# Patient Record
Sex: MALE | Race: WHITE | Employment: FULL TIME | URBAN - METROPOLITAN AREA
[De-identification: names, ages, dates, MRNs, and addresses within clinical notes are randomized per-mention and may not be internally consistent; named-entity substitution may affect disease eponyms.]

---

## 2018-10-16 ENCOUNTER — HOSPITAL ENCOUNTER (EMERGENCY)
Age: 58
Discharge: HOME | End: 2018-10-17
Attending: EMERGENCY MEDICINE
Payer: COMMERCIAL

## 2018-10-16 ENCOUNTER — APPOINTMENT (OUTPATIENT)
Dept: GENERAL RADIOLOGY | Age: 58
End: 2018-10-16
Attending: EMERGENCY MEDICINE
Payer: COMMERCIAL

## 2018-10-16 ENCOUNTER — APPOINTMENT (OUTPATIENT)
Dept: ULTRASOUND IMAGING | Age: 58
End: 2018-10-16
Attending: EMERGENCY MEDICINE
Payer: COMMERCIAL

## 2018-10-16 DIAGNOSIS — R07.9 CHEST PAIN, UNSPECIFIED TYPE: Primary | ICD-10-CM

## 2018-10-16 LAB
ALBUMIN SERPL-MCNC: 3.5 G/DL (ref 3.5–5)
ALBUMIN/GLOB SERPL: 0.9 {RATIO} (ref 1.2–3.5)
ALP SERPL-CCNC: 84 U/L (ref 50–136)
ALT SERPL-CCNC: 43 U/L (ref 12–65)
ANION GAP SERPL CALC-SCNC: 8 MMOL/L (ref 7–16)
AST SERPL-CCNC: 23 U/L (ref 15–37)
BASOPHILS # BLD: 0.1 K/UL (ref 0–0.2)
BASOPHILS NFR BLD: 1 % (ref 0–2)
BILIRUB SERPL-MCNC: 0.4 MG/DL (ref 0.2–1.1)
BUN SERPL-MCNC: 29 MG/DL (ref 6–23)
CALCIUM SERPL-MCNC: 8.9 MG/DL (ref 8.3–10.4)
CHLORIDE SERPL-SCNC: 103 MMOL/L (ref 98–107)
CO2 SERPL-SCNC: 27 MMOL/L (ref 21–32)
CREAT SERPL-MCNC: 1.7 MG/DL (ref 0.8–1.5)
DIFFERENTIAL METHOD BLD: NORMAL
EOSINOPHIL # BLD: 0.2 K/UL (ref 0–0.8)
EOSINOPHIL NFR BLD: 2 % (ref 0.5–7.8)
ERYTHROCYTE [DISTWIDTH] IN BLOOD BY AUTOMATED COUNT: 12.7 %
GLOBULIN SER CALC-MCNC: 4 G/DL (ref 2.3–3.5)
GLUCOSE SERPL-MCNC: 183 MG/DL (ref 65–100)
HCT VFR BLD AUTO: 42.3 % (ref 41.1–50.3)
HGB BLD-MCNC: 14.2 G/DL (ref 13.6–17.2)
IMM GRANULOCYTES # BLD: 0.1 K/UL (ref 0–0.5)
IMM GRANULOCYTES NFR BLD AUTO: 1 % (ref 0–5)
LYMPHOCYTES # BLD: 2.7 K/UL (ref 0.5–4.6)
LYMPHOCYTES NFR BLD: 27 % (ref 13–44)
MCH RBC QN AUTO: 29.5 PG (ref 26.1–32.9)
MCHC RBC AUTO-ENTMCNC: 33.6 G/DL (ref 31.4–35)
MCV RBC AUTO: 87.9 FL (ref 79.6–97.8)
MONOCYTES # BLD: 0.9 K/UL (ref 0.1–1.3)
MONOCYTES NFR BLD: 9 % (ref 4–12)
NEUTS SEG # BLD: 5.9 K/UL (ref 1.7–8.2)
NEUTS SEG NFR BLD: 60 % (ref 43–78)
NRBC # BLD: 0 K/UL (ref 0–0.2)
PLATELET # BLD AUTO: 225 K/UL (ref 150–450)
PMV BLD AUTO: 9.7 FL (ref 9.4–12.3)
POTASSIUM SERPL-SCNC: 3.7 MMOL/L (ref 3.5–5.1)
PROT SERPL-MCNC: 7.5 G/DL (ref 6.3–8.2)
RBC # BLD AUTO: 4.81 M/UL (ref 4.23–5.6)
SODIUM SERPL-SCNC: 138 MMOL/L (ref 136–145)
TROPONIN I SERPL-MCNC: <0.02 NG/ML (ref 0.02–0.05)
WBC # BLD AUTO: 9.8 K/UL (ref 4.3–11.1)

## 2018-10-16 PROCEDURE — 84484 ASSAY OF TROPONIN QUANT: CPT

## 2018-10-16 PROCEDURE — 85025 COMPLETE CBC W/AUTO DIFF WBC: CPT

## 2018-10-16 PROCEDURE — 71046 X-RAY EXAM CHEST 2 VIEWS: CPT

## 2018-10-16 PROCEDURE — 99285 EMERGENCY DEPT VISIT HI MDM: CPT | Performed by: EMERGENCY MEDICINE

## 2018-10-16 PROCEDURE — 93005 ELECTROCARDIOGRAM TRACING: CPT | Performed by: EMERGENCY MEDICINE

## 2018-10-16 PROCEDURE — 93971 EXTREMITY STUDY: CPT

## 2018-10-16 PROCEDURE — 80053 COMPREHEN METABOLIC PANEL: CPT

## 2018-10-17 VITALS
BODY MASS INDEX: 35.55 KG/M2 | WEIGHT: 240 LBS | HEART RATE: 54 BPM | SYSTOLIC BLOOD PRESSURE: 156 MMHG | RESPIRATION RATE: 18 BRPM | DIASTOLIC BLOOD PRESSURE: 85 MMHG | OXYGEN SATURATION: 97 % | TEMPERATURE: 98 F | HEIGHT: 69 IN

## 2018-10-17 LAB
ATRIAL RATE: 63 BPM
CALCULATED P AXIS, ECG09: 44 DEGREES
CALCULATED R AXIS, ECG10: 22 DEGREES
CALCULATED T AXIS, ECG11: 64 DEGREES
DIAGNOSIS, 93000: NORMAL
P-R INTERVAL, ECG05: 162 MS
Q-T INTERVAL, ECG07: 440 MS
QRS DURATION, ECG06: 104 MS
QTC CALCULATION (BEZET), ECG08: 450 MS
TROPONIN I BLD-MCNC: 0 NG/ML (ref 0.02–0.05)
VENTRICULAR RATE, ECG03: 63 BPM

## 2018-10-17 NOTE — ED PROVIDER NOTES
HPI Comments: Patient is a 59-year-old male who comes to the emergency department today with several complaints. He states he is having swellingin the left leg for about 4 days. Today he's been having some substernal chest pressure. He states he had a heart stent placed a few years ago. He thought that this pain was indigestion. He does admit to some shortness of breath. He took nitroglycerin several times with minimal minimal change. He also R he took aspirin today. Patient is a 62 y.o. male presenting with chest pain. The history is provided by the patient. Chest Pain (Angina) This is a new problem. The current episode started 12 to 24 hours ago. The problem has not changed since onset. The problem occurs constantly. The pain is associated with normal activity. The pain is present in the substernal region. The quality of the pain is described as pressure-like. The pain does not radiate. Associated symptoms include shortness of breath. Pertinent negatives include no abdominal pain, no cough, no diaphoresis, no fever, no hemoptysis, no irregular heartbeat, no palpitations, no sputum production and no vomiting. He has tried aspirin and nitroglycerin for the symptoms. Risk factors include hypertension and male gender. His past medical history is significant for HTN. Procedural history includes cardiac catheterization and cardiac stents. Past Medical History:  
Diagnosis Date  Gastrointestinal disorder   
 reflux  Hypertension  Obesity 10/6/2015  Other ill-defined conditions(799.89)   
 dyspnea since surgery, wheezing, SOB  Unstable angina ([facility] [address] 10/27/2016 Past Surgical History:  
Procedure Laterality Date  HX CHOLECYSTECTOMY  HX ORTHOPAEDIC    
 rotator cuff; knee  HX UROLOGICAL    
 kidney stone surgeries x 3 No family history on file. Social History Social History  Marital status:    Spouse name: N/A  
 Number of children: N/A  
  Years of education: N/A Occupational History  Not on file. Social History Main Topics  Smoking status: Never Smoker  Smokeless tobacco: Not on file  Alcohol use No  
 Drug use: No  
 Sexual activity: Not on file Other Topics Concern  Not on file Social History Narrative ALLERGIES: Latex; Hydrocodone-acetaminophen; and Tessalon perles [benzonatate] Review of Systems Constitutional: Negative for diaphoresis and fever. HENT: Negative. Eyes: Negative. Respiratory: Positive for shortness of breath. Negative for cough, hemoptysis and sputum production. Cardiovascular: Positive for chest pain and leg swelling. Negative for palpitations. Gastrointestinal: Negative for abdominal pain and vomiting. Endocrine: Negative. Genitourinary: Negative. Musculoskeletal: Negative. Neurological: Negative. Vitals:  
 10/16/18 2111 BP: (!) 180/102 Pulse: 66 Resp: 18 Temp: 98 °F (36.7 °C) SpO2: 96% Weight: 108.9 kg (240 lb) Height: 5' 9\" (1.753 m) Physical Exam  
Constitutional: He is oriented to person, place, and time. He appears well-developed and well-nourished. HENT:  
Head: Normocephalic and atraumatic. Neck: Normal range of motion. Neck supple. Cardiovascular: Normal rate, regular rhythm and intact distal pulses. Pulmonary/Chest: Effort normal and breath sounds normal. No respiratory distress. He exhibits tenderness. Abdominal: There is no tenderness. There is no rebound and no guarding. Musculoskeletal: He exhibits edema. Swelling to the left lower extremity Neurological: He is oriented to person, place, and time. He has normal strength. No cranial nerve deficit or sensory deficit. GCS eye subscore is 4. GCS verbal subscore is 5. GCS motor subscore is 6. Skin: Skin is warm and dry. No rash noted. Nursing note and vitals reviewed. MDM Number of Diagnoses or Management Options Diagnosis management comments: EKG shows normal sinus rhythm at a rate of 63 with no acute ischemia 12:14 AM 
Chest x-ray is negative, troponin 0 and repeat 2 hour troponin remained negative. Venous duplex scan of the left lower extremity is also negative for DVT. I went back and spoke to patient about all these results he has not followed up with cardiology in several years. I offered admission and further workup and he declines I will make a phone call referral to the [facility] cardiology referral line to have the patient contacted for expedited outpatient follow-up Voice dictation software was used during the making of this note. This software is not perfect and grammatical and other typographical errors may be present. This note has been proofread, but may still contain errors. Hyacinth Martin MD; 10/17/2018 @12:15 AM  
=================================================================== Amount and/or Complexity of Data Reviewed Clinical lab tests: ordered and reviewed Tests in the radiology section of CPT®: ordered and reviewed Review and summarize past medical records: yes Independent visualization of images, tracings, or specimens: yes Risk of Complications, Morbidity, and/or Mortality Presenting problems: moderate Diagnostic procedures: moderate Management options: moderate Patient Progress Patient progress: stable ED Course Procedures

## 2018-10-17 NOTE — DISCHARGE INSTRUCTIONS
Chest Pain: Care Instructions  Your Care Instructions    There are many things that can cause chest pain. Some are not serious and will get better on their own in a few days. But some kinds of chest pain need more testing and treatment. Your doctor may have recommended a follow-up visit in the next 8 to 12 hours. If you are not getting better, you may need more tests or treatment. Even though your doctor has released you, you still need to watch for any problems. The doctor carefully checked you, but sometimes problems can develop later. If you have new symptoms or if your symptoms do not get better, get medical care right away. If you have worse or different chest pain or pressure that lasts more than 5 minutes or you passed out (lost consciousness), call 911 or seek other emergency help right away. A medical visit is only one step in your treatment. Even if you feel better, you still need to do what your doctor recommends, such as going to all suggested follow-up appointments and taking medicines exactly as directed. This will help you recover and help prevent future problems. How can you care for yourself at home? · Rest until you feel better. · Take your medicine exactly as prescribed. Call your doctor if you think you are having a problem with your medicine. · Do not drive after taking a prescription pain medicine. When should you call for help? Call 911 if:    · You passed out (lost consciousness).     · You have severe difficulty breathing.     · You have symptoms of a heart attack. These may include:  ¨ Chest pain or pressure, or a strange feeling in your chest.  ¨ Sweating. ¨ Shortness of breath. ¨ Nausea or vomiting. ¨ Pain, pressure, or a strange feeling in your back, neck, jaw, or upper belly or in one or both shoulders or arms. ¨ Lightheadedness or sudden weakness. ¨ A fast or irregular heartbeat.   After you call 911, the  may tell you to chew 1 adult-strength or 2 to 4 low-dose aspirin. Wait for an ambulance. Do not try to drive yourself.    Call your doctor today if:    · You have any trouble breathing.     · Your chest pain gets worse.     · You are dizzy or lightheaded, or you feel like you may faint.     · You are not getting better as expected.     · You are having new or different chest pain. Where can you learn more? Go to http://jim-sharad.info/. Enter A120 in the search box to learn more about \"Chest Pain: Care Instructions. \"  Current as of: November 20, 2017  Content Version: 11.8  © 8925-9077 [employer]. Care instructions adapted under license by [employer] (which disclaims liability or warranty for this information). If you have questions about a medical condition or this instruction, always ask your healthcare professional. [address] any warranty or liability for your use of this information.

## 2018-10-17 NOTE — ED NOTES
I have reviewed discharge instructions with the patient. The patient verbalized understanding. Patient left ED via Discharge Method: ambulatory to Home with self. Opportunity for questions and clarification provided. Patient given 0 scripts. To continue your aftercare when you leave the hospital, you may receive an automated call from our care team to check in on how you are doing. This is a free service and part of our promise to provide the best care and service to meet your aftercare needs.  If you have questions, or wish to unsubscribe from this service please call [phone]. Thank you for Choosing our [facility] Emergency Department.

## 2018-10-17 NOTE — ED TRIAGE NOTES
Patient states chest pain that has been on going all day. Also states leg swelling for the past 3 days. States that he has taken nitro 2 times today with some relief. States that he took 81mg aspirin about 45 min PTA. States that pain is similar to indigestion but is intermittently sharp. States shortness of breath and pain in his left arm.

## 2018-11-29 ENCOUNTER — HOSPITAL ENCOUNTER (OUTPATIENT)
Dept: LAB | Age: 58
Discharge: HOME | End: 2018-11-29
Payer: COMMERCIAL

## 2018-11-29 DIAGNOSIS — I25.119 CORONARY ARTERY DISEASE INVOLVING NATIVE CORONARY ARTERY OF NATIVE HEART WITH ANGINA PECTORIS (HCC): ICD-10-CM

## 2018-11-29 PROBLEM — E78.5 DYSLIPIDEMIA, GOAL LDL BELOW 70: Status: ACTIVE | Noted: 2018-11-29

## 2018-11-29 LAB
ANION GAP SERPL CALC-SCNC: 8 MMOL/L
BASOPHILS # BLD: 0 K/UL (ref 0–0.2)
BASOPHILS NFR BLD: 0 % (ref 0–2)
BUN SERPL-MCNC: 22 MG/DL (ref 6–23)
CALCIUM SERPL-MCNC: 8.8 MG/DL (ref 8.3–10.4)
CHLORIDE SERPL-SCNC: 102 MMOL/L (ref 98–107)
CO2 SERPL-SCNC: 28 MMOL/L (ref 21–32)
CREAT SERPL-MCNC: 1.5 MG/DL (ref 0.8–1.5)
DIFFERENTIAL METHOD BLD: NORMAL
EOSINOPHIL # BLD: 0.1 K/UL (ref 0–0.8)
EOSINOPHIL NFR BLD: 1 % (ref 0.5–7.8)
ERYTHROCYTE [DISTWIDTH] IN BLOOD BY AUTOMATED COUNT: 12.4 % (ref 11.9–14.6)
GLUCOSE SERPL-MCNC: 218 MG/DL (ref 65–100)
HCT VFR BLD AUTO: 44 % (ref 41.1–50.3)
HGB BLD-MCNC: 15.1 G/DL (ref 13.6–17.2)
IMM GRANULOCYTES # BLD: 0.1 K/UL (ref 0–0.5)
IMM GRANULOCYTES NFR BLD AUTO: 1 % (ref 0–5)
INR PPP: 1
LYMPHOCYTES # BLD: 2.5 K/UL (ref 0.5–4.6)
LYMPHOCYTES NFR BLD: 25 % (ref 13–44)
MCH RBC QN AUTO: 29.7 PG (ref 26.1–32.9)
MCHC RBC AUTO-ENTMCNC: 34.3 G/DL (ref 31.4–35)
MCV RBC AUTO: 86.6 FL (ref 79.6–97.8)
MONOCYTES # BLD: 0.7 K/UL (ref 0.1–1.3)
MONOCYTES NFR BLD: 7 % (ref 4–12)
NEUTS SEG # BLD: 6.5 K/UL (ref 1.7–8.2)
NEUTS SEG NFR BLD: 65 % (ref 43–78)
NRBC # BLD: 0 K/UL (ref 0–0.2)
PLATELET # BLD AUTO: 189 K/UL (ref 150–450)
PMV BLD AUTO: 9.5 FL (ref 9.4–12.3)
POTASSIUM SERPL-SCNC: 3.9 MMOL/L (ref 3.5–5.1)
PROTHROMBIN TIME: 13 SEC (ref 11.5–14.5)
RBC # BLD AUTO: 5.08 M/UL (ref 4.23–5.6)
SODIUM SERPL-SCNC: 138 MMOL/L (ref 136–145)
WBC # BLD AUTO: 10 K/UL (ref 4.3–11.1)

## 2018-11-29 PROCEDURE — 36415 COLL VENOUS BLD VENIPUNCTURE: CPT

## 2018-11-29 PROCEDURE — 80048 BASIC METABOLIC PNL TOTAL CA: CPT

## 2018-11-29 PROCEDURE — 85025 COMPLETE CBC W/AUTO DIFF WBC: CPT

## 2018-11-29 PROCEDURE — 85610 PROTHROMBIN TIME: CPT

## 2018-12-05 NOTE — PROGRESS NOTES
Patient pre-assessment complete for [facility] poss with Dr Willy Felipe scheduled for 18 at 9:30am , arrival time 7:30am. Patient verified using . Patient instructed to bring all home medications in labeled bottles on the day of procedure. NPO status reinforced. Patient informed to take a full dose aspirin 325mg  or 81 mg x 4 on the day of procedure. Instructed they can take all other medications excluding vitamins & supplements. Patient verbalizes understanding of all instructions & denies any questions at this time.

## 2018-12-06 ENCOUNTER — HOSPITAL ENCOUNTER (OUTPATIENT)
Dept: CARDIAC CATH/INVASIVE PROCEDURES | Age: 58
Discharge: HOME | End: 2018-12-06
Attending: INTERNAL MEDICINE | Admitting: INTERNAL MEDICINE
Payer: COMMERCIAL

## 2018-12-06 VITALS
WEIGHT: 220 LBS | OXYGEN SATURATION: 96 % | TEMPERATURE: 98.1 F | HEIGHT: 69 IN | HEART RATE: 53 BPM | BODY MASS INDEX: 32.58 KG/M2 | RESPIRATION RATE: 16 BRPM | SYSTOLIC BLOOD PRESSURE: 184 MMHG | DIASTOLIC BLOOD PRESSURE: 86 MMHG

## 2018-12-06 LAB
ANION GAP SERPL CALC-SCNC: 6 MMOL/L (ref 7–16)
ATRIAL RATE: 46 BPM
BUN SERPL-MCNC: 18 MG/DL (ref 6–23)
CALCIUM SERPL-MCNC: 8.6 MG/DL (ref 8.3–10.4)
CALCULATED P AXIS, ECG09: 38 DEGREES
CALCULATED R AXIS, ECG10: 12 DEGREES
CALCULATED T AXIS, ECG11: 48 DEGREES
CHLORIDE SERPL-SCNC: 105 MMOL/L (ref 98–107)
CO2 SERPL-SCNC: 27 MMOL/L (ref 21–32)
CREAT SERPL-MCNC: 1.4 MG/DL (ref 0.8–1.5)
DIAGNOSIS, 93000: NORMAL
GLUCOSE SERPL-MCNC: 207 MG/DL (ref 65–100)
P-R INTERVAL, ECG05: 164 MS
POTASSIUM SERPL-SCNC: 4.1 MMOL/L (ref 3.5–5.1)
Q-T INTERVAL, ECG07: 456 MS
QRS DURATION, ECG06: 102 MS
QTC CALCULATION (BEZET), ECG08: 399 MS
SODIUM SERPL-SCNC: 138 MMOL/L (ref 136–145)
VENTRICULAR RATE, ECG03: 46 BPM

## 2018-12-06 PROCEDURE — 99152 MOD SED SAME PHYS/QHP 5/>YRS: CPT

## 2018-12-06 PROCEDURE — 93458 L HRT ARTERY/VENTRICLE ANGIO: CPT

## 2018-12-06 PROCEDURE — 93005 ELECTROCARDIOGRAM TRACING: CPT | Performed by: INTERNAL MEDICINE

## 2018-12-06 PROCEDURE — 77030004534 HC CATH ANGI DX INFN CARD -A

## 2018-12-06 PROCEDURE — 80048 BASIC METABOLIC PNL TOTAL CA: CPT

## 2018-12-06 PROCEDURE — 74011250636 HC RX REV CODE- 250/636: Performed by: INTERNAL MEDICINE

## 2018-12-06 PROCEDURE — 74011636320 HC RX REV CODE- 636/320: Performed by: INTERNAL MEDICINE

## 2018-12-06 PROCEDURE — 77030015766

## 2018-12-06 PROCEDURE — 77030019569 HC BND COMPR RAD TERU -B

## 2018-12-06 PROCEDURE — 74011250636 HC RX REV CODE- 250/636

## 2018-12-06 PROCEDURE — 74011000250 HC RX REV CODE- 250: Performed by: INTERNAL MEDICINE

## 2018-12-06 PROCEDURE — C1769 GUIDE WIRE: HCPCS

## 2018-12-06 PROCEDURE — C1894 INTRO/SHEATH, NON-LASER: HCPCS

## 2018-12-06 RX ORDER — GUAIFENESIN 100 MG/5ML
81 LIQUID (ML) ORAL ONCE
Status: DISCONTINUED | OUTPATIENT
Start: 2018-12-06 | End: 2018-12-06 | Stop reason: HOSPADM

## 2018-12-06 RX ORDER — FENTANYL CITRATE 50 UG/ML
25-50 INJECTION, SOLUTION INTRAMUSCULAR; INTRAVENOUS
Status: DISCONTINUED | OUTPATIENT
Start: 2018-12-06 | End: 2018-12-06 | Stop reason: HOSPADM

## 2018-12-06 RX ORDER — MIDAZOLAM HYDROCHLORIDE 1 MG/ML
.5-2 INJECTION, SOLUTION INTRAMUSCULAR; INTRAVENOUS
Status: DISCONTINUED | OUTPATIENT
Start: 2018-12-06 | End: 2018-12-06 | Stop reason: HOSPADM

## 2018-12-06 RX ORDER — LIDOCAINE HYDROCHLORIDE 20 MG/ML
1-20 INJECTION, SOLUTION EPIDURAL; INFILTRATION; INTRACAUDAL; PERINEURAL
Status: DISCONTINUED | OUTPATIENT
Start: 2018-12-06 | End: 2018-12-06 | Stop reason: HOSPADM

## 2018-12-06 RX ORDER — SODIUM CHLORIDE 9 MG/ML
75 INJECTION, SOLUTION INTRAVENOUS CONTINUOUS
Status: DISCONTINUED | OUTPATIENT
Start: 2018-12-06 | End: 2018-12-06 | Stop reason: HOSPADM

## 2018-12-06 RX ORDER — HEPARIN SODIUM 200 [USP'U]/100ML
3 INJECTION, SOLUTION INTRAVENOUS CONTINUOUS
Status: DISCONTINUED | OUTPATIENT
Start: 2018-12-06 | End: 2018-12-06 | Stop reason: HOSPADM

## 2018-12-06 RX ORDER — DIAZEPAM 5 MG/1
5 TABLET ORAL AS NEEDED
Status: DISCONTINUED | OUTPATIENT
Start: 2018-12-06 | End: 2018-12-06 | Stop reason: HOSPADM

## 2018-12-06 RX ADMIN — HEPARIN SODIUM 3 ML/HR: 5000 INJECTION, SOLUTION INTRAVENOUS; SUBCUTANEOUS at 09:41

## 2018-12-06 RX ADMIN — HEPARIN SODIUM 2 ML: 10000 INJECTION, SOLUTION INTRAVENOUS; SUBCUTANEOUS at 09:58

## 2018-12-06 RX ADMIN — LIDOCAINE HYDROCHLORIDE 60 MG: 20 INJECTION, SOLUTION EPIDURAL; INFILTRATION; INTRACAUDAL; PERINEURAL at 09:58

## 2018-12-06 RX ADMIN — MIDAZOLAM HYDROCHLORIDE 2 MG: 1 INJECTION, SOLUTION INTRAMUSCULAR; INTRAVENOUS at 09:54

## 2018-12-06 RX ADMIN — IOPAMIDOL 90 ML: 755 INJECTION, SOLUTION INTRAVENOUS at 10:13

## 2018-12-06 RX ADMIN — FENTANYL CITRATE 25 MCG: 50 INJECTION, SOLUTION INTRAMUSCULAR; INTRAVENOUS at 09:54

## 2018-12-06 RX ADMIN — SODIUM CHLORIDE 75 ML/HR: 900 INJECTION, SOLUTION INTRAVENOUS at 08:45

## 2018-12-06 NOTE — PROGRESS NOTES
Patient received to [address] [place] [address] room # 11  Ambulatory from [facility]. Patient scheduled for [facility] today with Dr Bryce Felipe. Procedure reviewed & questions answered, voiced good understanding consent obtained & placed on chart. All medications and medical history reviewed. Will prep patient per orders. Patient & family updated on plan of care. The patient has a fraility score of 3-MANAGING WELL, based on reports recent CP and SOB frequently

## 2018-12-06 NOTE — PROCEDURES
[address]  MR#: [id number]  : 1960  ACCOUNT #: [de-identified]   DATE OF SERVICE: 2018    CLINICAL INFORMATION:  The patient with chest pain, known coronary artery disease, referred for catheterization. PROCEDURE DETAILS:  After informed consent was obtained, a 6-Ecuadorean sheath was placed in the right radial artery. A cocktail was given by protocol. A 5-Ecuadorean Tiger catheter, JL3.5 catheter was used to cannulate the LAD. As there is a very short left main, an angled pigtail were used. TR band was placed at the end of the procedure. Conscious sedation was given under my supervision by Edilia Rendon RN, beginning at 9:55 concluding 10:15, a total of 2 mg Versed, 25 mcg fentanyl were given. Vital signs and saturations were stable throughout. FINDINGS:  The circumflex has proximal to this marginal branch 20% ostial narrowing. There was a large midvessel obtuse marginal branch with 30% smooth proximal narrowing. This vessel bifurcates distally. The small distal obtuse marginal branch. This was unchanged from previous catheterization films. The right coronary is small dominant vessel with no atherosclerosis seen throughout. The LAD is widely patent. There is a stent in the mid portion of the vessel that is widely patent with 20% narrowing proximal to the stent, there is a diagonal branch with  30-40% narrowing. Within it is a small diagonal branch and unchanged from previous catheterization films. Left ventriculogram reveals normal systolic function with an ejection fraction of 50-55%. Left ventricular end diastolic pressure 17 mmHg with an opening aortic pressure 122/74. No gradient across the aortic valve. CONCLUSION:  1. Mild coronary atherosclerotic heart disease with no high-grade lesions seen. 2.  Preserved left ventricular systolic function.       Scar Fierro MD       [facility] / SN  D: 12/06/2018 10:29     T: 12/06/2018 10:43  JOB #: [id number]

## 2018-12-06 NOTE — DISCHARGE INSTRUCTIONS

## 2018-12-06 NOTE — PROGRESS NOTES
TRANSFER - OUT REPORT: 
 
Verbal report given to Geovanni Members on [facility].  being transferred to [address] [place] [address] for routine progression of care Report consisted of patients Situation, Background, Assessment and Recommendations(SBAR). Information from the following report(s) SBAR, Kardex, Procedure Summary and MAR was reviewed with the receiving nurse. Opportunity for questions and clarification was provided. [facility] with Dr Ken Jones No intervention 2 versed 25 fentanyl Right radial Rband 12ml at 1015

## 2018-12-06 NOTE — PROGRESS NOTES
Report received from [facility] Cath Lab RN. Procedural findings communicated. Intra procedural  medication administration reviewed. Progression of care discussed. Patient received into [address] post sheath removal.  
 
Right  Radial access site without bleeding or swelling TR band dry and intact Patient instructed to limit movement to right upper extremity Routine post procedural vital signs and site assessment initiated

## 2021-01-06 ENCOUNTER — APPOINTMENT (OUTPATIENT)
Dept: GENERAL RADIOLOGY | Age: 61
DRG: 004 | End: 2021-01-06
Attending: EMERGENCY MEDICINE
Payer: COMMERCIAL

## 2021-01-06 ENCOUNTER — HOSPITAL ENCOUNTER (INPATIENT)
Age: 61
LOS: 50 days | Discharge: NURSING, REHAB OR LONG-TERM CARE | DRG: 004 | End: 2021-02-26
Attending: EMERGENCY MEDICINE | Admitting: HOSPITALIST
Payer: COMMERCIAL

## 2021-01-06 DIAGNOSIS — J96.01 ACUTE HYPOXEMIC RESPIRATORY FAILURE (HCC): ICD-10-CM

## 2021-01-06 DIAGNOSIS — I95.9 HYPOTENSION, UNSPECIFIED HYPOTENSION TYPE: ICD-10-CM

## 2021-01-06 DIAGNOSIS — I48.0 PAROXYSMAL ATRIAL FIBRILLATION (HCC): ICD-10-CM

## 2021-01-06 DIAGNOSIS — K92.2 LOWER GI BLEED REQUIRING MORE THAN 4 UNITS OF BLOOD IN 24 HOURS, ICU, OR SURGERY: ICD-10-CM

## 2021-01-06 DIAGNOSIS — A41.9 SEPTIC SHOCK (HCC): ICD-10-CM

## 2021-01-06 DIAGNOSIS — R06.00 DYSPNEA, UNSPECIFIED TYPE: ICD-10-CM

## 2021-01-06 DIAGNOSIS — R09.02 HYPOXIA: ICD-10-CM

## 2021-01-06 DIAGNOSIS — Z51.5 ENCOUNTER FOR PALLIATIVE CARE: ICD-10-CM

## 2021-01-06 DIAGNOSIS — U07.1 PNEUMONIA DUE TO COVID-19 VIRUS: ICD-10-CM

## 2021-01-06 DIAGNOSIS — R73.9 HYPERGLYCEMIA: ICD-10-CM

## 2021-01-06 DIAGNOSIS — E86.0 DEHYDRATION: ICD-10-CM

## 2021-01-06 DIAGNOSIS — I82.452 ACUTE DEEP VEIN THROMBOSIS (DVT) OF LEFT PERONEAL VEIN (HCC): ICD-10-CM

## 2021-01-06 DIAGNOSIS — I25.118 CORONARY ARTERY DISEASE OF NATIVE ARTERY OF NATIVE HEART WITH STABLE ANGINA PECTORIS (HCC): ICD-10-CM

## 2021-01-06 DIAGNOSIS — E87.0 HYPERNATREMIA: ICD-10-CM

## 2021-01-06 DIAGNOSIS — J12.82 PNEUMONIA DUE TO COVID-19 VIRUS: ICD-10-CM

## 2021-01-06 DIAGNOSIS — E66.09 CLASS 1 OBESITY DUE TO EXCESS CALORIES WITHOUT SERIOUS COMORBIDITY WITH BODY MASS INDEX (BMI) OF 30.0 TO 30.9 IN ADULT: ICD-10-CM

## 2021-01-06 DIAGNOSIS — R00.1 BRADYCARDIA: ICD-10-CM

## 2021-01-06 DIAGNOSIS — R41.0 CONFUSION: ICD-10-CM

## 2021-01-06 DIAGNOSIS — R65.21 SEPTIC SHOCK (HCC): ICD-10-CM

## 2021-01-06 DIAGNOSIS — G62.81 CRITICAL ILLNESS POLYNEUROPATHY (HCC): ICD-10-CM

## 2021-01-06 DIAGNOSIS — U07.1 COVID-19: Primary | ICD-10-CM

## 2021-01-06 DIAGNOSIS — N17.9 AKI (ACUTE KIDNEY INJURY) (HCC): ICD-10-CM

## 2021-01-06 DIAGNOSIS — G93.40 ENCEPHALOPATHY: ICD-10-CM

## 2021-01-06 DIAGNOSIS — R54 FRAILTY: ICD-10-CM

## 2021-01-06 DIAGNOSIS — I48.91 ATRIAL FIBRILLATION, UNSPECIFIED TYPE (HCC): ICD-10-CM

## 2021-01-06 DIAGNOSIS — J80 ARDS (ADULT RESPIRATORY DISTRESS SYNDROME) (HCC): ICD-10-CM

## 2021-01-06 DIAGNOSIS — J95.851 VAP (VENTILATOR-ASSOCIATED PNEUMONIA) (HCC): ICD-10-CM

## 2021-01-06 DIAGNOSIS — R50.9 FEVER, UNSPECIFIED FEVER CAUSE: ICD-10-CM

## 2021-01-06 DIAGNOSIS — D64.9 ANEMIA, UNSPECIFIED TYPE: ICD-10-CM

## 2021-01-06 DIAGNOSIS — E11.00: ICD-10-CM

## 2021-01-06 LAB
ALBUMIN SERPL-MCNC: 3.1 G/DL (ref 3.2–4.6)
ALBUMIN/GLOB SERPL: 0.7 {RATIO} (ref 1.2–3.5)
ALP SERPL-CCNC: 88 U/L (ref 50–136)
ALT SERPL-CCNC: 45 U/L (ref 12–65)
ANION GAP SERPL CALC-SCNC: 9 MMOL/L (ref 7–16)
AST SERPL-CCNC: 33 U/L (ref 15–37)
BASOPHILS # BLD: 0 K/UL (ref 0–0.2)
BASOPHILS NFR BLD: 0 % (ref 0–2)
BILIRUB SERPL-MCNC: 0.6 MG/DL (ref 0.2–1.1)
BUN SERPL-MCNC: 40 MG/DL (ref 8–23)
CALCIUM SERPL-MCNC: 9.3 MG/DL (ref 8.3–10.4)
CHLORIDE SERPL-SCNC: 107 MMOL/L (ref 98–107)
CO2 SERPL-SCNC: 27 MMOL/L (ref 21–32)
CREAT SERPL-MCNC: 2.61 MG/DL (ref 0.8–1.5)
DIFFERENTIAL METHOD BLD: ABNORMAL
EOSINOPHIL # BLD: 0 K/UL (ref 0–0.8)
EOSINOPHIL NFR BLD: 0 % (ref 0.5–7.8)
ERYTHROCYTE [DISTWIDTH] IN BLOOD BY AUTOMATED COUNT: 12.9 % (ref 11.9–14.6)
GLOBULIN SER CALC-MCNC: 4.7 G/DL (ref 2.3–3.5)
GLUCOSE SERPL-MCNC: 760 MG/DL (ref 65–100)
HCT VFR BLD AUTO: 55.1 % (ref 41.1–50.3)
HGB BLD-MCNC: 18.2 G/DL (ref 13.6–17.2)
IMM GRANULOCYTES # BLD AUTO: 0.1 K/UL (ref 0–0.5)
IMM GRANULOCYTES NFR BLD AUTO: 1 % (ref 0–5)
LYMPHOCYTES # BLD: 0.5 K/UL (ref 0.5–4.6)
LYMPHOCYTES NFR BLD: 6 % (ref 13–44)
MCH RBC QN AUTO: 30.2 PG (ref 26.1–32.9)
MCHC RBC AUTO-ENTMCNC: 33 G/DL (ref 31.4–35)
MCV RBC AUTO: 91.4 FL (ref 79.6–97.8)
MONOCYTES # BLD: 0.8 K/UL (ref 0.1–1.3)
MONOCYTES NFR BLD: 9 % (ref 4–12)
NEUTS SEG # BLD: 7.2 K/UL (ref 1.7–8.2)
NEUTS SEG NFR BLD: 84 % (ref 43–78)
NRBC # BLD: 0 K/UL (ref 0–0.2)
PLATELET # BLD AUTO: 137 K/UL (ref 150–450)
PMV BLD AUTO: 11.3 FL (ref 9.4–12.3)
POTASSIUM SERPL-SCNC: 4.5 MMOL/L (ref 3.5–5.1)
PROT SERPL-MCNC: 7.8 G/DL (ref 6.3–8.2)
RBC # BLD AUTO: 6.03 M/UL (ref 4.23–5.6)
SARS-COV-2, NAA: DETECTED
SODIUM SERPL-SCNC: 143 MMOL/L (ref 136–145)
WBC # BLD AUTO: 8.6 K/UL (ref 4.3–11.1)

## 2021-01-06 PROCEDURE — 83690 ASSAY OF LIPASE: CPT

## 2021-01-06 PROCEDURE — 71045 X-RAY EXAM CHEST 1 VIEW: CPT

## 2021-01-06 PROCEDURE — 85025 COMPLETE CBC W/AUTO DIFF WBC: CPT

## 2021-01-06 PROCEDURE — 74011636637 HC RX REV CODE- 636/637: Performed by: EMERGENCY MEDICINE

## 2021-01-06 PROCEDURE — 74011250636 HC RX REV CODE- 250/636: Performed by: EMERGENCY MEDICINE

## 2021-01-06 PROCEDURE — 96375 TX/PRO/DX INJ NEW DRUG ADDON: CPT

## 2021-01-06 PROCEDURE — 96374 THER/PROPH/DIAG INJ IV PUSH: CPT

## 2021-01-06 PROCEDURE — 83605 ASSAY OF LACTIC ACID: CPT

## 2021-01-06 PROCEDURE — 99284 EMERGENCY DEPT VISIT MOD MDM: CPT

## 2021-01-06 PROCEDURE — 84145 PROCALCITONIN (PCT): CPT

## 2021-01-06 PROCEDURE — 85379 FIBRIN DEGRADATION QUANT: CPT

## 2021-01-06 PROCEDURE — 81003 URINALYSIS AUTO W/O SCOPE: CPT

## 2021-01-06 PROCEDURE — 80053 COMPREHEN METABOLIC PANEL: CPT

## 2021-01-06 RX ORDER — ONDANSETRON 2 MG/ML
4 INJECTION INTRAMUSCULAR; INTRAVENOUS
Status: COMPLETED | OUTPATIENT
Start: 2021-01-06 | End: 2021-01-06

## 2021-01-06 RX ADMIN — SODIUM CHLORIDE 1000 ML: 900 INJECTION, SOLUTION INTRAVENOUS at 23:44

## 2021-01-06 RX ADMIN — SODIUM CHLORIDE 1000 ML: 900 INJECTION, SOLUTION INTRAVENOUS at 23:43

## 2021-01-06 RX ADMIN — INSULIN HUMAN 10 UNITS: 100 INJECTION, SOLUTION PARENTERAL at 23:44

## 2021-01-06 RX ADMIN — ONDANSETRON 4 MG: 2 INJECTION INTRAMUSCULAR; INTRAVENOUS at 23:44

## 2021-01-07 PROBLEM — U07.1 LOWER RESPIRATORY TRACT INFECTION DUE TO COVID-19 VIRUS: Status: ACTIVE | Noted: 2021-01-07

## 2021-01-07 PROBLEM — J22 LOWER RESPIRATORY TRACT INFECTION DUE TO COVID-19 VIRUS: Status: ACTIVE | Noted: 2021-01-07

## 2021-01-07 PROBLEM — J96.01 ACUTE HYPOXEMIC RESPIRATORY FAILURE (HCC): Status: ACTIVE | Noted: 2021-01-07

## 2021-01-07 PROBLEM — E11.00 TYPE 2 DIABETES MELLITUS WITH HYPEROSMOLARITY WITHOUT NONKETOTIC HYPERGLYCEMIC-HYPEROSMOLAR COMA (HCC): Status: ACTIVE | Noted: 2021-01-07

## 2021-01-07 PROBLEM — N17.9 AKI (ACUTE KIDNEY INJURY) (HCC): Status: ACTIVE | Noted: 2021-01-07

## 2021-01-07 LAB
25(OH)D3 SERPL-MCNC: 19.5 NG/ML (ref 30–100)
ABO + RH BLD: NORMAL
ALBUMIN SERPL-MCNC: 2.5 G/DL (ref 3.2–4.6)
ALBUMIN/GLOB SERPL: 0.6 {RATIO} (ref 1.2–3.5)
ALP SERPL-CCNC: 75 U/L (ref 50–136)
ALT SERPL-CCNC: 37 U/L (ref 12–65)
ANION GAP SERPL CALC-SCNC: 8 MMOL/L (ref 7–16)
APTT PPP: 29.2 SEC (ref 24.1–35.1)
AST SERPL-CCNC: 28 U/L (ref 15–37)
BACTERIA URNS QL MICRO: 0 /HPF
BILIRUB SERPL-MCNC: 0.4 MG/DL (ref 0.2–1.1)
BLOOD GROUP ANTIBODIES SERPL: NORMAL
BUN SERPL-MCNC: 38 MG/DL (ref 8–23)
CALCIUM SERPL-MCNC: 8.5 MG/DL (ref 8.3–10.4)
CASTS URNS QL MICRO: NORMAL /LPF
CHLORIDE SERPL-SCNC: 117 MMOL/L (ref 98–107)
CO2 SERPL-SCNC: 23 MMOL/L (ref 21–32)
CREAT SERPL-MCNC: 1.91 MG/DL (ref 0.8–1.5)
CRP SERPL-MCNC: 4.5 MG/DL (ref 0–0.9)
CRYSTALS URNS QL MICRO: 0 /LPF
D DIMER PPP FEU-MCNC: 0.43 UG/ML(FEU)
EPI CELLS #/AREA URNS HPF: NORMAL /HPF
EST. AVERAGE GLUCOSE BLD GHB EST-MCNC: 298 MG/DL
FERRITIN SERPL-MCNC: 1186 NG/ML (ref 8–388)
FIBRINOGEN PPP-MCNC: 592 MG/DL (ref 190–501)
GLOBULIN SER CALC-MCNC: 4 G/DL (ref 2.3–3.5)
GLUCOSE BLD STRIP.AUTO-MCNC: 366 MG/DL (ref 65–100)
GLUCOSE BLD STRIP.AUTO-MCNC: 438 MG/DL (ref 65–100)
GLUCOSE BLD STRIP.AUTO-MCNC: 485 MG/DL (ref 65–100)
GLUCOSE BLD STRIP.AUTO-MCNC: 554 MG/DL (ref 65–100)
GLUCOSE BLD STRIP.AUTO-MCNC: 573 MG/DL (ref 65–100)
GLUCOSE SERPL-MCNC: 452 MG/DL (ref 65–100)
HBA1C MFR BLD: 12 % (ref 4.2–6.3)
INR PPP: 1.1
LACTATE SERPL-SCNC: 1.6 MMOL/L (ref 0.4–2)
LACTATE SERPL-SCNC: 4.1 MMOL/L (ref 0.4–2)
LDH SERPL L TO P-CCNC: 296 U/L (ref 100–190)
LIPASE SERPL-CCNC: 653 U/L (ref 73–393)
MUCOUS THREADS URNS QL MICRO: 0 /LPF
OTHER OBSERVATIONS,UCOM: NORMAL
POTASSIUM SERPL-SCNC: 4.8 MMOL/L (ref 3.5–5.1)
PROCALCITONIN SERPL-MCNC: 0.25 NG/ML
PROT SERPL-MCNC: 6.5 G/DL (ref 6.3–8.2)
PROTHROMBIN TIME: 14.3 SEC (ref 12.5–14.7)
RBC #/AREA URNS HPF: >100 /HPF
SODIUM SERPL-SCNC: 148 MMOL/L (ref 136–145)
SPECIMEN EXP DATE BLD: NORMAL
WBC URNS QL MICRO: NORMAL /HPF

## 2021-01-07 PROCEDURE — 80053 COMPREHEN METABOLIC PANEL: CPT

## 2021-01-07 PROCEDURE — 74011250636 HC RX REV CODE- 250/636: Performed by: HOSPITALIST

## 2021-01-07 PROCEDURE — 83605 ASSAY OF LACTIC ACID: CPT

## 2021-01-07 PROCEDURE — 86901 BLOOD TYPING SEROLOGIC RH(D): CPT

## 2021-01-07 PROCEDURE — 36415 COLL VENOUS BLD VENIPUNCTURE: CPT

## 2021-01-07 PROCEDURE — 83036 HEMOGLOBIN GLYCOSYLATED A1C: CPT

## 2021-01-07 PROCEDURE — 82962 GLUCOSE BLOOD TEST: CPT

## 2021-01-07 PROCEDURE — 97166 OT EVAL MOD COMPLEX 45 MIN: CPT

## 2021-01-07 PROCEDURE — 97110 THERAPEUTIC EXERCISES: CPT

## 2021-01-07 PROCEDURE — 74011000258 HC RX REV CODE- 258: Performed by: EMERGENCY MEDICINE

## 2021-01-07 PROCEDURE — 74011636637 HC RX REV CODE- 636/637: Performed by: HOSPITALIST

## 2021-01-07 PROCEDURE — 74011250636 HC RX REV CODE- 250/636: Performed by: EMERGENCY MEDICINE

## 2021-01-07 PROCEDURE — 74011250637 HC RX REV CODE- 250/637: Performed by: HOSPITALIST

## 2021-01-07 PROCEDURE — 86140 C-REACTIVE PROTEIN: CPT

## 2021-01-07 PROCEDURE — 96365 THER/PROPH/DIAG IV INF INIT: CPT

## 2021-01-07 PROCEDURE — 97530 THERAPEUTIC ACTIVITIES: CPT

## 2021-01-07 PROCEDURE — 36430 TRANSFUSION BLD/BLD COMPNT: CPT

## 2021-01-07 PROCEDURE — 74011636637 HC RX REV CODE- 636/637: Performed by: INTERNAL MEDICINE

## 2021-01-07 PROCEDURE — 97161 PT EVAL LOW COMPLEX 20 MIN: CPT

## 2021-01-07 PROCEDURE — 85384 FIBRINOGEN ACTIVITY: CPT

## 2021-01-07 PROCEDURE — 83615 LACTATE (LD) (LDH) ENZYME: CPT

## 2021-01-07 PROCEDURE — 85610 PROTHROMBIN TIME: CPT

## 2021-01-07 PROCEDURE — 65270000029 HC RM PRIVATE

## 2021-01-07 PROCEDURE — 96375 TX/PRO/DX INJ NEW DRUG ADDON: CPT

## 2021-01-07 PROCEDURE — 82306 VITAMIN D 25 HYDROXY: CPT

## 2021-01-07 PROCEDURE — 81015 MICROSCOPIC EXAM OF URINE: CPT

## 2021-01-07 PROCEDURE — 74011250636 HC RX REV CODE- 250/636: Performed by: INTERNAL MEDICINE

## 2021-01-07 PROCEDURE — XW13325 TRANSFUSION OF CONVALESCENT PLASMA (NONAUTOLOGOUS) INTO PERIPHERAL VEIN, PERCUTANEOUS APPROACH, NEW TECHNOLOGY GROUP 5: ICD-10-PCS | Performed by: INTERNAL MEDICINE

## 2021-01-07 PROCEDURE — 85730 THROMBOPLASTIN TIME PARTIAL: CPT

## 2021-01-07 PROCEDURE — 87040 BLOOD CULTURE FOR BACTERIA: CPT

## 2021-01-07 PROCEDURE — 82728 ASSAY OF FERRITIN: CPT

## 2021-01-07 PROCEDURE — 2709999900 HC NON-CHARGEABLE SUPPLY

## 2021-01-07 RX ORDER — GUAIFENESIN 100 MG/5ML
81 LIQUID (ML) ORAL DAILY
Status: DISCONTINUED | OUTPATIENT
Start: 2021-01-07 | End: 2021-01-17

## 2021-01-07 RX ORDER — GUAIFENESIN/DEXTROMETHORPHAN 100-10MG/5
10 SYRUP ORAL
Status: DISCONTINUED | OUTPATIENT
Start: 2021-01-07 | End: 2021-01-24

## 2021-01-07 RX ORDER — INSULIN LISPRO 100 [IU]/ML
3 INJECTION, SOLUTION INTRAVENOUS; SUBCUTANEOUS ONCE
Status: COMPLETED | OUTPATIENT
Start: 2021-01-07 | End: 2021-01-07

## 2021-01-07 RX ORDER — CLOPIDOGREL BISULFATE 75 MG/1
75 TABLET ORAL DAILY
Status: DISCONTINUED | OUTPATIENT
Start: 2021-01-07 | End: 2021-01-17

## 2021-01-07 RX ORDER — METOPROLOL TARTRATE 50 MG/1
50 TABLET ORAL 2 TIMES DAILY
Status: DISCONTINUED | OUTPATIENT
Start: 2021-01-07 | End: 2021-01-17

## 2021-01-07 RX ORDER — SODIUM CHLORIDE 0.9 % (FLUSH) 0.9 %
5-40 SYRINGE (ML) INJECTION EVERY 8 HOURS
Status: DISCONTINUED | OUTPATIENT
Start: 2021-01-07 | End: 2021-01-16 | Stop reason: SDUPTHER

## 2021-01-07 RX ORDER — ATORVASTATIN CALCIUM 80 MG/1
80 TABLET, FILM COATED ORAL DAILY
Status: DISCONTINUED | OUTPATIENT
Start: 2021-01-07 | End: 2021-01-17

## 2021-01-07 RX ORDER — INSULIN LISPRO 100 [IU]/ML
8 INJECTION, SOLUTION INTRAVENOUS; SUBCUTANEOUS ONCE
Status: COMPLETED | OUTPATIENT
Start: 2021-01-07 | End: 2021-01-07

## 2021-01-07 RX ORDER — HEPARIN SODIUM 5000 [USP'U]/ML
5000 INJECTION, SOLUTION INTRAVENOUS; SUBCUTANEOUS EVERY 8 HOURS
Status: DISCONTINUED | OUTPATIENT
Start: 2021-01-07 | End: 2021-01-10

## 2021-01-07 RX ORDER — SODIUM CHLORIDE 9 MG/ML
100 INJECTION, SOLUTION INTRAVENOUS CONTINUOUS
Status: DISCONTINUED | OUTPATIENT
Start: 2021-01-07 | End: 2021-01-08

## 2021-01-07 RX ORDER — INSULIN LISPRO 100 [IU]/ML
1 INJECTION, SOLUTION INTRAVENOUS; SUBCUTANEOUS ONCE
Status: COMPLETED | OUTPATIENT
Start: 2021-01-07 | End: 2021-01-07

## 2021-01-07 RX ORDER — DEXTROSE 50 % IN WATER (D50W) INTRAVENOUS SYRINGE
25-50 AS NEEDED
Status: DISCONTINUED | OUTPATIENT
Start: 2021-01-07 | End: 2021-02-26 | Stop reason: HOSPADM

## 2021-01-07 RX ORDER — ONDANSETRON 2 MG/ML
4 INJECTION INTRAMUSCULAR; INTRAVENOUS
Status: DISCONTINUED | OUTPATIENT
Start: 2021-01-07 | End: 2021-01-24

## 2021-01-07 RX ORDER — HYDRALAZINE HYDROCHLORIDE 20 MG/ML
20 INJECTION INTRAMUSCULAR; INTRAVENOUS
Status: DISCONTINUED | OUTPATIENT
Start: 2021-01-07 | End: 2021-02-26 | Stop reason: HOSPADM

## 2021-01-07 RX ORDER — SODIUM CHLORIDE 0.9 % (FLUSH) 0.9 %
5-40 SYRINGE (ML) INJECTION AS NEEDED
Status: DISCONTINUED | OUTPATIENT
Start: 2021-01-07 | End: 2021-01-15 | Stop reason: SDUPTHER

## 2021-01-07 RX ORDER — LANOLIN ALCOHOL/MO/W.PET/CERES
400 CREAM (GRAM) TOPICAL DAILY
Status: DISCONTINUED | OUTPATIENT
Start: 2021-01-07 | End: 2021-01-17

## 2021-01-07 RX ORDER — DEXTROSE 40 %
15 GEL (GRAM) ORAL AS NEEDED
Status: DISCONTINUED | OUTPATIENT
Start: 2021-01-07 | End: 2021-02-18

## 2021-01-07 RX ORDER — UREA 10 %
220 LOTION (ML) TOPICAL DAILY
Status: DISCONTINUED | OUTPATIENT
Start: 2021-01-07 | End: 2021-01-17

## 2021-01-07 RX ORDER — ASCORBIC ACID 500 MG
1000 TABLET ORAL 2 TIMES DAILY
Status: DISCONTINUED | OUTPATIENT
Start: 2021-01-07 | End: 2021-01-17

## 2021-01-07 RX ORDER — INSULIN LISPRO 100 [IU]/ML
5 INJECTION, SOLUTION INTRAVENOUS; SUBCUTANEOUS ONCE
Status: COMPLETED | OUTPATIENT
Start: 2021-01-07 | End: 2021-01-07

## 2021-01-07 RX ORDER — POLYETHYLENE GLYCOL 3350 17 G/17G
17 POWDER, FOR SOLUTION ORAL DAILY PRN
Status: DISCONTINUED | OUTPATIENT
Start: 2021-01-07 | End: 2021-01-24

## 2021-01-07 RX ORDER — INSULIN LISPRO 100 [IU]/ML
INJECTION, SOLUTION INTRAVENOUS; SUBCUTANEOUS
Status: DISCONTINUED | OUTPATIENT
Start: 2021-01-07 | End: 2021-01-14

## 2021-01-07 RX ORDER — DEXAMETHASONE 4 MG/1
6 TABLET ORAL DAILY
Status: COMPLETED | OUTPATIENT
Start: 2021-01-07 | End: 2021-01-16

## 2021-01-07 RX ORDER — INSULIN GLARGINE 100 [IU]/ML
10 INJECTION, SOLUTION SUBCUTANEOUS DAILY
Status: DISCONTINUED | OUTPATIENT
Start: 2021-01-07 | End: 2021-01-07

## 2021-01-07 RX ORDER — PROMETHAZINE HYDROCHLORIDE 25 MG/1
12.5 TABLET ORAL
Status: DISCONTINUED | OUTPATIENT
Start: 2021-01-07 | End: 2021-01-24

## 2021-01-07 RX ORDER — ACETAMINOPHEN 325 MG/1
650 TABLET ORAL
Status: DISCONTINUED | OUTPATIENT
Start: 2021-01-07 | End: 2021-01-24

## 2021-01-07 RX ORDER — INSULIN GLARGINE 100 [IU]/ML
18 INJECTION, SOLUTION SUBCUTANEOUS DAILY
Status: DISCONTINUED | OUTPATIENT
Start: 2021-01-07 | End: 2021-01-08

## 2021-01-07 RX ORDER — ALBUTEROL SULFATE 90 UG/1
2 AEROSOL, METERED RESPIRATORY (INHALATION)
Status: DISCONTINUED | OUTPATIENT
Start: 2021-01-07 | End: 2021-02-26 | Stop reason: HOSPADM

## 2021-01-07 RX ORDER — SODIUM CHLORIDE 9 MG/ML
250 INJECTION, SOLUTION INTRAVENOUS AS NEEDED
Status: DISCONTINUED | OUTPATIENT
Start: 2021-01-07 | End: 2021-02-04

## 2021-01-07 RX ADMIN — Medication 220 MG: at 08:37

## 2021-01-07 RX ADMIN — METOPROLOL TARTRATE 50 MG: 50 TABLET, FILM COATED ORAL at 08:37

## 2021-01-07 RX ADMIN — Medication 1000 MG: at 17:10

## 2021-01-07 RX ADMIN — CLOPIDOGREL BISULFATE 75 MG: 75 TABLET ORAL at 08:37

## 2021-01-07 RX ADMIN — INSULIN LISPRO 5 UNITS: 100 INJECTION, SOLUTION INTRAVENOUS; SUBCUTANEOUS at 23:00

## 2021-01-07 RX ADMIN — AZITHROMYCIN 500 MG: 500 INJECTION, POWDER, LYOPHILIZED, FOR SOLUTION INTRAVENOUS at 01:35

## 2021-01-07 RX ADMIN — HEPARIN SODIUM 5000 UNITS: 5000 INJECTION INTRAVENOUS; SUBCUTANEOUS at 13:40

## 2021-01-07 RX ADMIN — INSULIN LISPRO 8 UNITS: 100 INJECTION, SOLUTION INTRAVENOUS; SUBCUTANEOUS at 16:50

## 2021-01-07 RX ADMIN — INSULIN LISPRO 1 UNITS: 100 INJECTION, SOLUTION INTRAVENOUS; SUBCUTANEOUS at 08:48

## 2021-01-07 RX ADMIN — Medication 10 ML: at 13:40

## 2021-01-07 RX ADMIN — ASPIRIN 81 MG 81 MG: 81 TABLET ORAL at 08:37

## 2021-01-07 RX ADMIN — METOPROLOL TARTRATE 50 MG: 50 TABLET, FILM COATED ORAL at 17:10

## 2021-01-07 RX ADMIN — MAGNESIUM GLUCONATE 500 MG ORAL TABLET 400 MG: 500 TABLET ORAL at 08:37

## 2021-01-07 RX ADMIN — INSULIN LISPRO 10 UNITS: 100 INJECTION, SOLUTION INTRAVENOUS; SUBCUTANEOUS at 21:34

## 2021-01-07 RX ADMIN — Medication 10 ML: at 01:52

## 2021-01-07 RX ADMIN — DEXAMETHASONE 6 MG: 4 TABLET ORAL at 02:18

## 2021-01-07 RX ADMIN — INSULIN LISPRO 10 UNITS: 100 INJECTION, SOLUTION INTRAVENOUS; SUBCUTANEOUS at 16:50

## 2021-01-07 RX ADMIN — ACETAMINOPHEN 650 MG: 325 TABLET, FILM COATED ORAL at 22:42

## 2021-01-07 RX ADMIN — HUMAN INSULIN 15 UNITS: 100 INJECTION, SOLUTION SUBCUTANEOUS at 01:45

## 2021-01-07 RX ADMIN — Medication 1000 MG: at 08:37

## 2021-01-07 RX ADMIN — INSULIN GLARGINE 18 UNITS: 100 INJECTION, SOLUTION SUBCUTANEOUS at 10:31

## 2021-01-07 RX ADMIN — INSULIN LISPRO 3 UNITS: 100 INJECTION, SOLUTION INTRAVENOUS; SUBCUTANEOUS at 12:09

## 2021-01-07 RX ADMIN — HYDRALAZINE HYDROCHLORIDE 20 MG: 20 INJECTION, SOLUTION INTRAMUSCULAR; INTRAVENOUS at 11:35

## 2021-01-07 RX ADMIN — INSULIN LISPRO 10 UNITS: 100 INJECTION, SOLUTION INTRAVENOUS; SUBCUTANEOUS at 11:35

## 2021-01-07 RX ADMIN — Medication 10 ML: at 05:57

## 2021-01-07 RX ADMIN — HEPARIN SODIUM 5000 UNITS: 5000 INJECTION INTRAVENOUS; SUBCUTANEOUS at 21:40

## 2021-01-07 RX ADMIN — Medication 10 ML: at 21:40

## 2021-01-07 RX ADMIN — CEFTRIAXONE 2 G: 2 INJECTION, POWDER, FOR SOLUTION INTRAMUSCULAR; INTRAVENOUS at 01:19

## 2021-01-07 RX ADMIN — ATORVASTATIN CALCIUM 80 MG: 80 TABLET, FILM COATED ORAL at 08:37

## 2021-01-07 RX ADMIN — HEPARIN SODIUM 5000 UNITS: 5000 INJECTION INTRAVENOUS; SUBCUTANEOUS at 05:57

## 2021-01-07 RX ADMIN — SODIUM CHLORIDE 100 ML/HR: 900 INJECTION, SOLUTION INTRAVENOUS at 02:18

## 2021-01-07 RX ADMIN — INSULIN LISPRO 10 UNITS: 100 INJECTION, SOLUTION INTRAVENOUS; SUBCUTANEOUS at 08:36

## 2021-01-07 NOTE — ACP (ADVANCE CARE PLANNING)
Chart reviewed by CM, for ACP. Per chart review, no advance directive on file/HCPOA. CM attempted to complete ACP discussion with patient. Patient declined discussion regarding ventilation and resuscitation. However, patient confirmed his primary decision maker is Rika Valerio(Sister) [phone]. CM was receptive. CM remains available.

## 2021-01-07 NOTE — DISCHARGE INSTR - DIET
Discharge Nutrition Plan: Outpatient Diabetes Education Counseling recommended to patient- more information at [phone].

## 2021-01-07 NOTE — DIABETES MGMT
Patient admitted with lower respiratory tract infection due to COVID-19 virus. Admitting blood glucose 760. Patient received Regular 25 units and Decadron 6mg. Blood glucose this morning was 438. Reviewed patient current regimen: Lantus 10 units daily (held per [facility]), Humalog SSI, and Humalog 1 unit once. Updated provider via [employer] regarding patient glycemic control and to clarify dose of Humalog 1 unit once. Patient would likely benefit from weight based basal insulin as fasting blood glucose is not at goal. Spoke with provider the Humalog 1 unit ordered in addition to 10 units of SSI. New orders received from provider to increase Lantus to 18 units and start dose daily. Patient may benefit from prandial insulin pending glycemic control due to steroid therapy. Noted per chart review patient has confusion last night spoke with primary RN who states patient is now alert and oriented. Plan to set up telehealth for diabetes education today as patient condition allows. Diabetes management consult acknowledged for new diagnosis teaching. A1c 12 (eAG 298).

## 2021-01-07 NOTE — PROGRESS NOTES
Hourly rounds completed on patient. Patient is a lot more oriented, he was able to answer all questions appropriately. Patient denies any needs at this time. Will report to oncoming nurse.

## 2021-01-07 NOTE — ED PROVIDER NOTES
Pt seen at [facility] yesterday. Note follows. He states for last 5 days he has had some cough and congestion. He states it started with some rhinorrhea and sore throat. He had subjective fever. Those complaints are resolved after the first day or 2. He has had body aches, decreased smell and taste. Some nausea and vomiting. Last episode of vomiting was yesterday. Has kept oral intake down today. Has had loose stools. No chest pain or shortness of breath. Has been generally fatigued. Covid test yesterday came back positive. Patient has had some intermittent confusion at home with continued nausea and vomiting anytime he eats or drinks. Has some dizziness and mild cough today. The history is provided by the patient. No  was used. Fatigue  This is a new problem. The current episode started more than 2 days ago. The problem has been gradually worsening. There was no focality noted. Primary symptoms include mental status change (intermittent confusion). Pertinent negatives include no focal weakness, no slurred speech, no movement disorder and no disorientation. There has been no fever. Associated symptoms include vomiting, confusion and nausea. Pertinent negatives include no shortness of breath, no chest pain, no headaches, no bowel incontinence and no bladder incontinence. Past Medical History:   Diagnosis Date    Gastrointestinal disorder     reflux    GERD (gastroesophageal reflux disease)     Hypertension     Nausea & vomiting     Obesity 10/6/2015    Other ill-defined conditions(799.89)     dyspnea since surgery, wheezing, SOB    Unstable angina ([facility] [address] 10/27/2016       Past Surgical History:   Procedure Laterality Date    CARDIAC SURG PROCEDURE UNLIST      stent    HX CHOLECYSTECTOMY      HX ORTHOPAEDIC      rotator cuff; knee    HX UROLOGICAL      kidney stone surgeries x 3         No family history on file.     Social History     Socioeconomic History    Marital status:      Spouse name: Not on file    Number of children: Not on file    Years of education: Not on file    Highest education level: Not on file   Occupational History    Not on file   Social Needs    Financial resource strain: Not on file    Food insecurity     Worry: Not on file     Inability: Not on file    Transportation needs     Medical: Not on file     Non-medical: Not on file   Tobacco Use    Smoking status: Never Smoker    Smokeless tobacco: Never Used   Substance and Sexual Activity    Alcohol use: No    Drug use: No    Sexual activity: Not on file   Lifestyle    Physical activity     Days per week: Not on file     Minutes per session: Not on file    Stress: Not on file   Relationships    Social connections     Talks on phone: Not on file     Gets together: Not on file     Attends Nondenominational service: Not on file     Active member of club or organization: Not on file     Attends meetings of clubs or organizations: Not on file     Relationship status: Not on file    Intimate partner violence     Fear of current or ex partner: Not on file     Emotionally abused: Not on file     Physically abused: Not on file     Forced sexual activity: Not on file   Other Topics Concern    Not on file   Social History Narrative    Not on file         ALLERGIES: Latex, Hydrocodone-acetaminophen, and Tessalon perles [benzonatate]    Review of Systems   Constitutional: Positive for appetite change and fatigue. Negative for chills and fever. HENT: Positive for sore throat. Negative for rhinorrhea. Eyes: Negative for pain and redness. Respiratory: Positive for cough. Negative for chest tightness, shortness of breath and wheezing. Cardiovascular: Negative for chest pain and leg swelling. Gastrointestinal: Positive for diarrhea, nausea and vomiting. Negative for abdominal pain and bowel incontinence. Genitourinary: Negative for bladder incontinence, dysuria and hematuria.    Musculoskeletal: Negative for back pain, gait problem, myalgias, neck pain and neck stiffness. Skin: Negative for color change and rash. Neurological: Positive for light-headedness. Negative for focal weakness, weakness, numbness and headaches. Psychiatric/Behavioral: Positive for confusion. Vitals:    01/06/21 2212 01/06/21 2219   BP: 118/65    Pulse:  74   Resp: 18    Temp: 99.5 °F (37.5 °C)    SpO2: 98% 96%   Weight: 106.6 kg (235 lb)    Height: 5' 8\" (1.727 m)             Physical Exam  Constitutional:       General: He is not in acute distress. Appearance: Normal appearance. He is well-developed. HENT:      Head: Normocephalic and atraumatic. Eyes:      Extraocular Movements: Extraocular movements intact. Pupils: Pupils are equal, round, and reactive to light. Neck:      Musculoskeletal: Normal range of motion and neck supple. Cardiovascular:      Rate and Rhythm: Normal rate and regular rhythm. Pulmonary:      Effort: Pulmonary effort is normal. No respiratory distress. Breath sounds: Normal breath sounds. No wheezing. Abdominal:      General: Bowel sounds are normal.      Palpations: Abdomen is soft. Tenderness: There is no abdominal tenderness. Musculoskeletal: Normal range of motion. General: No swelling. Skin:     General: Skin is warm and dry. Neurological:      General: No focal deficit present. Mental Status: He is alert and oriented to person, place, and time. MDM  Number of Diagnoses or Management Options  Diagnosis management comments: Hypoxic in room 88%. Covid positive from yesterday at [facility]. Worse today with some confusion per family. Also hyperglycemia 760 with no DKA and dehydration Cr 2.61. Denies DM but previous blood sugars in the 200's. Will admit.         Amount and/or Complexity of Data Reviewed  Clinical lab tests: ordered and reviewed  Tests in the radiology section of CPT®: ordered and reviewed  Tests in the medicine section of CPT®: ordered and reviewed    Patient Progress  Patient progress: stable         Procedures        CXR bilateral patchy infiltrates. Results Include:    Recent Results (from the past 24 hour(s))   METABOLIC PANEL, COMPREHENSIVE    Collection Time: 01/06/21 10:17 PM   Result Value Ref Range    Sodium 143 136 - 145 mmol/L    Potassium 4.5 3.5 - 5.1 mmol/L    Chloride 107 98 - 107 mmol/L    CO2 27 21 - 32 mmol/L    Anion gap 9 7 - 16 mmol/L    Glucose 760 (HH) 65 - 100 mg/dL    BUN 40 (H) 8 - 23 MG/DL    Creatinine 2.61 (H) 0.8 - 1.5 MG/DL    GFR est AA 32 (L) >60 ml/min/1.73m2    GFR est non-AA 27 (L) >60 ml/min/1.73m2    Calcium 9.3 8.3 - 10.4 MG/DL    Bilirubin, total 0.6 0.2 - 1.1 MG/DL    ALT (SGPT) 45 12 - 65 U/L    AST (SGOT) 33 15 - 37 U/L    Alk. phosphatase 88 50 - 136 U/L    Protein, total 7.8 6.3 - 8.2 g/dL    Albumin 3.1 (L) 3.2 - 4.6 g/dL    Globulin 4.7 (H) 2.3 - 3.5 g/dL    A-G Ratio 0.7 (L) 1.2 - 3.5     CBC WITH AUTOMATED DIFF    Collection Time: 01/06/21 10:17 PM   Result Value Ref Range    WBC 8.6 4.3 - 11.1 K/uL    RBC 6.03 (H) 4.23 - 5.6 M/uL    HGB 18.2 (H) 13.6 - 17.2 g/dL    HCT 55.1 (H) 41.1 - 50.3 %    MCV 91.4 79.6 - 97.8 FL    MCH 30.2 26.1 - 32.9 PG    MCHC 33.0 31.4 - 35.0 g/dL    RDW 12.9 11.9 - 14.6 %    PLATELET 332 (L) 295 - 450 K/uL    MPV 11.3 9.4 - 12.3 FL    ABSOLUTE NRBC 0.00 0.0 - 0.2 K/uL    DF AUTOMATED      NEUTROPHILS 84 (H) 43 - 78 %    LYMPHOCYTES 6 (L) 13 - 44 %    MONOCYTES 9 4.0 - 12.0 %    EOSINOPHILS 0 (L) 0.5 - 7.8 %    BASOPHILS 0 0.0 - 2.0 %    IMMATURE GRANULOCYTES 1 0.0 - 5.0 %    ABS. NEUTROPHILS 7.2 1.7 - 8.2 K/UL    ABS. LYMPHOCYTES 0.5 0.5 - 4.6 K/UL    ABS. MONOCYTES 0.8 0.1 - 1.3 K/UL    ABS. EOSINOPHILS 0.0 0.0 - 0.8 K/UL    ABS. BASOPHILS 0.0 0.0 - 0.2 K/UL    ABS. IMM.  GRANS. 0.1 0.0 - 0.5 K/UL   LIPASE    Collection Time: 01/06/21 10:17 PM   Result Value Ref Range    Lipase 653 (H) 73 - 393 U/L   D DIMER    Collection Time: 01/06/21 10:17 PM Result Value Ref Range    D DIMER 0.43 <0.56 ug/ml(FEU)

## 2021-01-07 NOTE — DIABETES MGMT
Patient admitted 2021 with Covid-19. History of CAD, GERD, and HTN. New diagnosis DM. HbA1c 12 (eA). Pt states that he has never been told he was prediabetic/diabetic and has no family history of DM. Pt also states that he has lost 40 pounds unintentionally. Blood glucose on admission 760. Pt is currently receiving steroids, Dexamethasone 6 mg daily. Patient given educational material, \"Diabetes Self-Management: A Patient Teaching Guide\", which was reviewed with patient. Explained basic physiology of diabetes, as well as causes, signs and symptoms, and treatments for hypoglycemia and hyperglycemia. Described the effects of poor glycemic control and the development of long-term complications such as renal, eye, nerve, and cardiovascular disease. Described proper diabetic foot care and the importance of checking feet daily. Patient denies numbness and tingling in feet. Per patient they typically drink water and blended smoothies. Reviewed beverages to help improve glycemic control. Per patient they typically eat 2 meals/day. Educated re: effects of carbohydrates on blood glucose, the \"plate method\" of healthy meal planning, basics of healthy meal plan, Consistent Carbohydrate Diet, discussed the basics of carb counting and how to read a nutrition label. Also explained the relationship between hyperglycemia and infection and delayed healing. Discussed target goals for blood glucose and A1C. Educated patient regarding diabetic medications including mechanism of action, timing, and possible side effects. Patient verbalizes understanding of teaching. Explained the importance of blood glucose monitoring. Recommended frequency of qid blood glucose checks and to record in log book to take to PCP appointments. Demonstrated how to use a blood glucose meter, care of strips, and sharps disposal. Educated patient that all glucometers are similar but differ in small ways.  Educated patient that they have to get the glucometer covered by their insurance formulary to keep their costs down. Educated patient to seek out  affordable glucometer options that exist at some stores or drug stores if they are ever uninsured. Pt was able to return demonstrate correct use of meter. Reviewed target goals for Hba1c and blood glucose and the significance on an HbA1c of 12%. Pt will need prescription for glucometer and glucometer supplies at discharge so that the patient may obtain the meter covered by their insurance. Patient instructed regarding preparation and injection of insulin dose via vial and syringe method. Patient returned demonstrated proper insulin injection technique using injection model via vial and syringe method. Nursing will continue to have patient practice insulin self-injection when insulin dose is due and document progress or refusals in progress notes. Patient verbalizes understanding of site rotation, storage of insulin, and proper sharps disposal. Patient was also instructed in proper use of insulin pens. Patient was able to return demonstrate proper use of insulin pen using injection model. Patient states thay do not have a preference for insulin pen or vials and syringes at this time. Educated patient regarding current basal/bolus regimen of Lantus 18 units daily and Humalog sliding scale coverage 4x/day ac and hs, including type of insulin, timing with meals, onset, duration of effect, and peak of insulin dose. Educated patient on the differences between prandial insulin and sliding scale insulin. Instructed patient to always seek guidance from their primary care provider about adjusting their insulin doses and not to adjust them on their own as this could negatively impact their glycemic control or result in hypoglycemia. Patient verbalizes understanding, but will need reinforcement and review. Patient would likely benefit from continued diabetes outpatient education.  Referral to [facility] has been sent. Pt is agreeable to plan. Encouraged compliance with discharge regimen. Stressed the importance of follow up care for diabetes management with PCP. Pt states that he currently does not have a PCP and would like a referral. Message left for case management. Patient voices no further questions regarding diabetes management at this time.

## 2021-01-07 NOTE — ED TRIAGE NOTES
Presents to Ed with c/o dizziness, decreased appetite, cough,and altered mental status for approx. 5 days. Per sister/caregiver, pt was tested for flu and Covid yesterday. Results are reportedly pending. Pt currently alert and orientedx3 with even and unlabored respirations. No distress witnessed at this time . Mask present on arrival.

## 2021-01-07 NOTE — H&P
Hospitalist Note     Admit Date:  2021 11:02 PM   Name:  Eunice Eduardo. Age:  61 y.o.  :  1960   MRN:  [id number]   PCP:  Shilo Pineda MD  Treatment Team: Attending Provider: Loraine Lagos MD; Primary Nurse: Charlee Sandy RN    HPI/Subjective:     Chief complaint fever cough, congestion,    Patient is a 59-year-old male with past medical history significant for coronary artery disease, GERD, hypertension presented to the ER because of fever cough and congestion. Patient states that he has been having subjective fevers for the last week. He also has dry cough as well as nasal congestion. He was seen in the ER at [facility] yesterday. Covid test was done and is positive. (- Result was reviewed in care everywhere) Complains of having myalgias decreased smell and taste. He denies shortness of breath. No chest pain no palpitations. He reports losing about 30 pounds in the last week. Per the sister at bedside patient is also intermittently confused at home. He also has nausea vomiting. He reports feeling very tired. 10 systems reviewed and negative except as noted in HPI. ER course:  Patient is afebrile. Hemodynamically stable. Oxygen saturations dropped to 86% on room air. Placed on supplemental oxygen by nasal cannula. CBC remarkable for hemoglobin of 18.2 likely hemoconcentration. CMP with blood glucose of 760. BUN 40 and creatinine 2.6. Lipase 653. Lactic acid 4.1. Procalcitonin 0.25. Chest x-ray shows mild patchy groundglass densities in the mid lungs likely atelectasis/pneumonia. Patient admitted for further evaluation management of new diagnosis of diabetes with hyperglycemia, COVID-19 pneumonia.     Past Medical History:   Diagnosis Date    Gastrointestinal disorder     reflux    GERD (gastroesophageal reflux disease)     Hypertension     Lower respiratory tract infection due to COVID-19 virus 2021    Nausea & vomiting     Obesity 10/6/2015    Other ill-defined conditions(799.89)     dyspnea since surgery, wheezing, SOB    Type 2 diabetes mellitus with hyperosmolarity without nonketotic hyperglycemic-hyperosmolar coma ([facility] [address] 2021    Unstable angina ([facility] 10/27/2016      Past Surgical History:   Procedure Laterality Date    HX CHOLECYSTECTOMY      HX ORTHOPAEDIC      rotator cuff; knee    HX UROLOGICAL      kidney stone surgeries x 3    GA CARDIAC SURG PROCEDURE UNLIST      stent      Allergies   Allergen Reactions    Latex Swelling     Had a purcell catheter with swelling of urethra. Only known latex issue in past. Wife remembers this now    Hydrocodone-Acetaminophen Itching     Wife remembers this allergy    Tessalon Perles [Benzonatate] Unknown (comments)      Social History     Tobacco Use    Smoking status: Never Smoker    Smokeless tobacco: Never Used   Substance Use Topics    Alcohol use: No      No family history on file. Immunization History   Administered Date(s) Administered    TD Vaccine 09/15/2008     PTA Medications:  Prior to Admission Medications   Prescriptions Last Dose Informant Patient Reported? Taking? Aspirin, Buffered 81 mg tab   Yes No   Sig: Take 81 mg by mouth daily. atorvastatin (LIPITOR) 80 mg tablet   No No   Sig: Take 1 Tab by mouth daily. clopidogreL (Plavix) 75 mg tab   No No   Sig: Take 1 Tab by mouth daily. magnesium oxide (MAG-OX) 400 mg tablet   No No   Sig: Take 1 Tab by mouth daily. metoprolol tartrate (LOPRESSOR) 50 mg tablet   No No   Sig: Take 1 Tab by mouth two (2) times a day. nitroglycerin (NITROSTAT) 0.4 mg SL tablet   No No   Si Tab by SubLINGual route every five (5) minutes as needed for Chest Pain.       Facility-Administered Medications: None       Objective:     Patient Vitals for the past 24 hrs:   Temp Pulse Resp BP SpO2   21 0030 -- 73 -- 117/70 91 %   21 0005 -- -- -- -- (!) 86 %   21 2359 -- 74 -- (!) 122/57 92 %   21 2329 -- 75 -- (!) 125/55 (!) 89 %   01/06/21 2318 -- 75 -- 135/62 92 %   01/06/21 2219 -- 74 -- -- 96 %   01/06/21 2212 99.5 °F (37.5 °C) -- 18 118/65 98 %     Oxygen Therapy  O2 Sat (%): 91 % (01/07/21 0030)  Pulse via Oximetry: 73 beats per minute (01/07/21 0030)  O2 Device: Room air (01/07/21 0005)    No intake or output data in the 24 hours ending 01/07/21 0203    *Note that automatically entered I/Os may not be accurate; dependent on patient compliance with collection and accurate  by assistants. Physical Exam:  General:    Elderly, ill-appearing,  Eyes:   Normal sclerae. Extraocular movements intact. HENT:  Normocephalic, atraumatic.  dry mucous membranes  CV:   RRR. No m/r/g. Lungs:  CTAB. No wheezing, rhonchi, or rales. Abdomen: Soft, nontender, nondistended. Active bowel sounds, no organomegaly  Extremities: Warm and dry. No cyanosis or edema. Neurologic: CN II-XII grossly intact. Sensation intact. Skin:     No rashes or jaundice. Normal coloration  Psych:  Normal mood and affect. I reviewed the labs, imaging, EKGs, telemetry, and other studies done this admission. Data Review:   Recent Results (from the past 24 hour(s))   METABOLIC PANEL, COMPREHENSIVE    Collection Time: 01/06/21 10:17 PM   Result Value Ref Range    Sodium 143 136 - 145 mmol/L    Potassium 4.5 3.5 - 5.1 mmol/L    Chloride 107 98 - 107 mmol/L    CO2 27 21 - 32 mmol/L    Anion gap 9 7 - 16 mmol/L    Glucose 760 (HH) 65 - 100 mg/dL    BUN 40 (H) 8 - 23 MG/DL    Creatinine 2.61 (H) 0.8 - 1.5 MG/DL    GFR est AA 32 (L) >60 ml/min/1.73m2    GFR est non-AA 27 (L) >60 ml/min/1.73m2    Calcium 9.3 8.3 - 10.4 MG/DL    Bilirubin, total 0.6 0.2 - 1.1 MG/DL    ALT (SGPT) 45 12 - 65 U/L    AST (SGOT) 33 15 - 37 U/L    Alk.  phosphatase 88 50 - 136 U/L    Protein, total 7.8 6.3 - 8.2 g/dL    Albumin 3.1 (L) 3.2 - 4.6 g/dL    Globulin 4.7 (H) 2.3 - 3.5 g/dL    A-G Ratio 0.7 (L) 1.2 - 3.5     CBC WITH AUTOMATED DIFF    Collection Time: 01/06/21 10:17 PM   Result Value Ref Range    WBC 8.6 4.3 - 11.1 K/uL    RBC 6.03 (H) 4.23 - 5.6 M/uL    HGB 18.2 (H) 13.6 - 17.2 g/dL    HCT 55.1 (H) 41.1 - 50.3 %    MCV 91.4 79.6 - 97.8 FL    MCH 30.2 26.1 - 32.9 PG    MCHC 33.0 31.4 - 35.0 g/dL    RDW 12.9 11.9 - 14.6 %    PLATELET 537 (L) 767 - 450 K/uL    MPV 11.3 9.4 - 12.3 FL    ABSOLUTE NRBC 0.00 0.0 - 0.2 K/uL    DF AUTOMATED      NEUTROPHILS 84 (H) 43 - 78 %    LYMPHOCYTES 6 (L) 13 - 44 %    MONOCYTES 9 4.0 - 12.0 %    EOSINOPHILS 0 (L) 0.5 - 7.8 %    BASOPHILS 0 0.0 - 2.0 %    IMMATURE GRANULOCYTES 1 0.0 - 5.0 %    ABS. NEUTROPHILS 7.2 1.7 - 8.2 K/UL    ABS. LYMPHOCYTES 0.5 0.5 - 4.6 K/UL    ABS. MONOCYTES 0.8 0.1 - 1.3 K/UL    ABS. EOSINOPHILS 0.0 0.0 - 0.8 K/UL    ABS. BASOPHILS 0.0 0.0 - 0.2 K/UL    ABS. IMM.  GRANS. 0.1 0.0 - 0.5 K/UL   LIPASE    Collection Time: 01/06/21 10:17 PM   Result Value Ref Range    Lipase 653 (H) 73 - 393 U/L   PROCALCITONIN    Collection Time: 01/06/21 10:17 PM   Result Value Ref Range    Procalcitonin 0.25 ng/mL   D DIMER    Collection Time: 01/06/21 10:17 PM   Result Value Ref Range    D DIMER 0.43 <0.56 ug/ml(FEU)   LACTIC ACID    Collection Time: 01/06/21 11:57 PM   Result Value Ref Range    Lactic acid 4.1 (HH) 0.4 - 2.0 MMOL/L   URINE MICROSCOPIC    Collection Time: 01/07/21 12:03 AM   Result Value Ref Range    WBC 0-3 0 /hpf    RBC >100 0 /hpf    Epithelial cells 0-3 0 /hpf    Bacteria 0 0 /hpf    Casts 0-3 0 /lpf    Crystals, urine 0 0 /LPF    Mucus 0 0 /lpf    Other observations RESULTS VERIFIED MANUALLY     GLUCOSE, POC    Collection Time: 01/07/21  1:34 AM   Result Value Ref Range    Glucose (POC) 554 (HH) 65 - 100 mg/dL       All Micro Results     Procedure Component Value Units Date/Time    CULTURE, BLOOD [[id number]] Collected: 01/07/21 0115    Order Status: Completed Specimen: Blood Updated: 01/07/21 0127    CULTURE, BLOOD [[id number]] Collected: 01/07/21 0109    Order Status: Completed Specimen: Blood Updated: 01/07/21 0127          Current Facility-Administered Medications   Medication Dose Route Frequency    cefTRIAXone (ROCEPHIN) 2 g in 0.9% sodium chloride (MBP/ADV) 50 mL MBP  2 g IntraVENous Q24H    azithromycin (ZITHROMAX) 500 mg in 0.9% sodium chloride 250 mL (VIAL-MATE)  500 mg IntraVENous Q24H    insulin regular (NOVOLIN R, HUMULIN R) injection 15 Units  15 Units IntraVENous ONCE    sodium chloride (NS) flush 5-40 mL  5-40 mL IntraVENous Q8H    sodium chloride (NS) flush 5-40 mL  5-40 mL IntraVENous PRN    polyethylene glycol (MIRALAX) packet 17 g  17 g Oral DAILY PRN    promethazine (PHENERGAN) tablet 12.5 mg  12.5 mg Oral Q6H PRN    Or    ondansetron (ZOFRAN) injection 4 mg  4 mg IntraVENous Q6H PRN    heparin (porcine) injection 5,000 Units  5,000 Units SubCUTAneous Q8H    guaiFENesin-dextromethorphan (ROBITUSSIN DM) 100-10 mg/5 mL syrup 10 mL  10 mL Oral Q4H PRN    dexAMETHasone (DECADRON) tablet 6 mg  6 mg Oral DAILY    insulin lispro (HUMALOG) injection   SubCUTAneous AC&HS    0.9% sodium chloride infusion  100 mL/hr IntraVENous CONTINUOUS    dextrose 40% (GLUTOSE) oral gel 1 Tube  15 g Oral PRN    glucagon (GLUCAGEN) injection 1 mg  1 mg IntraMUSCular PRN    dextrose (D50W) injection syrg 12.5-25 g  25-50 mL IntraVENous PRN    insulin glargine (LANTUS) injection 10 Units  10 Units SubCUTAneous DAILY     Current Outpatient Medications   Medication Sig    metoprolol tartrate (LOPRESSOR) 50 mg tablet Take 1 Tab by mouth two (2) times a day.  clopidogreL (Plavix) 75 mg tab Take 1 Tab by mouth daily.  nitroglycerin (NITROSTAT) 0.4 mg SL tablet 1 Tab by SubLINGual route every five (5) minutes as needed for Chest Pain.  atorvastatin (LIPITOR) 80 mg tablet Take 1 Tab by mouth daily.  magnesium oxide (MAG-OX) 400 mg tablet Take 1 Tab by mouth daily.  Aspirin, Buffered 81 mg tab Take 81 mg by mouth daily.        Other Studies:  Xr Chest Port    Result Date: 1/7/2021  Portable chest xray  COMPARISON: October 2018 INDICATION: Cough FINDINGS: Mild patchy groundglass densities in the mid lungs. There is no pneumothorax or large pleural effusion. Cardiac mediastinal contour is within normal limits. Surrounding bones are unremarkable. IMPRESSION: Mild patchy groundglass densities in the mid lungs, likely atelectasis or pneumonia. Assessment and Plan:     Hospital Problems as of 1/7/2021 Date Reviewed: 6/3/2020          Codes Class Noted - Resolved POA    SWATI (acute kidney injury) ([facility] [address] ICD-10-CM: N17.9  ICD-9-CM: 584.9  1/7/2021 - Present Unknown        Acute hypoxemic respiratory failure ([facility] [address] ICD-10-CM: J96.01  ICD-9-CM: 518.81  1/7/2021 - Present Unknown        Type 2 diabetes mellitus with hyperosmolarity without nonketotic hyperglycemic-hyperosmolar coma ([facility] [address] ICD-10-CM: E11.00  ICD-9-CM: 250.20  1/7/2021 - Present Unknown        * (Principal) Lower respiratory tract infection due to COVID-19 virus ICD-10-CM: U07.1, J22  ICD-9-CM: 519.8, 079.89  1/7/2021 - Present Unknown        CAD (coronary artery disease) ICD-10-CM: I25.10  ICD-9-CM: 414.00  10/28/2016 - Present Yes    Overview Signed 10/28/2016  8:05 AM by SHELDON Randolph     10-27-06 [facility] 90% LAD s/p 2.25 x 20 Synergy drug-eluting stent  (CS)             Obesity ICD-10-CM: E66.9  ICD-9-CM: 278.00  10/6/2015 - Present Yes              Plan: This is a 80-year-old male with    Acute hypoxemic respiratory failure secondary to COVID-19 pneumonia  Dexamethasone, vitamin C, zinc, ceftriaxone, azithromycin  Patient consented for convalescent plasma. Ordered. Not a candidate for remdesivir due to acute kidney injury. COVID-19 infection   as above    New onset type 2 diabetes mellitus with hyperglycemic hyperosmolar state  Patient's blood glucose was 700 on arrival.  Received 2 L IV fluids  and recheck blood glucose was 554. No signs of DKA. IV regular insulin 15 units x 1. Will start Lantus, sliding scale insulin.   With dexamethasone, patient's blood glucose very poorly controlled. May need mealtime insulin if able to take p.o. Diabetes management consulted. Appreciate recommendations. Acute kidney injury on chronic kidney disease stage III  Likely prerenal from hyperglycemic hyperosmolar state. Creatinine of 2.6. Avoid nephrotoxic medications. Hydrate with IV fluids. Lactic acidosis  Lactic acid of 4.1. Trend lactic acid.     Coronary artery disease  Aspirin Plavix statin    Morbid obesity    Discharge planning: Medical, inpatient    DVT ppx: Heparin subcu    Code status:  Full    DPOA: [facility]Soledad [phone]     Estimated LOS:  Greater than 2 midnights    Risk:  high    Signed:  Oliver Odom MD

## 2021-01-07 NOTE — PROGRESS NOTES
ACUTE PHYSICAL THERAPY GOALS:  (Developed with and agreed upon by patient and/or caregiver. )  LTG:  (1.)Mr. Torres will move from supine to sit and sit to supine , scoot up and down and roll side to side in bed with INDEPENDENT within 7 treatment day(s). (2.)Mr. Torres will transfer from bed to chair and chair to bed with INDEPENDENT using the least restrictive device within 7 treatment day(s). (3.)Mr. Torres will ambulate with INDEPENDENT for 250+ feet with the least restrictive device within 7 treatment day(s) while maintaining normal vital signs. (4.)Mr. Torres will participate in 23+ minutes of therapeutic activity within 7 treatment days while maintaining normal vital signs throughout.      ________________________________________________________________________________________________       PHYSICAL THERAPY ASSESSMENT: Initial Assessment and AM PT Treatment Day # 1      Tracie Mcgee. is a 61 y.o. male   PRIMARY DIAGNOSIS: Lower respiratory tract infection due to COVID-19 virus  Type 2 diabetes mellitus with hyperosmolarity without nonketotic hyperglycemic-hyperosmolar coma (HCC) [E11.00]  Lower respiratory tract infection due to COVID-19 virus [U07.1, J22]  Acute hypoxemic respiratory failure (HCC) [J96.01]  [facility] [N17.9]       Reason for Referral:    ICD-10: Treatment Diagnosis: Difficulty in walking, Not elsewhere classified (R26.2)  INPATIENT: Payor: Ashwini Tam / Plan: Cecilio GARBER / Product Type: Commerical /     ASSESSMENT:     REHAB RECOMMENDATIONS:   Recommendation to date pending progress:  Setting:   No further skilled therapy   Equipment:    To Be Determined     PRIOR LEVEL OF FUNCTION:  (Prior to Hospitalization) INITIAL/CURRENT LEVEL OF FUNCTION:  (Most Recently Demonstrated)   Bed Mobility:   Independent  Sit to Stand:   Independent  Transfers:   Independent  Gait/Mobility:   Independent Bed Mobility:   Not tested  Sit to Stand:  Fernandez Standby Assistance  Transfers:   Contact Guard Assistance  Gait/Mobility:   Contact Guard Assistance     ASSESSMENT:  Mr. Oral Swann with decreased bed mobility, transfers, ambulation, balance, activity tolerance, and overall general functional mobility s/p hospital admission on 1/6/21 with cough, AMS, dizziness. Pt A & O x 4 this date, 3 L O2 via n.c, stats 90% at rest.  Pt reports lives with sister, works, independent at baseline. Pt CGA to SBA transfers, ambulated without assistive device x 50', O2 stats 88-90%, cues for pursed lip breathing. Pt educated on energy conservation techniques, additional time for activity. PT to cont to follow for acute care needs to address deficits noted above. Pt would benefit from HHPT or none at discharge pending progress with mobility. SUBJECTIVE:   Mr. Oral Swann states, \"I am ready. \"    SOCIAL HISTORY/LIVING ENVIRONMENT:   Home Environment: Private residence  One/Two Story Residence: One story  Living Alone: No  Support Systems: Family member(s)  OBJECTIVE:     PAIN: VITAL SIGNS: LINES/DRAINS:   Pre Treatment: Pain Screen  Pain Scale 1: Numeric (0 - 10)  Pain Intensity 1: 0  Post Treatment: 0 Vital Signs  O2 Sat (%): 90 %  O2 Device: Nasal cannula  O2 Flow Rate (L/min): 3 l/min IV  O2 Device: Nasal cannula     GROSS EVALUATION:  B LE Within Functional Limits Abnormal/ Functional Abnormal/ Non-Functional (see comments) Not Tested Comments:   AROM [x] [] [] []    PROM [x] [] [] []    Strength [x] [] [] []    Balance [] [x] [] []    Posture [x] [] [] []    Sensation [] [] [] [x]    Coordination [] [x] [] []    Tone [] [] [] [x]    Edema [] [] [] [x]    Activity Tolerance [] [x] [] []     [] [] [] []      COGNITION/  PERCEPTION: Intact Impaired   (see comments) Comments:   Orientation [x] []    Vision [x] []    Hearing [x] []    Command Following [x] []    Safety Awareness [x] []     [] []      MOBILITY: I Mod I S SBA CGA Min Mod Max Total  NT x2 Comments:   Bed Mobility Rolling [] [] [] [] [] [] [] [] [] [x] []    Supine to Sit [] [] [] [] [] [] [] [] [] [x] []    Scooting [x] [] [] [] [] [] [] [] [] [] []    Sit to Supine [] [] [] [] [] [] [] [] [] [x] []    Transfers    Sit to Stand [] [] [] [] [x] [] [] [] [] [] []    Bed to Chair [] [] [] [] [x] [] [] [] [] [] []    Stand to Sit [] [] [] [] [x] [] [] [] [] [] []    I=Independent, Mod I=Modified Independent, S=Supervision, SBA=Standby Assistance, CGA=Contact Guard Assistance,   Min=Minimal Assistance, Mod=Moderate Assistance, Max=Maximal Assistance, Total=Total Assistance, NT=Not Tested  GAIT: I Mod I S SBA CGA Min Mod Max Total  NT x2 Comments:   Level of Assistance [] [] [] [] [x] [] [] [] [] [] []    Distance 50    DME None    Gait Quality Slow aleah, O2 3 L    I=Independent, Mod I=Modified Independent, S=Supervision, SBA=Standby Assistance, CGA=Contact Guard Assistance,   Min=Minimal Assistance, Mod=Moderate Assistance, Max=Maximal Assistance, Total=Total Assistance, NT=Not Tested    [facility] Form       How much difficulty does the patient currently have. .. Unable A Lot A Little None   1. Turning over in bed (including adjusting bedclothes, sheets and blankets)? [] 1   [] 2   [] 3   [x] 4   2. Sitting down on and standing up from a chair with arms ( e.g., wheelchair, bedside commode, etc.)   [] 1   [] 2   [x] 3   [] 4   3. Moving from lying on back to sitting on the side of the bed? [] 1   [] 2   [] 3   [x] 4   How much help from another person does the patient currently need. .. Total A Lot A Little None   4. Moving to and from a bed to a chair (including a wheelchair)? [] 1   [] 2   [x] 3   [] 4   5. Need to walk in hospital room? [] 1   [] 2   [x] 3   [] 4   6. Climbing 3-5 steps with a railing? [] 1   [] 2   [x] 3   [] 4   © 2007, Trustees of [facility], under license to [employer].  All rights reserved     Score:  Initial: 20 Most Recent: X (Date: -- )    Interpretation of Tool:  Represents activities that are increasingly more difficult (i.e. Bed mobility, Transfers, Gait). PLAN:   FREQUENCY/DURATION: PT Plan of Care: 3 times/week for duration of hospital stay or until stated goals are met, whichever comes first.    PROBLEM LIST:   (Skilled intervention is medically necessary to address:)  1. Decreased ADL/Functional Activities  2. Decreased Activity Tolerance  3. Decreased Balance  4. Decreased Cognition  5. Decreased Gait Ability  6. Decreased Strength  7. Decreased Transfer Abilities   INTERVENTIONS PLANNED:   (Benefits and precautions of physical therapy have been discussed with the patient.)  1. Therapeutic Activity  2. Therapeutic Exercise/HEP  3. Neuromuscular Re-education  4. Gait Training  5. Manual Therapy  6. Education     TREATMENT:     EVALUATION: Low Complexity : (Untimed Charge)    TREATMENT:   ($$ Therapeutic Exercises: 8-22 mins    )  Therapeutic Exercise (10 Minutes): Activities to include transfers, static and dynamic standing balance, ambulation on level surfaces, energy conservation technqiues to improve functional activity tolerance, strength and mobility.      AFTER TREATMENT POSITION/PRECAUTIONS:  Chair, Needs within reach and RN notified    INTERDISCIPLINARY COLLABORATION:  RN/PCT    TOTAL TREATMENT DURATION:  PT Patient Time In/Time Out  Time In: 1105  Time Out: [address], PT

## 2021-01-07 NOTE — ED NOTES
TRANSFER - OUT REPORT:    Verbal report given to 6th floor nurse (name) on Herchel Schilder.  being transferred to 6th floor(unit) for routine progression of care       Report consisted of patients Situation, Background, Assessment and   Recommendations(SBAR). Information from the following report(s) SBAR, Kardex, ED Summary, [facility] and Recent Results was reviewed with the receiving nurse. Lines:   Peripheral IV 01/06/21 (Active)       Peripheral IV 01/07/21 Right Antecubital (Active)   Site Assessment Clean, dry, & intact 01/07/21 0225   Phlebitis Assessment 0 01/07/21 0225   Infiltration Assessment 0 01/07/21 0225   Dressing Status Clean, dry, & intact 01/07/21 0225   Hub Color/Line Status Pink 01/07/21 0225   Action Taken Blood drawn 01/07/21 0225   Alcohol Cap Used No 01/07/21 0225        Opportunity for questions and clarification was provided.       Patient transported with:   Tech oxygen @ 2liters NC

## 2021-01-07 NOTE — PROGRESS NOTES
Comprehensive Nutrition Assessment    Type and Reason for Visit: Initial, Positive nutrition screen  Best Practice Alert for Malnutrition Screening Tool: Recently Lost Weight Without Trying: Yes, If Yes, How Much Weight Loss: 14 - 23 lbs, Eating Poorly Due to Decreased Appetite: Yes    Nutrition Recommendations/Plan:   Meals and Snacks:  Continue current diet. Nutrition Supplement Therapy:   Medical food supplement therapy:  Initiate Glucerna Shake three times per day (this provides 220 kcal and 10 grams protein per bottle)   Brief DM education initiated. Will continue as appropriate during admission. Likely will best benefit from outpt education once recovered from acute illness.  Discharge Nutrition Plan: Outpatient Diabetes Education Counseling recommended to patient- more information at [phone]. Malnutrition Assessment:  Malnutrition Status: At risk for malnutrition (specify)  Context: Acute illness  Findings of clinical characteristics of malnutrition:   Energy Intake:  Mild decrease in energy intake (specify)(Decrease reported for four days)  Weight Loss:  (89% UBE per EMR, pt states 40# loss over 4 days (15% based on stated # and CBW))     Body Fat Loss:  Unable to assess,     Muscle Mass Loss:  Unable to assess,    Fluid Accumulation:  Unable to assess,     Strength:  Not performed     Nutrition Assessment:   Nutrition History: Pt reports inability to tolerate any food or liquids for 4 days PTA. Indicates vomiting with all po attempts during this time. Nutrition Background: PMH remarkable for CAD, GERD, HTN, MO. Admitted with Coivd 19, new onset DM with hyperglycemic hyperosmolar state, SWATI on CKD, lactic acidosis. Daily Update:  Discussed with patient via phone due to isolation precautions. He reports + tolerance of small amount of lunch he has eaten thus far. Prior to acute illness he did not follow any form of modified diet.   He is noted to be intermittently From: Ila Herrmann  To: Irena Nails MD  Sent: 11/4/2019 6:49 AM CST  Subject: Medication Question    Dr. Nails    I did increase the rosuvastatin - from 5 mg a day to 10mg (took 2 pills 5 mg each) for a week and all was good.    So you can send in prescription now to [facility] for the 10 mg pills.    thanks     Ila Herrmann   confused at home as reported by sister. Hx BMP with blood glucose > 200, no prior A1C value available side. Nutrition Related Findings:   NFPE deferred due to isolation; RN reports pt visually appears to have experienced rapid weight loss      Current Nutrition Therapies:  DIET DIABETIC CONSISTENT CARB Regular; 2 GM NA (House Low NA)    Current Intake:   Average Meal Intake: (pt states he has only consumed ~25% noon meal ) Average Supplement Intake: None ordered      Anthropometric Measures:  Height: 5' 8\" (172.7 cm)  Current Body Wt: 91 kg (200 lb 9.9 oz)(1/7), Weight source: Bed scale  BMI: 30.5, Obese class 1 (BMI 30.0-34. 9)  Admission Body Weight: 235 lb 0.2 oz(estimated)  Ideal Body Wt: 154 lbs (70 kg), 130.3 %  Usual Body Wt: 101.6 kg (224 lb)([facility] 6/3/2020 FP office; pt stated # unable to verify ), Percent weight change: -10.4          Estimated Daily Nutrient Needs:  Energy (kcal/day): 4181-8858 (Kcal/kg(25-30), Weight Used: Ideal(70 kg))  Protein (g/day): 60-75 (0.8-1 g/kg) Weight Used: (Ideal)  Fluid (ml/day):   (1 ml/kcal)    Nutrition Diagnosis:   · Inadequate oral intake related to altered GI function as evidenced by nausea, vomiting(reported wt loss of 15% in 4 days)    · Food & nutrition-related knowledge deficit related to endocrine dysfunction as evidenced by (new DM dx, A1C 12, minimal exposure to DM information.)    Nutrition Interventions:   Food and/or Nutrient Delivery: Continue current diet, Start oral nutrition supplement  Nutrition Education/Counseling: Education initiated  Coordination of Nutrition Care: Continue to monitor while inpatient  Plan of Care discussed with AQUILINO Dumont via Phone. Goals:     Active Goal: Meet >75% estimated needs by nutrition follow-up    Nutrition Monitoring and Evaluation:   Behavioral-Environmental Outcomes: Knowledge or skill  Food/Nutrient Intake Outcomes: Food and nutrient intake, Supplement intake  Physical Signs/Symptoms Outcomes: Biochemical data, GI status    Discharge Planning:    Recommend pursue outpatient diabetes education    [place], [place], [place], LDN on 1/7/2021 at 1:08 PM  Contact: [phone]       Disaster Mode active

## 2021-01-07 NOTE — PROGRESS NOTES
ACUTE OT GOALS:  (Developed with and agreed upon by patient and/or caregiver.)  1. Pt will toilet with SBA   2. Pt will complete functional mobility for ADLs with SBA  3. Pt will complete lower body dressing with SBA using AE as needed  4. Pt will complete grooming and hygiene at sink with SBA  5. Pt will demonstrate independence with HEP to promote increased BUE strength and functional use for ADLs  6. Pt will tolerate 23 minutes functional activity with min or fewer rest breaks to promote increased endurance for ADLs  7.  Pt will independently demonstrate/ verbalize 2+ energy conservation techniques to promote increased endurance for ADLs      Timeframe: 7 days      OCCUPATIONAL THERAPY ASSESSMENT: Initial Assessment and Daily Note OT Treatment Day # 1    Jovita Courtney is a 61 y.o. male   PRIMARY DIAGNOSIS: Lower respiratory tract infection due to COVID-19 virus  Type 2 diabetes mellitus with hyperosmolarity without nonketotic hyperglycemic-hyperosmolar coma (HCC) [E11.00]  Lower respiratory tract infection due to COVID-19 virus [U07.1, J22]  Acute hypoxemic respiratory failure (HCC) [J96.01]  SWATI (acute kidney injury) ([address] [N17.9]       Reason for Referral:  Fever, SOB, confusion, decreased balance and mobility  ICD-10: Treatment Diagnosis: Generalized Muscle Weakness (M62.81)  INPATIENT: Payor: [facility] / Plan: [facility] PPO / Product Type: Commerical /   ASSESSMENT:     REHAB RECOMMENDATIONS:   Recommendation to date pending progress:  [address] [place] [address] Therapy vs no needs  Equipment:    3 in 1 Bedside Commode     PRIOR LEVEL OF FUNCTION:  (Prior to Hospitalization)  INITIAL/CURRENT LEVEL OF FUNCTION:  (Most Recently Demonstrated)   Bathing:   Independent  Dressing:   Independent  Feeding/Grooming:   Independent  Toileting:   Independent  Functional Mobility:   Independent Bathing:   Contact Guard Assistance  Dressing:   Contact Guard Assistance  Feeding/Grooming:   Contact Guard Assistance  Toileting:   Contact Guard Assistance  Functional Mobility:   Contact Guard Assistance     ASSESSMENT:  Mr. Jaclyn Osuna presented generally weak with deficits in balance, endurance, and mobility impacting ADLs. Pt lives with his sister and is independent at baseline, works and drives, does not own or use any DME. Today, pt required CGA for bed mobility and transfer to chair, had a LOB upon reaching chair requiring assistance to correct. CGA for socks. Pt reports that he has been unsteady, like he is drunk. Pt is below his functional baseline and would benefit from skilled OT services to address deficits. SUBJECTIVE:   Mr. Jaclyn Osuna states, \"My sister noticed that my balance has been off, like I'm drunk. \"    SOCIAL HISTORY/LIVING ENVIRONMENT:   Home Environment: Private residence  One/Two Story Residence: One story  Living Alone: No  Support Systems: Family member(s)    OBJECTIVE:     PAIN: VITAL SIGNS: LINES/DRAINS:   Pre Treatment: Pain Screen  Pain Scale 1: Numeric (0 - 10)  Pain Intensity 1: 0  Post Treatment: 0     O2 Device: Nasal cannula     GROSS EVALUATION:  BUE Within Functional Limits Abnormal/ Functional Abnormal/ Non-Functional (see comments) Not Tested Comments:   AROM [x] [] [] []    PROM [] [] [] []    Strength [] [x] [] []    Balance [] [x] [] []    Posture [] [] [] []    Sensation [] [] [] []    Coordination [] [] [] []    Tone [] [] [] []    Edema [] [] [] []    Activity Tolerance [] [x] [] []     [] [] [] []      COGNITION/  PERCEPTION: Intact Impaired   (see comments) Comments:   Orientation [x] []    Vision [] [] glasses   Hearing [] [x] Slightly [place]   Judgment/ Insight [x] []    Attention [] [x] Slightly impaired   Memory [x] []    Command Following [x] []    Emotional Regulation [] []     [] []      ACTIVITIES OF DAILY LIVING: I Mod I S SBA CGA Min Mod Max Total NT Comments   BASIC ADLs:              Bathing/ Showering [] [] [] [] [] [] [] [] [] []    Toileting [] [] [] [] [] [] [] [] [] []    Dressing [] [] [] [] [x] [] [] [] [] []    Feeding [] [] [] [] [] [] [] [] [] []    Grooming [] [] [] [] [x] [] [] [] [] []    Personal Device Care [] [] [] [] [] [] [] [] [] []    Functional Mobility [] [] [] [] [x] [] [] [] [] []    I=Independent, Mod I=Modified Independent, S=Supervision, SBA=Standby Assistance, CGA=Contact Guard Assistance,   Min=Minimal Assistance, Mod=Moderate Assistance, Max=Maximal Assistance, Total=Total Assistance, NT=Not Tested    MOBILITY: I Mod I S SBA CGA Min Mod Max Total  NT x2 Comments:   Supine to sit [] [] [] [] [x] [] [] [] [] [] []    Sit to supine [] [] [] [] [] [] [] [] [] [] []    Sit to stand [] [] [] [] [x] [] [] [] [] [] []    Bed to chair [] [] [] [] [x] [] [] [] [] [] []    I=Independent, Mod I=Modified Independent, S=Supervision, SBA=Standby Assistance, CGA=Contact Guard Assistance,   Min=Minimal Assistance, Mod=Moderate Assistance, Max=Maximal Assistance, Total=Total Assistance, NT=Not Tested    [address] [address] AM-PAC 6 Clicks   Daily Activity Inpatient Short Form        How much help from another person does the patient currently need. .. Total A Lot A Little None   1. Putting on and taking off regular lower body clothing? [] 1   [] 2   [x] 3   [] 4   2. Bathing (including washing, rinsing, drying)? [] 1   [] 2   [x] 3   [] 4   3. Toileting, which includes using toilet, bedpan or urinal?   [] 1   [] 2   [x] 3   [] 4   4. Putting on and taking off regular upper body clothing? [] 1   [] 2   [x] 3   [] 4   5. Taking care of personal grooming such as brushing teeth? [] 1   [] 2   [x] 3   [] 4   6. Eating meals? [] 1   [] 2   [] 3   [x] 4   © 2007, Trustees of [address] [place] [address] [address] [id number], under license to [employer].  All rights reserved     Score:  Initial: 19 Most Recent: X (Date: -- )   Interpretation of Tool:  Represents activities that are increasingly more difficult (i.e. Bed mobility, Transfers, Gait).    PLAN:   FREQUENCY/DURATION: OT Plan of Care: 3 times/week for duration of hospital stay or until stated goals are met, whichever comes first.    PROBLEM LIST:   (Skilled intervention is medically necessary to address:)  1. Decreased ADL/Functional Activities  2. Decreased Activity Tolerance  3. Decreased Balance  4. Decreased Strength  5. Decreased Transfer Abilities   INTERVENTIONS PLANNED:   (Benefits and precautions of occupational therapy have been discussed with the patient.)  1. Self Care Training  2. Therapeutic Activity  3. Therapeutic Exercise/HEP  4. Neuromuscular Re-education  5. Education     TREATMENT:     EVALUATION: Moderate Complexity : (Untimed Charge)    TREATMENT:   ( $$ Therapeutic Activity: 8-22 mins   )  Therapeutic Activity (8 Minutes): Therapeutic activity included Supine to Sit, Sitting balance  and Standing balance to improve functional Mobility, Activity tolerance and balance for ADLs.     AFTER TREATMENT POSITION/PRECAUTIONS:  Chair, Needs within reach and RN notified    INTERDISCIPLINARY COLLABORATION:  RN/PCT    TOTAL TREATMENT DURATION:  OT Patient Time In/Time Out  Time In: 1002  Time Out: [facility]

## 2021-01-07 NOTE — PROGRESS NOTES
01/07/21 0343   Dual Skin Pressure Injury Assessment   Dual Skin Pressure Injury Assessment WDL   Second Care Provider (Based on [address] [place] [address]) Cheyenne ROLLE   Skin Integumentary   Skin Integumentary (WDL) WDL    Pressure  Injury Documentation No Pressure Injury Noted-Pressure Ulcer Prevention Initiated   dual skin assessment, skin is intact, no signs of skin breakdown.

## 2021-01-07 NOTE — PROGRESS NOTES
CM contacted patient in room to complete assessment. Demographic information verified by patient. The patient lives in a one story home with no steps at the entrance. Patient confirmed his sister resides with him. Patient also confirmed he is independent with completing all ADL's and confirmed no DME. Patient stated he is employed with [employer]. Patient receives his medications from [facility] [address] on "[employer] in [facility]. Patient voiced no difficulty with obtaining medications in the community. Discharge planning: PT/OT has been consulted. Patient denies any history of [facility] or [facility]. Patient voiced no needs at this time. CM will continue to monitor therapy's recommendations and plan of care. Please consult or notify CM if any needs shall arise. CM continues to monitor plan of care. Care Management Interventions  PCP Verified by CM: Yes  Mode of Transport at Discharge:  Other (see comment)(TBD: based upon need. )  Transition of Care Consult (CM Consult): Discharge Planning  Discharge Durable Medical Equipment: No  Physical Therapy Consult: Yes  Occupational Therapy Consult: Yes  Speech Therapy Consult: No  Current Support Network: Own Home  Confirm Follow Up Transport: [address] Provided?: No  Discharge Location  Discharge Placement: Home

## 2021-01-07 NOTE — PROGRESS NOTES
TRANSFER - IN REPORT:    Verbal report received from [facility] on Madan Victoria.  being received from ED for routine progression of care      Report consisted of patients Situation, Background, Assessment and   Recommendations(SBAR). Information from the following report(s) SBAR, Kardex and ED Summary was reviewed with the receiving nurse. Opportunity for questions and clarification was provided. Assessment completed upon patients arrival to unit and care assumed.

## 2021-01-08 ENCOUNTER — APPOINTMENT (OUTPATIENT)
Dept: GENERAL RADIOLOGY | Age: 61
DRG: 004 | End: 2021-01-08
Attending: INTERNAL MEDICINE
Payer: COMMERCIAL

## 2021-01-08 PROBLEM — E87.0 HYPERNATREMIA: Status: ACTIVE | Noted: 2021-01-08

## 2021-01-08 LAB
ALBUMIN SERPL-MCNC: 2.6 G/DL (ref 3.2–4.6)
ALBUMIN/GLOB SERPL: 0.6 {RATIO} (ref 1.2–3.5)
ALP SERPL-CCNC: 83 U/L (ref 50–136)
ALT SERPL-CCNC: 42 U/L (ref 12–65)
ANION GAP SERPL CALC-SCNC: 8 MMOL/L (ref 7–16)
APTT PPP: 33.4 SEC (ref 24.1–35.1)
AST SERPL-CCNC: 45 U/L (ref 15–37)
BILIRUB SERPL-MCNC: 0.5 MG/DL (ref 0.2–1.1)
BLD PROD TYP BPU: NORMAL
BLOOD BANK CMNT PATIENT-IMP: NORMAL
BPU ID: NORMAL
BUN SERPL-MCNC: 35 MG/DL (ref 8–23)
CALCIUM SERPL-MCNC: 8.9 MG/DL (ref 8.3–10.4)
CHLORIDE SERPL-SCNC: 116 MMOL/L (ref 98–107)
CO2 SERPL-SCNC: 25 MMOL/L (ref 21–32)
CREAT SERPL-MCNC: 1.69 MG/DL (ref 0.8–1.5)
FIBRINOGEN PPP-MCNC: 756 MG/DL (ref 190–501)
GLOBULIN SER CALC-MCNC: 4.6 G/DL (ref 2.3–3.5)
GLUCOSE BLD STRIP.AUTO-MCNC: 208 MG/DL (ref 65–100)
GLUCOSE BLD STRIP.AUTO-MCNC: 219 MG/DL (ref 65–100)
GLUCOSE BLD STRIP.AUTO-MCNC: 375 MG/DL (ref 65–100)
GLUCOSE BLD STRIP.AUTO-MCNC: 387 MG/DL (ref 65–100)
GLUCOSE SERPL-MCNC: 290 MG/DL (ref 65–100)
INR PPP: 1.1
POTASSIUM SERPL-SCNC: 3.7 MMOL/L (ref 3.5–5.1)
PROT SERPL-MCNC: 7.2 G/DL (ref 6.3–8.2)
PROTHROMBIN TIME: 14.5 SEC (ref 12.5–14.7)
SODIUM SERPL-SCNC: 149 MMOL/L (ref 136–145)
STATUS OF UNIT,%ST: NORMAL
UNIT DIVISION, %UDIV: NORMAL

## 2021-01-08 PROCEDURE — 85730 THROMBOPLASTIN TIME PARTIAL: CPT

## 2021-01-08 PROCEDURE — 80053 COMPREHEN METABOLIC PANEL: CPT

## 2021-01-08 PROCEDURE — 82962 GLUCOSE BLOOD TEST: CPT

## 2021-01-08 PROCEDURE — 74011250636 HC RX REV CODE- 250/636: Performed by: EMERGENCY MEDICINE

## 2021-01-08 PROCEDURE — 74011250636 HC RX REV CODE- 250/636: Performed by: HOSPITALIST

## 2021-01-08 PROCEDURE — 74011250636 HC RX REV CODE- 250/636: Performed by: INTERNAL MEDICINE

## 2021-01-08 PROCEDURE — 74011000250 HC RX REV CODE- 250: Performed by: INTERNAL MEDICINE

## 2021-01-08 PROCEDURE — 74011636637 HC RX REV CODE- 636/637: Performed by: HOSPITALIST

## 2021-01-08 PROCEDURE — 74011000258 HC RX REV CODE- 258: Performed by: INTERNAL MEDICINE

## 2021-01-08 PROCEDURE — 85610 PROTHROMBIN TIME: CPT

## 2021-01-08 PROCEDURE — 74011636637 HC RX REV CODE- 636/637: Performed by: INTERNAL MEDICINE

## 2021-01-08 PROCEDURE — 2709999900 HC NON-CHARGEABLE SUPPLY

## 2021-01-08 PROCEDURE — 74011250637 HC RX REV CODE- 250/637: Performed by: HOSPITALIST

## 2021-01-08 PROCEDURE — 74011000258 HC RX REV CODE- 258: Performed by: EMERGENCY MEDICINE

## 2021-01-08 PROCEDURE — 85384 FIBRINOGEN ACTIVITY: CPT

## 2021-01-08 PROCEDURE — 97530 THERAPEUTIC ACTIVITIES: CPT

## 2021-01-08 PROCEDURE — 71045 X-RAY EXAM CHEST 1 VIEW: CPT

## 2021-01-08 PROCEDURE — 36415 COLL VENOUS BLD VENIPUNCTURE: CPT

## 2021-01-08 PROCEDURE — 65270000029 HC RM PRIVATE

## 2021-01-08 RX ORDER — INSULIN LISPRO 100 [IU]/ML
5 INJECTION, SOLUTION INTRAVENOUS; SUBCUTANEOUS ONCE
Status: DISCONTINUED | OUTPATIENT
Start: 2021-01-08 | End: 2021-01-08

## 2021-01-08 RX ORDER — INSULIN GLARGINE 100 [IU]/ML
18 INJECTION, SOLUTION SUBCUTANEOUS
Status: COMPLETED | OUTPATIENT
Start: 2021-01-08 | End: 2021-01-08

## 2021-01-08 RX ORDER — INSULIN LISPRO 100 [IU]/ML
10 INJECTION, SOLUTION INTRAVENOUS; SUBCUTANEOUS
Status: DISCONTINUED | OUTPATIENT
Start: 2021-01-08 | End: 2021-01-12

## 2021-01-08 RX ORDER — SODIUM CHLORIDE, SODIUM LACTATE, POTASSIUM CHLORIDE, CALCIUM CHLORIDE 600; 310; 30; 20 MG/100ML; MG/100ML; MG/100ML; MG/100ML
100 INJECTION, SOLUTION INTRAVENOUS CONTINUOUS
Status: DISCONTINUED | OUTPATIENT
Start: 2021-01-08 | End: 2021-01-10

## 2021-01-08 RX ORDER — INSULIN GLARGINE 100 [IU]/ML
36 INJECTION, SOLUTION SUBCUTANEOUS DAILY
Status: DISCONTINUED | OUTPATIENT
Start: 2021-01-09 | End: 2021-01-12

## 2021-01-08 RX ADMIN — INSULIN LISPRO 10 UNITS: 100 INJECTION, SOLUTION INTRAVENOUS; SUBCUTANEOUS at 16:17

## 2021-01-08 RX ADMIN — CEFTRIAXONE 2 G: 2 INJECTION, POWDER, FOR SOLUTION INTRAMUSCULAR; INTRAVENOUS at 01:31

## 2021-01-08 RX ADMIN — DEXAMETHASONE 6 MG: 4 TABLET ORAL at 08:39

## 2021-01-08 RX ADMIN — INSULIN LISPRO 10 UNITS: 100 INJECTION, SOLUTION INTRAVENOUS; SUBCUTANEOUS at 09:43

## 2021-01-08 RX ADMIN — INSULIN GLARGINE 18 UNITS: 100 INJECTION, SOLUTION SUBCUTANEOUS at 08:50

## 2021-01-08 RX ADMIN — REMDESIVIR 200 MG: 100 INJECTION, POWDER, LYOPHILIZED, FOR SOLUTION INTRAVENOUS at 16:27

## 2021-01-08 RX ADMIN — HEPARIN SODIUM 5000 UNITS: 5000 INJECTION INTRAVENOUS; SUBCUTANEOUS at 05:38

## 2021-01-08 RX ADMIN — ATORVASTATIN CALCIUM 80 MG: 80 TABLET, FILM COATED ORAL at 08:39

## 2021-01-08 RX ADMIN — INSULIN LISPRO 10 UNITS: 100 INJECTION, SOLUTION INTRAVENOUS; SUBCUTANEOUS at 08:49

## 2021-01-08 RX ADMIN — CLOPIDOGREL BISULFATE 75 MG: 75 TABLET ORAL at 08:40

## 2021-01-08 RX ADMIN — Medication 10 ML: at 21:30

## 2021-01-08 RX ADMIN — METOPROLOL TARTRATE 50 MG: 50 TABLET, FILM COATED ORAL at 16:47

## 2021-01-08 RX ADMIN — Medication 10 ML: at 05:39

## 2021-01-08 RX ADMIN — SODIUM CHLORIDE, SODIUM LACTATE, POTASSIUM CHLORIDE, AND CALCIUM CHLORIDE 100 ML/HR: 600; 310; 30; 20 INJECTION, SOLUTION INTRAVENOUS at 16:45

## 2021-01-08 RX ADMIN — ASPIRIN 81 MG 81 MG: 81 TABLET ORAL at 08:39

## 2021-01-08 RX ADMIN — METOPROLOL TARTRATE 50 MG: 50 TABLET, FILM COATED ORAL at 08:40

## 2021-01-08 RX ADMIN — Medication 220 MG: at 08:39

## 2021-01-08 RX ADMIN — INSULIN LISPRO 4 UNITS: 100 INJECTION, SOLUTION INTRAVENOUS; SUBCUTANEOUS at 21:35

## 2021-01-08 RX ADMIN — HEPARIN SODIUM 5000 UNITS: 5000 INJECTION INTRAVENOUS; SUBCUTANEOUS at 14:24

## 2021-01-08 RX ADMIN — AZITHROMYCIN 500 MG: 500 INJECTION, POWDER, LYOPHILIZED, FOR SOLUTION INTRAVENOUS at 02:07

## 2021-01-08 RX ADMIN — Medication 10 ML: at 14:24

## 2021-01-08 RX ADMIN — Medication 1000 MG: at 16:46

## 2021-01-08 RX ADMIN — SODIUM CHLORIDE 100 ML/HR: 900 INJECTION, SOLUTION INTRAVENOUS at 02:08

## 2021-01-08 RX ADMIN — MAGNESIUM GLUCONATE 500 MG ORAL TABLET 400 MG: 500 TABLET ORAL at 08:39

## 2021-01-08 RX ADMIN — INSULIN LISPRO 10 UNITS: 100 INJECTION, SOLUTION INTRAVENOUS; SUBCUTANEOUS at 12:25

## 2021-01-08 RX ADMIN — INSULIN GLARGINE 18 UNITS: 100 INJECTION, SOLUTION SUBCUTANEOUS at 10:30

## 2021-01-08 RX ADMIN — HEPARIN SODIUM 5000 UNITS: 5000 INJECTION INTRAVENOUS; SUBCUTANEOUS at 21:29

## 2021-01-08 RX ADMIN — Medication 1000 MG: at 08:39

## 2021-01-08 NOTE — PROGRESS NOTES
Hourly rounding completed on this shift. No new complaints at this time. All needs met. Pt increased in O2 from 5L to 7L NC. O2 saturation at 90%. Pt is currently resting in bed. Will continue to monitor and give report to oncoming nurse.

## 2021-01-08 NOTE — PROGRESS NOTES
Pt successfully performed self injection of insulin. Will continue to have pt practice self injections.

## 2021-01-08 NOTE — PROGRESS NOTES
ACUTE PHYSICAL THERAPY GOALS:  (Developed with and agreed upon by patient and/or caregiver. )  LTG:  (1.)Mr. Torres will move from supine to sit and sit to supine , scoot up and down and roll side to side in bed with INDEPENDENT within 7 treatment day(s). (2.)Mr. Torres will transfer from bed to chair and chair to bed with INDEPENDENT using the least restrictive device within 7 treatment day(s). (3.)Mr. Torres will ambulate with INDEPENDENT for 250+ feet with the least restrictive device within 7 treatment day(s) while maintaining normal vital signs. (4.)Mr. Torres will participate in 23+ minutes of therapeutic activity within 7 treatment days while maintaining normal vital signs throughout. PHYSICAL THERAPY: Daily Note and PM Treatment Day # 2    Martin Rocha is a 61 y.o. male   PRIMARY DIAGNOSIS: Lower respiratory tract infection due to COVID-19 virus  Type 2 diabetes mellitus with hyperosmolarity without nonketotic hyperglycemic-hyperosmolar coma (HCC) [E11.00]  Lower respiratory tract infection due to COVID-19 virus [U07.1, J22]  Acute hypoxemic respiratory failure (HCC) [J96.01]  SWATI (acute kidney injury) ([facility] [address] [N17.9]         ASSESSMENT:     REHAB RECOMMENDATIONS: CURRENT LEVEL OF FUNCTION:  (Most Recently Demonstrated)   Recommendation to date pending progress:  Setting:   No further skilled therapy   Equipment:    None Bed Mobility:   Supervision  Sit to Stand:   Supervision  Transfers:   Supervision  Gait/Mobility:   Supervision     ASSESSMENT:  Mr. Rachna Beltran was agreeable to tx. On 7L 84%. Sat up and dropped to 79%. Increased O2 to 8L taking 4min to recover and sat EOB. Sat dropped again to 79% and needed 3-4min to recover to 90%. Ambulated 50ft in room and sat dropping to 60%. Pt returned to supine and waited 5-6 min to recover to 84%-88%. Pt had minimal symptoms mainly sat dropping. Slow progress due to poor sat levels. SUBJECTIVE:   Mr. Rachna Beltran states, [de-identified]. \"    SOCIAL HISTORY/ LIVING ENVIRONMENT:   Home Environment: Private residence  One/Two Story Residence: One story  Living Alone: No  Support Systems: Family member(s)  OBJECTIVE:     PAIN: VITAL SIGNS: LINES/DRAINS:   Pre Treatment: Pain Screen  Pain Scale 1: Numeric (0 - 10)  Pain Intensity 1: 0  Post Treatment: 0   IV and 7L  O2 Device: Nasal cannula     MOBILITY: I Mod I S SBA CGA Min Mod Max Total  NT x2 Comments:   Bed Mobility    Rolling [] [] [x] [] [] [] [] [] [] [] []    Supine to Sit [] [] [x] [] [] [] [] [] [] [] []    Scooting [] [] [x] [] [] [] [] [] [] [] []    Sit to Supine [] [] [x] [] [] [] [] [] [] [] []    Transfers    Sit to Stand [] [] [x] [] [] [] [] [] [] [] []    Bed to Chair [] [] [] [] [] [] [] [] [] [] []    Stand to Sit [] [] [x] [] [] [] [] [] [] [] []    I=Independent, Mod I=Modified Independent, S=Supervision, SBA=Standby Assistance, CGA=Contact Guard Assistance,   Min=Minimal Assistance, Mod=Moderate Assistance, Max=Maximal Assistance, Total=Total Assistance, NT=Not Tested    GAIT: I Mod I S SBA CGA Min Mod Max Total  NT x2 Comments:   Level of Assistance [] [] [x] [] [] [] [] [] [] [] []    Distance 50    DME None    Gait Quality No balance challenges,     I=Independent, Mod I=Modified Independent, S=Supervision, SBA=Standby Assistance, CGA=Contact Guard Assistance,   Min=Minimal Assistance, Mod=Moderate Assistance, Max=Maximal Assistance, Total=Total Assistance, NT=Not Tested    PLAN:   FREQUENCY/DURATION: PT Plan of Care: 3 times/week for duration of hospital stay or until stated goals are met, whichever comes first.  TREATMENT:     TREATMENT:   ($$ Therapeutic Activity: 23-37 mins    )  Therapeutic Activity (24 Minutes): Therapeutic activity included Rolling, Supine to Sit, Sit to Supine, Scooting, Transfer Training, Ambulation on level ground, Sitting balance  and Standing balance to improve functional Mobility, Strength, ROM and Activity tolerance.     AFTER TREATMENT POSITION/PRECAUTIONS:  Bed, Needs within reach and RN notified    INTERDISCIPLINARY COLLABORATION:  RN/PCT and PT/PTA    TOTAL TREATMENT DURATION:  PT Patient Time In/Time Out  Time In: 1305  Time Out: Neno Calix DPT

## 2021-01-08 NOTE — PROGRESS NOTES
Hospitalist Progress Note    2021  Admit Date: 2021 11:02 PM   NAME: Kaylee Torres Jr. :  1960   MRN:  [id number]   Attending: Terry Medina DO  PCP:  Collette Colt, MD    SUBJECTIVE:     Chief complaint fever cough, congestion,     Patient is a 80-year-old male with past medical history significant for coronary artery disease, GERD, hypertension presented to the ER because of fever cough and congestion. Patient states that he has been having subjective fevers for the last week. He also has dry cough as well as nasal congestion. He was seen in the ER at [facility] yesterday. Covid test was done and is positive. (- Result was reviewed in care everywhere) Complains of having myalgias decreased smell and taste. He denies shortness of breath. No chest pain no palpitations. He reports losing about 30 pounds in the last week. Per the sister at bedside patient is also intermittently confused at home. He also has nausea vomiting. He reports feeling very tired.     10 systems reviewed and negative except as noted in HPI.     ER course:  Patient is afebrile. Hemodynamically stable. Oxygen saturations dropped to 86% on room air. Placed on supplemental oxygen by nasal cannula.     CBC remarkable for hemoglobin of 18.2 likely hemoconcentration.     CMP with blood glucose of 760. BUN 40 and creatinine 2.6. Lipase 653. Lactic acid 4.1. Procalcitonin 0.25.     Chest x-ray shows mild patchy groundglass densities in the mid lungs likely atelectasis/pneumonia.     Patient admitted for further evaluation management of new diagnosis of diabetes with hyperglycemia, COVID-19 pneumonia. Interval History (): patient examined at bedside. Feels weak. Denies shortness of breath. No chest pain.      Review of Systems negative with exception of pertinent positives noted above  PHYSICAL EXAM     Visit Vitals  BP (!) 157/58   Pulse 86   Temp 98 °F (36.7 °C)   Resp 19   Ht 5' 8\" (1.727 m)   Wt 91 kg (200 lb 9.6 oz)   SpO2 90%   BMI 30.50 kg/m²      Temp (24hrs), Av.4 °F (37.4 °C), Min:98 °F (36.7 °C), Max:102.7 °F (39.3 °C)    Oxygen Therapy  O2 Sat (%): 90 % (21 1609)  Pulse via Oximetry: 72 beats per minute (21 0238)  O2 Device: Hi flow nasal cannula (21 1455)  O2 Flow Rate (L/min): 7 l/min (21 1455)    Intake/Output Summary (Last 24 hours) at 2021 1635  Last data filed at 2021 1421  Gross per 24 hour   Intake 690.3 ml   Output 1425 ml   Net -734.7 ml      General: No acute distress    Lungs:  CTA Bilaterally. On 7L   Heart:  Regular rate and rhythm,  No murmur, rub, or gallop  Abdomen: Soft, Non distended, Non tender, Positive bowel sounds  Extremities: No cyanosis, clubbing or edema  Neurologic:  No focal deficits    ASSESSMENT      Active Hospital Problems    Diagnosis Date Noted    Hypernatremia 2021    SWATI (acute kidney injury) ([facility] [address] 2021    Acute hypoxemic respiratory failure ([facility] [address] 2021    Type 2 diabetes mellitus with hyperosmolarity without nonketotic hyperglycemic-hyperosmolar coma ([facility] [address] 2021    Lower respiratory tract infection due to COVID-19 virus 2021    CAD (coronary artery disease) 10/28/2016     10-27-06 LHC 90% LAD s/p 2.25 x 20 Synergy drug-eluting stent  (CS)      Obesity 10/06/2015     Plan:    # Acute hypoxic respiratory failure due to viral pneumonia from COVID  - worsening hypoxemia  - trend d dimer and procalcitonin levels  - repeat CXR  - started on remdesevir today as SCr improves  - continue Decadron 6 mg  - received plasma on     # SWATI, improving overall  - likely due to urinary losses   - switch from NS to LR due to hypernatremia  - avoid nephrotoxic meds and IV contrast  - strict I's/O's  - daily BMPs    # DM type II, new diagnosis   - likely worsened by Decadron  - basal/bolus regimen  - IVF resuscitation  - Humalog SSI and serial CBGs    F/E/N: fluids as above.  Replete electrolytes as needed, diabetic diet    Ppx: Lovenox for VTE    Code Status: FULL CODE    Disposition: pending clinical improvement with plan as above. Discussed with patient at bedside. All questions answered.      Signed By: Thom Styles DO     January 8, 2021

## 2021-01-08 NOTE — DIABETES MGMT
. Blood glucose range yesterday 760-148 with pt receiving a total 90 units of insulin (Lantus 18 units and Humalog 72 units). Per discussion with provider, orders received to increase Lantus dose to Lantus 36 units daily and add Humalog 10 units 3x/day ac. Educated patient regarding current basal/bolus regimen of Lantus 36 units daily, Humalog 10 units 3x/day ac, and Humalog sliding scale coverage 4x/day ac and hs, including type of insulin, timing with meals, onset, duration of effect, and peak of insulin dose. Educated patient on the differences between prandial insulin and sliding scale insulin. Instructed patient to always seek guidance from their primary care provider about adjusting their insulin doses and not to adjust them on their own as this could negatively impact their glycemic control or result in hypoglycemia. Patient verbalizes understanding. Reviewed target goals for HbA1c and blood glucose and the significance of an HbA1c of 12. Discussed signs, symptoms and treatments for hyper/hypoglycemia and to notify primary care nurse of any s/s. Discussed the relationship between hyperglycemia and steroids and that as steroids are discontinued insulin needs may decrease too. Pt verbalizes understanding. Patient would likely benefit from continued diabetes outpatient education. Referral to [facility] has been sent. Pt is agreeable to plan. Encouraged compliance with discharge regimen. Stressed the importance of follow up care for diabetes management with PCP. Patient voices no further questions regarding diabetes management at this time.

## 2021-01-08 NOTE — PROGRESS NOTES
Patient seen and examined at bedside, admitted after MN. Feels \"cold\" and he is actively shivering at the bedside. Having a productive cough. Denies fevers, however. Renal function improving with IVF resuscitation, will continue. Will continue Rocephin/azithro for CAP coverage, trend procalcitonin. Once renal function improves can add remdesevir. Continue with Decadron and convalescent plasma. Continue heparin for VTE prophylaxis. No bill for this encounter.

## 2021-01-09 LAB
ALBUMIN SERPL-MCNC: 2.1 G/DL (ref 3.2–4.6)
ALBUMIN/GLOB SERPL: 0.5 {RATIO} (ref 1.2–3.5)
ALP SERPL-CCNC: 81 U/L (ref 50–136)
ALT SERPL-CCNC: 35 U/L (ref 12–65)
ANION GAP SERPL CALC-SCNC: 13 MMOL/L (ref 7–16)
APTT PPP: 33.3 SEC (ref 24.1–35.1)
AST SERPL-CCNC: 39 U/L (ref 15–37)
BASOPHILS # BLD: 0 K/UL (ref 0–0.2)
BASOPHILS NFR BLD: 0 % (ref 0–2)
BILIRUB SERPL-MCNC: 0.6 MG/DL (ref 0.2–1.1)
BUN SERPL-MCNC: 28 MG/DL (ref 8–23)
CALCIUM SERPL-MCNC: 8.9 MG/DL (ref 8.3–10.4)
CHLORIDE SERPL-SCNC: 113 MMOL/L (ref 98–107)
CO2 SERPL-SCNC: 23 MMOL/L (ref 21–32)
CREAT SERPL-MCNC: 1.05 MG/DL (ref 0.8–1.5)
D DIMER PPP FEU-MCNC: 0.67 UG/ML(FEU)
DIFFERENTIAL METHOD BLD: ABNORMAL
EOSINOPHIL # BLD: 0 K/UL (ref 0–0.8)
EOSINOPHIL NFR BLD: 0 % (ref 0.5–7.8)
ERYTHROCYTE [DISTWIDTH] IN BLOOD BY AUTOMATED COUNT: 13 % (ref 11.9–14.6)
FIBRINOGEN PPP-MCNC: 659 MG/DL (ref 190–501)
GLOBULIN SER CALC-MCNC: 4.4 G/DL (ref 2.3–3.5)
GLUCOSE BLD STRIP.AUTO-MCNC: 219 MG/DL (ref 65–100)
GLUCOSE BLD STRIP.AUTO-MCNC: 229 MG/DL (ref 65–100)
GLUCOSE BLD STRIP.AUTO-MCNC: 277 MG/DL (ref 65–100)
GLUCOSE BLD STRIP.AUTO-MCNC: 308 MG/DL (ref 65–100)
GLUCOSE SERPL-MCNC: 221 MG/DL (ref 65–100)
HCT VFR BLD AUTO: 49.2 % (ref 41.1–50.3)
HGB BLD-MCNC: 16.3 G/DL (ref 13.6–17.2)
IMM GRANULOCYTES # BLD AUTO: 0.1 K/UL (ref 0–0.5)
IMM GRANULOCYTES NFR BLD AUTO: 1 % (ref 0–5)
INR PPP: 1.2
LYMPHOCYTES # BLD: 0.5 K/UL (ref 0.5–4.6)
LYMPHOCYTES NFR BLD: 4 % (ref 13–44)
MCH RBC QN AUTO: 29.6 PG (ref 26.1–32.9)
MCHC RBC AUTO-ENTMCNC: 33.1 G/DL (ref 31.4–35)
MCV RBC AUTO: 89.5 FL (ref 79.6–97.8)
MONOCYTES # BLD: 0.5 K/UL (ref 0.1–1.3)
MONOCYTES NFR BLD: 4 % (ref 4–12)
NEUTS SEG # BLD: 11.3 K/UL (ref 1.7–8.2)
NEUTS SEG NFR BLD: 91 % (ref 43–78)
NRBC # BLD: 0 K/UL (ref 0–0.2)
PLATELET # BLD AUTO: 121 K/UL (ref 150–450)
PLATELET COMMENTS,PCOM: SLIGHT
PMV BLD AUTO: 11.8 FL (ref 9.4–12.3)
POTASSIUM SERPL-SCNC: 3.5 MMOL/L (ref 3.5–5.1)
PROCALCITONIN SERPL-MCNC: 0.39 NG/ML
PROT SERPL-MCNC: 6.5 G/DL (ref 6.3–8.2)
PROTHROMBIN TIME: 15 SEC (ref 12.5–14.7)
RBC # BLD AUTO: 5.5 M/UL (ref 4.23–5.6)
RBC MORPH BLD: ABNORMAL
SODIUM SERPL-SCNC: 149 MMOL/L (ref 138–145)
WBC # BLD AUTO: 12.4 K/UL (ref 4.3–11.1)
WBC MORPH BLD: ABNORMAL

## 2021-01-09 PROCEDURE — 74011250636 HC RX REV CODE- 250/636: Performed by: INTERNAL MEDICINE

## 2021-01-09 PROCEDURE — 74011250636 HC RX REV CODE- 250/636: Performed by: HOSPITALIST

## 2021-01-09 PROCEDURE — 74011000258 HC RX REV CODE- 258: Performed by: EMERGENCY MEDICINE

## 2021-01-09 PROCEDURE — 74011250636 HC RX REV CODE- 250/636: Performed by: EMERGENCY MEDICINE

## 2021-01-09 PROCEDURE — 36415 COLL VENOUS BLD VENIPUNCTURE: CPT

## 2021-01-09 PROCEDURE — 85730 THROMBOPLASTIN TIME PARTIAL: CPT

## 2021-01-09 PROCEDURE — 85384 FIBRINOGEN ACTIVITY: CPT

## 2021-01-09 PROCEDURE — 82962 GLUCOSE BLOOD TEST: CPT

## 2021-01-09 PROCEDURE — 84145 PROCALCITONIN (PCT): CPT

## 2021-01-09 PROCEDURE — 85379 FIBRIN DEGRADATION QUANT: CPT

## 2021-01-09 PROCEDURE — 74011636637 HC RX REV CODE- 636/637: Performed by: INTERNAL MEDICINE

## 2021-01-09 PROCEDURE — 85610 PROTHROMBIN TIME: CPT

## 2021-01-09 PROCEDURE — 2709999900 HC NON-CHARGEABLE SUPPLY

## 2021-01-09 PROCEDURE — 77010033678 HC OXYGEN DAILY

## 2021-01-09 PROCEDURE — 65270000029 HC RM PRIVATE

## 2021-01-09 PROCEDURE — 74011000250 HC RX REV CODE- 250: Performed by: INTERNAL MEDICINE

## 2021-01-09 PROCEDURE — 74011250637 HC RX REV CODE- 250/637: Performed by: HOSPITALIST

## 2021-01-09 PROCEDURE — 85025 COMPLETE CBC W/AUTO DIFF WBC: CPT

## 2021-01-09 PROCEDURE — 80053 COMPREHEN METABOLIC PANEL: CPT

## 2021-01-09 PROCEDURE — 74011636637 HC RX REV CODE- 636/637: Performed by: HOSPITALIST

## 2021-01-09 PROCEDURE — 94760 N-INVAS EAR/PLS OXIMETRY 1: CPT

## 2021-01-09 RX ADMIN — Medication 1000 MG: at 18:22

## 2021-01-09 RX ADMIN — INSULIN LISPRO 10 UNITS: 100 INJECTION, SOLUTION INTRAVENOUS; SUBCUTANEOUS at 09:26

## 2021-01-09 RX ADMIN — Medication 1000 MG: at 09:25

## 2021-01-09 RX ADMIN — INSULIN LISPRO 10 UNITS: 100 INJECTION, SOLUTION INTRAVENOUS; SUBCUTANEOUS at 18:00

## 2021-01-09 RX ADMIN — HEPARIN SODIUM 5000 UNITS: 5000 INJECTION INTRAVENOUS; SUBCUTANEOUS at 05:00

## 2021-01-09 RX ADMIN — MAGNESIUM GLUCONATE 500 MG ORAL TABLET 400 MG: 500 TABLET ORAL at 09:26

## 2021-01-09 RX ADMIN — AZITHROMYCIN 500 MG: 500 INJECTION, POWDER, LYOPHILIZED, FOR SOLUTION INTRAVENOUS at 01:57

## 2021-01-09 RX ADMIN — ATORVASTATIN CALCIUM 80 MG: 80 TABLET, FILM COATED ORAL at 09:25

## 2021-01-09 RX ADMIN — INSULIN LISPRO 6 UNITS: 100 INJECTION, SOLUTION INTRAVENOUS; SUBCUTANEOUS at 18:00

## 2021-01-09 RX ADMIN — Medication 10 ML: at 21:19

## 2021-01-09 RX ADMIN — METOPROLOL TARTRATE 50 MG: 50 TABLET, FILM COATED ORAL at 18:22

## 2021-01-09 RX ADMIN — HEPARIN SODIUM 5000 UNITS: 5000 INJECTION INTRAVENOUS; SUBCUTANEOUS at 13:55

## 2021-01-09 RX ADMIN — CLOPIDOGREL BISULFATE 75 MG: 75 TABLET ORAL at 09:26

## 2021-01-09 RX ADMIN — Medication 10 ML: at 05:00

## 2021-01-09 RX ADMIN — DEXAMETHASONE 6 MG: 4 TABLET ORAL at 09:26

## 2021-01-09 RX ADMIN — HEPARIN SODIUM 5000 UNITS: 5000 INJECTION INTRAVENOUS; SUBCUTANEOUS at 21:18

## 2021-01-09 RX ADMIN — INSULIN GLARGINE 36 UNITS: 100 INJECTION, SOLUTION SUBCUTANEOUS at 09:26

## 2021-01-09 RX ADMIN — CEFTRIAXONE 2 G: 2 INJECTION, POWDER, FOR SOLUTION INTRAMUSCULAR; INTRAVENOUS at 01:26

## 2021-01-09 RX ADMIN — Medication 220 MG: at 09:25

## 2021-01-09 RX ADMIN — Medication 10 ML: at 13:52

## 2021-01-09 RX ADMIN — REMDESIVIR 100 MG: 100 INJECTION, POWDER, LYOPHILIZED, FOR SOLUTION INTRAVENOUS at 15:55

## 2021-01-09 RX ADMIN — ASPIRIN 81 MG 81 MG: 81 TABLET ORAL at 09:25

## 2021-01-09 RX ADMIN — INSULIN LISPRO 8 UNITS: 100 INJECTION, SOLUTION INTRAVENOUS; SUBCUTANEOUS at 13:25

## 2021-01-09 RX ADMIN — INSULIN LISPRO 4 UNITS: 100 INJECTION, SOLUTION INTRAVENOUS; SUBCUTANEOUS at 21:19

## 2021-01-09 RX ADMIN — INSULIN LISPRO 4 UNITS: 100 INJECTION, SOLUTION INTRAVENOUS; SUBCUTANEOUS at 09:26

## 2021-01-09 RX ADMIN — METOPROLOL TARTRATE 50 MG: 50 TABLET, FILM COATED ORAL at 09:25

## 2021-01-09 RX ADMIN — INSULIN LISPRO 10 UNITS: 100 INJECTION, SOLUTION INTRAVENOUS; SUBCUTANEOUS at 13:25

## 2021-01-10 ENCOUNTER — APPOINTMENT (OUTPATIENT)
Dept: GENERAL RADIOLOGY | Age: 61
DRG: 004 | End: 2021-01-10
Attending: INTERNAL MEDICINE
Payer: COMMERCIAL

## 2021-01-10 PROBLEM — J12.82 PNEUMONIA DUE TO COVID-19 VIRUS: Status: ACTIVE | Noted: 2021-01-07

## 2021-01-10 LAB
ALBUMIN SERPL-MCNC: 1.8 G/DL (ref 3.2–4.6)
ALBUMIN/GLOB SERPL: 0.4 {RATIO} (ref 1.2–3.5)
ALP SERPL-CCNC: 87 U/L (ref 50–136)
ALT SERPL-CCNC: 35 U/L (ref 12–65)
ANION GAP SERPL CALC-SCNC: 5 MMOL/L (ref 7–16)
APTT PPP: 33.5 SEC (ref 24.1–35.1)
ARTERIAL PATENCY WRIST A: ABNORMAL
AST SERPL-CCNC: 50 U/L (ref 15–37)
BASE EXCESS BLD CALC-SCNC: 3 MMOL/L
BASOPHILS # BLD: 0 K/UL (ref 0–0.2)
BASOPHILS NFR BLD: 0 % (ref 0–2)
BDY SITE: ABNORMAL
BILIRUB SERPL-MCNC: 0.6 MG/DL (ref 0.2–1.1)
BUN SERPL-MCNC: 23 MG/DL (ref 8–23)
CALCIUM SERPL-MCNC: 8.4 MG/DL (ref 8.3–10.4)
CHLORIDE SERPL-SCNC: 109 MMOL/L (ref 98–107)
CO2 BLD-SCNC: 26 MMOL/L
CO2 SERPL-SCNC: 28 MMOL/L (ref 21–32)
COLLECT TIME,HTIME: 1942
CREAT SERPL-MCNC: 0.91 MG/DL (ref 0.8–1.5)
DIFFERENTIAL METHOD BLD: ABNORMAL
EOSINOPHIL # BLD: 0 K/UL (ref 0–0.8)
EOSINOPHIL NFR BLD: 0 % (ref 0.5–7.8)
ERYTHROCYTE [DISTWIDTH] IN BLOOD BY AUTOMATED COUNT: 12.9 % (ref 11.9–14.6)
FIBRINOGEN PPP-MCNC: 585 MG/DL (ref 190–501)
GAS FLOW.O2 O2 DELIVERY SYS: ABNORMAL L/MIN
GLOBULIN SER CALC-MCNC: 4.1 G/DL (ref 2.3–3.5)
GLUCOSE BLD STRIP.AUTO-MCNC: 159 MG/DL (ref 65–100)
GLUCOSE BLD STRIP.AUTO-MCNC: 174 MG/DL (ref 65–100)
GLUCOSE BLD STRIP.AUTO-MCNC: 245 MG/DL (ref 65–100)
GLUCOSE BLD STRIP.AUTO-MCNC: 93 MG/DL (ref 65–100)
GLUCOSE SERPL-MCNC: 141 MG/DL (ref 65–100)
HCO3 BLD-SCNC: 24.6 MMOL/L (ref 22–26)
HCT VFR BLD AUTO: 46.1 % (ref 41.1–50.3)
HGB BLD-MCNC: 16 G/DL (ref 13.6–17.2)
IMM GRANULOCYTES # BLD AUTO: 0.1 K/UL (ref 0–0.5)
IMM GRANULOCYTES NFR BLD AUTO: 1 % (ref 0–5)
INR PPP: 1.1
LYMPHOCYTES # BLD: 0.6 K/UL (ref 0.5–4.6)
LYMPHOCYTES NFR BLD: 5 % (ref 13–44)
MCH RBC QN AUTO: 29.6 PG (ref 26.1–32.9)
MCHC RBC AUTO-ENTMCNC: 34.7 G/DL (ref 31.4–35)
MCV RBC AUTO: 85.4 FL (ref 79.6–97.8)
MONOCYTES # BLD: 0.4 K/UL (ref 0.1–1.3)
MONOCYTES NFR BLD: 4 % (ref 4–12)
NEUTS SEG # BLD: 9.9 K/UL (ref 1.7–8.2)
NEUTS SEG NFR BLD: 90 % (ref 43–78)
NRBC # BLD: 0 K/UL (ref 0–0.2)
O2/TOTAL GAS SETTING VFR VENT: 100 %
PCO2 BLD: 30.2 MMHG (ref 35–45)
PEEP RESPIRATORY: 10 CMH2O
PH BLD: 7.52 [PH] (ref 7.35–7.45)
PLATELET # BLD AUTO: 164 K/UL (ref 150–450)
PLATELET COMMENTS,PCOM: ADEQUATE
PMV BLD AUTO: 11.7 FL (ref 9.4–12.3)
PO2 BLD: 70 MMHG (ref 75–100)
POTASSIUM SERPL-SCNC: 3.5 MMOL/L (ref 3.5–5.1)
PROT SERPL-MCNC: 5.9 G/DL (ref 6.3–8.2)
PROTHROMBIN TIME: 14.8 SEC (ref 12.5–14.7)
RBC # BLD AUTO: 5.4 M/UL (ref 4.23–5.6)
RBC MORPH BLD: ABNORMAL
SAO2 % BLD: 96 % (ref 95–98)
SERVICE CMNT-IMP: ABNORMAL
SERVICE CMNT-IMP: ABNORMAL
SODIUM SERPL-SCNC: 142 MMOL/L (ref 138–145)
SPECIMEN TYPE: ABNORMAL
TOTAL RESP. RATE, ITRR: 26
WBC # BLD AUTO: 11 K/UL (ref 4.3–11.1)
WBC MORPH BLD: ABNORMAL

## 2021-01-10 PROCEDURE — 82962 GLUCOSE BLOOD TEST: CPT

## 2021-01-10 PROCEDURE — 65270000029 HC RM PRIVATE

## 2021-01-10 PROCEDURE — 85384 FIBRINOGEN ACTIVITY: CPT

## 2021-01-10 PROCEDURE — 36600 WITHDRAWAL OF ARTERIAL BLOOD: CPT

## 2021-01-10 PROCEDURE — 2709999900 HC NON-CHARGEABLE SUPPLY

## 2021-01-10 PROCEDURE — 74011250636 HC RX REV CODE- 250/636: Performed by: HOSPITALIST

## 2021-01-10 PROCEDURE — 74011000250 HC RX REV CODE- 250: Performed by: INTERNAL MEDICINE

## 2021-01-10 PROCEDURE — 36415 COLL VENOUS BLD VENIPUNCTURE: CPT

## 2021-01-10 PROCEDURE — 82803 BLOOD GASES ANY COMBINATION: CPT

## 2021-01-10 PROCEDURE — 74011250637 HC RX REV CODE- 250/637: Performed by: HOSPITALIST

## 2021-01-10 PROCEDURE — 74011250636 HC RX REV CODE- 250/636: Performed by: INTERNAL MEDICINE

## 2021-01-10 PROCEDURE — 99223 1ST HOSP IP/OBS HIGH 75: CPT | Performed by: INTERNAL MEDICINE

## 2021-01-10 PROCEDURE — 74011250636 HC RX REV CODE- 250/636: Performed by: EMERGENCY MEDICINE

## 2021-01-10 PROCEDURE — 71045 X-RAY EXAM CHEST 1 VIEW: CPT

## 2021-01-10 PROCEDURE — 5A09557 ASSISTANCE WITH RESPIRATORY VENTILATION, GREATER THAN 96 CONSECUTIVE HOURS, CONTINUOUS POSITIVE AIRWAY PRESSURE: ICD-10-PCS | Performed by: INTERNAL MEDICINE

## 2021-01-10 PROCEDURE — 80053 COMPREHEN METABOLIC PANEL: CPT

## 2021-01-10 PROCEDURE — 74011000258 HC RX REV CODE- 258: Performed by: EMERGENCY MEDICINE

## 2021-01-10 PROCEDURE — 74011636637 HC RX REV CODE- 636/637: Performed by: INTERNAL MEDICINE

## 2021-01-10 PROCEDURE — 85730 THROMBOPLASTIN TIME PARTIAL: CPT

## 2021-01-10 PROCEDURE — 85025 COMPLETE CBC W/AUTO DIFF WBC: CPT

## 2021-01-10 PROCEDURE — 74011636637 HC RX REV CODE- 636/637: Performed by: HOSPITALIST

## 2021-01-10 PROCEDURE — 85610 PROTHROMBIN TIME: CPT

## 2021-01-10 PROCEDURE — 94660 CPAP INITIATION&MGMT: CPT

## 2021-01-10 RX ORDER — ENOXAPARIN SODIUM 100 MG/ML
30 INJECTION SUBCUTANEOUS EVERY 12 HOURS
Status: DISCONTINUED | OUTPATIENT
Start: 2021-01-10 | End: 2021-01-19

## 2021-01-10 RX ADMIN — Medication 1000 MG: at 16:36

## 2021-01-10 RX ADMIN — HEPARIN SODIUM 5000 UNITS: 5000 INJECTION INTRAVENOUS; SUBCUTANEOUS at 05:10

## 2021-01-10 RX ADMIN — Medication 1000 MG: at 08:33

## 2021-01-10 RX ADMIN — Medication 220 MG: at 08:32

## 2021-01-10 RX ADMIN — INSULIN LISPRO 2 UNITS: 100 INJECTION, SOLUTION INTRAVENOUS; SUBCUTANEOUS at 08:35

## 2021-01-10 RX ADMIN — Medication 10 ML: at 05:10

## 2021-01-10 RX ADMIN — INSULIN LISPRO 10 UNITS: 100 INJECTION, SOLUTION INTRAVENOUS; SUBCUTANEOUS at 16:37

## 2021-01-10 RX ADMIN — METOPROLOL TARTRATE 50 MG: 50 TABLET, FILM COATED ORAL at 16:36

## 2021-01-10 RX ADMIN — ATORVASTATIN CALCIUM 80 MG: 80 TABLET, FILM COATED ORAL at 08:32

## 2021-01-10 RX ADMIN — REMDESIVIR 100 MG: 100 INJECTION, POWDER, LYOPHILIZED, FOR SOLUTION INTRAVENOUS at 16:38

## 2021-01-10 RX ADMIN — INSULIN GLARGINE 36 UNITS: 100 INJECTION, SOLUTION SUBCUTANEOUS at 08:33

## 2021-01-10 RX ADMIN — AZITHROMYCIN 500 MG: 500 INJECTION, POWDER, LYOPHILIZED, FOR SOLUTION INTRAVENOUS at 23:52

## 2021-01-10 RX ADMIN — METOPROLOL TARTRATE 50 MG: 50 TABLET, FILM COATED ORAL at 08:33

## 2021-01-10 RX ADMIN — ASPIRIN 81 MG 81 MG: 81 TABLET ORAL at 08:31

## 2021-01-10 RX ADMIN — Medication 10 ML: at 22:13

## 2021-01-10 RX ADMIN — DEXAMETHASONE 6 MG: 4 TABLET ORAL at 08:32

## 2021-01-10 RX ADMIN — ENOXAPARIN SODIUM 30 MG: 30 INJECTION SUBCUTANEOUS at 13:55

## 2021-01-10 RX ADMIN — INSULIN LISPRO 2 UNITS: 100 INJECTION, SOLUTION INTRAVENOUS; SUBCUTANEOUS at 16:37

## 2021-01-10 RX ADMIN — SODIUM CHLORIDE, SODIUM LACTATE, POTASSIUM CHLORIDE, AND CALCIUM CHLORIDE 100 ML/HR: 600; 310; 30; 20 INJECTION, SOLUTION INTRAVENOUS at 04:53

## 2021-01-10 RX ADMIN — CLOPIDOGREL BISULFATE 75 MG: 75 TABLET ORAL at 08:33

## 2021-01-10 RX ADMIN — INSULIN LISPRO 4 UNITS: 100 INJECTION, SOLUTION INTRAVENOUS; SUBCUTANEOUS at 11:30

## 2021-01-10 RX ADMIN — Medication 10 ML: at 13:58

## 2021-01-10 RX ADMIN — CEFTRIAXONE 2 G: 2 INJECTION, POWDER, FOR SOLUTION INTRAMUSCULAR; INTRAVENOUS at 01:02

## 2021-01-10 RX ADMIN — MAGNESIUM GLUCONATE 500 MG ORAL TABLET 400 MG: 500 TABLET ORAL at 08:32

## 2021-01-10 RX ADMIN — INSULIN LISPRO 10 UNITS: 100 INJECTION, SOLUTION INTRAVENOUS; SUBCUTANEOUS at 08:36

## 2021-01-10 RX ADMIN — AZITHROMYCIN 500 MG: 500 INJECTION, POWDER, LYOPHILIZED, FOR SOLUTION INTRAVENOUS at 01:40

## 2021-01-10 RX ADMIN — CEFTRIAXONE 2 G: 2 INJECTION, POWDER, FOR SOLUTION INTRAMUSCULAR; INTRAVENOUS at 23:52

## 2021-01-10 RX ADMIN — INSULIN LISPRO 10 UNITS: 100 INJECTION, SOLUTION INTRAVENOUS; SUBCUTANEOUS at 11:30

## 2021-01-10 RX ADMIN — HYDRALAZINE HYDROCHLORIDE 20 MG: 20 INJECTION, SOLUTION INTRAMUSCULAR; INTRAVENOUS at 11:07

## 2021-01-10 NOTE — PROGRESS NOTES
Hourly rounding completed on this shift. No new complaints at this time. All needs met. Pt increased in O2 from 55L 80% to 55L 97%, O2 sat 90%. Pt is currently resting in bed. Will continue to monitor and give report to oncoming nurse.

## 2021-01-10 NOTE — PROGRESS NOTES
Hospitalist Progress Note    2021  Admit Date: 2021 11:02 PM   NAME: Michael Torres Jr. :  1960   MRN:  [id number]   Attending: Eduardo Lawrence DO  PCP:  Rolando Huang MD    SUBJECTIVE:     Chief complaint fever cough, congestion,     Patient is a 29-year-old male with past medical history significant for coronary artery disease, GERD, hypertension presented to the ER because of fever cough and congestion. Patient states that he has been having subjective fevers for the last week. He also has dry cough as well as nasal congestion. He was seen in the ER at [facility] yesterday. Covid test was done and is positive. (- Result was reviewed in care everywhere) Complains of having myalgias decreased smell and taste. He denies shortness of breath. No chest pain no palpitations. He reports losing about 30 pounds in the last week. Per the sister at bedside patient is also intermittently confused at home. He also has nausea vomiting. He reports feeling very tired.     10 systems reviewed and negative except as noted in HPI.     ER course:  Patient is afebrile. Hemodynamically stable. Oxygen saturations dropped to 86% on room air. Placed on supplemental oxygen by nasal cannula.     CBC remarkable for hemoglobin of 18.2 likely hemoconcentration.     CMP with blood glucose of 760. BUN 40 and creatinine 2.6. Lipase 653. Lactic acid 4.1. Procalcitonin 0.25.     Chest x-ray shows mild patchy groundglass densities in the mid lungs likely atelectasis/pneumonia.     Patient admitted for further evaluation management of new diagnosis of diabetes with hyperglycemia, COVID-19 pneumonia. Interval History (): patient examined at bedside. Worsening hypoxemia overnight and started on Airvo with FiO2 at 75%. Denies shortness of breath. No chest pain. Wants to go home.     Review of Systems negative with exception of pertinent positives noted above  PHYSICAL EXAM     Visit Vitals  BP (!) 162/94 Pulse 87   Temp 97.7 °F (36.5 °C)   Resp 22   Ht 5' 8\" (1.727 m)   Wt 91 kg (200 lb 9.6 oz)   SpO2 (!) 86%   BMI 30.50 kg/m²      Temp (24hrs), Av.9 °F (36.6 °C), Min:97.4 °F (36.3 °C), Max:98.5 °F (36.9 °C)    Oxygen Therapy  O2 Sat (%): (!) 86 % (21)  Pulse via Oximetry: 74 beats per minute (21)  O2 Device: Heated; Hi flow nasal cannula (21)  O2 Flow Rate (L/min): 55 l/min (21)  O2 Temperature: 87.8 °F (31 °C) (21)  FIO2 (%): 80 %(increased to 90%) (21)    Intake/Output Summary (Last 24 hours) at 2021  Last data filed at 2021 1704  Gross per 24 hour   Intake --   Output 1250 ml   Net -1250 ml      General: No acute distress    Lungs:  CTA Bilaterally.  On 7L   Heart:  Regular rate and rhythm,  No murmur, rub, or gallop  Abdomen: Soft, Non distended, Non tender, Positive bowel sounds  Extremities: No cyanosis, clubbing or edema  Neurologic:  No focal deficits    ASSESSMENT      Active Hospital Problems    Diagnosis Date Noted    Hypernatremia 2021    SWATI (acute kidney injury) ([facility] [address] 2021    Acute hypoxemic respiratory failure ([facility] [address] 2021    Type 2 diabetes mellitus with hyperosmolarity without nonketotic hyperglycemic-hyperosmolar coma ([facility] [address] 2021    Lower respiratory tract infection due to COVID-19 virus 2021    CAD (coronary artery disease) 10/28/2016     10-27-06 LHC 90% LAD s/p 2.25 x 20 Synergy drug-eluting stent  (CS)      Obesity 10/06/2015     Plan:    # Acute hypoxic respiratory failure due to viral pneumonia from COVID  - worsening hypoxemia and now on Airvo  - continue with Rocephin/azithromycin  - trend d dimer and procalcitonin levels  - started on remdesevir today as SCr improves  - continue Decadron 6 mg  - received plasma on     # SWATI, now resolved  - likely due to urinary losses   - switch from NS to LR due to hypernatremia  - avoid nephrotoxic meds and IV contrast  - strict I's/O's  - daily BMPs    # DM type II, new diagnosis   - likely worsened by Decadron  - basal/bolus regimen  - IVF resuscitation  - Humalog SSI and serial CBGs    F/E/N: fluids as above. Replete electrolytes as needed, diabetic diet    Ppx: Lovenox for VTE    Code Status: FULL CODE    Disposition: pending clinical improvement with plan as above. Discussed with patient at bedside. All questions answered.      Signed By: Scar Cloud DO     January 9, 2021

## 2021-01-10 NOTE — H&P (VIEW-ONLY)
CONSULT NOTE    Debramilo Valeriain.    1/10/2021    Date of Admission:  1/6/2021    The patient's chart is reviewed and the patient is discussed with the staff. Subjective:     Patient is a 61 y.o.  male seen and evaluated at the request of Dr. Adelfo Brooks. Patient is a 70-year-old male with past medical history significant for coronary artery disease, GERD, hypertension presented to the ER because of fever cough and congestion. Patient states that he has been having subjective fevers for the last week. He also has dry cough as well as nasal congestion. He was seen in the ER at [facility] yesterday. Covid test was done and is positive. (1/5- Result was reviewed in care everywhere) Complains of having myalgias decreased smell and taste. He denies shortness of breath. No chest pain no palpitations. He reports losing about 30 pounds in the last week. Per the sister at bedside patient is also intermittently confused at home. He also has nausea vomiting. He reports feeling very tired. He had worsening hypoxemia over the last 24 hours and he was on arrival at 90%. He denies any shortness of breath or chest pain or fever or chills. We were asked to see the patient for worsening oxygenation and possible need for BiPAP. Review of Systems  A comprehensive review of systems was negative except for that written in the HPI. Patient Active Problem List   Diagnosis Code    Obesity E66.9    Hypertension I10    Bradycardia R00.1    PVC (premature ventricular contraction) I49.3    CAD (coronary artery disease) I25.10    Dyslipidemia, goal LDL below 70 E78.5    SWATI (acute kidney injury) ([facility] [address] N17.9    Acute hypoxemic respiratory failure (HCC) J96.01    Type 2 diabetes mellitus with hyperosmolarity without nonketotic hyperglycemic-hyperosmolar coma ([facility] [address] E11.00    Pneumonia due to COVID-19 virus U07.1, J12.82    Hypernatremia E87.0       Home DME company none.     Prior to Admission Medications   Prescriptions Last Dose Informant Patient Reported? Taking? Aspirin, Buffered 81 mg tab 2021 at Unknown time  Yes Yes   Sig: Take 81 mg by mouth daily. atorvastatin (LIPITOR) 80 mg tablet Not Taking at Unknown time  No No   Sig: Take 1 Tab by mouth daily. clopidogreL (Plavix) 75 mg tab 2021 at Unknown time  No Yes   Sig: Take 1 Tab by mouth daily. magnesium oxide (MAG-OX) 400 mg tablet 2021 at Unknown time  No Yes   Sig: Take 1 Tab by mouth daily. metoprolol tartrate (LOPRESSOR) 50 mg tablet 2021 at Unknown time  No Yes   Sig: Take 1 Tab by mouth two (2) times a day. nitroglycerin (NITROSTAT) 0.4 mg SL tablet 2020 at Unknown time  No Yes   Si Tab by SubLINGual route every five (5) minutes as needed for Chest Pain.       Facility-Administered Medications: None       Past Medical History:   Diagnosis Date    Gastrointestinal disorder     reflux    GERD (gastroesophageal reflux disease)     Hypertension     Lower respiratory tract infection due to COVID-19 virus 2021    Nausea & vomiting     Obesity 10/6/2015    Other ill-defined conditions(799.89)     dyspnea since surgery, wheezing, SOB    Type 2 diabetes mellitus with hyperosmolarity without nonketotic hyperglycemic-hyperosmolar coma ([facility] [address] 2021    Unstable angina ([facility] [address] 10/27/2016     Past Surgical History:   Procedure Laterality Date    HX CHOLECYSTECTOMY      HX ORTHOPAEDIC      rotator cuff; knee    HX UROLOGICAL      kidney stone surgeries x 3    OK CARDIAC SURG PROCEDURE UNLIST      stent     Social History     Socioeconomic History    Marital status:      Spouse name: Not on file    Number of children: Not on file    Years of education: Not on file    Highest education level: Not on file   Occupational History    Not on file   Social Needs    Financial resource strain: Not on file    Food insecurity     Worry: Not on file     Inability: Not on file   co[employer]ban[employer] needs     Medical: Not on file     Non-medical: Not on file   Tobacco Use    Smoking status: Never Smoker    Smokeless tobacco: Never Used   Substance and Sexual Activity    Alcohol use: No    Drug use: No    Sexual activity: Not on file   Lifestyle    Physical activity     Days per week: Not on file     Minutes per session: Not on file    Stress: Not on file   Relationships    Social connections     Talks on phone: Not on file     Gets together: Not on file     Attends Alevism service: Not on file     Active member of club or organization: Not on file     Attends meetings of clubs or organizations: Not on file     Relationship status: Not on file    Intimate partner violence     Fear of current or ex partner: Not on file     Emotionally abused: Not on file     Physically abused: Not on file     Forced sexual activity: Not on file   Other Topics Concern    Not on file   Social History Narrative    Not on file     No family history on file. Allergies   Allergen Reactions    Latex Swelling     Had a purcell catheter with swelling of urethra.  Only known latex issue in past. Wife remembers this now    Hydrocodone-Acetaminophen Itching     Wife remembers this allergy    Tessalon Perles [Benzonatate] Unknown (comments)       Current Facility-Administered Medications   Medication Dose Route Frequency    enoxaparin (LOVENOX) injection 30 mg  30 mg SubCUTAneous Q12H    insulin glargine (LANTUS) injection 36 Units  36 Units SubCUTAneous DAILY    insulin lispro (HUMALOG) injection 10 Units  10 Units SubCUTAneous TIDAC    remdesivir 100 mg in 0.9% sodium chloride 250 mL IVPB  100 mg IntraVENous Q24H    cefTRIAXone (ROCEPHIN) 2 g in 0.9% sodium chloride (MBP/ADV) 50 mL MBP  2 g IntraVENous Q24H    azithromycin (ZITHROMAX) 500 mg in 0.9% sodium chloride 250 mL (VIAL-MATE)  500 mg IntraVENous Q24H    sodium chloride (NS) flush 5-40 mL  5-40 mL IntraVENous Q8H    sodium chloride (NS) flush 5-40 mL  5-40 mL IntraVENous PRN    polyethylene glycol (MIRALAX) packet 17 g  17 g Oral DAILY PRN    promethazine (PHENERGAN) tablet 12.5 mg  12.5 mg Oral Q6H PRN    Or    ondansetron (ZOFRAN) injection 4 mg  4 mg IntraVENous Q6H PRN    guaiFENesin-dextromethorphan (ROBITUSSIN DM) 100-10 mg/5 mL syrup 10 mL  10 mL Oral Q4H PRN    dexAMETHasone (DECADRON) tablet 6 mg  6 mg Oral DAILY    insulin lispro (HUMALOG) injection   SubCUTAneous AC&HS    aspirin chewable tablet 81 mg  81 mg Oral DAILY    atorvastatin (LIPITOR) tablet 80 mg  80 mg Oral DAILY    clopidogreL (PLAVIX) tablet 75 mg  75 mg Oral DAILY    magnesium oxide (MAG-OX) tablet 400 mg  400 mg Oral DAILY    metoprolol tartrate (LOPRESSOR) tablet 50 mg  50 mg Oral BID    dextrose 40% (GLUTOSE) oral gel 1 Tube  15 g Oral PRN    glucagon (GLUCAGEN) injection 1 mg  1 mg IntraMUSCular PRN    dextrose (D50W) injection syrg 12.5-25 g  25-50 mL IntraVENous PRN    0.9% sodium chloride infusion 250 mL  250 mL IntraVENous PRN    albuterol (PROVENTIL HFA, VENTOLIN HFA, PROAIR HFA) inhaler 2 Puff  2 Puff Inhalation Q4H PRN    ascorbic acid (vitamin C) (VITAMIN C) tablet 1,000 mg  1,000 mg Oral BID    zinc sulfate tablet 220 mg  220 mg Oral DAILY    influenza vaccine 2020-21 (6 mos+)(PF) (FLUARIX/FLULAVAL/FLUZONE QUAD) injection 0.5 mL  0.5 mL IntraMUSCular PRIOR TO DISCHARGE    hydrALAZINE (APRESOLINE) 20 mg/mL injection 20 mg  20 mg IntraVENous Q6H PRN    acetaminophen (TYLENOL) tablet 650 mg  650 mg Oral Q6H PRN         Objective:     Vitals:    01/10/21 0809 01/10/21 1051 01/10/21 1231 01/10/21 1621   BP: (!) 183/94 (!) 193/109 (!) 152/82 135/72   Pulse: 95 (!) 107 (!) 103 88   Resp: 20 22  19   Temp: 97.7 °F (36.5 °C) 97.6 °F (36.4 °C)  98.3 °F (36.8 °C)   SpO2: (!) 88% (!) 89% (!) 84% (!) 87%   Weight:       Height:           PHYSICAL EXAM     Constitutional:  the patient is well developed and in no acute distress  EENMT:  Sclera clear, pupils equal, oral mucosa moist  Respiratory: Diminished breath sound in the bases  Cardiovascular:  RRR without M,G,R  Gastrointestinal: soft and non-tender; with positive bowel sounds. Musculoskeletal: warm without cyanosis. There is no lower extremity edema. Skin:  no jaundice or rashes, no wounds   Neurologic: no gross neuro deficits     Psychiatric:  alert and oriented x 3    CXR:            Recent Labs     01/10/21  1810 01/09/21  0721 01/08/21  0614   WBC 11.0 12.4*  --    HGB 16.0 16.3  --    HCT 46.1 49.2  --     121*  --    INR  --  1.2 1.1     Recent Labs     01/10/21  1810 01/09/21  0721 01/08/21  0614    149* 149*   K 3.5 3.5 3.7   * 113* 116*   * 221* 290*   CO2 28 23 25   BUN 23 28* 35*   CREA 0.91 1.05 1.69*   CA 8.4 8.9 8.9   ALB 1.8* 2.1* 2.6*   TBILI 0.6 0.6 0.5   ALT 35 35 42     No results for input(s): PH, PCO2, PO2, HCO3, PHI, PCO2I, PO2I, HCO3I in the last 72 hours. No results for input(s): LCAD, LAC in the last 72 hours.     Assessment:  (Medical Decision Making)     Hospital Problems  Date Reviewed: 6/3/2020          Codes Class Noted POA    Hypernatremia ICD-10-CM: E87.0  ICD-9-CM: 276.0  1/8/2021 Unknown        SWATI (acute kidney injury) ([facility] [address] ICD-10-CM: N17.9  ICD-9-CM: 584.9  1/7/2021 Unknown        Acute hypoxemic respiratory failure ([facility] [address] ICD-10-CM: J96.01  ICD-9-CM: 518.81  1/7/2021 Unknown        Type 2 diabetes mellitus with hyperosmolarity without nonketotic hyperglycemic-hyperosmolar coma [facility] ICD-10-CM: E11.00  ICD-9-CM: 250.20  1/7/2021 Unknown        * (Principal) Pneumonia due to COVID-19 virus ICD-10-CM: U07.1, J12.82  ICD-9-CM: 480.8  1/7/2021 Unknown        CAD (coronary artery disease) ICD-10-CM: I25.10  ICD-9-CM: 414.00  10/28/2016 Yes    Overview Signed 10/28/2016  8:05 AM by SHELDON Cramer     10-27-06 [facility] 90% LAD s/p 2.25 x 20 Synergy drug-eluting stent  (CS)             Obesity ICD-10-CM: E66.9  ICD-9-CM: 278.00  10/6/2015 Yes              Plan:  (Medical Decision Making)     --We will place the patient on CPAP 10 cm and adjust FiO2 to maintain oxygen saturation above 90%. If you worsen we will consider BiPAP and transferred to the ICU  --Check ABGs  --Continue Decadron for total of 10 days  --Continue vitamin C and zinc  --Continue DVT and GI prophylaxis  --Discussed with nursing and other providers    More than 50% of the time documented was spent in face-to-face contact with the patient and in the care of the patient on the floor/unit where the patient is located. Thank you very much for this referral.  We appreciate the opportunity to participate in this patient's care. Will follow along with above stated plan.     Ame Hooper MD

## 2021-01-10 NOTE — CONSULTS
CONSULT NOTE    Cheri Paris.    1/10/2021    Date of Admission:  1/6/2021    The patient's chart is reviewed and the patient is discussed with the staff. Subjective:     Patient is a 61 y.o.  male seen and evaluated at the request of Dr. John Malagon. Patient is a 72-year-old male with past medical history significant for coronary artery disease, GERD, hypertension presented to the ER because of fever cough and congestion. Patient states that he has been having subjective fevers for the last week. He also has dry cough as well as nasal congestion. He was seen in the ER at [facility] yesterday. Covid test was done and is positive. (1/5- Result was reviewed in care everywhere) Complains of having myalgias decreased smell and taste. He denies shortness of breath. No chest pain no palpitations. He reports losing about 30 pounds in the last week. Per the sister at bedside patient is also intermittently confused at home. He also has nausea vomiting. He reports feeling very tired. He had worsening hypoxemia over the last 24 hours and he was on arrival at 90%. He denies any shortness of breath or chest pain or fever or chills. We were asked to see the patient for worsening oxygenation and possible need for BiPAP. Review of Systems  A comprehensive review of systems was negative except for that written in the HPI. Patient Active Problem List   Diagnosis Code    Obesity E66.9    Hypertension I10    Bradycardia R00.1    PVC (premature ventricular contraction) I49.3    CAD (coronary artery disease) I25.10    Dyslipidemia, goal LDL below 70 E78.5    SWATI (acute kidney injury) ([facility] [address] N17.9    Acute hypoxemic respiratory failure (HCC) J96.01    Type 2 diabetes mellitus with hyperosmolarity without nonketotic hyperglycemic-hyperosmolar coma ([facility] [address] E11.00    Pneumonia due to COVID-19 virus U07.1, J12.82    Hypernatremia E87.0       [employer] none.     Prior to Admission Medications   Prescriptions Last Dose Informant Patient Reported? Taking? Aspirin, Buffered 81 mg tab 2021 at Unknown time  Yes Yes   Sig: Take 81 mg by mouth daily. atorvastatin (LIPITOR) 80 mg tablet Not Taking at Unknown time  No No   Sig: Take 1 Tab by mouth daily. clopidogreL (Plavix) 75 mg tab 2021 at Unknown time  No Yes   Sig: Take 1 Tab by mouth daily. magnesium oxide (MAG-OX) 400 mg tablet 2021 at Unknown time  No Yes   Sig: Take 1 Tab by mouth daily. metoprolol tartrate (LOPRESSOR) 50 mg tablet 2021 at Unknown time  No Yes   Sig: Take 1 Tab by mouth two (2) times a day. nitroglycerin (NITROSTAT) 0.4 mg SL tablet 2020 at Unknown time  No Yes   Si Tab by SubLINGual route every five (5) minutes as needed for Chest Pain.       Facility-Administered Medications: None       Past Medical History:   Diagnosis Date    Gastrointestinal disorder     reflux    GERD (gastroesophageal reflux disease)     Hypertension     Lower respiratory tract infection due to COVID-19 virus 2021    Nausea & vomiting     Obesity 10/6/2015    Other ill-defined conditions(799.89)     dyspnea since surgery, wheezing, SOB    Type 2 diabetes mellitus with hyperosmolarity without nonketotic hyperglycemic-hyperosmolar coma ([facility] [address] 2021    Unstable angina ([facility] [address] 10/27/2016     Past Surgical History:   Procedure Laterality Date    HX CHOLECYSTECTOMY      HX ORTHOPAEDIC      rotator cuff; knee    HX UROLOGICAL      kidney stone surgeries x 3    CT CARDIAC SURG PROCEDURE UNLIST      stent     Social History     Socioeconomic History    Marital status:      Spouse name: Not on file    Number of children: Not on file    Years of education: Not on file    Highest education level: Not on file   Occupational History    Not on file   Social Needs    Financial resource strain: Not on file    Food insecurity     Worry: Not on file     Inability: Not on file   Rebls needs     Medical: Not on file     Non-medical: Not on file   Tobacco Use    Smoking status: Never Smoker    Smokeless tobacco: Never Used   Substance and Sexual Activity    Alcohol use: No    Drug use: No    Sexual activity: Not on file   Lifestyle    Physical activity     Days per week: Not on file     Minutes per session: Not on file    Stress: Not on file   Relationships    Social connections     Talks on phone: Not on file     Gets together: Not on file     Attends Latter-day service: Not on file     Active member of club or organization: Not on file     Attends meetings of clubs or organizations: Not on file     Relationship status: Not on file    Intimate partner violence     Fear of current or ex partner: Not on file     Emotionally abused: Not on file     Physically abused: Not on file     Forced sexual activity: Not on file   Other Topics Concern    Not on file   Social History Narrative    Not on file     No family history on file. Allergies   Allergen Reactions    Latex Swelling     Had a purcell catheter with swelling of urethra.  Only known latex issue in past. Wife remembers this now    Hydrocodone-Acetaminophen Itching     Wife remembers this allergy    Tessalon Perles [Benzonatate] Unknown (comments)       Current Facility-Administered Medications   Medication Dose Route Frequency    enoxaparin (LOVENOX) injection 30 mg  30 mg SubCUTAneous Q12H    insulin glargine (LANTUS) injection 36 Units  36 Units SubCUTAneous DAILY    insulin lispro (HUMALOG) injection 10 Units  10 Units SubCUTAneous TIDAC    remdesivir 100 mg in 0.9% sodium chloride 250 mL IVPB  100 mg IntraVENous Q24H    cefTRIAXone (ROCEPHIN) 2 g in 0.9% sodium chloride (MBP/ADV) 50 mL MBP  2 g IntraVENous Q24H    azithromycin (ZITHROMAX) 500 mg in 0.9% sodium chloride 250 mL (VIAL-MATE)  500 mg IntraVENous Q24H    sodium chloride (NS) flush 5-40 mL  5-40 mL IntraVENous Q8H    sodium chloride (NS) flush 5-40 mL  5-40 mL IntraVENous PRN    polyethylene glycol (MIRALAX) packet 17 g  17 g Oral DAILY PRN    promethazine (PHENERGAN) tablet 12.5 mg  12.5 mg Oral Q6H PRN    Or    ondansetron (ZOFRAN) injection 4 mg  4 mg IntraVENous Q6H PRN    guaiFENesin-dextromethorphan (ROBITUSSIN DM) 100-10 mg/5 mL syrup 10 mL  10 mL Oral Q4H PRN    dexAMETHasone (DECADRON) tablet 6 mg  6 mg Oral DAILY    insulin lispro (HUMALOG) injection   SubCUTAneous AC&HS    aspirin chewable tablet 81 mg  81 mg Oral DAILY    atorvastatin (LIPITOR) tablet 80 mg  80 mg Oral DAILY    clopidogreL (PLAVIX) tablet 75 mg  75 mg Oral DAILY    magnesium oxide (MAG-OX) tablet 400 mg  400 mg Oral DAILY    metoprolol tartrate (LOPRESSOR) tablet 50 mg  50 mg Oral BID    dextrose 40% (GLUTOSE) oral gel 1 Tube  15 g Oral PRN    glucagon (GLUCAGEN) injection 1 mg  1 mg IntraMUSCular PRN    dextrose (D50W) injection syrg 12.5-25 g  25-50 mL IntraVENous PRN    0.9% sodium chloride infusion 250 mL  250 mL IntraVENous PRN    albuterol (PROVENTIL HFA, VENTOLIN HFA, PROAIR HFA) inhaler 2 Puff  2 Puff Inhalation Q4H PRN    ascorbic acid (vitamin C) (VITAMIN C) tablet 1,000 mg  1,000 mg Oral BID    zinc sulfate tablet 220 mg  220 mg Oral DAILY    influenza vaccine 2020-21 (6 mos+)(PF) (FLUARIX/FLULAVAL/FLUZONE QUAD) injection 0.5 mL  0.5 mL IntraMUSCular PRIOR TO DISCHARGE    hydrALAZINE (APRESOLINE) 20 mg/mL injection 20 mg  20 mg IntraVENous Q6H PRN    acetaminophen (TYLENOL) tablet 650 mg  650 mg Oral Q6H PRN         Objective:     Vitals:    01/10/21 0809 01/10/21 1051 01/10/21 1231 01/10/21 1621   BP: (!) 183/94 (!) 193/109 (!) 152/82 135/72   Pulse: 95 (!) 107 (!) 103 88   Resp: 20 22  19   Temp: 97.7 °F (36.5 °C) 97.6 °F (36.4 °C)  98.3 °F (36.8 °C)   SpO2: (!) 88% (!) 89% (!) 84% (!) 87%   Weight:       Height:           PHYSICAL EXAM     Constitutional:  the patient is well developed and in no acute distress  EENMT:  Sclera clear, pupils equal, oral mucosa moist  Respiratory: Diminished breath sound in the bases  Cardiovascular:  RRR without M,G,R  Gastrointestinal: soft and non-tender; with positive bowel sounds. Musculoskeletal: warm without cyanosis. There is no lower extremity edema. Skin:  no jaundice or rashes, no wounds   Neurologic: no gross neuro deficits     Psychiatric:  alert and oriented x 3    CXR:            Recent Labs     01/10/21  1810 01/09/21  0721 01/08/21  0614   WBC 11.0 12.4*  --    HGB 16.0 16.3  --    HCT 46.1 49.2  --     121*  --    INR  --  1.2 1.1     Recent Labs     01/10/21  1810 01/09/21  0721 01/08/21  0614    149* 149*   K 3.5 3.5 3.7   * 113* 116*   * 221* 290*   CO2 28 23 25   BUN 23 28* 35*   CREA 0.91 1.05 1.69*   CA 8.4 8.9 8.9   ALB 1.8* 2.1* 2.6*   TBILI 0.6 0.6 0.5   ALT 35 35 42     No results for input(s): PH, PCO2, PO2, HCO3, PHI, PCO2I, PO2I, HCO3I in the last 72 hours. No results for input(s): LCAD, LAC in the last 72 hours.     Assessment:  (Medical Decision Making)     Hospital Problems  Date Reviewed: 6/3/2020          Codes Class Noted POA    Hypernatremia ICD-10-CM: E87.0  ICD-9-CM: 276.0  1/8/2021 Unknown        SWATI (acute kidney injury) ([facility] [address] ICD-10-CM: N17.9  ICD-9-CM: 584.9  1/7/2021 Unknown        Acute hypoxemic respiratory failure ([facility] [address] ICD-10-CM: J96.01  ICD-9-CM: 518.81  1/7/2021 Unknown        Type 2 diabetes mellitus with hyperosmolarity without nonketotic hyperglycemic-hyperosmolar coma ([facility] ICD-10-CM: E11.00  ICD-9-CM: 250.20  1/7/2021 Unknown        * (Principal) Pneumonia due to COVID-19 virus ICD-10-CM: U07.1, J12.82  ICD-9-CM: 480.8  1/7/2021 Unknown        CAD (coronary artery disease) ICD-10-CM: I25.10  ICD-9-CM: 414.00  10/28/2016 Yes    Overview Signed 10/28/2016  8:05 AM by SHELDON Simeon     10-27-06 [facility] 90% LAD s/p 2.25 x 20 Synergy drug-eluting stent  (CS)             Obesity ICD-10-CM: E66.9  ICD-9-CM: 278.00  10/6/2015 Yes              Plan:  (Medical Decision Making)     --We will place the patient on CPAP 10 cm and adjust FiO2 to maintain oxygen saturation above 90%. If you worsen we will consider BiPAP and transferred to the ICU  --Check ABGs  --Continue Decadron for total of 10 days  --Continue vitamin C and zinc  --Continue DVT and GI prophylaxis  --Discussed with nursing and other providers    More than 50% of the time documented was spent in face-to-face contact with the patient and in the care of the patient on the floor/unit where the patient is located. Thank you very much for this referral.  We appreciate the opportunity to participate in this patient's care. Will follow along with above stated plan.     Camilla Ramos MD

## 2021-01-11 LAB
ALBUMIN SERPL-MCNC: 2 G/DL (ref 3.2–4.6)
ALBUMIN/GLOB SERPL: 0.6 {RATIO} (ref 1.2–3.5)
ALP SERPL-CCNC: 90 U/L (ref 50–136)
ALT SERPL-CCNC: 37 U/L (ref 12–65)
ANION GAP SERPL CALC-SCNC: 9 MMOL/L (ref 7–16)
APTT PPP: 33.6 SEC (ref 24.1–35.1)
AST SERPL-CCNC: 66 U/L (ref 15–37)
BASOPHILS # BLD: 0.1 K/UL (ref 0–0.2)
BASOPHILS NFR BLD: 0 % (ref 0–2)
BILIRUB SERPL-MCNC: 0.6 MG/DL (ref 0.2–1.1)
BNP SERPL-MCNC: 714 PG/ML (ref 5–125)
BUN SERPL-MCNC: 24 MG/DL (ref 8–23)
CALCIUM SERPL-MCNC: 8 MG/DL (ref 8.3–10.4)
CHLORIDE SERPL-SCNC: 108 MMOL/L (ref 98–107)
CO2 SERPL-SCNC: 25 MMOL/L (ref 21–32)
CREAT SERPL-MCNC: 1.06 MG/DL (ref 0.8–1.5)
DIFFERENTIAL METHOD BLD: ABNORMAL
EOSINOPHIL # BLD: 0 K/UL (ref 0–0.8)
EOSINOPHIL NFR BLD: 0 % (ref 0.5–7.8)
ERYTHROCYTE [DISTWIDTH] IN BLOOD BY AUTOMATED COUNT: 12.8 % (ref 11.9–14.6)
FIBRINOGEN PPP-MCNC: 707 MG/DL (ref 190–501)
GLOBULIN SER CALC-MCNC: 3.6 G/DL (ref 2.3–3.5)
GLUCOSE BLD STRIP.AUTO-MCNC: 168 MG/DL (ref 65–100)
GLUCOSE BLD STRIP.AUTO-MCNC: 195 MG/DL (ref 65–100)
GLUCOSE BLD STRIP.AUTO-MCNC: 238 MG/DL (ref 65–100)
GLUCOSE BLD STRIP.AUTO-MCNC: 84 MG/DL (ref 65–100)
GLUCOSE SERPL-MCNC: 87 MG/DL (ref 65–100)
HCT VFR BLD AUTO: 49.6 % (ref 41.1–50.3)
HGB BLD-MCNC: 17.2 G/DL (ref 13.6–17.2)
IMM GRANULOCYTES # BLD AUTO: 0.3 K/UL (ref 0–0.5)
IMM GRANULOCYTES NFR BLD AUTO: 2 % (ref 0–5)
INR PPP: 1.1
LYMPHOCYTES # BLD: 0.7 K/UL (ref 0.5–4.6)
LYMPHOCYTES NFR BLD: 6 % (ref 13–44)
MCH RBC QN AUTO: 29.8 PG (ref 26.1–32.9)
MCHC RBC AUTO-ENTMCNC: 34.7 G/DL (ref 31.4–35)
MCV RBC AUTO: 85.8 FL (ref 79.6–97.8)
MONOCYTES # BLD: 0.5 K/UL (ref 0.1–1.3)
MONOCYTES NFR BLD: 4 % (ref 4–12)
NEUTS SEG # BLD: 10.9 K/UL (ref 1.7–8.2)
NEUTS SEG NFR BLD: 88 % (ref 43–78)
NRBC # BLD: 0.02 K/UL (ref 0–0.2)
PLATELET # BLD AUTO: 191 K/UL (ref 150–450)
PMV BLD AUTO: 11.3 FL (ref 9.4–12.3)
POTASSIUM SERPL-SCNC: 3.5 MMOL/L (ref 3.5–5.1)
PROT SERPL-MCNC: 5.6 G/DL (ref 6.3–8.2)
PROTHROMBIN TIME: 14.8 SEC (ref 12.5–14.7)
RBC # BLD AUTO: 5.78 M/UL (ref 4.23–5.6)
SODIUM SERPL-SCNC: 142 MMOL/L (ref 138–145)
WBC # BLD AUTO: 12.5 K/UL (ref 4.3–11.1)

## 2021-01-11 PROCEDURE — 85730 THROMBOPLASTIN TIME PARTIAL: CPT

## 2021-01-11 PROCEDURE — 2709999900 HC NON-CHARGEABLE SUPPLY

## 2021-01-11 PROCEDURE — 74011636637 HC RX REV CODE- 636/637: Performed by: INTERNAL MEDICINE

## 2021-01-11 PROCEDURE — 36415 COLL VENOUS BLD VENIPUNCTURE: CPT

## 2021-01-11 PROCEDURE — 99232 SBSQ HOSP IP/OBS MODERATE 35: CPT | Performed by: INTERNAL MEDICINE

## 2021-01-11 PROCEDURE — 85610 PROTHROMBIN TIME: CPT

## 2021-01-11 PROCEDURE — 85025 COMPLETE CBC W/AUTO DIFF WBC: CPT

## 2021-01-11 PROCEDURE — 74011250637 HC RX REV CODE- 250/637: Performed by: HOSPITALIST

## 2021-01-11 PROCEDURE — 80053 COMPREHEN METABOLIC PANEL: CPT

## 2021-01-11 PROCEDURE — 74011250637 HC RX REV CODE- 250/637: Performed by: INTERNAL MEDICINE

## 2021-01-11 PROCEDURE — 74011250636 HC RX REV CODE- 250/636: Performed by: INTERNAL MEDICINE

## 2021-01-11 PROCEDURE — 65610000006 HC RM INTENSIVE CARE

## 2021-01-11 PROCEDURE — 82962 GLUCOSE BLOOD TEST: CPT

## 2021-01-11 PROCEDURE — 74011250636 HC RX REV CODE- 250/636: Performed by: HOSPITALIST

## 2021-01-11 PROCEDURE — 74011000250 HC RX REV CODE- 250: Performed by: INTERNAL MEDICINE

## 2021-01-11 PROCEDURE — 74011636637 HC RX REV CODE- 636/637: Performed by: HOSPITALIST

## 2021-01-11 PROCEDURE — 83880 ASSAY OF NATRIURETIC PEPTIDE: CPT

## 2021-01-11 PROCEDURE — 85384 FIBRINOGEN ACTIVITY: CPT

## 2021-01-11 RX ORDER — LOPERAMIDE HYDROCHLORIDE 2 MG/1
2 CAPSULE ORAL
Status: DISCONTINUED | OUTPATIENT
Start: 2021-01-11 | End: 2021-02-26 | Stop reason: HOSPADM

## 2021-01-11 RX ADMIN — Medication 220 MG: at 08:21

## 2021-01-11 RX ADMIN — REMDESIVIR 100 MG: 100 INJECTION, POWDER, LYOPHILIZED, FOR SOLUTION INTRAVENOUS at 16:57

## 2021-01-11 RX ADMIN — INSULIN LISPRO 10 UNITS: 100 INJECTION, SOLUTION INTRAVENOUS; SUBCUTANEOUS at 16:50

## 2021-01-11 RX ADMIN — Medication 10 ML: at 14:27

## 2021-01-11 RX ADMIN — METOPROLOL TARTRATE 50 MG: 50 TABLET, FILM COATED ORAL at 17:22

## 2021-01-11 RX ADMIN — METOPROLOL TARTRATE 50 MG: 50 TABLET, FILM COATED ORAL at 08:21

## 2021-01-11 RX ADMIN — ENOXAPARIN SODIUM 30 MG: 30 INJECTION SUBCUTANEOUS at 00:12

## 2021-01-11 RX ADMIN — INSULIN LISPRO 4 UNITS: 100 INJECTION, SOLUTION INTRAVENOUS; SUBCUTANEOUS at 16:50

## 2021-01-11 RX ADMIN — INSULIN LISPRO 10 UNITS: 100 INJECTION, SOLUTION INTRAVENOUS; SUBCUTANEOUS at 12:52

## 2021-01-11 RX ADMIN — Medication 1000 MG: at 08:21

## 2021-01-11 RX ADMIN — CLOPIDOGREL BISULFATE 75 MG: 75 TABLET ORAL at 08:21

## 2021-01-11 RX ADMIN — LOPERAMIDE HYDROCHLORIDE 2 MG: 2 CAPSULE ORAL at 11:31

## 2021-01-11 RX ADMIN — MAGNESIUM GLUCONATE 500 MG ORAL TABLET 400 MG: 500 TABLET ORAL at 08:21

## 2021-01-11 RX ADMIN — ATORVASTATIN CALCIUM 80 MG: 80 TABLET, FILM COATED ORAL at 08:21

## 2021-01-11 RX ADMIN — INSULIN LISPRO 2 UNITS: 100 INJECTION, SOLUTION INTRAVENOUS; SUBCUTANEOUS at 12:52

## 2021-01-11 RX ADMIN — Medication 1000 MG: at 17:24

## 2021-01-11 RX ADMIN — ASPIRIN 81 MG 81 MG: 81 TABLET ORAL at 08:21

## 2021-01-11 RX ADMIN — Medication 10 ML: at 05:23

## 2021-01-11 RX ADMIN — Medication 10 ML: at 22:00

## 2021-01-11 RX ADMIN — DEXAMETHASONE 6 MG: 4 TABLET ORAL at 08:21

## 2021-01-11 RX ADMIN — ENOXAPARIN SODIUM 30 MG: 30 INJECTION SUBCUTANEOUS at 14:26

## 2021-01-11 RX ADMIN — INSULIN LISPRO 2 UNITS: 100 INJECTION, SOLUTION INTRAVENOUS; SUBCUTANEOUS at 22:00

## 2021-01-11 NOTE — PROGRESS NOTES
Physical Therapy Note:    Chart reviewed for physical therapy treatment and treatment attempted. Patient with high O2 requirements, requiring BIPAP at rest and SpO2 88%. Will HOLD physical therapy today and attempt as patient is able and schedule permits.     Thank you,  Ronnie Tapia, PT, DPT

## 2021-01-11 NOTE — PROGRESS NOTES
[facility] Critical Care COVID-19 Note:    Critical Care Note: 1/11/2021    Dar Wakefieldgael Elliott. Admission Date: 1/6/2021     Length of Stay: 4 days    Background: 61 y.o. y/o male with acute hypoxemic respiratory failure secondary to COVID-19. Date of COVID-19 symptom onset: + COVID 01/05/21    Notable PMH: obesity, HTN, CAD, dyslipidemia, SWATI, DM, GERD    Drug Allergies: Allergies   Allergen Reactions    Latex Swelling     Had a purcell catheter with swelling of urethra. Only known latex issue in past. Wife remembers this now    Hydrocodone-Acetaminophen Itching     Wife remembers this allergy    Tessalon Perles [Benzonatate] Unknown (comments)       24 Hour events: pulmonary consulted for worsening hypoxia yesterday. Suggested CPAP 10/100%. Last PM ABG 7.52/30.2/70/24. 6. Looks better this AM, weaned FiO2 to 90%. Can likely try Airvo again today. Notified RT to transition back to airvo. REVIEW OF SYSTEMS:  Constitutional:  no fever, No chills, night sweats, weight loss, weight gain  Eyes:  Denies problems with eye pain, erythema, blurred vision, or visual field loss. ENTM:  Denies sore throat, no loss of taste or smell  Lymph:  Denies swollen glands. Cardiac:  No chest pain, pressure, discomfort, palpitations, orthopnea, murmurs, or edema. GI:  No dysphagia, heartburn reflux, nausea/vomiting, abdominal pain, or bleeding. + diarrhea   :Denies history of dysuria, hematuria, polyuria, or decreased urine output. MS:  No history of myalgias, arthralgias, bone pain, or muscle cramps. Skin:  No history of rashes, jaundice, cyanosis, nodules, or ulcers. Endo:  Negative for heat or cold intolerance. No polyuria/polydipsia  Psych:  No anxiety, No depression, insomnia, hallucinations. Neuro: There is no history of AMS, persistent headache, decreased level of consciousness, seizures, or motor or sensory deficits.     Visit Vitals  BP (!) 154/82   Pulse (!) 102   Temp 98.9 °F (37.2 °C) Resp 20   Ht 5' 8\" (1.727 m)   Wt 200 lb 9.6 oz (91 kg)   SpO2 90%   BMI 30.50 kg/m²       I/O:      Intake/Output Summary (Last 24 hours) at 1/11/2021 1106  Last data filed at 1/11/2021 0730  Gross per 24 hour   Intake 240 ml   Output 300 ml   Net -60 ml        Pertinent Exam:            Constitutional: awake, alert, minimal SOB noted on CPAP   EENMT:  Sclera clear, pupils equal, oral mucosa moist  Respiratory: crackles bilateral, diminished   Cardiovascular:  RRR with no M,G,R;  Gastrointestinal:  soft with no tenderness; positive bowel sounds present  Musculoskeletal:  warm with no cyanosis, no  lower extremity edema  Skin:  no jaundice or ecchymosis  Neurologic: no gross neuro deficits     Psychiatric: calm     CXR:  01/10/21    Lines (insertion date):   PIV (01/08/21)    Drips: current dose (range)  None     COVID-19 Meds:  Dexamethasone 6mg daily (01/06-present)  Remdesivir (initially couldn't have cause of SWATI, but started 01/08/21; 5-10 days)  Convalescent plasma transfused 01/07    SARS-CoV-2 LAB Results +COVID 01/05/21    Microbiology Results  Date  Source Culture Result   01/07/21 blood Pending, no growth x3 days   1/7/21 blood NG     Anti-infectives (start date):   Azithromycin (01/07/21- )  Ceftriaxone (01/07/21- )      Pertinent Labs:   Recent Labs     01/11/21  0823 01/10/21  1810 01/09/21  0721   WBC 12.5* 11.0 12.4*   HGB 17.2 16.0 16.3   HCT 49.6 46.1 49.2    164 121*   INR 1.1 1.1 1.2     Recent Labs     01/11/21  0823 01/10/21  1810 01/09/21  0721    142 149*   K 3.5 3.5 3.5   * 109* 113*   CO2 25 28 23   GLU 87 141* 221*   BUN 24* 23 28*   CREA 1.06 0.91 1.05   CA 8.0* 8.4 8.9   ALB 2.0* 1.8* 2.1*   TBILI 0.6 0.6 0.6   ALT 37 35 35     Recent Labs     01/11/21  0729 01/10/21  2035 01/10/21  1557   GLUCPOC 84 93 159*       Oxygen Requirements:  Date O2 support mode FiO2 SaO2   01/10/21 CPAP 90 93   1/1/21 BiPAP 100% 90%     IMPRESSION:  Active Hospital Problems    Diagnosis Date Noted    Hypernatremia 01/08/2021    SWATI (acute kidney [facility]) ([facility] 01/07/2021    Acute hypoxemic respiratory failure ([facility] 01/07/2021    Type 2 diabetes mellitus with hyperosmolarity without nonketotic hyperglycemic-hyperosmolar coma ([facility] 01/07/2021    Pneumonia due to COVID-19 virus 01/07/2021    CAD (coronary artery disease) 10/28/2016     10-27-06 LHC 90% LAD s/p 2.25 x 20 Synergy drug-eluting stent  (CS)      Obesity 10/06/2015       61 y.o. y/o male with acute hypoxemic respiratory failure secondary to COVID-19. PLAN:  1. Acute respiratory failure with hypoxia: supportive measures with noninvasive oxygen strategy unless tiring or desaturation. Self-proning encouraged. Likely try off of CPAP today and back to Airvo. 2. COVID-19 pneumonia:  Decadron, Convalescent plasma, remdesivir as outlined above. 3. Nutrition: last po meal was 01/08/2020, taking hydration, but minimal solids   4. PPx:  DVT ppx ongoing. H2 blocker with decadron. 5. imodium for diarrhea    Full Code      The patient is critically ill with respiratory failure and requires high complexity decision making for assessment and support including frequent evaluation and titration of therapies, application of advanced monitoring technologies & timing & modality of advanced respiratory support. Cumulative time devoted to patient care services by me for day of service -35 min     Vira Luna NP     I have spoken with and examined the patient. I agree with the above assessment and plan as documented. Gen:  Awake, pleasant, O2 sat 90% on CPAP [id number], 85%  Lungs:  crackles  Heart:  RRR with no Murmur/Rubs/Gallops  Abd: NTND  Ext: no edema    --recommend proning  --move to ICU area when bed available. High risk for intubation.     Gaylord Cogan, MD

## 2021-01-11 NOTE — PROGRESS NOTES
01/11/21 1412   CPAP/BIPAP   Mask Type and Size Full face; Medium   Skin Condition INTACT   PIP Observed 17 cm H20   IPAP (cm H2O) 16 cm H2O   EPAP (cm H2O) 8 cm H2O   Inspiratory Time (sec) 1 seconds   Vt Spont (ml) 665 ml   Ve Observed (l/min) 16.7 l/min   Total RR (Spontaneous) 35 breaths per minute   Leak (Estimated) 19 L/min   Pt's Home Machine No   Biomedical Check Performed Yes   Settings Verified Yes   Alarm Settings   High Pressure 40   Low Pressure 5   Apnea 20   Low Ve 3   High Rate 50   Low Rate 8   Pulmonary Toilet   Pulmonary Toilet H. O.B elevated   Checked patient while he was on OPtiflow. Patient was 80%. Returned patient to CPAP. SpO2 increased to 85%. Changed patient to BIPAP at documented settings. Patient's SpO2 remains 88% on documented settings. Notified RN, will continue to monitor.

## 2021-01-11 NOTE — DIABETES MGMT
Patient admitted with pneumonia due to COVID-19. Blood glucose ranged  yesterday with patient receiving Lantus 36 units, Humalog 38 units, and Decadron 6mg. Blood glucose this morning was 84. Most recent FSBS 195. Lantus and prandial insulin held this AM see MAR for details. Updated provider via [employer] regarding patient glycemic control. Spoke with provider patient currently off bipap, new orders received to decrease basal insulin to Lantus 18 units nightly to reduce patient risk of hypoglycemia. Creatinine 1.06. GFR >60.

## 2021-01-11 NOTE — PROGRESS NOTES
Hospitalist Progress Note    1/10/2021  Admit Date: 2021 11:02 PM   NAME: Siobhan Torres Jr. :  1960   MRN:  [id number]   Attending: Azalia Diaz DO  PCP:  Ariela Gilmore MD    SUBJECTIVE:     Chief complaint fever cough, congestion,     Patient is a 71-year-old male with past medical history significant for coronary artery disease, GERD, hypertension presented to the ER because of fever cough and congestion. Patient states that he has been having subjective fevers for the last week. He also has dry cough as well as nasal congestion. He was seen in the ER at [facility] yesterday. Covid test was done and is positive. (- Result was reviewed in care everywhere) Complains of having myalgias decreased smell and taste. He denies shortness of breath. No chest pain no palpitations. He reports losing about 30 pounds in the last week. Per the sister at bedside patient is also intermittently confused at home. He also has nausea vomiting. He reports feeling very tired.     10 systems reviewed and negative except as noted in HPI.     ER course:  Patient is afebrile. Hemodynamically stable. Oxygen saturations dropped to 86% on room air. Placed on supplemental oxygen by nasal cannula.     CBC remarkable for hemoglobin of 18.2 likely hemoconcentration.     CMP with blood glucose of 760. BUN 40 and creatinine 2.6. Lipase 653. Lactic acid 4.1. Procalcitonin 0.25.     Chest x-ray shows mild patchy groundglass densities in the mid lungs likely atelectasis/pneumonia.     Patient admitted for further evaluation management of new diagnosis of diabetes with hyperglycemia, COVID-19 pneumonia. Interval History (1/10): patient examined at bedside. Worsening hypoxemia and currently maxed out on Airvo. Tired appearing and says he does feel short of breath. No chest pain. Wants to go home.     Review of Systems negative with exception of pertinent positives noted above  PHYSICAL EXAM     Visit Vitals  BP (!) 156/87   Pulse 80   Temp 98.7 °F (37.1 °C)   Resp 20   Ht 5' 8\" (1.727 m)   Wt 91 kg (200 lb 9.6 oz)   SpO2 97%   BMI 30.50 kg/m²      Temp (24hrs), Av.2 °F (36.8 °C), Min:97.6 °F (36.4 °C), Max:98.7 °F (37.1 °C)    Oxygen Therapy  O2 Sat (%): 97 % (01/10/21 2008)  Pulse via Oximetry: 74 beats per minute (21)  O2 Device: BIPAP (01/10/21 1830)  O2 Flow Rate (L/min): 55 l/min (21)  O2 Temperature: 87.8 °F (31 °C) (01/10/21 1830)  FIO2 (%): 100 % (01/10/21 1830)    Intake/Output Summary (Last 24 hours) at 1/10/2021 2105  Last data filed at 1/10/2021 1400  Gross per 24 hour   Intake --   Output 900 ml   Net -900 ml      General: Tired appearing    Lungs:  CTA Bilaterally. On Airvo  Heart:  Regular rate and rhythm,  No murmur, rub, or gallop  Abdomen: Soft, Non distended, Non tender, Positive bowel sounds  Extremities: No cyanosis, clubbing or edema  Neurologic:  No focal deficits    ASSESSMENT      Active Hospital Problems    Diagnosis Date Noted   • Hypernatremia 2021   • SW[facility]) [facility] 2021   • Acute hypoxemic respiratory failure ([facility]) 2021   • Type 2 diabetes mellitus with hyperosmolarity without nonketotic hyperglycemic-hyperosmolar coma ([facility]) 2021   • Pneumonia due to COVID-19 virus 2021   • CAD (coronary artery disease) 10/28/2016     10-27-06 LHC 90% LAD s/p 2.25 x 20 Synergy drug-eluting stent  (CS)     • Obesity 10/06/2015     Plan:    # Acute hypoxic respiratory failure due to viral pneumonia from COVID  - worsening hypoxemia and now maxed out on Airvo  - consult pulmonology as patient will likely need to go onto NIV, he is ok with this when explained to him in detail at bedside  - continue with Rocephin/azithromycin  - trend d dimer and procalcitonin levels  - started on remdesevir yesterday (had SWATI that prevented this upon admission)  - continue Decadron 6 mg  - received plasma on   - repeat CXR    # SWATI, now resolved  - likely  due to urinary losses   - continue with LR  - avoid nephrotoxic meds and IV contrast  - strict I's/O's  - daily BMPs    # DM type II, new diagnosis   - likely worsened by Decadron  - basal/bolus regimen  - IVF resuscitation  - Humalog SSI and serial CBGs    F/E/N: fluids as above. Replete electrolytes as needed, diabetic diet    Ppx: Lovenox for VTE    Code Status: FULL CODE    Disposition: pending clinical improvement with plan as above. High risk for continued clinical decompensation. Discussed with patient at bedside. All questions answered.      Signed By: Ra Perez DO     January 10, 2021

## 2021-01-11 NOTE — PROGRESS NOTES
01/11/21 1449   Oxygen Therapy   O2 Sat (%) 90 %   Pulse via Oximetry 90 beats per minute   O2 Device BIPAP   FIO2 (%) 100 %   Respiratory   Respiratory (WDL) X   Respiratory Pattern Dyspnea with exertion   Chest/Tracheal Assessment Chest expansion, symmetrical   Breath Sounds Bilateral Diminished;Coarse   CPAP/BIPAP   Mask Type and Size Full face; Medium   Skin Condition intact   PIP Observed 21 cm H20   IPAP (cm H2O) 20 cm H2O   EPAP (cm H2O) 10 cm H2O   Inspiratory Time (sec) 0.9 seconds   Vt Spont (ml) 617 ml   Ve Observed (l/min) 20.2 l/min   Backup Rate 22   Total RR (Spontaneous) 33 breaths per minute   Insp Rise Time (sec) 3   Leak (Estimated) 26 L/min   Pt's Home Machine No   Biomedical Check Performed Yes   Settings Verified Yes   Alarm Settings   High Pressure 40   Low Pressure 5   Apnea 20   Low Ve 3   High Rate 50   Low Rate 8   Pulmonary Toilet   Pulmonary Toilet H. O.B elevated       Increased patient's settings due to RN reporting patient SpO2=84%. Patient is stable at 90% on documented settings. Will continue to monitor.

## 2021-01-11 NOTE — PROGRESS NOTES
Hourly rounding completed on this shift. No new complaints at this time. All needs met. Pt is currently resting in bed. Pt self proned for 5 hours today. Laying on side currently. Report given to 5th floor & awaiting transport.

## 2021-01-11 NOTE — PROGRESS NOTES
TRANSFER - OUT REPORT:    Verbal report given to Kwaku ROLLE on [employer].  being transferred to  for urgent transfer       Report consisted of patients Situation, Background, Assessment and   Recommendations(SBAR). Information from the following report(s) SBAR was reviewed with the receiving nurse. Lines:   Peripheral IV 01/08/21 Left Antecubital (Active)   Site Assessment Clean, dry, & intact 01/11/21 0800   Phlebitis Assessment 0 01/11/21 0800   Infiltration Assessment 0 01/11/21 0800   Dressing Status Clean, dry, & intact 01/11/21 0800   Dressing Type Tape;Transparent 01/11/21 0800   Hub Color/Line Status Patent; Flushed 01/11/21 0800   Alcohol Cap Used No 01/10/21 0316        Opportunity for questions and clarification was provided.       Patient transported with:   Veeip

## 2021-01-11 NOTE — PROGRESS NOTES
Case Management continues to follow pt plan of care. Unfortunately he has declined and is being transferred to a unit bed. Will alert coworker to pt transfer and we will remain available to assist as needed.

## 2021-01-11 NOTE — PROGRESS NOTES
01/11/21 0937   Oxygen Therapy   O2 Sat (%) 90 %   Pulse via Oximetry 105 beats per minute   O2 Device CPAP mask   FIO2 (%) 100 %   Respiratory   Respiratory (WDL) X   Respiratory Pattern Dyspnea with exertion   Chest/Tracheal Assessment Chest expansion, symmetrical   Breath Sounds Bilateral Diminished   CPAP/BIPAP   CPAP/BIPAP Start/Stop On   Device Mode CPAP   Mask Type and Size Full face; Medium   Skin Condition intact   PIP Observed 13 cm H20   EPAP (cm H2O) 10 cm H2O   Vt Spont (ml) 670 ml   Ve Observed (l/min) 22.7 l/min   Total RR (Spontaneous) 34 breaths per minute   Leak (Estimated) 59 L/min   Pt's Home Machine No   Biomedical Check Performed Yes   Settings Verified Yes   Alarm Settings   High Pressure 40   Low Pressure 5   Apnea 20   Low Ve 3   High Rate 50   Low Rate 8   Pulmonary Toilet   Pulmonary Toilet H. O.B elevated     Patient had removed himself from CPAP. Patient's SpO2=75% upon my arrival into the room. Patient had increased WOB, and was cyanotic  Patient returned to CPAP at documented settings. Patient's SpO2 increased to 92%. RN notified.

## 2021-01-12 LAB
ALBUMIN SERPL-MCNC: 1.7 G/DL (ref 3.2–4.6)
ALBUMIN/GLOB SERPL: 0.4 {RATIO} (ref 1.2–3.5)
ALP SERPL-CCNC: 106 U/L (ref 50–136)
ALT SERPL-CCNC: 36 U/L (ref 12–65)
ANION GAP SERPL CALC-SCNC: 16 MMOL/L (ref 7–16)
AST SERPL-CCNC: 62 U/L (ref 15–37)
BACTERIA SPEC CULT: NORMAL
BACTERIA SPEC CULT: NORMAL
BASOPHILS # BLD: 0.1 K/UL (ref 0–0.2)
BASOPHILS NFR BLD: 0 % (ref 0–2)
BILIRUB SERPL-MCNC: 0.9 MG/DL (ref 0.2–1.1)
BUN SERPL-MCNC: 28 MG/DL (ref 8–23)
CALCIUM SERPL-MCNC: 8.5 MG/DL (ref 8.3–10.4)
CHLORIDE SERPL-SCNC: 106 MMOL/L (ref 98–107)
CO2 SERPL-SCNC: 19 MMOL/L (ref 21–32)
CREAT SERPL-MCNC: 1 MG/DL (ref 0.8–1.5)
DIFFERENTIAL METHOD BLD: ABNORMAL
EOSINOPHIL # BLD: 0 K/UL (ref 0–0.8)
EOSINOPHIL NFR BLD: 0 % (ref 0.5–7.8)
ERYTHROCYTE [DISTWIDTH] IN BLOOD BY AUTOMATED COUNT: 13 % (ref 11.9–14.6)
GLOBULIN SER CALC-MCNC: 4.7 G/DL (ref 2.3–3.5)
GLUCOSE BLD STRIP.AUTO-MCNC: 204 MG/DL (ref 65–100)
GLUCOSE BLD STRIP.AUTO-MCNC: 212 MG/DL (ref 65–100)
GLUCOSE BLD STRIP.AUTO-MCNC: 299 MG/DL (ref 65–100)
GLUCOSE BLD STRIP.AUTO-MCNC: 341 MG/DL (ref 65–100)
GLUCOSE SERPL-MCNC: 176 MG/DL (ref 65–100)
HCT VFR BLD AUTO: 52.1 % (ref 41.1–50.3)
HGB BLD-MCNC: 17 G/DL (ref 13.6–17.2)
IMM GRANULOCYTES # BLD AUTO: 0.2 K/UL (ref 0–0.5)
IMM GRANULOCYTES NFR BLD AUTO: 2 % (ref 0–5)
LYMPHOCYTES # BLD: 0.7 K/UL (ref 0.5–4.6)
LYMPHOCYTES NFR BLD: 5 % (ref 13–44)
MCH RBC QN AUTO: 29.8 PG (ref 26.1–32.9)
MCHC RBC AUTO-ENTMCNC: 32.6 G/DL (ref 31.4–35)
MCV RBC AUTO: 91.4 FL (ref 79.6–97.8)
MONOCYTES # BLD: 0.5 K/UL (ref 0.1–1.3)
MONOCYTES NFR BLD: 3 % (ref 4–12)
NEUTS SEG # BLD: 12.2 K/UL (ref 1.7–8.2)
NEUTS SEG NFR BLD: 90 % (ref 43–78)
NRBC # BLD: 0 K/UL (ref 0–0.2)
PLATELET # BLD AUTO: 168 K/UL (ref 150–450)
PMV BLD AUTO: 11.3 FL (ref 9.4–12.3)
POTASSIUM SERPL-SCNC: 4.5 MMOL/L (ref 3.5–5.1)
PROT SERPL-MCNC: 6.4 G/DL (ref 6.3–8.2)
RBC # BLD AUTO: 5.7 M/UL (ref 4.23–5.6)
SERVICE CMNT-IMP: NORMAL
SERVICE CMNT-IMP: NORMAL
SODIUM SERPL-SCNC: 141 MMOL/L (ref 138–145)
WBC # BLD AUTO: 13.6 K/UL (ref 4.3–11.1)

## 2021-01-12 PROCEDURE — 2709999900 HC NON-CHARGEABLE SUPPLY

## 2021-01-12 PROCEDURE — 74011250636 HC RX REV CODE- 250/636: Performed by: HOSPITALIST

## 2021-01-12 PROCEDURE — 74011250637 HC RX REV CODE- 250/637: Performed by: HOSPITALIST

## 2021-01-12 PROCEDURE — 80053 COMPREHEN METABOLIC PANEL: CPT

## 2021-01-12 PROCEDURE — 86580 TB INTRADERMAL TEST: CPT | Performed by: INTERNAL MEDICINE

## 2021-01-12 PROCEDURE — 94762 N-INVAS EAR/PLS OXIMTRY CONT: CPT

## 2021-01-12 PROCEDURE — 94660 CPAP INITIATION&MGMT: CPT

## 2021-01-12 PROCEDURE — 74011000250 HC RX REV CODE- 250: Performed by: INTERNAL MEDICINE

## 2021-01-12 PROCEDURE — 65610000006 HC RM INTENSIVE CARE

## 2021-01-12 PROCEDURE — 99291 CRITICAL CARE FIRST HOUR: CPT | Performed by: INTERNAL MEDICINE

## 2021-01-12 PROCEDURE — 74011636637 HC RX REV CODE- 636/637: Performed by: INTERNAL MEDICINE

## 2021-01-12 PROCEDURE — 82962 GLUCOSE BLOOD TEST: CPT

## 2021-01-12 PROCEDURE — 74011000302 HC RX REV CODE- 302: Performed by: INTERNAL MEDICINE

## 2021-01-12 PROCEDURE — 77010033711 HC HIGH FLOW OXYGEN

## 2021-01-12 PROCEDURE — 85025 COMPLETE CBC W/AUTO DIFF WBC: CPT

## 2021-01-12 PROCEDURE — 36415 COLL VENOUS BLD VENIPUNCTURE: CPT

## 2021-01-12 PROCEDURE — 74011636637 HC RX REV CODE- 636/637: Performed by: HOSPITALIST

## 2021-01-12 PROCEDURE — 74011250636 HC RX REV CODE- 250/636: Performed by: INTERNAL MEDICINE

## 2021-01-12 RX ORDER — INSULIN GLARGINE 100 [IU]/ML
18 INJECTION, SOLUTION SUBCUTANEOUS
Status: DISCONTINUED | OUTPATIENT
Start: 2021-01-12 | End: 2021-01-14

## 2021-01-12 RX ADMIN — METOPROLOL TARTRATE 50 MG: 50 TABLET, FILM COATED ORAL at 09:55

## 2021-01-12 RX ADMIN — CLOPIDOGREL BISULFATE 75 MG: 75 TABLET ORAL at 09:55

## 2021-01-12 RX ADMIN — Medication 1000 MG: at 17:33

## 2021-01-12 RX ADMIN — MAGNESIUM GLUCONATE 500 MG ORAL TABLET 400 MG: 500 TABLET ORAL at 09:55

## 2021-01-12 RX ADMIN — INSULIN LISPRO 6 UNITS: 100 INJECTION, SOLUTION INTRAVENOUS; SUBCUTANEOUS at 22:00

## 2021-01-12 RX ADMIN — Medication 10 ML: at 22:00

## 2021-01-12 RX ADMIN — INSULIN GLARGINE 18 UNITS: 100 INJECTION, SOLUTION SUBCUTANEOUS at 22:00

## 2021-01-12 RX ADMIN — INSULIN LISPRO 4 UNITS: 100 INJECTION, SOLUTION INTRAVENOUS; SUBCUTANEOUS at 08:12

## 2021-01-12 RX ADMIN — METOPROLOL TARTRATE 50 MG: 50 TABLET, FILM COATED ORAL at 17:33

## 2021-01-12 RX ADMIN — Medication 1000 MG: at 09:55

## 2021-01-12 RX ADMIN — INSULIN LISPRO 4 UNITS: 100 INJECTION, SOLUTION INTRAVENOUS; SUBCUTANEOUS at 12:27

## 2021-01-12 RX ADMIN — ENOXAPARIN SODIUM 30 MG: 30 INJECTION SUBCUTANEOUS at 12:27

## 2021-01-12 RX ADMIN — INSULIN LISPRO 8 UNITS: 100 INJECTION, SOLUTION INTRAVENOUS; SUBCUTANEOUS at 16:30

## 2021-01-12 RX ADMIN — Medication 10 ML: at 06:00

## 2021-01-12 RX ADMIN — ENOXAPARIN SODIUM 30 MG: 30 INJECTION SUBCUTANEOUS at 01:50

## 2021-01-12 RX ADMIN — ASPIRIN 81 MG 81 MG: 81 TABLET ORAL at 09:55

## 2021-01-12 RX ADMIN — DEXAMETHASONE 6 MG: 4 TABLET ORAL at 09:55

## 2021-01-12 RX ADMIN — Medication 220 MG: at 09:55

## 2021-01-12 RX ADMIN — REMDESIVIR 100 MG: 100 INJECTION, POWDER, LYOPHILIZED, FOR SOLUTION INTRAVENOUS at 16:57

## 2021-01-12 RX ADMIN — TUBERCULIN PURIFIED PROTEIN DERIVATIVE 5 UNITS: 5 INJECTION, SOLUTION INTRADERMAL at 12:27

## 2021-01-12 RX ADMIN — ATORVASTATIN CALCIUM 80 MG: 80 TABLET, FILM COATED ORAL at 09:55

## 2021-01-12 RX ADMIN — Medication 10 ML: at 14:00

## 2021-01-12 NOTE — DIABETES MGMT
Patient admitted with pneumonia due to COVID-19 virus, patient transferred to ICU yesterday for continued hypoxic respiratory failure and on bipap with high risk for intubation. Blood glucose ranged  yesterday with patient receiving Humalog 28 units and Decadron 6mg. Blood glucose this morning was 204. Reviewed patient current regimen: Lantus 18 units nightly and Humalog SSI. Given patient condition will follow along loosely.

## 2021-01-12 NOTE — PROGRESS NOTES
Physical Therapy Note:    Therapist is discontinuing acute PT services secondary to transfer to the ICU. Patient's goals were not met. Please re-consult acute PT services when medically appropriate.  Thank you,    Whitney Lyn, PT, DPT  1/12/2020

## 2021-01-12 NOTE — PROGRESS NOTES
Patient chart reviewed. Patient with continued hypoxic respiratory failure and on BIPAP with high risk for intubation. Hospitalist will sign off. Please page/call with questions. Patient not seen or examined. No bill for this encounter.

## 2021-01-12 NOTE — PROGRESS NOTES
Comprehensive Nutrition Assessment    Type and Reason for Visit: Reassess  This assessment was completed remotely from within the hospital.    Nutrition Recommendations/Plan:    Continue current diet   Continue Glucerna TID   Provide Glucerna with med passes to increase kcal and protein intake throughout the day    · Pursue nutrition support (NGT preferred) if unable to consume adequate nutrition within 48hrs. If nutrition support pursued, please consult RD for management. Malnutrition Assessment:  Malnutrition Status: At risk for malnutrition (specify)  Context: Acute illness  Findings of clinical characteristics of malnutrition:   Energy Intake:  Mild decrease in energy intake (specify)(Decrease reported for four days)  Weight Loss:  (89% UBE per EMR, pt states 40# loss over 4 days (15% based on stated # and CBW))     Body Fat Loss:  Unable to assess,     Muscle Mass Loss:  Unable to assess,    Fluid Accumulation:  Unable to assess,     Strength:  Not performed   Nutrition Assessment:   Nutrition History: Pt reports inability to tolerate any food or liquids for 4 days PTA. Indicates vomiting with all po attempts during this time. Nutrition Background: PMH remarkable for CAD, GERD, HTN, MO. Admitted with Coivd 19, new onset DM with hyperglycemic hyperosmolar state, SWTAI on CKD, lactic acidosis. Daily Update:  Pt transferred to BMT 1/11. Per MD note, last po meal 1/8. RN reports pt had minimal intake 1/11. Recorded intakes remain <25%. Discussed possible need for TF with MD via [employer]. Trialing providing ONS today due to pt being transitioned from BiPAP to Airvo. Abdominal Status (last documented): Soft abdomen with Active  bowel sounds. Last BM 01/10/21.   Pertinent Medications: Imodium (PRN, last done given 1/11), Zofran (PRN), Phenergan (PRN)    Nutrition Related Findings:   NFPE deferred due to isolation; RN reports pt visually appears to have experienced rapid weight loss Current Nutrition Therapies:  DIET DIABETIC CONSISTENT CARB Regular; 2 GM NA (House Low NA)  DIET NUTRITIONAL SUPPLEMENTS All Meals; Glucerna Shake ( )    Current Intake:   Average Meal Intake: 0% Average Supplement Intake: Unable to assess      Anthropometric Measures:  Height: 5' 8\" (172.7 cm)  Current Body Wt: 91 kg (200 lb 9.9 oz)(1/7), Weight source: Bed scale  BMI: 30.5, Obese class 1 (BMI 30.0-34. 9)  Admission Body Weight: 235 lb 0.2 oz(estimated)  Ideal Body Wt: 154 lbs (70 kg), 130.3 %  Usual Body Wt: 101.6 kg (224 lb)(EMR 6/3/2020 FP office; pt stated # unable to verify ), Percent weight change: -10.4          Estimated Daily Nutrient Needs:  Energy (kcal/day): 6812-7784 (Kcal/kg(25-30), Weight Used: Ideal(70 kg))  Protein (g/day): 60-75 (0.8-1 g/kg) Weight Used: (Ideal)  Fluid (ml/day):   (1 ml/kcal)    Nutrition Diagnosis:   · Inadequate oral intake related to impaired respiratory function as evidenced by (need for BiPAP; reported wt loss of 15% in 4 days)    · Food & nutrition-related knowledge deficit related to endocrine dysfunction as evidenced by (new DM dx, A1C 12, minimal exposure to DM information.)    Nutrition Interventions:   Food and/or Nutrient Delivery: Continue current diet, Continue oral nutrition supplement  Nutrition Education/Counseling: Education initiated  Coordination of Nutrition Care: Continue to monitor while inpatient  Plan of Care discussed with Fatimah Orozco RN and Dr. Bryant Brunner (via [employer])    Goals:   Previous Goal Met: No progress toward goal(s)  Active Goal: Meet >75% estimated nutrition needs within 7 days    Nutrition Monitoring and Evaluation:   Behavioral-Environmental Outcomes: Knowledge or skill  Food/Nutrient Intake Outcomes: Food and nutrient intake, Supplement intake  Physical Signs/Symptoms Outcomes: Biochemical data    Discharge Planning:     Too soon to determine    Electronically signed by Celso Gary PIEDRA RDN, LD 1/12/2021 at 1:39 PM  Contact: [phone]    Disaster Mode active

## 2021-01-12 NOTE — PROGRESS NOTES
[facility] Critical Care COVID-19 Note:    Critical Care Note: 1/12/2021    Fidencio Severance Donna Meter. Admission Date: 1/6/2021     Length of Stay: 5 days    Background: 61 y.o. y/o male with acute hypoxemic respiratory failure secondary to COVID-19. Date of COVID-19 symptom onset: + COVID 01/05/21    Notable PMH: obesity, HTN, CAD, dyslipidemia, SWATI, DM, GERD    Drug Allergies: Allergies   Allergen Reactions    Latex Swelling     Had a purcell catheter with swelling of urethra. Only known latex issue in past. Wife remembers this now    Hydrocodone-Acetaminophen Itching     Wife remembers this allergy    Tessalon Perles [Benzonatate] Unknown (comments)     24 Hour events: pulmonary consulted for worsening hypoxia 1/10. Continues on CPAP, down slightly to 85%, trial on AirVo now 100%, 89% sat. He states he feels the same. RR in 20s and tolerating well. REVIEW OF SYSTEMS:  Constitutional:  no fever, No chills, night sweats, weight loss, weight gain  Eyes:  Denies problems with eye pain, erythema, blurred vision, or visual field loss. ENTM:  Denies sore throat, no loss of taste or smell  Lymph:  Denies swollen glands. Cardiac:  No chest pain, pressure, discomfort, palpitations, orthopnea, murmurs, or edema. GI:  No dysphagia, heartburn reflux, nausea/vomiting, abdominal pain, or bleeding. + diarrhea   :Denies history of dysuria, hematuria, polyuria, or decreased urine output. MS:  No history of myalgias, arthralgias, bone pain, or muscle cramps. Skin:  No history of rashes, jaundice, cyanosis, nodules, or ulcers. Endo:  Negative for heat or cold intolerance. No polyuria/polydipsia  Psych:  No anxiety, No depression, insomnia, hallucinations. Neuro: There is no history of AMS, persistent headache, decreased level of consciousness, seizures, or motor or sensory deficits.     Visit Vitals  BP (!) 156/81   Pulse 93   Temp 98.8 °F (37.1 °C)   Resp 29   Ht 5' 8\" (1.727 m)   Wt 200 lb 9.6 oz (91 kg)   SpO2 (!) 78%   BMI 30.50 kg/m²     I/O:      Intake/Output Summary (Last 24 hours) at 1/12/2021 1009  Last data filed at 1/12/2021 1003  Gross per 24 hour   Intake 480 ml   Output 725 ml   Net -245 ml      Pertinent Exam:            Constitutional: awake, alert, minimal SOB noted on CPAP   EENMT:  Sclera clear, pupils equal, oral mucosa moist  Respiratory: crackles bilateral, diminished   Cardiovascular:  RRR with no M,G,R;  Gastrointestinal:  soft with no tenderness; positive bowel sounds present  Musculoskeletal:  warm with no cyanosis, no  lower extremity edema  Skin:  no jaundice or ecchymosis  Neurologic: no gross neuro deficits     Psychiatric: calm     CXR:  01/10/21: slightly worse infiltrates    Lines (insertion date):   PIV (01/08/21)    Drips: current dose (range)  None     COVID-19 Meds:  Dexamethasone 6mg daily (01/06-present)  Remdesivir (initially couldn't have cause of SWATI, but started 01/08/21; 5-10 days)  Convalescent plasma transfused 01/07    SARS-CoV-2 LAB Results +COVID 01/05/21    Microbiology Results  Date  Source Culture Result   01/07/21 blood Pending, no growth x3 days   1/7/21 blood NG     Anti-infectives (start date):   Azithromycin (01/07/21- )  Ceftriaxone (01/07/21- )      Pertinent Labs:   Recent Labs     01/11/21  0823 01/10/21  1810   WBC 12.5* 11.0   HGB 17.2 16.0   HCT 49.6 46.1    164   INR 1.1 1.1     Recent Labs     01/11/21  0823 01/10/21  1810    142   K 3.5 3.5   * 109*   CO2 25 28   GLU 87 141*   BUN 24* 23   CREA 1.06 0.91   CA 8.0* 8.4   ALB 2.0* 1.8*   TBILI 0.6 0.6   ALT 37 35     Recent Labs     01/12/21  0727 01/11/21  2204 01/11/21  1550   GLUCPOC 204* 168* 238*       Oxygen Requirements:  Date O2 support mode FiO2 SaO2   01/10/21 CPAP 90 93   1/1/21 BiPAP 100% 90%     IMPRESSION:  Active Hospital Problems    Diagnosis Date Noted    Hypernatremia 01/08/2021    SWATI (acute kidney injury) ([facility] [address] 01/07/2021    Acute hypoxemic respiratory failure (HCC) 01/07/2021   • Type 2 diabetes mellitus with hyperosmolarity without nonketotic hyperglycemic-hyperosmolar coma (HCC) 01/07/2021   • Pneumonia due to COVID-19 virus 01/07/2021   • CAD (coronary artery disease) 10/28/2016     10-27-06 C 90% LAD s/p 2.25 x 20 Synergy drug-eluting stent  (CS)     • Obesity 10/06/2015       60 y.o. y/o male with acute hypoxemic respiratory failure secondary to COVID-19.    PLAN:  1.  Acute respiratory failure with hypoxia: supportive measures with noninvasive oxygen strategy unless tiring or desaturation.  Self-proning encouraged. Likely try off of CPAP today and back to Airvo. Consider dose of lasix tomorrow if Cr stable.   2. COVID-19 pneumonia:  Decadron, Convalescent plasma, remdesivir as outlined above.  3. Nutrition: last po meal was 01/08/2020, taking hydration, but minimal solids   4. PPx:  DVT ppx ongoing.  H2 blocker with decadron.  5. imodium for diarrhea  6. DM2: lantus + SSI    Full Code    The patient is critically ill with respiratory failure and requires high complexity decision making for assessment and support including frequent evaluation and titration of therapies, application of advanced monitoring technologies & timing & modality of advanced respiratory support.  Cumulative time devoted to patient care services by me for day of service -35 min     Luba Davison MD

## 2021-01-12 NOTE — PROGRESS NOTES
Hospitalist Progress Note    2021  Admit Date: 2021 11:02 PM   NAME: Kaylee Torres Jr. :  1960   MRN:  [id number]   Attending: Terry Medina DO  PCP:  Collette Colt, MD    SUBJECTIVE:     Chief complaint fever cough, congestion,     Patient is a 55-year-old male with past medical history significant for coronary artery disease, GERD, hypertension presented to the ER because of fever cough and congestion. Patient states that he has been having subjective fevers for the last week. He also has dry cough as well as nasal congestion. He was seen in the ER at [facility] yesterday. Covid test was done and is positive. (- Result was reviewed in care everywhere) Complains of having myalgias decreased smell and taste. He denies shortness of breath. No chest pain no palpitations. He reports losing about 30 pounds in the last week. Per the sister at bedside patient is also intermittently confused at home. He also has nausea vomiting. He reports feeling very tired.     10 systems reviewed and negative except as noted in HPI.     ER course:  Patient is afebrile. Hemodynamically stable. Oxygen saturations dropped to 86% on room air. Placed on supplemental oxygen by nasal cannula.     CBC remarkable for hemoglobin of 18.2 likely hemoconcentration.     CMP with blood glucose of 760. BUN 40 and creatinine 2.6. Lipase 653. Lactic acid 4.1. Procalcitonin 0.25.     Chest x-ray shows mild patchy groundglass densities in the mid lungs likely atelectasis/pneumonia.     Patient admitted for further evaluation management of new diagnosis of diabetes with hyperglycemia, COVID-19 pneumonia. Interval History (): patient examined at bedside. Worsening hypoxemia and currently on CPAP, temporarily weaned to Airvo but did not tolerate due to hypoxemia. Tired appearing and says he does feel short of breath. No chest pain.      Review of Systems negative with exception of pertinent positives noted above  PHYSICAL EXAM     Visit Vitals  /74   Pulse 91   Temp 98.7 °F (37.1 °C)   Resp 20   Ht 5' 8\" (1.727 m)   Wt 91 kg (200 lb 9.6 oz)   SpO2 90%   BMI 30.50 kg/m²      Temp (24hrs), Av.6 °F (37 °C), Min:98.4 °F (36.9 °C), Max:98.9 °F (37.2 °C)    Oxygen Therapy  O2 Sat (%): 90 % (21)  Pulse via Oximetry: 90 beats per minute (21)  O2 Device: BIPAP (21)  O2 Flow Rate (L/min): 55 l/min (21)  O2 Temperature: 87.8 °F (31 °C) (21 1210)  FIO2 (%): 100 % (21)    Intake/Output Summary (Last 24 hours) at 2021  Last data filed at 2021 1444  Gross per 24 hour   Intake 840 ml   Output 425 ml   Net 415 ml      General: Tired appearing    Lungs:  CTA Bilaterally.  On Airvo  Heart:  Regular rate and rhythm,  No murmur, rub, or gallop  Abdomen: Soft, Non distended, Non tender, Positive bowel sounds  Extremities: No cyanosis, clubbing or edema  Neurologic:  No focal deficits    ASSESSMENT      Active Hospital Problems    Diagnosis Date Noted    Hypernatremia 2021    SWATI (acute kidney injury) ([facility] [address] 2021    Acute hypoxemic respiratory failure ([facility] [address] 2021    Type 2 diabetes mellitus with hyperosmolarity without nonketotic hyperglycemic-hyperosmolar coma ([facility] [address] 2021    Pneumonia due to COVID-19 virus 2021    CAD (coronary artery disease) 10/28/2016     10-27-06 LHC 90% LAD s/p 2.25 x 20 Synergy drug-eluting stent  (CS)      Obesity 10/06/2015     Plan:    # Acute hypoxic respiratory failure due to viral pneumonia from COVID  - worsening hypoxemia and on CPAP  - pulmonary following, appreciate all help  - patient very high risk for intubation  - stop antibiotics  - trend d dimer and procalcitonin levels  - started on remdesevir (day3, he had SWATI that prevented this upon admission)  - continue Decadron 6 mg  - received plasma on     # SWATI, now resolved  - likely due to urinary losses   - continue with LR  - avoid nephrotoxic meds and IV contrast  - strict I's/O's  - daily BMPs    # DM type II, new diagnosis   - likely worsened by Decadron  - basal/bolus regimen  - IVF resuscitation  - Humalog SSI and serial CBGs    F/E/N: fluids as above. Replete electrolytes as needed, diabetic diet    Ppx: Lovenox for VTE    Code Status: FULL CODE    Disposition: transfer from floor to ICU. High risk for continued clinical decompensation. Discussed with patient at bedside and with sister via phone. All questions answered.      Signed By: Jocelyne Ramirez DO     January 11, 2021

## 2021-01-12 NOTE — PROGRESS NOTES
LOS 5d  Patient transferred from 6th floor to BMT/ICU related to increase care.  PT and OT have requested re-eval consult placed when patient is medically stable to participate.  Discharge plan undetermined at this time.  Will continue to follow for discharge planning needs  Please consult  if any new issues arise

## 2021-01-12 NOTE — PROGRESS NOTES
Occupational Therapy Note:  Therapist is discharging patient from OT at this time due to decline in medical status and transfer to ICU. Please reconsult OT when MD deems patient appropriate for continued services. Thank you.   Ludivina Owusu, OTR/L

## 2021-01-13 LAB
ANION GAP SERPL CALC-SCNC: 9 MMOL/L (ref 7–16)
APTT PPP: 30.9 SEC (ref 24.1–35.1)
APTT PPP: 32.6 SEC (ref 24.1–35.1)
BASOPHILS # BLD: 0 K/UL (ref 0–0.2)
BASOPHILS NFR BLD: 0 % (ref 0–2)
BUN SERPL-MCNC: 32 MG/DL (ref 8–23)
CALCIUM SERPL-MCNC: 8.5 MG/DL (ref 8.3–10.4)
CHLORIDE SERPL-SCNC: 109 MMOL/L (ref 98–107)
CO2 SERPL-SCNC: 25 MMOL/L (ref 21–32)
CREAT SERPL-MCNC: 0.93 MG/DL (ref 0.8–1.5)
DIFFERENTIAL METHOD BLD: ABNORMAL
EOSINOPHIL # BLD: 0 K/UL (ref 0–0.8)
EOSINOPHIL NFR BLD: 0 % (ref 0.5–7.8)
ERYTHROCYTE [DISTWIDTH] IN BLOOD BY AUTOMATED COUNT: 12.7 % (ref 11.9–14.6)
FIBRINOGEN PPP-MCNC: 722 MG/DL (ref 190–501)
FIBRINOGEN PPP-MCNC: 749 MG/DL (ref 190–501)
GLUCOSE BLD STRIP.AUTO-MCNC: 256 MG/DL (ref 65–100)
GLUCOSE BLD STRIP.AUTO-MCNC: 264 MG/DL (ref 65–100)
GLUCOSE BLD STRIP.AUTO-MCNC: 300 MG/DL (ref 65–100)
GLUCOSE BLD STRIP.AUTO-MCNC: 322 MG/DL (ref 65–100)
GLUCOSE SERPL-MCNC: 228 MG/DL (ref 65–100)
HCT VFR BLD AUTO: 48.7 % (ref 41.1–50.3)
HGB BLD-MCNC: 16.4 G/DL (ref 13.6–17.2)
IMM GRANULOCYTES # BLD AUTO: 0.2 K/UL (ref 0–0.5)
IMM GRANULOCYTES NFR BLD AUTO: 2 % (ref 0–5)
INR PPP: 1.1
INR PPP: 1.2
LYMPHOCYTES # BLD: 0.6 K/UL (ref 0.5–4.6)
LYMPHOCYTES NFR BLD: 4 % (ref 13–44)
MCH RBC QN AUTO: 29.4 PG (ref 26.1–32.9)
MCHC RBC AUTO-ENTMCNC: 33.7 G/DL (ref 31.4–35)
MCV RBC AUTO: 87.3 FL (ref 79.6–97.8)
MM INDURATION POC: 0 MM (ref 0–5)
MONOCYTES # BLD: 0.4 K/UL (ref 0.1–1.3)
MONOCYTES NFR BLD: 3 % (ref 4–12)
NEUTS SEG # BLD: 12.3 K/UL (ref 1.7–8.2)
NEUTS SEG NFR BLD: 91 % (ref 43–78)
NRBC # BLD: 0 K/UL (ref 0–0.2)
PLATELET # BLD AUTO: 220 K/UL (ref 150–450)
PMV BLD AUTO: 11.2 FL (ref 9.4–12.3)
POTASSIUM SERPL-SCNC: 3.9 MMOL/L (ref 3.5–5.1)
PPD POC: NEGATIVE NEGATIVE
PROTHROMBIN TIME: 15 SEC (ref 12.5–14.7)
PROTHROMBIN TIME: 15.3 SEC (ref 12.5–14.7)
RBC # BLD AUTO: 5.58 M/UL (ref 4.23–5.6)
SODIUM SERPL-SCNC: 143 MMOL/L (ref 136–145)
WBC # BLD AUTO: 13.5 K/UL (ref 4.3–11.1)

## 2021-01-13 PROCEDURE — 74011250636 HC RX REV CODE- 250/636: Performed by: INTERNAL MEDICINE

## 2021-01-13 PROCEDURE — 85025 COMPLETE CBC W/AUTO DIFF WBC: CPT

## 2021-01-13 PROCEDURE — 36415 COLL VENOUS BLD VENIPUNCTURE: CPT

## 2021-01-13 PROCEDURE — 74011250636 HC RX REV CODE- 250/636: Performed by: HOSPITALIST

## 2021-01-13 PROCEDURE — 85384 FIBRINOGEN ACTIVITY: CPT

## 2021-01-13 PROCEDURE — P9047 ALBUMIN (HUMAN), 25%, 50ML: HCPCS | Performed by: INTERNAL MEDICINE

## 2021-01-13 PROCEDURE — 74011636637 HC RX REV CODE- 636/637: Performed by: INTERNAL MEDICINE

## 2021-01-13 PROCEDURE — 85730 THROMBOPLASTIN TIME PARTIAL: CPT

## 2021-01-13 PROCEDURE — 80048 BASIC METABOLIC PNL TOTAL CA: CPT

## 2021-01-13 PROCEDURE — 74011636637 HC RX REV CODE- 636/637: Performed by: HOSPITALIST

## 2021-01-13 PROCEDURE — 94660 CPAP INITIATION&MGMT: CPT

## 2021-01-13 PROCEDURE — 85610 PROTHROMBIN TIME: CPT

## 2021-01-13 PROCEDURE — 65610000006 HC RM INTENSIVE CARE

## 2021-01-13 PROCEDURE — 82962 GLUCOSE BLOOD TEST: CPT

## 2021-01-13 PROCEDURE — 99291 CRITICAL CARE FIRST HOUR: CPT | Performed by: INTERNAL MEDICINE

## 2021-01-13 PROCEDURE — 74011250637 HC RX REV CODE- 250/637: Performed by: HOSPITALIST

## 2021-01-13 PROCEDURE — C1751 CATH, INF, PER/CENT/MIDLINE: HCPCS

## 2021-01-13 PROCEDURE — 2709999900 HC NON-CHARGEABLE SUPPLY

## 2021-01-13 RX ORDER — ALBUMIN HUMAN 250 G/1000ML
25 SOLUTION INTRAVENOUS EVERY 6 HOURS
Status: COMPLETED | OUTPATIENT
Start: 2021-01-13 | End: 2021-01-13

## 2021-01-13 RX ORDER — FUROSEMIDE 10 MG/ML
40 INJECTION INTRAMUSCULAR; INTRAVENOUS ONCE
Status: COMPLETED | OUTPATIENT
Start: 2021-01-13 | End: 2021-01-13

## 2021-01-13 RX ORDER — SODIUM CHLORIDE 0.9 % (FLUSH) 0.9 %
20 SYRINGE (ML) INJECTION EVERY 8 HOURS
Status: DISCONTINUED | OUTPATIENT
Start: 2021-01-13 | End: 2021-02-26 | Stop reason: HOSPADM

## 2021-01-13 RX ORDER — SODIUM CHLORIDE 0.9 % (FLUSH) 0.9 %
20 SYRINGE (ML) INJECTION AS NEEDED
Status: DISCONTINUED | OUTPATIENT
Start: 2021-01-13 | End: 2021-02-26 | Stop reason: HOSPADM

## 2021-01-13 RX ADMIN — ALBUMIN (HUMAN) 25 G: 0.25 INJECTION, SOLUTION INTRAVENOUS at 18:34

## 2021-01-13 RX ADMIN — METOPROLOL TARTRATE 50 MG: 50 TABLET, FILM COATED ORAL at 18:26

## 2021-01-13 RX ADMIN — CLOPIDOGREL BISULFATE 75 MG: 75 TABLET ORAL at 09:02

## 2021-01-13 RX ADMIN — Medication 20 ML: at 18:35

## 2021-01-13 RX ADMIN — Medication 10 ML: at 21:06

## 2021-01-13 RX ADMIN — Medication 1000 MG: at 09:02

## 2021-01-13 RX ADMIN — INSULIN LISPRO 6 UNITS: 100 INJECTION, SOLUTION INTRAVENOUS; SUBCUTANEOUS at 08:35

## 2021-01-13 RX ADMIN — INSULIN GLARGINE 18 UNITS: 100 INJECTION, SOLUTION SUBCUTANEOUS at 21:06

## 2021-01-13 RX ADMIN — Medication 20 ML: at 21:06

## 2021-01-13 RX ADMIN — INSULIN LISPRO 8 UNITS: 100 INJECTION, SOLUTION INTRAVENOUS; SUBCUTANEOUS at 21:06

## 2021-01-13 RX ADMIN — ATORVASTATIN CALCIUM 80 MG: 80 TABLET, FILM COATED ORAL at 09:01

## 2021-01-13 RX ADMIN — FUROSEMIDE 40 MG: 10 INJECTION, SOLUTION INTRAMUSCULAR; INTRAVENOUS at 12:44

## 2021-01-13 RX ADMIN — ASPIRIN 81 MG 81 MG: 81 TABLET ORAL at 09:01

## 2021-01-13 RX ADMIN — ALBUMIN (HUMAN) 25 G: 0.25 INJECTION, SOLUTION INTRAVENOUS at 12:34

## 2021-01-13 RX ADMIN — MAGNESIUM GLUCONATE 500 MG ORAL TABLET 400 MG: 500 TABLET ORAL at 09:01

## 2021-01-13 RX ADMIN — Medication 10 ML: at 06:00

## 2021-01-13 RX ADMIN — DEXAMETHASONE 6 MG: 4 TABLET ORAL at 09:02

## 2021-01-13 RX ADMIN — INSULIN LISPRO 8 UNITS: 100 INJECTION, SOLUTION INTRAVENOUS; SUBCUTANEOUS at 18:46

## 2021-01-13 RX ADMIN — Medication 10 ML: at 12:49

## 2021-01-13 RX ADMIN — Medication 220 MG: at 09:02

## 2021-01-13 RX ADMIN — ENOXAPARIN SODIUM 30 MG: 30 INJECTION SUBCUTANEOUS at 12:36

## 2021-01-13 RX ADMIN — METOPROLOL TARTRATE 50 MG: 50 TABLET, FILM COATED ORAL at 09:01

## 2021-01-13 RX ADMIN — Medication 1000 MG: at 18:26

## 2021-01-13 RX ADMIN — INSULIN LISPRO 6 UNITS: 100 INJECTION, SOLUTION INTRAVENOUS; SUBCUTANEOUS at 12:29

## 2021-01-13 RX ADMIN — ENOXAPARIN SODIUM 30 MG: 30 INJECTION SUBCUTANEOUS at 01:42

## 2021-01-13 NOTE — DIABETES MGMT
Patient's blood glucose ranged 176-341 yesterday with patient receiving Lantus 18 units and Humalog 22 units as well as dexamethasone 6 mg. Blood glucose 256 this morning. Patient on BIPAP. Provider could consider increasing basal insulin to improve control.

## 2021-01-13 NOTE — PROGRESS NOTES
Stood up out of bed with minimal (< 4% help from staff) assist. Marched in place  (As potential ambulation distance was minimized in relationship to length of Airvo tubing) for 5 minutes and then walked to recliner and eased into a sitting position. States feels much better sitting up. Tolerated all well.

## 2021-01-13 NOTE — PROGRESS NOTES
PICC Placement Note    PRE-PROCEDURE VERIFICATION  Correct Procedure: yes. Time out completed with assistant andrés tipton rn and all persons present in agreement with time out. Correct Site:  yes  Temperature: Temp: 98.3 °F (36.8 °C), Temperature Source: Temp Source: Axillary  Recent Labs     01/13/21  0715 01/13/21  0414   BUN 32*  --    CREA 0.93  --    PLT  --  220   INR 1.2 1.1   WBC  --  13.5*     Allergies: Latex, Hydrocodone-acetaminophen, and Tessalon perles [benzonatate]  Education materials for [facility] given to patient or family. PROCEDURE DETAIL  A double lumen PICC line was started for TPN. The following documentation is in addition to the PICC properties in the lines/airways flowsheet :  Lot #: [id number]  xylocaine used: yes  Mid-Arm Circumference: 33 (cm)  Internal Catheter Length: 42 (cm)  Internal Catheter Total Length: 42 (cm)  Vein Selection for PICC:right basilic  Central Line Bundle followed yes  Complication Related to Insertion: none  Both the insertion guidewire and ECG guidewire were removed intact all ports have positive blood return and were flush well with normal saline. The location of the tip of the PICC is verified using ECG technology. The tip is in the SVC per ECG reading. See image below.              Line is okay to use: yes

## 2021-01-13 NOTE — PROGRESS NOTES
[address] Eleazar[address] Chávez [facility] Critical Care COVID-19 Note:    Critical Care Note: 1/13/2021    Elliott Marcum. Admission Date: 1/6/2021     Length of Stay: 6 days    Background: 61 y.o. y/o male with acute hypoxemic respiratory failure secondary to COVID-19. Date of COVID-19 symptom onset: + COVID 01/05/21    Notable PMH: obesity, HTN, CAD, dyslipidemia, SWATI, DM, GERD    Drug Allergies: Allergies   Allergen Reactions    Latex Swelling     Had a purcell catheter with swelling of urethra. Only known latex issue in past. Wife remembers this now    Hydrocodone-Acetaminophen Itching     Wife remembers this allergy    Tessalon Perles [Benzonatate] Unknown (comments)     24 Hour events: Remains on CPAP 10. FIO2 down to 80%.      REVIEW OF SYSTEMS:    Constitutional: negative for fever, chills, sweats  Cardiac: negative for chest pain, palpitations, syncope, edema  GI: negative for dysphagia, reflux, vomiting, diarrhea, abdominal pain, or melena  Neuro: negative for focal weakness, numbness, headache      Visit Vitals  BP (!) 166/95   Pulse 89   Temp 98.8 °F (37.1 °C)   Resp 24   Ht 5' 8\" (1.727 m)   Wt 200 lb 9.6 oz (91 kg)   SpO2 93%   BMI 30.50 kg/m²     I/O:      Intake/Output Summary (Last 24 hours) at 1/13/2021 1200  Last data filed at 1/13/2021 0600  Gross per 24 hour   Intake --   Output 1350 ml   Net -1350 ml      Pertinent Exam:            Constitutional: awake, alert, minimal SOB noted on CPAP   EENMT:  Sclera clear, pupils equal, oral mucosa moist  Respiratory: few crackles bilateral, diminished   Cardiovascular:  RRR with no M,G,R;  Gastrointestinal:  soft with no tenderness; positive bowel sounds present  Musculoskeletal:  warm with no cyanosis, no  lower extremity edema  Skin:  no jaundice or ecchymosis  Neurologic: no gross neuro deficits     Psychiatric: calm     CXR:        01/10/21: slightly worse infiltrates    Lines (insertion date):   PIV (01/08/21)    Drips: current dose (range)  None COVID-19 Meds:  Dexamethasone 6mg daily (01/06-present)  Remdesivir (initially couldn't have cause of SWATI, but started 01/08/21; 5-10 days)  Convalescent plasma transfused 01/07    SARS-CoV-2 LAB Results +COVID 01/05/21    Microbiology Results  Date  Source Culture Result   01/07/21 blood Pending, no growth x3 days   1/7/21 blood NG     Anti-infectives (start date):   Azithromycin (01/07/21- )  Ceftriaxone (01/07/21- )      Pertinent Labs:   Recent Labs     01/13/21  0715 01/13/21  0414 01/12/21  0957 01/11/21  0823 01/10/21  1810   WBC  --  13.5* 13.6* 12.5* 11.0   HGB  --  16.4 17.0 17.2 16.0   HCT  --  48.7 52.1* 49.6 46.1   PLT  --  220 168 191 164   INR 1.2 1.1  --  1.1 1.1     Recent Labs     01/13/21  0715 01/12/21  0957 01/11/21  0823 01/10/21  1810    141 142 142   K 3.9 4.5 3.5 3.5   * 106 108* 109*   CO2 25 19* 25 28   * 176* 87 141*   BUN 32* 28* 24* 23   CREA 0.93 1.00 1.06 0.91   CA 8.5 8.5 8.0* 8.4   ALB  --  1.7* 2.0* 1.8*   TBILI  --  0.9 0.6 0.6   ALT  --  36 37 35     Recent Labs     01/13/21  0830 01/12/21  2204 01/12/21  1643   GLUCPOC 256* 299* 341*       Oxygen Requirements:  Date O2 support mode FiO2 SaO2   01/10/21 CPAP 90 93   1/1/21 BiPAP 100% 90%     IMPRESSION:  Active Hospital Problems    Diagnosis Date Noted    Hypernatremia 01/08/2021    SWATI (acute kidney injury) ([facility] [address] 01/07/2021    Acute hypoxemic respiratory failure ([facility] [address] 01/07/2021    Type 2 diabetes mellitus with hyperosmolarity without nonketotic hyperglycemic-hyperosmolar coma ([facility] [address] 01/07/2021    Pneumonia due to COVID-19 virus 01/07/2021    CAD (coronary artery disease) 10/28/2016     10-27-06 LHC 90% LAD s/p 2.25 x 20 Synergy drug-eluting stent  (CS)      Obesity 10/06/2015       61 y.o. y/o male with acute hypoxemic respiratory failure secondary to COVID-19. PLAN:  1. Acute respiratory failure with hypoxia: supportive measures with noninvasive oxygen strategy unless tiring or desaturation. Self-proning encouraged. Pt prefers CPAP over Airvo. Lasix x 1.   2. COVID-19 pneumonia:  Decadron, Convalescent plasma, remdesivir as outlined above. 3. Nutrition: last po meal was 01/08/2020, taking hydration, but minimal solids. Place PICC for better nutrition. 4. PPx:  DVT ppx ongoing. H2 blocker with decadron. 5. Diarrhea- better  6. DM2: lantus + SSI    Full Code    Called sister Celia Moore ([phone]) with update. The patient is critically ill with respiratory failure and requires high complexity decision making for assessment and support including frequent evaluation and titration of therapies, application of advanced monitoring technologies & timing & modality of advanced respiratory support.   Cumulative time devoted to patient care services by me for day of service -31 min     Milo Payne MD

## 2021-01-14 ENCOUNTER — APPOINTMENT (OUTPATIENT)
Dept: GENERAL RADIOLOGY | Age: 61
DRG: 004 | End: 2021-01-14
Attending: INTERNAL MEDICINE
Payer: COMMERCIAL

## 2021-01-14 LAB
ANION GAP SERPL CALC-SCNC: 5 MMOL/L (ref 7–16)
APTT PPP: 32.1 SEC (ref 24.1–35.1)
BASOPHILS # BLD: 0 K/UL (ref 0–0.2)
BASOPHILS NFR BLD: 0 % (ref 0–2)
BUN SERPL-MCNC: 32 MG/DL (ref 8–23)
CALCIUM SERPL-MCNC: 8.8 MG/DL (ref 8.3–10.4)
CHLORIDE SERPL-SCNC: 108 MMOL/L (ref 98–107)
CO2 SERPL-SCNC: 29 MMOL/L (ref 21–32)
CREAT SERPL-MCNC: 0.99 MG/DL (ref 0.8–1.5)
DIFFERENTIAL METHOD BLD: ABNORMAL
EOSINOPHIL # BLD: 0 K/UL (ref 0–0.8)
EOSINOPHIL NFR BLD: 0 % (ref 0.5–7.8)
ERYTHROCYTE [DISTWIDTH] IN BLOOD BY AUTOMATED COUNT: 12.5 % (ref 11.9–14.6)
FIBRINOGEN PPP-MCNC: 552 MG/DL (ref 190–501)
GLUCOSE BLD STRIP.AUTO-MCNC: 185 MG/DL (ref 65–100)
GLUCOSE BLD STRIP.AUTO-MCNC: 279 MG/DL (ref 65–100)
GLUCOSE BLD STRIP.AUTO-MCNC: 414 MG/DL (ref 65–100)
GLUCOSE BLD STRIP.AUTO-MCNC: 477 MG/DL (ref 65–100)
GLUCOSE SERPL-MCNC: 194 MG/DL (ref 65–100)
HCT VFR BLD AUTO: 42.2 % (ref 41.1–50.3)
HGB BLD-MCNC: 14.4 G/DL (ref 13.6–17.2)
IMM GRANULOCYTES # BLD AUTO: 0.1 K/UL (ref 0–0.5)
IMM GRANULOCYTES NFR BLD AUTO: 1 % (ref 0–5)
INR PPP: 1.2
LYMPHOCYTES # BLD: 0.5 K/UL (ref 0.5–4.6)
LYMPHOCYTES NFR BLD: 4 % (ref 13–44)
MAGNESIUM SERPL-MCNC: 2.4 MG/DL (ref 1.8–2.4)
MCH RBC QN AUTO: 29.8 PG (ref 26.1–32.9)
MCHC RBC AUTO-ENTMCNC: 34.1 G/DL (ref 31.4–35)
MCV RBC AUTO: 87.4 FL (ref 79.6–97.8)
MONOCYTES # BLD: 0.3 K/UL (ref 0.1–1.3)
MONOCYTES NFR BLD: 2 % (ref 4–12)
NEUTS SEG # BLD: 11 K/UL (ref 1.7–8.2)
NEUTS SEG NFR BLD: 92 % (ref 43–78)
NRBC # BLD: 0 K/UL (ref 0–0.2)
PHOSPHATE SERPL-MCNC: 2.8 MG/DL (ref 2.3–3.7)
PLATELET # BLD AUTO: 200 K/UL (ref 150–450)
PMV BLD AUTO: 10.7 FL (ref 9.4–12.3)
POTASSIUM SERPL-SCNC: 3.6 MMOL/L (ref 3.5–5.1)
PROTHROMBIN TIME: 15.7 SEC (ref 12.5–14.7)
RBC # BLD AUTO: 4.83 M/UL (ref 4.23–5.6)
SODIUM SERPL-SCNC: 142 MMOL/L (ref 136–145)
TRIGL SERPL-MCNC: 49 MG/DL (ref 35–150)
WBC # BLD AUTO: 11.9 K/UL (ref 4.3–11.1)

## 2021-01-14 PROCEDURE — 74011250636 HC RX REV CODE- 250/636: Performed by: INTERNAL MEDICINE

## 2021-01-14 PROCEDURE — 74011000250 HC RX REV CODE- 250: Performed by: INTERNAL MEDICINE

## 2021-01-14 PROCEDURE — 3E0336Z INTRODUCTION OF NUTRITIONAL SUBSTANCE INTO PERIPHERAL VEIN, PERCUTANEOUS APPROACH: ICD-10-PCS | Performed by: INTERNAL MEDICINE

## 2021-01-14 PROCEDURE — 84478 ASSAY OF TRIGLYCERIDES: CPT

## 2021-01-14 PROCEDURE — 80048 BASIC METABOLIC PNL TOTAL CA: CPT

## 2021-01-14 PROCEDURE — 85025 COMPLETE CBC W/AUTO DIFF WBC: CPT

## 2021-01-14 PROCEDURE — 94660 CPAP INITIATION&MGMT: CPT

## 2021-01-14 PROCEDURE — 74011000258 HC RX REV CODE- 258: Performed by: INTERNAL MEDICINE

## 2021-01-14 PROCEDURE — 74011636637 HC RX REV CODE- 636/637: Performed by: INTERNAL MEDICINE

## 2021-01-14 PROCEDURE — 83735 ASSAY OF MAGNESIUM: CPT

## 2021-01-14 PROCEDURE — 99232 SBSQ HOSP IP/OBS MODERATE 35: CPT | Performed by: INTERNAL MEDICINE

## 2021-01-14 PROCEDURE — 85610 PROTHROMBIN TIME: CPT

## 2021-01-14 PROCEDURE — 84100 ASSAY OF PHOSPHORUS: CPT

## 2021-01-14 PROCEDURE — 65610000006 HC RM INTENSIVE CARE

## 2021-01-14 PROCEDURE — 85384 FIBRINOGEN ACTIVITY: CPT

## 2021-01-14 PROCEDURE — 82962 GLUCOSE BLOOD TEST: CPT

## 2021-01-14 PROCEDURE — 36573 INSJ PICC RS&I 5 YR+: CPT | Performed by: INTERNAL MEDICINE

## 2021-01-14 PROCEDURE — 74011250636 HC RX REV CODE- 250/636: Performed by: HOSPITALIST

## 2021-01-14 PROCEDURE — 74011250637 HC RX REV CODE- 250/637: Performed by: HOSPITALIST

## 2021-01-14 PROCEDURE — 71045 X-RAY EXAM CHEST 1 VIEW: CPT

## 2021-01-14 PROCEDURE — 74011636637 HC RX REV CODE- 636/637: Performed by: HOSPITALIST

## 2021-01-14 PROCEDURE — 85730 THROMBOPLASTIN TIME PARTIAL: CPT

## 2021-01-14 RX ORDER — INSULIN LISPRO 100 [IU]/ML
5 INJECTION, SOLUTION INTRAVENOUS; SUBCUTANEOUS ONCE
Status: COMPLETED | OUTPATIENT
Start: 2021-01-14 | End: 2021-01-14

## 2021-01-14 RX ORDER — INSULIN LISPRO 100 [IU]/ML
10 INJECTION, SOLUTION INTRAVENOUS; SUBCUTANEOUS ONCE
Status: COMPLETED | OUTPATIENT
Start: 2021-01-14 | End: 2021-01-14

## 2021-01-14 RX ORDER — INSULIN GLARGINE 100 [IU]/ML
25 INJECTION, SOLUTION SUBCUTANEOUS
Status: DISCONTINUED | OUTPATIENT
Start: 2021-01-14 | End: 2021-01-17

## 2021-01-14 RX ORDER — INSULIN LISPRO 100 [IU]/ML
INJECTION, SOLUTION INTRAVENOUS; SUBCUTANEOUS EVERY 6 HOURS
Status: DISCONTINUED | OUTPATIENT
Start: 2021-01-14 | End: 2021-01-15

## 2021-01-14 RX ADMIN — ENOXAPARIN SODIUM 30 MG: 30 INJECTION SUBCUTANEOUS at 01:32

## 2021-01-14 RX ADMIN — INSULIN GLARGINE 25 UNITS: 100 INJECTION, SOLUTION SUBCUTANEOUS at 22:00

## 2021-01-14 RX ADMIN — Medication 20 ML: at 06:00

## 2021-01-14 RX ADMIN — MAGNESIUM GLUCONATE 500 MG ORAL TABLET 400 MG: 500 TABLET ORAL at 08:27

## 2021-01-14 RX ADMIN — Medication 1000 MG: at 17:35

## 2021-01-14 RX ADMIN — INSULIN LISPRO 10 UNITS: 100 INJECTION, SOLUTION INTRAVENOUS; SUBCUTANEOUS at 17:33

## 2021-01-14 RX ADMIN — Medication 20 ML: at 22:00

## 2021-01-14 RX ADMIN — METOPROLOL TARTRATE 50 MG: 50 TABLET, FILM COATED ORAL at 20:44

## 2021-01-14 RX ADMIN — I.V. FAT EMULSION 250 ML: 20 EMULSION INTRAVENOUS at 17:51

## 2021-01-14 RX ADMIN — INSULIN LISPRO 2 UNITS: 100 INJECTION, SOLUTION INTRAVENOUS; SUBCUTANEOUS at 08:38

## 2021-01-14 RX ADMIN — ASPIRIN 81 MG 81 MG: 81 TABLET ORAL at 08:27

## 2021-01-14 RX ADMIN — Medication 20 ML: at 13:54

## 2021-01-14 RX ADMIN — INSULIN LISPRO 10 UNITS: 100 INJECTION, SOLUTION INTRAVENOUS; SUBCUTANEOUS at 20:45

## 2021-01-14 RX ADMIN — Medication 1000 MG: at 08:27

## 2021-01-14 RX ADMIN — CLOPIDOGREL BISULFATE 75 MG: 75 TABLET ORAL at 08:27

## 2021-01-14 RX ADMIN — ENOXAPARIN SODIUM 30 MG: 30 INJECTION SUBCUTANEOUS at 13:53

## 2021-01-14 RX ADMIN — ATORVASTATIN CALCIUM 80 MG: 80 TABLET, FILM COATED ORAL at 08:27

## 2021-01-14 RX ADMIN — DEXAMETHASONE 6 MG: 4 TABLET ORAL at 08:27

## 2021-01-14 RX ADMIN — ASCORBIC ACID, VITAMIN A PALMITATE, CHOLECALCIFEROL, THIAMINE HYDROCHLORIDE, RIBOFLAVIN-5 PHOSPHATE SODIUM, PYRIDOXINE HYDROCHLORIDE, NIACINAMIDE, DEXPANTHENOL, ALPHA-TOCOPHEROL ACETATE, VITAMIN K1, FOLIC ACID, BIOTIN, CYANOCOBALAMIN: 200; 3300; 200; 6; 3.6; 6; 40; 15; 10; 150; 600; 60; 5 INJECTION, SOLUTION INTRAVENOUS at 17:50

## 2021-01-14 RX ADMIN — INSULIN LISPRO 6 UNITS: 100 INJECTION, SOLUTION INTRAVENOUS; SUBCUTANEOUS at 12:37

## 2021-01-14 RX ADMIN — METOPROLOL TARTRATE 50 MG: 50 TABLET, FILM COATED ORAL at 08:27

## 2021-01-14 RX ADMIN — INSULIN LISPRO 5 UNITS: 100 INJECTION, SOLUTION INTRAVENOUS; SUBCUTANEOUS at 17:33

## 2021-01-14 RX ADMIN — Medication 5 ML: at 13:54

## 2021-01-14 RX ADMIN — Medication 220 MG: at 08:27

## 2021-01-14 NOTE — PROGRESS NOTES
's visit via telephone call. I conveyed care and concern for patient and offered spiritual interventions including prayer as requested. Mr. Gordon Lomeli misses being at home. Chaplains remain available for support.      Jeannette Kramer MDIV, [address] [facility]

## 2021-01-14 NOTE — CONSULTS
Comprehensive Nutrition Assessment    Type and Reason for Visit: Reassess, Consult  TPN Management (Pulmonary)    Nutrition Recommendations/Plan:   Parenteral Nutrition:  Start TPN  10%DEX/ 4.25%AA at 44 ml/hr with 250 ml 20% Lipids daily  To provide: 1010 kcal/d (58% of needs), 43 grams of protein/d (72% of needs), 100 grams of CHO/d and 1043 ml of total volume/d (60% of needs). Lytes/L:   Sodium 40 meq (40 meq NaCl), Potassium 60 meq (25 meq Acetate, 35 meq KPO4)-- will provide ~1:1 formula Acetate:Chloride  Other additives: MTE, MVI  Vitamin and Mineral Supplement Therapy:  Activate electrolyte management replacement protocol. Labs:    BMP daily, Phos and Mg MWF, Triglyceride tomorrow am.  POC Glucoses/SSI if Glucose > 180 mg/dl on AM BMP. · Oral Nutrition:  · Continue current diet  · D/C Glucerna     Malnutrition Assessment:  Malnutrition Status: At risk for malnutrition (specify)  Context: Acute illness  Findings of clinical characteristics of malnutrition:   Energy Intake:  Mild decrease in energy intake (specify)(Decrease reported for four days)  Weight Loss:  (89% UBE per EMR, pt states 40# loss over 4 days (15% based on stated # and CBW))     Body Fat Loss:  Unable to assess,     Muscle Mass Loss:  Unable to assess,    Fluid Accumulation:  Unable to assess,     Strength:  Not performed   Nutrition Assessment:   Nutrition History: Pt reports inability to tolerate any food or liquids for 4 days PTA. Indicates vomiting with all po attempts during this time. Nutrition Background: PMH remarkable for CAD, GERD, HTN, MO. Admitted with Coivd 19, new onset DM with hyperglycemic hyperosmolar state, SWATI on CKD, lactic acidosis. Daily Update:  Pt continues need for CPAP during the day per flowsheets. Pt with minimal po intake this AM. Noted 100% Ensure consumed 1/13. PICC placed yesterday for TPN per MD note. Pt is currently 8 days poor po intake.  DM management following glucose trends. Abdominal Status (last documented): Soft, Intact abdomen with Hyperactive  bowel sounds. Last BM 01/14/21. Pertinent Medications: Vitamin C, Decadron (End date: 1/17), Lantus (18 units), SSI (28 units 1/13, 8 units thus far today, magnesium oxide, Mrialax (daily PRN), Zinc  Pertinent Labs: Sodium 142, Potassium 3.6 (trending down since 1/12), CO2 29, Glucose 194, POC 1/13 (256-322), Phos 2.8, Mag 2.4, Triglyceride 49    Nutrition Related Findings:   double lumen PICC (1/13)      Current Nutrition Therapies:  DIET DIABETIC CONSISTENT CARB Regular; 2 GM NA (House Low NA)  DIET NUTRITIONAL SUPPLEMENTS All Meals; Glucerna Shake ( )    Current Intake:   Average Meal Intake: 0% Average Supplement Intake: Unable to assess(1 Ensure documented 1/14)      Anthropometric Measures:  Height: 5' 8\" (172.7 cm)  Current Body Wt: 91 kg (200 lb 9.9 oz)(1/7), Weight source: Bed scale  BMI: 30.5, Obese class 1 (BMI 30.0-34. 9)  Admission Body Weight: 235 lb 0.2 oz(estimated)  Ideal Body Wt: 154 lbs (70 kg), 130.3 %  Usual Body Wt: 101.6 kg (224 lb)(EMR 6/3/2020 FP office; pt stated # unable to verify ), Percent weight change: -10.4          Estimated Daily Nutrient Needs:  Energy (kcal/day): 5608-6605 (Kcal/kg(25-30), Weight Used: Ideal(70 kg))  Protein (g/day): 60-75 (0.8-1 g/kg) Weight Used: (Ideal)  Fluid (ml/day):   (1 ml/kcal)    Nutrition Diagnosis:   · Inadequate oral intake related to impaired respiratory function as evidenced by (need for CPAP during the day)    · Food & nutrition-related knowledge deficit related to endocrine dysfunction as evidenced by (new DM dx, A1C 12, minimal exposure to DM information.)    Nutrition Interventions:   Food and/or Nutrient Delivery: Continue current diet, Discontinue oral nutrition supplement, Start parenteral nutrition  Nutrition Education/Counseling: Education initiated  Coordination of Nutrition Care: Continue to monitor while inpatient  Plan of Care discussed with Rhett Fong, Dr. Vona Pallas (via [address] [place] [address]), DM management (via [employer])    Goals:   Previous Goal Met: No progress toward goal(s)  Active Goal: Meet >75% estimated nutrition needs within 3 days    Nutrition Monitoring and Evaluation:   Food/Nutrient Intake Outcomes: Food and nutrient intake, Parenteral nutrition intake/tolerance  Physical Signs/Symptoms Outcomes: Biochemical data, GI status    Discharge Planning:     Too soon to determine    Electronically signed by Aleyda Aldana MS, RDN, LD 1/14/2021 at 11:46 AM  Contact: [phone]    Disaster Mode active

## 2021-01-14 NOTE — PROGRESS NOTES
Critical Care Daily Progress Note: 1/14/2021  Admission Date: 1/6/2021     The patient's chart is reviewed and the patient is discussed with the staff. Admission Date: 1/6/2021     Length of Stay: 9 days     Background: 61 y.o. y/o male with acute hypoxemic respiratory failure secondary to COVID-19. Date of COVID-19 symptom onset: + COVID 01/05/21     Notable PMH: obesity, HTN, CAD, dyslipidemia, SWATI, DM, GERD     Drug Allergies: Allergies   Allergen Reactions    Latex Swelling       Had a purcell catheter with swelling of urethra. Only known latex issue in past. Wife remembers this now    Hydrocodone-Acetaminophen Itching       Wife remembers this allergy    Tessalon Perles [Benzonatate] Unknown (comments)      24 Hour events: Remains on CPAP 10.  FIO2 down to 80%    Subjective:   Not eating, currently on airvo 80% with O2 sat 92,  Angry and wants to go home-threatening to leave    Current Facility-Administered Medications   Medication Dose Route Frequency    sodium chloride (NS) flush 20 mL  20 mL InterCATHeter Q8H    sodium chloride (NS) flush 20 mL  20 mL InterCATHeter PRN    insulin glargine (LANTUS) injection 18 Units  18 Units SubCUTAneous QHS    loperamide (IMODIUM) capsule 2 mg  2 mg Oral Q4H PRN    enoxaparin (LOVENOX) injection 30 mg  30 mg SubCUTAneous Q12H    sodium chloride (NS) flush 5-40 mL  5-40 mL IntraVENous Q8H    sodium chloride (NS) flush 5-40 mL  5-40 mL IntraVENous PRN    polyethylene glycol (MIRALAX) packet 17 g  17 g Oral DAILY PRN    promethazine (PHENERGAN) tablet 12.5 mg  12.5 mg Oral Q6H PRN    Or    ondansetron (ZOFRAN) injection 4 mg  4 mg IntraVENous Q6H PRN    guaiFENesin-dextromethorphan (ROBITUSSIN DM) 100-10 mg/5 mL syrup 10 mL  10 mL Oral Q4H PRN    dexAMETHasone (DECADRON) tablet 6 mg  6 mg Oral DAILY    insulin lispro (HUMALOG) injection   SubCUTAneous AC&HS    aspirin chewable tablet 81 mg  81 mg Oral DAILY    atorvastatin (LIPITOR) tablet 80 mg  80 mg Oral DAILY    clopidogreL (PLAVIX) tablet 75 mg  75 mg Oral DAILY    magnesium oxide (MAG-OX) tablet 400 mg  400 mg Oral DAILY    metoprolol tartrate (LOPRESSOR) tablet 50 mg  50 mg Oral BID    dextrose 40% (GLUTOSE) oral gel 1 Tube  15 g Oral PRN    glucagon (GLUCAGEN) injection 1 mg  1 mg IntraMUSCular PRN    dextrose (D50W) injection syrg 12.5-25 g  25-50 mL IntraVENous PRN    0.9% sodium chloride infusion 250 mL  250 mL IntraVENous PRN    albuterol (PROVENTIL HFA, VENTOLIN HFA, PROAIR HFA) inhaler 2 Puff  2 Puff Inhalation Q4H PRN    ascorbic acid (vitamin C) (VITAMIN C) tablet 1,000 mg  1,000 mg Oral BID    zinc sulfate tablet 220 mg  220 mg Oral DAILY    influenza vaccine 2020-21 (6 mos+)(PF) (FLUARIX/FLULAVAL/FLUZONE QUAD) injection 0.5 mL  0.5 mL IntraMUSCular PRIOR TO DISCHARGE    hydrALAZINE (APRESOLINE) 20 mg/mL injection 20 mg  20 mg IntraVENous Q6H PRN    acetaminophen (TYLENOL) tablet 650 mg  650 mg Oral Q6H PRN       Review of Systems    Constitutional:  negative for fever, chills, sweats  Cardiovascular:  negative for chest pain, palpitations, syncope, edema  Gastrointestinal:  negative for dysphagia, reflux, vomiting, diarrhea, abdominal pain, or melena  Neurologic:  negative for focal weakness, numbness, headache        Objective:     Vitals:    01/14/21 0400 01/14/21 0500 01/14/21 0600 01/14/21 0835   BP: 136/62 (!) 171/91 (!) 169/95    Pulse: (!) 56 68 73    Resp: (!) 36 22 23    Temp: 98.1 °F (36.7 °C)      SpO2: 96% 98% 95% 96%   Weight:       Height:             Intake/Output Summary (Last 24 hours) at 1/14/2021 0919  Last data filed at 1/14/2021 0500  Gross per 24 hour   Intake 680 ml   Output 2340 ml   Net -1660 ml         Physical Exam:          Constitutional:  intubated and mechanically ventilated.   EENMT:  Sclera clear, pupils equal, oral mucosa moist  Respiratory: bilateral crackles  Cardiovascular:  RRR with no M,G,R;  Gastrointestinal:  soft with no tenderness; positive bowel sounds present  Musculoskeletal:  warm with no cyanosis, no lower extremity edema  Skin:  no jaundice or ecchymosis  Neurologic: no gross neuro deficits     Psychiatric:  alert and oriented x 3     LINES:    ETT, pic    DRIPS:    none    CXR:        Ventilator Settings  Mode FIO2 Rate Tidal Volume Pressure PEEP      80 %                 Peak airway pressure:     Minute ventilation: 20 l/min     ABG: No results for input(s): PH, PCO2, PO2, HCO3, PHI, PCO2I, PO2I, HCO3I in the last 72 hours. LAB  Recent Labs     01/14/21  0836 01/13/21  2038 01/13/21  1844 01/13/21  1227 01/13/21  0830   GLUCPOC 185* 300* 322* 264* 256*     Recent Labs     01/14/21  0248 01/13/21  0715 01/13/21  0414 01/12/21  0957   WBC 11.9*  --  13.5* 13.6*   HGB 14.4  --  16.4 17.0   HCT 42.2  --  48.7 52.1*     --  220 168   INR 1.2 1.2 1.1  --      Recent Labs     01/14/21  0248 01/13/21  0715 01/12/21  0957    143 141   K 3.6 3.9 4.5   * 109* 106   CO2 29 25 19*   * 228* 176*   BUN 32* 32* 28*   CREA 0.99 0.93 1.00   CA 8.8 8.5 8.5   ALB  --   --  1.7*     No results for input(s): LCAD, LAC in the last 72 hours.       Patient Active Problem List   Diagnosis Code    Obesity E66.9    Hypertension I10    Bradycardia R00.1    PVC (premature ventricular contraction) I49.3    CAD (coronary artery disease) I25.10    Dyslipidemia, goal LDL below 70 E78.5    SWATI (acute kidney injury) ([facility] N17.9    Acute hypoxemic respiratory failure (HCC) J96.01    Type 2 diabetes mellitus with hyperosmolarity without nonketotic hyperglycemic-hyperosmolar coma ([facility] E11.00    Pneumonia due to COVID-19 virus U07.1, J12.82    Hypernatremia E87.0         Assessment:  (Medical Decision Making)     Hospital Problems  Date Reviewed: 6/3/2020          Codes Class Noted POA    Hypernatremia ICD-10-CM: E87.0  ICD-9-CM: 276.0  1/8/2021 Unknown    141    [facility] ([facility]) [facility] ICD-10-CM: N17.9  ICD-9-CM: 584.9  1/7/2021 Unknown    Cr 0.98    Acute hypoxemic respiratory failure (HCC) ICD-10-CM: J96.01  ICD-9-CM: 518.81  1/7/2021 Unknown    sats marginal on 80% airvo    Type 2 diabetes mellitus with hyperosmolarity without nonketotic hyperglycemic-hyperosmolar coma ([facility] ICD-10-CM: E11.00  ICD-9-CM: 250.20  1/7/2021 Unknown        * (Principal) Pneumonia due to COVID-19 virus ICD-10-CM: U07.1, J12.82  ICD-9-CM: 480.8  1/7/2021 Unknown        CAD (coronary artery disease) ICD-10-CM: I25.10  ICD-9-CM: 414.00  10/28/2016 Yes    Overview Signed 10/28/2016  8:05 AM by SHELDON Quezada     10-27-06 [facility] 90% LAD s/p 2.25 x 20 Synergy drug-eluting stent  (CS)             Obesity ICD-10-CM: E66.9  ICD-9-CM: 278.00  10/6/2015 Yes              Plan:  (Medical Decision Making)   1    Wean O2 as tolerated   2    Will start tpn  --  I called Rika his sister  More than 50% of the time documented was spent in face-to-face contact with the patient and in the care of the patient on the floor/unit where the patient is located.     Abby Hernandez MD

## 2021-01-14 NOTE — DIABETES MGMT
Patient's blood glucose ranged 228-322 yesterday with patient receiving Lantus 18 units, Humalog 28 units, and dexamethasone 6 mg. Blood glucose 185 this morning. Provider could consider small prandial dose of Humalog if tighter control is desired. Update: Nutrition states patient to start on TPN today. Do not recommend titration of insulin today. Will follow along.

## 2021-01-14 NOTE — PROGRESS NOTES
LOS 8d  Pt remains in the BMT/ICU  02 demand: CPap 100%  PT and OT will need to eval when patient is medically stable to participate   Discharge plan is [facility] vs HH  Will continue to follow for discharge planning needs  Please consult  if any new issues arise

## 2021-01-15 LAB
ANION GAP SERPL CALC-SCNC: 7 MMOL/L (ref 7–16)
APTT PPP: 26.7 SEC (ref 24.1–35.1)
ARTERIAL PATENCY WRIST A: YES
ARTERIAL PATENCY WRIST A: YES
BASE EXCESS BLD CALC-SCNC: 0 MMOL/L
BASE EXCESS BLD CALC-SCNC: 3 MMOL/L
BASOPHILS # BLD: 0.1 K/UL (ref 0–0.2)
BASOPHILS NFR BLD: 0 % (ref 0–2)
BDY SITE: ABNORMAL
BDY SITE: ABNORMAL
BUN SERPL-MCNC: 27 MG/DL (ref 8–23)
CALCIUM SERPL-MCNC: 8.4 MG/DL (ref 8.3–10.4)
CHLORIDE SERPL-SCNC: 106 MMOL/L (ref 98–107)
CO2 BLD-SCNC: 23 MMOL/L
CO2 BLD-SCNC: 26 MMOL/L
CO2 SERPL-SCNC: 26 MMOL/L (ref 21–32)
COLLECT TIME,HTIME: 1525
COLLECT TIME,HTIME: 430
CREAT SERPL-MCNC: 1.15 MG/DL (ref 0.8–1.5)
DIFFERENTIAL METHOD BLD: ABNORMAL
EOSINOPHIL # BLD: 0.2 K/UL (ref 0–0.8)
EOSINOPHIL NFR BLD: 1 % (ref 0.5–7.8)
ERYTHROCYTE [DISTWIDTH] IN BLOOD BY AUTOMATED COUNT: 12.7 % (ref 11.9–14.6)
EXHALED MINUTE VOLUME, VE: 18.3 L/MIN
EXHALED MINUTE VOLUME, VE: 22.2 L/MIN
FIBRINOGEN PPP-MCNC: 586 MG/DL (ref 190–501)
GAS FLOW.O2 O2 DELIVERY SYS: ABNORMAL L/MIN
GAS FLOW.O2 O2 DELIVERY SYS: ABNORMAL L/MIN
GLUCOSE BLD STRIP.AUTO-MCNC: 144 MG/DL (ref 65–100)
GLUCOSE BLD STRIP.AUTO-MCNC: 147 MG/DL (ref 65–100)
GLUCOSE BLD STRIP.AUTO-MCNC: 297 MG/DL (ref 65–100)
GLUCOSE BLD STRIP.AUTO-MCNC: 305 MG/DL (ref 65–100)
GLUCOSE BLD STRIP.AUTO-MCNC: 319 MG/DL (ref 65–100)
GLUCOSE BLD STRIP.AUTO-MCNC: 414 MG/DL (ref 65–100)
GLUCOSE SERPL-MCNC: 153 MG/DL (ref 65–100)
HCO3 BLD-SCNC: 22.2 MMOL/L (ref 22–26)
HCO3 BLD-SCNC: 25.5 MMOL/L (ref 22–26)
HCT VFR BLD AUTO: 48.3 % (ref 41.1–50.3)
HGB BLD-MCNC: 16.4 G/DL (ref 13.6–17.2)
IMM GRANULOCYTES # BLD AUTO: 0.8 K/UL (ref 0–0.5)
IMM GRANULOCYTES NFR BLD AUTO: 4 % (ref 0–5)
INR PPP: 1.1
LYMPHOCYTES # BLD: 1.4 K/UL (ref 0.5–4.6)
LYMPHOCYTES NFR BLD: 7 % (ref 13–44)
MAGNESIUM SERPL-MCNC: 2.2 MG/DL (ref 1.8–2.4)
MCH RBC QN AUTO: 29.3 PG (ref 26.1–32.9)
MCHC RBC AUTO-ENTMCNC: 34 G/DL (ref 31.4–35)
MCV RBC AUTO: 86.4 FL (ref 79.6–97.8)
MONOCYTES # BLD: 0.4 K/UL (ref 0.1–1.3)
MONOCYTES NFR BLD: 2 % (ref 4–12)
NEUTS SEG # BLD: 18.3 K/UL (ref 1.7–8.2)
NEUTS SEG NFR BLD: 87 % (ref 43–78)
NRBC # BLD: 0 K/UL (ref 0–0.2)
O2/TOTAL GAS SETTING VFR VENT: 100 %
O2/TOTAL GAS SETTING VFR VENT: 95 %
PCO2 BLD: 30.4 MMHG (ref 35–45)
PCO2 BLD: 30.7 MMHG (ref 35–45)
PEEP RESPIRATORY: 10 CMH2O
PEEP RESPIRATORY: 10 CMH2O
PH BLD: 7.47 [PH] (ref 7.35–7.45)
PH BLD: 7.53 [PH] (ref 7.35–7.45)
PHOSPHATE SERPL-MCNC: 3 MG/DL (ref 2.3–3.7)
PLATELET # BLD AUTO: 253 K/UL (ref 150–450)
PMV BLD AUTO: 10.9 FL (ref 9.4–12.3)
PO2 BLD: 66 MMHG (ref 75–100)
PO2 BLD: 90 MMHG (ref 75–100)
POTASSIUM SERPL-SCNC: 4.2 MMOL/L (ref 3.5–5.1)
PROTHROMBIN TIME: 14.9 SEC (ref 12.5–14.7)
RBC # BLD AUTO: 5.59 M/UL (ref 4.23–5.6)
SAO2 % BLD: 94 % (ref 95–98)
SAO2 % BLD: 98 % (ref 95–98)
SERVICE CMNT-IMP: ABNORMAL
SODIUM SERPL-SCNC: 139 MMOL/L (ref 136–145)
SPECIMEN TYPE: ABNORMAL
SPECIMEN TYPE: ABNORMAL
TOTAL RESP. RATE, ITRR: 25
TOTAL RESP. RATE, ITRR: 31
TRIGL SERPL-MCNC: 47 MG/DL (ref 35–150)
WBC # BLD AUTO: 21.1 K/UL (ref 4.3–11.1)

## 2021-01-15 PROCEDURE — 74011000258 HC RX REV CODE- 258: Performed by: INTERNAL MEDICINE

## 2021-01-15 PROCEDURE — 74011000250 HC RX REV CODE- 250: Performed by: INTERNAL MEDICINE

## 2021-01-15 PROCEDURE — 85025 COMPLETE CBC W/AUTO DIFF WBC: CPT

## 2021-01-15 PROCEDURE — 85730 THROMBOPLASTIN TIME PARTIAL: CPT

## 2021-01-15 PROCEDURE — 74011250637 HC RX REV CODE- 250/637: Performed by: HOSPITALIST

## 2021-01-15 PROCEDURE — 84478 ASSAY OF TRIGLYCERIDES: CPT

## 2021-01-15 PROCEDURE — 99233 SBSQ HOSP IP/OBS HIGH 50: CPT | Performed by: INTERNAL MEDICINE

## 2021-01-15 PROCEDURE — 74011250636 HC RX REV CODE- 250/636: Performed by: INTERNAL MEDICINE

## 2021-01-15 PROCEDURE — 74011636637 HC RX REV CODE- 636/637: Performed by: INTERNAL MEDICINE

## 2021-01-15 PROCEDURE — 82803 BLOOD GASES ANY COMBINATION: CPT

## 2021-01-15 PROCEDURE — 85610 PROTHROMBIN TIME: CPT

## 2021-01-15 PROCEDURE — 36600 WITHDRAWAL OF ARTERIAL BLOOD: CPT

## 2021-01-15 PROCEDURE — 83735 ASSAY OF MAGNESIUM: CPT

## 2021-01-15 PROCEDURE — 74011250637 HC RX REV CODE- 250/637: Performed by: INTERNAL MEDICINE

## 2021-01-15 PROCEDURE — 80048 BASIC METABOLIC PNL TOTAL CA: CPT

## 2021-01-15 PROCEDURE — 65610000006 HC RM INTENSIVE CARE

## 2021-01-15 PROCEDURE — 74011250636 HC RX REV CODE- 250/636: Performed by: HOSPITALIST

## 2021-01-15 PROCEDURE — 84100 ASSAY OF PHOSPHORUS: CPT

## 2021-01-15 PROCEDURE — 94660 CPAP INITIATION&MGMT: CPT

## 2021-01-15 PROCEDURE — 82962 GLUCOSE BLOOD TEST: CPT

## 2021-01-15 PROCEDURE — 85384 FIBRINOGEN ACTIVITY: CPT

## 2021-01-15 RX ORDER — MORPHINE SULFATE 2 MG/ML
2 INJECTION, SOLUTION INTRAMUSCULAR; INTRAVENOUS
Status: DISCONTINUED | OUTPATIENT
Start: 2021-01-15 | End: 2021-02-07 | Stop reason: SDUPTHER

## 2021-01-15 RX ORDER — INSULIN LISPRO 100 [IU]/ML
INJECTION, SOLUTION INTRAVENOUS; SUBCUTANEOUS EVERY 4 HOURS
Status: DISCONTINUED | OUTPATIENT
Start: 2021-01-15 | End: 2021-01-15

## 2021-01-15 RX ORDER — LORAZEPAM 2 MG/ML
1 INJECTION INTRAMUSCULAR
Status: DISCONTINUED | OUTPATIENT
Start: 2021-01-15 | End: 2021-02-10 | Stop reason: SDUPTHER

## 2021-01-15 RX ORDER — INSULIN LISPRO 100 [IU]/ML
INJECTION, SOLUTION INTRAVENOUS; SUBCUTANEOUS EVERY 4 HOURS
Status: DISCONTINUED | OUTPATIENT
Start: 2021-01-15 | End: 2021-01-15 | Stop reason: CLARIF

## 2021-01-15 RX ORDER — INSULIN LISPRO 100 [IU]/ML
INJECTION, SOLUTION INTRAVENOUS; SUBCUTANEOUS EVERY 4 HOURS
Status: DISCONTINUED | OUTPATIENT
Start: 2021-01-15 | End: 2021-01-16

## 2021-01-15 RX ADMIN — SODIUM PHOSPHATE, MONOBASIC, MONOHYDRATE: 276; 142 INJECTION, SOLUTION INTRAVENOUS at 18:04

## 2021-01-15 RX ADMIN — ACETAMINOPHEN 650 MG: 325 TABLET, FILM COATED ORAL at 04:12

## 2021-01-15 RX ADMIN — LORAZEPAM 1 MG: 2 INJECTION INTRAMUSCULAR; INTRAVENOUS at 04:12

## 2021-01-15 RX ADMIN — METOPROLOL TARTRATE 50 MG: 50 TABLET, FILM COATED ORAL at 17:29

## 2021-01-15 RX ADMIN — Medication 20 ML: at 13:10

## 2021-01-15 RX ADMIN — DEXAMETHASONE 6 MG: 4 TABLET ORAL at 08:29

## 2021-01-15 RX ADMIN — ATORVASTATIN CALCIUM 80 MG: 80 TABLET, FILM COATED ORAL at 08:29

## 2021-01-15 RX ADMIN — Medication 20 ML: at 06:00

## 2021-01-15 RX ADMIN — Medication 1000 MG: at 08:29

## 2021-01-15 RX ADMIN — INSULIN LISPRO 8 UNITS: 100 INJECTION, SOLUTION INTRAVENOUS; SUBCUTANEOUS at 00:14

## 2021-01-15 RX ADMIN — Medication 20 ML: at 21:50

## 2021-01-15 RX ADMIN — Medication 22 ML: at 06:00

## 2021-01-15 RX ADMIN — Medication 220 MG: at 08:29

## 2021-01-15 RX ADMIN — Medication 1000 MG: at 17:29

## 2021-01-15 RX ADMIN — ENOXAPARIN SODIUM 30 MG: 30 INJECTION SUBCUTANEOUS at 12:13

## 2021-01-15 RX ADMIN — INSULIN LISPRO 8 UNITS: 100 INJECTION, SOLUTION INTRAVENOUS; SUBCUTANEOUS at 13:10

## 2021-01-15 RX ADMIN — ENOXAPARIN SODIUM 30 MG: 30 INJECTION SUBCUTANEOUS at 00:14

## 2021-01-15 RX ADMIN — INSULIN LISPRO 15 UNITS: 100 INJECTION, SOLUTION INTRAVENOUS; SUBCUTANEOUS at 18:08

## 2021-01-15 RX ADMIN — ASPIRIN 81 MG 81 MG: 81 TABLET ORAL at 08:29

## 2021-01-15 RX ADMIN — I.V. FAT EMULSION 250 ML: 20 EMULSION INTRAVENOUS at 18:03

## 2021-01-15 RX ADMIN — LOPERAMIDE HYDROCHLORIDE 2 MG: 2 CAPSULE ORAL at 12:13

## 2021-01-15 RX ADMIN — CLOPIDOGREL BISULFATE 75 MG: 75 TABLET ORAL at 08:29

## 2021-01-15 RX ADMIN — INSULIN GLARGINE 25 UNITS: 100 INJECTION, SOLUTION SUBCUTANEOUS at 21:50

## 2021-01-15 RX ADMIN — MAGNESIUM GLUCONATE 500 MG ORAL TABLET 400 MG: 500 TABLET ORAL at 08:29

## 2021-01-15 RX ADMIN — INSULIN LISPRO 9 UNITS: 100 INJECTION, SOLUTION INTRAVENOUS; SUBCUTANEOUS at 21:50

## 2021-01-15 RX ADMIN — METOPROLOL TARTRATE 50 MG: 50 TABLET, FILM COATED ORAL at 08:29

## 2021-01-15 RX ADMIN — Medication 10 ML: at 21:51

## 2021-01-15 NOTE — PROGRESS NOTES
BS> 400; MD notified. Order to modify sliding scale to insulin resistant. 15 units Lispro administered per sliding scale.

## 2021-01-15 NOTE — PROGRESS NOTES
Comprehensive Nutrition Assessment    Type and Reason for Visit: Reassess  This assessment was completed remotely from within the hospital.    Nutrition Recommendations/Plan:   Parenteral Nutrition:  Change TPN   5%DEX/ 4.25%AA at 86ml/hr with 250 ml 20% Lipids daily  To provide: 1180 kcal/d (67% of needs), 85 grams of protein/d (100% of needs), 100 grams of CHO/d and 2309 ml of total volume/d ( 100% of needs). Lytes/L:   Sodium 75 meq (65 meq NaCl, 10 meq NaPhos), Potassium 20 meq ( 20 meq KPO4)  Other additives: MTE, MVI  TPN to remain insulin free. Defer blood glucose management to DM management  Vitamin and Mineral Supplement Therapy:  Electrolyte management replacement protocol active. Labs:    BMP daily, Phos and Mg MWF. POC Glucoses/SSI if Glucose > 180 mg/dl on AM BMP.  Albumin tomorrow. Malnutrition Assessment:  Malnutrition Status: At risk for malnutrition (specify)  Context: Acute illness  Findings of clinical characteristics of malnutrition:   Energy Intake:  Mild decrease in energy intake (specify)(Decrease reported for four days)  Weight Loss:  (89% UBE per EMR, pt states 40# loss over 4 days (15% based on stated # and CBW))     Body Fat Loss:  Unable to assess,     Muscle Mass Loss:  Unable to assess,    Fluid Accumulation:  Unable to assess,     Strength:  Not performed   Nutrition Assessment:   Nutrition History: Pt reports inability to tolerate any food or liquids for 4 days PTA. Indicates vomiting with all po attempts during this time. Nutrition Background: PMH remarkable for CAD, GERD, HTN, MO. Admitted with Coivd 19, new onset DM with hyperglycemic hyperosmolar state, SWATI on CKD, lactic acidosis. Daily Update:  Pt unable to tolerate being off CPAP per MD note. RN reports pt had minimal intake and drank 1 Gatorade yesterday. Pt received lipids and TPN infusing currently per RN. TPN to continue today. Noted POC glucose of 477 @ 1719 and received 25 units of SSI. Lantus changed to 25 units at 2111. Glucose down to 147 at 0353. Per DM management note, Provider could consider transition from a normal SSI to very insulin resistant scale q4h for stricter glycemic control. Abdominal Status (last documented): Soft abdomen with Active  bowel sounds. Last BM 01/14/21. Pertinent Medications: vitamin C, Decadron (end date 1/17), Lantus 25 units, SSI (31 units 1/14, 8 units thus far today), magnesium oxide (400mg), zinc  Pertinent Labs: Sodium 139, Potassium 4.2, Glucose 153, POC 1/14 (185-477), Phos 30, Magnesium 2.2, Triglyceride 47    Nutrition Related Findings:   double lumen PICC (1/13)      Current Nutrition Therapies:  DIET DIABETIC CONSISTENT CARB Regular; 2 GM NA (House Low NA)  Current Parenteral Nutrition Orders:  · Type and Formula: 4.25AA/10DEX   · Lipids: 250ml  · Duration: Continuous  · Rate/Volume: 44ml/hr  · Current PN Order Provides: 1010 kcal/d (58% of needs), 43 grams of protein/d (72% of needs), 100 grams of CHO/d and 1043 ml of total volume/d (60% of needs)    Current Intake:   Average Meal Intake: 0% Average Supplement Intake: None ordered      Anthropometric Measures:  Height: 5' 8\" (172.7 cm)  Current Body Wt: 86.9 kg (191 lb 9.3 oz)(1/15), Weight source: Not specified  BMI: 29.1, Overweight (BMI 25.0-29. 9)  Admission Body Weight: 235 lb 0.2 oz(estimated)  Ideal Body Wt: 154 lbs (70 kg), 130.3 %  Usual Body Wt: 101.6 kg (224 lb)(EMR 6/3/2020 FP office; pt stated # unable to verify ), Percent weight change: -10.4          Estimated Daily Nutrient Needs:  Energy (kcal/day): 8185-2394 (Kcal/kg(25-30), Weight Used: Ideal(70 kg))  Protein (g/day): (1.2-1.5gm/kg) Weight Used: (Ideal(70kg))  Fluid (ml/day):   (1 ml/kcal)    Nutrition Diagnosis:   · Inadequate oral intake related to impaired respiratory function as evidenced by nutrition support-parenteral nutrition(need for CPAP)    · Food & nutrition-related knowledge deficit related to endocrine dysfunction as evidenced by (new DM dx, A1C 12, minimal exposure to DM information.)    Nutrition Interventions:   Food and/or Nutrient Delivery: Continue current diet, Modify parenteral nutrition  Nutrition Education/Counseling: Education initiated  Coordination of Nutrition Care: Continue to monitor while inpatient  Plan of Care discussed with Patricia RN, DM management notified via [employer]    Goals:   Previous Goal Met: Progressing toward goal(s)  Active Goal: Meet >75% estimated nutrition needs within 5 days    Nutrition Monitoring and Evaluation:   Behavioral-Environmental Outcomes: Knowledge or skill  Food/Nutrient Intake Outcomes: Food and nutrient intake, Parenteral nutrition intake/tolerance  Physical Signs/Symptoms Outcomes: Biochemical data, GI status    Discharge Planning:    Too soon to determine    Electronically signed by Michell Moreno MS, RDN, LD 1/15/2021 at 11:51 AM  Contact: [phone]    Disaster Mode active

## 2021-01-15 NOTE — PROGRESS NOTES
Critical Care Daily Progress Note: 1/15/2021  Admission Date: 1/6/2021     The patient's chart is reviewed and the patient is discussed with the staff. Admission Date: 1/6/2021     Length of Stay: 9 days     Background: 61 y.o. y/o male with acute hypoxemic respiratory failure secondary to COVID-19. Date of COVID-19 symptom onset: + COVID 01/05/21     Notable PMH: obesity, HTN, CAD, dyslipidemia, SWATI, DM, GERD     Drug Allergies: Allergies   Allergen Reactions    Latex Swelling       Had a purcell catheter with swelling of urethra. Only known latex issue in past. Wife remembers this now    Hydrocodone-Acetaminophen Itching       Wife remembers this allergy    Tessalon Perles [Benzonatate] Unknown (comments)      24 Hour events: Remains on CPAP 10. FIO2 at 100%. Subjective:     On TPN for nutrition. Not tolerating being off CPAP. BS high and started on Lantus. Sat 95-97% on 100% CPAP 10.      Current Facility-Administered Medications   Medication Dose Route Frequency    LORazepam (ATIVAN) injection 1 mg  1 mg IntraVENous Q4H PRN    morphine injection 2 mg  2 mg IntraVENous Q4H PRN    insulin lispro (HUMALOG) injection   SubCUTAneous Q6H    TPN ADULT - dextrose 10% amino acid 4.25%   IntraVENous QPM    fat emulsion 20% (LIPOSYN, INTRAlipid) infusion 250 mL  250 mL IntraVENous QPM    NUTRITIONAL SUPPORT ELECTROLYTE PRN ORDERS   Does Not Apply PRN    insulin glargine (LANTUS) injection 25 Units  25 Units SubCUTAneous QHS    sodium chloride (NS) flush 20 mL  20 mL InterCATHeter Q8H    sodium chloride (NS) flush 20 mL  20 mL InterCATHeter PRN    loperamide (IMODIUM) capsule 2 mg  2 mg Oral Q4H PRN    enoxaparin (LOVENOX) injection 30 mg  30 mg SubCUTAneous Q12H    sodium chloride (NS) flush 5-40 mL  5-40 mL IntraVENous Q8H    sodium chloride (NS) flush 5-40 mL  5-40 mL IntraVENous PRN    polyethylene glycol (MIRALAX) packet 17 g  17 g Oral DAILY PRN    promethazine (PHENERGAN) tablet 12.5 mg  12.5 mg Oral Q6H PRN    Or    ondansetron (ZOFRAN) injection 4 mg  4 mg IntraVENous Q6H PRN    guaiFENesin-dextromethorphan (ROBITUSSIN DM) 100-10 mg/5 mL syrup 10 mL  10 mL Oral Q4H PRN    dexAMETHasone (DECADRON) tablet 6 mg  6 mg Oral DAILY    aspirin chewable tablet 81 mg  81 mg Oral DAILY    atorvastatin (LIPITOR) tablet 80 mg  80 mg Oral DAILY    clopidogreL (PLAVIX) tablet 75 mg  75 mg Oral DAILY    magnesium oxide (MAG-OX) tablet 400 mg  400 mg Oral DAILY    metoprolol tartrate (LOPRESSOR) tablet 50 mg  50 mg Oral BID    dextrose 40% (GLUTOSE) oral gel 1 Tube  15 g Oral PRN    glucagon (GLUCAGEN) injection 1 mg  1 mg IntraMUSCular PRN    dextrose (D50W) injection syrg 12.5-25 g  25-50 mL IntraVENous PRN    0.9% sodium chloride infusion 250 mL  250 mL IntraVENous PRN    albuterol (PROVENTIL HFA, VENTOLIN HFA, PROAIR HFA) inhaler 2 Puff  2 Puff Inhalation Q4H PRN    ascorbic acid (vitamin C) (VITAMIN C) tablet 1,000 mg  1,000 mg Oral BID    zinc sulfate tablet 220 mg  220 mg Oral DAILY    influenza vaccine 2020-21 (6 mos+)(PF) (FLUARIX/FLULAVAL/FLUZONE QUAD) injection 0.5 mL  0.5 mL IntraMUSCular PRIOR TO DISCHARGE    hydrALAZINE (APRESOLINE) 20 mg/mL injection 20 mg  20 mg IntraVENous Q6H PRN    acetaminophen (TYLENOL) tablet 650 mg  650 mg Oral Q6H PRN       Review of Systems    Constitutional:  negative for fever, chills, sweats  Cardiovascular:  negative for chest pain, palpitations, syncope, edema  Gastrointestinal:  negative for dysphagia, reflux, vomiting, diarrhea, abdominal pain, or melena  Neurologic:  negative for focal weakness, numbness, headache        Objective:     Vitals:    01/15/21 0600 01/15/21 0700 01/15/21 0800 01/15/21 0900   BP: (!) 89/55 116/66 115/67 120/66   Pulse: 82 77 80 71   Resp: 27 24 24 26   Temp:   99.2 °F (37.3 °C)    SpO2: 99% 97% 96% 96%   Weight:       Height:             Intake/Output Summary (Last 24 hours) at 1/15/2021 1305 [facility] filed at 1/15/2021 0600  Gross per 24 hour   Intake 1213.41 ml   Output 1245 ml   Net -31.59 ml         Physical Exam:          Constitutional:  Comfortable on CPAP 10  EENMT:  Sclera clear, pupils equal, oral mucosa moist  Respiratory: bilateral crackles  Cardiovascular:  RRR with no M,G,R;  Gastrointestinal:  soft with no tenderness; positive bowel sounds present  Musculoskeletal:  warm with no cyanosis, no lower extremity edema  Skin:  no jaundice or ecchymosis  Neurologic: no gross neuro deficits     Psychiatric:  alert and oriented x 3     LINES:    PICC    DRIPS:    none    CXR:          Ventilator Settings  Mode FIO2 Rate Tidal Volume Pressure PEEP      100 %                 Peak airway pressure:     Minute ventilation: 23.8 l/min     ABG:   Recent Labs     01/15/21  0439   PHI 7.47*   PCO2I 30.4*   PO2I 66*   HCO3I 22.2       LAB  Recent Labs     01/15/21  0539 01/15/21  0353 01/15/21  0011 01/14/21  1949 01/14/21  1719   GLUCPOC 144* 147* 319* 414* 477*     Recent Labs     01/15/21  0356 01/14/21  0248 01/13/21  0715 01/13/21  0414   WBC 21.1* 11.9*  --  13.5*   HGB 16.4 14.4  --  16.4   HCT 48.3 42.2  --  48.7    200  --  220   INR 1.1 1.2 1.2 1.1     Recent Labs     01/15/21  0356 01/14/21  0248 01/13/21  0715    142 143   K 4.2 3.6 3.9    108* 109*   CO2 26 29 25   * 194* 228*   BUN 27* 32* 32*   CREA 1.15 0.99 0.93   MG 2.2 2.4  --    PHOS 3.0 2.8  --    CA 8.4 8.8 8.5     No results for input(s): LCAD, LAC in the last 72 hours.       Patient Active Problem List   Diagnosis Code    Obesity E66.9    Hypertension I10    Bradycardia R00.1    PVC (premature ventricular contraction) I49.3    CAD (coronary artery disease) I25.10    Dyslipidemia, goal LDL below 70 E78.5    SWATI (acute kidney injury) ([address] N17.9    Acute hypoxemic respiratory failure (HCC) J96.01    Type 2 diabetes mellitus with hyperosmolarity without nonketotic hyperglycemic-hyperosmolar coma ([facility] E11.00    Pneumonia due to COVID-19 virus U07.1, J12.82    Hypernatremia E87.0         Assessment:  (Medical Decision Making)     Hospital Problems  Date Reviewed: 6/3/2020          Codes Class Noted POA    Hypernatremia ICD-10-CM: E87.0  ICD-9-CM: 276.0  1/8/2021 Unknown    Resolved    SWATI (acute kidney injury) ([facility] ICD-10-CM: N17.9  ICD-9-CM: 584.9  1/7/2021 Unknown    Cr 1.15    Acute hypoxemic respiratory failure ([facility] ICD-10-CM: J96.01  ICD-9-CM: 518.81  1/7/2021 Unknown    Back on 100% CPAP 10. Type 2 diabetes mellitus with hyperosmolarity without nonketotic hyperglycemic-hyperosmolar coma ([facility] ICD-10-CM: E11.00  ICD-9-CM: 250.20  1/7/2021 Unknown        * (Principal) Pneumonia due to COVID-19 virus ICD-10-CM: U07.1, J12.82  ICD-9-CM: 480.8  1/7/2021 Unknown    Severe. Remains on decadron. CAD (coronary artery disease) ICD-10-CM: I25.10  ICD-9-CM: 414.00  10/28/2016 Yes    Overview Signed 10/28/2016  8:05 AM by SHELDON Verde     10-27-06 [facility] 90% LAD s/p 2.25 x 20 Synergy drug-eluting stent  (CS)             Obesity ICD-10-CM: E66.9  ICD-9-CM: 278.00  10/6/2015 Yes              Plan:  (Medical Decision Making)     Control anxiety and BS. Try to wean oxygen a little; if unable, increase CPAP to 12. BIPAP prn; intubate prn. Continue decadron. Follow CXR. Has [facility] paperwork that needs to be filled out. --  I called Rika his sister at [phone] with update. More than 50% of the time documented was spent in face-to-face contact with the patient and in the care of the patient on the floor/unit where the patient is located.     Veronica Dickinson MD

## 2021-01-15 NOTE — PROGRESS NOTES
Patient is tachypneic and anxious. O2 was increased to 100% on CPAP. Patient's sat is 93. Heart rate is sinus tach with PACs in the 120s. Blood pressure is in the 200s. MD notified. MD would like to order ativan 1 mg every 4 hours and morphine 2 mgs every 4 hours.

## 2021-01-15 NOTE — DIABETES MGMT
Patient admitted with pneumonia due to COVID-19. Blood glucose ranged 185-477 yesterday with patient receiving Lantus 25 units, Humalog 33 units and Decadron 6mg. Blood glucose this morning was 144. Reviewed patient current regimen: Lantus 25 units nightly, Humalog SSI q6h, and Decadron 6mg daily. Noted patient has TPN. Provider could consider transition from a normal SSI to very insulin resistant scale q4h for stricter glycemic control.

## 2021-01-15 NOTE — PROGRESS NOTES
01/15/21 1136   Oxygen Therapy   O2 Sat (%) 93 %   Pulse via Oximetry 71 beats per minute   O2 Device CPAP mask   FIO2 (%) 100 %   Respiratory   Respiratory (WDL) X   Respiratory Pattern Tachypneic;Retractions, intercostal  (no other distress or increased WOB)   Chest/Tracheal Assessment Chest expansion, symmetrical   Breath Sounds Bilateral Clear;Diminished   Cough Non-productive   CPAP/BIPAP   Device Mode CPAP   Mask Type and Size Medium   Skin Condition intact   PIP Observed 13 cm H20   EPAP (cm H2O) 10 cm H2O   Vt Spont (ml) 857 ml   Ve Observed (l/min) 26.1 l/min   Total RR (Spontaneous) 30 breaths per minute   Leak (Estimated) 7 L/min   Pt's Home Machine No   Biomedical Check Performed Yes   Settings Verified Yes   Alarm Settings   High Pressure 40   Low Pressure 5   Apnea 30   Low Ve 2   High Rate 50   Low Rate 8   Pulmonary Toilet   Pulmonary Toilet H. O.B elevated;Cough and deep breath

## 2021-01-15 NOTE — PROGRESS NOTES
BS>400. MD notified. 10 units of humalog ordered. Lantus order modified to 25 units. Will recheck at midnight.

## 2021-01-16 ENCOUNTER — APPOINTMENT (OUTPATIENT)
Dept: GENERAL RADIOLOGY | Age: 61
DRG: 004 | End: 2021-01-16
Attending: INTERNAL MEDICINE
Payer: COMMERCIAL

## 2021-01-16 LAB
ALBUMIN SERPL-MCNC: 1.9 G/DL (ref 3.2–4.6)
ANION GAP SERPL CALC-SCNC: 6 MMOL/L (ref 7–16)
APTT PPP: 28.2 SEC (ref 24.1–35.1)
BASOPHILS # BLD: 0 K/UL (ref 0–0.2)
BASOPHILS NFR BLD: 0 % (ref 0–2)
BUN SERPL-MCNC: 29 MG/DL (ref 8–23)
CALCIUM SERPL-MCNC: 8.4 MG/DL (ref 8.3–10.4)
CHLORIDE SERPL-SCNC: 106 MMOL/L (ref 98–107)
CO2 SERPL-SCNC: 25 MMOL/L (ref 21–32)
CREAT SERPL-MCNC: 0.84 MG/DL (ref 0.8–1.5)
DIFFERENTIAL METHOD BLD: ABNORMAL
EOSINOPHIL # BLD: 0.1 K/UL (ref 0–0.8)
EOSINOPHIL NFR BLD: 0 % (ref 0.5–7.8)
ERYTHROCYTE [DISTWIDTH] IN BLOOD BY AUTOMATED COUNT: 12.4 % (ref 11.9–14.6)
FIBRINOGEN PPP-MCNC: 643 MG/DL (ref 190–501)
GLUCOSE BLD STRIP.AUTO-MCNC: 173 MG/DL (ref 65–100)
GLUCOSE BLD STRIP.AUTO-MCNC: 191 MG/DL (ref 65–100)
GLUCOSE BLD STRIP.AUTO-MCNC: 196 MG/DL (ref 65–100)
GLUCOSE BLD STRIP.AUTO-MCNC: 277 MG/DL (ref 65–100)
GLUCOSE BLD STRIP.AUTO-MCNC: 301 MG/DL (ref 65–100)
GLUCOSE BLD STRIP.AUTO-MCNC: 344 MG/DL (ref 65–100)
GLUCOSE BLD STRIP.AUTO-MCNC: 371 MG/DL (ref 65–100)
GLUCOSE SERPL-MCNC: 220 MG/DL (ref 65–100)
HCT VFR BLD AUTO: 44.2 % (ref 41.1–50.3)
HGB BLD-MCNC: 15.2 G/DL (ref 13.6–17.2)
IMM GRANULOCYTES # BLD AUTO: 0.2 K/UL (ref 0–0.5)
IMM GRANULOCYTES NFR BLD AUTO: 2 % (ref 0–5)
INR PPP: 1.1
LYMPHOCYTES # BLD: 0.6 K/UL (ref 0.5–4.6)
LYMPHOCYTES NFR BLD: 4 % (ref 13–44)
MCH RBC QN AUTO: 29.9 PG (ref 26.1–32.9)
MCHC RBC AUTO-ENTMCNC: 34.4 G/DL (ref 31.4–35)
MCV RBC AUTO: 87 FL (ref 79.6–97.8)
MONOCYTES # BLD: 0.2 K/UL (ref 0.1–1.3)
MONOCYTES NFR BLD: 2 % (ref 4–12)
NEUTS SEG # BLD: 13.2 K/UL (ref 1.7–8.2)
NEUTS SEG NFR BLD: 92 % (ref 43–78)
NRBC # BLD: 0 K/UL (ref 0–0.2)
PLATELET # BLD AUTO: 151 K/UL (ref 150–450)
PMV BLD AUTO: 10.8 FL (ref 9.4–12.3)
POTASSIUM SERPL-SCNC: 4.1 MMOL/L (ref 3.5–5.1)
PROTHROMBIN TIME: 14.6 SEC (ref 12.5–14.7)
RBC # BLD AUTO: 5.08 M/UL (ref 4.23–5.6)
SODIUM SERPL-SCNC: 137 MMOL/L (ref 138–145)
WBC # BLD AUTO: 14.2 K/UL (ref 4.3–11.1)

## 2021-01-16 PROCEDURE — 85025 COMPLETE CBC W/AUTO DIFF WBC: CPT

## 2021-01-16 PROCEDURE — 74011250637 HC RX REV CODE- 250/637: Performed by: HOSPITALIST

## 2021-01-16 PROCEDURE — 85730 THROMBOPLASTIN TIME PARTIAL: CPT

## 2021-01-16 PROCEDURE — 85384 FIBRINOGEN ACTIVITY: CPT

## 2021-01-16 PROCEDURE — 74011250636 HC RX REV CODE- 250/636: Performed by: HOSPITALIST

## 2021-01-16 PROCEDURE — 80048 BASIC METABOLIC PNL TOTAL CA: CPT

## 2021-01-16 PROCEDURE — 71045 X-RAY EXAM CHEST 1 VIEW: CPT

## 2021-01-16 PROCEDURE — 82040 ASSAY OF SERUM ALBUMIN: CPT

## 2021-01-16 PROCEDURE — 74011250636 HC RX REV CODE- 250/636: Performed by: INTERNAL MEDICINE

## 2021-01-16 PROCEDURE — 94660 CPAP INITIATION&MGMT: CPT

## 2021-01-16 PROCEDURE — 74011000250 HC RX REV CODE- 250: Performed by: INTERNAL MEDICINE

## 2021-01-16 PROCEDURE — 74011000258 HC RX REV CODE- 258: Performed by: INTERNAL MEDICINE

## 2021-01-16 PROCEDURE — 74011636637 HC RX REV CODE- 636/637: Performed by: INTERNAL MEDICINE

## 2021-01-16 PROCEDURE — 65610000006 HC RM INTENSIVE CARE

## 2021-01-16 PROCEDURE — 85610 PROTHROMBIN TIME: CPT

## 2021-01-16 PROCEDURE — 82962 GLUCOSE BLOOD TEST: CPT

## 2021-01-16 PROCEDURE — 99233 SBSQ HOSP IP/OBS HIGH 50: CPT | Performed by: INTERNAL MEDICINE

## 2021-01-16 RX ORDER — INSULIN LISPRO 100 [IU]/ML
INJECTION, SOLUTION INTRAVENOUS; SUBCUTANEOUS EVERY 4 HOURS
Status: DISCONTINUED | OUTPATIENT
Start: 2021-01-16 | End: 2021-02-26 | Stop reason: HOSPADM

## 2021-01-16 RX ADMIN — ENOXAPARIN SODIUM 30 MG: 30 INJECTION SUBCUTANEOUS at 01:00

## 2021-01-16 RX ADMIN — Medication 20 ML: at 21:02

## 2021-01-16 RX ADMIN — METOPROLOL TARTRATE 50 MG: 50 TABLET, FILM COATED ORAL at 17:14

## 2021-01-16 RX ADMIN — Medication 10 ML: at 06:00

## 2021-01-16 RX ADMIN — ATORVASTATIN CALCIUM 80 MG: 80 TABLET, FILM COATED ORAL at 08:38

## 2021-01-16 RX ADMIN — INSULIN LISPRO 9 UNITS: 100 INJECTION, SOLUTION INTRAVENOUS; SUBCUTANEOUS at 20:31

## 2021-01-16 RX ADMIN — INSULIN LISPRO 3 UNITS: 100 INJECTION, SOLUTION INTRAVENOUS; SUBCUTANEOUS at 06:00

## 2021-01-16 RX ADMIN — INSULIN LISPRO 9 UNITS: 100 INJECTION, SOLUTION INTRAVENOUS; SUBCUTANEOUS at 16:16

## 2021-01-16 RX ADMIN — I.V. FAT EMULSION 250 ML: 20 EMULSION INTRAVENOUS at 17:16

## 2021-01-16 RX ADMIN — METOPROLOL TARTRATE 50 MG: 50 TABLET, FILM COATED ORAL at 08:39

## 2021-01-16 RX ADMIN — Medication 1000 MG: at 08:39

## 2021-01-16 RX ADMIN — ASPIRIN 81 MG 81 MG: 81 TABLET ORAL at 08:39

## 2021-01-16 RX ADMIN — INSULIN GLARGINE 25 UNITS: 100 INJECTION, SOLUTION SUBCUTANEOUS at 21:02

## 2021-01-16 RX ADMIN — ENOXAPARIN SODIUM 30 MG: 30 INJECTION SUBCUTANEOUS at 12:32

## 2021-01-16 RX ADMIN — Medication 1000 MG: at 17:14

## 2021-01-16 RX ADMIN — SODIUM PHOSPHATE, MONOBASIC, MONOHYDRATE: 276; 142 INJECTION, SOLUTION INTRAVENOUS at 17:15

## 2021-01-16 RX ADMIN — INSULIN LISPRO 3 UNITS: 100 INJECTION, SOLUTION INTRAVENOUS; SUBCUTANEOUS at 08:26

## 2021-01-16 RX ADMIN — DEXAMETHASONE 6 MG: 4 TABLET ORAL at 08:39

## 2021-01-16 RX ADMIN — INSULIN LISPRO 3 UNITS: 100 INJECTION, SOLUTION INTRAVENOUS; SUBCUTANEOUS at 12:08

## 2021-01-16 RX ADMIN — Medication 20 ML: at 14:14

## 2021-01-16 RX ADMIN — INSULIN LISPRO 12 UNITS: 100 INJECTION, SOLUTION INTRAVENOUS; SUBCUTANEOUS at 02:00

## 2021-01-16 RX ADMIN — Medication 220 MG: at 08:39

## 2021-01-16 RX ADMIN — Medication 20 ML: at 06:00

## 2021-01-16 RX ADMIN — CLOPIDOGREL BISULFATE 75 MG: 75 TABLET ORAL at 08:39

## 2021-01-16 RX ADMIN — MAGNESIUM GLUCONATE 500 MG ORAL TABLET 400 MG: 500 TABLET ORAL at 08:39

## 2021-01-16 NOTE — PROGRESS NOTES
Nutrition F/U: TPN Management    Daily Update:  Has active diet order for [facility] but po intake remains minimal d/t respiratory status, thus remains TPN dependent. Abdominal Status (last documented): Intact abdomen with Active  bowel sounds. Last BM 01/15/21. Pertinent Medications: Vitamin C, Lantus (25 units qhs), ssi ( received 40 units yesterday and 21 units thus far today)prn immodium ( 1 dose given yesterday),MgOxide, prn miralax, zinc  Pertinent Labs:   Lab Results   Component Value Date/Time    Sodium 137 (L) 01/16/2021 03:24 AM    Potassium 4.1 01/16/2021 03:24 AM    Chloride 106 01/16/2021 03:24 AM    CO2 25 01/16/2021 03:24 AM    Anion gap 6 (L) 01/16/2021 03:24 AM    Glucose 220 (H) 01/16/2021 03:24 AM    Glucose 103 09/16/2008 05:55 AM    BUN 29 (H) 01/16/2021 03:24 AM    Creatinine 0.84 01/16/2021 03:24 AM    Calcium 8.4 01/16/2021 03:24 AM    Albumin 1.9 (L) 01/16/2021 03:24 AM    Phosphorus 3.0 01/15/2021 03:56 AM   cCa 10.1-current TPN does not contain calcium  Current TPN contains 75 meq Na/L  POC glucoses: 173-414 -past 24 hrs. SSI from from normal sensitivity every 6 hrs to very resistant every 4 hr on 1/15. CHO intake from TPN maintained at 100 grams/d. Would not add insulin to TPN since pt is on Lantus and SSI and last dose of decadron given today so expect glucoses may start to improve.     Plan: Continue current TPN    Elham Sewell, [address] [place] [address], [facility] [address] [place] [address], [address] [place] [address]

## 2021-01-16 NOTE — PROGRESS NOTES
At 0519, blood sugar was 371 from a finger stick. At [address], blood sugar was 196 from blood from the PICC. At 0536, blood sugar was 184 from a ear stick. Patient is upset with status of health and appears agitated throughout the night. Emotional support given.

## 2021-01-16 NOTE — PROGRESS NOTES
Critical Care Daily Progress Note: 1/16/2021  Admission Date: 1/6/2021     The patient's chart is reviewed and the patient is discussed with the staff. Admission Date: 1/6/2021     Length of Stay: 9 days     Background: 61 y.o. y/o male with acute hypoxemic respiratory failure secondary to COVID-19. Date of COVID-19 symptom onset: + COVID 01/05/21     Notable PMH: obesity, HTN, CAD, dyslipidemia, SWATI, DM, GERD     Drug Allergies: Allergies   Allergen Reactions    Latex Swelling       Had a purcell catheter with swelling of urethra. Only known latex issue in past. Wife remembers this now    Hydrocodone-Acetaminophen Itching       Wife remembers this allergy    Tessalon Perles [Benzonatate] Unknown (comments)      24 Hour events: Remains on CPAP 10. FIO2 at 100%. Subjective:     On TPN, still on CPAP, couldn't come off to AirVo today. On 75% CPAP. No new issues.      Current Facility-Administered Medications   Medication Dose Route Frequency    insulin lispro (HUMALOG) injection   SubCUTAneous Q4H    TPN ADULT - dextrose 5% amino acid 4.25%   IntraVENous QPM    LORazepam (ATIVAN) injection 1 mg  1 mg IntraVENous Q4H PRN    morphine injection 2 mg  2 mg IntraVENous Q4H PRN    TPN ADULT - dextrose 5% amino acid 4.25%   IntraVENous QPM    fat emulsion 20% (LIPOSYN, INTRAlipid) infusion 250 mL  250 mL IntraVENous QPM    NUTRITIONAL SUPPORT ELECTROLYTE PRN ORDERS   Does Not Apply PRN    insulin glargine (LANTUS) injection 25 Units  25 Units SubCUTAneous QHS    sodium chloride (NS) flush 20 mL  20 mL InterCATHeter Q8H    sodium chloride (NS) flush 20 mL  20 mL InterCATHeter PRN    loperamide (IMODIUM) capsule 2 mg  2 mg Oral Q4H PRN    enoxaparin (LOVENOX) injection 30 mg  30 mg SubCUTAneous Q12H    sodium chloride (NS) flush 5-40 mL  5-40 mL IntraVENous Q8H    polyethylene glycol (MIRALAX) packet 17 g  17 g Oral DAILY PRN    promethazine (PHENERGAN) tablet 12.5 mg  12.5 mg Oral Q6H PRN    Or    ondansetron (ZOFRAN) injection 4 mg  4 mg IntraVENous Q6H PRN    guaiFENesin-dextromethorphan (ROBITUSSIN DM) 100-10 mg/5 mL syrup 10 mL  10 mL Oral Q4H PRN    aspirin chewable tablet 81 mg  81 mg Oral DAILY    atorvastatin (LIPITOR) tablet 80 mg  80 mg Oral DAILY    clopidogreL (PLAVIX) tablet 75 mg  75 mg Oral DAILY    magnesium oxide (MAG-OX) tablet 400 mg  400 mg Oral DAILY    metoprolol tartrate (LOPRESSOR) tablet 50 mg  50 mg Oral BID    dextrose 40% (GLUTOSE) oral gel 1 Tube  15 g Oral PRN    glucagon (GLUCAGEN) injection 1 mg  1 mg IntraMUSCular PRN    dextrose (D50W) injection syrg 12.5-25 g  25-50 mL IntraVENous PRN    0.9% sodium chloride infusion 250 mL  250 mL IntraVENous PRN    albuterol (PROVENTIL HFA, VENTOLIN HFA, PROAIR HFA) inhaler 2 Puff  2 Puff Inhalation Q4H PRN    ascorbic acid (vitamin C) (VITAMIN C) tablet 1,000 mg  1,000 mg Oral BID    zinc sulfate tablet 220 mg  220 mg Oral DAILY    influenza vaccine 2020-21 (6 mos+)(PF) (FLUARIX/FLULAVAL/FLUZONE QUAD) injection 0.5 mL  0.5 mL IntraMUSCular PRIOR TO DISCHARGE    hydrALAZINE (APRESOLINE) 20 mg/mL injection 20 mg  20 mg IntraVENous Q6H PRN    acetaminophen (TYLENOL) tablet 650 mg  650 mg Oral Q6H PRN     Review of Systems  Constitutional:  negative for fever, chills, sweats  Cardiovascular:  negative for chest pain, palpitations, syncope, edema  Gastrointestinal:  negative for dysphagia, reflux, vomiting, diarrhea, abdominal pain, or melena  Neurologic:  negative for focal weakness, numbness, headache    Objective:     Vitals:    01/16/21 0900 01/16/21 1000 01/16/21 1100 01/16/21 1200   BP: (!) 145/77 131/77 111/68 (!) 128/94   Pulse: 90 83 79 83   Resp: 30 (!) 33 (!) 32 29   Temp:       SpO2: 92% 90% 91% 98%   Weight:       Height:           Intake/Output Summary (Last 24 hours) at 1/16/2021 1350  Last data filed at 1/16/2021 1205  Gross per 24 hour   Intake 865.48 ml   Output 1225 ml   Net -359.52 ml Physical Exam:          Constitutional:  Comfortable on CPAP 10  EENMT:  Sclera clear, pupils equal, oral mucosa moist  Respiratory: bilateral crackles  Cardiovascular:  RRR with no M,G,R;  Gastrointestinal:  soft with no tenderness; positive bowel sounds present  Musculoskeletal:  warm with no cyanosis, no lower extremity edema  Skin:  no jaundice or ecchymosis  Neurologic: no gross neuro deficits     Psychiatric:  alert and oriented x 3     LINES:    PICC    DRIPS:    none    CXR:  Bilateral infiltrates, stable       Ventilator Settings  Mode FIO2 Rate Tidal Volume Pressure PEEP      75 %                 Peak airway pressure:     Minute ventilation: 24 l/min     ABG:   Recent Labs     01/15/21  1529 01/15/21  0439   PHI 7.53* 7.47*   PCO2I 30.7* 30.4*   PO2I 90 66*   HCO3I 25.5 22.2     LAB  Recent Labs     01/16/21  1207 01/16/21  0823 01/16/21  0532 01/16/21  0519 01/16/21  0124   GLUCPOC 191* 173* 196* 371* 301*     Recent Labs     01/16/21  0324 01/15/21  0356 01/14/21  0248   WBC 14.2* 21.1* 11.9*   HGB 15.2 16.4 14.4   HCT 44.2 48.3 42.2    253 200   INR 1.1 1.1 1.2     Recent Labs     01/16/21  0324 01/15/21  0356 01/14/21  0248   * 139 142   K 4.1 4.2 3.6    106 108*   CO2 25 26 29   * 153* 194*   BUN 29* 27* 32*   CREA 0.84 1.15 0.99   MG  --  2.2 2.4   PHOS  --  3.0 2.8   CA 8.4 8.4 8.8   ALB 1.9*  --   --      No results for input(s): LCAD, LAC in the last 72 hours.       Patient Active Problem List   Diagnosis Code    Obesity E66.9    Hypertension I10    Bradycardia R00.1    PVC (premature ventricular contraction) I49.3    CAD (coronary artery disease) I25.10    Dyslipidemia, goal LDL below 70 E78.5    SWATI (acute kidney injury) ([facility] N17.9    Acute hypoxemic respiratory failure (HCC) J96.01    Type 2 diabetes mellitus with hyperosmolarity without nonketotic hyperglycemic[facility]hyperos[facility] E11.00    Pneumonia due to COVID-19 virus U07.1, J12.82    Hypernatremia E87.0     Assessment:  (Medical Decision Making)     Hospital Problems  Date Reviewed: 6/3/2020          Codes Class Noted POA    Hypernatremia ICD-10-CM: E87.0  ICD-9-CM: 276.0  1/8/2021 Unknown    Resolved    SWATI (acute kidney injury) ([facility] ICD-10-CM: N17.9  ICD-9-CM: 584.9  1/7/2021 Unknown    Cr 1.15    Acute hypoxemic respiratory failure ([facility] ICD-10-CM: J96.01  ICD-9-CM: 518.81  1/7/2021 Unknown    Back on 100% CPAP 10. Type 2 diabetes mellitus with hyperosmolarity without nonketotic hyperglycemic-hyperosmolar coma ([facility] ICD-10-CM: E11.00  ICD-9-CM: 250.20  1/7/2021 Unknown        * (Principal) Pneumonia due to COVID-19 virus ICD-10-CM: U07.1, J12.82  ICD-9-CM: 480.8  1/7/2021 Unknown    Severe. Remains on decadron. CAD (coronary artery disease) ICD-10-CM: I25.10  ICD-9-CM: 414.00  10/28/2016 Yes    Overview Signed 10/28/2016  8:05 AM by SHELDON Talley     10-27-06 [facility] 90% LAD s/p 2.25 x 20 Synergy drug-eluting stent  (CS)             Obesity ICD-10-CM: E66.9  ICD-9-CM: 278.00  10/6/2015 Yes            Plan:  (Medical Decision Making)     Control anxiety and BS. Try to wean oxygen a little; if unable, increase CPAP to 12. CPAP 75%, Wean to AirVo  Continue decadron. CXR stable  Patient is frustrated with lack of progress, tried to encourage him. Has [place] paperwork that needs to be filled out. More than 50% of the time documented was spent in face-to-face contact with the patient and in the care of the patient on the floor/unit where the patient is located.     Joni Holstein, MD

## 2021-01-17 ENCOUNTER — APPOINTMENT (OUTPATIENT)
Dept: GENERAL RADIOLOGY | Age: 61
DRG: 004 | End: 2021-01-17
Attending: INTERNAL MEDICINE
Payer: COMMERCIAL

## 2021-01-17 LAB
ALBUMIN SERPL-MCNC: 1.7 G/DL (ref 3.2–4.6)
ALBUMIN/GLOB SERPL: 0.3 {RATIO} (ref 1.2–3.5)
ALP SERPL-CCNC: 139 U/L (ref 50–136)
ALT SERPL-CCNC: 27 U/L (ref 12–65)
ANION GAP SERPL CALC-SCNC: 7 MMOL/L (ref 7–16)
ANION GAP SERPL CALC-SCNC: 8 MMOL/L (ref 7–16)
APTT PPP: 28.3 SEC (ref 24.1–35.1)
ARTERIAL PATENCY WRIST A: YES
ARTERIAL PATENCY WRIST A: YES
AST SERPL-CCNC: 35 U/L (ref 15–37)
BASE DEFICIT BLD-SCNC: 5 MMOL/L
BASE DEFICIT BLD-SCNC: 5 MMOL/L
BASOPHILS # BLD: 0 K/UL (ref 0–0.2)
BASOPHILS NFR BLD: 0 % (ref 0–2)
BDY SITE: ABNORMAL
BDY SITE: ABNORMAL
BILIRUB SERPL-MCNC: 0.7 MG/DL (ref 0.2–1.1)
BUN SERPL-MCNC: 28 MG/DL (ref 8–23)
BUN SERPL-MCNC: 33 MG/DL (ref 8–23)
CALCIUM SERPL-MCNC: 8.2 MG/DL (ref 8.3–10.4)
CALCIUM SERPL-MCNC: 8.5 MG/DL (ref 8.3–10.4)
CHLORIDE SERPL-SCNC: 105 MMOL/L (ref 98–107)
CHLORIDE SERPL-SCNC: 106 MMOL/L (ref 98–107)
CO2 BLD-SCNC: 22 MMOL/L
CO2 BLD-SCNC: 24 MMOL/L
CO2 SERPL-SCNC: 22 MMOL/L (ref 21–32)
CO2 SERPL-SCNC: 24 MMOL/L (ref 21–32)
COLLECT TIME,HTIME: 1446
COLLECT TIME,HTIME: 1845
CREAT SERPL-MCNC: 0.82 MG/DL (ref 0.8–1.5)
CREAT SERPL-MCNC: 1.2 MG/DL (ref 0.8–1.5)
DIFFERENTIAL METHOD BLD: ABNORMAL
EOSINOPHIL # BLD: 0 K/UL (ref 0–0.8)
EOSINOPHIL NFR BLD: 0 % (ref 0.5–7.8)
ERYTHROCYTE [DISTWIDTH] IN BLOOD BY AUTOMATED COUNT: 12.2 % (ref 11.9–14.6)
EXHALED MINUTE VOLUME, VE: 13.6 L/MIN
EXHALED MINUTE VOLUME, VE: 15.8 L/MIN
FIBRINOGEN PPP-MCNC: 700 MG/DL (ref 190–501)
GAS FLOW.O2 O2 DELIVERY SYS: ABNORMAL L/MIN
GAS FLOW.O2 O2 DELIVERY SYS: ABNORMAL L/MIN
GAS FLOW.O2 SETTING OXYMISER: 26 BPM
GAS FLOW.O2 SETTING OXYMISER: 32 BPM
GLOBULIN SER CALC-MCNC: 4.9 G/DL (ref 2.3–3.5)
GLUCOSE BLD STRIP.AUTO-MCNC: 214 MG/DL (ref 65–100)
GLUCOSE BLD STRIP.AUTO-MCNC: 214 MG/DL (ref 65–100)
GLUCOSE BLD STRIP.AUTO-MCNC: 243 MG/DL (ref 65–100)
GLUCOSE BLD STRIP.AUTO-MCNC: 266 MG/DL (ref 65–100)
GLUCOSE BLD STRIP.AUTO-MCNC: 273 MG/DL (ref 65–100)
GLUCOSE BLD STRIP.AUTO-MCNC: 322 MG/DL (ref 65–100)
GLUCOSE SERPL-MCNC: 237 MG/DL (ref 65–100)
GLUCOSE SERPL-MCNC: 286 MG/DL (ref 65–100)
HCO3 BLD-SCNC: 20.9 MMOL/L (ref 22–26)
HCO3 BLD-SCNC: 22.3 MMOL/L (ref 22–26)
HCT VFR BLD AUTO: 43.9 % (ref 41.1–50.3)
HGB BLD-MCNC: 15.1 G/DL (ref 13.6–17.2)
IMM GRANULOCYTES # BLD AUTO: 0.3 K/UL (ref 0–0.5)
IMM GRANULOCYTES NFR BLD AUTO: 2 % (ref 0–5)
INR PPP: 1.1
INSPIRATION.DURATION SETTING TIME VENT: 0.9 SEC
INSPIRATION.DURATION SETTING TIME VENT: 0.9 SEC
LYMPHOCYTES # BLD: 0.4 K/UL (ref 0.5–4.6)
LYMPHOCYTES NFR BLD: 3 % (ref 13–44)
MAGNESIUM SERPL-MCNC: 2 MG/DL (ref 1.8–2.4)
MCH RBC QN AUTO: 29.4 PG (ref 26.1–32.9)
MCHC RBC AUTO-ENTMCNC: 34.4 G/DL (ref 31.4–35)
MCV RBC AUTO: 85.6 FL (ref 79.6–97.8)
MONOCYTES # BLD: 0.3 K/UL (ref 0.1–1.3)
MONOCYTES NFR BLD: 2 % (ref 4–12)
NEUTS SEG # BLD: 15.2 K/UL (ref 1.7–8.2)
NEUTS SEG NFR BLD: 94 % (ref 43–78)
NRBC # BLD: 0 K/UL (ref 0–0.2)
O2/TOTAL GAS SETTING VFR VENT: 100 %
O2/TOTAL GAS SETTING VFR VENT: 100 %
PCO2 BLD: 41.5 MMHG (ref 35–45)
PCO2 BLD: 48.7 MMHG (ref 35–45)
PEEP RESPIRATORY: 12 CMH2O
PEEP RESPIRATORY: 12 CMH2O
PH BLD: 7.27 [PH] (ref 7.35–7.45)
PH BLD: 7.31 [PH] (ref 7.35–7.45)
PLATELET # BLD AUTO: 155 K/UL (ref 150–450)
PMV BLD AUTO: 10.6 FL (ref 9.4–12.3)
PO2 BLD: 102 MMHG (ref 75–100)
PO2 BLD: 77 MMHG (ref 75–100)
POTASSIUM SERPL-SCNC: 4 MMOL/L (ref 3.5–5.1)
POTASSIUM SERPL-SCNC: 5.3 MMOL/L (ref 3.5–5.1)
PROT SERPL-MCNC: 6.6 G/DL (ref 6.3–8.2)
PROTHROMBIN TIME: 14.6 SEC (ref 12.5–14.7)
RBC # BLD AUTO: 5.13 M/UL (ref 4.23–5.6)
SAO2 % BLD: 93 % (ref 95–98)
SAO2 % BLD: 97 % (ref 95–98)
SERVICE CMNT-IMP: ABNORMAL
SERVICE CMNT-IMP: ABNORMAL
SODIUM SERPL-SCNC: 136 MMOL/L (ref 136–145)
SODIUM SERPL-SCNC: 136 MMOL/L (ref 138–145)
SPECIMEN TYPE: ABNORMAL
SPECIMEN TYPE: ABNORMAL
VENTILATION MODE VENT: ABNORMAL
VENTILATION MODE VENT: ABNORMAL
VT SETTING VENT: 500 ML
VT SETTING VENT: 500 ML
WBC # BLD AUTO: 16.2 K/UL (ref 4.3–11.1)

## 2021-01-17 PROCEDURE — 74011250636 HC RX REV CODE- 250/636: Performed by: ANESTHESIOLOGY

## 2021-01-17 PROCEDURE — 74011000250 HC RX REV CODE- 250: Performed by: ANESTHESIOLOGY

## 2021-01-17 PROCEDURE — 74011000250 HC RX REV CODE- 250

## 2021-01-17 PROCEDURE — 74011250637 HC RX REV CODE- 250/637: Performed by: HOSPITALIST

## 2021-01-17 PROCEDURE — 99291 CRITICAL CARE FIRST HOUR: CPT | Performed by: INTERNAL MEDICINE

## 2021-01-17 PROCEDURE — 74018 RADEX ABDOMEN 1 VIEW: CPT

## 2021-01-17 PROCEDURE — 74011000250 HC RX REV CODE- 250: Performed by: INTERNAL MEDICINE

## 2021-01-17 PROCEDURE — 5A1955Z RESPIRATORY VENTILATION, GREATER THAN 96 CONSECUTIVE HOURS: ICD-10-PCS | Performed by: INTERNAL MEDICINE

## 2021-01-17 PROCEDURE — 94660 CPAP INITIATION&MGMT: CPT

## 2021-01-17 PROCEDURE — 74011250636 HC RX REV CODE- 250/636

## 2021-01-17 PROCEDURE — 74011000258 HC RX REV CODE- 258: Performed by: INTERNAL MEDICINE

## 2021-01-17 PROCEDURE — 71045 X-RAY EXAM CHEST 1 VIEW: CPT

## 2021-01-17 PROCEDURE — 82803 BLOOD GASES ANY COMBINATION: CPT

## 2021-01-17 PROCEDURE — 85730 THROMBOPLASTIN TIME PARTIAL: CPT

## 2021-01-17 PROCEDURE — 65610000006 HC RM INTENSIVE CARE

## 2021-01-17 PROCEDURE — 94002 VENT MGMT INPAT INIT DAY: CPT

## 2021-01-17 PROCEDURE — 74011250637 HC RX REV CODE- 250/637: Performed by: INTERNAL MEDICINE

## 2021-01-17 PROCEDURE — 80053 COMPREHEN METABOLIC PANEL: CPT

## 2021-01-17 PROCEDURE — 74011250636 HC RX REV CODE- 250/636: Performed by: INTERNAL MEDICINE

## 2021-01-17 PROCEDURE — 36600 WITHDRAWAL OF ARTERIAL BLOOD: CPT

## 2021-01-17 PROCEDURE — 85610 PROTHROMBIN TIME: CPT

## 2021-01-17 PROCEDURE — 85384 FIBRINOGEN ACTIVITY: CPT

## 2021-01-17 PROCEDURE — 0BH17EZ INSERTION OF ENDOTRACHEAL AIRWAY INTO TRACHEA, VIA NATURAL OR ARTIFICIAL OPENING: ICD-10-PCS | Performed by: ANESTHESIOLOGY

## 2021-01-17 PROCEDURE — 85025 COMPLETE CBC W/AUTO DIFF WBC: CPT

## 2021-01-17 PROCEDURE — 74011636637 HC RX REV CODE- 636/637: Performed by: INTERNAL MEDICINE

## 2021-01-17 PROCEDURE — 83735 ASSAY OF MAGNESIUM: CPT

## 2021-01-17 RX ORDER — UREA 10 %
220 LOTION (ML) TOPICAL DAILY
Status: DISCONTINUED | OUTPATIENT
Start: 2021-01-18 | End: 2021-01-18

## 2021-01-17 RX ORDER — ASCORBIC ACID 500 MG
1000 TABLET ORAL 2 TIMES DAILY
Status: DISCONTINUED | OUTPATIENT
Start: 2021-01-17 | End: 2021-01-18

## 2021-01-17 RX ORDER — CLOPIDOGREL BISULFATE 75 MG/1
75 TABLET ORAL DAILY
Status: DISCONTINUED | OUTPATIENT
Start: 2021-01-18 | End: 2021-02-26 | Stop reason: HOSPADM

## 2021-01-17 RX ORDER — FENTANYL CITRATE-0.9 % NACL/PF 25 MCG/ML
0-200 PLASTIC BAG, INJECTION (ML) INJECTION
Status: DISCONTINUED | OUTPATIENT
Start: 2021-01-17 | End: 2021-02-04

## 2021-01-17 RX ORDER — ETOMIDATE 2 MG/ML
INJECTION INTRAVENOUS
Status: COMPLETED
Start: 2021-01-17 | End: 2021-01-17

## 2021-01-17 RX ORDER — INSULIN GLARGINE 100 [IU]/ML
20 INJECTION, SOLUTION SUBCUTANEOUS EVERY 12 HOURS
Status: DISCONTINUED | OUTPATIENT
Start: 2021-01-17 | End: 2021-01-18

## 2021-01-17 RX ORDER — MIDAZOLAM HYDROCHLORIDE 1 MG/ML
2 INJECTION, SOLUTION INTRAMUSCULAR; INTRAVENOUS ONCE
Status: COMPLETED | OUTPATIENT
Start: 2021-01-17 | End: 2021-01-17

## 2021-01-17 RX ORDER — PROPOFOL 10 MG/ML
INJECTION, EMULSION INTRAVENOUS
Status: COMPLETED
Start: 2021-01-17 | End: 2021-01-17

## 2021-01-17 RX ORDER — NOREPINEPHRINE BITARTRATE/D5W 4MG/250ML
.5-16 PLASTIC BAG, INJECTION (ML) INTRAVENOUS
Status: DISCONTINUED | OUTPATIENT
Start: 2021-01-17 | End: 2021-01-20

## 2021-01-17 RX ORDER — ATORVASTATIN CALCIUM 40 MG/1
80 TABLET, FILM COATED ORAL DAILY
Status: DISCONTINUED | OUTPATIENT
Start: 2021-01-18 | End: 2021-02-26 | Stop reason: HOSPADM

## 2021-01-17 RX ORDER — PROPOFOL 10 MG/ML
0-50 VIAL (ML) INTRAVENOUS
Status: DISCONTINUED | OUTPATIENT
Start: 2021-01-17 | End: 2021-01-22

## 2021-01-17 RX ORDER — ROCURONIUM BROMIDE 10 MG/ML
0.6 INJECTION, SOLUTION INTRAVENOUS
Status: COMPLETED | OUTPATIENT
Start: 2021-01-17 | End: 2021-01-17

## 2021-01-17 RX ORDER — SUCCINYLCHOLINE CHLORIDE 20 MG/ML
0.6 INJECTION INTRAMUSCULAR; INTRAVENOUS
Status: COMPLETED | OUTPATIENT
Start: 2021-01-17 | End: 2021-01-17

## 2021-01-17 RX ORDER — LANOLIN ALCOHOL/MO/W.PET/CERES
400 CREAM (GRAM) TOPICAL DAILY
Status: DISCONTINUED | OUTPATIENT
Start: 2021-01-18 | End: 2021-01-22

## 2021-01-17 RX ORDER — MIDAZOLAM HYDROCHLORIDE 1 MG/ML
INJECTION, SOLUTION INTRAMUSCULAR; INTRAVENOUS
Status: ACTIVE
Start: 2021-01-17 | End: 2021-01-18

## 2021-01-17 RX ORDER — MIDAZOLAM IN 0.9 % SOD.CHLORID 1 MG/ML
0-10 PLASTIC BAG, INJECTION (ML) INTRAVENOUS
Status: DISCONTINUED | OUTPATIENT
Start: 2021-01-17 | End: 2021-02-04

## 2021-01-17 RX ORDER — METOPROLOL TARTRATE 50 MG/1
50 TABLET ORAL 2 TIMES DAILY
Status: DISCONTINUED | OUTPATIENT
Start: 2021-01-17 | End: 2021-02-19

## 2021-01-17 RX ORDER — ETOMIDATE 2 MG/ML
0.15 INJECTION INTRAVENOUS ONCE
Status: COMPLETED | OUTPATIENT
Start: 2021-01-17 | End: 2021-01-17

## 2021-01-17 RX ORDER — GUAIFENESIN 100 MG/5ML
81 LIQUID (ML) ORAL DAILY
Status: DISCONTINUED | OUTPATIENT
Start: 2021-01-18 | End: 2021-01-22

## 2021-01-17 RX ORDER — ATRACURIUM BESYLATE 10 MG/ML
0.4 INJECTION, SOLUTION INTRAVENOUS
Status: COMPLETED | OUTPATIENT
Start: 2021-01-17 | End: 2021-01-17

## 2021-01-17 RX ORDER — SUCCINYLCHOLINE CHLORIDE 20 MG/ML INJECTION SOLUTION
SOLUTION
Status: COMPLETED
Start: 2021-01-17 | End: 2021-01-17

## 2021-01-17 RX ADMIN — INSULIN GLARGINE 20 UNITS: 100 INJECTION, SOLUTION SUBCUTANEOUS at 20:26

## 2021-01-17 RX ADMIN — LORAZEPAM 1 MG: 2 INJECTION INTRAMUSCULAR; INTRAVENOUS at 11:40

## 2021-01-17 RX ADMIN — ASPIRIN 81 MG 81 MG: 81 TABLET ORAL at 09:07

## 2021-01-17 RX ADMIN — Medication 52 MG: at 13:00

## 2021-01-17 RX ADMIN — FAMOTIDINE 20 MG: 10 INJECTION INTRAVENOUS at 13:53

## 2021-01-17 RX ADMIN — OXYCODONE HYDROCHLORIDE AND ACETAMINOPHEN 1000 MG: 500 TABLET ORAL at 17:02

## 2021-01-17 RX ADMIN — PROPOFOL 30 MCG/KG/MIN: 10 INJECTION, EMULSION INTRAVENOUS at 12:43

## 2021-01-17 RX ADMIN — INSULIN LISPRO 9 UNITS: 100 INJECTION, SOLUTION INTRAVENOUS; SUBCUTANEOUS at 09:07

## 2021-01-17 RX ADMIN — SODIUM CHLORIDE 5 MCG/KG/MIN: 900 INJECTION, SOLUTION INTRAVENOUS at 15:37

## 2021-01-17 RX ADMIN — CLOPIDOGREL BISULFATE 75 MG: 75 TABLET ORAL at 09:07

## 2021-01-17 RX ADMIN — ATORVASTATIN CALCIUM 80 MG: 80 TABLET, FILM COATED ORAL at 09:07

## 2021-01-17 RX ADMIN — MAGNESIUM GLUCONATE 500 MG ORAL TABLET 400 MG: 500 TABLET ORAL at 09:07

## 2021-01-17 RX ADMIN — Medication 20 ML: at 20:46

## 2021-01-17 RX ADMIN — METOPROLOL TARTRATE 50 MG: 50 TABLET, FILM COATED ORAL at 17:02

## 2021-01-17 RX ADMIN — ENOXAPARIN SODIUM 30 MG: 30 INJECTION SUBCUTANEOUS at 00:27

## 2021-01-17 RX ADMIN — ETOMIDATE 13.06 MG: 2 INJECTION INTRAVENOUS at 12:41

## 2021-01-17 RX ADMIN — ATRACURIUM BESYLATE 34.8 MG: 10 INJECTION, SOLUTION INTRAVENOUS at 15:35

## 2021-01-17 RX ADMIN — Medication 220 MG: at 09:07

## 2021-01-17 RX ADMIN — INSULIN LISPRO 12 UNITS: 100 INJECTION, SOLUTION INTRAVENOUS; SUBCUTANEOUS at 20:00

## 2021-01-17 RX ADMIN — Medication 1000 MG: at 09:07

## 2021-01-17 RX ADMIN — ENOXAPARIN SODIUM 30 MG: 30 INJECTION SUBCUTANEOUS at 13:53

## 2021-01-17 RX ADMIN — SUCCINYLCHOLINE CHLORIDE 52 MG: 20 INJECTION INTRAMUSCULAR; INTRAVENOUS at 13:00

## 2021-01-17 RX ADMIN — INSULIN LISPRO 6 UNITS: 100 INJECTION, SOLUTION INTRAVENOUS; SUBCUTANEOUS at 04:25

## 2021-01-17 RX ADMIN — INSULIN LISPRO 6 UNITS: 100 INJECTION, SOLUTION INTRAVENOUS; SUBCUTANEOUS at 17:00

## 2021-01-17 RX ADMIN — Medication 20 ML: at 13:31

## 2021-01-17 RX ADMIN — LORAZEPAM 1 MG: 2 INJECTION INTRAMUSCULAR; INTRAVENOUS at 05:13

## 2021-01-17 RX ADMIN — I.V. FAT EMULSION 250 ML: 20 EMULSION INTRAVENOUS at 17:02

## 2021-01-17 RX ADMIN — PROPOFOL 40 MCG/KG/MIN: 10 INJECTION, EMULSION INTRAVENOUS at 15:52

## 2021-01-17 RX ADMIN — INSULIN LISPRO 9 UNITS: 100 INJECTION, SOLUTION INTRAVENOUS; SUBCUTANEOUS at 11:53

## 2021-01-17 RX ADMIN — PROPOFOL 30 MCG/KG/MIN: 10 INJECTION, EMULSION INTRAVENOUS at 20:14

## 2021-01-17 RX ADMIN — POTASSIUM CHLORIDE: 2 INJECTION, SOLUTION, CONCENTRATE INTRAVENOUS at 17:02

## 2021-01-17 RX ADMIN — INSULIN LISPRO 6 UNITS: 100 INJECTION, SOLUTION INTRAVENOUS; SUBCUTANEOUS at 00:27

## 2021-01-17 RX ADMIN — ROCURONIUM BROMIDE 52.3 MG: 10 INJECTION, SOLUTION INTRAVENOUS at 12:42

## 2021-01-17 RX ADMIN — Medication 20 ML: at 05:13

## 2021-01-17 RX ADMIN — FAMOTIDINE 20 MG: 10 INJECTION INTRAVENOUS at 20:45

## 2021-01-17 RX ADMIN — MIDAZOLAM 2 MG: 1 INJECTION INTRAMUSCULAR; INTRAVENOUS at 13:06

## 2021-01-17 RX ADMIN — Medication 100 MCG/HR: at 13:05

## 2021-01-17 RX ADMIN — MIDAZOLAM HYDROCHLORIDE 2 MG/HR: 5 INJECTION, SOLUTION INTRAMUSCULAR; INTRAVENOUS at 13:19

## 2021-01-17 RX ADMIN — METOPROLOL TARTRATE 50 MG: 50 TABLET, FILM COATED ORAL at 09:07

## 2021-01-17 NOTE — PROGRESS NOTES
RT paged regarding the need to prone patient. Atracurium injection ready to hang  Bolus drawn up in syringe. 12 Fr. Catheter placed in bladder    Padding at bedside for proning.

## 2021-01-17 NOTE — PROGRESS NOTES
Sats continue to worsen. RT at bedside. Per Dr. Maira Alvarado, prone and paralyze patint. Atracurium injection and bolus ordered. BIS and TOF to be placed prior to proning    Unable to place 16 Fr. Cath. Awaiting Coude cath.

## 2021-01-17 NOTE — PROGRESS NOTES
Critical Care Daily Progress Note: 1/17/2021  Admission Date: 1/6/2021     The patient's chart is reviewed and the patient is discussed with the staff. Admission Date: 1/6/2021     Length of Stay: 9 days     Background: 61 y.o. y/o male with acute hypoxemic respiratory failure secondary to COVID-19. Date of COVID-19 symptom onset: + COVID 01/05/21     Notable PMH: obesity, HTN, CAD, dyslipidemia, SWATI, DM, GERD     Drug Allergies: Allergies   Allergen Reactions    Latex Swelling       Had a purcell catheter with swelling of urethra. Only known latex issue in past. Wife remembers this now    Hydrocodone-Acetaminophen Itching       Wife remembers this allergy    Tessalon Perles [Benzonatate] Unknown (comments)      24 Hour events: Remains on CPAP 10. FIO2 at 100%. Subjective: Worse with RR 40s, abdominal breathing, still on 75%, but looks worse overall. He is sleepy, but agreed to intubation.     Current Facility-Administered Medications   Medication Dose Route Frequency    etomidate (AMIDATE) 2 mg/mL injection        succinylcholine chloride 200 mg/10 mL (20 mg/mL) 200 mg/10 ml syringe        etomidate (AMIDATE) 2 mg/mL injection 13.06 mg  0.15 mg/kg IntraVENous ONCE    succinylcholine (ANECTINE) injection 52 mg  0.6 mg/kg IntraVENous NOW    rocuronium injection 52.3 mg  0.6 mg/kg IntraVENous NOW    NOREPINephrine (LEVOPHED) 4 mg in 5% dextrose 250 mL infusion  0.5-16 mcg/min IntraVENous TITRATE    propofol (DIPRIVAN) 10 mg/mL infusion  0-50 mcg/kg/min IntraVENous TITRATE    insulin lispro (HUMALOG) injection   SubCUTAneous Q4H    TPN ADULT - dextrose 5% amino acid 4.25%   IntraVENous QPM    LORazepam (ATIVAN) injection 1 mg  1 mg IntraVENous Q4H PRN    morphine injection 2 mg  2 mg IntraVENous Q4H PRN    fat emulsion 20% (LIPOSYN, INTRAlipid) infusion 250 mL  250 mL IntraVENous QPM    NUTRITIONAL SUPPORT ELECTROLYTE PRN ORDERS   Does Not Apply PRN    insulin glargine (LANTUS) injection 25 Units  25 Units SubCUTAneous QHS    sodium chloride (NS) flush 20 mL  20 mL InterCATHeter Q8H    sodium chloride (NS) flush 20 mL  20 mL InterCATHeter PRN    loperamide (IMODIUM) capsule 2 mg  2 mg Oral Q4H PRN    enoxaparin (LOVENOX) injection 30 mg  30 mg SubCUTAneous Q12H    polyethylene glycol (MIRALAX) packet 17 g  17 g Oral DAILY PRN    promethazine (PHENERGAN) tablet 12.5 mg  12.5 mg Oral Q6H PRN    Or    ondansetron (ZOFRAN) injection 4 mg  4 mg IntraVENous Q6H PRN    guaiFENesin-dextromethorphan (ROBITUSSIN DM) 100-10 mg/5 mL syrup 10 mL  10 mL Oral Q4H PRN    aspirin chewable tablet 81 mg  81 mg Oral DAILY    atorvastatin (LIPITOR) tablet 80 mg  80 mg Oral DAILY    clopidogreL (PLAVIX) tablet 75 mg  75 mg Oral DAILY    magnesium oxide (MAG-OX) tablet 400 mg  400 mg Oral DAILY    metoprolol tartrate (LOPRESSOR) tablet 50 mg  50 mg Oral BID    dextrose 40% (GLUTOSE) oral gel 1 Tube  15 g Oral PRN    glucagon (GLUCAGEN) injection 1 mg  1 mg IntraMUSCular PRN    dextrose (D50W) injection syrg 12.5-25 g  25-50 mL IntraVENous PRN    0.9% sodium chloride infusion 250 mL  250 mL IntraVENous PRN    albuterol (PROVENTIL HFA, VENTOLIN HFA, PROAIR HFA) inhaler 2 Puff  2 Puff Inhalation Q4H PRN    ascorbic acid (vitamin C) (VITAMIN C) tablet 1,000 mg  1,000 mg Oral BID    zinc sulfate tablet 220 mg  220 mg Oral DAILY    influenza vaccine 2020-21 (6 mos+)(PF) (FLUARIX/FLULAVAL/FLUZONE QUAD) injection 0.5 mL  0.5 mL IntraMUSCular PRIOR TO DISCHARGE    hydrALAZINE (APRESOLINE) 20 mg/mL injection 20 mg  20 mg IntraVENous Q6H PRN    acetaminophen (TYLENOL) tablet 650 mg  650 mg Oral Q6H PRN     Review of Systems  Constitutional:  negative for fever, chills, sweats  Cardiovascular:  negative for chest pain, palpitations, syncope, edema  Gastrointestinal:  negative for dysphagia, reflux, vomiting, diarrhea, abdominal pain, or melena  Neurologic:  negative for focal weakness, numbness, headache    Objective:     Vitals:    01/17/21 0930 01/17/21 1000 01/17/21 1140 01/17/21 1154   BP:  (!) 152/87 (!) 168/86    Pulse:  73 85    Resp:  (!) 31 (!) 40    Temp:       SpO2: 93% 95% 94% 92%   Weight:       Height:           Intake/Output Summary (Last 24 hours) at 1/17/2021 1221  Last data filed at 1/17/2021 0800  Gross per 24 hour   Intake 3942.41 ml   Output 1155 ml   Net 2787.41 ml     Physical Exam:          Constitutional:  Comfortable on CPAP 10  EENMT:  Sclera clear, pupils equal, oral mucosa moist  Respiratory: bilateral crackles  Cardiovascular:  RRR with no M,G,R;  Gastrointestinal:  soft with no tenderness; positive bowel sounds present  Musculoskeletal:  warm with no cyanosis, no lower extremity edema  Skin:  no jaundice or ecchymosis  Neurologic: no gross neuro deficits     Psychiatric:  alert and oriented x 3     LINES:    PICC    DRIPS:    none    CXR:  1/17: ?improved        Ventilator Settings  Mode FIO2 Rate Tidal Volume Pressure PEEP      75 %                 Peak airway pressure:     Minute ventilation: 41 l/min     ABG:   Recent Labs     01/15/21  1529 01/15/21  0439   PHI 7.53* 7.47*   PCO2I 30.7* 30.4*   PO2I 90 66*   HCO3I 25.5 22.2     LAB  Recent Labs     01/17/21  1132 01/17/21  0859 01/17/21  0333 01/16/21  2355 01/16/21  1949   GLUCPOC 273* 266* 214* 243* 344*     Recent Labs     01/17/21  0326 01/16/21  0324 01/15/21  0356   WBC 16.2* 14.2* 21.1*   HGB 15.1 15.2 16.4   HCT 43.9 44.2 48.3    151 253   INR 1.1 1.1 1.1     Recent Labs     01/17/21  0326 01/16/21  0324 01/15/21  0356   * 137* 139   K 4.0 4.1 4.2    106 106   CO2 22 25 26   * 220* 153*   BUN 28* 29* 27*   CREA 0.82 0.84 1.15   MG  --   --  2.2   PHOS  --   --  3.0   CA 8.5 8.4 8.4   ALB  --  1.9*  --      No results for input(s): LCAD, LAC in the last 72 hours.     Patient Active Problem List   Diagnosis Code    Obesity E66.9    Hypertension I10    Bradycardia R00.1    PVC (premature ventricular contraction) I49.3   • CAD (coronary artery disease) I25.10   • Dyslipidemia, goal LDL below 70 E78.5   • SWATI (acute kidney injury) (HCC) N17.9   • Acute hypoxemic respiratory failure (HCC) J96.01   • Type 2 diabetes mellitus with hyperosmolarity without nonketotic hyperglycemic-hyperosmolar coma (HCC) E11.00   • Pneumonia due to COVID-19 virus U07.1, J12.82   • Hypernatremia E87.0     Assessment:  (Medical Decision Making)     Hospital Problems  Date Reviewed: 6/3/2020          Codes Class Noted POA    Hypernatremia ICD-10-CM: E87.0  ICD-9-CM: 276.0  1/8/2021 Unknown    Resolved    SWATI (acute kidney injury) ([facility]) ICD-10-CM: N17.9  ICD-9-CM: 584.9  1/7/2021 Unknown    Cr 1.15    Acute hypoxemic respiratory failure (HCC) ICD-10-CM: J96.01  ICD-9-CM: 518.81  1/7/2021 Unknown    Back on 100% CPAP 10.     Type 2 diabetes mellitus with hyperosmolarity without nonketotic hyperglycemic-hyperosmolar coma (HCC) ICD-10-CM: E11.00  ICD-9-CM: 250.20  1/7/2021 Unknown        * (Principal) Pneumonia due to COVID-19 virus ICD-10-CM: U07.1, J12.82  ICD-9-CM: 480.8  1/7/2021 Unknown    Severe. Remains on decadron.     CAD (coronary artery disease) ICD-10-CM: I25.10  ICD-9-CM: 414.00  10/28/2016 Yes    Overview Signed 10/28/2016  8:05 AM by Patricia Carter PA     10-27-06 [facility] 90% LAD s/p 2.25 x 20 Synergy drug-eluting stent  (CS)             Obesity ICD-10-CM: E66.9  ICD-9-CM: 278.00  10/6/2015 Yes            Plan:  (Medical Decision Making)     Impression: 60 y.o. y/o male with acute hypoxemic respiratory failure secondary to COVID-19.  NEURO:   1. Sedation: Versed gtt  2. Analgesia: Fentanyl gtt  3. Paralytics: consider for severe hypoxemia or high ventilator pressures, poor synchrony with mechanical ventilation  CV:   1. Volume Status: avoid fluids when possible. Gentle fluid bolus vs pressors for hypotension. Lasix if normotensive nd hypoxemic. NO MAINTENANCE FLUIDS  PULM:   1. Acute  hypoxemic/hypercapneic respiratory failure:  Intubate today  2. Severe ARDS 2/2 COVID: Low Tidal Volume ventilation, goal 6cc per kg (Ideal body weight: 68.4 kg (150 lb 12.7 oz) x 6 = 420). Attempt to limit driving pressure to 70JO/J3Y or less. Allow permissive hypercapnia/acidosis to pH 7.25 if needed to achieve above. RENAL:  1. SWATI: frequent finding in COVID-19. Goal of slightly hypovolemic balance with fluids and Lasix. Early nephrology consult if need for dialysis is suspected  GI: NPO, PPI prophy  1. Nutrition: TF tomorrow if stable  HEME: no acute issues  1. Anemia: transfuse Hg <7  ID: WBC elevated, afebrile. 1. COVID-19: COVID meds as above, multi-D rounds with pharmacy to optimize meds with noted contraindications  ENDO: not on chronic steroids  1. DM: sliding scale insulin, increase lantus to 20 Q12  Skin: no decub, turns, preventive care  Prophy: Lovenox, H2B    Full Code    Updated sister by phone    The patient is critically ill with respiratory failure, circulatory failure and requires high complexity decision making for assessment and support including frequent ventilator adjustment , frequent evaluation and titration of therapies , application of advanced monitoring technologies and extensive interpretation of multiple databases.   Cumulative time devoted to patient care services by me for day of service -Zak Valdivia MD

## 2021-01-17 NOTE — PROGRESS NOTES
Comprehensive Nutrition Assessment  This assessment was completed remotely   Type and Reason for Visit: Reassess  TPN Management (Pulmonary)    Nutrition Recommendations/Plan: If FT placed and levophed dose is < 10 mcg/min, suggest start trophic TF of Vital AF 1.2 at 20 ml/hr with 40 ml water flush every 4 hrs  · Parenteral Nutrition:  · Change TPN   · 1L/d of 15%DEX/ 5%AA at 44ml/hr with 250 ml 20% Lipids daily  · To provide: 1210 kcal/d (69% of needs), 50 grams of protein/d (48% of needs), 150 grams of CHO/d and 1330 ml of total volume/d  · Lytes/L:   · Sodium 100 meq (100 meq NaCl), Potassium 40 meq (10 meq KCl, 30 meq Kphos)  · Other additives: MTE, MVI  · TPN to remain insulin free. · Vitamin and Mineral Supplement Therapy:  · Electrolyte management replacement protocol active. · Labs:   · BMP daily, Phos and Mg MWF. Malnutrition Assessment:  Malnutrition Status: At risk for malnutrition (specify)  Context: Acute illness  Findings of clinical characteristics of malnutrition:   Energy Intake:  Mild decrease in energy intake (specify)(Decrease reported for four days)  Weight Loss:  (89% UBW per EMR, pt states 40# loss over 4 days (15% based on stated # and CBW))       Nutrition Assessment:   Nutrition History: 1/7/2021: Pt reports inability to tolerate any food or liquids for 4 days PTA. Indicates vomiting with all po attempts during this time      Nutrition Background: PMH remarkable for CAD, GERD, HTN, MO. Admitted with Coivd 19, new onset DM with hyperglycemic hyperosmolar state, SWATI on CKD, lactic acidosis. Daily Update:  RN reports pt can quickly take all his po meds at one time but desats for the short time that the mask is off. Therefore he can tolerate the mask off for po intake including for an ONS or FT placement. Since phone call to RN this AMt, pt has been intubated. Therefore could consider start trophic feedings if hemodynamically stable.   Abdominal Status (last documented): Intact abdomen with Active  bowel sounds. Last BM 01/16/21. Pertinent Medications: Vitamin C, Lantus (25 units qhs), ssi ( received 39 units yesterday and 30 units thus far today), prn immodium ( 1 dose given 1-15), MgOxide, prn miralax, zinc, pepcid added 1/17  Drips: Levophed and Diprivan added 1/17( no doses yet specified)  Pertinent Labs:   Lab Results   Component Value Date/Time    Sodium 136 (L) 01/17/2021 03:26 AM    Potassium 4.0 01/17/2021 03:26 AM    Chloride 106 01/17/2021 03:26 AM    CO2 22 01/17/2021 03:26 AM    Anion gap 8 01/17/2021 03:26 AM    Glucose 237 (H) 01/17/2021 03:26 AM    Glucose 103 09/16/2008 05:55 AM    BUN 28 (H) 01/17/2021 03:26 AM    Creatinine 0.82 01/17/2021 03:26 AM    Calcium 8.5 01/17/2021 03:26 AM    Albumin 1.9 (L) 01/16/2021 03:24 AM    Phosphorus 3.0 01/15/2021 03:56 AM   Current TPN contains 75 meq Na/L  POC glucoses: 214-344 -past 24 hrs. SSI chnaged from normal sensitivity every 6 hrs to very resistant every 4 hr on 1/15. CHO intake from TPN maintained at 100 grams/d. Would not add insulin to TPN since pt is on Lantus and SSI and last dose of decadron given 1/16 so expect glucoses may start to improve. Nutrition Related Findings:   double lumen PICC (1/13)      Current Nutrition Therapies:  Current Parenteral Nutrition Orders:  · Type and Formula: 4.25AA/10DEX   · Lipids: 250ml  · Duration: Continuous  · Rate/Volume: 86 ml/hr or 2L/d  · Current PN Order Provides: 1180 kcal/d (67% of needs), 85 grams of protein/d (81% of needs), 100 grams of CHO/d and 2309 ml of total volume/d ( 100% of needs      Current Intake:   Average Meal Intake: 0% Average Supplement Intake: None ordered      Anthropometric Measures:  Height: 5' 8\" (172.7 cm)  Current Body Wt: 87.1 kg (192 lb 0.3 oz), Weight source: Not specified  BMI: 29.2, Overweight (BMI 25.0-29. 9)  Admission Body Weight: 235 lb(\"Estimated\")  Usual Body Wt: 101.6 kg (224 lb)([facility] 6/3/2020 FP office; pt stated # unable to verify ),        Estimated Daily Nutrient Needs:  Energy (kcal/day): 9502-9983 kcal/d ((20-25), Weight Used: Current(87.1 kg on 1/17/2021))  Protein (g/day): 105-131 grams/d (1.2 to 1.5 grams/kg) Weight Used: (Current(87.1 kg))  Fluid (ml/day):   (1 ml/kcal)    Nutrition Diagnosis:   · Inadequate oral intake related to impaired respiratory function as evidenced by (unable to take po d/t desats swhen CPAP mask removed, PN dependent)    Nutrition Interventions:   Food and/or Nutrient Delivery: Continue current diet, Modify parenteral nutrition  Coordination of Nutrition Care: Continue to monitor while inpatient  Plan of Care discussed with Quintin Denton RN    Goals:   Previous Goal Met: Progressing toward goal(s)  Active Goal: Meet >75% estimated nutrition needs within 5 days    Nutrition Monitoring and Evaluation:   Behavioral-Environmental Outcomes: Knowledge or skill  Food/Nutrient Intake Outcomes: Food and nutrient intake, Parenteral nutrition intake/tolerance  Physical Signs/Symptoms Outcomes: Biochemical data, GI status    Discharge Planning:     Too soon to determine    Blanca Abraham RD, LD, [facility] [phone]    Disaster Mode active

## 2021-01-17 NOTE — PROGRESS NOTES
01/17/21 1314   Vitals   Pulse (Heart Rate) (!) 136   Resp Rate 24   O2 Sat (%) 94 %   BP (!) 211/109   MAP (Monitor) 136     Patient remains hypertensive after propofol infusing @ 45 mcg. Orders for 2 mg IVP Versed and versed infusion to be hung per Dr. Seferino Lenz.

## 2021-01-17 NOTE — PROGRESS NOTES
Patient was intubated with a number 8.0 ET Tube. Tube placement verified by auscultation. ET Tube is secured at the 24 cm darin at the lip and on the right side. Patient was intubated by Dr. Soren Meza on the 1 attempt. Breath sounds are diminished. Patient is Negative for subcutaneous air and chest excursion is symmetric. Trachea is midline. Patient is also Negative for cyanosis and is Negative for pitting edema. Patient placed on ventilator on documented settings. All alarms are set and audible. Resuscitation bag is at the head of the bed. Ventilator Settings  Mode FIO2 Rate Tidal Volume Pressure PEEP I:E Ratio   PRVC  100 %   24 500 ml     12 cm H20  1:2.1      Peak airway pressure: 30 cm H2O   Minute ventilation: 11.4 l/min     ABG: No results for input(s): PH, PCO2, PO2, HCO3 in the last 72 hours.

## 2021-01-17 NOTE — PROGRESS NOTES
Patient with increased WOB and tachypnea. States he feels anxious. Lungs are diminished which are not different from previous assessment. RT at bedside. Patient states he would like some ativan.      Ativan 1 mg IV given

## 2021-01-18 ENCOUNTER — APPOINTMENT (OUTPATIENT)
Dept: GENERAL RADIOLOGY | Age: 61
DRG: 004 | End: 2021-01-18
Attending: ANESTHESIOLOGY
Payer: COMMERCIAL

## 2021-01-18 ENCOUNTER — APPOINTMENT (OUTPATIENT)
Dept: ULTRASOUND IMAGING | Age: 61
DRG: 004 | End: 2021-01-18
Attending: ANESTHESIOLOGY
Payer: COMMERCIAL

## 2021-01-18 ENCOUNTER — APPOINTMENT (OUTPATIENT)
Dept: GENERAL RADIOLOGY | Age: 61
DRG: 004 | End: 2021-01-18
Attending: INTERNAL MEDICINE
Payer: COMMERCIAL

## 2021-01-18 LAB
ANION GAP SERPL CALC-SCNC: 6 MMOL/L (ref 7–16)
APTT PPP: 31.7 SEC (ref 24.1–35.1)
ARTERIAL PATENCY WRIST A: YES
BASE DEFICIT BLD-SCNC: 4 MMOL/L
BASOPHILS # BLD: 0 K/UL (ref 0–0.2)
BASOPHILS NFR BLD: 0 % (ref 0–2)
BDY SITE: ABNORMAL
BUN SERPL-MCNC: 34 MG/DL (ref 8–23)
CALCIUM SERPL-MCNC: 8.4 MG/DL (ref 8.3–10.4)
CHLORIDE SERPL-SCNC: 105 MMOL/L (ref 98–107)
CO2 BLD-SCNC: 24 MMOL/L
CO2 SERPL-SCNC: 25 MMOL/L (ref 21–32)
COLLECT TIME,HTIME: 302
CREAT SERPL-MCNC: 1.23 MG/DL (ref 0.8–1.5)
D DIMER PPP FEU-MCNC: 2.44 UG/ML(FEU)
DIFFERENTIAL METHOD BLD: ABNORMAL
EOSINOPHIL # BLD: 0.1 K/UL (ref 0–0.8)
EOSINOPHIL NFR BLD: 1 % (ref 0.5–7.8)
ERYTHROCYTE [DISTWIDTH] IN BLOOD BY AUTOMATED COUNT: 12.7 % (ref 11.9–14.6)
EXHALED MINUTE VOLUME, VE: 15.8 L/MIN
FIBRINOGEN PPP-MCNC: 729 MG/DL (ref 190–501)
GAS FLOW.O2 O2 DELIVERY SYS: ABNORMAL L/MIN
GAS FLOW.O2 SETTING OXYMISER: 32 BPM
GLUCOSE BLD STRIP.AUTO-MCNC: 204 MG/DL (ref 65–100)
GLUCOSE BLD STRIP.AUTO-MCNC: 211 MG/DL (ref 65–100)
GLUCOSE BLD STRIP.AUTO-MCNC: 248 MG/DL (ref 65–100)
GLUCOSE BLD STRIP.AUTO-MCNC: 278 MG/DL (ref 65–100)
GLUCOSE BLD STRIP.AUTO-MCNC: 299 MG/DL (ref 65–100)
GLUCOSE BLD STRIP.AUTO-MCNC: 344 MG/DL (ref 65–100)
GLUCOSE SERPL-MCNC: 325 MG/DL (ref 65–100)
HCO3 BLD-SCNC: 22.7 MMOL/L (ref 22–26)
HCT VFR BLD AUTO: 42.7 % (ref 41.1–50.3)
HGB BLD-MCNC: 14.1 G/DL (ref 13.6–17.2)
IMM GRANULOCYTES # BLD AUTO: 0.2 K/UL (ref 0–0.5)
IMM GRANULOCYTES NFR BLD AUTO: 2 % (ref 0–5)
INR PPP: 1.2
LYMPHOCYTES # BLD: 0.6 K/UL (ref 0.5–4.6)
LYMPHOCYTES NFR BLD: 5 % (ref 13–44)
MAGNESIUM SERPL-MCNC: 2.2 MG/DL (ref 1.8–2.4)
MCH RBC QN AUTO: 29.3 PG (ref 26.1–32.9)
MCHC RBC AUTO-ENTMCNC: 33 G/DL (ref 31.4–35)
MCV RBC AUTO: 88.8 FL (ref 79.6–97.8)
MONOCYTES # BLD: 0.3 K/UL (ref 0.1–1.3)
MONOCYTES NFR BLD: 2 % (ref 4–12)
NEUTS SEG # BLD: 10.8 K/UL (ref 1.7–8.2)
NEUTS SEG NFR BLD: 90 % (ref 43–78)
NRBC # BLD: 0 K/UL (ref 0–0.2)
O2/TOTAL GAS SETTING VFR VENT: 85 %
PCO2 BLD: 48.8 MMHG (ref 35–45)
PEEP RESPIRATORY: 12 CMH2O
PH BLD: 7.28 [PH] (ref 7.35–7.45)
PHOSPHATE SERPL-MCNC: 4 MG/DL (ref 2.3–3.7)
PLATELET # BLD AUTO: 130 K/UL (ref 150–450)
PMV BLD AUTO: 10.3 FL (ref 9.4–12.3)
PO2 BLD: 85 MMHG (ref 75–100)
POTASSIUM SERPL-SCNC: 5 MMOL/L (ref 3.5–5.1)
PROCALCITONIN SERPL-MCNC: 1.38 NG/ML
PROTHROMBIN TIME: 15.1 SEC (ref 12.5–14.7)
RBC # BLD AUTO: 4.81 M/UL (ref 4.23–5.6)
SAO2 % BLD: 95 % (ref 95–98)
SERVICE CMNT-IMP: ABNORMAL
SERVICE CMNT-IMP: ABNORMAL
SODIUM SERPL-SCNC: 136 MMOL/L (ref 136–145)
SPECIMEN TYPE: ABNORMAL
TROPONIN-HIGH SENSITIVITY: 107.1 PG/ML (ref 0–14)
VENTILATION MODE VENT: ABNORMAL
VT SETTING VENT: 500 ML
WBC # BLD AUTO: 12 K/UL (ref 4.3–11.1)

## 2021-01-18 PROCEDURE — 94003 VENT MGMT INPAT SUBQ DAY: CPT

## 2021-01-18 PROCEDURE — 82962 GLUCOSE BLOOD TEST: CPT

## 2021-01-18 PROCEDURE — 74011250636 HC RX REV CODE- 250/636: Performed by: ANESTHESIOLOGY

## 2021-01-18 PROCEDURE — 74011636637 HC RX REV CODE- 636/637: Performed by: ANESTHESIOLOGY

## 2021-01-18 PROCEDURE — 85025 COMPLETE CBC W/AUTO DIFF WBC: CPT

## 2021-01-18 PROCEDURE — 93970 EXTREMITY STUDY: CPT

## 2021-01-18 PROCEDURE — 87040 BLOOD CULTURE FOR BACTERIA: CPT

## 2021-01-18 PROCEDURE — 87086 URINE CULTURE/COLONY COUNT: CPT

## 2021-01-18 PROCEDURE — 36415 COLL VENOUS BLD VENIPUNCTURE: CPT

## 2021-01-18 PROCEDURE — 74011000258 HC RX REV CODE- 258: Performed by: INTERNAL MEDICINE

## 2021-01-18 PROCEDURE — 85384 FIBRINOGEN ACTIVITY: CPT

## 2021-01-18 PROCEDURE — 83735 ASSAY OF MAGNESIUM: CPT

## 2021-01-18 PROCEDURE — 74011000258 HC RX REV CODE- 258: Performed by: ANESTHESIOLOGY

## 2021-01-18 PROCEDURE — 65610000006 HC RM INTENSIVE CARE

## 2021-01-18 PROCEDURE — 03HY32Z INSERTION OF MONITORING DEVICE INTO UPPER ARTERY, PERCUTANEOUS APPROACH: ICD-10-PCS | Performed by: ANESTHESIOLOGY

## 2021-01-18 PROCEDURE — 2709999900 HC NON-CHARGEABLE SUPPLY

## 2021-01-18 PROCEDURE — 71045 X-RAY EXAM CHEST 1 VIEW: CPT

## 2021-01-18 PROCEDURE — 74011000250 HC RX REV CODE- 250: Performed by: INTERNAL MEDICINE

## 2021-01-18 PROCEDURE — 84100 ASSAY OF PHOSPHORUS: CPT

## 2021-01-18 PROCEDURE — 74011250636 HC RX REV CODE- 250/636: Performed by: INTERNAL MEDICINE

## 2021-01-18 PROCEDURE — P9047 ALBUMIN (HUMAN), 25%, 50ML: HCPCS | Performed by: ANESTHESIOLOGY

## 2021-01-18 PROCEDURE — 85730 THROMBOPLASTIN TIME PARTIAL: CPT

## 2021-01-18 PROCEDURE — 74011636637 HC RX REV CODE- 636/637: Performed by: INTERNAL MEDICINE

## 2021-01-18 PROCEDURE — 74011000250 HC RX REV CODE- 250: Performed by: ANESTHESIOLOGY

## 2021-01-18 PROCEDURE — 36600 WITHDRAWAL OF ARTERIAL BLOOD: CPT

## 2021-01-18 PROCEDURE — 74011250637 HC RX REV CODE- 250/637: Performed by: INTERNAL MEDICINE

## 2021-01-18 PROCEDURE — 82803 BLOOD GASES ANY COMBINATION: CPT

## 2021-01-18 PROCEDURE — 85610 PROTHROMBIN TIME: CPT

## 2021-01-18 PROCEDURE — 5A1D70Z PERFORMANCE OF URINARY FILTRATION, INTERMITTENT, LESS THAN 6 HOURS PER DAY: ICD-10-PCS | Performed by: INTERNAL MEDICINE

## 2021-01-18 PROCEDURE — 74011250637 HC RX REV CODE- 250/637: Performed by: HOSPITALIST

## 2021-01-18 PROCEDURE — 80048 BASIC METABOLIC PNL TOTAL CA: CPT

## 2021-01-18 PROCEDURE — 84145 PROCALCITONIN (PCT): CPT

## 2021-01-18 PROCEDURE — 85379 FIBRIN DEGRADATION QUANT: CPT

## 2021-01-18 PROCEDURE — 84484 ASSAY OF TROPONIN QUANT: CPT

## 2021-01-18 RX ORDER — NOREPINEPHRINE BITARTRATE/D5W 4MG/250ML
PLASTIC BAG, INJECTION (ML) INTRAVENOUS
Status: ACTIVE
Start: 2021-01-18 | End: 2021-01-18

## 2021-01-18 RX ORDER — ALBUMIN HUMAN 250 G/1000ML
25 SOLUTION INTRAVENOUS ONCE
Status: COMPLETED | OUTPATIENT
Start: 2021-01-18 | End: 2021-01-18

## 2021-01-18 RX ORDER — SODIUM CHLORIDE 9 MG/ML
8 INJECTION, SOLUTION INTRAVENOUS
Status: DISCONTINUED | OUTPATIENT
Start: 2021-01-18 | End: 2021-01-23

## 2021-01-18 RX ORDER — INSULIN GLARGINE 100 [IU]/ML
30 INJECTION, SOLUTION SUBCUTANEOUS EVERY 12 HOURS
Status: DISCONTINUED | OUTPATIENT
Start: 2021-01-18 | End: 2021-01-22

## 2021-01-18 RX ORDER — CALCIUM GLUCONATE 20 MG/ML
2 INJECTION, SOLUTION INTRAVENOUS ONCE
Status: DISPENSED | OUTPATIENT
Start: 2021-01-18 | End: 2021-01-19

## 2021-01-18 RX ORDER — CALCIUM GLUCONATE 20 MG/ML
2 INJECTION, SOLUTION INTRAVENOUS ONCE
Status: COMPLETED | OUTPATIENT
Start: 2021-01-18 | End: 2021-01-18

## 2021-01-18 RX ORDER — POLYETHYLENE GLYCOL 3350 17 G/17G
17 POWDER, FOR SOLUTION ORAL DAILY
Status: DISCONTINUED | OUTPATIENT
Start: 2021-01-19 | End: 2021-01-24

## 2021-01-18 RX ADMIN — CLOPIDOGREL BISULFATE 75 MG: 75 TABLET ORAL at 08:54

## 2021-01-18 RX ADMIN — ALBUMIN (HUMAN) 25 G: 0.25 INJECTION, SOLUTION INTRAVENOUS at 17:41

## 2021-01-18 RX ADMIN — INSULIN LISPRO 9 UNITS: 100 INJECTION, SOLUTION INTRAVENOUS; SUBCUTANEOUS at 04:00

## 2021-01-18 RX ADMIN — Medication 400 MG: at 08:54

## 2021-01-18 RX ADMIN — ENOXAPARIN SODIUM 30 MG: 30 INJECTION SUBCUTANEOUS at 00:39

## 2021-01-18 RX ADMIN — Medication 20 ML: at 22:00

## 2021-01-18 RX ADMIN — Medication 20 ML: at 14:38

## 2021-01-18 RX ADMIN — FAMOTIDINE 20 MG: 10 INJECTION INTRAVENOUS at 09:00

## 2021-01-18 RX ADMIN — INSULIN LISPRO 9 UNITS: 100 INJECTION, SOLUTION INTRAVENOUS; SUBCUTANEOUS at 22:38

## 2021-01-18 RX ADMIN — INSULIN GLARGINE 30 UNITS: 100 INJECTION, SOLUTION SUBCUTANEOUS at 22:36

## 2021-01-18 RX ADMIN — Medication 75 MCG/HR: at 19:08

## 2021-01-18 RX ADMIN — ACETAMINOPHEN 650 MG: 325 TABLET, FILM COATED ORAL at 15:43

## 2021-01-18 RX ADMIN — ATORVASTATIN CALCIUM 80 MG: 40 TABLET, FILM COATED ORAL at 08:54

## 2021-01-18 RX ADMIN — ASCORBIC ACID, VITAMIN A PALMITATE, CHOLECALCIFEROL, THIAMINE HYDROCHLORIDE, RIBOFLAVIN-5 PHOSPHATE SODIUM, PYRIDOXINE HYDROCHLORIDE, NIACINAMIDE, DEXPANTHENOL, ALPHA-TOCOPHEROL ACETATE, VITAMIN K1, FOLIC ACID, BIOTIN, CYANOCOBALAMIN: 200; 3300; 200; 6; 3.6; 6; 40; 15; 10; 150; 600; 60; 5 INJECTION, SOLUTION INTRAVENOUS at 18:38

## 2021-01-18 RX ADMIN — Medication 220 MG: at 08:54

## 2021-01-18 RX ADMIN — INSULIN LISPRO 6 UNITS: 100 INJECTION, SOLUTION INTRAVENOUS; SUBCUTANEOUS at 12:00

## 2021-01-18 RX ADMIN — FAMOTIDINE 20 MG: 10 INJECTION INTRAVENOUS at 22:39

## 2021-01-18 RX ADMIN — SODIUM CHLORIDE 6.6 ML/HR: 900 INJECTION, SOLUTION INTRAVENOUS at 19:05

## 2021-01-18 RX ADMIN — OXYCODONE HYDROCHLORIDE AND ACETAMINOPHEN 1000 MG: 500 TABLET ORAL at 08:54

## 2021-01-18 RX ADMIN — ACETAMINOPHEN 650 MG: 325 TABLET, FILM COATED ORAL at 09:08

## 2021-01-18 RX ADMIN — INSULIN GLARGINE 20 UNITS: 100 INJECTION, SOLUTION SUBCUTANEOUS at 08:00

## 2021-01-18 RX ADMIN — INSULIN LISPRO 6 UNITS: 100 INJECTION, SOLUTION INTRAVENOUS; SUBCUTANEOUS at 16:42

## 2021-01-18 RX ADMIN — DEXTROSE MONOHYDRATE 4 MCG/MIN: 50 INJECTION, SOLUTION INTRAVENOUS at 09:55

## 2021-01-18 RX ADMIN — CALCIUM GLUCONATE 2 G: 20 INJECTION, SOLUTION INTRAVENOUS at 19:57

## 2021-01-18 RX ADMIN — Medication 20 ML: at 06:45

## 2021-01-18 RX ADMIN — SODIUM CHLORIDE 500 ML: 900 INJECTION, SOLUTION INTRAVENOUS at 12:00

## 2021-01-18 RX ADMIN — METOPROLOL TARTRATE 50 MG: 50 TABLET, FILM COATED ORAL at 08:54

## 2021-01-18 RX ADMIN — ASPIRIN 81 MG: 81 TABLET, CHEWABLE ORAL at 08:54

## 2021-01-18 RX ADMIN — ATRACURIUM BESYLATE 11 MCG/KG/MIN: 10 INJECTION, SOLUTION INTRAVENOUS at 23:30

## 2021-01-18 RX ADMIN — INSULIN LISPRO 6 UNITS: 100 INJECTION, SOLUTION INTRAVENOUS; SUBCUTANEOUS at 08:00

## 2021-01-18 RX ADMIN — INSULIN LISPRO 12 UNITS: 100 INJECTION, SOLUTION INTRAVENOUS; SUBCUTANEOUS at 00:00

## 2021-01-18 RX ADMIN — MIDAZOLAM HYDROCHLORIDE 2 MG/HR: 5 INJECTION, SOLUTION INTRAMUSCULAR; INTRAVENOUS at 19:08

## 2021-01-18 RX ADMIN — ENOXAPARIN SODIUM 30 MG: 30 INJECTION SUBCUTANEOUS at 14:33

## 2021-01-18 RX ADMIN — WATER 10 NG/KG/MIN: 1 SOLUTION INTRAVENOUS at 18:51

## 2021-01-18 NOTE — PROGRESS NOTES
LOS 11d  Pt remains in the BMT/ICU  02 demand currently Vent 100% Fi02  Gtts  Atracurium  Fentanyl gtt  midaz  norepi  Propofol    PPD ordered for discharge planning, Pt will need PT and OT eval when medically stable to participate. [facility] vs HH?   Will continue to follow for discharge planning needs  Please consult  if any new issues arise

## 2021-01-18 NOTE — PROGRESS NOTES
Ventilator check complete; patient has a #8. 0 ET tube secured at the 24 at the lip. Patient is sedated. Patient is not able to follow commands. Breath sounds are coarse and diminished. Trachea is midline, Negative for subcutaneous air, and chest excursion is symmetric. Patient is also Negative for cyanosis and is Negative for pitting edema. All alarms are set and audible. Resuscitation bag is at the head of the bed. Ventilator Settings  Mode FIO2 Rate Tidal Volume Pressure PEEP I:E Ratio   PRVC  100 %   32 500 ml     12 cm H20  1:1      Peak airway pressure: 27 cm H2O   Minute ventilation: 16.2 l/min     ABG: No results for input(s): PH, PCO2, PO2, HCO3 in the last 72 hours.       Destiny Round, RT

## 2021-01-18 NOTE — PROGRESS NOTES
Pt having a lot of ectopy. Dr. Katia Galo at bedside. Orders to obtain a Lucien English. MD notified of BGL= [phone].

## 2021-01-18 NOTE — PROCEDURES
Arterial Line Placement    Start time: 1/18/2021 1:26 PM  End time: 1/18/2021 1:36 PM  Performed by: Tanja Mahajan MD  Authorized by: Tanja Mahajan MD     Pre-Procedure  Indications:  Arterial pressure monitoring  Preanesthetic Checklist: patient identified, risks and benefits discussed, anesthesia consent, site marked, patient being monitored, timeout performed and patient being monitored    Timeout Time: 13:26        Procedure:   Prep:  Chlorhexidine  Seldinger Technique?: Yes    Orientation:  Left  Location:  Radial artery  Catheter size:  20 G  Number of attempts:  1    Assessment:   Post-procedure:  Line secured and sterile dressing applied  Patient Tolerance:  Patient tolerated the procedure well with no immediate complications  Comment:   Potential access site(s) were examined with ultrasound and acceptable patent access site selected as noted above. Needle path and artery access visualized in real time using ultrasound, an image of wire in vessel recorded for permanent record.

## 2021-01-18 NOTE — PROGRESS NOTES
Patient skin is clean, dry and intact. NO pressure injury noted. Damien Torres, AQUILINO dual skin assessment.

## 2021-01-18 NOTE — CONSULTS
Comprehensive Nutrition Assessment    Type and Reason for Visit: Reassess, Consult  Tube Feeding Management (Pulmonary)    Nutrition Recommendations/Plan:   · Parenteral Nutrition:  · Change TPN   · 1L/d of 15%DEX/ 5%AA at 43ml/hr, no lipids today  · To provide: 710 kcal/d (41% of needs), 50 grams of protein/d (48% of needs), 150 grams of CHO/d and 1037 ml of total volume/d  · Lytes/L:   · Sodium 100 meq (100 meq NaCl)  · Other additives: MTE, MVI  · TPN to remain insulin free  Enteral Nutrition:  Trophic Enteral Feeds do not advance:  Initiate Vital AF via NGT at 20ml/hour. Water flush 30ml Q4H  To provide 576 kcal (33% estimated calorie needs), 36 grams protein (34% estimated protein needs) and 569ml free fluid (<1ml/kcal). Parenteral and Enteral Nutrition:  · To provide 1286kcal (74% estimated calorie needs), 86gm protein (82% estimated protein needs), and 1606ml free fluid (<1ml/kcal)  Vitamin and Mineral Supplement Therapy:  Electrolyte management replacement protocol implemented. · Labs:   · BMP daily, Phos and Mg MWF.       Malnutrition Assessment:  Malnutrition Status: At risk for malnutrition (specify)  Context: Acute illness  Nutrition Assessment:   Nutrition History: 1/7/2021: Pt reports inability to tolerate any food or liquids for 4 days PTA. Indicates vomiting with all po attempts during this time      Nutrition Background: PMH remarkable for CAD, GERD, HTN, MO. Admitted with Coivd 19, new onset DM with hyperglycemic hyperosmolar state, SWATI on CKD, lactic acidosis. Daily Update:  Pt proned yesterday and became significantly hypotensive when unproned per MD note. Levophed started. Abdominal Status (last documented): Intact abdomen with Hypoactive  bowel sounds. Last BM 01/16/21.   Pertinent Medications: Pepcid, SSI (48units 1/17, 27units thus far today), Lantus (20 units; D/C 0856), Lantus (starting 2000; 30units Q12H), magnesium oxide (400mg)  Drips/IVF: Tracrium, Fentanyl, Versed, Levophed (6mcg/min at time of assessment), Propofol (off at time of assessment)  Pertinent Labs: Sodium 136, Potassium 5.0, Glucose 325, POC Glucose 1/18 (214-322), Phos 4.0, Magnesium 2.2    Nutrition Related Findings:   double lumen PICC (1/13), Intubated 1/17, NGT 1/17      Current Nutrition Therapies:  No diet orders on file  Current Parenteral Nutrition Orders:  · Type and Formula: 5%AA/15%DEX   · Lipids: 250ml  · Duration: Continuous  · Rate/Volume: 44ml/hr or 1L/day  · Current PN Order Provides: 1210 kcal/d (69% of needs), 50 grams of protein/d (48% of needs), 150 grams of CHO/d and 1330 ml of total volume/d   ·   Current Intake:   Average Meal Intake: NPO Average Supplement Intake: NPO      Anthropometric Measures:  Height: 5' 8\" (172.7 cm)  Current Body Wt: 87.1 kg (192 lb 0.3 oz)(1/17), Weight source: Not specified  BMI: 29.2, Overweight (BMI 25.0-29.9)  Admission Body Weight: 235 lb(\"Estimated\")  Ideal Body Wt: 154 lbs (70 kg), 130.3 %  Usual Body Wt: 101.6 kg (224 lb)([facility] 6/3/2020 FP office; pt stated # unable to verify ), Percent weight change: -10.4          Estimated Daily Nutrient Needs:  Energy (kcal/day): 7644-0483 kcal/d ((20-25), Weight Used: Current(87.1 kg on 1/17/2021))  Protein (g/day): 105-131 grams/d (1.2 to 1.5 grams/kg) Weight Used: (Current(87.1 kg))  Fluid (ml/day):   (1 ml/kcal)    Nutrition Diagnosis:   · Inadequate oral intake related to impaired respiratory function as evidenced by (unable to take po d/t desat swhen CPAP mask removed, PN dependent)    · Food & nutrition-related knowledge deficit related to endocrine dysfunction as evidenced by (new DM dx, A1C 12, minimal exposure to DM information.)    Nutrition Interventions:   Food and/or Nutrient Delivery: Continue current diet, Modify parenteral nutrition  Coordination of Nutrition Care: Continue to monitor while inpatient  Plan of Care discussed with Shaw    Goals:   Previous Goal Met: Progressing toward  goal(s)  Active Goal: Meet >75% estimated nutrition needs within 5 days    Nutrition Monitoring and Evaluation:   Behavioral-Environmental Outcomes: Knowledge or skill  Food/Nutrient Intake Outcomes: Enteral nutrition intake/tolerance, Parenteral nutrition intake/tolerance  Physical Signs/Symptoms Outcomes: Biochemical data, GI status    Discharge Planning:     Too soon to determine    Electronically signed by Dana Beauchamp MS, RDN, LD 1/18/2021 at 11:48 AM  Contact: [phone]    Disaster Mode active

## 2021-01-18 NOTE — PROGRESS NOTES
Critical Care Daily Progress Note: 1/18/2021  Admission Date: 1/6/2021     The patient's chart is reviewed and the patient is discussed with the staff. Admission Date: 1/6/2021     Length of Stay: 9 days     Background: 61 y.o. y/o male with acute hypoxemic respiratory failure secondary to COVID-19. Date of COVID-19 symptom onset: + COVID 01/05/21     Notable PMH: obesity, HTN, CAD, dyslipidemia, SWATI, DM, GERD     Drug Allergies: Allergies   Allergen Reactions    Latex Swelling       Had a purcell catheter with swelling of urethra. Only known latex issue in past. Wife remembers this now    Hydrocodone-Acetaminophen Itching       Wife remembers this allergy    Tessalon Perles [Benzonatate] Unknown (comments)      24 Hour events: Remains on CPAP 10. FIO2 at 100%. Subjective:      Intubated 1/17  proned 1/17 when unproned he became significantly hypotensive, starting Levo 6, sending eval given IVF bolus    Current Facility-Administered Medications   Medication Dose Route Frequency    white petrolatum-mineral oiL (LACRILUBE S.O.P.) ointment   Both Eyes Q4H PRN    NOREPINephrine (LEVOPHED) 4 mg/250 mL (16 mcg/mL) in dextrose 5% 250 mL infusion        insulin glargine (LANTUS) injection 30 Units  30 Units SubCUTAneous Q12H    NOREPINephrine (LEVOPHED) 4 mg in 5% dextrose 250 mL infusion  0.5-16 mcg/min IntraVENous TITRATE    propofol (DIPRIVAN) 10 mg/mL infusion  0-50 mcg/kg/min IntraVENous TITRATE    famotidine (PF) (PEPCID) 20 mg in 0.9% sodium chloride 10 mL injection  20 mg IntraVENous Q12H    TPN ADULT-CENTRAL - dextrose 15% amino acid 5%   IntraVENous QPM    fentaNYL in normal saline (pf) 25 mcg/mL infusion  0-200 mcg/hr IntraVENous TITRATE    midazolam in normal saline (VERSED) 1 mg/mL infusion  0-10 mg/hr IntraVENous TITRATE    atracurium (TRACRIUM) 1,000 mg in 0.9% sodium chloride 250 mL infusion  0-15 mcg/kg/min IntraVENous TITRATE    ascorbic acid (vitamin C) (VITAMIN C) tablet 1,000 mg  1,000 mg Per NG tube BID    aspirin chewable tablet 81 mg  81 mg Per NG tube DAILY    atorvastatin (LIPITOR) tablet 80 mg  80 mg Per NG tube DAILY    clopidogreL (PLAVIX) tablet 75 mg  75 mg Per NG tube DAILY    magnesium oxide (MAG-OX) tablet 400 mg  400 mg Per NG tube DAILY    metoprolol tartrate (LOPRESSOR) tablet 50 mg  50 mg Per NG tube BID    zinc sulfate tablet 220 mg  220 mg Per G Tube DAILY    insulin lispro (HUMALOG) injection   SubCUTAneous Q4H    LORazepam (ATIVAN) injection 1 mg  1 mg IntraVENous Q4H PRN    morphine injection 2 mg  2 mg IntraVENous Q4H PRN    fat emulsion 20% (LIPOSYN, INTRAlipid) infusion 250 mL  250 mL IntraVENous QPM    NUTRITIONAL SUPPORT ELECTROLYTE PRN ORDERS   Does Not Apply PRN    sodium chloride (NS) flush 20 mL  20 mL InterCATHeter Q8H    sodium chloride (NS) flush 20 mL  20 mL InterCATHeter PRN    loperamide (IMODIUM) capsule 2 mg  2 mg Oral Q4H PRN    enoxaparin (LOVENOX) injection 30 mg  30 mg SubCUTAneous Q12H    polyethylene glycol (MIRALAX) packet 17 g  17 g Oral DAILY PRN    promethazine (PHENERGAN) tablet 12.5 mg  12.5 mg Oral Q6H PRN    Or    ondansetron (ZOFRAN) injection 4 mg  4 mg IntraVENous Q6H PRN    guaiFENesin-dextromethorphan (ROBITUSSIN DM) 100-10 mg/5 mL syrup 10 mL  10 mL Oral Q4H PRN    dextrose 40% (GLUTOSE) oral gel 1 Tube  15 g Oral PRN    glucagon (GLUCAGEN) injection 1 mg  1 mg IntraMUSCular PRN    dextrose (D50W) injection syrg 12.5-25 g  25-50 mL IntraVENous PRN    0.9% sodium chloride infusion 250 mL  250 mL IntraVENous PRN    albuterol (PROVENTIL HFA, VENTOLIN HFA, PROAIR HFA) inhaler 2 Puff  2 Puff Inhalation Q4H PRN    influenza vaccine 2020-21 (6 mos+)(PF) (FLUARIX/FLULAVAL/FLUZONE QUAD) injection 0.5 mL  0.5 mL IntraMUSCular PRIOR TO DISCHARGE    hydrALAZINE (APRESOLINE) 20 mg/mL injection 20 mg  20 mg IntraVENous Q6H PRN    acetaminophen (TYLENOL) tablet 650 mg  650 mg Oral Q6H PRN     Review of Systems  Constitutional:  negative for fever, chills, sweats  Cardiovascular:  negative for chest pain, palpitations, syncope, edema  Gastrointestinal:  negative for dysphagia, reflux, vomiting, diarrhea, abdominal pain, or melena  Neurologic:  negative for focal weakness, numbness, headache    Objective:     Vitals:    01/18/21 0701 01/18/21 0716 01/18/21 0730 01/18/21 0922   BP: (!) 88/58 116/68 138/79    Pulse: 86 87 91 (!) 112   Resp: (!) 31 25 (!) 32 (!) 32   Temp:       SpO2: 97% 97% 97% 95%   Weight:       Height:           Intake/Output Summary (Last 24 hours) at 1/18/2021 1042  Last data filed at 1/18/2021 5619  Gross per 24 hour   Intake 2593.22 ml   Output 1165 ml   Net 1428.22 ml     Physical Exam:          Constitutional:  Intubated and sedated  EENMT:  Sclera clear, pupils equal, oral mucosa moist  Respiratory: bilateral crackles, no crepitus  Cardiovascular:  RRR with no M,G,R;  Gastrointestinal:  soft with no tenderness; positive bowel sounds present  Musculoskeletal:  warm with no cyanosis, no lower extremity edema  Skin:  no jaundice or ecchymosis  Neurologic: pupils equal  Psychiatric:  sedation    LINES:    PICC    DRIPS:    Atracurium  Fentanyl gtt  midaz  norepi  propofol    CXR:  1/17: ?improved        Ventilator Settings  Mode FIO2 Rate Tidal Volume Pressure PEEP   PRVC  100 %    500 ml     12 cm H20      Peak airway pressure: 28 cm H2O   Minute ventilation: 15.7 l/min     ABG:   Recent Labs     01/18/21  0305 01/17/21  1847 01/17/21  1450   PHI 7.28* 7.31* 7.27*   PCO2I 48.8* 41.5 48.7*   PO2I 85 102* 77   HCO3I 22.7 20.9* 22.3     LAB  Recent Labs     01/18/21  0855 01/18/21  0320 01/18/21  0021 01/17/21 2012 01/17/21  1653   GLUCPOC 248* 299* 344* 322* 214*     Recent Labs     01/18/21  0312 01/17/21  0326 01/16/21  0324   WBC 12.0* 16.2* 14.2*   HGB 14.1 15.1 15.2   HCT 42.7 43.9 44.2   * 155 151   INR 1.2 1.1 1.1     Recent Labs     01/18/21 0312 01/17/21 2035 01/17/21 0326 01/16/21  0324    136 136* 137*   K 5.0 5.3* 4.0 4.1    105 106 106   CO2 25 24 22 25   * 286* 237* 220*   BUN 34* 33* 28* 29*   CREA 1.23 1.20 0.82 0.84   MG 2.2 2.0  --   --    PHOS 4.0*  --   --   --    CA 8.4 8.2* 8.5 8.4   ALB  --  1.7*  --  1.9*     No results for input(s): LCAD, LAC in the last 72 hours. Patient Active Problem List   Diagnosis Code    Obesity E66.9    Hypertension I10    Bradycardia R00.1    PVC (premature ventricular contraction) I49.3    CAD (coronary artery disease) I25.10    Dyslipidemia, goal LDL below 70 E78.5    SWATI (acute kidney injury) ([facility] N17.9    Acute hypoxemic respiratory failure (HCC) J96.01    Type 2 diabetes mellitus with hyperosmolarity without nonketotic hyperglycemic-hyperosmolar coma ([facility] E11.00    Pneumonia due to COVID-19 virus U07.1, J12.82    Hypernatremia E87.0     Assessment:  (Medical Decision Making)     Hospital Problems  Date Reviewed: 6/3/2020          Codes Class Noted POA    Hypernatremia ICD-10-CM: E87.0  ICD-9-CM: 276.0  1/8/2021 Unknown    Resolved    SWATI (acute kidney injury) ([facility] ICD-10-CM: N17.9  ICD-9-CM: 584.9  1/7/2021 Unknown    Cr 1.15    Acute hypoxemic respiratory failure ([facility] ICD-10-CM: J96.01  ICD-9-CM: 518.81  1/7/2021 Unknown    Back on 100% CPAP 10. Type 2 diabetes mellitus with hyperosmolarity without nonketotic hyperglycemic-hyperosmolar coma ([facility] ICD-10-CM: E11.00  ICD-9-CM: 250.20  1/7/2021 Unknown        * (Principal) Pneumonia due to COVID-19 virus ICD-10-CM: U07.1, J12.82  ICD-9-CM: 480.8  1/7/2021 Unknown    Severe. Remains on decadron.      CAD (coronary artery disease) ICD-10-CM: I25.10  ICD-9-CM: 414.00  10/28/2016 Yes    Overview Signed 10/28/2016  8:05 AM by SHELDON Simeon     10-27-06 [facility] 90% LAD s/p 2.25 x 20 Synergy drug-eluting stent  (CS)             Obesity ICD-10-CM: E66.9  ICD-9-CM: 278.00  10/6/2015 Yes            Plan:  (Medical Decision Making)     Impression: 61 y.o. y/o male with acute hypoxemic respiratory failure secondary to COVID-19. NEURO:   1. Sedation: Versed gtt propofol  2. Analgesia: Fentanyl gtt  3. Paralytics:atracurium    CV:   1. Volume Status: avoid fluids when possible. Gentle fluid bolus vs pressors for hypotension. Lasix if normotensive and hypoxemic. NO MAINTENANCE FLUIDS on low dose nor-epi with eval.  PULM:   1. Acute hypoxemic/hypercapneic respiratory failure:    1/18:proned overnight, 100% FiO2 with sats in low 90s currently 12 PEEP 1:1 ratio  2. Severe ARDS 2/2 COVID: Low Tidal Volume ventilation, goal 6cc per kg (Ideal body weight: 68.4 kg (150 lb 12.7 oz) x 6 = 420). Attempt to limit driving pressure to 22ZL/U4I or less. Allow permissive hypercapnia/acidosis to pH 7.25 if needed to achieve above. RENAL:  1. SWATI: elevated Cr      GI: NPO, PPI prophy  1. Nutrition: TF request to dietary, continue TPN      HEME: no acute issues, platelets slowly sliding 130 on 1/18  1. Anemia: transfuse Hg <7      ID: WBC falling from 16 to 12 (elevated), febrile to 100.7 overnight,  Will pan culture today but will not order empiric antibiotics unless he spikes again  Blood Urine sputum Cx 1/18 - pending    1. COVID-19: COVID meds as above, multi-D rounds with pharmacy to optimize meds with noted contraindications    ENDO: not on chronic steroids  1. DM: sliding scale insulin, increase lantus to 30 Q12    Skin: no decub, turns, preventive care    Prophy: Lovenox, H2B    Full Code        Plan to Update sister Mónica Necessary  [phone] by phone    The patient is critically ill with respiratory failure, circulatory failure and requires high complexity decision making for assessment and support including frequent ventilator adjustment , frequent evaluation and titration of therapies , application of advanced monitoring technologies and extensive interpretation of multiple databases.   Cumulative time devoted to coordination, counseling and  patient care services by me for day of service -Kvng Krause MD

## 2021-01-18 NOTE — PROGRESS NOTES
Bedside report received from [place], [address] [facility]. Fentanyl and versed gtt dual verified.

## 2021-01-18 NOTE — DIABETES MGMT
Patient admitted with pneumonia due to COVID-19 virus. Blood glucose ranged 214-322 yesterday with patient receiving Lantus 20 units and Humalog 58 units. Blood glucose this morning was 248. Per chart review patient intubated on ventilator, patient has TPN ordered. Provider has increased patient basal insulin. Reviewed patient current regimen: Lantus 30 units q12h and Humalog SSI very insulin resistant q4h. Will continue to follow along loosely.

## 2021-01-19 LAB
ANION GAP SERPL CALC-SCNC: 5 MMOL/L (ref 7–16)
APTT PPP: 44.7 SEC (ref 24.1–35.1)
APTT PPP: 47.5 SEC (ref 24.1–35.1)
ARTERIAL PATENCY WRIST A: ABNORMAL
BASE DEFICIT BLD-SCNC: 7 MMOL/L
BASOPHILS # BLD: 0 K/UL (ref 0–0.2)
BASOPHILS # BLD: 0 K/UL (ref 0–0.2)
BASOPHILS NFR BLD: 0 % (ref 0–2)
BASOPHILS NFR BLD: 0 % (ref 0–2)
BDY SITE: ABNORMAL
BUN SERPL-MCNC: 36 MG/DL (ref 8–23)
CALCIUM SERPL-MCNC: 8.7 MG/DL (ref 8.3–10.4)
CHLORIDE SERPL-SCNC: 110 MMOL/L (ref 98–107)
CO2 BLD-SCNC: 19 MMOL/L
CO2 SERPL-SCNC: 22 MMOL/L (ref 21–32)
COLLECT TIME,HTIME: 120
CREAT SERPL-MCNC: 1.52 MG/DL (ref 0.8–1.5)
DIFFERENTIAL METHOD BLD: ABNORMAL
DIFFERENTIAL METHOD BLD: ABNORMAL
EOSINOPHIL # BLD: 0.2 K/UL (ref 0–0.8)
EOSINOPHIL # BLD: 0.2 K/UL (ref 0–0.8)
EOSINOPHIL NFR BLD: 2 % (ref 0.5–7.8)
EOSINOPHIL NFR BLD: 2 % (ref 0.5–7.8)
ERYTHROCYTE [DISTWIDTH] IN BLOOD BY AUTOMATED COUNT: 13 % (ref 11.9–14.6)
ERYTHROCYTE [DISTWIDTH] IN BLOOD BY AUTOMATED COUNT: 13.1 % (ref 11.9–14.6)
EXHALED MINUTE VOLUME, VE: 16 L/MIN
FIBRINOGEN PPP-MCNC: 814 MG/DL (ref 190–501)
GAS FLOW.O2 O2 DELIVERY SYS: ABNORMAL L/MIN
GAS FLOW.O2 SETTING OXYMISER: 32 BPM
GLUCOSE BLD STRIP.AUTO-MCNC: 157 MG/DL (ref 65–100)
GLUCOSE BLD STRIP.AUTO-MCNC: 190 MG/DL (ref 65–100)
GLUCOSE BLD STRIP.AUTO-MCNC: 212 MG/DL (ref 65–100)
GLUCOSE BLD STRIP.AUTO-MCNC: 236 MG/DL (ref 65–100)
GLUCOSE BLD STRIP.AUTO-MCNC: 253 MG/DL (ref 65–100)
GLUCOSE BLD STRIP.AUTO-MCNC: 254 MG/DL (ref 65–100)
GLUCOSE SERPL-MCNC: 242 MG/DL (ref 65–100)
HCO3 BLD-SCNC: 18.4 MMOL/L (ref 22–26)
HCT VFR BLD AUTO: 37.2 % (ref 41.1–50.3)
HCT VFR BLD AUTO: 38.5 % (ref 41.1–50.3)
HGB BLD-MCNC: 12 G/DL (ref 13.6–17.2)
HGB BLD-MCNC: 12.3 G/DL (ref 13.6–17.2)
IMM GRANULOCYTES # BLD AUTO: 0.1 K/UL (ref 0–0.5)
IMM GRANULOCYTES # BLD AUTO: 0.2 K/UL (ref 0–0.5)
IMM GRANULOCYTES NFR BLD AUTO: 1 % (ref 0–5)
IMM GRANULOCYTES NFR BLD AUTO: 1 % (ref 0–5)
INR PPP: 1.4
INSPIRATION.DURATION SETTING TIME VENT: 0.95 SEC
LYMPHOCYTES # BLD: 0.6 K/UL (ref 0.5–4.6)
LYMPHOCYTES # BLD: 0.6 K/UL (ref 0.5–4.6)
LYMPHOCYTES NFR BLD: 5 % (ref 13–44)
LYMPHOCYTES NFR BLD: 6 % (ref 13–44)
MCH RBC QN AUTO: 29.2 PG (ref 26.1–32.9)
MCH RBC QN AUTO: 29.6 PG (ref 26.1–32.9)
MCHC RBC AUTO-ENTMCNC: 31.9 G/DL (ref 31.4–35)
MCHC RBC AUTO-ENTMCNC: 32.3 G/DL (ref 31.4–35)
MCV RBC AUTO: 91.4 FL (ref 79.6–97.8)
MCV RBC AUTO: 91.6 FL (ref 79.6–97.8)
MONOCYTES # BLD: 0.1 K/UL (ref 0.1–1.3)
MONOCYTES # BLD: 0.3 K/UL (ref 0.1–1.3)
MONOCYTES NFR BLD: 1 % (ref 4–12)
MONOCYTES NFR BLD: 2 % (ref 4–12)
NEUTS SEG # BLD: 10.5 K/UL (ref 1.7–8.2)
NEUTS SEG # BLD: 8.3 K/UL (ref 1.7–8.2)
NEUTS SEG NFR BLD: 90 % (ref 43–78)
NEUTS SEG NFR BLD: 90 % (ref 43–78)
NRBC # BLD: 0 K/UL (ref 0–0.2)
NRBC # BLD: 0 K/UL (ref 0–0.2)
O2/TOTAL GAS SETTING VFR VENT: 100 %
PCO2 BLD: 36.3 MMHG (ref 35–45)
PEEP RESPIRATORY: 12 CMH2O
PH BLD: 7.31 [PH] (ref 7.35–7.45)
PLATELET # BLD AUTO: 107 K/UL (ref 150–450)
PLATELET # BLD AUTO: 116 K/UL (ref 150–450)
PLATELET COMMENTS,PCOM: SLIGHT
PMV BLD AUTO: 10.6 FL (ref 9.4–12.3)
PMV BLD AUTO: 10.7 FL (ref 9.4–12.3)
PO2 BLD: 253 MMHG (ref 75–100)
POTASSIUM SERPL-SCNC: 4.3 MMOL/L (ref 3.5–5.1)
PROTHROMBIN TIME: 17.1 SEC (ref 12.5–14.7)
RBC # BLD AUTO: 4.06 M/UL (ref 4.23–5.6)
RBC # BLD AUTO: 4.21 M/UL (ref 4.23–5.6)
RBC MORPH BLD: ABNORMAL
SAO2 % BLD: 100 % (ref 95–98)
SERVICE CMNT-IMP: ABNORMAL
SODIUM SERPL-SCNC: 137 MMOL/L (ref 136–145)
SPECIMEN TYPE: ABNORMAL
UFH PPP CHRO-ACNC: 0.51 IU/ML (ref 0.3–0.7)
VENTILATION MODE VENT: ABNORMAL
VT SETTING VENT: 500 ML
WBC # BLD AUTO: 11.8 K/UL (ref 4.3–11.1)
WBC # BLD AUTO: 9.3 K/UL (ref 4.3–11.1)
WBC MORPH BLD: ABNORMAL

## 2021-01-19 PROCEDURE — 36600 WITHDRAWAL OF ARTERIAL BLOOD: CPT

## 2021-01-19 PROCEDURE — 74011250636 HC RX REV CODE- 250/636: Performed by: ANESTHESIOLOGY

## 2021-01-19 PROCEDURE — 85730 THROMBOPLASTIN TIME PARTIAL: CPT

## 2021-01-19 PROCEDURE — 74011000250 HC RX REV CODE- 250: Performed by: INTERNAL MEDICINE

## 2021-01-19 PROCEDURE — 74011000258 HC RX REV CODE- 258: Performed by: INTERNAL MEDICINE

## 2021-01-19 PROCEDURE — 74011250637 HC RX REV CODE- 250/637: Performed by: ANESTHESIOLOGY

## 2021-01-19 PROCEDURE — 80048 BASIC METABOLIC PNL TOTAL CA: CPT

## 2021-01-19 PROCEDURE — 74011250637 HC RX REV CODE- 250/637: Performed by: INTERNAL MEDICINE

## 2021-01-19 PROCEDURE — 82803 BLOOD GASES ANY COMBINATION: CPT

## 2021-01-19 PROCEDURE — 99291 CRITICAL CARE FIRST HOUR: CPT | Performed by: INTERNAL MEDICINE

## 2021-01-19 PROCEDURE — 85384 FIBRINOGEN ACTIVITY: CPT

## 2021-01-19 PROCEDURE — 74011250636 HC RX REV CODE- 250/636: Performed by: INTERNAL MEDICINE

## 2021-01-19 PROCEDURE — 94003 VENT MGMT INPAT SUBQ DAY: CPT

## 2021-01-19 PROCEDURE — 82962 GLUCOSE BLOOD TEST: CPT

## 2021-01-19 PROCEDURE — 65610000006 HC RM INTENSIVE CARE

## 2021-01-19 PROCEDURE — 74011636637 HC RX REV CODE- 636/637: Performed by: INTERNAL MEDICINE

## 2021-01-19 PROCEDURE — 74011000250 HC RX REV CODE- 250: Performed by: ANESTHESIOLOGY

## 2021-01-19 PROCEDURE — 2709999900 HC NON-CHARGEABLE SUPPLY

## 2021-01-19 PROCEDURE — 74011000258 HC RX REV CODE- 258: Performed by: ANESTHESIOLOGY

## 2021-01-19 PROCEDURE — 85520 HEPARIN ASSAY: CPT

## 2021-01-19 PROCEDURE — 85025 COMPLETE CBC W/AUTO DIFF WBC: CPT

## 2021-01-19 PROCEDURE — 85610 PROTHROMBIN TIME: CPT

## 2021-01-19 PROCEDURE — 74011250637 HC RX REV CODE- 250/637: Performed by: HOSPITALIST

## 2021-01-19 PROCEDURE — 74011636637 HC RX REV CODE- 636/637: Performed by: ANESTHESIOLOGY

## 2021-01-19 RX ORDER — SODIUM BICARBONATE 1 MEQ/ML
50 SYRINGE (ML) INTRAVENOUS ONCE
Status: COMPLETED | OUTPATIENT
Start: 2021-01-19 | End: 2021-01-19

## 2021-01-19 RX ORDER — VANCOMYCIN 2 GRAM/500 ML IN 0.9 % SODIUM CHLORIDE INTRAVENOUS
2000 ONCE
Status: COMPLETED | OUTPATIENT
Start: 2021-01-19 | End: 2021-01-19

## 2021-01-19 RX ORDER — VANCOMYCIN HYDROCHLORIDE
1250
Status: DISCONTINUED | OUTPATIENT
Start: 2021-01-20 | End: 2021-01-21

## 2021-01-19 RX ORDER — HEPARIN SODIUM 5000 [USP'U]/100ML
18-36 INJECTION, SOLUTION INTRAVENOUS
Status: DISCONTINUED | OUTPATIENT
Start: 2021-01-19 | End: 2021-01-22

## 2021-01-19 RX ADMIN — CLOPIDOGREL BISULFATE 75 MG: 75 TABLET ORAL at 08:37

## 2021-01-19 RX ADMIN — Medication 20 ML: at 21:06

## 2021-01-19 RX ADMIN — INSULIN LISPRO 3 UNITS: 100 INJECTION, SOLUTION INTRAVENOUS; SUBCUTANEOUS at 21:04

## 2021-01-19 RX ADMIN — WATER 10.23 NG/KG/MIN: 1 SOLUTION INTRAVENOUS at 17:00

## 2021-01-19 RX ADMIN — Medication 20 ML: at 14:00

## 2021-01-19 RX ADMIN — POLYETHYLENE GLYCOL 3350 17 G: 17 POWDER, FOR SOLUTION ORAL at 08:38

## 2021-01-19 RX ADMIN — FAMOTIDINE 20 MG: 10 INJECTION INTRAVENOUS at 08:38

## 2021-01-19 RX ADMIN — INSULIN LISPRO 9 UNITS: 100 INJECTION, SOLUTION INTRAVENOUS; SUBCUTANEOUS at 12:31

## 2021-01-19 RX ADMIN — ACETAMINOPHEN 650 MG: 325 TABLET, FILM COATED ORAL at 21:06

## 2021-01-19 RX ADMIN — HEPARIN SODIUM 18 UNITS/KG/HR: 5000 INJECTION, SOLUTION INTRAVENOUS at 13:07

## 2021-01-19 RX ADMIN — FAMOTIDINE 20 MG: 10 INJECTION INTRAVENOUS at 21:06

## 2021-01-19 RX ADMIN — Medication 75 MCG/HR: at 01:05

## 2021-01-19 RX ADMIN — CEFEPIME 2 G: 2 INJECTION, POWDER, FOR SOLUTION INTRAVENOUS at 12:32

## 2021-01-19 RX ADMIN — Medication 20 ML: at 04:55

## 2021-01-19 RX ADMIN — DEXTROSE MONOHYDRATE 14 MCG/MIN: 50 INJECTION, SOLUTION INTRAVENOUS at 09:07

## 2021-01-19 RX ADMIN — INSULIN GLARGINE 30 UNITS: 100 INJECTION, SOLUTION SUBCUTANEOUS at 07:54

## 2021-01-19 RX ADMIN — ATRACURIUM BESYLATE 13 MCG/KG/MIN: 10 INJECTION, SOLUTION INTRAVENOUS at 16:54

## 2021-01-19 RX ADMIN — INSULIN GLARGINE 30 UNITS: 100 INJECTION, SOLUTION SUBCUTANEOUS at 21:04

## 2021-01-19 RX ADMIN — INSULIN LISPRO 9 UNITS: 100 INJECTION, SOLUTION INTRAVENOUS; SUBCUTANEOUS at 01:06

## 2021-01-19 RX ADMIN — INSULIN LISPRO 3 UNITS: 100 INJECTION, SOLUTION INTRAVENOUS; SUBCUTANEOUS at 17:05

## 2021-01-19 RX ADMIN — Medication 400 MG: at 08:38

## 2021-01-19 RX ADMIN — INSULIN LISPRO 6 UNITS: 100 INJECTION, SOLUTION INTRAVENOUS; SUBCUTANEOUS at 07:54

## 2021-01-19 RX ADMIN — ATORVASTATIN CALCIUM 80 MG: 40 TABLET, FILM COATED ORAL at 08:38

## 2021-01-19 RX ADMIN — SODIUM BICARBONATE 50 MEQ: 84 INJECTION, SOLUTION INTRAVENOUS at 03:59

## 2021-01-19 RX ADMIN — MIDAZOLAM HYDROCHLORIDE 2 MG/HR: 5 INJECTION, SOLUTION INTRAMUSCULAR; INTRAVENOUS at 15:20

## 2021-01-19 RX ADMIN — INSULIN LISPRO 6 UNITS: 100 INJECTION, SOLUTION INTRAVENOUS; SUBCUTANEOUS at 03:42

## 2021-01-19 RX ADMIN — DEXTROSE MONOHYDRATE 8 MCG/MIN: 50 INJECTION, SOLUTION INTRAVENOUS at 15:38

## 2021-01-19 RX ADMIN — VANCOMYCIN HYDROCHLORIDE 2000 MG: 10 INJECTION, POWDER, LYOPHILIZED, FOR SOLUTION INTRAVENOUS at 13:38

## 2021-01-19 RX ADMIN — ACETAMINOPHEN 650 MG: 325 TABLET, FILM COATED ORAL at 03:44

## 2021-01-19 RX ADMIN — WATER 10.23 NG/KG/MIN: 1 SOLUTION INTRAVENOUS at 09:00

## 2021-01-19 RX ADMIN — ASPIRIN 81 MG: 81 TABLET, CHEWABLE ORAL at 08:37

## 2021-01-19 RX ADMIN — ENOXAPARIN SODIUM 30 MG: 30 INJECTION SUBCUTANEOUS at 01:04

## 2021-01-19 NOTE — PROGRESS NOTES
Ventilator check complete; patient has a #8. 0 ET tube secured at the 24 at the lip. Patient is sedated. Patient is not able to follow commands. Breath sounds are diminished. Trachea is midline, Negative for subcutaneous air, and chest excursion is symmetric. Patient is also Negative for cyanosis and is Negative for pitting edema. All alarms are set and audible. Resuscitation bag is at the head of the bed.       Ventilator Settings  Mode FIO2 Rate Tidal Volume Pressure PEEP I:E Ratio   PRVC  70 %   32 500 ml     12 cm H20  1:1      Peak airway pressure: 28 cm H2O   Minute ventilation: 17 l/min         Americo Ha, RT

## 2021-01-19 NOTE — PROGRESS NOTES
Critical Care Daily Progress Note: 1/19/2021  Admission Date: 1/6/2021     The patient's chart is reviewed and the patient is discussed with the staff. Admission Date: 1/6/2021     Length of Stay: 9 days     Background: 61 y.o. y/o male with acute hypoxemic respiratory failure secondary to COVID-19. Date of COVID-19 symptom onset: + COVID 01/05/21     Notable PMH: obesity, HTN, CAD, dyslipidemia, SWATI, DM, GERD       24 Hour events:  Hypotensive yesterday. D-dimer not highly elevated. Procal slightly up. Still having fevers, Tm 101.1 yesterday. Hyperglycemic  P:F ratio of 253 this morning. Fluid balance 613ml positive yesterday.     Current Facility-Administered Medications   Medication Dose Route Frequency    white petrolatum-mineral oiL (LACRILUBE S.O.P.) ointment   Both Eyes Q4H PRN    insulin glargine (LANTUS) injection 30 Units  30 Units SubCUTAneous Q12H    TPN ADULT-CENTRAL - dextrose 15% amino acid 5%   IntraVENous QPM    polyethylene glycol (MIRALAX) packet 17 g  17 g Oral DAILY    EPINEPHrine (ADRENALIN) 4 mg in 0.9% sodium chloride 250 mL infusion  1-10 mcg/min IntraVENous TITRATE    vasopressin (VASOSTRICT) 20 Units in 0.9% sodium chloride 100 mL infusion  0-0.03 Units/min IntraVENous TITRATE    epoprostenol (FLOLAN) 30,000 ng/mL in diluent for epoprostenol (gly) 50 mL solution  10-50 ng/kg/min (Ideal) Nebulization TITRATE    0.9% sodium chloride infusion  8 mL/hr Nebulization TITRATE    atracurium (TRACRIUM) 1,000 mg in 0.9% sodium chloride 250 mL infusion  0-15 mcg/kg/min IntraVENous TITRATE    NOREPINephrine (LEVOPHED) 4 mg in 5% dextrose 250 mL infusion  0.5-16 mcg/min IntraVENous TITRATE    propofol (DIPRIVAN) 10 mg/mL infusion  0-50 mcg/kg/min IntraVENous TITRATE    famotidine (PF) (PEPCID) 20 mg in 0.9% sodium chloride 10 mL injection  20 mg IntraVENous Q12H    fentaNYL in normal saline (pf) 25 mcg/mL infusion  0-200 mcg/hr IntraVENous TITRATE    midazolam in normal saline (VERSED) 1 mg/mL infusion  0-10 mg/hr IntraVENous TITRATE    aspirin chewable tablet 81 mg  81 mg Per NG tube DAILY    atorvastatin (LIPITOR) tablet 80 mg  80 mg Per NG tube DAILY    clopidogreL (PLAVIX) tablet 75 mg  75 mg Per NG tube DAILY    magnesium oxide (MAG-OX) tablet 400 mg  400 mg Per NG tube DAILY    [Held by provider] metoprolol tartrate (LOPRESSOR) tablet 50 mg  50 mg Per NG tube BID    insulin lispro (HUMALOG) injection   SubCUTAneous Q4H    LORazepam (ATIVAN) injection 1 mg  1 mg IntraVENous Q4H PRN    morphine injection 2 mg  2 mg IntraVENous Q4H PRN    NUTRITIONAL SUPPORT ELECTROLYTE PRN ORDERS   Does Not Apply PRN    sodium chloride (NS) flush 20 mL  20 mL InterCATHeter Q8H    sodium chloride (NS) flush 20 mL  20 mL InterCATHeter PRN    loperamide (IMODIUM) capsule 2 mg  2 mg Oral Q4H PRN    enoxaparin (LOVENOX) injection 30 mg  30 mg SubCUTAneous Q12H    polyethylene glycol (MIRALAX) packet 17 g  17 g Oral DAILY PRN    promethazine (PHENERGAN) tablet 12.5 mg  12.5 mg Oral Q6H PRN    Or    ondansetron (ZOFRAN) injection 4 mg  4 mg IntraVENous Q6H PRN    guaiFENesin-dextromethorphan (ROBITUSSIN DM) 100-10 mg/5 mL syrup 10 mL  10 mL Oral Q4H PRN    dextrose 40% (GLUTOSE) oral gel 1 Tube  15 g Oral PRN    glucagon (GLUCAGEN) injection 1 mg  1 mg IntraMUSCular PRN    dextrose (D50W) injection syrg 12.5-25 g  25-50 mL IntraVENous PRN    0.9% sodium chloride infusion 250 mL  250 mL IntraVENous PRN    albuterol (PROVENTIL HFA, VENTOLIN HFA, PROAIR HFA) inhaler 2 Puff  2 Puff Inhalation Q4H PRN    influenza vaccine 2020-21 (6 mos+)(PF) (FLUARIX/FLULAVAL/FLUZONE QUAD) injection 0.5 mL  0.5 mL IntraMUSCular PRIOR TO DISCHARGE    hydrALAZINE (APRESOLINE) 20 mg/mL injection 20 mg  20 mg IntraVENous Q6H PRN    acetaminophen (TYLENOL) tablet 650 mg  650 mg Oral Q6H PRN       Objective:     Vitals:    01/19/21 0930 01/19/21 0945 01/19/21 1000 01/19/21 1015   BP:   102/65 Pulse: 78 79 75 81   Resp: (!) 31 (!) 32 (!) 31 (!) 31   Temp:       SpO2: 94% 93% 94% 94%   Weight:       Height:           Intake/Output Summary (Last 24 hours) at 1/19/2021 1101  Last data filed at 1/19/2021 0448  Gross per 24 hour   Intake 1633.93 ml   Output 1020 ml   Net 613.93 ml     Physical Exam:          Constitutional:  Intubated and sedated  EENMT:  Sclera clear, pupils equal, oral mucosa moist  Respiratory: bilateral crackles, no crepitus  Cardiovascular:  RRR with no M,G,R;  Gastrointestinal:  soft with no tenderness; positive bowel sounds present  Musculoskeletal:  warm with no cyanosis, no lower extremity edema  Skin:  no jaundice or ecchymosis  Neurologic: pupils equal  Psychiatric:  sedation    LINES:    ET tube 1/17/2021  PICC 1/13  NGT 1/17  Art line 1/18/2021    DRIPS:    Atracurium  Fentanyl gtt  midaz  norepi 14    COVID-19 medications  Decadron 1/7-16  remdesivir 1/8-1/12  Convalescent plasma 1/7    Anti-infectives  azithro (completed)  Ceftriaxone (completed)    CXR:-Improved slightly        Ventilator Settings  Mode FIO2 Rate Tidal Volume Pressure PEEP   PRVC  70 %    500 ml     12 cm H20      Peak airway pressure: 28 cm H2O   Minute ventilation: 17 l/min     ABG:   Recent Labs     01/19/21  0132 01/18/21  0305 01/17/21  1847   PHI 7.31* 7.28* 7.31*   PCO2I 36.3 48.8* 41.5   PO2I 253* 85 102*   HCO3I 18.4* 22.7 20.9*     LAB    MICRO  Date Source Result   1/18  sputum  pending   1/18  blood  pending   1/18  blood  pending   1/18  urine  no growth; prelim     Recent Labs     01/19/21  0753 01/19/21  0341 01/19/21  0102 01/18/21  2236 01/18/21  1642   GLUCPOC 212* 236* 254* 278* 204*     Recent Labs     01/19/21  0258 01/18/21  0312 01/17/21  0326   WBC 9.3 12.0* 16.2*   HGB 12.3* 14.1 15.1   HCT 38.5* 42.7 43.9   * 130* 155   INR 1.4 1.2 1.1     Recent Labs     01/19/21  0258 01/18/21  0312 01/17/21 2035    136 136   K 4.3 5.0 5.3*   * 105 105   CO2 22 25 24   * 325* 286*   BUN 36* 34* 33*   CREA 1.52* 1.23 1.20   MG  --  2.2 2.0   PHOS  --  4.0*  --    CA 8.7 8.4 8.2*   ALB  --   --  1.7*     No results for input(s): LCAD, LAC in the last 72 hours. Assessment:  (Medical Decision Making)     Hospital Problems  Date Reviewed: 6/3/2020          Codes Class Noted POA    Hypernatremia ICD-10-CM: E87.0  ICD-9-CM: 276.0  1/8/2021 Unknown    Resolved    SWATI (acute kidney injury) ([facility].) ICD-10-CM: N17.9  ICD-9-CM: 584.9  1/7/2021 Unknown    Cr 1.15    Acute hypoxemic respiratory failure ([facility] ICD-10-CM: J96.01  ICD-9-CM: 518.81  1/7/2021 Unknown    Back on 100% CPAP 10. Type 2 diabetes mellitus with hyperosmolarity without nonketotic hyperglycemic-hyperosmolar coma ([facility] ICD-10-CM: E11.00  ICD-9-CM: 250.20  1/7/2021 Unknown        * (Principal) Pneumonia due to COVID-19 virus ICD-10-CM: U07.1, J12.82  ICD-9-CM: 480.8  1/7/2021 Unknown    Severe. Remains on decadron. CAD (coronary artery disease) ICD-10-CM: I25.10  ICD-9-CM: 414.00  10/28/2016 Yes    Overview Signed 10/28/2016  8:05 AM by SHELDON Shaffer     10-27-06 [facility] 90% LAD s/p 2.25 x 20 Synergy drug-eluting stent  (CS)             Obesity ICD-10-CM: E66.9  ICD-9-CM: 278.00  10/6/2015 Yes            Plan:  (Medical Decision Making)     Impression: 61 y.o. y/o male with acute hypoxemic respiratory failure secondary to COVID-19. NEURO:   1. Sedation: Versed gtt propofol  2. Analgesia: Fentanyl gtt  3. Paralytics: d/c paralytics  CV:   1. Volume Status: f/u with [facility] numbers to assess. PULM:   1. Acute hypoxemic/hypercapneic respiratory failure:    1/18:proned overnight, 100% FiO2 with sats in low 90s currently 12 PEEP 1:1 ratio  2. Severe ARDS 2/2 COVID: Low Tidal Volume ventilation, goal 6cc per kg (Ideal body weight: 68.4 kg (150 lb 12.7 oz) x 6 = 420). Attempt to limit driving pressure to 82NC/K0C or less. Allow permissive hypercapnia/acidosis to pH 7.25 if needed to achieve above.  No need to prone today.  RENAL:  1. SWATI: elevated Cr  GI: NPO, PPI prophy  1. Nutrition: TF request to dietary, d/c TPN  HEME: no acute issues, platelets slowly sliding 130 on 1/18  1. DVT Peroneal vein:  Will anticoagulate with heparin drip and monitor platelets  ID:   1. [id number]: COVID meds as above, multi-D rounds with pharmacy to optimize meds with noted contraindications  2. Fevers:  With elevated procal.  Empiric vanco/cefepime awaiting culture results. Started 1/19  ENDO: not on chronic steroids  1. DM: sliding scale insulin,  lantus to 30 Q12 and monitor. Skin: no decub, turns, preventive care  Prophy: Lovenox, H2B    Full Code    Plan to Update sister Taye 47-7  [phone] by phone  The patient is critically ill with respiratory failure, circulatory failure and requires high complexity decision making for assessment and support including frequent ventilator adjustment , frequent evaluation and titration of therapies , application of advanced monitoring technologies and extensive interpretation of multiple databases.   Cumulative time devoted to coordination, counseling and  patient care services by me for day of service -Rick Kern MD

## 2021-01-19 NOTE — PROGRESS NOTES
Tube feed were initiated earlier per nutrition orders but patient is now on 12 of levophed. Dr. Hemant Sherman at bedside and orders to turn off tube feeds received.

## 2021-01-19 NOTE — PROGRESS NOTES
Comprehensive Nutrition Assessment    Type and Reason for Visit: Reassess    Nutrition Recommendations/Plan:   Enteral Nutrition:   Vital AF via OGT start at 20ml/hr and progress by 10ml/hour every 8 hours to goal rate of 65ml/hour. Water flush 80ml Q4H. At goal will provide 1872 kcal (100% estimated calorie needs), 117 grams protein (100% estimated protein needs) and 1745ml free fluid (1ml/kcal). Labs:   BMP daily, Mg and Phos MWF. Malnutrition Assessment:  Malnutrition Status: At risk for malnutrition (specify)  Context: Acute illness  Nutrition Assessment:   Nutrition History: 1/7/2021: Pt reports inability to tolerate any food or liquids for 4 days PTA. Indicates vomiting with all po attempts during this time      Nutrition Background: PMH remarkable for CAD, GERD, HTN, MO. Admitted with Coivd 19, new onset DM with hyperglycemic hyperosmolar state, SWATI on CKD, lactic acidosis. Daily Update:  TPN d/c today with plans to advance TF to goal rate. TF not started yesterday due to increase in levophed to 12mcg/min. Abdominal Status (last documented): Intact, Semi-soft abdomen with Hypoactive  bowel sounds. Last BM 01/16/21. Pertinent Medications: Pepcid, Lantus (30units Q12H), SSI (48units 1/18, 30units thus far today), Magnesium oxide, Miralax (daily)  Drips: Tracrium, Fentanyl, Levophed (8mcg/min at time of assessment), Propofol (off at 4937)  Pertinent Labs: Sodium 137, Potassium 4.3, GFR 50    Nutrition Related Findings:   double lumen PICC (1/13), Intubated 1/17, NGT 1/17      Current Nutrition Therapies:  DIET TUBE FEEDING  Current Tube Feeding (TF) Orders:   · Feeding Route: Nasogastric  · Formula: Vital AF  · Schedule:Continuous    · Regimen: 20ml/hr  · Additives/Modulars: Other (comment)(None)  · Water Flushes: 30ml Q4H  · Current TF & Flush Orders Provides: held due to Levophed  · Goal TF & Flush Orders Provides:  To provide 576 kcal (33% estimated calorie needs), 36 grams protein (34% estimated protein needs) and 569ml free fluid (<1ml/kcal). Current Parenteral Nutrition Orders:  · Type and Formula: 5%AA/15%DEX   · Lipids: None  · Duration: Continuous  · Rate/Volume: 43ml/hr  · Current PN Order Provides: 710 kcal/d (41% of needs), 50 grams of protein/d (48% of needs), 150 grams of CHO/d and 1037 ml of total volume/d     Current Intake:   Average Meal Intake: NPO Average Supplement Intake: NPO      Anthropometric Measures:  Height: 5' 8\" (172.7 cm)  Current Body Wt: 89.2 kg (196 lb 10.4 oz)(1/19), Weight source: Bed scale  BMI: 29.9, Overweight (BMI 25.0-29. 9)  Admission Body Weight: 235 lb(\"Estimated\")  Ideal Body Wt: 154 lbs (70 kg), 130.3 %  Usual Body Wt: 101.6 kg (224 lb)([facility] 6/3/2020 FP office; pt stated # unable to verify ), Percent weight change: -10.4          Estimated Daily Nutrient Needs:  Energy (kcal/day): 5956-4965 kcal/d ((20-25), Weight Used: Current(87.1 kg on 1/17/2021))  Protein (g/day): 105-131 grams/d (1.2 to 1.5 grams/kg) Weight Used: (Current(87.1 kg))  Fluid (ml/day):   (1 ml/kcal)    Nutrition Diagnosis:   · Inadequate oral intake related to impaired respiratory function as evidenced by (unable to take po d/t desat swhen CPAP mask removed, PN dependent)    · Food & nutrition-related knowledge deficit related to endocrine dysfunction as evidenced by (new DM dx, A1C 12, minimal exposure to DM information.)    Nutrition Interventions:   Food and/or Nutrient Delivery: Modify tube feeding, Continue NPO  Coordination of Nutrition Care: Continue to monitor while inpatient  Plan of Care discussed with AQUILINO Ames    Goals:   Previous Goal Met: Progressing toward goal(s)  Active Goal: Meet >75% estimated nutrition needs within 3 days    Nutrition Monitoring and Evaluation:   Food/Nutrient Intake Outcomes: Enteral nutrition intake/tolerance  Physical Signs/Symptoms Outcomes: Biochemical data, GI status    Discharge Planning:     Too soon to determine    Electronically signed by Jose A William MS, RDN, LD 1/19/2021 at 4:36 PM  Contact: [phone]    Disaster Mode active

## 2021-01-19 NOTE — DIABETES MGMT
Patient admitted with pneumonia due to COVID-19. Blood glucose ranged 204-344 yesterday with patient receiving Lantus 50 units and Humalog 48 units. Blood glucose this morning was 212. Reviewed patient current regimen: 30 units BID (an increase from yesterday's basal total) and Humalog SSI q4h very insulin resistant scale. Will continue to follow along loosely.

## 2021-01-19 NOTE — PROGRESS NOTES
Ventilator check complete; patient has a #8. 0 ET tube secured at the 24 at the lip. Patient is sedated. Patient is not able to follow commands. Breath sounds are coarse and diminished. Trachea is midline, Negative for subcutaneous air, and chest excursion is symmetric. Patient is also Negative for cyanosis. All alarms are set and audible. Resuscitation bag is at the head of the bed. Ventilator Settings  Mode FIO2 Rate Tidal Volume Pressure PEEP I:E Ratio   PRVC  100 %   32 500 ml     12 cm H20  1:1      Peak airway pressure: 29 cm H2O   Minute ventilation: 16.1 l/min     ABG: No results for input(s): PH, PCO2, PO2, HCO3 in the last 72 hours.       Radha Patel

## 2021-01-19 NOTE — MANAGEMENT PLAN
Pharmacokinetic Consult to Pharmacist    Kaitlin Huang. is a 61 y.o. male being treated for VAP with vancomycin. Height: 5' 8\" (172.7 cm)  Weight: 89.2 kg (196 lb 10.4 oz)  Lab Results   Component Value Date/Time    BUN 36 (H) 01/19/2021 02:58 AM    Creatinine 1.52 (H) 01/19/2021 02:58 AM    WBC 9.3 01/19/2021 02:58 AM    Procalcitonin 1.38 01/18/2021 11:17 AM    Lactic acid 1.6 01/07/2021 06:24 AM      Estimated Creatinine Clearance: 56.1 mL/min (A) (based on SCr of 1.52 mg/dL (H)). CULTURES:  pending    No results found for: Adelia Barillas    Day 1 of vancomycin. Goal trough is 15-20. Will load with 2000 mg and then initiate 1250 mg every 18 hours for now. Pharmacy to order levels and adjust the dose as clinically indicated. Will continue to follow patient.       Thank you,  Brenda Kern, PharmD, [address]  Clinical Pharmacy Specialist  [phone] Principal Discharge DX:	Hip pain, acute, left  Secondary Diagnosis:	Human metapneumovirus (hMPV) as cause of disease classified elsewhere

## 2021-01-20 ENCOUNTER — APPOINTMENT (OUTPATIENT)
Dept: GENERAL RADIOLOGY | Age: 61
DRG: 004 | End: 2021-01-20
Attending: INTERNAL MEDICINE
Payer: COMMERCIAL

## 2021-01-20 LAB
ANION GAP SERPL CALC-SCNC: 6 MMOL/L (ref 7–16)
APTT PPP: >200 SEC (ref 24.1–35.1)
ARTERIAL PATENCY WRIST A: ABNORMAL
ARTERIAL PATENCY WRIST A: YES
BASE DEFICIT BLD-SCNC: 5 MMOL/L
BASE DEFICIT BLD-SCNC: 5 MMOL/L
BASOPHILS # BLD: 0 K/UL (ref 0–0.2)
BASOPHILS NFR BLD: 0 % (ref 0–2)
BDY SITE: ABNORMAL
BDY SITE: ABNORMAL
BUN SERPL-MCNC: 34 MG/DL (ref 8–23)
CALCIUM SERPL-MCNC: 8.8 MG/DL (ref 8.3–10.4)
CHLORIDE SERPL-SCNC: 109 MMOL/L (ref 98–107)
CO2 BLD-SCNC: 21 MMOL/L
CO2 BLD-SCNC: 23 MMOL/L
CO2 SERPL-SCNC: 23 MMOL/L (ref 21–32)
COLLECT TIME,HTIME: 1549
COLLECT TIME,HTIME: 320
CREAT SERPL-MCNC: 1.55 MG/DL (ref 0.8–1.5)
DIFFERENTIAL METHOD BLD: ABNORMAL
EOSINOPHIL # BLD: 0.2 K/UL (ref 0–0.8)
EOSINOPHIL NFR BLD: 2 % (ref 0.5–7.8)
ERYTHROCYTE [DISTWIDTH] IN BLOOD BY AUTOMATED COUNT: 13.2 % (ref 11.9–14.6)
EXHALED MINUTE VOLUME, VE: 15.1 L/MIN
EXHALED MINUTE VOLUME, VE: 16 L/MIN
FIBRINOGEN PPP-MCNC: 919 MG/DL (ref 190–501)
GAS FLOW.O2 O2 DELIVERY SYS: ABNORMAL L/MIN
GAS FLOW.O2 O2 DELIVERY SYS: ABNORMAL L/MIN
GAS FLOW.O2 SETTING OXYMISER: 30 BPM
GAS FLOW.O2 SETTING OXYMISER: 32 BPM
GLUCOSE BLD STRIP.AUTO-MCNC: 133 MG/DL (ref 65–100)
GLUCOSE BLD STRIP.AUTO-MCNC: 143 MG/DL (ref 65–100)
GLUCOSE BLD STRIP.AUTO-MCNC: 143 MG/DL (ref 65–100)
GLUCOSE BLD STRIP.AUTO-MCNC: 166 MG/DL (ref 65–100)
GLUCOSE BLD STRIP.AUTO-MCNC: 170 MG/DL (ref 65–100)
GLUCOSE BLD STRIP.AUTO-MCNC: 180 MG/DL (ref 65–100)
GLUCOSE SERPL-MCNC: 181 MG/DL (ref 65–100)
HCO3 BLD-SCNC: 20.3 MMOL/L (ref 22–26)
HCO3 BLD-SCNC: 21.7 MMOL/L (ref 22–26)
HCT VFR BLD AUTO: 36.6 % (ref 41.1–50.3)
HGB BLD-MCNC: 11.9 G/DL (ref 13.6–17.2)
IMM GRANULOCYTES # BLD AUTO: 0.2 K/UL (ref 0–0.5)
IMM GRANULOCYTES NFR BLD AUTO: 1 % (ref 0–5)
INR PPP: 1.4
INSPIRATION.DURATION SETTING TIME VENT: 0.95 SEC
LYMPHOCYTES # BLD: 0.8 K/UL (ref 0.5–4.6)
LYMPHOCYTES NFR BLD: 7 % (ref 13–44)
MAGNESIUM SERPL-MCNC: 2.1 MG/DL (ref 1.8–2.4)
MCH RBC QN AUTO: 29.7 PG (ref 26.1–32.9)
MCHC RBC AUTO-ENTMCNC: 32.5 G/DL (ref 31.4–35)
MCV RBC AUTO: 91.3 FL (ref 79.6–97.8)
MONOCYTES # BLD: 0.4 K/UL (ref 0.1–1.3)
MONOCYTES NFR BLD: 3 % (ref 4–12)
NEUTS SEG # BLD: 10.9 K/UL (ref 1.7–8.2)
NEUTS SEG NFR BLD: 87 % (ref 43–78)
NRBC # BLD: 0 K/UL (ref 0–0.2)
O2/TOTAL GAS SETTING VFR VENT: 70 %
O2/TOTAL GAS SETTING VFR VENT: 90 %
PCO2 BLD: 36.6 MMHG (ref 35–45)
PCO2 BLD: 46.7 MMHG (ref 35–45)
PEEP RESPIRATORY: 12 CMH2O
PEEP RESPIRATORY: 12 CMH2O
PH BLD: 7.27 [PH] (ref 7.35–7.45)
PH BLD: 7.35 [PH] (ref 7.35–7.45)
PHOSPHATE SERPL-MCNC: 2.1 MG/DL (ref 2.3–3.7)
PHOSPHATE SERPL-MCNC: 3.4 MG/DL (ref 2.3–3.7)
PLATELET # BLD AUTO: 123 K/UL (ref 150–450)
PMV BLD AUTO: 10.5 FL (ref 9.4–12.3)
PO2 BLD: 113 MMHG (ref 75–100)
PO2 BLD: 69 MMHG (ref 75–100)
POTASSIUM SERPL-SCNC: 4.3 MMOL/L (ref 3.5–5.1)
PROTHROMBIN TIME: 17 SEC (ref 12.5–14.7)
RBC # BLD AUTO: 4.01 M/UL (ref 4.23–5.6)
SAO2 % BLD: 93 % (ref 95–98)
SAO2 % BLD: 98 % (ref 95–98)
SERVICE CMNT-IMP: ABNORMAL
SODIUM SERPL-SCNC: 138 MMOL/L (ref 136–145)
SPECIMEN TYPE: ABNORMAL
SPECIMEN TYPE: ABNORMAL
UFH PPP CHRO-ACNC: 0.75 IU/ML (ref 0.3–0.7)
UFH PPP CHRO-ACNC: 0.86 IU/ML (ref 0.3–0.7)
VENTILATION MODE VENT: ABNORMAL
VENTILATION MODE VENT: ABNORMAL
VT SETTING VENT: 500 ML
VT SETTING VENT: 500 ML
WBC # BLD AUTO: 12.5 K/UL (ref 4.3–11.1)

## 2021-01-20 PROCEDURE — 74011000258 HC RX REV CODE- 258: Performed by: INTERNAL MEDICINE

## 2021-01-20 PROCEDURE — 74011000250 HC RX REV CODE- 250: Performed by: ANESTHESIOLOGY

## 2021-01-20 PROCEDURE — 71045 X-RAY EXAM CHEST 1 VIEW: CPT

## 2021-01-20 PROCEDURE — 74011000258 HC RX REV CODE- 258: Performed by: ANESTHESIOLOGY

## 2021-01-20 PROCEDURE — 82962 GLUCOSE BLOOD TEST: CPT

## 2021-01-20 PROCEDURE — 65610000006 HC RM INTENSIVE CARE

## 2021-01-20 PROCEDURE — 80048 BASIC METABOLIC PNL TOTAL CA: CPT

## 2021-01-20 PROCEDURE — 85610 PROTHROMBIN TIME: CPT

## 2021-01-20 PROCEDURE — 74011000250 HC RX REV CODE- 250: Performed by: INTERNAL MEDICINE

## 2021-01-20 PROCEDURE — 85025 COMPLETE CBC W/AUTO DIFF WBC: CPT

## 2021-01-20 PROCEDURE — 94003 VENT MGMT INPAT SUBQ DAY: CPT

## 2021-01-20 PROCEDURE — 82803 BLOOD GASES ANY COMBINATION: CPT

## 2021-01-20 PROCEDURE — 85520 HEPARIN ASSAY: CPT

## 2021-01-20 PROCEDURE — 84100 ASSAY OF PHOSPHORUS: CPT

## 2021-01-20 PROCEDURE — 74011250636 HC RX REV CODE- 250/636: Performed by: INTERNAL MEDICINE

## 2021-01-20 PROCEDURE — 85730 THROMBOPLASTIN TIME PARTIAL: CPT

## 2021-01-20 PROCEDURE — 83735 ASSAY OF MAGNESIUM: CPT

## 2021-01-20 PROCEDURE — 74011250637 HC RX REV CODE- 250/637: Performed by: INTERNAL MEDICINE

## 2021-01-20 PROCEDURE — 74011250637 HC RX REV CODE- 250/637: Performed by: ANESTHESIOLOGY

## 2021-01-20 PROCEDURE — 74011636637 HC RX REV CODE- 636/637: Performed by: INTERNAL MEDICINE

## 2021-01-20 PROCEDURE — 2709999900 HC NON-CHARGEABLE SUPPLY

## 2021-01-20 PROCEDURE — 74011636637 HC RX REV CODE- 636/637: Performed by: ANESTHESIOLOGY

## 2021-01-20 PROCEDURE — 74011250636 HC RX REV CODE- 250/636: Performed by: ANESTHESIOLOGY

## 2021-01-20 PROCEDURE — 85384 FIBRINOGEN ACTIVITY: CPT

## 2021-01-20 RX ORDER — NOREPINEPHRINE BITARTRATE/D5W 4MG/250ML
.5-3 PLASTIC BAG, INJECTION (ML) INTRAVENOUS
Status: DISCONTINUED | OUTPATIENT
Start: 2021-01-20 | End: 2021-01-22

## 2021-01-20 RX ORDER — ATRACURIUM BESYLATE 10 MG/ML
0.5 INJECTION, SOLUTION INTRAVENOUS ONCE
Status: COMPLETED | OUTPATIENT
Start: 2021-01-20 | End: 2021-01-20

## 2021-01-20 RX ADMIN — INSULIN GLARGINE 30 UNITS: 100 INJECTION, SOLUTION SUBCUTANEOUS at 21:52

## 2021-01-20 RX ADMIN — DEXTROSE MONOHYDRATE 14 MCG/MIN: 50 INJECTION, SOLUTION INTRAVENOUS at 00:27

## 2021-01-20 RX ADMIN — INSULIN LISPRO 3 UNITS: 100 INJECTION, SOLUTION INTRAVENOUS; SUBCUTANEOUS at 08:15

## 2021-01-20 RX ADMIN — Medication 150 MCG/HR: at 14:07

## 2021-01-20 RX ADMIN — FAMOTIDINE 20 MG: 10 INJECTION INTRAVENOUS at 08:14

## 2021-01-20 RX ADMIN — INSULIN GLARGINE 30 UNITS: 100 INJECTION, SOLUTION SUBCUTANEOUS at 08:15

## 2021-01-20 RX ADMIN — HEPARIN SODIUM 16 UNITS/KG/HR: 5000 INJECTION, SOLUTION INTRAVENOUS at 05:00

## 2021-01-20 RX ADMIN — INSULIN LISPRO 3 UNITS: 100 INJECTION, SOLUTION INTRAVENOUS; SUBCUTANEOUS at 03:49

## 2021-01-20 RX ADMIN — ATRACURIUM BESYLATE 45.5 MG: 10 INJECTION, SOLUTION INTRAVENOUS at 13:21

## 2021-01-20 RX ADMIN — CEFEPIME 2 G: 2 INJECTION, POWDER, FOR SOLUTION INTRAVENOUS at 00:15

## 2021-01-20 RX ADMIN — FAMOTIDINE 20 MG: 10 INJECTION INTRAVENOUS at 21:37

## 2021-01-20 RX ADMIN — WATER 10.23 NG/KG/MIN: 1 SOLUTION INTRAVENOUS at 17:18

## 2021-01-20 RX ADMIN — DEXTROSE MONOHYDRATE 6 MCG/MIN: 50 INJECTION, SOLUTION INTRAVENOUS at 05:23

## 2021-01-20 RX ADMIN — DEXTROSE MONOHYDRATE 8 MCG/MIN: 50 INJECTION, SOLUTION INTRAVENOUS at 21:36

## 2021-01-20 RX ADMIN — MORPHINE SULFATE 2 MG: 2 INJECTION, SOLUTION INTRAMUSCULAR; INTRAVENOUS at 12:36

## 2021-01-20 RX ADMIN — ATORVASTATIN CALCIUM 80 MG: 40 TABLET, FILM COATED ORAL at 08:14

## 2021-01-20 RX ADMIN — CEFEPIME 2 G: 2 INJECTION, POWDER, FOR SOLUTION INTRAVENOUS at 11:46

## 2021-01-20 RX ADMIN — CLOPIDOGREL BISULFATE 75 MG: 75 TABLET ORAL at 08:13

## 2021-01-20 RX ADMIN — SODIUM PHOSPHATE, MONOBASIC, MONOHYDRATE AND SODIUM PHOSPHATE, DIBASIC, ANHYDROUS: 276; 142 INJECTION, SOLUTION INTRAVENOUS at 08:13

## 2021-01-20 RX ADMIN — Medication 20 ML: at 14:00

## 2021-01-20 RX ADMIN — INSULIN LISPRO 3 UNITS: 100 INJECTION, SOLUTION INTRAVENOUS; SUBCUTANEOUS at 00:20

## 2021-01-20 RX ADMIN — ATRACURIUM BESYLATE 13 MCG/KG/MIN: 10 INJECTION, SOLUTION INTRAVENOUS at 08:41

## 2021-01-20 RX ADMIN — Medication 20 ML: at 05:24

## 2021-01-20 RX ADMIN — ASPIRIN 81 MG: 81 TABLET, CHEWABLE ORAL at 08:14

## 2021-01-20 RX ADMIN — WATER 10 NG/KG/MIN: 1 SOLUTION INTRAVENOUS at 00:38

## 2021-01-20 RX ADMIN — VANCOMYCIN HYDROCHLORIDE 1250 MG: 10 INJECTION, POWDER, LYOPHILIZED, FOR SOLUTION INTRAVENOUS at 10:46

## 2021-01-20 RX ADMIN — WATER 10.23 NG/KG/MIN: 1 SOLUTION INTRAVENOUS at 08:30

## 2021-01-20 RX ADMIN — Medication 400 MG: at 08:14

## 2021-01-20 RX ADMIN — Medication 20 ML: at 21:37

## 2021-01-20 RX ADMIN — POLYETHYLENE GLYCOL 3350 17 G: 17 POWDER, FOR SOLUTION ORAL at 08:15

## 2021-01-20 NOTE — PROGRESS NOTES
Ventilator check complete; patient has a #8. 0 ET tube secured at the 24 at the lip. Patient is sedated. Patient is not able to follow commands. Breath sounds are diminished. Trachea is midline, Negative for subcutaneous air, and chest excursion is symmetric. Patient is also Negative for cyanosis and is Negative for pitting edema. All alarms are set and audible. Resuscitation bag is at the head of the bed.       Ventilator Settings  Mode FIO2 Rate Tidal Volume PEEP I:E Ratio   PRVC  70 %   32 bpm 500 ml  12 cm H20  1:1      Peak airway pressure: 32 cm H2O   Minute ventilation: 15.9 l/min       Terrance Velásquez, RT

## 2021-01-20 NOTE — PROGRESS NOTES
Bedside, Verbal and Written shift change report given to Teresa ROLLE (oncoming nurse) by Demetrio Javier RN (offgoing nurse). Report included the following information SBAR, Kardex, Intake/Output, MAR, Recent Results, Cardiac Rhythm NSR and Alarm Parameters .

## 2021-01-20 NOTE — PROCEDURES
Emergent Intubation  Performed by: Lorena Gonzalez MD  Authorized by: Lorena Gonzalez MD     Emergent Intubation:   Location:  ICU  Date/Time:  1/17/2021 12:30 PM  Indications:  Impending respiratory failure  Spontaneous Ventilation: present    Level of Consciousness: sedated  Preoxygenated:  Yes      Airway Documentation:   Airway:  ETT - Cuffed  Technique:  Direct laryngoscopy  Advanced Technique:  Glide scope  Insertion Site:  Oral  Blade Type:  Emily  Blade Size:  4  ETT size (mm):  8.0  ETT Line Yandel:  Gumline  ETT Insertion depth (cm):  23  Placement verified by: auscultation, EtCO2, BBS and fiber optic    Attempts:  1  Difficult airway: No

## 2021-01-20 NOTE — PROGRESS NOTES
Critical Care Daily Progress Note: 1/20/2021  Admission Date: 1/6/2021     The patient's chart is reviewed and the patient is discussed with the staff. Admission Date: 1/6/2021     Length of Stay: 9 days     Background: 61 y.o. y/o male with acute hypoxemic respiratory failure secondary to COVID-19. Date of COVID-19 symptom onset: + COVID 01/05/21     Notable PMH: obesity, HTN, CAD, dyslipidemia, SWATI, DM, GERD       24 Hour events:   D-dimer not highly elevated. Procal slightly up. Still having fevers, Tm 101. 1/19, afebrile since. P:F ratio of 100 this morning. Fluid balance 2.5 L ml positive yesterday.   dyssnchronous with the vent upon un-paralyzing today, needed returned to 100%    Current Facility-Administered Medications   Medication Dose Route Frequency    NOREPINephrine (LEVOPHED) 4 mg in 5% dextrose 250 mL infusion  0.5-30 mcg/min IntraVENous TITRATE    [START ON 1/21/2021] Vancomycin Trough Level Reminder   Other ONCE    heparin 25,000 units in dextrose 500 mL infusion  18-36 Units/kg/hr IntraVENous TITRATE    cefepime (MAXIPIME) 2 g in 0.9% sodium chloride (MBP/ADV) 100 mL MBP  2 g IntraVENous Q12H    vancomycin (VANCOCIN) 1250 mg in  ml infusion  1,250 mg IntraVENous Q18H    white petrolatum-mineral oiL (LACRILUBE S.O.P.) ointment   Both Eyes Q4H PRN    insulin glargine (LANTUS) injection 30 Units  30 Units SubCUTAneous Q12H    polyethylene glycol (MIRALAX) packet 17 g  17 g Oral DAILY    EPINEPHrine (ADRENALIN) 4 mg in 0.9% sodium chloride 250 mL infusion  1-10 mcg/min IntraVENous TITRATE    vasopressin (VASOSTRICT) 20 Units in 0.9% sodium chloride 100 mL infusion  0-0.03 Units/min IntraVENous TITRATE    epoprostenol (FLOLAN) 30,000 ng/mL in diluent for epoprostenol (gly) 50 mL solution  10-50 ng/kg/min (Ideal) Nebulization TITRATE    0.9% sodium chloride infusion  8 mL/hr Nebulization TITRATE    atracurium (TRACRIUM) 1,000 mg in 0.9% sodium chloride 250 mL infusion  0-15 mcg/kg/min IntraVENous TITRATE    propofol (DIPRIVAN) 10 mg/mL infusion  0-50 mcg/kg/min IntraVENous TITRATE    famotidine (PF) (PEPCID) 20 mg in 0.9% sodium chloride 10 mL injection  20 mg IntraVENous Q12H    fentaNYL in normal saline (pf) 25 mcg/mL infusion  0-200 mcg/hr IntraVENous TITRATE    midazolam in normal saline (VERSED) 1 mg/mL infusion  0-10 mg/hr IntraVENous TITRATE    aspirin chewable tablet 81 mg  81 mg Per NG tube DAILY    atorvastatin (LIPITOR) tablet 80 mg  80 mg Per NG tube DAILY    clopidogreL (PLAVIX) tablet 75 mg  75 mg Per NG tube DAILY    magnesium oxide (MAG-OX) tablet 400 mg  400 mg Per NG tube DAILY    [Held by provider] metoprolol tartrate (LOPRESSOR) tablet 50 mg  50 mg Per NG tube BID    insulin lispro (HUMALOG) injection   SubCUTAneous Q4H    LORazepam (ATIVAN) injection 1 mg  1 mg IntraVENous Q4H PRN    morphine injection 2 mg  2 mg IntraVENous Q4H PRN    NUTRITIONAL SUPPORT ELECTROLYTE PRN ORDERS   Does Not Apply PRN    sodium chloride (NS) flush 20 mL  20 mL InterCATHeter Q8H    sodium chloride (NS) flush 20 mL  20 mL InterCATHeter PRN    loperamide (IMODIUM) capsule 2 mg  2 mg Oral Q4H PRN    polyethylene glycol (MIRALAX) packet 17 g  17 g Oral DAILY PRN    promethazine (PHENERGAN) tablet 12.5 mg  12.5 mg Oral Q6H PRN    Or    ondansetron (ZOFRAN) injection 4 mg  4 mg IntraVENous Q6H PRN    guaiFENesin-dextromethorphan (ROBITUSSIN DM) 100-10 mg/5 mL syrup 10 mL  10 mL Oral Q4H PRN    dextrose 40% (GLUTOSE) oral gel 1 Tube  15 g Oral PRN    glucagon (GLUCAGEN) injection 1 mg  1 mg IntraMUSCular PRN    dextrose (D50W) injection syrg 12.5-25 g  25-50 mL IntraVENous PRN    0.9% sodium chloride infusion 250 mL  250 mL IntraVENous PRN    albuterol (PROVENTIL HFA, VENTOLIN HFA, PROAIR HFA) inhaler 2 Puff  2 Puff Inhalation Q4H PRN    influenza vaccine 2020-21 (6 mos+)(PF) (FLUARIX/FLULAVAL/FLUZONE QUAD) injection 0.5 mL  0.5 mL IntraMUSCular PRIOR TO DISCHARGE    hydrALAZINE (APRESOLINE) 20 mg/mL injection 20 mg  20 mg IntraVENous Q6H PRN    acetaminophen (TYLENOL) tablet 650 mg  650 mg Oral Q6H PRN       Objective:     Vitals:    01/20/21 1130 01/20/21 1145 01/20/21 1200 01/20/21 1215   BP:   (!) 144/71    Pulse: 96 100 96 98   Resp: (!) 31 (!) 31 (!) 31 (!) 31   Temp:       SpO2: 94% 95% 94% 93%   Weight:       Height:           Intake/Output Summary (Last 24 hours) at 1/20/2021 1233  Last data filed at 1/20/2021 0528  Gross per 24 hour   Intake 3419.35 ml   Output 1075 ml   Net 2344.35 ml     Physical Exam:            Constitutional:  Intubated and sedated  EENMT:  Sclera clear, pupils equal, oral mucosa moist  Respiratory: bilateral crackles, no crepitus  Cardiovascular:  RRR with no M,G,R;  Gastrointestinal:  soft with no tenderness; positive bowel sounds present  Musculoskeletal:  warm with no cyanosis, no lower extremity edema  Skin:  no jaundice or ecchymosis  Neurologic: pupils equal  Psychiatric:  sedation      LINES:    ET tube 1/17/2021  PICC 1/13  NGT 1/17  Art line 1/18/2021    DRIPS:    Atracurium  Fentanyl gtt  midaz  norepi 14    COVID-19 medications  Decadron 1/7-16  remdesivir 1/8-1/12  Convalescent plasma 1/7    Anti-infectives  azithro (completed)  Ceftriaxone (completed)  Vanc and cefepime 1/19 and 1/20      CXR:-Improved slightly        Ventilator Settings  Mode FIO2 Rate Tidal Volume Pressure PEEP   PRVC  70 %    500 ml     12 cm H20      Peak airway pressure: 31 cm H2O   Minute ventilation: 15.9 l/min     ABG:   Recent Labs     01/20/21  0326 01/19/21  0132 01/18/21  0305   PHI 7.35 7.31* 7.28*   PCO2I 36.6 36.3 48.8*   PO2I 69* 253* 85   HCO3I 20.3* 18.4* 22.7     LAB    MICRO  Date Source Result   1/18  sputum  pending   1/18  blood  pending   1/18  blood  pending   1/18  urine  no growth; prelim     Recent Labs     01/20/21  1111 01/20/21  0747 01/20/21  0346 01/20/21  0014 01/19/21 2102   GLUCPOC 143* 180* 166* 170* 157* Recent Labs     01/20/21  0211 01/19/21  1249 01/19/21  0258 01/18/21 0312   WBC 12.5* 11.8* 9.3 12.0*   HGB 11.9* 12.0* 12.3* 14.1   HCT 36.6* 37.2* 38.5* 42.7   * 116* 107* 130*   INR 1.4  --  1.4 1.2     Recent Labs     01/20/21  0211 01/19/21  0258 01/18/21 0312 01/17/21 2035    137 136 136   K 4.3 4.3 5.0 5.3*   * 110* 105 105   CO2 23 22 25 24   * 242* 325* 286*   BUN 34* 36* 34* 33*   CREA 1.55* 1.52* 1.23 1.20   MG 2.1  --  2.2 2.0   PHOS 2.1*  --  4.0*  --    CA 8.8 8.7 8.4 8.2*   ALB  --   --   --  1.7*     No results for input(s): LCAD, LAC in the last 72 hours. Assessment:  (Medical Decision Making)     Hospital Problems  Date Reviewed: 6/3/2020          Codes Class Noted POA    Hypernatremia ICD-10-CM: E87.0  ICD-9-CM: 276.0  1/8/2021 Unknown    Resolved    SWATI (acute kidney injury) ([facility] 75.) ICD-10-CM: N17.9  ICD-9-CM: 584.9  1/7/2021 Unknown    Cr 1.15    Acute hypoxemic respiratory failure ([facility] 75.) ICD-10-CM: J96.01  ICD-9-CM: 518.81  1/7/2021 Unknown    Back on 100% CPAP 10. Type 2 diabetes mellitus with hyperosmolarity without nonketotic hyperglycemic-hyperosmolar coma ([facility] 75.) ICD-10-CM: E11.00  ICD-9-CM: 250.20  1/7/2021 Unknown        * (Principal) Pneumonia due to COVID-19 virus ICD-10-CM: U07.1, J12.82  ICD-9-CM: 480.8  1/7/2021 Unknown    Severe. Remains on decadron. CAD (coronary artery disease) ICD-10-CM: I25.10  ICD-9-CM: 414.00  10/28/2016 Yes    Overview Signed 10/28/2016  8:05 AM by SHELDON Talley     10-27-06 [facility] 90% LAD s/p 2.25 x 20 Synergy drug-eluting stent  (CS)             Obesity ICD-10-CM: E66.9  ICD-9-CM: 278.00  10/6/2015 Yes            Plan:  (Medical Decision Making)     Impression: 61 y.o. y/o male with acute hypoxemic respiratory failure secondary to COVID-19. NEURO:   1. Sedation: Versed gtt propofol  2. Analgesia: Fentanyl gtt  3. Paralytics: d/c paralytics    CV:   1. Volume Status: norepi @ 6, this morning now off.     PULM: 1. Acute hypoxemic/hypercapneic respiratory failure:    1/18:proned overnight, 100% FiO2 with sats in low 90s currently 12 PEEP 1:1 ratio  1/20 prone again today. 2. Severe ARDS 2/2 COVID: Low Tidal Volume ventilation, goal 6cc per kg (Ideal body weight: 68.4 kg (150 lb 12.7 oz) x 6 = 420). Attempt to limit driving pressure to 73BI/J2T or less. Allow permissive hypercapnia/acidosis to pH 7.25 if needed to achieve above. RENAL:  1. SWATI: elevated Cr    GI: NPO, PPI prophy  1. Nutrition: TF request to dietary, d/c TPN    HEME: no acute issues, platelets stable since 1/18  1. DVT Peroneal vein:   heparin drip and monitor platelets, low threshold to stop given it is treatment for a below the knee DVT. ID:   1. COVID-19: COVID meds as above, multi-D rounds with pharmacy to optimize meds with noted contraindications    2. Fevers:  With elevated procal.  Empiric vanco/cefepime awaiting culture results. Started 1/19    ENDO: not on chronic steroids  1. DM: sliding scale insulin,  lantus to 30 Q12 and monitor. Skin: no decub, turns, preventive care    Prophy: Lovenox, H2B    Full Code    Plan to Update sister Mynor Chowdhury  [phone] by phone  The patient is critically ill with respiratory failure, circulatory failure and requires high complexity decision making for assessment and support including frequent ventilator adjustment , frequent evaluation and titration of therapies , application of advanced monitoring technologies and extensive interpretation of multiple databases.   Cumulative time devoted to coordination, counseling and  patient care services by me for day of service -42 min     Veronica Hutchison MD

## 2021-01-20 NOTE — PROGRESS NOTES
Ventilator check complete; patient has a #8. 0 ET tube secured at the 24 at the lip. Patient is sedated. Patient is not able to follow commands. Breath sounds are coarse. Trachea is midline, Negative for subcutaneous air, and chest excursion is symmetric. Patient is also Negative for cyanosis and is Positive for pitting edema. All alarms are set and audible. Resuscitation bag is at the head of the bed. Ventilator Settings  Mode FIO2 Rate Tidal Volume Pressure PEEP I:E Ratio   PRVC  70 %   32 500 ml     12 cm H20  1:1.3      Peak airway pressure: 31 cm H2O   Minute ventilation: 15.9 l/min     ABG: No results for input(s): PH, PCO2, PO2, HCO3 in the last 72 hours.       Sera Siddiqi, RT

## 2021-01-20 NOTE — PROGRESS NOTES
LOS 13d  BMT/ ICU  Vent 70% Fi02/ PRVC (vent mode)  Gtts:  Tracrium  Fentanyl  Norepi  STRH vs LTACH  CM will send to referral to [facility], [facility] to have liaison review for possible LTACH as next LOC  Will continue to follow for discharge planning needs  Please consult  if any new issues arise

## 2021-01-21 ENCOUNTER — APPOINTMENT (OUTPATIENT)
Dept: GENERAL RADIOLOGY | Age: 61
DRG: 004 | End: 2021-01-21
Attending: INTERNAL MEDICINE
Payer: COMMERCIAL

## 2021-01-21 LAB
ALBUMIN SERPL-MCNC: 2.1 G/DL (ref 3.2–4.6)
ALBUMIN/GLOB SERPL: 0.5 {RATIO} (ref 1.2–3.5)
ALP SERPL-CCNC: 118 U/L (ref 50–136)
ALT SERPL-CCNC: 35 U/L (ref 12–65)
ANION GAP SERPL CALC-SCNC: 5 MMOL/L (ref 7–16)
ANION GAP SERPL CALC-SCNC: 5 MMOL/L (ref 7–16)
ANION GAP SERPL CALC-SCNC: 7 MMOL/L (ref 7–16)
APTT PPP: 44.4 SEC (ref 24.1–35.1)
ARTERIAL PATENCY WRIST A: ABNORMAL
ARTERIAL PATENCY WRIST A: ABNORMAL
AST SERPL-CCNC: 60 U/L (ref 15–37)
BACTERIA SPEC CULT: NORMAL
BASE DEFICIT BLD-SCNC: 4 MMOL/L
BASE DEFICIT BLD-SCNC: 7 MMOL/L
BASOPHILS # BLD: 0 K/UL (ref 0–0.2)
BASOPHILS NFR BLD: 0 % (ref 0–2)
BDY SITE: ABNORMAL
BDY SITE: ABNORMAL
BILIRUB SERPL-MCNC: 1.3 MG/DL (ref 0.2–1.1)
BUN SERPL-MCNC: 37 MG/DL (ref 8–23)
BUN SERPL-MCNC: 39 MG/DL (ref 8–23)
BUN SERPL-MCNC: 43 MG/DL (ref 8–23)
CALCIUM SERPL-MCNC: 8.7 MG/DL (ref 8.3–10.4)
CALCIUM SERPL-MCNC: 8.9 MG/DL (ref 8.3–10.4)
CALCIUM SERPL-MCNC: 9 MG/DL (ref 8.3–10.4)
CHLORIDE SERPL-SCNC: 108 MMOL/L (ref 98–107)
CHLORIDE SERPL-SCNC: 108 MMOL/L (ref 98–107)
CHLORIDE SERPL-SCNC: 109 MMOL/L (ref 98–107)
CO2 BLD-SCNC: 22 MMOL/L
CO2 BLD-SCNC: 23 MMOL/L
CO2 SERPL-SCNC: 21 MMOL/L (ref 21–32)
CO2 SERPL-SCNC: 23 MMOL/L (ref 21–32)
CO2 SERPL-SCNC: 24 MMOL/L (ref 21–32)
COLLECT TIME,HTIME: 1548
COLLECT TIME,HTIME: 420
CREAT SERPL-MCNC: 1.95 MG/DL (ref 0.8–1.5)
CREAT SERPL-MCNC: 2.32 MG/DL (ref 0.8–1.5)
CREAT SERPL-MCNC: 2.53 MG/DL (ref 0.8–1.5)
DIFFERENTIAL METHOD BLD: ABNORMAL
EOSINOPHIL # BLD: 0.2 K/UL (ref 0–0.8)
EOSINOPHIL NFR BLD: 2 % (ref 0.5–7.8)
ERYTHROCYTE [DISTWIDTH] IN BLOOD BY AUTOMATED COUNT: 13.4 % (ref 11.9–14.6)
EXHALED MINUTE VOLUME, VE: 15.5 L/MIN
EXHALED MINUTE VOLUME, VE: 16.2 L/MIN
FIBRINOGEN PPP-MCNC: 892 MG/DL (ref 190–501)
GAS FLOW.O2 O2 DELIVERY SYS: ABNORMAL L/MIN
GAS FLOW.O2 O2 DELIVERY SYS: ABNORMAL L/MIN
GAS FLOW.O2 SETTING OXYMISER: 32 BPM
GAS FLOW.O2 SETTING OXYMISER: 32 BPM
GLOBULIN SER CALC-MCNC: 4.1 G/DL (ref 2.3–3.5)
GLUCOSE BLD STRIP.AUTO-MCNC: 116 MG/DL (ref 65–100)
GLUCOSE BLD STRIP.AUTO-MCNC: 124 MG/DL (ref 65–100)
GLUCOSE BLD STRIP.AUTO-MCNC: 126 MG/DL (ref 65–100)
GLUCOSE BLD STRIP.AUTO-MCNC: 154 MG/DL (ref 65–100)
GLUCOSE BLD STRIP.AUTO-MCNC: 158 MG/DL (ref 65–100)
GLUCOSE BLD STRIP.AUTO-MCNC: 212 MG/DL (ref 65–100)
GLUCOSE BLD STRIP.AUTO-MCNC: 218 MG/DL (ref 65–100)
GLUCOSE SERPL-MCNC: 125 MG/DL (ref 65–100)
GLUCOSE SERPL-MCNC: 144 MG/DL (ref 65–100)
GLUCOSE SERPL-MCNC: 220 MG/DL (ref 65–100)
HCO3 BLD-SCNC: 20.6 MMOL/L (ref 22–26)
HCO3 BLD-SCNC: 22 MMOL/L (ref 22–26)
HCT VFR BLD AUTO: 31.3 % (ref 41.1–50.3)
HCT VFR BLD AUTO: 33.2 % (ref 41.1–50.3)
HGB BLD-MCNC: 10.2 G/DL (ref 13.6–17.2)
HGB BLD-MCNC: 10.8 G/DL (ref 13.6–17.2)
IMM GRANULOCYTES # BLD AUTO: 0.1 K/UL (ref 0–0.5)
IMM GRANULOCYTES NFR BLD AUTO: 2 % (ref 0–5)
INR PPP: 1.2
LYMPHOCYTES # BLD: 0.6 K/UL (ref 0.5–4.6)
LYMPHOCYTES NFR BLD: 7 % (ref 13–44)
MAGNESIUM SERPL-MCNC: 2.3 MG/DL (ref 1.8–2.4)
MCH RBC QN AUTO: 29.7 PG (ref 26.1–32.9)
MCHC RBC AUTO-ENTMCNC: 32.5 G/DL (ref 31.4–35)
MCV RBC AUTO: 91.2 FL (ref 79.6–97.8)
MONOCYTES # BLD: 0.4 K/UL (ref 0.1–1.3)
MONOCYTES NFR BLD: 4 % (ref 4–12)
NEUTS SEG # BLD: 7.5 K/UL (ref 1.7–8.2)
NEUTS SEG NFR BLD: 85 % (ref 43–78)
NRBC # BLD: 0 K/UL (ref 0–0.2)
O2/TOTAL GAS SETTING VFR VENT: 75 %
O2/TOTAL GAS SETTING VFR VENT: 80 %
PCO2 BLD: 44 MMHG (ref 35–45)
PCO2 BLD: 47.9 MMHG (ref 35–45)
PEEP RESPIRATORY: 12 CMH2O
PEEP RESPIRATORY: 16 CMH2O
PH BLD: 7.24 [PH] (ref 7.35–7.45)
PH BLD: 7.31 [PH] (ref 7.35–7.45)
PLATELET # BLD AUTO: 108 K/UL (ref 150–450)
PMV BLD AUTO: 10.1 FL (ref 9.4–12.3)
PO2 BLD: 73 MMHG (ref 75–100)
PO2 BLD: 77 MMHG (ref 75–100)
POTASSIUM SERPL-SCNC: 4.6 MMOL/L (ref 3.5–5.1)
POTASSIUM SERPL-SCNC: 4.7 MMOL/L (ref 3.5–5.1)
POTASSIUM SERPL-SCNC: 5.3 MMOL/L (ref 3.5–5.1)
PROT SERPL-MCNC: 6.2 G/DL (ref 6.3–8.2)
PROTHROMBIN TIME: 15.8 SEC (ref 12.5–14.7)
RBC # BLD AUTO: 3.64 M/UL (ref 4.23–5.6)
SAO2 % BLD: 92 % (ref 95–98)
SAO2 % BLD: 93 % (ref 95–98)
SERVICE CMNT-IMP: ABNORMAL
SERVICE CMNT-IMP: NORMAL
SODIUM SERPL-SCNC: 136 MMOL/L (ref 136–145)
SODIUM SERPL-SCNC: 137 MMOL/L (ref 136–145)
SODIUM SERPL-SCNC: 137 MMOL/L (ref 136–145)
SPECIMEN TYPE: ABNORMAL
SPECIMEN TYPE: ABNORMAL
VANCOMYCIN TROUGH SERPL-MCNC: 16.8 UG/ML (ref 5–20)
VENTILATION MODE VENT: ABNORMAL
VENTILATION MODE VENT: ABNORMAL
VT SETTING VENT: 480 ML
VT SETTING VENT: 500 ML
WBC # BLD AUTO: 8.8 K/UL (ref 4.3–11.1)

## 2021-01-21 PROCEDURE — 65610000006 HC RM INTENSIVE CARE

## 2021-01-21 PROCEDURE — 71045 X-RAY EXAM CHEST 1 VIEW: CPT

## 2021-01-21 PROCEDURE — 74011000250 HC RX REV CODE- 250: Performed by: INTERNAL MEDICINE

## 2021-01-21 PROCEDURE — 36592 COLLECT BLOOD FROM PICC: CPT

## 2021-01-21 PROCEDURE — 74011250636 HC RX REV CODE- 250/636: Performed by: INTERNAL MEDICINE

## 2021-01-21 PROCEDURE — 74011000258 HC RX REV CODE- 258: Performed by: INTERNAL MEDICINE

## 2021-01-21 PROCEDURE — 74011000258 HC RX REV CODE- 258: Performed by: EMERGENCY MEDICINE

## 2021-01-21 PROCEDURE — 99291 CRITICAL CARE FIRST HOUR: CPT | Performed by: INTERNAL MEDICINE

## 2021-01-21 PROCEDURE — 74011250637 HC RX REV CODE- 250/637: Performed by: ANESTHESIOLOGY

## 2021-01-21 PROCEDURE — 74011250637 HC RX REV CODE- 250/637: Performed by: HOSPITALIST

## 2021-01-21 PROCEDURE — 74011250637 HC RX REV CODE- 250/637: Performed by: INTERNAL MEDICINE

## 2021-01-21 PROCEDURE — 82962 GLUCOSE BLOOD TEST: CPT

## 2021-01-21 PROCEDURE — 85730 THROMBOPLASTIN TIME PARTIAL: CPT

## 2021-01-21 PROCEDURE — 85384 FIBRINOGEN ACTIVITY: CPT

## 2021-01-21 PROCEDURE — 85610 PROTHROMBIN TIME: CPT

## 2021-01-21 PROCEDURE — 74011000250 HC RX REV CODE- 250: Performed by: EMERGENCY MEDICINE

## 2021-01-21 PROCEDURE — 94003 VENT MGMT INPAT SUBQ DAY: CPT

## 2021-01-21 PROCEDURE — 80053 COMPREHEN METABOLIC PANEL: CPT

## 2021-01-21 PROCEDURE — 82803 BLOOD GASES ANY COMBINATION: CPT

## 2021-01-21 PROCEDURE — 74011000258 HC RX REV CODE- 258: Performed by: ANESTHESIOLOGY

## 2021-01-21 PROCEDURE — 74011000250 HC RX REV CODE- 250: Performed by: ANESTHESIOLOGY

## 2021-01-21 PROCEDURE — 80048 BASIC METABOLIC PNL TOTAL CA: CPT

## 2021-01-21 PROCEDURE — 36415 COLL VENOUS BLD VENIPUNCTURE: CPT

## 2021-01-21 PROCEDURE — 74011250636 HC RX REV CODE- 250/636: Performed by: ANESTHESIOLOGY

## 2021-01-21 PROCEDURE — 93005 ELECTROCARDIOGRAM TRACING: CPT | Performed by: EMERGENCY MEDICINE

## 2021-01-21 PROCEDURE — 85025 COMPLETE CBC W/AUTO DIFF WBC: CPT

## 2021-01-21 PROCEDURE — P9045 ALBUMIN (HUMAN), 5%, 250 ML: HCPCS | Performed by: INTERNAL MEDICINE

## 2021-01-21 PROCEDURE — 74011636637 HC RX REV CODE- 636/637: Performed by: INTERNAL MEDICINE

## 2021-01-21 PROCEDURE — 74011250636 HC RX REV CODE- 250/636: Performed by: EMERGENCY MEDICINE

## 2021-01-21 PROCEDURE — 85014 HEMATOCRIT: CPT

## 2021-01-21 PROCEDURE — 83735 ASSAY OF MAGNESIUM: CPT

## 2021-01-21 PROCEDURE — P9047 ALBUMIN (HUMAN), 25%, 50ML: HCPCS | Performed by: INTERNAL MEDICINE

## 2021-01-21 PROCEDURE — 80202 ASSAY OF VANCOMYCIN: CPT

## 2021-01-21 PROCEDURE — 74011636637 HC RX REV CODE- 636/637: Performed by: ANESTHESIOLOGY

## 2021-01-21 PROCEDURE — 2709999900 HC NON-CHARGEABLE SUPPLY

## 2021-01-21 RX ORDER — DIGOXIN 0.25 MG/ML
500 INJECTION INTRAMUSCULAR; INTRAVENOUS
Status: COMPLETED | OUTPATIENT
Start: 2021-01-21 | End: 2021-01-21

## 2021-01-21 RX ORDER — HYDROCORTISONE SODIUM SUCCINATE 100 MG/2ML
50 INJECTION, POWDER, FOR SOLUTION INTRAMUSCULAR; INTRAVENOUS EVERY 6 HOURS
Status: DISCONTINUED | OUTPATIENT
Start: 2021-01-21 | End: 2021-01-28

## 2021-01-21 RX ORDER — DILTIAZEM HYDROCHLORIDE 5 MG/ML
15 INJECTION INTRAVENOUS ONCE
Status: COMPLETED | OUTPATIENT
Start: 2021-01-21 | End: 2021-01-21

## 2021-01-21 RX ORDER — ALBUMIN HUMAN 250 G/1000ML
25 SOLUTION INTRAVENOUS EVERY 6 HOURS
Status: COMPLETED | OUTPATIENT
Start: 2021-01-21 | End: 2021-01-23

## 2021-01-21 RX ORDER — ALBUMIN HUMAN 50 G/1000ML
50 SOLUTION INTRAVENOUS
Status: COMPLETED | OUTPATIENT
Start: 2021-01-21 | End: 2021-01-21

## 2021-01-21 RX ADMIN — VANCOMYCIN HYDROCHLORIDE 1250 MG: 10 INJECTION, POWDER, LYOPHILIZED, FOR SOLUTION INTRAVENOUS at 04:06

## 2021-01-21 RX ADMIN — HYDROCORTISONE SODIUM SUCCINATE 50 MG: 100 INJECTION, POWDER, FOR SOLUTION INTRAMUSCULAR; INTRAVENOUS at 13:04

## 2021-01-21 RX ADMIN — HYDROCORTISONE SODIUM SUCCINATE 50 MG: 100 INJECTION, POWDER, FOR SOLUTION INTRAMUSCULAR; INTRAVENOUS at 23:43

## 2021-01-21 RX ADMIN — DEXTROSE MONOHYDRATE 10 MCG/MIN: 50 INJECTION, SOLUTION INTRAVENOUS at 10:16

## 2021-01-21 RX ADMIN — Medication 150 MCG/HR: at 23:53

## 2021-01-21 RX ADMIN — POLYETHYLENE GLYCOL 3350 17 G: 17 POWDER, FOR SOLUTION ORAL at 09:24

## 2021-01-21 RX ADMIN — CEFEPIME 2 G: 2 INJECTION, POWDER, FOR SOLUTION INTRAVENOUS at 00:49

## 2021-01-21 RX ADMIN — CEFEPIME 2 G: 2 INJECTION, POWDER, FOR SOLUTION INTRAVENOUS at 23:43

## 2021-01-21 RX ADMIN — DEXTROSE MONOHYDRATE 14 MCG/MIN: 50 INJECTION, SOLUTION INTRAVENOUS at 20:00

## 2021-01-21 RX ADMIN — INSULIN LISPRO 6 UNITS: 100 INJECTION, SOLUTION INTRAVENOUS; SUBCUTANEOUS at 23:38

## 2021-01-21 RX ADMIN — ACETAMINOPHEN 650 MG: 325 TABLET, FILM COATED ORAL at 11:46

## 2021-01-21 RX ADMIN — Medication 175 MCG/HR: at 10:08

## 2021-01-21 RX ADMIN — DEXTROSE MONOHYDRATE 10 MCG/MIN: 50 INJECTION, SOLUTION INTRAVENOUS at 01:38

## 2021-01-21 RX ADMIN — ASPIRIN 81 MG: 81 TABLET, CHEWABLE ORAL at 08:21

## 2021-01-21 RX ADMIN — WATER 10 NG/KG/MIN: 1 SOLUTION INTRAVENOUS at 00:50

## 2021-01-21 RX ADMIN — ALBUMIN (HUMAN) 50 G: 12.5 INJECTION, SOLUTION INTRAVENOUS at 12:35

## 2021-01-21 RX ADMIN — ATORVASTATIN CALCIUM 80 MG: 40 TABLET, FILM COATED ORAL at 08:21

## 2021-01-21 RX ADMIN — Medication 20 ML: at 06:00

## 2021-01-21 RX ADMIN — Medication 400 MG: at 08:21

## 2021-01-21 RX ADMIN — INSULIN GLARGINE 30 UNITS: 100 INJECTION, SOLUTION SUBCUTANEOUS at 20:18

## 2021-01-21 RX ADMIN — FAMOTIDINE 20 MG: 10 INJECTION INTRAVENOUS at 22:05

## 2021-01-21 RX ADMIN — MIDAZOLAM HYDROCHLORIDE 5 MG/HR: 5 INJECTION, SOLUTION INTRAMUSCULAR; INTRAVENOUS at 02:33

## 2021-01-21 RX ADMIN — ALBUMIN (HUMAN) 25 G: 0.25 INJECTION, SOLUTION INTRAVENOUS at 20:19

## 2021-01-21 RX ADMIN — DEXTROSE MONOHYDRATE 16 MCG/MIN: 50 INJECTION, SOLUTION INTRAVENOUS at 19:42

## 2021-01-21 RX ADMIN — CEFEPIME 2 G: 2 INJECTION, POWDER, FOR SOLUTION INTRAVENOUS at 11:46

## 2021-01-21 RX ADMIN — ATRACURIUM BESYLATE 8 MCG/KG/MIN: 10 INJECTION, SOLUTION INTRAVENOUS at 17:32

## 2021-01-21 RX ADMIN — FAMOTIDINE 20 MG: 10 INJECTION INTRAVENOUS at 08:22

## 2021-01-21 RX ADMIN — DIGOXIN 500 MCG: 0.25 INJECTION INTRAMUSCULAR; INTRAVENOUS at 21:07

## 2021-01-21 RX ADMIN — INSULIN GLARGINE 30 UNITS: 100 INJECTION, SOLUTION SUBCUTANEOUS at 08:21

## 2021-01-21 RX ADMIN — DILTIAZEM HYDROCHLORIDE 15 MG: 5 INJECTION INTRAVENOUS at 21:30

## 2021-01-21 RX ADMIN — PHENYLEPHRINE HYDROCHLORIDE 30 MCG/MIN: 10 INJECTION INTRAVENOUS at 21:00

## 2021-01-21 RX ADMIN — INSULIN LISPRO 3 UNITS: 100 INJECTION, SOLUTION INTRAVENOUS; SUBCUTANEOUS at 00:00

## 2021-01-21 RX ADMIN — WATER 10.23 NG/KG/MIN: 1 SOLUTION INTRAVENOUS at 17:00

## 2021-01-21 RX ADMIN — Medication 20 ML: at 14:30

## 2021-01-21 RX ADMIN — CLOPIDOGREL BISULFATE 75 MG: 75 TABLET ORAL at 08:21

## 2021-01-21 RX ADMIN — MIDAZOLAM HYDROCHLORIDE 5 MG/HR: 5 INJECTION, SOLUTION INTRAMUSCULAR; INTRAVENOUS at 23:53

## 2021-01-21 RX ADMIN — DEXTROSE MONOHYDRATE 28 MCG/MIN: 50 INJECTION, SOLUTION INTRAVENOUS at 16:55

## 2021-01-21 RX ADMIN — WATER 10.23 NG/KG/MIN: 1 SOLUTION INTRAVENOUS at 08:50

## 2021-01-21 RX ADMIN — Medication 20 ML: at 22:06

## 2021-01-21 RX ADMIN — INSULIN LISPRO 3 UNITS: 100 INJECTION, SOLUTION INTRAVENOUS; SUBCUTANEOUS at 16:06

## 2021-01-21 RX ADMIN — HYDROCORTISONE SODIUM SUCCINATE 50 MG: 100 INJECTION, POWDER, FOR SOLUTION INTRAMUSCULAR; INTRAVENOUS at 17:27

## 2021-01-21 RX ADMIN — MINERAL OIL, PETROLATUM: 425; 568 OINTMENT OPHTHALMIC at 10:00

## 2021-01-21 RX ADMIN — INSULIN LISPRO 6 UNITS: 100 INJECTION, SOLUTION INTRAVENOUS; SUBCUTANEOUS at 20:18

## 2021-01-21 RX ADMIN — SODIUM CHLORIDE 5 MG/HR: 900 INJECTION, SOLUTION INTRAVENOUS at 21:50

## 2021-01-21 NOTE — MANAGEMENT PLAN
Pharmacokinetic Consult to Pharmacist    Eunice Eduardo. is a 61 y.o. male being treated for VAP with vancomycin. Height: 5' 8\" (172.7 cm)  Weight: 92.2 kg (203 lb 4.2 oz)  Lab Results   Component Value Date/Time    BUN 37 (H) 01/21/2021 03:47 AM    Creatinine 1.95 (H) 01/21/2021 03:47 AM    WBC 8.8 01/21/2021 03:47 AM    Procalcitonin 1.38 01/18/2021 11:17 AM    Lactic acid 1.6 01/07/2021 06:24 AM      Estimated Creatinine Clearance: 44.4 mL/min (A) (based on SCr of 1.95 mg/dL (H)). CULTURES:  pending    Lab Results   Component Value Date/Time    Vancomycin,trough 16.8 01/21/2021 03:47 AM       Day 3 of vancomycin. Goal trough is 15-20. Pre-steady state trough therapeutic at 16.8, however SCR worsening further. Will change to intermittent dosing for now. Pharmacy to order levels and adjust the dose as clinically indicated. Will continue to follow patient.       Thank you,  Hayder Snyder, PharmD, [address]  Clinical Pharmacy Specialist  [phone]

## 2021-01-21 NOTE — PROGRESS NOTES
Ventilator check complete; patient has a #8. 0 ET tube secured at the 25 at the lip. Breath sounds are diminished. Trachea is midline, Negative for subcutaneous air, and chest excursion is symmetric. Patient is also Negative for cyanosis and is Negative for pitting edema. All alarms are set and audible. Resuscitation bag is at the head of the bed. Ventilator Settings  Mode FIO2 Rate Tidal Volume Pressure PEEP I:E Ratio   PRVC  75 %    500 ml     12 cm H20  1:1.7      Peak airway pressure: 29 cm H2O   Minute ventilation: 16.1 l/min     ABG: No results for input(s): PH, PCO2, PO2, HCO3 in the last 72 hours.       Ariel Petty, RT

## 2021-01-21 NOTE — PROGRESS NOTES
Critical Care Daily Progress Note: 1/21/2021  Admission Date: 1/6/2021     The patient's chart is reviewed and the patient is discussed with the staff. Admission Date: 1/6/2021     Length of Stay: 9 days     Background: 61 y.o. y/o male with acute hypoxemic respiratory failure secondary to COVID-19. Date of COVID-19 symptom onset: + COVID 01/05/21     Notable PMH: obesity, HTN, CAD, dyslipidemia, SWATI, DM, GERD    24 Hour events:  Paralyzed yesterday after episode of dyssynchrony and agitation. MAPs 55-70. Heparin drip stopped after epistaxis. Leukocytosis improving. Still has severe hypoxia with P:F ratio 97  Now also hypotensive on increasing doses of levophed to maintain MAP.   Febrile today on abx - 100.7    Current Facility-Administered Medications   Medication Dose Route Frequency    VANCOMYCIN INTERMITTENT DOSING PER PHARMACY   Other Rx Dosing/Monitoring    NOREPINephrine (LEVOPHED) 4 mg in 5% dextrose 250 mL infusion  0.5-30 mcg/min IntraVENous TITRATE    white petrolatum-mineral oiL (LACRILUBE S.O.P.) ointment   Both Eyes Q12H    heparin 25,000 units in dextrose 500 mL infusion  18-36 Units/kg/hr IntraVENous TITRATE    cefepime (MAXIPIME) 2 g in 0.9% sodium chloride (MBP/ADV) 100 mL MBP  2 g IntraVENous Q12H    white petrolatum-mineral oiL (LACRILUBE S.O.P.) ointment   Both Eyes Q4H PRN    insulin glargine (LANTUS) injection 30 Units  30 Units SubCUTAneous Q12H    polyethylene glycol (MIRALAX) packet 17 g  17 g Oral DAILY    EPINEPHrine (ADRENALIN) 4 mg in 0.9% sodium chloride 250 mL infusion  1-10 mcg/min IntraVENous TITRATE    vasopressin (VASOSTRICT) 20 Units in 0.9% sodium chloride 100 mL infusion  0-0.03 Units/min IntraVENous TITRATE    epoprostenol (FLOLAN) 30,000 ng/mL in diluent for epoprostenol (gly) 50 mL solution  10-50 ng/kg/min (Ideal) Nebulization TITRATE    0.9% sodium chloride infusion  8 mL/hr Nebulization TITRATE    atracurium (TRACRIUM) 1,000 mg in 0.9% sodium chloride 250 mL infusion  0-15 mcg/kg/min IntraVENous TITRATE    propofol (DIPRIVAN) 10 mg/mL infusion  0-50 mcg/kg/min IntraVENous TITRATE    famotidine (PF) (PEPCID) 20 mg in 0.9% sodium chloride 10 mL injection  20 mg IntraVENous Q12H    fentaNYL in normal saline (pf) 25 mcg/mL infusion  0-200 mcg/hr IntraVENous TITRATE    midazolam in normal saline (VERSED) 1 mg/mL infusion  0-10 mg/hr IntraVENous TITRATE    aspirin chewable tablet 81 mg  81 mg Per NG tube DAILY    atorvastatin (LIPITOR) tablet 80 mg  80 mg Per NG tube DAILY    clopidogreL (PLAVIX) tablet 75 mg  75 mg Per NG tube DAILY    magnesium oxide (MAG-OX) tablet 400 mg  400 mg Per NG tube DAILY    [Held by provider] metoprolol tartrate (LOPRESSOR) tablet 50 mg  50 mg Per NG tube BID    insulin lispro (HUMALOG) injection   SubCUTAneous Q4H    LORazepam (ATIVAN) injection 1 mg  1 mg IntraVENous Q4H PRN    morphine injection 2 mg  2 mg IntraVENous Q4H PRN    NUTRITIONAL SUPPORT ELECTROLYTE PRN ORDERS   Does Not Apply PRN    sodium chloride (NS) flush 20 mL  20 mL InterCATHeter Q8H    sodium chloride (NS) flush 20 mL  20 mL InterCATHeter PRN    loperamide (IMODIUM) capsule 2 mg  2 mg Oral Q4H PRN    polyethylene glycol (MIRALAX) packet 17 g  17 g Oral DAILY PRN    promethazine (PHENERGAN) tablet 12.5 mg  12.5 mg Oral Q6H PRN    Or    ondansetron (ZOFRAN) injection 4 mg  4 mg IntraVENous Q6H PRN    guaiFENesin-dextromethorphan (ROBITUSSIN DM) 100-10 mg/5 mL syrup 10 mL  10 mL Oral Q4H PRN    dextrose 40% (GLUTOSE) oral gel 1 Tube  15 g Oral PRN    glucagon (GLUCAGEN) injection 1 mg  1 mg IntraMUSCular PRN    dextrose (D50W) injection syrg 12.5-25 g  25-50 mL IntraVENous PRN    0.9% sodium chloride infusion 250 mL  250 mL IntraVENous PRN    albuterol (PROVENTIL HFA, VENTOLIN HFA, PROAIR HFA) inhaler 2 Puff  2 Puff Inhalation Q4H PRN    influenza vaccine 2020-21 (6 mos+)(PF) (FLUARIX/FLULAVAL/FLUZONE QUAD) injection 0.5 mL  0.5 mL IntraMUSCular PRIOR TO DISCHARGE    hydrALAZINE (APRESOLINE) 20 mg/mL injection 20 mg  20 mg IntraVENous Q6H PRN    acetaminophen (TYLENOL) tablet 650 mg  650 mg Oral Q6H PRN       Objective:     Vitals:    01/21/21 1045 01/21/21 1100 01/21/21 1115 01/21/21 1130   BP:  (!) 116/45     Pulse: 79 78 79 81   Resp: (!) 31 (!) 31 (!) 31 (!) 31   Temp:  (!) 100.7 °F (38.2 °C)     SpO2: 93% 93% 93% 92%   Weight:       Height:           Intake/Output Summary (Last 24 hours) at 1/21/2021 1152  Last data filed at 1/21/2021 0538  Gross per 24 hour   Intake 2915.06 ml   Output 1375 ml   Net 1540.06 ml     Physical Exam:            Constitutional:  Intubated and sedated  EENMT:  Sclera clear, pupils equal, oral mucosa moist  Respiratory: bilateral crackles, no crepitus  Cardiovascular:  RRR with no M,G,R;  Gastrointestinal:  soft with no tenderness; positive bowel sounds present  Musculoskeletal:  warm with no cyanosis, no lower extremity edema  Skin:  no jaundice or ecchymosis  Neurologic: pupils equal  Psychiatric:  sedation    Flotrac values  CO 7.2  CI 3.1    SVV 12  CVP 13  MAP 60    LINES:    ET tube 1/17/2021  PICC 1/13  NGT 1/17  Art line 1/18/2021    DRIPS:    Levophed 20mcg/min  Tracrium 8mcg/kg/min  Flolan 8ng/kg/min  Fentanyl 150mcg/hr  Versed 5mg/hr      COVID-19 medications  Decadron 1/7-16  remdesivir 1/8-1/12  Convalescent plasma 1/7    Anti-infectives  azithro (completed)  Ceftriaxone (completed)  Vanc and cefepime 1/19 and 1/20    CXR:-Improved slightly        Ventilator Settings  Mode FIO2 Rate Tidal Volume Pressure PEEP   PRVC  75 %    500 ml     12 cm H20      Peak airway pressure: 29 cm H2O   Minute ventilation: 16.1 l/min     ABG:   Recent Labs     01/21/21  0427 01/20/21  1551 01/20/21  0326   PHI 7.31* 7.27* 7.35   PCO2I 44.0 46.7* 36.6   PO2I 73* 113* 69*   HCO3I 22.0 21.7* 20.3*     LAB    MICRO  Date Source Result   1/18  sputum NG   1/18  blood  NG   1/18  blood NG   1/18  urine  no growth; prelim     Recent Labs     01/21/21  0739 01/21/21  0404 01/21/21  0017 01/20/21  2111 01/20/21  1548   GLUCPOC 116* 124* 154* 133* 143*     Recent Labs     01/21/21  0347 01/20/21  0211 01/19/21  1249 01/19/21  0258   WBC 8.8 12.5* 11.8* 9.3   HGB 10.8* 11.9* 12.0* 12.3*   HCT 33.2* 36.6* 37.2* 38.5*   * 123* 116* 107*   INR 1.2 1.4  --  1.4     Recent Labs     01/21/21  0347 01/20/21  1833 01/20/21  0211 01/19/21  0258     --  138 137   K 4.6  --  4.3 4.3   *  --  109* 110*   CO2 24  --  23 22   *  --  181* 242*   BUN 37*  --  34* 36*   CREA 1.95*  --  1.55* 1.52*   MG  --   --  2.1  --    PHOS  --  3.4 2.1*  --    CA 8.7  --  8.8 8.7     No results for input(s): LCAD, LAC in the last 72 hours. Assessment:  (Medical Decision Making)     Hospital Problems  Date Reviewed: 6/3/2020          Codes Class Noted POA    Hypernatremia ICD-10-CM: E87.0  ICD-9-CM: 276.0  1/8/2021 Unknown    Resolved    SWATI (acute kidney injury) ([facility].) ICD-10-CM: N17.9  ICD-9-CM: 584.9  1/7/2021 Unknown    Creatinine up at 1.95. Acute hypoxemic respiratory failure [facility]) ICD-10-CM: J96.01  ICD-9-CM: 518.81  1/7/2021 Unknown    Severe ARDS    Type 2 diabetes mellitus with hyperosmolarity without nonketotic hyperglycemic-hyperosmolar coma ([facility] ICD-10-CM: E11.00  ICD-9-CM: 250.20  1/7/2021 Unknown        * (Principal) Pneumonia due to COVID-19 virus ICD-10-CM: U07.1, J12.82  ICD-9-CM: 480.8  1/7/2021 Unknown    Severe. Remains on decadron. CAD (coronary artery disease) ICD-10-CM: I25.10  ICD-9-CM: 414.00  10/28/2016 Yes    Overview Signed 10/28/2016  8:05 AM by SHELDON Connell     10-27-06 [facility] 90% LAD s/p 2.25 x 20 Synergy drug-eluting stent  (CS)             Obesity ICD-10-CM: E66.9  ICD-9-CM: 278.00  10/6/2015 Yes            Plan:  (Medical Decision Making)     Impression: 61 y.o. y/o male with acute hypoxemic respiratory failure secondary to COVID-19.   NEURO:   1. Sedation: Versed gtt propofol  2. Analgesia: Fentanyl gtt  3. Paralytics: d/c paralytics  CV:   1. Hypotension:  Increased SVV and low SVR concerning for hypovolemia. Will give albumin now. Also start hydrocortisone in case of relative adrenal insufficiency. PULM:   1. Acute hypoxemic/hypercapneic respiratory failure:    P:F ratio <100. Flolan not helping and will stop. Prone and paralysis to continue. 2. Severe ARDS 2/2 COVID: Low Tidal Volume ventilation, goal 6cc per kg (Ideal body weight: 68.4 kg (150 lb 12.7 oz) x 6 = 420). Attempt to limit driving pressure to 55MM/B8I or less. Allow permissive hypercapnia/acidosis to pH 7.25 if needed to achieve above. RENAL:  1. SWATI: elevated Cr. Monitor with volume repletion    GI: NPO, PPI prophy  1. Nutrition: TF request to dietary, d/c TPN    HEME: no acute issues, platelets stable since 1/18  1. DVT Peroneal vein:   heparin drip and monitor platelets, low threshold to stop given it is treatment for a below the knee DVT. ID:   1. COVID-19: COVID meds as above, multi-D rounds with pharmacy to optimize meds with noted contraindications    2. Fevers:  With elevated procal.  Empiric vanco/cefepime. ENDO:   1. DM: sliding scale insulin,  lantus to 30 Q12 and monitor. Skin: no decub, turns, preventive care    Prophy: Lovenox, H2B    Full Code    Plan to Update sister Mackenzie Gordon  [phone] by phone  The patient is critically ill with respiratory failure, circulatory failure and requires high complexity decision making for assessment and support including frequent ventilator adjustment , frequent evaluation and titration of therapies , application of advanced monitoring technologies and extensive interpretation of multiple databases.   Cumulative time devoted to coordination, counseling and  patient care services by me for day of service -41 min     Augusto Garcia MD

## 2021-01-21 NOTE — PROGRESS NOTES
MD notified of ABG results. No vent changes at this time. We are using ARDS protocol with smaller VT for airway protection.

## 2021-01-21 NOTE — DIABETES MGMT
Patient's blood glucose ranged 133-180 yesterday with patient receiving Lantus 60 units and Humalog 9 units. Blood glucose 116 this morning. Hgb 10. 8. Cr 1.95. GFR 37. Patient remains on vent with TF. Note blood glucose slowly trending down. Current regimen: Lantus 30 units Q12 and Humalog SSI Q4 (VIR). Provider may want to consider small decrease in basal dose to reduce risk for hypoglycemia.

## 2021-01-21 NOTE — PROGRESS NOTES
Ventilator check complete; patient has a #8. 0 ET tube secured at the 25 at the lip. Patient is sedated. Patient is not able to follow commands. Breath sounds are diminished. Trachea is midline, Negative for subcutaneous air, and chest excursion is symmetric. Patient is also Negative for cyanosis and is Negative for pitting edema. All alarms are set and audible. Resuscitation bag is at the head of the bed.       Ventilator Settings  Mode FIO2 Rate Tidal Volume Pressure PEEP I:E Ratio   PRVC  75 %   32 bpm 500 ml     12 cm H20  1:1.7      Peak airway pressure: 32 cm H2O   Minute ventilation: 16.2 l/min     ABG:     Harjeet Mathew RT

## 2021-01-21 NOTE — PROGRESS NOTES
Comprehensive Nutrition Assessment    Type and Reason for Visit: Reassess    Nutrition Recommendations/Plan:    Restart TF as ordered when pt is medically appropriate     Malnutrition Assessment:  Malnutrition Status: At risk for malnutrition (specify)  Nutrition Assessment:   Nutrition History: 1/7/2021: Pt reports inability to tolerate any food or liquids for 4 days PTA. Indicates vomiting with all po attempts during this time      Nutrition Background: PMH remarkable for CAD, GERD, HTN, MO. Admitted with Coivd 19, new onset DM with hyperglycemic hyperosmolar state, SWATI on CKD, lactic acidosis. Daily Update:  Pt currently receiving max levophed. RN reports TF currently on hold. Per flowsheets, pt's rate previously at 30ml/hr and held due to residual of 400ml. Abdominal Status (last documented): Intact, Semi-soft abdomen with Hypoactive  bowel sounds. Last BM 01/16/21. Pertinent Medications: Pepcid, hydrocortisone, Lantus (30units BID), SSI (9 units 1/20, 3 units 1/21), magnesium oxide, Miralax (daily), albumin, vancomycin  Drips: tracrium, fentanyl, versed, levophed (30mcg/min at time of assessment)  Pertinent Labs: Sodium 137, Potassium 4.6, Cr 1.95    Nutrition Related Findings:   double lumen PICC (1/13), Intubated 1/17, NGT 1/17      Current Nutrition Therapies:  DIET TUBE FEEDING Please open for rate and water flush. Current Tube Feeding (TF) Orders:   · Feeding Route: Nasogastric  · Formula: Vital AF  · Schedule:Continuous    · Regimen: 65ml/hr (20ml/hr advancing 10ml/hr Q8H to goal)  · Additives/Modulars:  Other (comment)(None)  · Water Flushes: 80ml Q4H  · Current TF & Flush Orders Provides: held due to Levophed  · Goal TF & Flush Orders Provides: At goal will provide 1872 kcal (100% estimated calorie needs), 117 grams protein (100% estimated protein needs) and 1745ml free fluid (1ml/kcal)    Current Intake:   Average Meal Intake: NPO Average Supplement Intake: NPO      Anthropometric Measures:  Height: 5' 8\" (172.7 cm)  Current Body Wt: 92.2 kg (203 lb 4.2 oz)(1/21), Weight source: Bed scale  BMI: 30.9, Obese class 1 (BMI 30.0-34. 9)  Admission Body Weight: 235 lb(\"Estimated\")  Ideal Body Wt: 154 lbs (70 kg), 130.3 %  Usual Body Wt: 101.6 kg (224 lb)(EMR 6/3/2020 FP office; pt stated # unable to verify ), Percent weight change: -10.4          Estimated Daily Nutrient Needs:  Energy (kcal/day): 2641-0717 kcal/d ((20-25), Weight Used: Current(87.1 kg on 1/17/2021))  Protein (g/day): 105-131 grams/d (1.2 to 1.5 grams/kg) Weight Used: (Current(87.1 kg))  Fluid (ml/day):   (1 ml/kcal)    Nutrition Diagnosis:   · Inadequate oral intake related to impaired respiratory function as evidenced by (unable to take po d/t desat swhen CPAP mask removed, PN dependent)    · Food & nutrition-related knowledge deficit related to endocrine dysfunction as evidenced by (new DM dx, A1C 12, minimal exposure to DM information.)    Nutrition Interventions:   Food and/or Nutrient Delivery: Continue NPO, Continue tube feeding(cont TF when medically appropriate)  Nutrition Education/Counseling: Education initiated  Coordination of Nutrition Care: Continue to monitor while inpatient  Plan of Care discussed with AQUILINO Ames    Goals:   Previous Goal Met: No progress toward goal(s)  Active Goal: Meet >75% estimated nutrition needs within 5 days    Nutrition Monitoring and Evaluation:   Behavioral-Environmental Outcomes: Knowledge or skill  Food/Nutrient Intake Outcomes: Enteral nutrition intake/tolerance  Physical Signs/Symptoms Outcomes: Biochemical data, GI status    Discharge Planning:     Too soon to determine    Electronically signed by Nito Grove MS, RDN, LD 1/21/2021 at 2:06 PM  Contact: [phone]    Disaster Mode active

## 2021-01-22 ENCOUNTER — APPOINTMENT (OUTPATIENT)
Dept: GENERAL RADIOLOGY | Age: 61
DRG: 004 | End: 2021-01-22
Attending: INTERNAL MEDICINE
Payer: COMMERCIAL

## 2021-01-22 PROBLEM — I48.91 ATRIAL FIBRILLATION (HCC): Status: ACTIVE | Noted: 2021-01-22

## 2021-01-22 LAB
ANION GAP SERPL CALC-SCNC: 6 MMOL/L (ref 7–16)
ANION GAP SERPL CALC-SCNC: 7 MMOL/L (ref 7–16)
ANION GAP SERPL CALC-SCNC: 8 MMOL/L (ref 7–16)
APTT PPP: 44.2 SEC (ref 24.1–35.1)
ARTERIAL PATENCY WRIST A: ABNORMAL
ARTERIAL PATENCY WRIST A: YES
ATRIAL RATE: 122 BPM
BASE DEFICIT BLD-SCNC: 10 MMOL/L
BASE DEFICIT BLD-SCNC: 11 MMOL/L
BASE DEFICIT BLD-SCNC: 6 MMOL/L
BASE DEFICIT BLD-SCNC: 6 MMOL/L
BASE DEFICIT BLD-SCNC: 8 MMOL/L
BASOPHILS # BLD: 0 K/UL (ref 0–0.2)
BASOPHILS NFR BLD: 0 % (ref 0–2)
BDY SITE: ABNORMAL
BUN SERPL-MCNC: 45 MG/DL (ref 8–23)
BUN SERPL-MCNC: 47 MG/DL (ref 8–23)
BUN SERPL-MCNC: 63 MG/DL (ref 8–23)
CALCIUM SERPL-MCNC: 6.2 MG/DL (ref 8.3–10.4)
CALCIUM SERPL-MCNC: 8.7 MG/DL (ref 8.3–10.4)
CALCIUM SERPL-MCNC: 8.8 MG/DL (ref 8.3–10.4)
CALCULATED R AXIS, ECG10: 39 DEGREES
CALCULATED T AXIS, ECG11: -179 DEGREES
CHLORIDE SERPL-SCNC: 106 MMOL/L (ref 98–107)
CHLORIDE SERPL-SCNC: 109 MMOL/L (ref 98–107)
CHLORIDE SERPL-SCNC: 119 MMOL/L (ref 98–107)
CO2 BLD-SCNC: 21 MMOL/L
CO2 BLD-SCNC: 21 MMOL/L
CO2 BLD-SCNC: 22 MMOL/L
CO2 BLD-SCNC: 22 MMOL/L
CO2 BLD-SCNC: 23 MMOL/L
CO2 SERPL-SCNC: 16 MMOL/L (ref 21–32)
CO2 SERPL-SCNC: 21 MMOL/L (ref 21–32)
CO2 SERPL-SCNC: 23 MMOL/L (ref 21–32)
COLLECT TIME,HTIME: 1546
COLLECT TIME,HTIME: 1732
COLLECT TIME,HTIME: 2106
COLLECT TIME,HTIME: 350
COLLECT TIME,HTIME: 630
CREAT SERPL-MCNC: 1.91 MG/DL (ref 0.8–1.5)
CREAT SERPL-MCNC: 2.57 MG/DL (ref 0.8–1.5)
CREAT SERPL-MCNC: 2.97 MG/DL (ref 0.8–1.5)
DIAGNOSIS, 93000: NORMAL
DIFFERENTIAL METHOD BLD: ABNORMAL
EOSINOPHIL # BLD: 0 K/UL (ref 0–0.8)
EOSINOPHIL NFR BLD: 0 % (ref 0.5–7.8)
ERYTHROCYTE [DISTWIDTH] IN BLOOD BY AUTOMATED COUNT: 13.8 % (ref 11.9–14.6)
EXHALED MINUTE VOLUME, VE: 13.3 L/MIN
EXHALED MINUTE VOLUME, VE: 15 L/MIN
EXHALED MINUTE VOLUME, VE: 15.4 L/MIN
EXHALED MINUTE VOLUME, VE: 15.5 L/MIN
EXHALED MINUTE VOLUME, VE: 8.1 L/MIN
FIBRINOGEN PPP-MCNC: 847 MG/DL (ref 190–501)
GAS FLOW.O2 O2 DELIVERY SYS: ABNORMAL L/MIN
GAS FLOW.O2 SETTING OXYMISER: 32 BPM
GLUCOSE BLD STRIP.AUTO-MCNC: 195 MG/DL (ref 65–100)
GLUCOSE BLD STRIP.AUTO-MCNC: 207 MG/DL (ref 65–100)
GLUCOSE BLD STRIP.AUTO-MCNC: 239 MG/DL (ref 65–100)
GLUCOSE BLD STRIP.AUTO-MCNC: 278 MG/DL (ref 65–100)
GLUCOSE BLD STRIP.AUTO-MCNC: 287 MG/DL (ref 65–100)
GLUCOSE SERPL-MCNC: 192 MG/DL (ref 65–100)
GLUCOSE SERPL-MCNC: 212 MG/DL (ref 65–100)
GLUCOSE SERPL-MCNC: 310 MG/DL (ref 65–100)
HCO3 BLD-SCNC: 19.4 MMOL/L (ref 22–26)
HCO3 BLD-SCNC: 19.9 MMOL/L (ref 22–26)
HCO3 BLD-SCNC: 19.9 MMOL/L (ref 22–26)
HCO3 BLD-SCNC: 20.2 MMOL/L (ref 22–26)
HCO3 BLD-SCNC: 21.5 MMOL/L (ref 22–26)
HCT VFR BLD AUTO: 30.4 % (ref 41.1–50.3)
HGB BLD-MCNC: 9.7 G/DL (ref 13.6–17.2)
IMM GRANULOCYTES # BLD AUTO: 0.1 K/UL (ref 0–0.5)
IMM GRANULOCYTES NFR BLD AUTO: 2 % (ref 0–5)
INR PPP: 1.4
INSPIRATION.DURATION SETTING TIME VENT: 0.6 SEC
INSPIRATION.DURATION SETTING TIME VENT: 0.95 SEC
LYMPHOCYTES # BLD: 0.4 K/UL (ref 0.5–4.6)
LYMPHOCYTES NFR BLD: 5 % (ref 13–44)
MAGNESIUM SERPL-MCNC: 2.5 MG/DL (ref 1.8–2.4)
MAGNESIUM SERPL-MCNC: 2.6 MG/DL (ref 1.8–2.4)
MCH RBC QN AUTO: 29.8 PG (ref 26.1–32.9)
MCHC RBC AUTO-ENTMCNC: 31.9 G/DL (ref 31.4–35)
MCV RBC AUTO: 93.5 FL (ref 79.6–97.8)
MONOCYTES # BLD: 0.2 K/UL (ref 0.1–1.3)
MONOCYTES NFR BLD: 3 % (ref 4–12)
NEUTS SEG # BLD: 6.7 K/UL (ref 1.7–8.2)
NEUTS SEG NFR BLD: 90 % (ref 43–78)
NRBC # BLD: 0 K/UL (ref 0–0.2)
O2/TOTAL GAS SETTING VFR VENT: 100 %
O2/TOTAL GAS SETTING VFR VENT: 100 %
O2/TOTAL GAS SETTING VFR VENT: 70 %
O2/TOTAL GAS SETTING VFR VENT: 70 %
O2/TOTAL GAS SETTING VFR VENT: 80 %
PCO2 BLD: 42.5 MMHG (ref 35–45)
PCO2 BLD: 47.3 MMHG (ref 35–45)
PCO2 BLD: 54.7 MMHG (ref 35–45)
PCO2 BLD: 63.8 MMHG (ref 35–45)
PCO2 BLD: 74.4 MMHG (ref 35–45)
PEEP RESPIRATORY: 15 CMH2O
PEEP RESPIRATORY: 16 CMH2O
PH BLD: 7.04 [PH] (ref 7.35–7.45)
PH BLD: 7.11 [PH] (ref 7.35–7.45)
PH BLD: 7.2 [PH] (ref 7.35–7.45)
PH BLD: 7.22 [PH] (ref 7.35–7.45)
PH BLD: 7.28 [PH] (ref 7.35–7.45)
PHOSPHATE SERPL-MCNC: 5.4 MG/DL (ref 2.3–3.7)
PLATELET # BLD AUTO: 91 K/UL (ref 150–450)
PMV BLD AUTO: 10.4 FL (ref 9.4–12.3)
PO2 BLD: 100 MMHG (ref 75–100)
PO2 BLD: 102 MMHG (ref 75–100)
PO2 BLD: 122 MMHG (ref 75–100)
PO2 BLD: 150 MMHG (ref 75–100)
PO2 BLD: 80 MMHG (ref 75–100)
POTASSIUM SERPL-SCNC: 3.3 MMOL/L (ref 3.5–5.1)
POTASSIUM SERPL-SCNC: 4.8 MMOL/L (ref 3.5–5.1)
POTASSIUM SERPL-SCNC: 5.5 MMOL/L (ref 3.5–5.1)
PRESSURE CONTROL, IPC: 21
PRESSURE CONTROL, IPC: 23
PROTHROMBIN TIME: 17 SEC (ref 12.5–14.7)
Q-T INTERVAL, ECG07: 262 MS
QRS DURATION, ECG06: 98 MS
QTC CALCULATION (BEZET), ECG08: 406 MS
RBC # BLD AUTO: 3.25 M/UL (ref 4.23–5.6)
SAO2 % BLD: 90 % (ref 95–98)
SAO2 % BLD: 96 % (ref 95–98)
SAO2 % BLD: 96 % (ref 95–98)
SAO2 % BLD: 97 % (ref 95–98)
SAO2 % BLD: 99 % (ref 95–98)
SERVICE CMNT-IMP: ABNORMAL
SODIUM SERPL-SCNC: 135 MMOL/L (ref 136–145)
SODIUM SERPL-SCNC: 138 MMOL/L (ref 136–145)
SODIUM SERPL-SCNC: 142 MMOL/L (ref 136–145)
SPECIMEN TYPE: ABNORMAL
UFH PPP CHRO-ACNC: <0.1 IU/ML (ref 0.3–0.7)
VANCOMYCIN SERPL-MCNC: 19 UG/ML
VENTILATION MODE VENT: ABNORMAL
VENTRICULAR RATE, ECG03: 145 BPM
VT SETTING VENT: 430 ML
VT SETTING VENT: 480 ML
VT SETTING VENT: 480 ML
WBC # BLD AUTO: 7.5 K/UL (ref 4.3–11.1)

## 2021-01-22 PROCEDURE — 83735 ASSAY OF MAGNESIUM: CPT

## 2021-01-22 PROCEDURE — 80048 BASIC METABOLIC PNL TOTAL CA: CPT

## 2021-01-22 PROCEDURE — 85384 FIBRINOGEN ACTIVITY: CPT

## 2021-01-22 PROCEDURE — 71045 X-RAY EXAM CHEST 1 VIEW: CPT

## 2021-01-22 PROCEDURE — 74011000250 HC RX REV CODE- 250: Performed by: INTERNAL MEDICINE

## 2021-01-22 PROCEDURE — 85610 PROTHROMBIN TIME: CPT

## 2021-01-22 PROCEDURE — 85730 THROMBOPLASTIN TIME PARTIAL: CPT

## 2021-01-22 PROCEDURE — 74011000258 HC RX REV CODE- 258: Performed by: EMERGENCY MEDICINE

## 2021-01-22 PROCEDURE — 65610000006 HC RM INTENSIVE CARE

## 2021-01-22 PROCEDURE — 74011250637 HC RX REV CODE- 250/637: Performed by: ANESTHESIOLOGY

## 2021-01-22 PROCEDURE — 74011636637 HC RX REV CODE- 636/637: Performed by: INTERNAL MEDICINE

## 2021-01-22 PROCEDURE — 74011000258 HC RX REV CODE- 258: Performed by: INTERNAL MEDICINE

## 2021-01-22 PROCEDURE — 85025 COMPLETE CBC W/AUTO DIFF WBC: CPT

## 2021-01-22 PROCEDURE — 74011000250 HC RX REV CODE- 250: Performed by: ANESTHESIOLOGY

## 2021-01-22 PROCEDURE — 74011250636 HC RX REV CODE- 250/636: Performed by: INTERNAL MEDICINE

## 2021-01-22 PROCEDURE — 85520 HEPARIN ASSAY: CPT

## 2021-01-22 PROCEDURE — 82803 BLOOD GASES ANY COMBINATION: CPT

## 2021-01-22 PROCEDURE — 74011250637 HC RX REV CODE- 250/637: Performed by: INTERNAL MEDICINE

## 2021-01-22 PROCEDURE — C9113 INJ PANTOPRAZOLE SODIUM, VIA: HCPCS | Performed by: INTERNAL MEDICINE

## 2021-01-22 PROCEDURE — 74011250636 HC RX REV CODE- 250/636: Performed by: EMERGENCY MEDICINE

## 2021-01-22 PROCEDURE — P9047 ALBUMIN (HUMAN), 25%, 50ML: HCPCS | Performed by: INTERNAL MEDICINE

## 2021-01-22 PROCEDURE — 74011636637 HC RX REV CODE- 636/637: Performed by: ANESTHESIOLOGY

## 2021-01-22 PROCEDURE — 84100 ASSAY OF PHOSPHORUS: CPT

## 2021-01-22 PROCEDURE — 74011250636 HC RX REV CODE- 250/636: Performed by: ANESTHESIOLOGY

## 2021-01-22 PROCEDURE — 80202 ASSAY OF VANCOMYCIN: CPT

## 2021-01-22 PROCEDURE — 99291 CRITICAL CARE FIRST HOUR: CPT | Performed by: INTERNAL MEDICINE

## 2021-01-22 PROCEDURE — 94003 VENT MGMT INPAT SUBQ DAY: CPT

## 2021-01-22 PROCEDURE — 82962 GLUCOSE BLOOD TEST: CPT

## 2021-01-22 RX ORDER — NOREPINEPHRINE BITARTRATE/D5W 4MG/250ML
.5-3 PLASTIC BAG, INJECTION (ML) INTRAVENOUS
Status: DISCONTINUED | OUTPATIENT
Start: 2021-01-22 | End: 2021-01-25

## 2021-01-22 RX ORDER — INSULIN GLARGINE 100 [IU]/ML
32 INJECTION, SOLUTION SUBCUTANEOUS EVERY 12 HOURS
Status: DISCONTINUED | OUTPATIENT
Start: 2021-01-22 | End: 2021-01-28

## 2021-01-22 RX ORDER — HEPARIN SODIUM 5000 [USP'U]/ML
40 INJECTION, SOLUTION INTRAVENOUS; SUBCUTANEOUS ONCE
Status: COMPLETED | OUTPATIENT
Start: 2021-01-22 | End: 2021-01-22

## 2021-01-22 RX ORDER — HEPARIN SODIUM 5000 [USP'U]/100ML
10 INJECTION, SOLUTION INTRAVENOUS
Status: DISCONTINUED | OUTPATIENT
Start: 2021-01-22 | End: 2021-01-23

## 2021-01-22 RX ADMIN — INSULIN LISPRO 9 UNITS: 100 INJECTION, SOLUTION INTRAVENOUS; SUBCUTANEOUS at 17:39

## 2021-01-22 RX ADMIN — INSULIN GLARGINE 30 UNITS: 100 INJECTION, SOLUTION SUBCUTANEOUS at 08:25

## 2021-01-22 RX ADMIN — INSULIN LISPRO 6 UNITS: 100 INJECTION, SOLUTION INTRAVENOUS; SUBCUTANEOUS at 12:12

## 2021-01-22 RX ADMIN — Medication 125 MCG/HR: at 17:40

## 2021-01-22 RX ADMIN — ALBUMIN (HUMAN) 25 G: 0.25 INJECTION, SOLUTION INTRAVENOUS at 06:06

## 2021-01-22 RX ADMIN — ALBUMIN (HUMAN) 25 G: 0.25 INJECTION, SOLUTION INTRAVENOUS at 12:16

## 2021-01-22 RX ADMIN — AMIODARONE HYDROCHLORIDE 1 MG/MIN: 50 INJECTION, SOLUTION INTRAVENOUS at 10:28

## 2021-01-22 RX ADMIN — SODIUM CHLORIDE 20 MG/HR: 900 INJECTION, SOLUTION INTRAVENOUS at 01:58

## 2021-01-22 RX ADMIN — WATER 10 NG/KG/MIN: 1 SOLUTION INTRAVENOUS at 01:24

## 2021-01-22 RX ADMIN — NOREPINEPHRINE-DEXTROSE IV SOLUTION 4 MG/250ML-5% 10 MCG/MIN: 4-5/250 SOLUTION at 21:38

## 2021-01-22 RX ADMIN — FAMOTIDINE 20 MG: 10 INJECTION INTRAVENOUS at 08:22

## 2021-01-22 RX ADMIN — Medication 20 ML: at 13:20

## 2021-01-22 RX ADMIN — AMIODARONE HYDROCHLORIDE 0.5 MG/MIN: 50 INJECTION, SOLUTION INTRAVENOUS at 20:28

## 2021-01-22 RX ADMIN — SODIUM ZIRCONIUM CYCLOSILICATE 10 G: 10 POWDER, FOR SUSPENSION ORAL at 11:44

## 2021-01-22 RX ADMIN — INSULIN LISPRO 6 UNITS: 100 INJECTION, SOLUTION INTRAVENOUS; SUBCUTANEOUS at 08:26

## 2021-01-22 RX ADMIN — ATRACURIUM BESYLATE 8 MCG/KG/MIN: 10 INJECTION, SOLUTION INTRAVENOUS at 13:07

## 2021-01-22 RX ADMIN — NOREPINEPHRINE-DEXTROSE IV SOLUTION 4 MG/250ML-5% 4 MCG/MIN: 4-5/250 SOLUTION at 12:12

## 2021-01-22 RX ADMIN — ATORVASTATIN CALCIUM 80 MG: 40 TABLET, FILM COATED ORAL at 08:21

## 2021-01-22 RX ADMIN — HYDROCORTISONE SODIUM SUCCINATE 50 MG: 100 INJECTION, POWDER, FOR SOLUTION INTRAMUSCULAR; INTRAVENOUS at 06:06

## 2021-01-22 RX ADMIN — HEPARIN SODIUM 10 UNITS/KG/HR: 5000 INJECTION, SOLUTION INTRAVENOUS at 10:50

## 2021-01-22 RX ADMIN — HEPARIN SODIUM 3800 UNITS: 5000 INJECTION INTRAVENOUS; SUBCUTANEOUS at 20:16

## 2021-01-22 RX ADMIN — MINERAL OIL, PETROLATUM: 425; 568 OINTMENT OPHTHALMIC at 21:00

## 2021-01-22 RX ADMIN — HYDROCORTISONE SODIUM SUCCINATE 50 MG: 100 INJECTION, POWDER, FOR SOLUTION INTRAMUSCULAR; INTRAVENOUS at 17:40

## 2021-01-22 RX ADMIN — ALBUMIN (HUMAN) 25 G: 0.25 INJECTION, SOLUTION INTRAVENOUS at 01:24

## 2021-01-22 RX ADMIN — ASPIRIN 81 MG: 81 TABLET, CHEWABLE ORAL at 08:21

## 2021-01-22 RX ADMIN — PHENYLEPHRINE HYDROCHLORIDE 75 MCG/MIN: 10 INJECTION INTRAVENOUS at 03:45

## 2021-01-22 RX ADMIN — Medication 20 ML: at 06:08

## 2021-01-22 RX ADMIN — PANTOPRAZOLE SODIUM 40 MG: 40 INJECTION, POWDER, FOR SOLUTION INTRAVENOUS at 11:44

## 2021-01-22 RX ADMIN — INSULIN LISPRO 3 UNITS: 100 INJECTION, SOLUTION INTRAVENOUS; SUBCUTANEOUS at 03:45

## 2021-01-22 RX ADMIN — MINERAL OIL, PETROLATUM: 425; 568 OINTMENT OPHTHALMIC at 08:29

## 2021-01-22 RX ADMIN — SODIUM CHLORIDE 15 MG/HR: 900 INJECTION, SOLUTION INTRAVENOUS at 06:49

## 2021-01-22 RX ADMIN — SODIUM CHLORIDE 12.5 MG/HR: 900 INJECTION, SOLUTION INTRAVENOUS at 07:57

## 2021-01-22 RX ADMIN — POLYETHYLENE GLYCOL 3350 17 G: 17 POWDER, FOR SOLUTION ORAL at 08:23

## 2021-01-22 RX ADMIN — INSULIN LISPRO 9 UNITS: 100 INJECTION, SOLUTION INTRAVENOUS; SUBCUTANEOUS at 20:15

## 2021-01-22 RX ADMIN — MIDAZOLAM HYDROCHLORIDE 4 MG/HR: 5 INJECTION, SOLUTION INTRAMUSCULAR; INTRAVENOUS at 17:55

## 2021-01-22 RX ADMIN — Medication 20 ML: at 22:00

## 2021-01-22 RX ADMIN — INSULIN GLARGINE 32 UNITS: 100 INJECTION, SOLUTION SUBCUTANEOUS at 20:15

## 2021-01-22 RX ADMIN — CLOPIDOGREL BISULFATE 75 MG: 75 TABLET ORAL at 08:21

## 2021-01-22 RX ADMIN — CEFEPIME 2 G: 2 INJECTION, POWDER, FOR SOLUTION INTRAVENOUS at 12:29

## 2021-01-22 RX ADMIN — AMIODARONE HYDROCHLORIDE 150 MG: 50 INJECTION, SOLUTION INTRAVENOUS at 10:18

## 2021-01-22 RX ADMIN — HYDROCORTISONE SODIUM SUCCINATE 50 MG: 100 INJECTION, POWDER, FOR SOLUTION INTRAMUSCULAR; INTRAVENOUS at 12:17

## 2021-01-22 NOTE — PROGRESS NOTES
LOS 15d  Pt remains in BMT/ICU   Vent 70% Fi02 PRVC  Gtts  Paul-Synephrine   Tracrium   Cardizem   Fentanyl  Versed  PWill continue to follow for discharge planning needs  Please consult  if any new issues arise  Patientt will need PT and OT re-evals when medically stable to participate

## 2021-01-22 NOTE — PROGRESS NOTES
Bedside, Verbal and Written shift change report given to Rosy Montana RN (oncoming nurse) by Juwan Castillo RN (offgoing nurse). Report included the following information SBAR, Kardex, Procedure Summary, Intake/Output, MAR, Recent Results, Cardiac Rhythm afib (rate controlled on cardizem gtt) and Alarm Parameters . All gtts dual verified in [facility]

## 2021-01-22 NOTE — PROGRESS NOTES
Critical Care Daily Progress Note: 1/22/2021  Admission Date: 1/6/2021     The patient's chart is reviewed and the patient is discussed with the staff. Admission Date: 1/6/2021     Length of Stay: 9 days     Background: 61 y.o. y/o male with acute hypoxemic respiratory failure secondary to COVID-19. Date of COVID-19 symptom onset: + COVID 01/05/21     Notable PMH: obesity, HTN, CAD, dyslipidemia, SWATI, DM, GERD    24 Hour events:  Rapid a fib overnight with rate as high as 200s. Proned yesterday. Required volume yesterday to support hypotension. P:F ratio 145. Leukocytosis improving. Creatinine rising. UOP 1860ml yesterday. Tm 100.7 yesterday. Hemoglobin trending down slightly. Mildly hyperglycemic.     Current Facility-Administered Medications   Medication Dose Route Frequency    VANCOMYCIN INTERMITTENT DOSING PER PHARMACY   Other Rx Dosing/Monitoring    albumin human 25% (BUMINATE) solution 25 g  25 g IntraVENous Q6H    hydrocortisone Sod Succ (PF) (SOLU-CORTEF) injection 50 mg  50 mg IntraVENous Q6H    PHENYLephrine (ZI-SYNEPHRINE) 30 mg in 0.9% sodium chloride 250 mL infusion   mcg/min IntraVENous TITRATE    dilTIAZem (CARDIZEM) 100 mg in 0.9% sodium chloride (MBP/ADV) 100 mL infusion  0-20 mg/hr IntraVENous TITRATE    NOREPINephrine (LEVOPHED) 4 mg in 5% dextrose 250 mL infusion  0.5-30 mcg/min IntraVENous TITRATE    white petrolatum-mineral oiL (LACRILUBE S.O.P.) ointment   Both Eyes Q12H    heparin 25,000 units in dextrose 500 mL infusion  18-36 Units/kg/hr IntraVENous TITRATE    cefepime (MAXIPIME) 2 g in 0.9% sodium chloride (MBP/ADV) 100 mL MBP  2 g IntraVENous Q12H    white petrolatum-mineral oiL (LACRILUBE S.O.P.) ointment   Both Eyes Q4H PRN    insulin glargine (LANTUS) injection 30 Units  30 Units SubCUTAneous Q12H    polyethylene glycol (MIRALAX) packet 17 g  17 g Oral DAILY    EPINEPHrine (ADRENALIN) 4 mg in 0.9% sodium chloride 250 mL infusion  1-10 mcg/min IntraVENous TITRATE    vasopressin (VASOSTRICT) 20 Units in 0.9% sodium chloride 100 mL infusion  0-0.03 Units/min IntraVENous TITRATE    epoprostenol (FLOLAN) 30,000 ng/mL in diluent for epoprostenol (gly) 50 mL solution  10-50 ng/kg/min (Ideal) Nebulization TITRATE    0.9% sodium chloride infusion  8 mL/hr Nebulization TITRATE    atracurium (TRACRIUM) 1,000 mg in 0.9% sodium chloride 250 mL infusion  0-15 mcg/kg/min IntraVENous TITRATE    propofol (DIPRIVAN) 10 mg/mL infusion  0-50 mcg/kg/min IntraVENous TITRATE    famotidine (PF) (PEPCID) 20 mg in 0.9% sodium chloride 10 mL injection  20 mg IntraVENous Q12H    fentaNYL in normal saline (pf) 25 mcg/mL infusion  0-200 mcg/hr IntraVENous TITRATE    midazolam in normal saline (VERSED) 1 mg/mL infusion  0-10 mg/hr IntraVENous TITRATE    aspirin chewable tablet 81 mg  81 mg Per NG tube DAILY    atorvastatin (LIPITOR) tablet 80 mg  80 mg Per NG tube DAILY    clopidogreL (PLAVIX) tablet 75 mg  75 mg Per NG tube DAILY    magnesium oxide (MAG-OX) tablet 400 mg  400 mg Per NG tube DAILY    [Held by provider] metoprolol tartrate (LOPRESSOR) tablet 50 mg  50 mg Per NG tube BID    insulin lispro (HUMALOG) injection   SubCUTAneous Q4H    LORazepam (ATIVAN) injection 1 mg  1 mg IntraVENous Q4H PRN    morphine injection 2 mg  2 mg IntraVENous Q4H PRN    NUTRITIONAL SUPPORT ELECTROLYTE PRN ORDERS   Does Not Apply PRN    sodium chloride (NS) flush 20 mL  20 mL InterCATHeter Q8H    sodium chloride (NS) flush 20 mL  20 mL InterCATHeter PRN    loperamide (IMODIUM) capsule 2 mg  2 mg Oral Q4H PRN    polyethylene glycol (MIRALAX) packet 17 g  17 g Oral DAILY PRN    promethazine (PHENERGAN) tablet 12.5 mg  12.5 mg Oral Q6H PRN    Or    ondansetron (ZOFRAN) injection 4 mg  4 mg IntraVENous Q6H PRN    guaiFENesin-dextromethorphan (ROBITUSSIN DM) 100-10 mg/5 mL syrup 10 mL  10 mL Oral Q4H PRN    dextrose 40% (GLUTOSE) oral gel 1 Tube  15 g Oral PRN    glucagon (GLUCAGEN) injection 1 mg  1 mg IntraMUSCular PRN    dextrose (D50W) injection syrg 12.5-25 g  25-50 mL IntraVENous PRN    0.9% sodium chloride infusion 250 mL  250 mL IntraVENous PRN    albuterol (PROVENTIL HFA, VENTOLIN HFA, PROAIR HFA) inhaler 2 Puff  2 Puff Inhalation Q4H PRN    influenza vaccine 2020-21 (6 mos+)(PF) (FLUARIX/FLULAVAL/FLUZONE QUAD) injection 0.5 mL  0.5 mL IntraMUSCular PRIOR TO DISCHARGE    hydrALAZINE (APRESOLINE) 20 mg/mL injection 20 mg  20 mg IntraVENous Q6H PRN    acetaminophen (TYLENOL) tablet 650 mg  650 mg Oral Q6H PRN       Objective:     Vitals:    01/22/21 0730 01/22/21 0745 01/22/21 0800 01/22/21 0809   BP:  (!) 148/78     Pulse: 88 (!) 107 83 75   Resp: (!) 32 (!) 31 (!) 31 (!) 32   Temp:  97.3 °F (36.3 °C)     SpO2: 97% 97% 97% 97%   Weight:       Height:           Intake/Output Summary (Last 24 hours) at 1/22/2021 0836  Last data filed at 1/22/2021 1203  Gross per 24 hour   Intake 2452.12 ml   Output 2035 ml   Net 417.12 ml     Physical Exam:            Constitutional:  Intubated and sedated  EENMT:  Sclera clear, pupils equal, oral mucosa moist  Respiratory: bilateral crackles, no crepitus  Cardiovascular:  RRR with no M,G,R;  Gastrointestinal:  soft with no tenderness; positive bowel sounds present  Musculoskeletal:  warm with no cyanosis, no lower extremity edema  Skin:  no jaundice or ecchymosis  Neurologic: pupils equal  Psychiatric:  sedation    Flotrac values  CO 6.4  CI 3.2  SVV 8    LINES:    ET tube 1/17/2021  PICC 1/13  NGT 1/17  Art line 1/18/2021    DRIPS:    Paul-Synephrine 50 mcg  Tracrium 8mcg/kg/min  Cardizem 12.5 mg/ hour  Fentanyl 150mcg/hr  Versed 5mg/hr    COVID-19 medications  Decadron 1/7-16  remdesivir 1/8-1/12  Convalescent plasma 1/7    Anti-infectives  azithro (completed)  Ceftriaxone (completed)  Vanc and cefepime 1/19 and 1/20    CXR:  None today.    Ventilator Settings  Mode FIO2 Rate Tidal Volume Pressure PEEP   PRVC  70 %    480 ml 16 cm H20      Peak airway pressure: 27 cm H2O   Minute ventilation: 14.4 l/min     ABG:   Recent Labs     01/22/21  0633 01/22/21  0352 01/21/21  1549   PHI 7.28* 7.20* 7.24*   PCO2I 42.5 54.7* 47.9*   PO2I 102* 150* 77   HCO3I 19.9* 21.5* 20.6*     LAB    MICRO  Date Source Result   1/18  blood  NG 3 days   1/18  blood NG 3 days   1/18  urine  no growth 2 days     Recent Labs     01/22/21  0343 01/21/21  2335 01/21/21  1956 01/21/21  1603 01/21/21  1202   GLUCPOC 195* 212* 218* 158* 126*     Recent Labs     01/22/21 0317 01/21/21  1508 01/21/21  0347 01/20/21  0211   WBC 7.5  --  8.8 12.5*   HGB 9.7* 10.2* 10.8* 11.9*   HCT 30.4* 31.3* 33.2* 36.6*   PLT 91*  --  108* 123*   INR 1.4  --  1.2 1.4     Recent Labs     01/22/21  0317 01/21/21  2204 01/21/21  1508 01/20/21  1833 01/20/21  1833 01/20/21  0211    136 137   < >  --  138   K 5.5* 5.3* 4.7   < >  --  4.3   * 108* 109*   < >  --  109*   CO2 23 21 23   < >  --  23   * 220* 144*   < >  --  181*   BUN 47* 43* 39*   < >  --  34*   CREA 2.57* 2.53* 2.32*   < >  --  1.55*   MG 2.5* 2.3  --   --   --  2.1   PHOS 5.4*  --   --   --  3.4 2.1*   CA 8.7 9.0 8.9   < >  --  8.8   ALB  --   --  2.1*  --   --   --     < > = values in this interval not displayed. No results for input(s): LCAD, LAC in the last 72 hours. Assessment:  (Medical Decision Making)     Hospital Problems  Date Reviewed: 6/3/2020          Codes Class Noted POA    Hypernatremia ICD-10-CM: E87.0  ICD-9-CM: 276.0  1/8/2021 Unknown    Resolved    SWATI (acute kidney injury) ([facility] ICD-10-CM: N17.9  ICD-9-CM: 584.9  1/7/2021 Unknown    Creatinine up at 1.95.     Acute hypoxemic respiratory failure [facility]) ICD-10-CM: J96.01  ICD-9-CM: 518.81  1/7/2021 Unknown    Severe ARDS    Type 2 diabetes mellitus with hyperosmolarity without nonketotic hyperglycemic-hyperosmolar coma ([facility] ICD-10-CM: E11.00  ICD-9-CM: 250.20  1/7/2021 Unknown        * (Principal) Pneumonia due to COVID-19 virus ICD-10-CM: U07.1, J12.82  ICD-9-CM: 480.8  1/7/2021 Unknown    Severe. Remains on decadron. CAD (coronary artery disease) ICD-10-CM: I25.10  ICD-9-CM: 414.00  10/28/2016 Yes    Overview Signed 10/28/2016  8:05 AM by Aldo Hatchet, PA     10-27-06 [facility] 90% LAD s/p 2.25 x 20 Synergy drug-eluting stent  (CS)             Obesity ICD-10-CM: E66.9  ICD-9-CM: 278.00  10/6/2015 Yes            Plan:  (Medical Decision Making)     Impression: 61 y.o. y/o male with acute hypoxemic respiratory failure secondary to COVID-19. NEURO:   1. Sedation: Versed gtt propofol  2. Analgesia: Fentanyl gtt  3. Paralytics: d/c paralytics  CV:   1. Hypotension:  Getting albumin, hydrocortisone, vasopressor support. [facility]. 2.  A fib:  amio bolus and drip starting now. Low dose AC with monitoring for bleeding. 3.  Holding ASA    PULM:   1. Acute hypoxemic/hypercapneic respiratory failure:    P:F ratio improves significantly with PRONING. Has not benefited from trials of Flolan. Will continue proning today. 2. Severe ARDS 2/2 COVID: Low Tidal Volume ventilation, goal 6cc per kg (Ideal body weight: 68.4 kg (150 lb 12.7 oz) x 6 = 420). Attempt to limit driving pressure to 40BG/S3C or less. Allow permissive hypercapnia/acidosis to pH 7.25 if needed to achieve above. RENAL:  1. SWATI: worsening renal function with good UOP. Monitor closely today- may need nephrology consult. GI: NPO, PPI prophy  1. Nutrition: TF, use PPI rather than H2b due to low platelets    HEME: no acute issues, platelets stable since 1/18  1. DVT Peroneal vein:  Had epistaxis and anticoag stopped  2. A fib:  Starting low dose anticoagulation per anticoag protocol. ID:   1. COVID-19: COVID meds as above, multi-D rounds with pharmacy to optimize meds with noted contraindications. 2. Fevers:  With elevated procal.  Empiric vanco/cefepime. Will trend procal.  Day 4 vanco, day 3 cefepime.     ENDO:   1. DM: sliding scale insulin,  lantus to 32 Q12 and monitor. Skin: no decub, turns, preventive care    Prophy: Lovenox, H2B    Full Code    Plan to Update sister Emigdio Verde  [phone] by phone  The patient is critically ill with respiratory failure, circulatory failure and requires high complexity decision making for assessment and support including frequent ventilator adjustment , frequent evaluation and titration of therapies , application of advanced monitoring technologies and extensive interpretation of multiple databases.   Cumulative time devoted to coordination, counseling and  patient care services by me for day of service -39 min     Frida Shea MD

## 2021-01-22 NOTE — PROGRESS NOTES
Ventilator check complete; patient has a #8.0 ETT. Patient is sedated. Patient is not able to follow commands. Breath sounds are diminished. Trachea is midline, Negative for subcutaneous air, and chest excursion is symmetric. Patient is also Negative for cyanosis and is Positive for pitting edema. All alarms are set and audible. Resuscitation bag is at the head of the bed. Ventilator Settings  Mode FIO2 Rate Tidal Volume Pressure PEEP I:E Ratio   PRVC  70 %   32 480 ml     16 cm H20  1:2.1      Peak airway pressure: 36 cm H2O   Minute ventilation: 15.3 l/min     ABG: No results for input(s): PH, PCO2, PO2, HCO3 in the last 72 hours.       Seferino Boone, RT

## 2021-01-22 NOTE — PROGRESS NOTES
2010:  Pt HR 140s-170s, irregular, with Spoke with Dr Kelsie Carrillo. New orders received. Levophed drip titrated off and Paul started. Awaiting Digoxin from Pharmacy. 2110:  Pt more tachycardic. HR 190s-200s. Dr Melani Garrett to bedside. New orders received.

## 2021-01-22 NOTE — PROGRESS NOTES
I was called to the patients room by nursing staff his HR has greatly increased they have had trouble with ectopy throughout his stay but now his HR has gone up to 180 to 200. I went and evaluated patient he has narrow complex tachycardia that appears irregular. He was switched from levophed to neosynephrine earlier in the evening to try and help with his HR but it did not help the HR. His blood pressure has been okay. I did order 15 mg of diltiazem which did help slow it down.   ekg and cardizem gtt ordered as well as repeat electrolytes. 35 minutes was spent at patients beside for critical care. Involving unstable HR with rechecks.   Ardeth Paget, MD 9:59 PM 1/21/2021

## 2021-01-22 NOTE — PROGRESS NOTES
Ventilator check complete; patient has a #8. 0 ET tube secured at the 25 at the lip. Patient is sedated. Patient is not able to follow commands. Breath sounds are diminished. Trachea is midline, Negative for subcutaneous air, and chest excursion is symmetric. Patient is also Negative for cyanosis. All alarms are set and audible. Resuscitation bag is at the head of the bed. Ventilator Settings  Mode FIO2 Rate Tidal Volume Pressure PEEP I:E Ratio   PRVC  80 %   32 480 ml     16 cm H20  1:2.1      Peak airway pressure: 27 cm H2O   Minute ventilation: 14.4 l/min     ABG: No results for input(s): PH, PCO2, PO2, HCO3 in the last 72 hours.       Marlo Slain

## 2021-01-22 NOTE — PROGRESS NOTES
..Bedside, Verbal and Written shift change report given to Aleida Richardson, AQUILINO and Autumn Avila RN (oncoming nurse) by [facility], RN (offgoing nurse). Report included the following information SBAR, Kardex, Procedure Summary, Intake/Output, MAR, Recent Results, Med Rec Status, Cardiac Rhythm Sinus Tachy, Alarm Parameters , Procedure Verification and Quality Measures. Verified Fentynl and Versed.

## 2021-01-22 NOTE — PROGRESS NOTES
Dr. Harrison Evans notified of elevated peak pressures and MVe. Orders received for STAT chest xray.

## 2021-01-22 NOTE — DIABETES MGMT
Patient's blood glucose ranged 116-220 yesterday with patient receiving Lantus 60 units, Humalog 21 units, and SoluCortef 150 mg. Blood glucose 207 this morning. Hgb 9. 7. Plt 91. K+ 5. 5. Cr 2.57. Marily 5. 4. GFR 27.Mg 2. 5. Patient remains on vent, had issues with afib and heart rate overnight. Provider increased Lantus slightly today. Current regimen: Lantus 32 units Q12. Humalog SSI Q4, and SoluCortef 50 mg Q6. Will continue to follow loosely.

## 2021-01-22 NOTE — PROGRESS NOTES
Sp[facility] to Dr. Sonal Dalal [facility]. No new vent orders at this. Time.

## 2021-01-22 NOTE — PROGRESS NOTES
Dr. Juan Miguel Post notified of morning platelet count of 91. No new orders received. Will continue to initiate heparin gtt as planned per Dr. Juan Miguel Post.

## 2021-01-22 NOTE — PROGRESS NOTES
Pt proned. Able to pass suction catheter with no obstruction, however peak pressure was 45-48. Switched pt to [facility] and VE dropped from 14.5-15 to 11.2. Called MD, was not able to leave message. Will attempt to call again. ABG in 1Hour.

## 2021-01-23 LAB
ANION GAP SERPL CALC-SCNC: 5 MMOL/L (ref 7–16)
ANION GAP SERPL CALC-SCNC: 8 MMOL/L (ref 7–16)
APPEARANCE UR: ABNORMAL
APTT PPP: 66.6 SEC (ref 24.1–35.1)
APTT PPP: >200 SEC (ref 24.1–35.1)
ARTERIAL PATENCY WRIST A: ABNORMAL
ARTERIAL PATENCY WRIST A: ABNORMAL
BACTERIA SPEC CULT: NORMAL
BACTERIA SPEC CULT: NORMAL
BACTERIA URNS QL MICRO: ABNORMAL /HPF
BASE DEFICIT BLD-SCNC: 7 MMOL/L
BASE DEFICIT BLD-SCNC: 9 MMOL/L
BASOPHILS # BLD: 0 K/UL (ref 0–0.2)
BASOPHILS # BLD: 0 K/UL (ref 0–0.2)
BASOPHILS NFR BLD: 0 % (ref 0–2)
BASOPHILS NFR BLD: 0 % (ref 0–2)
BDY SITE: ABNORMAL
BDY SITE: ABNORMAL
BILIRUB UR QL: ABNORMAL
BUN SERPL-MCNC: 70 MG/DL (ref 8–23)
BUN SERPL-MCNC: 79 MG/DL (ref 8–23)
CALCIUM SERPL-MCNC: 8.4 MG/DL (ref 8.3–10.4)
CALCIUM SERPL-MCNC: 8.5 MG/DL (ref 8.3–10.4)
CHLORIDE SERPL-SCNC: 106 MMOL/L (ref 98–107)
CHLORIDE SERPL-SCNC: 107 MMOL/L (ref 98–107)
CO2 BLD-SCNC: 20 MMOL/L
CO2 BLD-SCNC: 22 MMOL/L
CO2 SERPL-SCNC: 20 MMOL/L (ref 21–32)
CO2 SERPL-SCNC: 21 MMOL/L (ref 21–32)
COLLECT TIME,HTIME: 1515
COLLECT TIME,HTIME: 400
COLOR UR: ABNORMAL
CREAT SERPL-MCNC: 3.22 MG/DL (ref 0.8–1.5)
CREAT SERPL-MCNC: 3.53 MG/DL (ref 0.8–1.5)
CREAT UR-MCNC: 86.7 MG/DL
DIFFERENTIAL METHOD BLD: ABNORMAL
DIFFERENTIAL METHOD BLD: ABNORMAL
EOSINOPHIL # BLD: 0 K/UL (ref 0–0.8)
EOSINOPHIL # BLD: 0 K/UL (ref 0–0.8)
EOSINOPHIL NFR BLD: 0 % (ref 0.5–7.8)
EOSINOPHIL NFR BLD: 0 % (ref 0.5–7.8)
EPI CELLS #/AREA URNS HPF: ABNORMAL /HPF
ERYTHROCYTE [DISTWIDTH] IN BLOOD BY AUTOMATED COUNT: 14 % (ref 11.9–14.6)
ERYTHROCYTE [DISTWIDTH] IN BLOOD BY AUTOMATED COUNT: 14.1 % (ref 11.9–14.6)
EXHALED MINUTE VOLUME, VE: 15.1 L/MIN
FIBRINOGEN PPP-MCNC: 695 MG/DL (ref 190–501)
GAS FLOW.O2 O2 DELIVERY SYS: ABNORMAL L/MIN
GAS FLOW.O2 O2 DELIVERY SYS: ABNORMAL L/MIN
GAS FLOW.O2 SETTING OXYMISER: 32 BPM
GLUCOSE BLD STRIP.AUTO-MCNC: 187 MG/DL (ref 65–100)
GLUCOSE BLD STRIP.AUTO-MCNC: 205 MG/DL (ref 65–100)
GLUCOSE BLD STRIP.AUTO-MCNC: 230 MG/DL (ref 65–100)
GLUCOSE BLD STRIP.AUTO-MCNC: 239 MG/DL (ref 65–100)
GLUCOSE BLD STRIP.AUTO-MCNC: 296 MG/DL (ref 65–100)
GLUCOSE BLD STRIP.AUTO-MCNC: 297 MG/DL (ref 65–100)
GLUCOSE SERPL-MCNC: 233 MG/DL (ref 65–100)
GLUCOSE SERPL-MCNC: 292 MG/DL (ref 65–100)
GLUCOSE UR STRIP.AUTO-MCNC: 250 MG/DL
HCO3 BLD-SCNC: 18.9 MMOL/L (ref 22–26)
HCO3 BLD-SCNC: 20.1 MMOL/L (ref 22–26)
HCT VFR BLD AUTO: 27.5 % (ref 41.1–50.3)
HCT VFR BLD AUTO: 29.9 % (ref 41.1–50.3)
HGB BLD-MCNC: 9 G/DL (ref 13.6–17.2)
HGB BLD-MCNC: 9.5 G/DL (ref 13.6–17.2)
HGB UR QL STRIP: ABNORMAL
IMM GRANULOCYTES # BLD AUTO: 0.1 K/UL (ref 0–0.5)
IMM GRANULOCYTES # BLD AUTO: 0.1 K/UL (ref 0–0.5)
IMM GRANULOCYTES NFR BLD AUTO: 1 % (ref 0–5)
IMM GRANULOCYTES NFR BLD AUTO: 1 % (ref 0–5)
INR PPP: 1.3
INR PPP: 1.5
INSPIRATION.DURATION SETTING TIME VENT: 0.95 SEC
KETONES UR QL STRIP.AUTO: 40 MG/DL
LEUKOCYTE ESTERASE UR QL STRIP.AUTO: ABNORMAL
LYMPHOCYTES # BLD: 0.4 K/UL (ref 0.5–4.6)
LYMPHOCYTES # BLD: 0.5 K/UL (ref 0.5–4.6)
LYMPHOCYTES NFR BLD: 6 % (ref 13–44)
LYMPHOCYTES NFR BLD: 7 % (ref 13–44)
MCH RBC QN AUTO: 29.5 PG (ref 26.1–32.9)
MCH RBC QN AUTO: 30.2 PG (ref 26.1–32.9)
MCHC RBC AUTO-ENTMCNC: 31.8 G/DL (ref 31.4–35)
MCHC RBC AUTO-ENTMCNC: 32.7 G/DL (ref 31.4–35)
MCV RBC AUTO: 92.3 FL (ref 79.6–97.8)
MCV RBC AUTO: 92.9 FL (ref 79.6–97.8)
MONOCYTES # BLD: 0.3 K/UL (ref 0.1–1.3)
MONOCYTES # BLD: 0.3 K/UL (ref 0.1–1.3)
MONOCYTES NFR BLD: 4 % (ref 4–12)
MONOCYTES NFR BLD: 5 % (ref 4–12)
NEUTS SEG # BLD: 5.8 K/UL (ref 1.7–8.2)
NEUTS SEG # BLD: 7.3 K/UL (ref 1.7–8.2)
NEUTS SEG NFR BLD: 88 % (ref 43–78)
NEUTS SEG NFR BLD: 88 % (ref 43–78)
NITRITE UR QL STRIP.AUTO: POSITIVE
NRBC # BLD: 0 K/UL (ref 0–0.2)
NRBC # BLD: 0 K/UL (ref 0–0.2)
O2/TOTAL GAS SETTING VFR VENT: 100 %
O2/TOTAL GAS SETTING VFR VENT: 70 %
PCO2 BLD: 40.5 MMHG (ref 35–45)
PCO2 BLD: 58.4 MMHG (ref 35–45)
PEEP RESPIRATORY: 15 CMH2O
PH BLD: 7.14 [PH] (ref 7.35–7.45)
PH BLD: 7.28 [PH] (ref 7.35–7.45)
PH UR STRIP: 7 [PH] (ref 5–9)
PLATELET # BLD AUTO: 84 K/UL (ref 150–450)
PLATELET # BLD AUTO: 89 K/UL (ref 150–450)
PMV BLD AUTO: 10.6 FL (ref 9.4–12.3)
PMV BLD AUTO: 10.6 FL (ref 9.4–12.3)
PO2 BLD: 115 MMHG (ref 75–100)
PO2 BLD: 215 MMHG (ref 75–100)
POTASSIUM SERPL-SCNC: 4.3 MMOL/L (ref 3.5–5.1)
POTASSIUM SERPL-SCNC: 4.4 MMOL/L (ref 3.5–5.1)
PRESSURE CONTROL, IPC: 21
PROT UR STRIP-MCNC: >300 MG/DL
PROTHROMBIN TIME: 16.5 SEC (ref 12.5–14.7)
PROTHROMBIN TIME: 18.2 SEC (ref 12.5–14.7)
RBC # BLD AUTO: 2.98 M/UL (ref 4.23–5.6)
RBC # BLD AUTO: 3.22 M/UL (ref 4.23–5.6)
RBC #/AREA URNS HPF: >100 /HPF
SAO2 % BLD: 100 % (ref 95–98)
SAO2 % BLD: 97 % (ref 95–98)
SERVICE CMNT-IMP: ABNORMAL
SERVICE CMNT-IMP: NORMAL
SERVICE CMNT-IMP: NORMAL
SODIUM SERPL-SCNC: 132 MMOL/L (ref 136–145)
SODIUM SERPL-SCNC: 135 MMOL/L (ref 138–145)
SODIUM UR-SCNC: 13 MMOL/L
SP GR UR REFRACTOMETRY: 1.01 (ref 1–1.02)
SPECIMEN TYPE: ABNORMAL
SPECIMEN TYPE: ABNORMAL
UFH PPP CHRO-ACNC: 0.82 IU/ML (ref 0.3–0.7)
UFH PPP CHRO-ACNC: <0.1 IU/ML (ref 0.3–0.7)
UROBILINOGEN UR QL STRIP.AUTO: >8 EU/DL (ref 0.2–1)
VENTILATION MODE VENT: ABNORMAL
VENTILATION MODE VENT: ABNORMAL
WBC # BLD AUTO: 6.6 K/UL (ref 4.3–11.1)
WBC # BLD AUTO: 8.3 K/UL (ref 4.3–11.1)
WBC URNS QL MICRO: ABNORMAL /HPF

## 2021-01-23 PROCEDURE — 82803 BLOOD GASES ANY COMBINATION: CPT

## 2021-01-23 PROCEDURE — 85520 HEPARIN ASSAY: CPT

## 2021-01-23 PROCEDURE — 85610 PROTHROMBIN TIME: CPT

## 2021-01-23 PROCEDURE — 74011000250 HC RX REV CODE- 250: Performed by: INTERNAL MEDICINE

## 2021-01-23 PROCEDURE — 74011250636 HC RX REV CODE- 250/636: Performed by: ANESTHESIOLOGY

## 2021-01-23 PROCEDURE — 82570 ASSAY OF URINE CREATININE: CPT

## 2021-01-23 PROCEDURE — 65610000006 HC RM INTENSIVE CARE

## 2021-01-23 PROCEDURE — 80048 BASIC METABOLIC PNL TOTAL CA: CPT

## 2021-01-23 PROCEDURE — 99233 SBSQ HOSP IP/OBS HIGH 50: CPT | Performed by: INTERNAL MEDICINE

## 2021-01-23 PROCEDURE — 74011250637 HC RX REV CODE- 250/637: Performed by: ANESTHESIOLOGY

## 2021-01-23 PROCEDURE — 85730 THROMBOPLASTIN TIME PARTIAL: CPT

## 2021-01-23 PROCEDURE — 81003 URINALYSIS AUTO W/O SCOPE: CPT

## 2021-01-23 PROCEDURE — C9113 INJ PANTOPRAZOLE SODIUM, VIA: HCPCS | Performed by: INTERNAL MEDICINE

## 2021-01-23 PROCEDURE — 74011636637 HC RX REV CODE- 636/637: Performed by: INTERNAL MEDICINE

## 2021-01-23 PROCEDURE — 85384 FIBRINOGEN ACTIVITY: CPT

## 2021-01-23 PROCEDURE — 74011250637 HC RX REV CODE- 250/637: Performed by: INTERNAL MEDICINE

## 2021-01-23 PROCEDURE — 85025 COMPLETE CBC W/AUTO DIFF WBC: CPT

## 2021-01-23 PROCEDURE — 74011250636 HC RX REV CODE- 250/636: Performed by: INTERNAL MEDICINE

## 2021-01-23 PROCEDURE — 94003 VENT MGMT INPAT SUBQ DAY: CPT

## 2021-01-23 PROCEDURE — 74011000258 HC RX REV CODE- 258: Performed by: INTERNAL MEDICINE

## 2021-01-23 PROCEDURE — 84300 ASSAY OF URINE SODIUM: CPT

## 2021-01-23 RX ORDER — SODIUM BICARBONATE 1 MEQ/ML
100 SYRINGE (ML) INTRAVENOUS ONCE
Status: COMPLETED | OUTPATIENT
Start: 2021-01-23 | End: 2021-01-23

## 2021-01-23 RX ORDER — VANCOMYCIN HYDROCHLORIDE
1250 ONCE
Status: COMPLETED | OUTPATIENT
Start: 2021-01-23 | End: 2021-01-24

## 2021-01-23 RX ADMIN — POLYETHYLENE GLYCOL 3350 17 G: 17 POWDER, FOR SOLUTION ORAL at 08:56

## 2021-01-23 RX ADMIN — ATORVASTATIN CALCIUM 80 MG: 40 TABLET, FILM COATED ORAL at 08:56

## 2021-01-23 RX ADMIN — HYDROCORTISONE SODIUM SUCCINATE 50 MG: 100 INJECTION, POWDER, FOR SOLUTION INTRAMUSCULAR; INTRAVENOUS at 12:36

## 2021-01-23 RX ADMIN — VANCOMYCIN HYDROCHLORIDE 1250 MG: 10 INJECTION, POWDER, LYOPHILIZED, FOR SOLUTION INTRAVENOUS at 12:32

## 2021-01-23 RX ADMIN — HYDROCORTISONE SODIUM SUCCINATE 50 MG: 100 INJECTION, POWDER, FOR SOLUTION INTRAMUSCULAR; INTRAVENOUS at 00:28

## 2021-01-23 RX ADMIN — INSULIN LISPRO 6 UNITS: 100 INJECTION, SOLUTION INTRAVENOUS; SUBCUTANEOUS at 16:22

## 2021-01-23 RX ADMIN — PANTOPRAZOLE SODIUM 40 MG: 40 INJECTION, POWDER, FOR SOLUTION INTRAVENOUS at 08:57

## 2021-01-23 RX ADMIN — HYDROCORTISONE SODIUM SUCCINATE 50 MG: 100 INJECTION, POWDER, FOR SOLUTION INTRAMUSCULAR; INTRAVENOUS at 17:26

## 2021-01-23 RX ADMIN — Medication 20 ML: at 22:00

## 2021-01-23 RX ADMIN — INSULIN LISPRO 6 UNITS: 100 INJECTION, SOLUTION INTRAVENOUS; SUBCUTANEOUS at 08:59

## 2021-01-23 RX ADMIN — ATRACURIUM BESYLATE 8 MCG/KG/MIN: 10 INJECTION, SOLUTION INTRAVENOUS at 13:12

## 2021-01-23 RX ADMIN — Medication 150 MCG/HR: at 14:35

## 2021-01-23 RX ADMIN — CEFEPIME 2 G: 2 INJECTION, POWDER, FOR SOLUTION INTRAVENOUS at 00:28

## 2021-01-23 RX ADMIN — MINERAL OIL, PETROLATUM: 425; 568 OINTMENT OPHTHALMIC at 08:57

## 2021-01-23 RX ADMIN — INSULIN GLARGINE 32 UNITS: 100 INJECTION, SOLUTION SUBCUTANEOUS at 08:59

## 2021-01-23 RX ADMIN — INSULIN GLARGINE 32 UNITS: 100 INJECTION, SOLUTION SUBCUTANEOUS at 20:43

## 2021-01-23 RX ADMIN — Medication 20 ML: at 13:02

## 2021-01-23 RX ADMIN — MIDAZOLAM HYDROCHLORIDE 5 MG/HR: 5 INJECTION, SOLUTION INTRAMUSCULAR; INTRAVENOUS at 19:29

## 2021-01-23 RX ADMIN — MINERAL OIL, PETROLATUM: 425; 568 OINTMENT OPHTHALMIC at 21:00

## 2021-01-23 RX ADMIN — INSULIN LISPRO 9 UNITS: 100 INJECTION, SOLUTION INTRAVENOUS; SUBCUTANEOUS at 05:26

## 2021-01-23 RX ADMIN — HYDROCORTISONE SODIUM SUCCINATE 50 MG: 100 INJECTION, POWDER, FOR SOLUTION INTRAMUSCULAR; INTRAVENOUS at 05:25

## 2021-01-23 RX ADMIN — Medication 20 ML: at 05:26

## 2021-01-23 RX ADMIN — NOREPINEPHRINE-DEXTROSE IV SOLUTION 4 MG/250ML-5% 10 MCG/MIN: 4-5/250 SOLUTION at 14:39

## 2021-01-23 RX ADMIN — CEFEPIME 2 G: 2 INJECTION, POWDER, FOR SOLUTION INTRAVENOUS at 12:31

## 2021-01-23 RX ADMIN — INSULIN LISPRO 6 UNITS: 100 INJECTION, SOLUTION INTRAVENOUS; SUBCUTANEOUS at 12:31

## 2021-01-23 RX ADMIN — SODIUM BICARBONATE 100 MEQ: 84 INJECTION, SOLUTION INTRAVENOUS at 16:54

## 2021-01-23 RX ADMIN — INSULIN LISPRO 9 UNITS: 100 INJECTION, SOLUTION INTRAVENOUS; SUBCUTANEOUS at 00:28

## 2021-01-23 RX ADMIN — INSULIN LISPRO 3 UNITS: 100 INJECTION, SOLUTION INTRAVENOUS; SUBCUTANEOUS at 20:41

## 2021-01-23 NOTE — PROGRESS NOTES
Critical Care Daily Progress Note: 1/23/2021  Admission Date: 1/6/2021     The patient's chart is reviewed and the patient is discussed with the staff. Admission Date: 1/6/2021     Length of Stay: 17 days     Background: 61 y.o. y/o male with acute hypoxemic respiratory failure secondary to COVID-19. Date of COVID-19 symptom onset: + COVID 01/05/21     Notable PMH: obesity, HTN, CAD, dyslipidemia, SWATI, DM, GERD    24 Hour events:  Pt was on heparin and amiodarone, but was stopped last pm due to INR 1.5, PTT <200 and pt with hematuria and factor X: mildly elevated 0.82. Cr continues to rise: 3.22 today, was elevated on admit but recovered to 0.9 on 1-10-21 but has cont to rise. Will consult nephrology today. Attempted prone yesterday and pt did not tolerate.      Current Facility-Administered Medications   Medication Dose Route Frequency    vancomycin (VANCOCIN) 1250 mg in  ml infusion  1,250 mg IntraVENous ONCE    insulin glargine (LANTUS) injection 32 Units  32 Units SubCUTAneous Q12H    pantoprazole (PROTONIX) 40 mg in 0.9% sodium chloride 10 mL injection  40 mg IntraVENous DAILY    sodium zirconium cyclosilicate (LOKELMA) powder packet 10 g  10 g Oral DAILY    NOREPINephrine (LEVOPHED) 4 mg in 5% dextrose 250 mL infusion  0.5-30 mcg/min IntraVENous TITRATE    VANCOMYCIN INTERMITTENT DOSING PER PHARMACY   Other Rx Dosing/Monitoring    hydrocortisone Sod Succ (PF) (SOLU-CORTEF) injection 50 mg  50 mg IntraVENous Q6H    white petrolatum-mineral oiL (LACRILUBE S.O.P.) ointment   Both Eyes Q12H    cefepime (MAXIPIME) 2 g in 0.9% sodium chloride (MBP/ADV) 100 mL MBP  2 g IntraVENous Q12H    white petrolatum-mineral oiL (LACRILUBE S.O.P.) ointment   Both Eyes Q4H PRN    polyethylene glycol (MIRALAX) packet 17 g  17 g Oral DAILY    0.9% sodium chloride infusion  8 mL/hr Nebulization TITRATE    atracurium (TRACRIUM) 1,000 mg in 0.9% sodium chloride 250 mL infusion  0-15 mcg/kg/min IntraVENous TITRATE    fentaNYL in normal saline (pf) 25 mcg/mL infusion  0-200 mcg/hr IntraVENous TITRATE    midazolam in normal saline (VERSED) 1 mg/mL infusion  0-10 mg/hr IntraVENous TITRATE    atorvastatin (LIPITOR) tablet 80 mg  80 mg Per NG tube DAILY    [Held by provider] clopidogreL (PLAVIX) tablet 75 mg  75 mg Per NG tube DAILY    [Held by provider] metoprolol tartrate (LOPRESSOR) tablet 50 mg  50 mg Per NG tube BID    insulin lispro (HUMALOG) injection   SubCUTAneous Q4H    LORazepam (ATIVAN) injection 1 mg  1 mg IntraVENous Q4H PRN    morphine injection 2 mg  2 mg IntraVENous Q4H PRN    NUTRITIONAL SUPPORT ELECTROLYTE PRN ORDERS   Does Not Apply PRN    sodium chloride (NS) flush 20 mL  20 mL InterCATHeter Q8H    sodium chloride (NS) flush 20 mL  20 mL InterCATHeter PRN    loperamide (IMODIUM) capsule 2 mg  2 mg Oral Q4H PRN    polyethylene glycol (MIRALAX) packet 17 g  17 g Oral DAILY PRN    promethazine (PHENERGAN) tablet 12.5 mg  12.5 mg Oral Q6H PRN    Or    ondansetron (ZOFRAN) injection 4 mg  4 mg IntraVENous Q6H PRN    guaiFENesin-dextromethorphan (ROBITUSSIN DM) 100-10 mg/5 mL syrup 10 mL  10 mL Oral Q4H PRN    dextrose 40% (GLUTOSE) oral gel 1 Tube  15 g Oral PRN    glucagon (GLUCAGEN) injection 1 mg  1 mg IntraMUSCular PRN    dextrose (D50W) injection syrg 12.5-25 g  25-50 mL IntraVENous PRN    0.9% sodium chloride infusion 250 mL  250 mL IntraVENous PRN    albuterol (PROVENTIL HFA, VENTOLIN HFA, PROAIR HFA) inhaler 2 Puff  2 Puff Inhalation Q4H PRN    influenza vaccine 2020-21 (6 mos+)(PF) (FLUARIX/FLULAVAL/FLUZONE QUAD) injection 0.5 mL  0.5 mL IntraMUSCular PRIOR TO DISCHARGE    hydrALAZINE (APRESOLINE) 20 mg/mL injection 20 mg  20 mg IntraVENous Q6H PRN    acetaminophen (TYLENOL) tablet 650 mg  650 mg Oral Q6H PRN       Objective:     Vitals:    01/23/21 1230 01/23/21 1243 01/23/21 1246 01/23/21 1257   BP:       Pulse: 81 75 78 78   Resp: 30 30 (!) 32 (!) 31   Temp: SpO2: 92% 91% 91% 92%   Weight:       Height:           Intake/Output Summary (Last 24 hours) at 1/23/2021 1325  Last data filed at 1/23/2021 1128  Gross per 24 hour   Intake 2446.76 ml   Output 430 ml   Net 2016.76 ml     Physical Exam:            Constitutional:  Intubated/sedated/paralysed   EENMT:  Sclera clear, pupils equal, oral mucosa moist  Respiratory: bilateral crackles, no crepitus  Cardiovascular:  RRR with no M,G,R;  Gastrointestinal:  soft with no tenderness; positive bowel sounds present  Musculoskeletal:  warm with no cyanosis, +1 lower extremity edema  Skin:  no jaundice or ecchymosis  Neurologic: pupils equal  Psychiatric:  Sedation and paralytic.      Flotrac values  CO 7.0  CI 3.5  SVV 7    LINES:    ET tube 1/17/2021  PICC 1/13  NGT 1/17  Art line 1/18/2021    DRIPS:    Tracrium 8mcg/kg/min  Fentanyl 150mcg/hr  Versed 5mg/hr    COVID-19 medications  Decadron 1/7-16  remdesivir 1/8-1/12  Convalescent plasma 1/7    Anti-infectives  azithro (completed)  Ceftriaxone (completed)  Vanc and cefepime: D 5/7    CXR: 1-22-21      Ventilator Settings  Mode FIO2 Rate Tidal Volume Pressure PEEP   PRVC  60%    481 ml     15 cm H20      Peak airway pressure: 36 cm H2O   Minute ventilation: 15.3 l/min     ABG:   Recent Labs     01/23/21  0410 01/22/21  2116 01/22/21  1733   PHI 7.28* 7.22* 7.11*   PCO2I 40.5 47.3* 63.8*   PO2I 215* 100 80   HCO3I 18.9* 19.4* 20.2*     LAB    MICRO  Date Source Result   1/18  blood  NG 3 days   1/18  blood NG 3 days   1/18  urine  no growth 2 days     Recent Labs     01/23/21  1218 01/23/21  0900 01/23/21  0517 01/22/21  2351 01/22/21 2006   GLUCPOC 239* 230* 296* 297* 278*     Recent Labs     01/23/21  1041 01/23/21  0231 01/22/21  0317   WBC 6.6 8.3 7.5   HGB 9.0* 9.5* 9.7*   HCT 27.5* 29.9* 30.4*   PLT 84* 89* 91*   INR 1.3 1.5 1.4     Recent Labs     01/23/21  1041 01/23/21  0231 01/22/21  1910 01/22/21  0317 01/22/21  0317 01/21/21  2204 01/21/21  1508 01/20/21  1833 01/20/21  1833   * 132* 135*   < > 138 136 137   < >  --    K 4.4 4.3 4.8   < > 5.5* 5.3* 4.7   < >  --     107 106   < > 109* 108* 109*   < >  --    CO2 21 20* 21   < > 23 21 23   < >  --    * 292* 310*   < > 192* 220* 144*   < >  --    BUN 79* 70* 63*   < > 47* 43* 39*   < >  --    CREA 3.53* 3.22* 2.97*   < > 2.57* 2.53* 2.32*   < >  --    MG  --   --  2.6*  --  2.5* 2.3  --   --   --    PHOS  --   --   --   --  5.4*  --   --   --  3.4   CA 8.4 8.5 8.8   < > 8.7 9.0 8.9   < >  --    ALB  --   --   --   --   --   --  2.1*  --   --     < > = values in this interval not displayed. No results for input(s): LCAD, LAC in the last 72 hours. Assessment:  (Medical Decision Making)     Hospital Problems  Date Reviewed: 6/3/2020          Codes Class Noted POA    Hypernatremia ICD-10-CM: E87.0  ICD-9-CM: 276.0  1/8/2021 Unknown    Resolved    SWATI (acute kidney injury) ([facility] ICD-10-CM: N17.9  ICD-9-CM: 584.9  1/7/2021 Unknown    Creatinine up at 1.95. Acute hypoxemic respiratory failure [facility]) ICD-10-CM: J96.01  ICD-9-CM: 518.81  1/7/2021 Unknown    Severe ARDS    Type 2 diabetes mellitus with hyperosmolarity without nonketotic hyperglycemic-hyperosmolar coma ([facility] ICD-10-CM: E11.00  ICD-9-CM: 250.20  1/7/2021 Unknown        * (Principal) Pneumonia due to COVID-19 virus ICD-10-CM: U07.1, J12.82  ICD-9-CM: 480.8  1/7/2021 Unknown    Severe. Remains on decadron. CAD (coronary artery disease) ICD-10-CM: I25.10  ICD-9-CM: 414.00  10/28/2016 Yes    Overview Signed 10/28/2016  8:05 AM by Aldo Hatchet, PA     10-27-06 [facility] 90% LAD s/p 2.25 x 20 Synergy drug-eluting stent  (CS)             Obesity ICD-10-CM: E66.9  ICD-9-CM: 278.00  10/6/2015 Yes            Plan:  (Medical Decision Making)     Impression: 61 y.o. y/o male with acute hypoxemic respiratory failure secondary to COVID-19. NEURO:   1. Sedation: Versed gtt  2. Analgesia: Fentanyl gtt  3.  Paralytics: Tracrium gtt- will first try to stop paralytics,    CV:   1. Hypotension:  Off pressors for several hours, back on levo to keep MAP >65  2. A fib: pt was on heparin gtt and amiodarone gtt, dc'd last pm due to hematuria/PTT. Will need AC when tolerated. 3.  Holding ASA    PULM:   1. Acute hypoxemic/hypercapneic respiratory failure:    PF ratio: 215. Pt on 100% AC, decreased to 60% this am. , will decrease PEEP before FI02, repeat ABG on 70 % Fi02 ,PEEP 14- if Ok would stop paralytics first     2. Severe ARDS 2/2 COVID: Low Tidal Volume ventilation, goal 6cc per kg (Ideal body weight: 68.4 kg (150 lb 12.7 oz) x 6 = 420). Attempt to limit driving pressure to 45KP/P1B or less. Allow permissive hypercapnia/acidosis to pH 7.25 if needed to achieve above. RENAL:  1. SWATI: worsening renal function with decreased UOP over last 24 hours. Will consult Nephrology today. GI: NPO, PPI prophy  1. Nutrition: TF, vital AF @20cc/hr. use PPI rather than H2b due to low platelets    HEME: no acute issues, platelets stable since 1/18  1. DVT Peroneal vein:  Had epistaxis and anticoag stopped  2. A fib: heparin stopped due to hematuria/PTT. Will need to restart asap. ID:   1. COVID-19: COVID meds as above, multi-D rounds with pharmacy to optimize meds with noted contraindications. 2. Fevers:  With elevated procal.  Empiric vanco/cefepime. Will trend procal.  Day 5 vanco, day 4 cefepime. ENDO:   1. DM: sliding scale insulin,  lantus to 32 Q12 and monitor.     Skin: no decub, turns, preventive care    Prophy: protonix    Full Code    Sister:  Ibrahima Mckinnon MD

## 2021-01-23 NOTE — PROGRESS NOTES
Ventilator check complete; patient has a #8. 0 ET tube secured at the 25 at the lip. Patient is sedated. Patient is not able to follow commands. Breath sounds are diminished. Trachea is midline, Negative for subcutaneous air, and chest excursion is symmetric. Patient is also Negative for cyanosis and is Negative for pitting edema. All alarms are set and audible. Resuscitation bag is at the head of the bed.       Ventilator Settings  Mode FIO2 Rate Tidal Volume PEEP I:E Ratio   PRVC  60 %(weaned)   32 bpm 450 ml(post abg)  15 cm H20  1:1      Peak airway pressure: 35 cm H2O   Minute ventilation: 15.6 l/min         Olga Ko, RT

## 2021-01-23 NOTE — CONSULTS
[facility] CONSULT NOTE    Admission Date:  1/6/2021    Admission Diagnosis:  Type 2 diabetes mellitus with hyperosmolarity without nonketotic hyperglycemic-hyperosmolar coma ([facility] [E11.00]  Lower respiratory tract infection due to COVID-19 virus [U07.1, J22]  Acute hypoxemic respiratory failure ([facility] [J96.01]  SWATI (acute kidney injury) ([facility] [N17.9]    Consulting physician: DR Aristeo ORTEGA  Reason for consult: Swati over CKD     Subjective:   History of Present Illness:    62 y/o male with PMH of CAD /GERD /HTNpresneted to Er on 01/07/2021 with fever ,cough diagnosed with COVID     Hypotensive - needing IVF   AFIB with RVR     Intubated and sedated     On cardizem   On levophed , flako ,   On heparin - stopped for hematuria        Having fevers       Past Medical History:   Diagnosis Date    Atrial fibrillation ([facility] 01/22/2021    Gastrointestinal disorder     reflux    GERD (gastroesophageal reflux disease)     Hypertension     Lower respiratory tract infection due to COVID-19 virus 1/7/2021    Nausea & vomiting     Obesity 10/6/2015    Other ill-defined conditions(799.89)     dyspnea since surgery, wheezing, SOB    Type 2 diabetes mellitus with hyperosmolarity without nonketotic hyperglycemic-hyperosmolar coma ([facility] 1/7/2021    Unstable angina ([facility] 10/27/2016      Past Surgical History:   Procedure Laterality Date    HX CHOLECYSTECTOMY      HX ORTHOPAEDIC      rotator cuff; knee    HX UROLOGICAL      kidney stone surgeries x 3    TN CARDIAC SURG PROCEDURE UNLIST      stent      Current Facility-Administered Medications   Medication Dose Route Frequency    vancomycin (VANCOCIN) 1250 mg in  ml infusion  1,250 mg IntraVENous ONCE    insulin glargine (LANTUS) injection 32 Units  32 Units SubCUTAneous Q12H    pantoprazole (PROTONIX) 40 mg in 0.9% sodium chloride 10 mL injection  40 mg IntraVENous DAILY    sodium zirconium cyclosilicate (LOKELMA) powder packet 10 g  10 g Oral DAILY    NOREPINephrine (LEVOPHED) 4 mg in 5% dextrose 250 mL infusion  0.5-30 mcg/min IntraVENous TITRATE    VANCOMYCIN INTERMITTENT DOSING PER PHARMACY   Other Rx Dosing/Monitoring    hydrocortisone Sod Succ (PF) (SOLU-CORTEF) injection 50 mg  50 mg IntraVENous Q6H    white petrolatum-mineral oiL (LACRILUBE S.O.P.) ointment   Both Eyes Q12H    cefepime (MAXIPIME) 2 g in 0.9% sodium chloride (MBP/ADV) 100 mL MBP  2 g IntraVENous Q12H    white petrolatum-mineral oiL (LACRILUBE S.O.P.) ointment   Both Eyes Q4H PRN    polyethylene glycol (MIRALAX) packet 17 g  17 g Oral DAILY    0.9% sodium chloride infusion  8 mL/hr Nebulization TITRATE    atracurium (TRACRIUM) 1,000 mg in 0.9% sodium chloride 250 mL infusion  0-15 mcg/kg/min IntraVENous TITRATE    fentaNYL in normal saline (pf) 25 mcg/mL infusion  0-200 mcg/hr IntraVENous TITRATE    midazolam in normal saline (VERSED) 1 mg/mL infusion  0-10 mg/hr IntraVENous TITRATE    atorvastatin (LIPITOR) tablet 80 mg  80 mg Per NG tube DAILY    [Held by provider] clopidogreL (PLAVIX) tablet 75 mg  75 mg Per NG tube DAILY    [Held by provider] metoprolol tartrate (LOPRESSOR) tablet 50 mg  50 mg Per NG tube BID    insulin lispro (HUMALOG) injection   SubCUTAneous Q4H    LORazepam (ATIVAN) injection 1 mg  1 mg IntraVENous Q4H PRN    morphine injection 2 mg  2 mg IntraVENous Q4H PRN    NUTRITIONAL SUPPORT ELECTROLYTE PRN ORDERS   Does Not Apply PRN    sodium chloride (NS) flush 20 mL  20 mL InterCATHeter Q8H    sodium chloride (NS) flush 20 mL  20 mL InterCATHeter PRN    loperamide (IMODIUM) capsule 2 mg  2 mg Oral Q4H PRN    polyethylene glycol (MIRALAX) packet 17 g  17 g Oral DAILY PRN    promethazine (PHENERGAN) tablet 12.5 mg  12.5 mg Oral Q6H PRN    Or    ondansetron (ZOFRAN) injection 4 mg  4 mg IntraVENous Q6H PRN    guaiFENesin-dextromethorphan (ROBITUSSIN DM) 100-10 mg/5 mL syrup 10 mL  10 mL Oral Q4H PRN    dextrose 40% (GLUTOSE) oral gel 1 Tube  15 g Oral PRN    glucagon (GLUCAGEN) injection 1 mg  1 mg IntraMUSCular PRN    dextrose (D50W) injection syrg 12.5-25 g  25-50 mL IntraVENous PRN    0.9% sodium chloride infusion 250 mL  250 mL IntraVENous PRN    albuterol (PROVENTIL HFA, VENTOLIN HFA, PROAIR HFA) inhaler 2 Puff  2 Puff Inhalation Q4H PRN    influenza vaccine 2020-21 (6 mos+)(PF) (FLUARIX/FLULAVAL/FLUZONE QUAD) injection 0.5 mL  0.5 mL IntraMUSCular PRIOR TO DISCHARGE    hydrALAZINE (APRESOLINE) 20 mg/mL injection 20 mg  20 mg IntraVENous Q6H PRN    acetaminophen (TYLENOL) tablet 650 mg  650 mg Oral Q6H PRN     Allergies   Allergen Reactions    Latex Swelling     Had a purcell catheter with swelling of urethra. Only known latex issue in past. Wife remembers this now    Hydrocodone-Acetaminophen Itching     Wife remembers this allergy    Tessalon Perles [Benzonatate] Unknown (comments)      Social History     Tobacco Use    Smoking status: Never Smoker    Smokeless tobacco: Never Used   Substance Use Topics    Alcohol use: No      History reviewed. No pertinent family history. Review of Systems  UTO     Objective:   Vitals:    01/23/21 1052 01/23/21 1102 01/23/21 1106 01/23/21 1113   BP:       Pulse: 70 68 71 74   Resp: (!) 31 30 (!) 32 (!) 32   Temp:  98.3 °F (36.8 °C)     SpO2: 95% 95% 94% 94%   Weight:       Height:           Intake/Output Summary (Last 24 hours) at 1/23/2021 1123  Last data filed at 1/23/2021 0801  Gross per 24 hour   Intake 2360.09 ml   Output 515 ml   Net 1845.09 ml       Physical Exam  GEN :intubated and sedated   HEENT: anicteric sclerae, eomi. Oropharynx without lesions. Mucous membranes are moist.  Neck - supple without JVD, no thyromegaly. No lymphadenopathy.   CV - regular rate and rhythm, no murmur, no rub  Lung - clear bilaterally, lungs expand symmetrically  Chest wall - normal appearance  Abd - soft, nontender, bowel sounds present, no hepatosplenomegaly  Ext - no clubbing, no cyanosis, no edema        Data Review:   Recent Labs     01/23/21  0231 01/22/21  0317 01/21/21  1508 01/21/21  0347   WBC 8.3 7.5  --  8.8   HGB 9.5* 9.7* 10.2* 10.8*   HCT 29.9* 30.4* 31.3* 33.2*   PLT 89* 91*  --  108*     Recent Labs     01/23/21  0231 01/22/21  1910 01/22/21  1750 01/22/21 0317 01/21/21  2204 01/20/21  1833 01/20/21 1833   * 135* 142 138 136   < >  --    K 4.3 4.8 3.3* 5.5* 5.3*   < >  --     106 119* 109* 108*   < >  --    CO2 20* 21 16* 23 21   < >  --    BUN 70* 63* 45* 47* 43*   < >  --    CREA 3.22* 2.97* 1.91* 2.57* 2.53*   < >  --    * 310* 212* 192* 220*   < >  --    CA 8.5 8.8 6.2* 8.7 9.0   < >  --    MG  --  2.6*  --  2.5* 2.3  --   --    PHOS  --   --   --  5.4*  --   --  3.4    < > = values in this interval not displayed. No results for input(s): PH, PCO2, PO2, PCO2 in the last 72 hours. Problem List:     Patient Active Problem List    Diagnosis Date Noted    At[facility] fibrillation ([facility] 01/22/2021    Hypernatremia 01/08/2021    SWATI (acute kidney injury) ([facility] 01/07/2021    Acute hypoxemic respiratory failure ([facility] 01/07/2021    Type 2 diabetes mellitus with hyperosmolarity without nonketotic hyperglycemic-hyperosmolar coma ([facility] 01/07/2021    Pneumonia due to COVID-19 virus 01/07/2021    Dyslipidemia, goal LDL below 70 11/29/2018    CAD (coronary artery disease) 10/28/2016    Hypertension 10/27/2016    Bradycardia 10/27/2016    PVC (premature ventricular contraction) 10/27/2016    Obesity 10/06/2015       Assessment and Plan:    1. SWATI - COVID ATN - Had multiple episodes of Hypotension     Baseline cr 1.5 in 2018 ,admitted with cr of 2.6 improved to normal renal function until 01/17 ,started declining and today its 3.2   Uo 800 ml with positive fluid balance   Will do UA, UPCR , urine chemistries  Maintain MAP > 65     2. COVID /severe ARDS  Intubated   On vancomycin - trough wnl     3. Hypotension   On pressors   Maintain MAP > 65     4. Hyperkalemia   On lokelma     5.  Mild hyponatremia and acidosis seen         Dutch Mann MD

## 2021-01-23 NOTE — PROGRESS NOTES
Bedside, Verbal and Written shift change report given to AQUILINO Smiley (oncoming nurse) by AQUILINO Damico (offgoing nurse). Report included the following information SBAR, Kardex, Intake/Output, MAR, Recent Results, Cardiac Rhythm SB and Alarm Parameters .    Versed, fentanyl and tracrium dual verified in MAR.     Current infusion rates:  Fentanyl @ 125mcg/hr  Versed @ 4mg/hr  tracrium @ 8mcg/kg/min

## 2021-01-24 ENCOUNTER — APPOINTMENT (OUTPATIENT)
Dept: GENERAL RADIOLOGY | Age: 61
DRG: 004 | End: 2021-01-24
Attending: NURSE PRACTITIONER
Payer: COMMERCIAL

## 2021-01-24 ENCOUNTER — APPOINTMENT (OUTPATIENT)
Dept: GENERAL RADIOLOGY | Age: 61
DRG: 004 | End: 2021-01-24
Attending: INTERNAL MEDICINE
Payer: COMMERCIAL

## 2021-01-24 LAB
ANION GAP SERPL CALC-SCNC: 10 MMOL/L (ref 7–16)
ANION GAP SERPL CALC-SCNC: 11 MMOL/L (ref 7–16)
APTT PPP: 36.7 SEC (ref 24.1–35.1)
ARTERIAL PATENCY WRIST A: ABNORMAL
BASE DEFICIT BLD-SCNC: 6 MMOL/L
BASOPHILS # BLD: 0 K/UL (ref 0–0.2)
BASOPHILS NFR BLD: 0 % (ref 0–2)
BDY SITE: ABNORMAL
BUN SERPL-MCNC: 102 MG/DL (ref 8–23)
BUN SERPL-MCNC: 129 MG/DL (ref 8–23)
CALCIUM SERPL-MCNC: 8.6 MG/DL (ref 8.3–10.4)
CALCIUM SERPL-MCNC: 8.6 MG/DL (ref 8.3–10.4)
CHLORIDE SERPL-SCNC: 106 MMOL/L (ref 98–107)
CHLORIDE SERPL-SCNC: 108 MMOL/L (ref 98–107)
CO2 BLD-SCNC: 21 MMOL/L
CO2 SERPL-SCNC: 19 MMOL/L (ref 21–32)
CO2 SERPL-SCNC: 20 MMOL/L (ref 21–32)
COLLECT TIME,HTIME: 245
CREAT SERPL-MCNC: 4.74 MG/DL (ref 0.8–1.5)
CREAT SERPL-MCNC: 5.63 MG/DL (ref 0.8–1.5)
DIFFERENTIAL METHOD BLD: ABNORMAL
EOSINOPHIL # BLD: 0 K/UL (ref 0–0.8)
EOSINOPHIL NFR BLD: 0 % (ref 0.5–7.8)
ERYTHROCYTE [DISTWIDTH] IN BLOOD BY AUTOMATED COUNT: 14.3 % (ref 11.9–14.6)
ERYTHROCYTE [DISTWIDTH] IN BLOOD BY AUTOMATED COUNT: 14.9 % (ref 11.9–14.6)
EXHALED MINUTE VOLUME, VE: 16.2 L/MIN
FIBRINOGEN PPP-MCNC: 576 MG/DL (ref 190–501)
GAS FLOW.O2 O2 DELIVERY SYS: ABNORMAL L/MIN
GAS FLOW.O2 SETTING OXYMISER: 32 BPM
GLUCOSE BLD STRIP.AUTO-MCNC: 145 MG/DL (ref 65–100)
GLUCOSE BLD STRIP.AUTO-MCNC: 147 MG/DL (ref 65–100)
GLUCOSE BLD STRIP.AUTO-MCNC: 151 MG/DL (ref 65–100)
GLUCOSE BLD STRIP.AUTO-MCNC: 166 MG/DL (ref 65–100)
GLUCOSE BLD STRIP.AUTO-MCNC: 182 MG/DL (ref 65–100)
GLUCOSE BLD STRIP.AUTO-MCNC: 199 MG/DL (ref 65–100)
GLUCOSE BLD STRIP.AUTO-MCNC: 241 MG/DL (ref 65–100)
GLUCOSE SERPL-MCNC: 147 MG/DL (ref 65–100)
GLUCOSE SERPL-MCNC: 225 MG/DL (ref 65–100)
HCO3 BLD-SCNC: 19.4 MMOL/L (ref 22–26)
HCT VFR BLD AUTO: 28.3 % (ref 41.1–50.3)
HCT VFR BLD AUTO: 32.2 % (ref 41.1–50.3)
HGB BLD-MCNC: 10.1 G/DL (ref 13.6–17.2)
HGB BLD-MCNC: 9.1 G/DL (ref 13.6–17.2)
IMM GRANULOCYTES # BLD AUTO: 0.1 K/UL (ref 0–0.5)
IMM GRANULOCYTES NFR BLD AUTO: 1 % (ref 0–5)
INR PPP: 1.3
INSPIRATION.DURATION SETTING TIME VENT: 0.6 SEC
LYMPHOCYTES # BLD: 0.4 K/UL (ref 0.5–4.6)
LYMPHOCYTES NFR BLD: 4 % (ref 13–44)
MAGNESIUM SERPL-MCNC: 3 MG/DL (ref 1.8–2.4)
MCH RBC QN AUTO: 29.1 PG (ref 26.1–32.9)
MCH RBC QN AUTO: 29.4 PG (ref 26.1–32.9)
MCHC RBC AUTO-ENTMCNC: 31.4 G/DL (ref 31.4–35)
MCHC RBC AUTO-ENTMCNC: 32.2 G/DL (ref 31.4–35)
MCV RBC AUTO: 91.3 FL (ref 79.6–97.8)
MCV RBC AUTO: 92.8 FL (ref 79.6–97.8)
MONOCYTES # BLD: 0.4 K/UL (ref 0.1–1.3)
MONOCYTES NFR BLD: 4 % (ref 4–12)
NEUTS SEG # BLD: 8.8 K/UL (ref 1.7–8.2)
NEUTS SEG NFR BLD: 91 % (ref 43–78)
NRBC # BLD: 0 K/UL (ref 0–0.2)
NRBC # BLD: 0 K/UL (ref 0–0.2)
O2/TOTAL GAS SETTING VFR VENT: 70 %
PCO2 BLD: 38.5 MMHG (ref 35–45)
PEEP RESPIRATORY: 14 CMH2O
PH BLD: 7.31 [PH] (ref 7.35–7.45)
PLATELET # BLD AUTO: 103 K/UL (ref 150–450)
PLATELET # BLD AUTO: 105 K/UL (ref 150–450)
PMV BLD AUTO: 10.1 FL (ref 9.4–12.3)
PMV BLD AUTO: 10.8 FL (ref 9.4–12.3)
PO2 BLD: 131 MMHG (ref 75–100)
POTASSIUM SERPL-SCNC: 4.6 MMOL/L (ref 3.5–5.1)
POTASSIUM SERPL-SCNC: 4.8 MMOL/L (ref 3.5–5.1)
PRESSURE CONTROL, IPC: 24
PROTHROMBIN TIME: 16.2 SEC (ref 12.5–14.7)
RBC # BLD AUTO: 3.1 M/UL (ref 4.23–5.6)
RBC # BLD AUTO: 3.47 M/UL (ref 4.23–5.6)
SAO2 % BLD: 99 % (ref 95–98)
SERVICE CMNT-IMP: ABNORMAL
SODIUM SERPL-SCNC: 136 MMOL/L (ref 136–145)
SODIUM SERPL-SCNC: 138 MMOL/L (ref 136–145)
SPECIMEN TYPE: ABNORMAL
VANCOMYCIN SERPL-MCNC: 23.8 UG/ML
VENTILATION MODE VENT: ABNORMAL
WBC # BLD AUTO: 9 K/UL (ref 4.3–11.1)
WBC # BLD AUTO: 9.6 K/UL (ref 4.3–11.1)

## 2021-01-24 PROCEDURE — 74011636637 HC RX REV CODE- 636/637: Performed by: INTERNAL MEDICINE

## 2021-01-24 PROCEDURE — 85610 PROTHROMBIN TIME: CPT

## 2021-01-24 PROCEDURE — 82803 BLOOD GASES ANY COMBINATION: CPT

## 2021-01-24 PROCEDURE — 74011000258 HC RX REV CODE- 258: Performed by: INTERNAL MEDICINE

## 2021-01-24 PROCEDURE — 74011250636 HC RX REV CODE- 250/636: Performed by: INTERNAL MEDICINE

## 2021-01-24 PROCEDURE — 80048 BASIC METABOLIC PNL TOTAL CA: CPT

## 2021-01-24 PROCEDURE — 77030014008 HC SPNG HEMSTAT J&J -C

## 2021-01-24 PROCEDURE — 74011250636 HC RX REV CODE- 250/636: Performed by: EMERGENCY MEDICINE

## 2021-01-24 PROCEDURE — 82962 GLUCOSE BLOOD TEST: CPT

## 2021-01-24 PROCEDURE — 74011000250 HC RX REV CODE- 250: Performed by: INTERNAL MEDICINE

## 2021-01-24 PROCEDURE — 85384 FIBRINOGEN ACTIVITY: CPT

## 2021-01-24 PROCEDURE — 85730 THROMBOPLASTIN TIME PARTIAL: CPT

## 2021-01-24 PROCEDURE — C9113 INJ PANTOPRAZOLE SODIUM, VIA: HCPCS | Performed by: INTERNAL MEDICINE

## 2021-01-24 PROCEDURE — 36556 INSERT NON-TUNNEL CV CATH: CPT | Performed by: NURSE PRACTITIONER

## 2021-01-24 PROCEDURE — 85027 COMPLETE CBC AUTOMATED: CPT

## 2021-01-24 PROCEDURE — 74011250637 HC RX REV CODE- 250/637: Performed by: ANESTHESIOLOGY

## 2021-01-24 PROCEDURE — 02HV33Z INSERTION OF INFUSION DEVICE INTO SUPERIOR VENA CAVA, PERCUTANEOUS APPROACH: ICD-10-PCS | Performed by: NURSE PRACTITIONER

## 2021-01-24 PROCEDURE — 2709999900 HC NON-CHARGEABLE SUPPLY

## 2021-01-24 PROCEDURE — 80202 ASSAY OF VANCOMYCIN: CPT

## 2021-01-24 PROCEDURE — 94003 VENT MGMT INPAT SUBQ DAY: CPT

## 2021-01-24 PROCEDURE — 74011000258 HC RX REV CODE- 258: Performed by: EMERGENCY MEDICINE

## 2021-01-24 PROCEDURE — 36556 INSERT NON-TUNNEL CV CATH: CPT

## 2021-01-24 PROCEDURE — 71045 X-RAY EXAM CHEST 1 VIEW: CPT

## 2021-01-24 PROCEDURE — 99291 CRITICAL CARE FIRST HOUR: CPT | Performed by: INTERNAL MEDICINE

## 2021-01-24 PROCEDURE — 83735 ASSAY OF MAGNESIUM: CPT

## 2021-01-24 PROCEDURE — 85025 COMPLETE CBC W/AUTO DIFF WBC: CPT

## 2021-01-24 PROCEDURE — 65620000000 HC RM CCU GENERAL

## 2021-01-24 PROCEDURE — 74011250637 HC RX REV CODE- 250/637: Performed by: INTERNAL MEDICINE

## 2021-01-24 RX ORDER — FENTANYL CITRATE 50 UG/ML
INJECTION, SOLUTION INTRAMUSCULAR; INTRAVENOUS
Status: DISCONTINUED
Start: 2021-01-24 | End: 2021-01-24 | Stop reason: CLARIF

## 2021-01-24 RX ORDER — POLYETHYLENE GLYCOL 3350 17 G/17G
17 POWDER, FOR SOLUTION ORAL DAILY PRN
Status: DISCONTINUED | OUTPATIENT
Start: 2021-01-24 | End: 2021-02-26 | Stop reason: HOSPADM

## 2021-01-24 RX ORDER — ONDANSETRON 2 MG/ML
4 INJECTION INTRAMUSCULAR; INTRAVENOUS
Status: DISCONTINUED | OUTPATIENT
Start: 2021-01-24 | End: 2021-02-26 | Stop reason: HOSPADM

## 2021-01-24 RX ORDER — SUCCINYLCHOLINE CHLORIDE 20 MG/ML INJECTION SOLUTION
SOLUTION
Status: DISCONTINUED
Start: 2021-01-24 | End: 2021-01-24 | Stop reason: CLARIF

## 2021-01-24 RX ORDER — GUAIFENESIN/DEXTROMETHORPHAN 100-10MG/5
10 SYRUP ORAL
Status: DISCONTINUED | OUTPATIENT
Start: 2021-01-24 | End: 2021-02-26 | Stop reason: HOSPADM

## 2021-01-24 RX ORDER — ETOMIDATE 2 MG/ML
INJECTION INTRAVENOUS
Status: DISCONTINUED
Start: 2021-01-24 | End: 2021-01-24 | Stop reason: CLARIF

## 2021-01-24 RX ORDER — PROMETHAZINE HYDROCHLORIDE 25 MG/1
12.5 TABLET ORAL
Status: DISCONTINUED | OUTPATIENT
Start: 2021-01-24 | End: 2021-01-29

## 2021-01-24 RX ORDER — ACETAMINOPHEN 325 MG/1
650 TABLET ORAL
Status: DISCONTINUED | OUTPATIENT
Start: 2021-01-24 | End: 2021-02-26 | Stop reason: HOSPADM

## 2021-01-24 RX ORDER — POLYETHYLENE GLYCOL 3350 17 G/17G
17 POWDER, FOR SOLUTION ORAL DAILY
Status: DISCONTINUED | OUTPATIENT
Start: 2021-01-25 | End: 2021-02-12

## 2021-01-24 RX ADMIN — INSULIN LISPRO 3 UNITS: 100 INJECTION, SOLUTION INTRAVENOUS; SUBCUTANEOUS at 00:57

## 2021-01-24 RX ADMIN — ATORVASTATIN CALCIUM 80 MG: 40 TABLET, FILM COATED ORAL at 08:23

## 2021-01-24 RX ADMIN — CEFEPIME 2 G: 2 INJECTION, POWDER, FOR SOLUTION INTRAVENOUS at 23:38

## 2021-01-24 RX ADMIN — CEFEPIME 2 G: 2 INJECTION, POWDER, FOR SOLUTION INTRAVENOUS at 11:14

## 2021-01-24 RX ADMIN — Medication 20 ML: at 22:00

## 2021-01-24 RX ADMIN — HYDROCORTISONE SODIUM SUCCINATE 50 MG: 100 INJECTION, POWDER, FOR SOLUTION INTRAMUSCULAR; INTRAVENOUS at 11:10

## 2021-01-24 RX ADMIN — AMIODARONE HYDROCHLORIDE 150 MG: 50 INJECTION, SOLUTION INTRAVENOUS at 23:26

## 2021-01-24 RX ADMIN — HYDROCORTISONE SODIUM SUCCINATE 50 MG: 100 INJECTION, POWDER, FOR SOLUTION INTRAMUSCULAR; INTRAVENOUS at 06:23

## 2021-01-24 RX ADMIN — Medication 20 ML: at 13:36

## 2021-01-24 RX ADMIN — INSULIN GLARGINE 32 UNITS: 100 INJECTION, SOLUTION SUBCUTANEOUS at 08:24

## 2021-01-24 RX ADMIN — ATRACURIUM BESYLATE 8 MCG/KG/MIN: 10 INJECTION, SOLUTION INTRAVENOUS at 13:38

## 2021-01-24 RX ADMIN — MINERAL OIL, PETROLATUM: 425; 568 OINTMENT OPHTHALMIC at 20:21

## 2021-01-24 RX ADMIN — INSULIN LISPRO 3 UNITS: 100 INJECTION, SOLUTION INTRAVENOUS; SUBCUTANEOUS at 20:18

## 2021-01-24 RX ADMIN — NOREPINEPHRINE-DEXTROSE IV SOLUTION 4 MG/250ML-5% 6 MCG/MIN: 4-5/250 SOLUTION at 14:13

## 2021-01-24 RX ADMIN — HYDROCORTISONE SODIUM SUCCINATE 50 MG: 100 INJECTION, POWDER, FOR SOLUTION INTRAMUSCULAR; INTRAVENOUS at 23:38

## 2021-01-24 RX ADMIN — NOREPINEPHRINE-DEXTROSE IV SOLUTION 4 MG/250ML-5% 3 MCG/MIN: 4-5/250 SOLUTION at 01:09

## 2021-01-24 RX ADMIN — CEFEPIME 2 G: 2 INJECTION, POWDER, FOR SOLUTION INTRAVENOUS at 00:56

## 2021-01-24 RX ADMIN — INSULIN LISPRO 3 UNITS: 100 INJECTION, SOLUTION INTRAVENOUS; SUBCUTANEOUS at 05:32

## 2021-01-24 RX ADMIN — POLYETHYLENE GLYCOL 3350 17 G: 17 POWDER, FOR SOLUTION ORAL at 08:23

## 2021-01-24 RX ADMIN — Medication 150 MCG/HR: at 08:11

## 2021-01-24 RX ADMIN — Medication 20 ML: at 06:23

## 2021-01-24 RX ADMIN — INSULIN LISPRO 3 UNITS: 100 INJECTION, SOLUTION INTRAVENOUS; SUBCUTANEOUS at 08:24

## 2021-01-24 RX ADMIN — HYDRALAZINE HYDROCHLORIDE 20 MG: 20 INJECTION, SOLUTION INTRAMUSCULAR; INTRAVENOUS at 21:37

## 2021-01-24 RX ADMIN — INSULIN LISPRO 6 UNITS: 100 INJECTION, SOLUTION INTRAVENOUS; SUBCUTANEOUS at 23:38

## 2021-01-24 RX ADMIN — MINERAL OIL, PETROLATUM: 425; 568 OINTMENT OPHTHALMIC at 08:26

## 2021-01-24 RX ADMIN — INSULIN GLARGINE 32 UNITS: 100 INJECTION, SOLUTION SUBCUTANEOUS at 20:17

## 2021-01-24 RX ADMIN — HYDROCORTISONE SODIUM SUCCINATE 50 MG: 100 INJECTION, POWDER, FOR SOLUTION INTRAMUSCULAR; INTRAVENOUS at 17:42

## 2021-01-24 RX ADMIN — AMIODARONE HYDROCHLORIDE 1 MG/MIN: 50 INJECTION, SOLUTION INTRAVENOUS at 23:37

## 2021-01-24 RX ADMIN — HYDROCORTISONE SODIUM SUCCINATE 50 MG: 100 INJECTION, POWDER, FOR SOLUTION INTRAMUSCULAR; INTRAVENOUS at 00:56

## 2021-01-24 RX ADMIN — PANTOPRAZOLE SODIUM 40 MG: 40 INJECTION, POWDER, FOR SOLUTION INTRAVENOUS at 08:54

## 2021-01-24 NOTE — PROGRESS NOTES
reviewed notes for spiritual concerns   patient looks peaceful   staff providing very compassionate care   said prayer for patient outside the door

## 2021-01-24 NOTE — PROGRESS NOTES
Bedside, Verbal and Written shift change report given to Karly Engle RN (oncoming nurse) by Katie Borrero RN (offgoing nurse). Report included the following information SBAR, Kardex, Intake/Output, MAR, Recent Results, Cardiac Rhythm SB and Alarm Parameters .

## 2021-01-24 NOTE — PROGRESS NOTES
Ventilator check complete; patient has a #8. 0 ET tube secured at the 25 at the lip. Patient is sedated. Patient is not able to follow commands. Breath sounds are coarse and diminished. Trachea is midline, Negative for subcutaneous air, and chest excursion is symmetric. Patient is also Negative for cyanosis and is Negative for pitting edema. All alarms are set and audible. Resuscitation bag is at the head of the bed.       Ventilator Settings  Mode FIO2 Rate Pressure PEEP I:E Ratio   Pressure control  60 %(weaned)   32 bpm   24 cm H20 14 cm H20  1:2.1      Peak airway pressure: 37 cm H2O   Minute ventilation: 16 l/min       David Shoulders, RT

## 2021-01-24 NOTE — PROGRESS NOTES
TRANSFER - OUT REPORT:    Verbal report given to Davi Delgado RN (name) on [facility].  being transferred to CCU 3302 (unit) for routine progression of care       Report consisted of patients Situation, Background, Assessment and   Recommendations(SBAR). Information from the following report(s) SBAR, Kardex, Procedure Summary, Intake/Output, MAR, Recent Results, Cardiac Rhythm SB/NSR and Alarm Parameters  was reviewed with the receiving nurse. Lines:   PICC Double Lumen 68/35/65 Right;Basilic (Active)   Central Line Being Utilized Yes 01/24/21 0815   Criteria for Appropriate Use Hemodynamically unstable, requiring monitoring lines, vasopressors, or volume resuscitation 01/24/21 0815   Site Assessment Clean, dry, & intact 01/24/21 0815   Phlebitis Assessment 0 01/24/21 0815   Infiltration Assessment 0 01/24/21 0815   Arm Circumference (cm) 33 cm 01/13/21 1605   Date of Last Dressing Change 01/20/21 01/24/21 0815   Dressing Status Clean, dry, & intact 01/24/21 0815   External Catheter Length (cm) 0 centimeters 01/16/21 1600   Dressing Type Transparent;Disk with Chlorhexadine gluconate (CHG) 01/24/21 0815   Hub Color/Line Status Red;Patent; Flushed 01/24/21 0815   Positive Blood Return (Site #1) Yes 01/24/21 0815   Hub Color/Line Status Purple;Patent; Flushed 01/24/21 0815   Positive Blood Return (Site #2) Yes 01/24/21 0815   Alcohol Cap Used No 01/24/21 0815       Peripheral IV 01/18/21 Left Arm (Active)   Site Assessment Clean, dry, & intact 01/24/21 0815   Phlebitis Assessment 0 01/24/21 0815   Infiltration Assessment 0 01/24/21 0815   Dressing Status Clean, dry, & intact 01/24/21 0815   Dressing Type Transparent;Tape 01/24/21 0815   Hub Color/Line Status Pink;Patent; Flushed 01/24/21 0815   Alcohol Cap Used No 01/24/21 0815       Peripheral IV 01/19/21 Right Hand (Active)   Site Assessment Clean, dry, & intact 01/24/21 0815   Phlebitis Assessment 0 01/24/21 0815   Infiltration Assessment 0 01/24/21 0815 Dressing Status Clean, dry, & intact 01/24/21 0815   Dressing Type Transparent;Tape 01/24/21 0815   Hub Color/Line Status Pink;Patent; Flushed 01/24/21 0815   Alcohol Cap Used No 01/24/21 0815       Arterial Line 01/18/21 Left Radial artery (Active)   Site Assessment Clean, dry, & intact 01/24/21 0815   Dressing Status Clean, dry, & intact 01/24/21 0815   Dressing Type Transparent;Disk with Chlorhexadine gluconate (CHG) 01/24/21 0815   Line Status Intact and in place 01/24/21 0815   Treatment Zeroed or re-zeroed 01/24/21 0815   Affected Extremity/Extremities Color distal to insertion site pink (or appropriate for race); Pulses palpable;Range of motion performed 01/24/21 0815        Opportunity for questions and clarification was provided.       Patient transported with:   Monitor  O2 @ vent liters  Patient-specific medications from Pharmacy  Registered Nurse

## 2021-01-24 NOTE — PROGRESS NOTES
Ventilator check complete; patient has a #8. 0 ET tube secured at the 25 at the lip. Patient is sedated. Patient is not able to follow commands. Breath sounds are coarse and diminished. Trachea is midline, Negative for subcutaneous air, and chest excursion is symmetric. Patient is also Negative for cyanosis and is Positive for pitting edema. All alarms are set and audible. Resuscitation bag is at the head of the bed.       Ventilator Settings  Mode FIO2 Rate Tidal Volume Pressure PEEP I:E Ratio   Pressure control  70 %    454 ml     14 cm H20  1:1.5      Peak airway pressure: 32 cm H2O   Minute ventilation: 15.5 l/min       Viraj Corcoran, RT

## 2021-01-24 NOTE — PROGRESS NOTES
[facility] Critical Care COVID-19 Note:    Critical Care Note: 1/24/2021    Anjali Chavez Mark Choi. Admission Date: 1/6/2021     Length of Stay: 17 days    Background: 61 y.o. y/o male with acute hypoxemic respiratory failure secondary to COVID-19. Onset of COVID-19 symptoms:  + COVID 01/05/21    Notable PMH: obesity, HTN, CAD, dyslipidemia, SWATI, DM, GERD    24 Hour events:   Pt was on heparin and amiodarone, but was stopped 1-22-21due to INR 1.5, PTT <200 and pt with hematuria and factor X: mildly elevated 0.82. Cr continues to rise: 4.74 today. Nephrology following. .   Attempted prone 1-22-21 and pt did not tolerate. ROS: intubated, sedated      Flotrac values  CO 65  CI 3.5  SVV 5     LINES:    ET tube 1/17/2021  PICC 1/13  NGT 1/17  Art line 1/18/2021     DRIPS:    Tracrium 8mcg/kg/min  Fentanyl 150mcg/hr  Versed 5mg/hr     COVID-19 medications  Decadron 1/7-16  remdesivir 1/8-1/12  Convalescent plasma 1/7     Anti-infectives  azithro (completed)  Ceftriaxone (completed)  Vanc and cefepime: D 6/7      Visit Vitals  BP (!) 132/48 (BP 1 Location: Left arm, BP Patient Position: At rest)   Pulse 61   Temp 98.3 °F (36.8 °C)   Resp 30   Ht 5' 8\" (1.727 m)   Wt 219 lb 9.3 oz (99.6 kg)   SpO2 96%   BMI 33.39 kg/m²     Pertinent Exam:            Constitutional:  intubated and mechanically ventilated.   EENMT:  Sclera clear, pupils equal, oral mucosa moist  Respiratory: coarse  Cardiovascular:  RRR  Gastrointestinal:  soft with no tenderness; positive bowel sounds present  Musculoskeletal:  warm with no cyanosis, plus 1 lower extremity edema  Skin:  no jaundice or ecchymosis  Neurologic:unable to assess, sedated   Psychiatric: sedated and paralyzed    CXR    CXR: 1-22-21    Pertinent Labs:   Recent Labs     01/24/21  0500 01/23/21  1041 01/23/21  0231   WBC 9.6 6.6 8.3   HGB 9.1* 9.0* 9.5*   HCT 28.3* 27.5* 29.9*   * 84* 89*   INR 1.3 1.3 1.5     Recent Labs     01/24/21  0500 01/23/21  1041 01/23/21  0231 01/22/21  1910 01/22/21  0317 01/22/21  0317 01/21/21  2204 01/21/21  1508    135* 132* 135*   < > 138 136 137   K 4.6 4.4 4.3 4.8   < > 5.5* 5.3* 4.7   * 106 107 106   < > 109* 108* 109*   CO2 20* 21 20* 21   < > 23 21 23   * 233* 292* 310*   < > 192* 220* 144*   * 79* 70* 63*   < > 47* 43* 39*   CREA 4.74* 3.53* 3.22* 2.97*   < > 2.57* 2.53* 2.32*   MG  --   --   --  2.6*  --  2.5* 2.3  --    CA 8.6 8.4 8.5 8.8   < > 8.7 9.0 8.9   PHOS  --   --   --   --   --  5.4*  --   --    ALB  --   --   --   --   --   --   --  2.1*   TBILI  --   --   --   --   --   --   --  1.3*   ALT  --   --   --   --   --   --   --  35    < > = values in this interval not displayed. Recent Labs     01/24/21  0821 01/24/21  0529 01/24/21  0047   GLUCPOC 151* 166* 182*           Ventilator Settings:  5' 8\" (1.727 m)  Mode FIO2 Rate Tidal Volume Pressure PEEP   Pressure control  80 %    454 ml     14 cm H20    Peak airway pressure: 37 cm H2O   Minute ventilation: 14.8 l/min    ABG:   Recent Labs     01/24/21  0255 01/23/21  1515 01/23/21  0410   PHI 7.31* 7.14* 7.28*   PCO2I 38.5 58.4* 40.5   PO2I 131* 115* 215*   HCO3I 19.4* 20.1* 18.9*     Hospital Problems  Date Reviewed: 6/3/2020           Codes Class Noted POA     Hypernatremia ICD-10-CM: E87.0  ICD-9-CM: 276.0   1/8/2021 Unknown     Resolved     SWATI (acute kidney injury) ([facility] ICD-10-CM: N17.9  ICD-9-CM: 686. 9   1/7/2021 Unknown     Creatinine up at 1.95.     Acute hypoxemic respiratory failure [facility]) ICD-10-CM: J96.01  ICD-9-CM: 518.81   1/7/2021 Unknown     Severe ARDS     Type 2 diabetes mellitus with hyperosmolarity without nonketotic hyperglycemic-hyperosmolar coma ([facility] ICD-10-CM: E11.00  ICD-9-CM: 250.20   1/7/2021 Unknown           * (Principal) Pneumonia due to COVID-19 virus ICD-10-CM: U07.1, J12.82  ICD-9-CM: 480. 8   1/7/2021 Unknown     Severe.  Remains on decadron.      CAD (coronary artery disease) ICD-10-CM: I25.10  ICD-9-CM: 414.00   10/28/2016 Yes         Plan:  (Medical Decision Making)      Impression: 61 y.o. y/o male with acute hypoxemic respiratory failure secondary to COVID-19. NEURO:   1. Sedation: Versed gtt  2. Analgesia: Fentanyl gtt  3. Paralytics: Tracrium gtt- will first try to stop paralytics,    CV:   1. Hypotension:  levo to keep MAP >65  2. A fib: pt was on heparin gtt and amiodarone gtt, dc'd 1-22-21 due to hematuria/PTT. Will need AC when tolerated. 3.  Holding ASA    PULM:   1. Acute hypoxemic/hypercapneic respiratory failure:    PF ratio: 187. Pt on 70%. PEEP 14-     2. Severe ARDS 2/2 COVID: Low Tidal Volume ventilation, goal 6cc per kg (Ideal body weight: 68.4 kg (150 lb 12.7 oz) x 6 = 420). Attempt to limit driving pressure to 39UE/H4I or less. Allow permissive hypercapnia/acidosis to pH 7.25 if needed to achieve above. RENAL:  1. SWATI: worsening renal function and Nephrology consulted and following. 2. UOP: 250cc/24hr. 3. UA: large leukocytes, Nitrites +, Hematuria. ( on cefepime/vanc)     GI:  1. Nutrition: TF, vital AF @20cc/hr. use PPI rather than H2b due to low platelets  2. PPI: protonix      HEME:  1. DVT Peroneal vein:  Had epistaxis and anticoag stopped  2. A fib: heparin stopped due to hematuria/PTT. Will need to restart asap. 3. HH: 9.1/28.3  4. PLT: 105 today. INR: 1.3    ID:   1. COVID-19: COVID meds as above, multi-D rounds with pharmacy to optimize meds with noted contraindications.     2. TEMP:  Tm: 98.4/24hr.   VANC/CEFE: D6    ENDO:   1. DM: sliding scale insulin,  lantus to 32 Q12 and monitor.     Skin: no decub, turns, preventive care       Full Code    Full Code     Sister:  Jesus Velazquez  [phone]     The patient is critically ill with respiratory failure, circulatory failure and requires high complexity decision making for assessment and support including frequent ventilator adjustment , frequent evaluation and titration of therapies , application of advanced monitoring technologies and extensive interpretation of multiple databases  Time devoted to patient care services -35 min        Velia Galvan MD

## 2021-01-24 NOTE — PROCEDURES
TRIALYSIS DIALYSIS CATHETER:    Date: 1-24-21  Time: 1200  Indication: Hemodynamic monitoring/Intravenous access      A time-out was completed verifying correct patient, procedure, site, positioning, and special equipment if applicable. The patient was placed in a dependent position appropriate for central line placement based on the vein to be cannulated. The patients right/left neck was prepped and draped in sterile fashion. 1% Lidocaine was used to anesthetize the surrounding skin area. A triple lumen Trialysis catheter  13-Japanese Cordis catheter was introduced into the the internal jugular vein using the Seldinger technique and under ultrasound guidance. The catheter was threaded smoothly over the guide wire and appropriate blood return was obtained. Each lumen of the catheter was evacuated of air and flushed with sterile saline. The catheter was then sutured in place to the skin and a sterile dressing applied. Estimated Blood Loss: 5cc  The patient tolerated the procedure well and there were no complications. A PCXR was ordered and viewed: good placement noted.

## 2021-01-24 NOTE — PROGRESS NOTES
[facility] PROGRESS NOTE    Follow up for:    Subjective:   Patient seen and examined. Chart, notes, labs, imaging, results all reviewed.      Intubated and sedated   On cardizem   On levophed , flako ,   On heparin - stopped for hematuria       ROS:  UTO     Objective:   Exam:  Vitals:    01/24/21 0945 01/24/21 0946 01/24/21 0947 01/24/21 1000   BP:       Pulse: 61 62 62 61   Resp: (!) 33 (!) 32 (!) 32 (!) 31   Temp:       SpO2: 96% 96%  96%   Weight:       Height:             Intake/Output Summary (Last 24 hours) at 1/24/2021 1019  Last data filed at 1/24/2021 3119  Gross per 24 hour   Intake 2360.97 ml   Output 275 ml   Net 2085.97 ml       Current Facility-Administered Medications   Medication Dose Route Frequency    insulin glargine (LANTUS) injection 32 Units  32 Units SubCUTAneous Q12H    pantoprazole (PROTONIX) 40 mg in 0.9% sodium chloride 10 mL injection  40 mg IntraVENous DAILY    sodium zirconium cyclosilicate (LOKELMA) powder packet 10 g  10 g Oral DAILY    NOREPINephrine (LEVOPHED) 4 mg in 5% dextrose 250 mL infusion  0.5-30 mcg/min IntraVENous TITRATE    VANCOMYCIN INTERMITTENT DOSING PER PHARMACY   Other Rx Dosing/Monitoring    hydrocortisone Sod Succ (PF) (SOLU-CORTEF) injection 50 mg  50 mg IntraVENous Q6H    white petrolatum-mineral oiL (LACRILUBE S.O.P.) ointment   Both Eyes Q12H    cefepime (MAXIPIME) 2 g in 0.9% sodium chloride (MBP/ADV) 100 mL MBP  2 g IntraVENous Q12H    white petrolatum-mineral oiL (LACRILUBE S.O.P.) ointment   Both Eyes Q4H PRN    polyethylene glycol (MIRALAX) packet 17 g  17 g Oral DAILY    atracurium (TRACRIUM) 1,000 mg in 0.9% sodium chloride 250 mL infusion  0-15 mcg/kg/min IntraVENous TITRATE    fentaNYL in normal saline (pf) 25 mcg/mL infusion  0-200 mcg/hr IntraVENous TITRATE    midazolam in normal saline (VERSED) 1 mg/mL infusion  0-10 mg/hr IntraVENous TITRATE    atorvastatin (LIPITOR) tablet 80 mg  80 mg Per NG tube DAILY    [Held by provider] clopidogreL (PLAVIX) tablet 75 mg  75 mg Per NG tube DAILY    [Held by provider] metoprolol tartrate (LOPRESSOR) tablet 50 mg  50 mg Per NG tube BID    insulin lispro (HUMALOG) injection   SubCUTAneous Q4H    LORazepam (ATIVAN) injection 1 mg  1 mg IntraVENous Q4H PRN    morphine injection 2 mg  2 mg IntraVENous Q4H PRN    NUTRITIONAL SUPPORT ELECTROLYTE PRN ORDERS   Does Not Apply PRN    sodium chloride (NS) flush 20 mL  20 mL InterCATHeter Q8H    sodium chloride (NS) flush 20 mL  20 mL InterCATHeter PRN    loperamide (IMODIUM) capsule 2 mg  2 mg Oral Q4H PRN    polyethylene glycol (MIRALAX) packet 17 g  17 g Oral DAILY PRN    promethazine (PHENERGAN) tablet 12.5 mg  12.5 mg Oral Q6H PRN    Or    ondansetron (ZOFRAN) injection 4 mg  4 mg IntraVENous Q6H PRN    guaiFENesin-dextromethorphan (ROBITUSSIN DM) 100-10 mg/5 mL syrup 10 mL  10 mL Oral Q4H PRN    dextrose 40% (GLUTOSE) oral gel 1 Tube  15 g Oral PRN    glucagon (GLUCAGEN) injection 1 mg  1 mg IntraMUSCular PRN    dextrose (D50W) injection syrg 12.5-25 g  25-50 mL IntraVENous PRN    0.9% sodium chloride infusion 250 mL  250 mL IntraVENous PRN    albuterol (PROVENTIL HFA, VENTOLIN HFA, PROAIR HFA) inhaler 2 Puff  2 Puff Inhalation Q4H PRN    influenza vaccine 2020-21 (6 mos+)(PF) (FLUARIX/FLULAVAL/FLUZONE QUAD) injection 0.5 mL  0.5 mL IntraMUSCular PRIOR TO DISCHARGE    hydrALAZINE (APRESOLINE) 20 mg/mL injection 20 mg  20 mg IntraVENous Q6H PRN    acetaminophen (TYLENOL) tablet 650 mg  650 mg Oral Q6H PRN       EXAM  GEN - Alert, oriented, in no distress  CV - S1, S2, RRR, no rub, murmur, or gallop  Lung - clear to auscultation bilaterally  Abd - soft, nontender, BS present  Ext - no edema    Recent Labs     01/24/21  0500 01/23/21  1041 01/23/21  0231   WBC 9.6 6.6 8.3   HGB 9.1* 9.0* 9.5*   HCT 28.3* 27.5* 29.9*   * 84* 89*        Recent Labs     01/24/21  0500 01/23/21  1041 01/23/21  0231 01/22/21  1910 01/22/21  7666 01/22/21  0317 01/21/21  2204    135* 132* 135*   < > 138 136   K 4.6 4.4 4.3 4.8   < > 5.5* 5.3*   * 106 107 106   < > 109* 108*   CO2 20* 21 20* 21   < > 23 21   * 79* 70* 63*   < > 47* 43*   CREA 4.74* 3.53* 3.22* 2.97*   < > 2.57* 2.53*   CA 8.6 8.4 8.5 8.8   < > 8.7 9.0   * 233* 292* 310*   < > 192* 220*   MG  --   --   --  2.6*  --  2.5* 2.3   PHOS  --   --   --   --   --  5.4*  --     < > = values in this interval not displayed. Assessment and Plan:     1. SWATI - COVID ATN - Had multiple episodes of Hypotension      Baseline cr 1.5 in 2018 ,admitted with cr of 2.6 improved to normal renal function until 01/17 ,started declining and today its 3.2    ml with positive fluid balance   Maintain MAP > 65   UA very active urine  sediments with wbc and rbc ,UTI     Will need to initiate pt on dialysis as renal function declining with declining UO   Will request temp HD line placement      2. COVID /severe ARDS  Intubated   On vancomycin - trough wnl      3. Hypotension   On pressors   Maintain MAP > 65      4. Hyperkalemia   On lokelma      5. Mild hyponatremia and acidosis seen     Discussed with Sister Queen Bam  - explained about the pros and cons of doing dialysis.  Agreed to the dialysis   Paddy Vang MD

## 2021-01-24 NOTE — PROGRESS NOTES
Critical Care Daily Progress Note: 1/24/2021  Admission Date: 1/6/2021     The patient's chart is reviewed and the patient is discussed with the staff. Admission Date: 1/6/2021     Length of Stay: 18 days     Background: 61 y.o. y/o male with acute hypoxemic respiratory failure secondary to COVID-19. Date of COVID-19 symptom onset: + COVID 01/05/21     Notable PMH: obesity, HTN, CAD, dyslipidemia, SWATI, DM, GERD    24 Hour events:  Pt was on heparin and amiodarone, but was stopped 1-22-21due to INR 1.5, PTT <200 and pt with hematuria and factor X: mildly elevated 0.82. Cr continues to rise: 4.74 today. Nephrology following. .   Attempted prone 1-22-21 and pt did not tolerate.      Current Facility-Administered Medications   Medication Dose Route Frequency    insulin glargine (LANTUS) injection 32 Units  32 Units SubCUTAneous Q12H    pantoprazole (PROTONIX) 40 mg in 0.9% sodium chloride 10 mL injection  40 mg IntraVENous DAILY    sodium zirconium cyclosilicate (LOKELMA) powder packet 10 g  10 g Oral DAILY    NOREPINephrine (LEVOPHED) 4 mg in 5% dextrose 250 mL infusion  0.5-30 mcg/min IntraVENous TITRATE    VANCOMYCIN INTERMITTENT DOSING PER PHARMACY   Other Rx Dosing/Monitoring    hydrocortisone Sod Succ (PF) (SOLU-CORTEF) injection 50 mg  50 mg IntraVENous Q6H    white petrolatum-mineral oiL (LACRILUBE S.O.P.) ointment   Both Eyes Q12H    cefepime (MAXIPIME) 2 g in 0.9% sodium chloride (MBP/ADV) 100 mL MBP  2 g IntraVENous Q12H    white petrolatum-mineral oiL (LACRILUBE S.O.P.) ointment   Both Eyes Q4H PRN    polyethylene glycol (MIRALAX) packet 17 g  17 g Oral DAILY    atracurium (TRACRIUM) 1,000 mg in 0.9% sodium chloride 250 mL infusion  0-15 mcg/kg/min IntraVENous TITRATE    fentaNYL in normal saline (pf) 25 mcg/mL infusion  0-200 mcg/hr IntraVENous TITRATE    midazolam in normal saline (VERSED) 1 mg/mL infusion  0-10 mg/hr IntraVENous TITRATE    atorvastatin (LIPITOR) tablet 80 mg  80 mg Per NG tube DAILY    [Held by provider] clopidogreL (PLAVIX) tablet 75 mg  75 mg Per NG tube DAILY    [Held by provider] metoprolol tartrate (LOPRESSOR) tablet 50 mg  50 mg Per NG tube BID    insulin lispro (HUMALOG) injection   SubCUTAneous Q4H    LORazepam (ATIVAN) injection 1 mg  1 mg IntraVENous Q4H PRN    morphine injection 2 mg  2 mg IntraVENous Q4H PRN    NUTRITIONAL SUPPORT ELECTROLYTE PRN ORDERS   Does Not Apply PRN    sodium chloride (NS) flush 20 mL  20 mL InterCATHeter Q8H    sodium chloride (NS) flush 20 mL  20 mL InterCATHeter PRN    loperamide (IMODIUM) capsule 2 mg  2 mg Oral Q4H PRN    polyethylene glycol (MIRALAX) packet 17 g  17 g Oral DAILY PRN    promethazine (PHENERGAN) tablet 12.5 mg  12.5 mg Oral Q6H PRN    Or    ondansetron (ZOFRAN) injection 4 mg  4 mg IntraVENous Q6H PRN    guaiFENesin-dextromethorphan (ROBITUSSIN DM) 100-10 mg/5 mL syrup 10 mL  10 mL Oral Q4H PRN    dextrose 40% (GLUTOSE) oral gel 1 Tube  15 g Oral PRN    glucagon (GLUCAGEN) injection 1 mg  1 mg IntraMUSCular PRN    dextrose (D50W) injection syrg 12.5-25 g  25-50 mL IntraVENous PRN    0.9% sodium chloride infusion 250 mL  250 mL IntraVENous PRN    albuterol (PROVENTIL HFA, VENTOLIN HFA, PROAIR HFA) inhaler 2 Puff  2 Puff Inhalation Q4H PRN    influenza vaccine 2020-21 (6 mos+)(PF) (FLUARIX/FLULAVAL/FLUZONE QUAD) injection 0.5 mL  0.5 mL IntraMUSCular PRIOR TO DISCHARGE    hydrALAZINE (APRESOLINE) 20 mg/mL injection 20 mg  20 mg IntraVENous Q6H PRN    acetaminophen (TYLENOL) tablet 650 mg  650 mg Oral Q6H PRN       Objective:     Vitals:    01/24/21 0800 01/24/21 0815 01/24/21 0826 01/24/21 0830   BP:  (!) 132/48     Pulse: 64 61 63 62   Resp: (!) 32 (!) 35 25 (!) 31   Temp:  98.3 °F (36.8 °C)     SpO2: 96% 95% 95% 95%   Weight:       Height:           Intake/Output Summary (Last 24 hours) at 1/24/2021 0907  Last data filed at 1/24/2021 0826  Gross per 24 hour   Intake 2360.97 ml   Output 275 ml   Net 2085.97 ml     Physical Exam:            Constitutional:  Intubated/sedated/paralysed   EENMT:  Sclera clear, pupils equal, oral mucosa moist  Respiratory: coarse. +secretions. Cardiovascular:  RRR with no M,G,R;  Gastrointestinal:  soft with no tenderness; positive bowel sounds present  Musculoskeletal:  warm with no cyanosis, +1 lower extremity edema  Skin:  no jaundice or ecchymosis  Neurologic: pupils equal  Psychiatric:  Sedation and paralytic.      Flotrac values  CO 65  CI 3.5  SVV 5    LINES:    ET tube 1/17/2021  PICC 1/13  NGT 1/17  Art line 1/18/2021    DRIPS:    Tracrium 8mcg/kg/min  Fentanyl 150mcg/hr  Versed 5mg/hr    COVID-19 medications  Decadron 1/7-16  remdesivir 1/8-1/12  Convalescent plasma 1/7    Anti-infectives  azithro (completed)  Ceftriaxone (completed)  Vanc and cefepime: D 6/7    CXR: 1-22-21      Ventilator Settings  Mode FIO2 Rate Tidal Volume Pressure PEEP   Pressure control  60%    481 ml     14 cm H20      Peak airway pressure: 37 cm H2O   Minute ventilation: 16 l/min     ABG:   Recent Labs     01/24/21  0255 01/23/21  1515 01/23/21  0410   PHI 7.31* 7.14* 7.28*   PCO2I 38.5 58.4* 40.5   PO2I 131* 115* 215*   HCO3I 19.4* 20.1* 18.9*     LAB    MICRO  Date Source Result   1/18  blood  NG 3 days   1/18  blood NG 3 days   1/18  urine  no growth 2 days     Recent Labs     01/24/21  0821 01/24/21  0529 01/24/21  0047 01/23/21  2035 01/23/21  1621   GLUCPOC 151* 166* 182* 187* 205*     Recent Labs     01/24/21  0500 01/23/21  1041 01/23/21  0231   WBC 9.6 6.6 8.3   HGB 9.1* 9.0* 9.5*   HCT 28.3* 27.5* 29.9*   * 84* 89*   INR 1.3 1.3 1.5     Recent Labs     01/24/21  0500 01/23/21  1041 01/23/21  0231 01/22/21  1910 01/22/21  0317 01/22/21  0317 01/21/21  2204 01/21/21  1508    135* 132* 135*   < > 138 136 137   K 4.6 4.4 4.3 4.8   < > 5.5* 5.3* 4.7   * 106 107 106   < > 109* 108* 109*   CO2 20* 21 20* 21   < > 23 21 23   * 233* 292* 310*   < > 192* 220* 144*   * 79* 70* 63*   < > 47* 43* 39*   CREA 4.74* 3.53* 3.22* 2.97*   < > 2.57* 2.53* 2.32*   MG  --   --   --  2.6*  --  2.5* 2.3  --    PHOS  --   --   --   --   --  5.4*  --   --    CA 8.6 8.4 8.5 8.8   < > 8.7 9.0 8.9   ALB  --   --   --   --   --   --   --  2.1*    < > = values in this interval not displayed. No results for input(s): LCAD, LAC in the last 72 hours. Assessment:  (Medical Decision Making)     Hospital Problems  Date Reviewed: 6/3/2020          Codes Class Noted POA    Hypernatremia ICD-10-CM: E87.0  ICD-9-CM: 276.0  1/8/2021 Unknown    Resolved    SWATI (acute kidney injury) ([facility] ICD-10-CM: N17.9  ICD-9-CM: 584.9  1/7/2021 Unknown    Creatinine up at 1.95. Acute hypoxemic respiratory failure [facility]) ICD-10-CM: J96.01  ICD-9-CM: 518.81  1/7/2021 Unknown    Severe ARDS    Type 2 diabetes mellitus with hyperosmolarity without nonketotic hyperglycemic-hyperosmolar coma ([facility] ICD-10-CM: E11.00  ICD-9-CM: 250.20  1/7/2021 Unknown        * (Principal) Pneumonia due to COVID-19 virus ICD-10-CM: U07.1, J12.82  ICD-9-CM: 480.8  1/7/2021 Unknown    Severe. Remains on decadron. CAD (coronary artery disease) ICD-10-CM: I25.10  ICD-9-CM: 414.00  10/28/2016 Yes    Overview Signed 10/28/2016  8:05 AM by SHELDON Jules     10-27-06 [facility] 90% LAD s/p 2.25 x 20 Synergy drug-eluting stent  (CS)             Obesity ICD-10-CM: E66.9  ICD-9-CM: 278.00  10/6/2015 Yes            Plan:  (Medical Decision Making)     Impression: 61 y.o. y/o male with acute hypoxemic respiratory failure secondary to COVID-19. NEURO:   1. Sedation: Versed gtt  2. Analgesia: Fentanyl gtt  3. Paralytics: Tracrium gtt- will first try to stop paralytics,    CV:   1. Hypotension:  levo to keep MAP >65  2. A fib: pt was on heparin gtt and amiodarone gtt, dc'd 1-22-21 due to hematuria/PTT. Will need AC when tolerated. 3.  Holding ASA    PULM:   1. Acute hypoxemic/hypercapneic respiratory failure:    PF ratio: 187.  Pt on 70%. PEEP 14-    2. Severe ARDS 2/2 COVID: Low Tidal Volume ventilation, goal 6cc per kg (Ideal body weight: 68.4 kg (150 lb 12.7 oz) x 6 = 420). Attempt to limit driving pressure to 57MB/V4U or less. Allow permissive hypercapnia/acidosis to pH 7.25 if needed to achieve above. RENAL:  1. SWATI: worsening renal function and Nephrology consulted and following. 2. UOP: 250cc/24hr. 3. UA: large leukocytes, Nitrites +, Hematuria. ( on cefepime/vanc)     GI:  1. Nutrition: TF, vital AF @20cc/hr. use PPI rather than H2b due to low platelets  2. PPI: protonix     HEME:  1. DVT Peroneal vein:  Had epistaxis and anticoag stopped  2. A fib: heparin stopped due to hematuria/PTT. Will need to restart asap. 3. HH: 9.1/28.3  4. PLT: 105 today. INR: 1.3    ID:   1. COVID-19: COVID meds as above, multi-D rounds with pharmacy to optimize meds with noted contraindications. 2. TEMP:  Tm: 98.4/24hr. VANC/CEFE: D6    ENDO:   1. DM: sliding scale insulin,  lantus to 32 Q12 and monitor.     Skin: no decub, turns, preventive care      Full Code    Sister:  Sangita Cabezas     Kendall , NP

## 2021-01-25 LAB
ANION GAP SERPL CALC-SCNC: 13 MMOL/L (ref 7–16)
APTT PPP: 37.9 SEC (ref 24.1–35.1)
ARTERIAL PATENCY WRIST A: YES
BASE DEFICIT BLD-SCNC: 11 MMOL/L
BASOPHILS # BLD: 0 K/UL (ref 0–0.2)
BASOPHILS NFR BLD: 0 % (ref 0–2)
BDY SITE: ABNORMAL
BUN SERPL-MCNC: 131 MG/DL (ref 8–23)
CALCIUM SERPL-MCNC: 8.4 MG/DL (ref 8.3–10.4)
CHLORIDE SERPL-SCNC: 105 MMOL/L (ref 98–107)
CO2 BLD-SCNC: 19 MMOL/L
CO2 SERPL-SCNC: 18 MMOL/L (ref 21–32)
COLLECT TIME,HTIME: 240
CREAT SERPL-MCNC: 6.02 MG/DL (ref 0.8–1.5)
DIFFERENTIAL METHOD BLD: ABNORMAL
EOSINOPHIL # BLD: 0 K/UL (ref 0–0.8)
EOSINOPHIL NFR BLD: 0 % (ref 0.5–7.8)
ERYTHROCYTE [DISTWIDTH] IN BLOOD BY AUTOMATED COUNT: 15 % (ref 11.9–14.6)
ERYTHROCYTE [DISTWIDTH] IN BLOOD BY AUTOMATED COUNT: 15 % (ref 11.9–14.6)
EXHALED MINUTE VOLUME, VE: 12.4 L/MIN
FIBRINOGEN PPP-MCNC: 519 MG/DL (ref 190–501)
GAS FLOW.O2 O2 DELIVERY SYS: ABNORMAL L/MIN
GAS FLOW.O2 SETTING OXYMISER: 32 BPM
GASTROCULT GAST QL: POSITIVE
GLUCOSE BLD STRIP.AUTO-MCNC: 110 MG/DL (ref 65–100)
GLUCOSE BLD STRIP.AUTO-MCNC: 114 MG/DL (ref 65–100)
GLUCOSE BLD STRIP.AUTO-MCNC: 119 MG/DL (ref 65–100)
GLUCOSE BLD STRIP.AUTO-MCNC: 155 MG/DL (ref 65–100)
GLUCOSE BLD STRIP.AUTO-MCNC: 200 MG/DL (ref 65–100)
GLUCOSE BLD STRIP.AUTO-MCNC: 356 MG/DL (ref 65–100)
GLUCOSE SERPL-MCNC: 304 MG/DL (ref 65–100)
HCO3 BLD-SCNC: 17.2 MMOL/L (ref 22–26)
HCT VFR BLD AUTO: 27.6 % (ref 41.1–50.3)
HCT VFR BLD AUTO: 31.5 % (ref 41.1–50.3)
HGB BLD-MCNC: 8.9 G/DL (ref 13.6–17.2)
HGB BLD-MCNC: 8.9 G/DL (ref 13.6–17.2)
HGB BLD-MCNC: 9.9 G/DL (ref 13.6–17.2)
IMM GRANULOCYTES # BLD AUTO: 0.1 K/UL (ref 0–0.5)
IMM GRANULOCYTES NFR BLD AUTO: 1 % (ref 0–5)
INR PPP: 1.3
LYMPHOCYTES # BLD: 0.4 K/UL (ref 0.5–4.6)
LYMPHOCYTES NFR BLD: 3 % (ref 13–44)
MAGNESIUM SERPL-MCNC: 3 MG/DL (ref 1.8–2.4)
MCH RBC QN AUTO: 29.3 PG (ref 26.1–32.9)
MCH RBC QN AUTO: 29.5 PG (ref 26.1–32.9)
MCHC RBC AUTO-ENTMCNC: 31.4 G/DL (ref 31.4–35)
MCHC RBC AUTO-ENTMCNC: 32.2 G/DL (ref 31.4–35)
MCV RBC AUTO: 91.4 FL (ref 79.6–97.8)
MCV RBC AUTO: 93.2 FL (ref 79.6–97.8)
MONOCYTES # BLD: 0.7 K/UL (ref 0.1–1.3)
MONOCYTES NFR BLD: 6 % (ref 4–12)
NEUTS SEG # BLD: 10.5 K/UL (ref 1.7–8.2)
NEUTS SEG NFR BLD: 89 % (ref 43–78)
NRBC # BLD: 0 K/UL (ref 0–0.2)
NRBC # BLD: 0.02 K/UL (ref 0–0.2)
O2/TOTAL GAS SETTING VFR VENT: 65 %
PCO2 BLD: 47.7 MMHG (ref 35–45)
PEEP RESPIRATORY: 14 CMH2O
PH BLD: 7.16 [PH] (ref 7.35–7.45)
PH GAST: 4 [PH] (ref 1.5–3.5)
PHOSPHATE SERPL-MCNC: 8.6 MG/DL (ref 2.3–3.7)
PLATELET # BLD AUTO: 114 K/UL (ref 150–450)
PLATELET # BLD AUTO: 143 K/UL (ref 150–450)
PMV BLD AUTO: 10.9 FL (ref 9.4–12.3)
PMV BLD AUTO: 11.2 FL (ref 9.4–12.3)
PO2 BLD: 106 MMHG (ref 75–100)
POTASSIUM SERPL-SCNC: 5 MMOL/L (ref 3.5–5.1)
PRESSURE CONTROL, IPC: 20
PROTHROMBIN TIME: 16.6 SEC (ref 12.5–14.7)
RBC # BLD AUTO: 3.02 M/UL (ref 4.23–5.6)
RBC # BLD AUTO: 3.38 M/UL (ref 4.23–5.6)
SAO2 % BLD: 96 % (ref 95–98)
SERVICE CMNT-IMP: ABNORMAL
SERVICE CMNT-IMP: ABNORMAL
SODIUM SERPL-SCNC: 136 MMOL/L (ref 136–145)
SPECIMEN TYPE: ABNORMAL
VENTILATION MODE VENT: ABNORMAL
WBC # BLD AUTO: 11.7 K/UL (ref 4.3–11.1)
WBC # BLD AUTO: 15.3 K/UL (ref 4.3–11.1)

## 2021-01-25 PROCEDURE — 74011250637 HC RX REV CODE- 250/637: Performed by: INTERNAL MEDICINE

## 2021-01-25 PROCEDURE — 82803 BLOOD GASES ANY COMBINATION: CPT

## 2021-01-25 PROCEDURE — 74011000250 HC RX REV CODE- 250: Performed by: INTERNAL MEDICINE

## 2021-01-25 PROCEDURE — 85730 THROMBOPLASTIN TIME PARTIAL: CPT

## 2021-01-25 PROCEDURE — 82271 OCCULT BLOOD OTHER SOURCES: CPT

## 2021-01-25 PROCEDURE — 74011250636 HC RX REV CODE- 250/636: Performed by: INTERNAL MEDICINE

## 2021-01-25 PROCEDURE — 74011000258 HC RX REV CODE- 258: Performed by: ANESTHESIOLOGY

## 2021-01-25 PROCEDURE — 90945 DIALYSIS ONE EVALUATION: CPT

## 2021-01-25 PROCEDURE — 74011000258 HC RX REV CODE- 258: Performed by: INTERNAL MEDICINE

## 2021-01-25 PROCEDURE — 82962 GLUCOSE BLOOD TEST: CPT

## 2021-01-25 PROCEDURE — 77030029488 HC ELECTRD PAD DEFIB ZOLL -B

## 2021-01-25 PROCEDURE — 80048 BASIC METABOLIC PNL TOTAL CA: CPT

## 2021-01-25 PROCEDURE — 92960 CARDIOVERSION ELECTRIC EXT: CPT | Performed by: INTERNAL MEDICINE

## 2021-01-25 PROCEDURE — 84100 ASSAY OF PHOSPHORUS: CPT

## 2021-01-25 PROCEDURE — 83735 ASSAY OF MAGNESIUM: CPT

## 2021-01-25 PROCEDURE — 74011250636 HC RX REV CODE- 250/636: Performed by: ANESTHESIOLOGY

## 2021-01-25 PROCEDURE — 5A1D90Z PERFORMANCE OF URINARY FILTRATION, CONTINUOUS, GREATER THAN 18 HOURS PER DAY: ICD-10-PCS | Performed by: INTERNAL MEDICINE

## 2021-01-25 PROCEDURE — 5A2204Z RESTORATION OF CARDIAC RHYTHM, SINGLE: ICD-10-PCS | Performed by: INTERNAL MEDICINE

## 2021-01-25 PROCEDURE — 77030014008 HC SPNG HEMSTAT J&J -C

## 2021-01-25 PROCEDURE — 85018 HEMOGLOBIN: CPT

## 2021-01-25 PROCEDURE — 74011636637 HC RX REV CODE- 636/637: Performed by: INTERNAL MEDICINE

## 2021-01-25 PROCEDURE — 99223 1ST HOSP IP/OBS HIGH 75: CPT | Performed by: INTERNAL MEDICINE

## 2021-01-25 PROCEDURE — 74011000250 HC RX REV CODE- 250

## 2021-01-25 PROCEDURE — 99291 CRITICAL CARE FIRST HOUR: CPT | Performed by: INTERNAL MEDICINE

## 2021-01-25 PROCEDURE — C9113 INJ PANTOPRAZOLE SODIUM, VIA: HCPCS | Performed by: INTERNAL MEDICINE

## 2021-01-25 PROCEDURE — 74011250636 HC RX REV CODE- 250/636

## 2021-01-25 PROCEDURE — 85025 COMPLETE CBC W/AUTO DIFF WBC: CPT

## 2021-01-25 PROCEDURE — 2709999900 HC NON-CHARGEABLE SUPPLY

## 2021-01-25 PROCEDURE — 85027 COMPLETE CBC AUTOMATED: CPT

## 2021-01-25 PROCEDURE — 74011250636 HC RX REV CODE- 250/636: Performed by: EMERGENCY MEDICINE

## 2021-01-25 PROCEDURE — 65620000000 HC RM CCU GENERAL

## 2021-01-25 PROCEDURE — 74011000258 HC RX REV CODE- 258: Performed by: EMERGENCY MEDICINE

## 2021-01-25 PROCEDURE — 85610 PROTHROMBIN TIME: CPT

## 2021-01-25 PROCEDURE — 85384 FIBRINOGEN ACTIVITY: CPT

## 2021-01-25 RX ORDER — FUROSEMIDE 10 MG/ML
80 INJECTION INTRAMUSCULAR; INTRAVENOUS ONCE
Status: COMPLETED | OUTPATIENT
Start: 2021-01-25 | End: 2021-01-25

## 2021-01-25 RX ORDER — HEPARIN SODIUM 1000 [USP'U]/ML
5000 INJECTION, SOLUTION INTRAVENOUS; SUBCUTANEOUS ONCE
Status: COMPLETED | OUTPATIENT
Start: 2021-01-25 | End: 2021-01-25

## 2021-01-25 RX ORDER — SODIUM BICARBONATE 84 MG/ML
INJECTION, SOLUTION INTRAVENOUS
Status: COMPLETED
Start: 2021-01-25 | End: 2021-01-25

## 2021-01-25 RX ORDER — GUAIFENESIN 100 MG/5ML
81 LIQUID (ML) ORAL DAILY
Status: DISCONTINUED | OUTPATIENT
Start: 2021-01-26 | End: 2021-01-25

## 2021-01-25 RX ORDER — SODIUM BICARBONATE 84 MG/ML
100 INJECTION, SOLUTION INTRAVENOUS ONCE
Status: COMPLETED | OUTPATIENT
Start: 2021-01-25 | End: 2021-01-25

## 2021-01-25 RX ORDER — FACIAL-BODY WIPES
10 EACH TOPICAL DAILY
Status: ACTIVE | OUTPATIENT
Start: 2021-01-25 | End: 2021-01-26

## 2021-01-25 RX ORDER — AMOXICILLIN 250 MG
1 CAPSULE ORAL DAILY
Status: DISCONTINUED | OUTPATIENT
Start: 2021-01-25 | End: 2021-02-12

## 2021-01-25 RX ORDER — SODIUM BICARBONATE 1 MEQ/ML
100 SYRINGE (ML) INTRAVENOUS ONCE
Status: DISCONTINUED | OUTPATIENT
Start: 2021-01-25 | End: 2021-01-25 | Stop reason: SDUPTHER

## 2021-01-25 RX ORDER — FUROSEMIDE 10 MG/ML
INJECTION INTRAMUSCULAR; INTRAVENOUS
Status: COMPLETED
Start: 2021-01-25 | End: 2021-01-25

## 2021-01-25 RX ORDER — HEPARIN SODIUM 5000 [USP'U]/ML
5000 INJECTION, SOLUTION INTRAVENOUS; SUBCUTANEOUS
Status: DISCONTINUED | OUTPATIENT
Start: 2021-01-25 | End: 2021-01-25 | Stop reason: SDUPTHER

## 2021-01-25 RX ORDER — HEPARIN SODIUM 1000 [USP'U]/ML
5000 INJECTION, SOLUTION INTRAVENOUS; SUBCUTANEOUS
Status: DISCONTINUED | OUTPATIENT
Start: 2021-01-25 | End: 2021-02-04

## 2021-01-25 RX ADMIN — FUROSEMIDE 80 MG: 10 INJECTION, SOLUTION INTRAMUSCULAR; INTRAVENOUS at 00:18

## 2021-01-25 RX ADMIN — Medication 150 MCG/HR: at 23:10

## 2021-01-25 RX ADMIN — HEPARIN SODIUM 5000 UNITS: 1000 INJECTION, SOLUTION INTRAVENOUS; SUBCUTANEOUS at 06:56

## 2021-01-25 RX ADMIN — INSULIN LISPRO 15 UNITS: 100 INJECTION, SOLUTION INTRAVENOUS; SUBCUTANEOUS at 04:06

## 2021-01-25 RX ADMIN — AMIODARONE HYDROCHLORIDE 1 MG/MIN: 50 INJECTION, SOLUTION INTRAVENOUS at 18:33

## 2021-01-25 RX ADMIN — Medication 20 ML: at 05:35

## 2021-01-25 RX ADMIN — AMIODARONE HYDROCHLORIDE 150 MG: 150 INJECTION, SOLUTION INTRAVENOUS at 13:00

## 2021-01-25 RX ADMIN — ATORVASTATIN CALCIUM 80 MG: 40 TABLET, FILM COATED ORAL at 08:55

## 2021-01-25 RX ADMIN — DOCUSATE SODIUM 50 MG AND SENNOSIDES 8.6 MG 1 TABLET: 8.6; 5 TABLET, FILM COATED ORAL at 18:16

## 2021-01-25 RX ADMIN — INSULIN LISPRO 6 UNITS: 100 INJECTION, SOLUTION INTRAVENOUS; SUBCUTANEOUS at 09:07

## 2021-01-25 RX ADMIN — MINERAL OIL, PETROLATUM: 425; 568 OINTMENT OPHTHALMIC at 08:55

## 2021-01-25 RX ADMIN — Medication 20 ML: at 15:22

## 2021-01-25 RX ADMIN — DEXTROSE MONOHYDRATE 12 MCG/MIN: 50 INJECTION, SOLUTION INTRAVENOUS at 08:13

## 2021-01-25 RX ADMIN — SODIUM BICARBONATE 100 MEQ: 84 INJECTION, SOLUTION INTRAVENOUS at 03:53

## 2021-01-25 RX ADMIN — HYDROCORTISONE SODIUM SUCCINATE 50 MG: 100 INJECTION, POWDER, FOR SOLUTION INTRAMUSCULAR; INTRAVENOUS at 18:16

## 2021-01-25 RX ADMIN — INSULIN GLARGINE 32 UNITS: 100 INJECTION, SOLUTION SUBCUTANEOUS at 08:35

## 2021-01-25 RX ADMIN — HYDROCORTISONE SODIUM SUCCINATE 50 MG: 100 INJECTION, POWDER, FOR SOLUTION INTRAMUSCULAR; INTRAVENOUS at 13:05

## 2021-01-25 RX ADMIN — Medication 125 MCG/HR: at 05:48

## 2021-01-25 RX ADMIN — HYDROCORTISONE SODIUM SUCCINATE 50 MG: 100 INJECTION, POWDER, FOR SOLUTION INTRAMUSCULAR; INTRAVENOUS at 05:35

## 2021-01-25 RX ADMIN — Medication 20 ML: at 22:18

## 2021-01-25 RX ADMIN — MIDAZOLAM HYDROCHLORIDE 5 MG/HR: 5 INJECTION, SOLUTION INTRAMUSCULAR; INTRAVENOUS at 14:47

## 2021-01-25 RX ADMIN — ATRACURIUM BESYLATE 8 MCG/KG/MIN: 10 INJECTION, SOLUTION INTRAVENOUS at 14:47

## 2021-01-25 RX ADMIN — FUROSEMIDE 80 MG: 10 INJECTION INTRAMUSCULAR; INTRAVENOUS at 00:18

## 2021-01-25 RX ADMIN — CEFEPIME 2 G: 2 INJECTION, POWDER, FOR SOLUTION INTRAVENOUS at 22:40

## 2021-01-25 RX ADMIN — CEFEPIME 2 G: 2 INJECTION, POWDER, FOR SOLUTION INTRAVENOUS at 13:05

## 2021-01-25 RX ADMIN — POLYETHYLENE GLYCOL 3350 17 G: 17 POWDER, FOR SOLUTION ORAL at 08:55

## 2021-01-25 RX ADMIN — PANTOPRAZOLE SODIUM 40 MG: 40 INJECTION, POWDER, FOR SOLUTION INTRAVENOUS at 09:34

## 2021-01-25 RX ADMIN — MINERAL OIL, PETROLATUM: 425; 568 OINTMENT OPHTHALMIC at 20:00

## 2021-01-25 NOTE — PROGRESS NOTES
Ventilator check complete; patient has a #8. 0 ET tube secured at the 24 at the lip. Patient is sedated. Patient is not able to follow commands. Breath sounds are coarse. Trachea is midline, Negative for subcutaneous air, and chest excursion is symmetric. Patient is also Negative for cyanosis. All alarms are set and audible. Resuscitation bag is at the head of the bed.       Ventilator Settings  Mode FIO2 Rate Tidal Volume Pressure PEEP I:E Ratio   Pressure control  65 %  32     20  14 cm H20  1:1.49      Peak airway pressure: 32.9 cm H2O   Minute ventilation: 12 l/min     Terri Villalobos, RT

## 2021-01-25 NOTE — PROCEDURES
Brief Cardiac Procedure Note    Patient: Jovita Courtney MRN: [id number]  SSN: [id number]    YOB: 1960  Age: 61 y.o. Sex: male      Date of Procedure: 1/25/2021     Pre-procedure Diagnosis: Atrial Fibrillation/Atrial Flutter    Post-procedure Diagnosis: atrial fibrillation      Procedure: Cardioversion    Brief Description of Procedure: As above    Performed By: Cheri Perez MD     Assistants: None    Anesthesia: Moderate Sedation    Estimated Blood Loss: Less than 10 mL      Specimens: None    Implants: None    Findings: 200 J cardioversion to sinus    Complications: None    Recommendations: Continue medical therapy.     Signed By: Cheri Perez MD     January 25, 2021

## 2021-01-25 NOTE — PROGRESS NOTES
Bedside shift change report given to Alberto Henriquez (oncoming nurse) by Lashay Bojorquez RN (offgoing nurse). Report included the following information SBAR, Kardex, ED Summary, Procedure Summary, Intake/Output, MAR, Recent Results and Cardiac Rhythm SR with frequent PVCs.

## 2021-01-25 NOTE — PROGRESS NOTES
Chart reviewed s/p tx from covid floor to CCU covid positive isolation. Intubated/vent (7 days). 65% Fio2. SLED per Nephrology. Paralyzed, fentanyl, versed, amio and levo gtts. CM following for any assist. LOS 18 days.

## 2021-01-25 NOTE — DIABETES MGMT
Patient's blood glucose ranged 147-241 yesterday with patient receiving Lantus 64 units, Humalog 18 units, and SoluCortef 100 mg. Blood glucose 177 this morning. Hgb 9. 9. Plt 114. Cr 6.02. GFR 10. Phos 8. 6. Mg 3. Patient remains on vent, Levo, CRRT, TF currently on hold due to residual. Will continue to follow loosely.

## 2021-01-25 NOTE — CONSULTS
[facility] Cardiology Consult                Date of  Admission: 1/6/2021 11:02 PM         HPI:  Florencio Irizarry is a 61 y.o. male admitted for Type 2 diabetes mellitus with hyperosmolarity without nonketotic hyperglycemic-hyperosmolar coma ([facility] [address] [E11.00]; Lower respiratory tract infection due to COVID-19 virus [U07.1, J22]; Acute hypoxemic respiratory failure (HCC) [J96.01];SWATI (acute kidney injury) ([facility] [address] [N17.9]. Patient is a 59-year-old male with history of coronary artery disease with previous PCI in 2016, hypertension, diabetes and hyperlipidemia. He has been admitted with Covid pneumonia and respiratory failure. He is currently intubated. He has had intermittent atrial fibrillation and was placed on amiodarone yesterday. This morning with attempts at dialysis he became unstable with atrial fibrillation with a tachycardic rate and hypotension. He has required escalation of his Levophed therapy. He is currently on 14 mics per minute. He is unable to give history due to his intubated status.       Patient Active Problem List   Diagnosis Code    Obesity E66.9    Hypertension I10    Bradycardia R00.1    PVC (premature ventricular contraction) I49.3    CAD (coronary artery disease) I25.10    Dyslipidemia, goal LDL below 70 E78.5    SWATI (acute kidney injury) ([facility] [address] N17.9    Acute hypoxemic respiratory failure (HCC) J96.01    Type 2 diabetes mellitus with hyperosmolarity without nonketotic hyperglycemic-hyperosmolar coma ([facility] [address] E11.00    Pneumonia due to COVID-19 virus U07.1, J12.82    Hypernatremia E87.0    Atrial fibrillation (HCC) I48.91       Past Medical History:   Diagnosis Date    Atrial fibrillation ([facility] [address] 01/22/2021    Gastrointestinal disorder     reflux    GERD (gastroesophageal reflux disease)     Hypertension     Lower respiratory tract infection due to COVID-19 virus 1/7/2021    Nausea & vomiting     Obesity 10/6/2015    Other ill-defined conditions(799.89)     dyspnea since surgery, wheezing, SOB    Type 2 diabetes mellitus with hyperosmolarity without nonketotic hyperglycemic-hyperosmolar coma ([facility] [address] 1/7/2021    Unstable angina ([facility] [address] 10/27/2016      Past Surgical History:   Procedure Laterality Date    HX CHOLECYSTECTOMY      HX ORTHOPAEDIC      rotator cuff; knee    HX UROLOGICAL      kidney stone surgeries x 3    GA CARDIAC SURG PROCEDURE UNLIST      stent     Allergies   Allergen Reactions    Latex Swelling     Had a purcell catheter with swelling of urethra. Only known latex issue in past. Wife remembers this now    Hydrocodone-Acetaminophen Itching     Wife remembers this allergy    Tessalon Perles [Benzonatate] Unknown (comments)      History reviewed. No pertinent family history.      Current Facility-Administered Medications   Medication Dose Route Frequency    NOREPINephrine (LEVOPHED) 16,000 mcg in dextrose 5% 250 mL infusion  0.5-30 mcg/min IntraVENous TITRATE    heparin (porcine) 1,000 unit/mL injection 5,000 Units  5,000 Units Hemodialysis DIALYSIS PRN    promethazine (PHENERGAN) tablet 12.5 mg  12.5 mg Per NG tube Q6H PRN    Or    ondansetron (ZOFRAN) injection 4 mg  4 mg IntraVENous Q6H PRN    polyethylene glycol (MIRALAX) packet 17 g  17 g Per NG tube DAILY PRN    guaiFENesin-dextromethorphan (ROBITUSSIN DM) 100-10 mg/5 mL syrup 10 mL  10 mL Per NG tube Q4H PRN    acetaminophen (TYLENOL) tablet 650 mg  650 mg Per NG tube Q6H PRN    polyethylene glycol (MIRALAX) packet 17 g  17 g Per NG tube DAILY    amiodarone (CORDARONE) 450 mg in dextrose 5% 250 mL infusion  0.5-1 mg/min IntraVENous TITRATE    insulin glargine (LANTUS) injection 32 Units  32 Units SubCUTAneous Q12H    pantoprazole (PROTONIX) 40 mg in 0.9% sodium chloride 10 mL injection  40 mg IntraVENous DAILY    sodium zirconium cyclosilicate (LOKELMA) powder packet 10 g  10 g Oral DAILY    hydrocortisone Sod Succ (PF) (SOLU-CORTEF) injection 50 mg  50 mg IntraVENous Q6H    white petrolatum-mineral oiL (LACRILUBE S.O.P.) ointment   Both Eyes Q12H    cefepime (MAXIPIME) 2 g in 0.9% sodium chloride (MBP/ADV) 100 mL MBP  2 g IntraVENous Q12H    white petrolatum-mineral oiL (LACRILUBE S.O.P.) ointment   Both Eyes Q4H PRN    atracurium (TRACRIUM) 1,000 mg in 0.9% sodium chloride 250 mL infusion  0-15 mcg/kg/min IntraVENous TITRATE    fentaNYL in normal saline (pf) 25 mcg/mL infusion  0-200 mcg/hr IntraVENous TITRATE    midazolam in normal saline (VERSED) 1 mg/mL infusion  0-10 mg/hr IntraVENous TITRATE    atorvastatin (LIPITOR) tablet 80 mg  80 mg Per NG tube DAILY    [Held by provider] clopidogreL (PLAVIX) tablet 75 mg  75 mg Per NG tube DAILY    [Held by provider] metoprolol tartrate (LOPRESSOR) tablet 50 mg  50 mg Per NG tube BID    insulin lispro (HUMALOG) injection   SubCUTAneous Q4H    LORazepam (ATIVAN) injection 1 mg  1 mg IntraVENous Q4H PRN    morphine injection 2 mg  2 mg IntraVENous Q4H PRN    NUTRITIONAL SUPPORT ELECTROLYTE PRN ORDERS   Does Not Apply PRN    sodium chloride (NS) flush 20 mL  20 mL InterCATHeter Q8H    sodium chloride (NS) flush 20 mL  20 mL InterCATHeter PRN    loperamide (IMODIUM) capsule 2 mg  2 mg Oral Q4H PRN    dextrose 40% (GLUTOSE) oral gel 1 Tube  15 g Oral PRN    glucagon (GLUCAGEN) injection 1 mg  1 mg IntraMUSCular PRN    dextrose (D50W) injection syrg 12.5-25 g  25-50 mL IntraVENous PRN    0.9% sodium chloride infusion 250 mL  250 mL IntraVENous PRN    albuterol (PROVENTIL HFA, VENTOLIN HFA, PROAIR HFA) inhaler 2 Puff  2 Puff Inhalation Q4H PRN    influenza vaccine 2020-21 (6 mos+)(PF) (FLUARIX/FLULAVAL/FLUZONE QUAD) injection 0.5 mL  0.5 mL IntraMUSCular PRIOR TO DISCHARGE    hydrALAZINE (APRESOLINE) 20 mg/mL injection 20 mg  20 mg IntraVENous Q6H PRN       Review of Systems   Unable to perform ROS: Intubated        Physical Exam  Vitals:    01/25/21 1215 01/25/21 1230 01/25/21 1245 01/25/21 1300   BP:       Pulse: 87 81 82 80 Resp: (!) 32 (!) 32 (!) 33 (!) 32   Temp: (!) 94.4 °F (34.7 °C) (!) 94.6 °F (34.8 °C) (!) 94.8 °F (34.9 °C) (!) 95 °F (35 °C)   SpO2: 92% 92% 91% 92%   Weight:       Height:           Physical Exam:  Physical Exam  HENT:      Head: Atraumatic. Eyes:      Conjunctiva/sclera: Conjunctivae normal.      Pupils: Pupils are equal, round, and reactive to light. Neck:      Thyroid: No thyromegaly. Vascular: No JVD. Cardiovascular:      Rate and Rhythm: Tachycardia present. Rhythm irregular. Heart sounds: No murmur. Pulmonary:      Effort: Pulmonary effort is normal.      Breath sounds: Normal breath sounds. Abdominal:      General: Bowel sounds are normal. There is no distension. Palpations: Abdomen is soft. Tenderness: There is no abdominal tenderness. Musculoskeletal:         General: Swelling present. Skin:     General: Skin is warm. Findings: No rash. Neurological:      Mental Status: He is alert and oriented to person, place, and time. Psychiatric:         Mood and Affect: Mood normal.             Cardiac testing      Labs:   Recent Labs     01/25/21  0311 01/24/21  2245 01/24/21  0500    136 138   K 5.0 4.8 4.6   MG 3.0* 3.0*  --    * 129* 102*   CREA 6.02* 5.63* 4.74*   * 225* 147*   WBC 11.7* 9.0 9.6   HGB 9.9* 10.1* 9.1*   HCT 31.5* 32.2* 28.3*   * 103* 105*   INR 1.3  --  1.3      No results found for: TROIQ, CHARLEY, TROPT, TNIPOC     Assessment/Plan:     Assessment:      Principal Problem:    Pneumonia due to COVID-19 virus (1/7/2021)  Critically ill. Continue steroids and vent support per pulmonary    Active Problems:    Obesity (25/7/5412)  Complicates management. CAD (coronary artery disease) (10/28/2016)    Needs to be on ASA 81 mg a day. SWATI [facility]acute [facility]) ([facility] (1/7/2021)  Dialysis as hemodynamics allow. Acute hypoxemic respiratory failure ([facility] [address] (1/7/2021)  Intubated.   Critically ill      Type 2 diabetes mellitus with hyperosmolarity without nonketotic hyperglycemic-hyperosmolar coma ([facility] [address] (1/7/2021)  Defer management to primary team.        Hypernatremia (1/8/2021)  Defer management to primary team.        Atrial fibrillation ([facility] [address] (1/22/2021)  Patient hemodynamically unstable with atrial fibrillation with a rapid ventricular response. Requiring escalation of his inotropic support. Will attempt cardioversion given that he has been on intravenous amiodarone. High risk for recurrent atrial fibrillation. Appears prognosis is poor. Thank you very much for this referral. We appreciate the opportunity to participate in this patient's care. We will follow along with above stated plan.     Linda Mckeon MD

## 2021-01-25 NOTE — PROGRESS NOTES
Nephrologist notified of patient's bun and creatinine, bounding pulses, and low urine output. New order for lasix 80mg IV x 1 dose.

## 2021-01-25 NOTE — MANAGEMENT PLAN
[facility] Cardiology Care Management Note                Date of  Admission: 1/6/2021 11:02 PM     Primary Care Physician: Dr. Lieutenant Butt  Primary Cardiologist:  Dr. Tala Cunningham  Referring Physician:  Dr. Del Angel  Physician:  Dr. Watt Lesches     CC/Reason for consult:  AF with RVR, hypotension       Isaac Perry is a 61 y.o. male with PMH of CAD with PCI to LAD in 2016, GERD, HTN, and DM, who presented to the ED with dizziness, decreased appetite, cough and AMS. He was tested for COVID the day prior to admission and was found to be +. Patient also noted fever and decreased taste and smell. Labs in the ED showed bgl 760, BUN 40 creatinine 2.6, lipase,LA 4.1. CXR showed bilateral ground glass densities. Patient was admitted for treatment. Unfortunately he continued to decline with development of ARBS requiring intubation. He remains intubated, sedated and paralyzed as he was difficult to synch with the vent. Patient also had a progressive worsening of his renal function with minimal UOP and nephrology was consulted. Trialysis catheter was placed yesterday. Overnight the patient was noted to have runs of NSVT for which IV amiodarone bolus was given and drip started. This morning dialysis was started with plan to run an 8 hour SLED. However, patient MAP dropped into 50s at which time levophed was started. His HR was then elevated at 130-150s and UF decreased to 200/hr from 250 /hr. EKG confirmed a fib with RVR. Nephrology was contacted regarding changes after SLED was started and order to turn UF off x 1 hour was received. BP improved with UF off and levophed has been weaned to 12 mcg/min, but his HR remains elevated. Labs today show /creatinine 6.02, WBC 11.7, H&H 9.9/31.5, and plt 114. ABG on vent PC with FIO2 65%, PEEP 14, rate 32 -- pH 7.16, CO2 47, PO2 106 and HCO3 17.2.         Information for HPI obtained via chart review  Past Medical History:   Diagnosis Date    [facility]22[facility]    Gastrointestinal disorder     reflux    GERD (gastroesophageal reflux disease)     Hypertension     Lower respiratory tract infection due to COVID-19 virus 1/7/2021    Nausea & vomiting     Obesity 10/6/2015    Other ill-defined conditions(799.89)     dyspnea since surgery, wheezing, SOB    Type 2 diabetes mellitus with hyperosmolarity without nonketotic hyperglycemic-hyperosmolar coma ([facility] [address] 1/7/2021    Unstable ang[facility] ([facility] 10/27[facility]2016      Past Surgical History:   Procedure Laterality Date    HX CHOLECYSTECTOMY      HX ORTHOPAEDIC      rotator cuff; knee    HX UROLOGICAL      kidney stone surgeries x 3    CT CARDIAC SURG PROCEDURE UNLIST      stent     Allergies   Allergen Reactions    Latex Swelling     Had a purcell catheter with swelling of urethra. Only known latex issue in past. Wife remembers this now    Hydrocodone-Acetaminophen Itching     Wife remembers this allergy    Tessalon Perles [Benzonatate] Unknown (comments)      History reviewed. No pertinent family history.      Current Facility-Administered Medications   Medication Dose Route Frequency    NOREPINephrine (LEVOPHED) 16,000 mcg in dextrose 5% 250 mL infusion  0.5-30 mcg/min IntraVENous TITRATE    heparin (porcine) 1,000 unit/mL injection 5,000 Units  5,000 Units Hemodialysis DIALYSIS PRN    promethazine (PHENERGAN) tablet 12.5 mg  12.5 mg Per NG tube Q6H PRN    Or    ondansetron (ZOFRAN) injection 4 mg  4 mg IntraVENous Q6H PRN    polyethylene glycol (MIRALAX) packet 17 g  17 g Per NG tube DAILY PRN    guaiFENesin-dextromethorphan (ROBITUSSIN DM) 100-10 mg/5 mL syrup 10 mL  10 mL Per NG tube Q4H PRN    acetaminophen (TYLENOL) tablet 650 mg  650 mg Per NG tube Q6H PRN    polyethylene glycol (MIRALAX) packet 17 g  17 g Per NG tube DAILY    amiodarone (CORDARONE) 450 mg in dextrose 5% 250 mL infusion  0.5-1 mg/min IntraVENous TITRATE    insulin glargine (LANTUS) injection 32 Units  32 Units SubCUTAneous Q12H    pantoprazole (PROTONIX) 40 mg in 0.9% sodium chloride 10 mL injection  40 mg IntraVENous DAILY    sodium zirconium cyclosilicate (LOKELMA) powder packet 10 g  10 g Oral DAILY    VANCOMYCIN INTERMITTENT DOSING PER PHARMACY   Other Rx Dosing/Monitoring    hydrocortisone Sod Succ (PF) (SOLU-CORTEF) injection 50 mg  50 mg IntraVENous Q6H    white petrolatum-mineral oiL (LACRILUBE S.O.P.) ointment   Both Eyes Q12H    cefepime (MAXIPIME) 2 g in 0.9% sodium chloride (MBP/ADV) 100 mL MBP  2 g IntraVENous Q12H    white petrolatum-mineral oiL (LACRILUBE S.O.P.) ointment   Both Eyes Q4H PRN    atracurium (TRACRIUM) 1,000 mg in 0.9% sodium chloride 250 mL infusion  0-15 mcg/kg/min IntraVENous TITRATE    fentaNYL in normal saline (pf) 25 mcg/mL infusion  0-200 mcg/hr IntraVENous TITRATE    midazolam in normal saline (VERSED) 1 mg/mL infusion  0-10 mg/hr IntraVENous TITRATE    atorvastatin (LIPITOR) tablet 80 mg  80 mg Per NG tube DAILY    [Held by provider] clopidogreL (PLAVIX) tablet 75 mg  75 mg Per NG tube DAILY    [Held by provider] metoprolol tartrate (LOPRESSOR) tablet 50 mg  50 mg Per NG tube BID    insulin lispro (HUMALOG) injection   SubCUTAneous Q4H    LORazepam (ATIVAN) injection 1 mg  1 mg IntraVENous Q4H PRN    morphine injection 2 mg  2 mg IntraVENous Q4H PRN    NUTRITIONAL SUPPORT ELECTROLYTE PRN ORDERS   Does Not Apply PRN    sodium chloride (NS) flush 20 mL  20 mL InterCATHeter Q8H    sodium chloride (NS) flush 20 mL  20 mL InterCATHeter PRN    loperamide (IMODIUM) capsule 2 mg  2 mg Oral Q4H PRN    dextrose 40% (GLUTOSE) oral gel 1 Tube  15 g Oral PRN    glucagon (GLUCAGEN) injection 1 mg  1 mg IntraMUSCular PRN    dextrose (D50W) injection syrg 12.5-25 g  25-50 mL IntraVENous PRN    0.9% sodium chloride infusion 250 mL  250 mL IntraVENous PRN    albuterol (PROVENTIL HFA, VENTOLIN HFA, PROAIR HFA) inhaler 2 Puff  2 Puff Inhalation Q4H PRN    influenza vaccine 2020-21 (6 mos+)(PF) (FLUARIX/FLULAVAL/FLUZONE QUAD) injection 0.5 mL  0.5 mL IntraMUSCular PRIOR TO DISCHARGE    hydrALAZINE (APRESOLINE) 20 mg/mL injection 20 mg  20 mg IntraVENous Q6H PRN       Review of Systems   Unable to perform ROS: Intubated     ROS information obtained from chart review   Physical Exam  Vitals:    01/25/21 0745 01/25/21 0800 01/25/21 0815 01/25/21 0820   BP:    (!) 97/53   Pulse: 78 (!) 130 (!) 134 (!) 136   Resp: (!) 33      Temp:       SpO2: 93% 91% 91%    Weight:       Height:           Physical Exam:  Not performed due to COVID/Droplet Plus precautions in order to limit exposures and preserve PPE    Cardiographics    Telemetry: AFIB  ECG: atrial fibrillation, rate 145    Labs:   Recent Labs     01/25/21  0311 01/24/21  2245 01/24/21  0500    136 138   K 5.0 4.8 4.6   MG 3.0* 3.0*  --    * 129* 102*   CREA 6.02* 5.63* 4.74*   * 225* 147*   WBC 11.7* 9.0 9.6   HGB 9.9* 10.1* 9.1*   HCT 31.5* 32.2* 28.3*   * 103* 105*   INR 1.3  --  1.3        Assessment/Plan:     Assessment:      Principal Problem:    Pneumonia due to COVID-19 virus --  On abx and steroids    Active Problems:    Obesity --       CAD (coronary artery disease)           SWATI (acute kidney injury) -- nephrology following, SLED running with UF off, for only clearance       Acute hypoxemic respiratory failure -- remains on vent      Type 2 diabetes mellitus -- on lantus and SSi       Hypernatremia       Atrial fibrillation with RVR --  In setting of COVID and SLED, continue amiodarone drip. Options are limited in the setting of hypotension and SWATI. Thank you very much for this referral. We appreciate the opportunity to participate in this patient's care. We will follow along with above stated plan.     Sathish Teresa NP  Consulting MD: Claudetta Nay

## 2021-01-25 NOTE — PROGRESS NOTES
08:15 - Dr. Dillan Villegas aware of rapid afib with addition of vasopressor during first hour on SLED. Orders to turn UF off for one hour. 09:45 - UF turned off indefinitely pending Cardiology consult.  In the meantime, amiodarone gtt increased to 1 mg/min per Intensivist.

## 2021-01-25 NOTE — PROGRESS NOTES
Pt cardioverted at 200j per MD Khan. To give bolus dose of amiodarone 150 mg and continue infusion at 1 mg/min.

## 2021-01-25 NOTE — PROGRESS NOTES
Ventilator check complete; patient has a #8. 0ET tube secured at the 25 at the lip. Patient is sedated. Patient is not able to follow commands. Breath sounds are coarse. Trachea is midline, Negative for subcutaneous air, and chest excursion is symmetric. Patient is also Negative for cyanosis and is Negative for pitting edema. All alarms are set and audible. Resuscitation bag is at the head of the bed.       Ventilator Settings  Mode FIO2 Rate Tidal Volume Pressure PEEP I:E Ratio   Pressure control  65 %   32     20 cmh20  14 cm H20  1:1.49      Peak airway pressure: 32.8 cm H2O   Minute ventilation: 14.5 l/min           Jean Pierre Ferguson, RT

## 2021-01-25 NOTE — PROGRESS NOTES
Bedside, Verbal and Written shift change report given to Shelly Walker RN and AQUILINO Lei (oncoming nurse) by Rishabh Arellano RN (offgoing nurse). Report included the following information SBAR, Kardex, ED Summary, Intake/Output, MAR, Recent Results, Med Rec Status, Cardiac Rhythm Sinus brett with PVCs  and Alarm Parameters . Fentanyl and Versed drips dual verified.

## 2021-01-25 NOTE — PROGRESS NOTES
Levophed started for MAP in 50s after SLED initiated. Frequent PVCs noted after Levophed started; pt not perfusing ectopic beats, making titration difficult. Up to 24 mcg/min. Dialysis RN aware. Will assess for tolerance and update MD in morning rounds.

## 2021-01-25 NOTE — PROGRESS NOTES
. MD notified. Gastric residual greater than 250ml x 2 consecutive checks. Tube feeding currently on hold. MD aware.

## 2021-01-25 NOTE — DIALYSIS
8 HR SLED initiated using  R IJ. Aspirated and flushed both catheter ports without problem. Machine settings per MD order. 150  DFR  250 UFR  Heparin 5000 unit bolus and 500 ml/hr. Reported to primary nurse. Dialysis nurse available as needed.

## 2021-01-25 NOTE — PROGRESS NOTES
Critical Care Daily Progress Note: 1/25/2021  Admission Date: 1/6/2021     The patient's chart is reviewed and the patient is discussed with the staff.       Admission Date: 1/6/2021     Length of Stay: 18 days   day 9 intubation  Background: 61 y.o. y/o male with acute hypoxemic respiratory failure secondary to COVID-19.     Onset of COVID-19 symptoms:  + COVID 01/05/21     Notable PMH: obesity, HTN, CAD, dyslipidemia, SWATI, DM, GERD  Subjective:   24 hour events  Back on amiodarone as he is in a fib with rvr as soon as dialysis attempted this am  Flotrac values  CO 65  CI 3.5  SVV 5   svr 480  LINES:    ET tube 1/17/2021  PICC 1/13  NGT 1/17  Art line 1/18/2021     DRIPS:    Tracrium 8mcg/kg/min  Fentanyl 150mcg/hr  Versed 5mg/hr     COVID-19 medications  Decadron 1/7-16  remdesivir 1/8-1/12  Convalescent plasma 1/7     Anti-infectives  azithro (completed)  Ceftriaxone (completed)  Vanc and cefepime: D 7/7  Current Facility-Administered Medications   Medication Dose Route Frequency    NOREPINephrine (LEVOPHED) 16,000 mcg in dextrose 5% 250 mL infusion  0.5-30 mcg/min IntraVENous TITRATE    heparin (porcine) 1,000 unit/mL injection 5,000 Units  5,000 Units Hemodialysis DIALYSIS PRN    promethazine (PHENERGAN) tablet 12.5 mg  12.5 mg Per NG tube Q6H PRN    Or    ondansetron (ZOFRAN) injection 4 mg  4 mg IntraVENous Q6H PRN    polyethylene glycol (MIRALAX) packet 17 g  17 g Per NG tube DAILY PRN    guaiFENesin-dextromethorphan (ROBITUSSIN DM) 100-10 mg/5 mL syrup 10 mL  10 mL Per NG tube Q4H PRN    acetaminophen (TYLENOL) tablet 650 mg  650 mg Per NG tube Q6H PRN    polyethylene glycol (MIRALAX) packet 17 g  17 g Per NG tube DAILY    amiodarone (CORDARONE) 450 mg in dextrose 5% 250 mL infusion  0.5-1 mg/min IntraVENous TITRATE    insulin glargine (LANTUS) injection 32 Units  32 Units SubCUTAneous Q12H    pantoprazole (PROTONIX) 40 mg in 0.9% sodium chloride 10 mL injection  40 mg IntraVENous DAILY    sodium zirconium cyclosilicate (LOKELMA) powder packet 10 g  10 g Oral DAILY    VANCOMYCIN INTERMITTENT DOSING PER PHARMACY   Other Rx Dosing/Monitoring    hydrocortisone Sod Succ (PF) (SOLU-CORTEF) injection 50 mg  50 mg IntraVENous Q6H    white petrolatum-mineral oiL (LACRILUBE S.O.P.) ointment   Both Eyes Q12H    cefepime (MAXIPIME) 2 g in 0.9% sodium chloride (MBP/ADV) 100 mL MBP  2 g IntraVENous Q12H    white petrolatum-mineral oiL (LACRILUBE S.O.P.) ointment   Both Eyes Q4H PRN    atracurium (TRACRIUM) 1,000 mg in 0.9% sodium chloride 250 mL infusion  0-15 mcg/kg/min IntraVENous TITRATE    fentaNYL in normal saline (pf) 25 mcg/mL infusion  0-200 mcg/hr IntraVENous TITRATE    midazolam in normal saline (VERSED) 1 mg/mL infusion  0-10 mg/hr IntraVENous TITRATE    atorvastatin (LIPITOR) tablet 80 mg  80 mg Per NG tube DAILY    [Held by provider] clopidogreL (PLAVIX) tablet 75 mg  75 mg Per NG tube DAILY    [Held by provider] metoprolol tartrate (LOPRESSOR) tablet 50 mg  50 mg Per NG tube BID    insulin lispro (HUMALOG) injection   SubCUTAneous Q4H    LORazepam (ATIVAN) injection 1 mg  1 mg IntraVENous Q4H PRN    morphine injection 2 mg  2 mg IntraVENous Q4H PRN    NUTRITIONAL SUPPORT ELECTROLYTE PRN ORDERS   Does Not Apply PRN    sodium chloride (NS) flush 20 mL  20 mL InterCATHeter Q8H    sodium chloride (NS) flush 20 mL  20 mL InterCATHeter PRN    loperamide (IMODIUM) capsule 2 mg  2 mg Oral Q4H PRN    dextrose 40% (GLUTOSE) oral gel 1 Tube  15 g Oral PRN    glucagon (GLUCAGEN) injection 1 mg  1 mg IntraMUSCular PRN    dextrose (D50W) injection syrg 12.5-25 g  25-50 mL IntraVENous PRN    0.9% sodium chloride infusion 250 mL  250 mL IntraVENous PRN    albuterol (PROVENTIL HFA, VENTOLIN HFA, PROAIR HFA) inhaler 2 Puff  2 Puff Inhalation Q4H PRN    influenza vaccine 2020-21 (6 mos+)(PF) (FLUARIX/FLULAVAL/FLUZONE QUAD) injection 0.5 mL  0.5 mL IntraMUSCular PRIOR TO DISCHARGE    hydrALAZINE (APRESOLINE) 20 mg/mL injection 20 mg  20 mg IntraVENous Q6H PRN       Review of Systems   Unobtainable due to patient status. Objective:     Vitals:    01/25/21 0745 01/25/21 0800 01/25/21 0815 01/25/21 0820   BP:    (!) 97/53   Pulse: 78 (!) 130 (!) 134 (!) 136   Resp: (!) 33      Temp:       SpO2: 93% 91% 91%    Weight:       Height:             Intake/Output Summary (Last 24 hours) at 1/25/2021 0900  Last data filed at 1/25/2021 0820  Gross per 24 hour   Intake 2097.65 ml   Output 674 ml   Net 1423.65 ml         Physical Exam:          Constitutional:  intubated and mechanically ventilated.   EENMT:  Sclera clear, pupils equal, oral mucosa moist  Respiratory: rhonchi  Cardiovascular:  RRR with no M,G,R;  Gastrointestinal:  soft with no tenderness; positive bowel sounds present  Musculoskeletal:  warm with no cyanosis, no lower extremity edema  Skin:  no jaundice or ecchymosis  Neurologic: no gross neuro deficits     Psychiatric:  sedated      CXR:    None today    Ventilator Settings  Mode FIO2 Rate Tidal Volume Pressure PEEP   Pressure control  65 %    454 ml     14 cm H20      Peak airway pressure: 32.9 cm H2O   Minute ventilation: 12 l/min     ABG:   Recent Labs     01/25/21  0245 01/24/21  0255 01/23/21  1515   PHI 7.16* 7.31* 7.14*   PCO2I 47.7* 38.5 58.4*   PO2I 106* 131* 115*   HCO3I 17.2* 19.4* 20.1*       LAB  Recent Labs     01/25/21  0351 01/24/21  2336 01/24/21 2006 01/24/21  1638 01/24/21  1112   GLUCPOC 356* 241* 199* 147* 145*     Recent Labs     01/25/21  0311 01/24/21  2245 01/24/21  0500 01/23/21  1041   WBC 11.7* 9.0 9.6 6.6   HGB 9.9* 10.1* 9.1* 9.0*   HCT 31.5* 32.2* 28.3* 27.5*   * 103* 105* 84*   INR 1.3  --  1.3 1.3     Recent Labs     01/25/21  0311 01/24/21  2245 01/24/21  0500 01/22/21 1910 01/22/21 1910    136 138   < > 135*   K 5.0 4.8 4.6   < > 4.8    106 108*   < > 106   CO2 18* 19* 20*   < > 21   * 225* 147*   < > 310*   * 129* 102*   < > 63*   CREA 6.02* 5.63* 4.74*   < > 2.97*   MG 3.0* 3.0*  --   --  2.6*   PHOS 8.6*  --   --   --   --    CA 8.4 8.6 8.6   < > 8.8    < > = values in this interval not displayed. No results for input(s): LCAD, LAC in the last 72 hours.       Patient Active Problem List   Diagnosis Code    Obesity E66.9    Hypertension I10    Bradycardia R00.1    PVC (premature ventricular contraction) I49.3    CAD (coronary artery disease) I25.10    Dyslipidemia, goal LDL below 70 E78.5    SWATI (acute kidney injury) ([facility] N17.9    Acute hypoxemic respiratory failure (HCC) J96.01    Type 2 diabetes mellitus with hyperosmolarity without nonketotic hyperglycemic-hyperosmolar coma ([facility] E11.00    Pneumonia due to COVID-19 virus U07.1, J12.82    Hypernatremia E87.0    Atrial fibrillation ([facility]) I48.91         Assessment:  (Medical Decision Making)     Hospital Problems  Date Reviewed: 6/3/2020          Codes Class Noted POA    Atrial fibrillation ([facility] ICD-10-CM: I48.91  ICD-9-CM: 427.31  1/22/2021 Yes    With rvr    Hypernatremia ICD-10-CM: E87.0  ICD-9-CM: 276.0  1/8/2021 Unknown        SWATI (acute kidney injury) ([facility] ICD-10-CM: N17.9  ICD-9-CM: 584.9  1/7/2021 Unknown        Acute hypoxemic respiratory failure ([facility] ICD-10-CM: J96.01  ICD-9-CM: 518.81  1/7/2021 Unknown    On vent support-critically ill    Type 2 diabetes mellitus with hyperosmolarity without nonketotic hyperglycemic-hyperosmolar coma ([facility] ICD-10-CM: E11.00  ICD-9-CM: 250.20  1/7/2021 Unknown        * (Principal) Pneumonia due to COVID-19 virus ICD-10-CM: U07.1, J12.82  ICD-9-CM: 480.8  1/7/2021 Unknown        CAD (coronary artery disease) ICD-10-CM: I25.10  ICD-9-CM: 414.00  10/28/2016 Yes    Overview Signed 10/28/2016  8:05 AM by Aldo Hatchet, PA     10-27-06 [facility] 90% LAD s/p 2.25 x 20 Synergy drug-eluting stent  (CS)             Obesity ICD-10-CM: E66.9  ICD-9-CM: 278.00  10/6/2015 Yes              Plan:  (Medical Decision Making)   1 Vent support ongoing- 65% fio2  2    Amiodarone for a fib  3   Did not tolerate proning  4   Cardiology consult  5    Dialysis-crrt as tolerated  6   Stop antibx today  7   Start back on heparin soon  --  Critically care time 39 minutes  More than 50% of the time documented was spent in face-to-face contact with the patient and in the care of the patient on the floor/unit where the patient is located.     Sarabjit Can MD

## 2021-01-25 NOTE — PROGRESS NOTES
Comprehensive Nutrition Assessment    Type and Reason for Visit: Reassess      Nutrition Recommendations/Plan:   Pt has received limited nutrition since TPN was d/c'd on 1/19 and TF was started. TF held 1/20-1/22 d/t levophed requirements. Trophic TF from 1/22-1/24 provides minimal nutrition. TF off today. Consider TPN. Enteral Nutrition:  D/C current TF orders with Vital AF 1.2 and start Nepro   As medically appropriate: initiate Nepro via NGT at 25ml/hour, progress by 10ml/hour every 4 hours to goal rate of 45ml/hour. Water flush 100 every 4 hours. At goal will provide 1944 kcal (100% estimated calorie needs), 87 grams protein (62% estimated protein needs) and 1383 ml free fluid (100% fluid needs met with TF regimen + drips/medications). Prosource tube feeding can be added as medically appropriate to meet estimated protein needs. Vitamin and Mineral Supplement Therapy:  Electrolyte management replacement protocol implemented. Labs:   EN labs: BMP daily, Mg and Phos MWF. Bowel regimen: check for impaction, dulcolax x 1, daily pericolace     Malnutrition Assessment:  Malnutrition Status: At risk for malnutrition (specify)(Poor nutrition since 1/19.)  Context: Acute illness  Findings of clinical characteristics of malnutrition:   Energy Intake:  Mild decrease in energy intake (specify)(Decrease reported for four days)  Weight Loss:  (89% UBE per EMR, pt states 40# loss over 4 days (15% based on stated # and CBW))     Body Fat Loss:  Unable to assess,     Muscle Mass Loss:  Unable to assess,    Fluid Accumulation:  Unable to assess,     Strength:  Not performed     Nutrition Assessment:   Nutrition History: 1/7/2021: Pt reports inability to tolerate any food or liquids for 4 days PTA. Indicates vomiting with all po attempts during this time      Nutrition Background: PMH remarkable for CAD, GERD, HTN, MO.   Admitted with Coivd 19, new onset DM with hyperglycemic hyperosmolar state, SWATI on CKD, lactic acidosis. Daily Update:  Pt remains intubated, sedated, and paralyzed. NGT in place with TF on hold this afternoon since 0700 d/t elevated GRVs x 2 (240,260,550 cc x last three checks). TF infusing @ 20 ml/hr from 1/22-1/24. Pt remains constipated despite daily miralax. S/P cardioversion today. SLED initiated today. Abdominal Status (last documented): Intact, Semi-soft abdomen with Hypoactive  bowel sounds. Last BM 01/16/21. Pertinent Medications: cefepime, solu-cortef, 32 units lantus q 12, SSI, protonix, daily miralax (since 1/19), lokelma (held since 1/23)  Drips: amiodarone gtt, tracrium, fentanyl, versed, levophed @ 10 mcg/min at time of assessment  Pertinent Labs: Glucose 304 mg/dl, K+ 5, Phos 8.6, Mg 3, , Cr 6.02    Nutrition Related Findings:   double lumen PICC (1/13), Intubated 1/17, NGT 1/17. Pt was TPN dependent from 1/14-1/19. TF initiated on 1/18. TF held 1/20-1/22 d/t levophed requirements. TF resumed on 1/22 @ 20 ml/hr and not advanced. Held on 1/25 d/t elevated GRVs. SLED initiated on 1/25. Current Nutrition Therapies:  Current Tube Feeding (TF) Orders:   · Feeding Route: Nasogastric  · Formula: Vital AF  · Schedule:Continuous    · Regimen: 65ml/hr (20ml/hr advancing 10ml/hr Q8H to goal)  · Additives/Modulars: Other (comment)(None)  · Water Flushes: 80ml Q4H  · Current TF & Flush Orders Provides: 0 kcal, 0 gm PRO  · Goal TF & Flush Orders Provides: At goal will provide 1872 kcal (100% estimated calorie needs), 117 grams protein (100% estimated protein needs) and 1745ml free fluid (1ml/kcal)    Current Intake:   Average Meal Intake: NPO Average Supplement Intake: NPO      Anthropometric Measures:  Height: 5' 8\" (172.7 cm)  Current Body Wt: 97.1 kg (214 lb 1.1 oz)(1/25), Weight source: Not specified  BMI: 32.6, Obese class 1 (BMI 30.0-34. 9)  Admission Body Weight: 235 lb(\"Estimated\")  Ideal Body Wt: 154 lbs (70 kg), 130.3 %  Usual Body Wt: 101.6 kg (224 lb)(EMR 6/3/2020 FP office; pt stated # unable to verify ), Percent weight change: -10.4          Estimated Daily Nutrient Needs:  Energy (kcal/day): 5169-4814 (Kcal/kg(25-30 ), Weight Used: Ideal(70 kg - vent, SLED))  Protein (g/day): 140-175(2-2.5 - vent, SLED) Weight Used: (Ideal(70 kg ))  Fluid (ml/day):   (Standard renal)    Nutrition Diagnosis:   · Inadequate protein-energy intake related to altered GI function, impaired respiratory function as evidenced by intubation(NPO, TF on hold d/t elevated GRVs x 2, constipation.)    Nutrition Interventions:   Food and/or Nutrient Delivery: Continue NPO(Resume TF as medically appropriate.)  Nutrition Education/Counseling: Education initiated  Coordination of Nutrition Care: Continue to monitor while inpatient  Plan of Care discussed with - awaiting RN to return phone call. Perfect serve sent to Dr. Kenyatta Baptiste. Goals:   Previous Goal Met: No progress toward goal(s)  Active Goal: Meet >75% estimated nutrition needs within 5 days    Nutrition Monitoring and Evaluation:   Behavioral-Environmental Outcomes: Knowledge or skill  Food/Nutrient Intake Outcomes: Enteral nutrition intake/tolerance  Physical Signs/Symptoms Outcomes: Biochemical data, Constipation, Hemodynamic status    Discharge Planning:     Too soon to determine    Hamp Young, MS, RD, LD, [address]    Disaster Mode active

## 2021-01-25 NOTE — PROGRESS NOTES
[facility] PROGRESS NOTE    Follow up for:    Subjective:   Patient seen and examined on SLED. In Afib w/ RVR. UF turned off. Remains intubated/sedated.       ROS:  UTO     Objective:   Exam:  Vitals:    01/25/21 0745 01/25/21 0800 01/25/21 0815 01/25/21 0820   BP:    (!) 97/53   Pulse: 78 (!) 130 (!) 134 (!) 136   Resp: (!) 33      Temp:       SpO2: 93% 91% 91%    Weight:       Height:             Intake/Output Summary (Last 24 hours) at 1/25/2021 0857  Last data filed at 1/25/2021 0820  Gross per 24 hour   Intake 2097.65 ml   Output 674 ml   Net 1423.65 ml       Current Facility-Administered Medications   Medication Dose Route Frequency    NOREPINephrine (LEVOPHED) 16,000 mcg in dextrose 5% 250 mL infusion  0.5-30 mcg/min IntraVENous TITRATE    heparin (porcine) 1,000 unit/mL injection 5,000 Units  5,000 Units Hemodialysis DIALYSIS PRN    promethazine (PHENERGAN) tablet 12.5 mg  12.5 mg Per NG tube Q6H PRN    Or    ondansetron (ZOFRAN) injection 4 mg  4 mg IntraVENous Q6H PRN    polyethylene glycol (MIRALAX) packet 17 g  17 g Per NG tube DAILY PRN    guaiFENesin-dextromethorphan (ROBITUSSIN DM) 100-10 mg/5 mL syrup 10 mL  10 mL Per NG tube Q4H PRN    acetaminophen (TYLENOL) tablet 650 mg  650 mg Per NG tube Q6H PRN    polyethylene glycol (MIRALAX) packet 17 g  17 g Per NG tube DAILY    amiodarone (CORDARONE) 450 mg in dextrose 5% 250 mL infusion  0.5-1 mg/min IntraVENous TITRATE    insulin glargine (LANTUS) injection 32 Units  32 Units SubCUTAneous Q12H    pantoprazole (PROTONIX) 40 mg in 0.9% sodium chloride 10 mL injection  40 mg IntraVENous DAILY    sodium zirconium cyclosilicate (LOKELMA) powder packet 10 g  10 g Oral DAILY    VANCOMYCIN INTERMITTENT DOSING PER PHARMACY   Other Rx Dosing/Monitoring    hydrocortisone Sod Succ (PF) (SOLU-CORTEF) injection 50 mg  50 mg IntraVENous Q6H    white petrolatum-mineral oiL (LACRILUBE S.O.P.) ointment   Both Eyes Q12H    cefepime (MAXIPIME) 2 g in 0.9% sodium chloride (MBP/ADV) 100 mL MBP  2 g IntraVENous Q12H    white petrolatum-mineral oiL (LACRILUBE S.O.P.) ointment   Both Eyes Q4H PRN    atracurium (TRACRIUM) 1,000 mg in 0.9% sodium chloride 250 mL infusion  0-15 mcg/kg/min IntraVENous TITRATE    fentaNYL in normal saline (pf) 25 mcg/mL infusion  0-200 mcg/hr IntraVENous TITRATE    midazolam in normal saline (VERSED) 1 mg/mL infusion  0-10 mg/hr IntraVENous TITRATE    atorvastatin (LIPITOR) tablet 80 mg  80 mg Per NG tube DAILY    [Held by provider] clopidogreL (PLAVIX) tablet 75 mg  75 mg Per NG tube DAILY    [Held by provider] metoprolol tartrate (LOPRESSOR) tablet 50 mg  50 mg Per NG tube BID    insulin lispro (HUMALOG) injection   SubCUTAneous Q4H    LORazepam (ATIVAN) injection 1 mg  1 mg IntraVENous Q4H PRN    morphine injection 2 mg  2 mg IntraVENous Q4H PRN    NUTRITIONAL SUPPORT ELECTROLYTE PRN ORDERS   Does Not Apply PRN    sodium chloride (NS) flush 20 mL  20 mL InterCATHeter Q8H    sodium chloride (NS) flush 20 mL  20 mL InterCATHeter PRN    loperamide (IMODIUM) capsule 2 mg  2 mg Oral Q4H PRN    dextrose 40% (GLUTOSE) oral gel 1 Tube  15 g Oral PRN    glucagon (GLUCAGEN) injection 1 mg  1 mg IntraMUSCular PRN    dextrose (D50W) injection syrg 12.5-25 g  25-50 mL IntraVENous PRN    0.9% sodium chloride infusion 250 mL  250 mL IntraVENous PRN    albuterol (PROVENTIL HFA, VENTOLIN HFA, PROAIR HFA) inhaler 2 Puff  2 Puff Inhalation Q4H PRN    influenza vaccine 2020-21 (6 mos+)(PF) (FLUARIX/FLULAVAL/FLUZONE QUAD) injection 0.5 mL  0.5 mL IntraMUSCular PRIOR TO DISCHARGE    hydrALAZINE (APRESOLINE) 20 mg/mL injection 20 mg  20 mg IntraVENous Q6H PRN       EXAM  GEN - intubated/sedated   CV - S1, S2, RRR, no rub, murmur, or gallop  Lung - diffusely coarse  Abd - soft, nontender, BS present  Ext - no edema  Access- R IJ temp line    Recent Labs     01/25/21  0311 01/24/21  2245 01/24/21  0500   WBC 11.7* 9.0 9.6   HGB 9.9* 10.1* 9.1*   HCT 31.5* 32.2* 28.3*   * 103* 105*        Recent Labs     01/25/21  0311 01/24/21  2245 01/24/21  0500 01/22/21 1910 01/22/21 1910    136 138   < > 135*   K 5.0 4.8 4.6   < > 4.8    106 108*   < > 106   CO2 18* 19* 20*   < > 21   * 129* 102*   < > 63*   CREA 6.02* 5.63* 4.74*   < > 2.97*   CA 8.4 8.6 8.6   < > 8.8   * 225* 147*   < > 310*   MG 3.0* 3.0*  --   --  2.6*   PHOS 8.6*  --   --   --   --     < > = values in this interval not displayed. Assessment and Plan:     1. SWATI - 2/2 ATN from COVID. Initiated on SLED today. Unfortunately went back in to Afib with RVR. UF is off, but continue for clearance for now.           2. COVID /severe ARDS  Intubated   On vancomycin - trough wnl      3. Hypotension   On pressors   Maintain MAP > 65      4. Hyperkalemia   Will correct with HD.     5. Afib w/ RVR-   amio gtt.   Pending cardiology consult        Neena Can MD

## 2021-01-25 NOTE — PROGRESS NOTES
Patient having multiple PVC's and short runs of V-Tach. Dr. Mary Miller notified. Labs ordered. New order to restart amiodarone gtt with bolus of 150mg, then drip at 1mg/min.

## 2021-01-25 NOTE — PROCEDURES
[facility]  PROCEDURE NOTE    Name:  Janae Diaz  MR#:  [id number]  :  1960  ACCOUNT #:  [de-identified]  DATE OF SERVICE:  2021    PREOPERATIVE DIAGNOSIS:  Atrial fibrillation with hemodynamic instability requiring cardioversion. POSTOPERATIVE DIAGNOSIS:  Atrial fibrillation with hemodynamic instability requiring cardioversion. PROCEDURE PERFORMED:  Cardioversion of atrial fibrillation    SURGEON:  Wayne Hazel. MD Erin    ASSISTANT:  None. ANESTHESIA:  The patient had been sedated and intubated prior to my arrival.  No additional sedation was given. ESTIMATED BLOOD LOSS:  None. SPECIMENS REMOVED:  None. COMPLICATIONS:  None. IMPLANTS:  None. TECHNICAL FACTORS:  The patient was in the ICU critically ill. Developed atrial fibrillation with hypotension requiring escalating doses of Levophed. Based on this, it was felt urgent cardioversion was needed. The defibrillation pads were placed in the apical and right upper chest positions. 200 joules of synchronized energy was applied with restoration of sinus rhythm. Frequent ectopy was noted. Additional 150 mg of amiodarone was ordered and will continue the amiodarone drip at 1 mg per minute. CONCLUSION:  Successful cardioversion of symptomatic atrial fibrillation to sinus rhythm. PLAN:  Amiodarone as above. High risk for recurrent arrhythmias given this critically ill state.       Leeanne Lopez MD      [id number]  D:  2021 13:23  T:  2021 13:59  JOB #:  [id number]  CC:  Nancy Triplett MD

## 2021-01-25 NOTE — PROGRESS NOTES
Dr. Lan Founds placed order for daily aspirin to be given per NGT. Notified MD of bloody residuals from NGT, order received to cancel order for aspirin.

## 2021-01-26 PROBLEM — I95.9 HYPOTENSION: Status: ACTIVE | Noted: 2021-01-26

## 2021-01-26 LAB
ANION GAP SERPL CALC-SCNC: 5 MMOL/L (ref 7–16)
APTT PPP: 34.5 SEC (ref 24.1–35.1)
ARTERIAL PATENCY WRIST A: ABNORMAL
BASE EXCESS BLD CALC-SCNC: 2 MMOL/L
BASOPHILS # BLD: 0 K/UL (ref 0–0.2)
BASOPHILS NFR BLD: 0 % (ref 0–2)
BDY SITE: ABNORMAL
BUN SERPL-MCNC: 60 MG/DL (ref 8–23)
CALCIUM SERPL-MCNC: 8 MG/DL (ref 8.3–10.4)
CHLORIDE SERPL-SCNC: 103 MMOL/L (ref 98–107)
CO2 BLD-SCNC: 28 MMOL/L
CO2 SERPL-SCNC: 29 MMOL/L (ref 21–32)
COLLECT TIME,HTIME: 250
CREAT SERPL-MCNC: 3.48 MG/DL (ref 0.8–1.5)
DIFFERENTIAL METHOD BLD: ABNORMAL
EOSINOPHIL # BLD: 0 K/UL (ref 0–0.8)
EOSINOPHIL NFR BLD: 0 % (ref 0.5–7.8)
ERYTHROCYTE [DISTWIDTH] IN BLOOD BY AUTOMATED COUNT: 15 % (ref 11.9–14.6)
ERYTHROCYTE [DISTWIDTH] IN BLOOD BY AUTOMATED COUNT: 15.1 % (ref 11.9–14.6)
ERYTHROCYTE [DISTWIDTH] IN BLOOD BY AUTOMATED COUNT: 15.3 % (ref 11.9–14.6)
ERYTHROCYTE [DISTWIDTH] IN BLOOD BY AUTOMATED COUNT: 15.6 % (ref 11.9–14.6)
EXHALED MINUTE VOLUME, VE: 14.7 L/MIN
FIBRINOGEN PPP-MCNC: 501 MG/DL (ref 190–501)
GAS FLOW.O2 O2 DELIVERY SYS: ABNORMAL L/MIN
GAS FLOW.O2 SETTING OXYMISER: 32 BPM
GLUCOSE BLD STRIP.AUTO-MCNC: 119 MG/DL (ref 65–100)
GLUCOSE BLD STRIP.AUTO-MCNC: 124 MG/DL (ref 65–100)
GLUCOSE BLD STRIP.AUTO-MCNC: 145 MG/DL (ref 65–100)
GLUCOSE BLD STRIP.AUTO-MCNC: 162 MG/DL (ref 65–100)
GLUCOSE BLD STRIP.AUTO-MCNC: 215 MG/DL (ref 65–100)
GLUCOSE SERPL-MCNC: 115 MG/DL (ref 65–100)
HCO3 BLD-SCNC: 26.9 MMOL/L (ref 22–26)
HCT VFR BLD AUTO: 24.7 % (ref 41.1–50.3)
HCT VFR BLD AUTO: 25.6 % (ref 41.1–50.3)
HCT VFR BLD AUTO: 26.1 % (ref 41.1–50.3)
HCT VFR BLD AUTO: 26.7 % (ref 41.1–50.3)
HGB BLD-MCNC: 7.9 G/DL (ref 13.6–17.2)
HGB BLD-MCNC: 8.3 G/DL (ref 13.6–17.2)
HGB BLD-MCNC: 8.5 G/DL (ref 13.6–17.2)
HGB BLD-MCNC: 8.6 G/DL (ref 13.6–17.2)
IMM GRANULOCYTES # BLD AUTO: 0.3 K/UL (ref 0–0.5)
IMM GRANULOCYTES NFR BLD AUTO: 2 % (ref 0–5)
INR PPP: 1.2
LYMPHOCYTES # BLD: 0.5 K/UL (ref 0.5–4.6)
LYMPHOCYTES NFR BLD: 4 % (ref 13–44)
MAGNESIUM SERPL-MCNC: 2.5 MG/DL (ref 1.8–2.4)
MCH RBC QN AUTO: 29.3 PG (ref 26.1–32.9)
MCH RBC QN AUTO: 30.2 PG (ref 26.1–32.9)
MCHC RBC AUTO-ENTMCNC: 31.8 G/DL (ref 31.4–35)
MCHC RBC AUTO-ENTMCNC: 32 G/DL (ref 31.4–35)
MCHC RBC AUTO-ENTMCNC: 32.2 G/DL (ref 31.4–35)
MCHC RBC AUTO-ENTMCNC: 33.2 G/DL (ref 31.4–35)
MCV RBC AUTO: 90.8 FL (ref 79.6–97.8)
MCV RBC AUTO: 91.1 FL (ref 79.6–97.8)
MCV RBC AUTO: 91.5 FL (ref 79.6–97.8)
MCV RBC AUTO: 92.2 FL (ref 79.6–97.8)
MONOCYTES # BLD: 1.1 K/UL (ref 0.1–1.3)
MONOCYTES NFR BLD: 7 % (ref 4–12)
NEUTS SEG # BLD: 13 K/UL (ref 1.7–8.2)
NEUTS SEG NFR BLD: 87 % (ref 43–78)
NRBC # BLD: 0 K/UL (ref 0–0.2)
O2/TOTAL GAS SETTING VFR VENT: 65 %
PCO2 BLD: 43.6 MMHG (ref 35–45)
PEEP RESPIRATORY: 14 CMH2O
PH BLD: 7.4 [PH] (ref 7.35–7.45)
PHOSPHATE SERPL-MCNC: 4.4 MG/DL (ref 2.3–3.7)
PLATELET # BLD AUTO: 108 K/UL (ref 150–450)
PLATELET # BLD AUTO: 115 K/UL (ref 150–450)
PLATELET # BLD AUTO: 120 K/UL (ref 150–450)
PLATELET # BLD AUTO: 137 K/UL (ref 150–450)
PMV BLD AUTO: 10.1 FL (ref 9.4–12.3)
PMV BLD AUTO: 10.1 FL (ref 9.4–12.3)
PMV BLD AUTO: 10.6 FL (ref 9.4–12.3)
PMV BLD AUTO: 10.7 FL (ref 9.4–12.3)
PO2 BLD: 75 MMHG (ref 75–100)
POTASSIUM SERPL-SCNC: 4.4 MMOL/L (ref 3.5–5.1)
PRESSURE CONTROL, IPC: 20
PROTHROMBIN TIME: 15.9 SEC (ref 12.5–14.7)
RBC # BLD AUTO: 2.7 M/UL (ref 4.23–5.6)
RBC # BLD AUTO: 2.81 M/UL (ref 4.23–5.6)
RBC # BLD AUTO: 2.83 M/UL (ref 4.23–5.6)
RBC # BLD AUTO: 2.94 M/UL (ref 4.23–5.6)
SAO2 % BLD: 95 % (ref 95–98)
SERVICE CMNT-IMP: ABNORMAL
SERVICE CMNT-IMP: ABNORMAL
SODIUM SERPL-SCNC: 137 MMOL/L (ref 138–145)
SPECIMEN TYPE: ABNORMAL
TRIGL SERPL-MCNC: 74 MG/DL (ref 35–150)
VENTILATION MODE VENT: ABNORMAL
WBC # BLD AUTO: 12.8 K/UL (ref 4.3–11.1)
WBC # BLD AUTO: 13 K/UL (ref 4.3–11.1)
WBC # BLD AUTO: 14.2 K/UL (ref 4.3–11.1)
WBC # BLD AUTO: 15 K/UL (ref 4.3–11.1)

## 2021-01-26 PROCEDURE — 74011250637 HC RX REV CODE- 250/637: Performed by: INTERNAL MEDICINE

## 2021-01-26 PROCEDURE — 74011000250 HC RX REV CODE- 250: Performed by: INTERNAL MEDICINE

## 2021-01-26 PROCEDURE — 74011000258 HC RX REV CODE- 258: Performed by: INTERNAL MEDICINE

## 2021-01-26 PROCEDURE — 74011250636 HC RX REV CODE- 250/636: Performed by: INTERNAL MEDICINE

## 2021-01-26 PROCEDURE — 74011250636 HC RX REV CODE- 250/636: Performed by: ANESTHESIOLOGY

## 2021-01-26 PROCEDURE — 36600 WITHDRAWAL OF ARTERIAL BLOOD: CPT

## 2021-01-26 PROCEDURE — 2709999900 HC NON-CHARGEABLE SUPPLY

## 2021-01-26 PROCEDURE — 85730 THROMBOPLASTIN TIME PARTIAL: CPT

## 2021-01-26 PROCEDURE — 84478 ASSAY OF TRIGLYCERIDES: CPT

## 2021-01-26 PROCEDURE — 83735 ASSAY OF MAGNESIUM: CPT

## 2021-01-26 PROCEDURE — 65620000000 HC RM CCU GENERAL

## 2021-01-26 PROCEDURE — 74011636637 HC RX REV CODE- 636/637: Performed by: INTERNAL MEDICINE

## 2021-01-26 PROCEDURE — 84100 ASSAY OF PHOSPHORUS: CPT

## 2021-01-26 PROCEDURE — C8929 TTE W OR WO FOL WCON,DOPPLER: HCPCS

## 2021-01-26 PROCEDURE — 82962 GLUCOSE BLOOD TEST: CPT

## 2021-01-26 PROCEDURE — 82803 BLOOD GASES ANY COMBINATION: CPT

## 2021-01-26 PROCEDURE — 74011000258 HC RX REV CODE- 258: Performed by: NURSE PRACTITIONER

## 2021-01-26 PROCEDURE — C9113 INJ PANTOPRAZOLE SODIUM, VIA: HCPCS | Performed by: INTERNAL MEDICINE

## 2021-01-26 PROCEDURE — 85027 COMPLETE CBC AUTOMATED: CPT

## 2021-01-26 PROCEDURE — 74011250636 HC RX REV CODE- 250/636: Performed by: NURSE PRACTITIONER

## 2021-01-26 PROCEDURE — 74011000258 HC RX REV CODE- 258: Performed by: ANESTHESIOLOGY

## 2021-01-26 PROCEDURE — 99233 SBSQ HOSP IP/OBS HIGH 50: CPT | Performed by: INTERNAL MEDICINE

## 2021-01-26 PROCEDURE — 85025 COMPLETE CBC W/AUTO DIFF WBC: CPT

## 2021-01-26 PROCEDURE — 99222 1ST HOSP IP/OBS MODERATE 55: CPT | Performed by: INTERNAL MEDICINE

## 2021-01-26 PROCEDURE — 85610 PROTHROMBIN TIME: CPT

## 2021-01-26 PROCEDURE — 80048 BASIC METABOLIC PNL TOTAL CA: CPT

## 2021-01-26 PROCEDURE — 76450000000

## 2021-01-26 PROCEDURE — 99291 CRITICAL CARE FIRST HOUR: CPT | Performed by: INTERNAL MEDICINE

## 2021-01-26 PROCEDURE — 94003 VENT MGMT INPAT SUBQ DAY: CPT

## 2021-01-26 PROCEDURE — 85384 FIBRINOGEN ACTIVITY: CPT

## 2021-01-26 RX ADMIN — Medication 150 MCG/HR: at 18:20

## 2021-01-26 RX ADMIN — INSULIN GLARGINE 32 UNITS: 100 INJECTION, SOLUTION SUBCUTANEOUS at 20:25

## 2021-01-26 RX ADMIN — I.V. FAT EMULSION 250 ML: 20 EMULSION INTRAVENOUS at 18:34

## 2021-01-26 RX ADMIN — ATRACURIUM BESYLATE 8 MCG/KG/MIN: 10 INJECTION, SOLUTION INTRAVENOUS at 12:40

## 2021-01-26 RX ADMIN — MIDAZOLAM HYDROCHLORIDE 6 MG/HR: 5 INJECTION, SOLUTION INTRAMUSCULAR; INTRAVENOUS at 10:35

## 2021-01-26 RX ADMIN — INSULIN LISPRO 6 UNITS: 100 INJECTION, SOLUTION INTRAVENOUS; SUBCUTANEOUS at 20:25

## 2021-01-26 RX ADMIN — SODIUM CHLORIDE: 234 INJECTION, SOLUTION INTRAVENOUS at 18:34

## 2021-01-26 RX ADMIN — AMIODARONE HYDROCHLORIDE 1 MG/MIN: 50 INJECTION, SOLUTION INTRAVENOUS at 03:02

## 2021-01-26 RX ADMIN — HYDROCORTISONE SODIUM SUCCINATE 50 MG: 100 INJECTION, POWDER, FOR SOLUTION INTRAMUSCULAR; INTRAVENOUS at 23:59

## 2021-01-26 RX ADMIN — DOCUSATE SODIUM 50 MG AND SENNOSIDES 8.6 MG 1 TABLET: 8.6; 5 TABLET, FILM COATED ORAL at 10:18

## 2021-01-26 RX ADMIN — AMIODARONE HYDROCHLORIDE 1 MG/MIN: 50 INJECTION, SOLUTION INTRAVENOUS at 18:42

## 2021-01-26 RX ADMIN — DEXTROSE MONOHYDRATE 3 MCG/MIN: 50 INJECTION, SOLUTION INTRAVENOUS at 10:37

## 2021-01-26 RX ADMIN — INSULIN LISPRO 3 UNITS: 100 INJECTION, SOLUTION INTRAVENOUS; SUBCUTANEOUS at 11:48

## 2021-01-26 RX ADMIN — Medication 20 ML: at 14:57

## 2021-01-26 RX ADMIN — HYDROCORTISONE SODIUM SUCCINATE 50 MG: 100 INJECTION, POWDER, FOR SOLUTION INTRAMUSCULAR; INTRAVENOUS at 05:47

## 2021-01-26 RX ADMIN — MINERAL OIL, PETROLATUM: 425; 568 OINTMENT OPHTHALMIC at 20:26

## 2021-01-26 RX ADMIN — Medication 20 ML: at 22:17

## 2021-01-26 RX ADMIN — HYDROCORTISONE SODIUM SUCCINATE 50 MG: 100 INJECTION, POWDER, FOR SOLUTION INTRAMUSCULAR; INTRAVENOUS at 11:47

## 2021-01-26 RX ADMIN — Medication 20 ML: at 05:03

## 2021-01-26 RX ADMIN — ATORVASTATIN CALCIUM 80 MG: 40 TABLET, FILM COATED ORAL at 10:19

## 2021-01-26 RX ADMIN — PANTOPRAZOLE SODIUM 40 MG: 40 INJECTION, POWDER, FOR SOLUTION INTRAVENOUS at 10:35

## 2021-01-26 RX ADMIN — HYDROCORTISONE SODIUM SUCCINATE 50 MG: 100 INJECTION, POWDER, FOR SOLUTION INTRAMUSCULAR; INTRAVENOUS at 18:20

## 2021-01-26 RX ADMIN — HYDROCORTISONE SODIUM SUCCINATE 50 MG: 100 INJECTION, POWDER, FOR SOLUTION INTRAMUSCULAR; INTRAVENOUS at 00:26

## 2021-01-26 RX ADMIN — PERFLUTREN 1 ML: 6.52 INJECTION, SUSPENSION INTRAVENOUS at 14:15

## 2021-01-26 RX ADMIN — AMIODARONE HYDROCHLORIDE 1 MG/MIN: 50 INJECTION, SOLUTION INTRAVENOUS at 10:19

## 2021-01-26 RX ADMIN — PANTOPRAZOLE SODIUM 40 MG: 40 INJECTION, POWDER, FOR SOLUTION INTRAVENOUS at 22:16

## 2021-01-26 RX ADMIN — MINERAL OIL, PETROLATUM: 425; 568 OINTMENT OPHTHALMIC at 10:20

## 2021-01-26 RX ADMIN — POLYETHYLENE GLYCOL 3350 17 G: 17 POWDER, FOR SOLUTION ORAL at 10:19

## 2021-01-26 NOTE — PROGRESS NOTES
Bedside shift change report given to Erica Cogan and Trinity Wells RN (oncoming nurse) by Diaz Burgos RN (offgoing nurse). Report included the following information SBAR, Kardex, ED Summary, Procedure Summary, Intake/Output, MAR, Recent Results and Cardiac Rhythm SR with PVCs.     Fentanyl and Versed gtts dual verified

## 2021-01-26 NOTE — PROGRESS NOTES
Dialysis catheter site continues to ooze blood. Stat Hgb. Results 8.9. Blood sugar 110. Sean Yo. MD aware. Tube feedings are not running.

## 2021-01-26 NOTE — PROGRESS NOTES
Bedside shift change report given to Chris Herron RN and Liv Gooden (oncoming nurse) by Leslee Griffith, AQUILINO and Pawel Martínez RN (offgoing nurse). Report included the following information SBAR, Kardex, ED Summary, Procedure Summary, Intake/Output, MAR, Recent Results and Cardiac Rhythm NSR.     Fentanyl and Versed gtts dual verified

## 2021-01-26 NOTE — DIABETES MGMT
Patient admitted with pneumonia due to COVID-19 virus. Blood glucose ranged 114-356 yesterday with patient receiving Lantus 32 units, Humalog 21 units, and 150mg solucortef. Blood glucose this morning was 124. Per chart review patient tube feedings on hold. Reviewed patient current regimen: Lantus 32 units BID, Humalog SSI q4h, and Solucortef 50mg q6h. Spoke with primary RN. If patient tube feedings are not to be restarted today would recommend clarifying Lantus dose with provider to reduce patient risk of hypoglycemia.

## 2021-01-26 NOTE — PROGRESS NOTES
Pt residuals are very dark brown/black. Sample sent to lab to check for blood. Occult positive in gastric residuals. MD notified. CBC q6 and Protonix q12 was ordered.

## 2021-01-26 NOTE — PROGRESS NOTES
[facility] CARDIOLOGY PROGRESS NOTE           1/26/2021 11:59 AM    Admit Date: 1/6/2021      Subjective:   Patient remains intubated and sedated. In sinus rhythm. Remains on Levaphed but weaned down to 3. ROS:  UNABLE TO OBTAIN DUE TO INABILITY OF PATIENT TO COMMUNICATE SECONDARY TO CURRENT INTUBATION AND NEED FOR MECHANICAL VENTILATION. Objective:      Vitals:    01/26/21 1100 01/26/21 1115 01/26/21 1130 01/26/21 1145   BP:    (!) 121/49   Pulse: 68 69 68 70   Resp: (!) 32 (!) 32 (!) 32 (!) 32   Temp: 97.5 °F (36.4 °C) 97.3 °F (36.3 °C) 97.3 °F (36.3 °C) 97.3 °F (36.3 °C)   SpO2: 93% 93% 93% 92%   Weight:       Height:           Physical Exam:  General-Intubated. Neck- supple, no JVD  CV- regular rate and rhythm grade I/VI JEROME  Lung- coarse bilaterally  Abd- soft, nontender, nondistended  Ext- trivial edema bilaterally. Skin- warm and dry  Psychiatric:  Unable to accurately assess due to intubated and sedated status. Neurologic:  Unable to accurately assess due to intubated and sedated status. Data Review:   Labs:   Recent Labs     01/26/21  1043 01/26/21  0330 01/25/21  0311 01/25/21 0311   NA  --  137*  --  136   K  --  4.4  --  5.0   MG  --  2.5*  --  3.0*   BUN  --  60*  --  131*   CREA  --  3.48*  --  6.02*   GLU  --  115*  --  304*   WBC 12.8* 15.0*   < > 11.7*   HGB 8.3* 8.6*   < > 9.9*   HCT 26.1* 26.7*   < > 31.5*   * 137*   < > 114*   INR  --  1.2  --  1.3   TRIGL  --  74  --   --     < > = values in this interval not displayed. No results found for: TROIQ, CHARLEY, TROPT, TNIPOC    TELEMETRY:  Sinus rhythm. Assessment/Plan:     Principal Problem:    Pneumonia due to COVID-19 virus (1/7/2021)  Defer management to primary team.      Active Problems:    Obesity (53/6/5750)  Complicates management. CAD (coronary artery disease) (10/28/2016)    Holding antiplatelet therapy due to bleeding. Restart as condition stabilizes.         SAWTI (acute kidney injury) ([facility] (1/7/2021)  On dialysis. Nephrology following. Acute hypoxemic respiratory failure ([facility] (1/7/2021)  Currently intubated and paralyzed/sedated. Vent management per pulmonary      Type 2 diabetes mellitus with hyperosmolarity without nonketotic hyperglycemic-hyperosmolar coma ([facility] (1/7/2021)  Defer management to primary team.          Atrial fibrillation ([facility] (1/22/2021)  Required cardioversion yesterday. Continue amiodarone at 1 mg/min today and wean to 0.5 mg/min tomorrow if rhythm stable. High risk for recurrent arrhythmias. Hypotension (1/26/2021)  Levaphed as needed for support. Check echo.              Thais Nunes MD  1/26/2021 11:59 AM

## 2021-01-26 NOTE — CONSULTS
Palliative Care    Patient: Clarke Arredondo. MRN: [id number]  SSN: [id number]    YOB: 1960  Age: 61 y.o. Sex: male       Date of Request: 1/26/2021  Date of Consult:  1/26/2021  Reason for Consult:  family support and education and goals of care  Requesting Physician: Dr. Maxim Orozco     Assessment/Plan:     Principal Diagnosis:    Dyspnea  R06.00    Additional Diagnoses:   · Acute Respiratory Failure, Unspecified  J96.00  · Frailty  R54  · Counseling, Encounter for Medical Advice  Z71.9  · Encounter for Palliative Care  Z51.5    Palliative Performance Scale (PPS):       Medical Decision Making:   Reviewed and summarized labs and imaging. Pt sedated, paralyzed on vent. Spoke with pt sister who is next of kin. Updated on current condition and concerns. Explained difficulties with dialysis leading to hypotension. Sister expressed her thanks for ongoing care. I discussed code status and explained procedures involved during code. Sister wishes for pt to remain FULL code at this time. I reviewed tracheostomy procedure as pt day 9 on the vent. Pt states she will decide pending pt course the next few days  Will follow and updated sister to assist with goals discussions. Will discuss findings with members of the interdisciplinary team.      Thank you for this referral.         Subjective:     History obtained from:  Family and Chart    Chief Complaint: dyspnea  History of Present Illness:  66-year-old male with past medical history significant for coronary artery disease, GERD, hypertension presented to the ER because of fever cough and congestion. Patient states that he has been having subjective fevers for the last week. He also has dry cough as well as nasal congestion. He was seen in the ER at [facility] yesterday. Covid test was done and is positive  Pt remains intubated, sedated, paralyzed. Currently with renal failure.       Advance Directive: No       Code Status:  Full Code Health Care Power of : pt sister is decision maker as next of kin    Past Medical History:   Diagnosis Date    Atrial fibrillation ([facility] 01/22/2021    Gastrointestinal disorder     reflux    GERD (gastroesophageal reflux disease)     Hypertension     Lower respiratory tract infection due to COVID-19 virus 1/7/2021    Nausea & vomiting     Obesity 10/6/2015    Other ill-defined conditions(799.89)     dyspnea since surgery, wheezing, SOB    Type 2 diabetes mellitus with hyperosmolarity without nonketotic hyperglycemic-hyperosmolar coma ([facility] [address] [address] 1/7/2021    Unstable angina ([facility] 10/27/2016      Past Surgical History:   Procedure Laterality Date    HX CHOLECYSTECTOMY      HX ORTHOPAEDIC      rotator cuff; knee    HX UROLOGICAL      kidney stone surgeries x 3    LA CARDIAC SURG PROCEDURE UNLIST      stent     History reviewed. No pertinent family history. Social History     Tobacco Use    Smoking status: Never Smoker    Smokeless tobacco: Never Used   Substance Use Topics    Alcohol use: No     Prior to Admission medications    Medication Sig Start Date End Date Taking? Authorizing Provider   metoprolol tartrate (LOPRESSOR) 50 mg tablet Take 1 Tab by mouth two (2) times a day. 6/3/20  Yes Ranjana Booker MD   clopidogreL (Plavix) 75 mg tab Take 1 Tab by mouth daily. 6/3/20  Yes Ranjana Booker MD   nitroglycerin (NITROSTAT) 0.4 mg SL tablet 1 Tab by SubLINGual route every five (5) minutes as needed for Chest Pain. 6/3/20  Yes Ranjana Booker MD   magnesium oxide (MAG-OX) 400 mg tablet Take 1 Tab by mouth daily. 6/17/19  Yes Ranjana Booker MD   Aspirin, Buffered 81 mg tab Take 81 mg by mouth daily. Yes Provider, Historical   atorvastatin (LIPITOR) 80 mg tablet Take 1 Tab by mouth daily. 3/19/20   Ranjana Booker MD       Allergies   Allergen Reactions    Latex Swelling     Had a purcell catheter with swelling of urethra.  Only known latex issue in past. Wife remembers this now    Hydrocodone-Acetaminophen Itching     Wife remembers this allergy    Tessalon Perles [Benzonatate] Unknown (comments)        Review of systems unable to obtain due to mentation. Objective:     Visit Vitals  BP (!) 142/54 (BP 1 Location: Left arm, BP Patient Position: At rest;Head of bed elevated (Comment degrees))   Pulse 68   Temp 97.5 °F (36.4 °C)   Resp (!) 32   Ht 5' 8\" (1.727 m)   Wt 223 lb 12.3 oz (101.5 kg)   SpO2 93%   BMI 34.02 kg/m²        Physical Exam:    General:  sedated. Eyes:  Conjunctivae/corneas clear    Nose: Nares normal. Septum midline. Neck: Supple, symmetrical, trachea midline, no JVD   Lungs:   Coarse bilateral bs   Heart:  Regular rate and rhythm, no murmur    Abdomen:   Soft, non-tender, non-distended. Positive bowel sounds   Extremities: Normal, atraumatic, no cyanosis or edema   Skin: Skin color, texture, turgor normal. No rash or lesions.    Neurologic: sedated   Psych: sedated      Assessment:     Hospital Problems  Date Reviewed: 6/3/2020          Codes Class Noted POA    Hypotension ICD-10-CM: I95.9  ICD-9-CM: 458.9  1/26/2021 Unknown        Atrial fibrillation ([facility] ICD-10-CM: I48.91  ICD-9-CM: 427.31  1/22/2021 Yes        Hypernatremia ICD-10-CM: E87.0  ICD-9-CM: 276.0  1/8/2021 Unknown        SWATI (acute kidney injury) ([facility] ICD-10-CM: N17.9  ICD-9-CM: 584.9  1/7/2021 Unknown        Acute hypoxemic respiratory failure ([facility] ICD-10-CM: J96.01  ICD-9-CM: 518.81  1/7/2021 Unknown        Type 2 diabetes mellitus with hyperosmolarity without nonketotic hyperglycemic-hyperosmolar coma ([facility] ICD-10-CM: E11.00  ICD-9-CM: 250.20  1/7/2021 Unknown        * (Principal) Pneumonia due to COVID-19 virus ICD-10-CM: U07.1, J12.82  ICD-9-CM: 480.8  1/7/2021 Unknown        CAD (coronary artery disease) ICD-10-CM: I25.10  ICD-9-CM: 414.00  10/28/2016 Yes    Overview Signed 10/28/2016  8:05 AM by SHELDON Nye     10-27-06 [facility] 90% LAD s/p 2.25 x 20 Synergy drug-eluting stent  (CS)             Obesity ICD-10-CM: E66.9  ICD-9-CM: 278.00  10/6/2015 Yes              Signed By: Juan Alberto Szymanski MD     January 26, 2021

## 2021-01-26 NOTE — PROGRESS NOTES
Critical Care Daily Progress Note: 1/26/2021  Admission Date: 1/6/2021     The patient's chart is reviewed and the patient is discussed with the staff.       Admission Date: 1/6/2021     Length of Stay: 19 days   day 9 intubation  Background: 60 y.o. y/o male with acute hypoxemic respiratory failure secondary to COVID-19.     Onset of COVID-19 symptoms:  + COVID 01/05/21     Notable PMH: obesity, HTN, CAD, dyslipidemia, SWATI, DM, GERD  Subjective:   Ventilator Settings  Mode FIO2 Rate Tidal Volume Pressure PEEP I:E Ratio   Pressure control  60 %    454 ml     14 cm H20  1:1.49       Peak airway pressure: 31.5 cm H2O   Minute ventilation: 13.1 l/min   A fib yesterday - cardioverted yesterday pm, still on amio- in nsr  Some  Bloody gastric residuals-dark blood-getting protonix-hg ok so far  Current Facility-Administered Medications   Medication Dose Route Frequency    NOREPINephrine (LEVOPHED) 16,000 mcg in dextrose 5% 250 mL infusion  0.5-30 mcg/min IntraVENous TITRATE    heparin (porcine) 1,000 unit/mL injection 5,000 Units  5,000 Units Hemodialysis DIALYSIS PRN    bisacodyL (DULCOLAX) suppository 10 mg  10 mg Rectal DAILY    senna-docusate (PERICOLACE) 8.6-50 mg per tablet 1 Tab  1 Tab Oral DAILY    amiodarone (CORDARONE) 450 mg in dextrose 5% 250 mL infusion  0.5-1 mg/min IntraVENous TITRATE    pantoprazole (PROTONIX) 40 mg in 0.9% sodium chloride 10 mL injection  40 mg IntraVENous Q12H    promethazine (PHENERGAN) tablet 12.5 mg  12.5 mg Per NG tube Q6H PRN    Or    ondansetron (ZOFRAN) injection 4 mg  4 mg IntraVENous Q6H PRN    polyethylene glycol (MIRALAX) packet 17 g  17 g Per NG tube DAILY PRN    guaiFENesin-dextromethorphan (ROBITUSSIN DM) 100-10 mg/5 mL syrup 10 mL  10 mL Per NG tube Q4H PRN    acetaminophen (TYLENOL) tablet 650 mg  650 mg Per NG tube Q6H PRN    polyethylene glycol (MIRALAX) packet 17 g  17 g Per NG tube DAILY    insulin glargine (LANTUS) injection 32 Units  32 Units SubCUTAneous Q12H    sodium zirconium cyclosilicate (LOKELMA) powder packet 10 g  10 g Oral DAILY    hydrocortisone Sod Succ (PF) (SOLU-CORTEF) injection 50 mg  50 mg IntraVENous Q6H    white petrolatum-mineral oiL (LACRILUBE S.O.P.) ointment   Both Eyes Q12H    white petrolatum-mineral oiL (LACRILUBE S.O.P.) ointment   Both Eyes Q4H PRN    atracurium (TRACRIUM) 1,000 mg in 0.9% sodium chloride 250 mL infusion  0-15 mcg/kg/min IntraVENous TITRATE    fentaNYL in normal saline (pf) 25 mcg/mL infusion  0-200 mcg/hr IntraVENous TITRATE    midazolam in normal saline (VERSED) 1 mg/mL infusion  0-10 mg/hr IntraVENous TITRATE    atorvastatin (LIPITOR) tablet 80 mg  80 mg Per NG tube DAILY    [Held by provider] clopidogreL (PLAVIX) tablet 75 mg  75 mg Per NG tube DAILY    [Held by provider] metoprolol tartrate (LOPRESSOR) tablet 50 mg  50 mg Per NG tube BID    insulin lispro (HUMALOG) injection   SubCUTAneous Q4H    LORazepam (ATIVAN) injection 1 mg  1 mg IntraVENous Q4H PRN    morphine injection 2 mg  2 mg IntraVENous Q4H PRN    NUTRITIONAL SUPPORT ELECTROLYTE PRN ORDERS   Does Not Apply PRN    sodium chloride (NS) flush 20 mL  20 mL InterCATHeter Q8H    sodium chloride (NS) flush 20 mL  20 mL InterCATHeter PRN    loperamide (IMODIUM) capsule 2 mg  2 mg Oral Q4H PRN    dextrose 40% (GLUTOSE) oral gel 1 Tube  15 g Oral PRN    glucagon (GLUCAGEN) injection 1 mg  1 mg IntraMUSCular PRN    dextrose (D50W) injection syrg 12.5-25 g  25-50 mL IntraVENous PRN    0.9% sodium chloride infusion 250 mL  250 mL IntraVENous PRN    albuterol (PROVENTIL HFA, VENTOLIN HFA, PROAIR HFA) inhaler 2 Puff  2 Puff Inhalation Q4H PRN    influenza vaccine 2020-21 (6 mos+)(PF) (FLUARIX/FLULAVAL/FLUZONE QUAD) injection 0.5 mL  0.5 mL IntraMUSCular PRIOR TO DISCHARGE    hydrALAZINE (APRESOLINE) 20 mg/mL injection 20 mg  20 mg IntraVENous Q6H PRN       Review of Systems   Unobtainable due to patient status. Objective:     Vitals:    01/26/21 0700 01/26/21 0715 01/26/21 0720 01/26/21 0730   BP:       Pulse: 63 64 63 63   Resp:  (!) 32 (!) 32 (!) 32   Temp: 97.7 °F (36.5 °C) 97.7 °F (36.5 °C)  97.7 °F (36.5 °C)   SpO2: 94% 94% 93% 94%   Weight:       Height:             Intake/Output Summary (Last 24 hours) at 1/26/2021 0751  Last data filed at 1/26/2021 0504  Gross per 24 hour   Intake 1568.8 ml   Output 394 ml   Net 1174.8 ml         Physical Exam:          Constitutional:  intubated and mechanically ventilated.   EENMT:  Sclera clear, pupils equal, oral mucosa moist  Respiratory: crackles bilateral  Cardiovascular:  RRR with no M,G,R;  Gastrointestinal:  soft with no tenderness; positive bowel sounds present  Musculoskeletal:  warm with no cyanosis, no lower extremity edema  Skin:  no jaundice or ecchymosis  Neurologic: no gross neuro deficits     Psychiatric:  sedated    Flotrac values  CO 5,7  CI 2.8  SVV 89   svr 840  LINES:    ET tube 1/17/2021  PICC 1/13  NGT 1/17  Art line 1/18/2021     DRIPS:    Tracrium 8mcg/kg/min  Fentanyl 150mcg/hr  Versed 6mg/hr   levo 3  amio  COVID-19 medications  Decadron 1/7-16  remdesivir 1/8-1/12  Convalescent plasma 1/7     Anti-infectives  azithro (completed)  Ceftriaxone (completed)  Vanc and cefepime: D 7/7  CXR:    None today    Ventilator Settings  Mode FIO2 Rate Tidal Volume Pressure PEEP   Pressure control  60 %    454 ml     14 cm H20      Peak airway pressure: 31.5 cm H2O   Minute ventilation: 13.1 l/min     ABG:   Recent Labs     01/26/21  0253 01/25/21  0245 01/24/21  0255   PHI 7.40 7.16* 7.31*   PCO2I 43.6 47.7* 38.5   PO2I 75 106* 131*   HCO3I 26.9* 17.2* 19.4*       LAB  Recent Labs     01/26/21  0728 01/26/21  0306 01/25/21  2232 01/25/21 1957 01/25/21  1653   GLUCPOC 124* 119* 114* 110* 119*     Recent Labs     01/26/21  0330 01/25/21 2030 01/25/21 2026 01/25/21  0311 01/24/21  0500 01/24/21  0500   WBC 15.0*  --  15.3* 11.7*   < > 9.6   HGB 8.6* 8. 9* 8.9* 9.9*   < > 9.1*   HCT 26.7*  --  27.6* 31.5*   < > 28.3*   *  --  143* 114*   < > 105*   INR 1.2  --   --  1.3  --  1.3    < > = values in this interval not displayed. Recent Labs     01/26/21  0330 01/25/21  0311 01/24/21  2245   * 136 136   K 4.4 5.0 4.8    105 106   CO2 29 18* 19*   * 304* 225*   BUN 60* 131* 129*   CREA 3.48* 6.02* 5.63*   MG  --  3.0* 3.0*   PHOS  --  8.6*  --    CA 8.0* 8.4 8.6     No results for input(s): LCAD, LAC in the last 72 hours.       Patient Active Problem List   Diagnosis Code    Obesity E66.9    Hypertension I10    Bradycardia R00.1    PVC (premature ventricular contraction) I49.3    CAD (coronary artery disease) I25.10    Dyslipidemia, goal LDL below 70 E78.5    [facility] N17.9    Acute hypoxemic respiratory failure (HCC) J96.01    Type 2 diabetes mellitus with hyperosmolarity without nonketotic hyperglycemic-hyperosmolar coma (HCC) E11.00    Pneumonia due to COVID-19 virus U07.1, J12.82    Hypernatremia E87.0    Atrial fibrillation (HCC) I48.91         Assessment:  (Medical Decision Making)     Hospital Problems  Date Reviewed: 6/3/2020          Codes Class Noted POA    Atrial fibrillation ([facility] ICD-10-CM: I48.91  ICD-9-CM: 427.31  1/22/2021 Yes        Hypernatremia ICD-10-CM: E87.0  ICD-9-CM: 276.0  1/8/2021 Unknown    better    SWATI (acute kidney injury) ([facility] ICD-10-CM: N17.9  ICD-9-CM: 584.9  1/7/2021 Unknown    Minimal output    Acute hypoxemic respiratory failure (HCC) ICD-10-CM: J96.01  ICD-9-CM: 518.81  1/7/2021 Unknown        Type 2 diabetes mellitus with hyperosmolarity without nonketotic hyperglycemic-hyperosmolar coma ([facility] ICD-10-CM: E11.00  ICD-9-CM: 250.20  1/7/2021 Unknown        * (Principal) Pneumonia due to COVID-19 virus ICD-10-CM: U07.1, J12.82  ICD-9-CM: 480.8  1/7/2021 Unknown        CAD (coronary artery disease) ICD-10-CM: I25.10  ICD-9-CM: 414.00  10/28/2016 Yes    Overview Signed 10/28/2016  8:05 AM by SHELDON Bauman     10-27-06 [facility] 90% LAD s/p 2.25 x 20 Synergy drug-eluting stent  (CS)             Obesity ICD-10-CM: E66.9  ICD-9-CM: 278.00  10/6/2015 Yes    Hypotension - critically ill          Plan:  (Medical Decision Making)   1     Vent support - on 60%  2     Continue amiodarone  3     Back on levophed  4     Proned  -last time was on 1/21 -did poorly  5    Dialysis tomorrow  6    cardilogy evaluating  --  Critical care time 39 minutes  More than 50% of the time documented was spent in face-to-face contact with the patient and in the care of the patient on the floor/unit where the patient is located.     Michelle So MD

## 2021-01-26 NOTE — PROGRESS NOTES
Heart rate 58 with amio drip at 1mg/min. Martha Hagen NP notified. New parameters to maintain drip at 1mg/min overnight and contact physician for heart rate less than 50.

## 2021-01-26 NOTE — PROGRESS NOTES
[facility] PROGRESS NOTE    Follow up for:    Subjective:   Patient seen and examined.   Cardioverted yesterday and back in NSR.    ROS:  UTO     Objective:   Exam:  Vitals:    01/26/21 0730 01/26/21 0745 01/26/21 0800 01/26/21 0815   BP: (!) 142/54      Pulse: 63 65 65 66   Resp: (!) 32 (!) 32 (!) 32 30   Temp: 97.7 °F (36.5 °C) 97.7 °F (36.5 °C) 97.7 °F (36.5 °C) 97.7 °F (36.5 °C)   SpO2: 94% 92% 93% 94%   Weight:       Height:             Intake/Output Summary (Last 24 hours) at 1/26/2021 0850  Last data filed at 1/26/2021 0504  Gross per 24 hour   Intake 1568.8 ml   Output 115 ml   Net 1453.8 ml       Current Facility-Administered Medications   Medication Dose Route Frequency    NOREPINephrine (LEVOPHED) 16,000 mcg in dextrose 5% 250 mL infusion  0.5-30 mcg/min IntraVENous TITRATE    heparin (porcine) 1,000 unit/mL injection 5,000 Units  5,000 Units Hemodialysis DIALYSIS PRN    bisacodyL (DULCOLAX) suppository 10 mg  10 mg Rectal DAILY    senna-docusate (PERICOLACE) 8.6-50 mg per tablet 1 Tab  1 Tab Oral DAILY    amiodarone (CORDARONE) 450 mg in dextrose 5% 250 mL infusion  0.5-1 mg/min IntraVENous TITRATE    pantoprazole (PROTONIX) 40 mg in 0.9% sodium chloride 10 mL injection  40 mg IntraVENous Q12H    promethazine (PHENERGAN) tablet 12.5 mg  12.5 mg Per NG tube Q6H PRN    Or    ondansetron (ZOFRAN) injection 4 mg  4 mg IntraVENous Q6H PRN    polyethylene glycol (MIRALAX) packet 17 g  17 g Per NG tube DAILY PRN    guaiFENesin-dextromethorphan (ROBITUSSIN DM) 100-10 mg/5 mL syrup 10 mL  10 mL Per NG tube Q4H PRN    acetaminophen (TYLENOL) tablet 650 mg  650 mg Per NG tube Q6H PRN    polyethylene glycol (MIRALAX) packet 17 g  17 g Per NG tube DAILY    insulin glargine (LANTUS) injection 32 Units  32 Units SubCUTAneous Q12H    sodium zirconium cyclosilicate (LOKELMA) powder packet 10 g  10 g Oral DAILY    hydrocortisone Sod Succ (PF) (SOLU-CORTEF) injection 50 mg  50 mg IntraVENous Q6H    white petrolatum-mineral oiL (LACRILUBE S.O.P.) ointment   Both Eyes Q12H    white petrolatum-mineral oiL (LACRILUBE S.O.P.) ointment   Both Eyes Q4H PRN    atracurium (TRACRIUM) 1,000 mg in 0.9% sodium chloride 250 mL infusion  0-15 mcg/kg/min IntraVENous TITRATE    fentaNYL in normal saline (pf) 25 mcg/mL infusion  0-200 mcg/hr IntraVENous TITRATE    midazolam in normal saline (VERSED) 1 mg/mL infusion  0-10 mg/hr IntraVENous TITRATE    atorvastatin (LIPITOR) tablet 80 mg  80 mg Per NG tube DAILY    [Held by provider] clopidogreL (PLAVIX) tablet 75 mg  75 mg Per NG tube DAILY    [Held by provider] metoprolol tartrate (LOPRESSOR) tablet 50 mg  50 mg Per NG tube BID    insulin lispro (HUMALOG) injection   SubCUTAneous Q4H    LORazepam (ATIVAN) injection 1 mg  1 mg IntraVENous Q4H PRN    morphine injection 2 mg  2 mg IntraVENous Q4H PRN    NUTRITIONAL SUPPORT ELECTROLYTE PRN ORDERS   Does Not Apply PRN    sodium chloride (NS) flush 20 mL  20 mL InterCATHeter Q8H    sodium chloride (NS) flush 20 mL  20 mL InterCATHeter PRN    loperamide (IMODIUM) capsule 2 mg  2 mg Oral Q4H PRN    dextrose 40% (GLUTOSE) oral gel 1 Tube  15 g Oral PRN    glucagon (GLUCAGEN) injection 1 mg  1 mg IntraMUSCular PRN    dextrose (D50W) injection syrg 12.5-25 g  25-50 mL IntraVENous PRN    0.9% sodium chloride infusion 250 mL  250 mL IntraVENous PRN    albuterol (PROVENTIL HFA, VENTOLIN HFA, PROAIR HFA) inhaler 2 Puff  2 Puff Inhalation Q4H PRN    influenza vaccine 2020-21 (6 mos+)(PF) (FLUARIX/FLULAVAL/FLUZONE QUAD) injection 0.5 mL  0.5 mL IntraMUSCular PRIOR TO DISCHARGE    hydrALAZINE (APRESOLINE) 20 mg/mL injection 20 mg  20 mg IntraVENous Q6H PRN       EXAM  GEN - intubated/sedated   CV - S1, S2, RRR, no rub, murmur, or gallop  Lung - diffusely coarse  Abd - soft, nontender, BS present  Ext - no edema  Access- R IJ temp line    Recent Labs     01/26/21  0330 01/25/21 2030 01/25/21 2026 01/25/21 0311   WBC 15.0* --  15.3* 11.7*   HGB 8.6* 8.9* 8.9* 9.9*   HCT 26.7*  --  27.6* 31.5*   *  --  143* 114*        Recent Labs     01/26/21  0330 01/25/21 0311 01/24/21  2245   * 136 136   K 4.4 5.0 4.8    105 106   CO2 29 18* 19*   BUN 60* 131* 129*   CREA 3.48* 6.02* 5.63*   CA 8.0* 8.4 8.6   * 304* 225*   MG  --  3.0* 3.0*   PHOS  --  8.6*  --        Assessment and Plan:     1. SWATI - 2/2 ATN from COVID. Initiated on SLED yesterday, but minimal UF 2/2 afib w/ RVR. Plan on SLED again tomorrow with increased UF as tolerated.       2. COVID /severe ARDS  Intubated        3. Hypotension   On pressors   Maintain MAP > 65      4. Hyperkalemia   Will correct with HD.     5. Afib w/ RVR-  s/p cardioversion.   amio gtt.         Willis Torres MD

## 2021-01-26 NOTE — PROGRESS NOTES
Ventilator check complete; patient has a #8. 0 ET tube secured at the 24 at the lip. Breath sounds are diminished. Trachea is midline, Negative for subcutaneous air, and chest excursion is symmetric. Patient is also Negative for cyanosis and is Negative for pitting edema. All alarms are set and audible. Resuscitation bag is at the head of the bed. Ventilator Settings  Mode FIO2 Rate Tidal Volume Pressure PEEP I:E Ratio   Pressure control  60 %    454 ml     14 cm H20  1:1.49      Peak airway pressure: 31.5 cm H2O   Minute ventilation: 13.1 l/min     ABG: No results for input(s): PH, PCO2, PO2, HCO3 in the last 72 hours.       Juan Quach, RT

## 2021-01-26 NOTE — CONSULTS
Comprehensive Nutrition Assessment    Type and Reason for Visit: Reassess, Consult  Consult for TPN Management (Dr. Candelaria Russ)    Nutrition Recommendations/Plan:   Parenteral Nutrition:  Start TPN  15%DEX/ 5%AA at 42 ml/hr with 250 ml 20% Lipids daily  To provide: 1210 kcal/d (69% of needs), 50 grams of protein/d (36% of needs), 150 grams of CHO/d and ~1250 ml of total volume/d (~100% of needs). Lytes/L:   Sodium 80 meq (80 meq NaCl). Other additives: MTE, MVI. Vitamin and Mineral Supplement Therapy:  Electrolyte management replacement protocol have been activated. Labs:    BMP daily, Phos and Mg every MWF. Continue POC Glucoses/SSI. Malnutrition Assessment:  Malnutrition Status: At risk for malnutrition (specify)(Poor nutrition since 1/19.)  Context: Acute illness    Nutrition Assessment:   Nutrition History: 1/7/2021: Pt reports inability to tolerate any food or liquids for 4 days PTA. Indicates vomiting with all po attempts during this time      Nutrition Background: PMH remarkable for CAD, GERD, HTN, MO. Admitted with Coivd 19, new onset DM with hyperglycemic hyperosmolar state, SWATI on CKD, lactic acidosis. Daily Update:  Remains ventilated, sedated and paralyzed. Noted elevated GRV since late 1/23, now bloody overnight (1/25). TPN to start this evening due to possible GIB and TF intolerance over the last few days. Nutrition Related Findings:   Double lumen PICC (1/13), Intubated 1/17, NGT 1/17. Pt was TPN dependent from 1/14-1/19. TF initiated on 1/18. TF held 1/20-1/22 d/t levophed requirements. TF resumed on 1/22 @ 20 ml/hr and not advanced. Held on 1/25 d/t elevated GRVs. SLED initiated on 1/25. TPN started 1/26.       Current Nutrition Therapies:  NPO    Current Intake:   Average Meal Intake: NPO Average Supplement Intake: NPO      Anthropometric Measures:  Height: 5' 8\" (172.7 cm)  Current Body Wt: 101.5 kg (223 lb 12.3 oz)(1/26/21), Weight source: Not specified  BMI: 34, Obese class 1 (BMI 30.0-34. 9)  Admission Body Weight: 235 lb(\"Estimated\")  Ideal Body Wt: 154 lbs (70 kg), 130.3 %  Usual Body Wt: 101.6 kg (224 lb)([facility] 6/3/[facility] FP office; pt stated # unable to verify ), Percent weight change: -10.4          Estimated Daily Nutrient Needs:  Energy (kcal/day): 0496-0830 (Kcal/kg(25-30 ), Weight Used: Ideal(70 kg - vent, SLED))  Protein (g/day): 140-175(2-2.5 - vent, SLED) Weight Used: (Ideal(70 kg ))  Fluid (ml/day):   (Standard renal)    Nutrition Diagnosis:   · Inadequate protein-energy intake related to altered GI function, impaired respiratory function as evidenced by intubation(NPO, TF on hold d/t elevated GRVs x 2, constipation.)    · Food & nutrition-related knowledge deficit related to endocrine dysfunction as evidenced by (new DM dx, A1C 12, minimal exposure to DM information.)    Nutrition Interventions:   Food and/or Nutrient Delivery: Continue NPO, Discontinue tube feeding, Start parenteral nutrition  Nutrition Education/Counseling: Education initiated  Coordination of Nutrition Care: Continue to monitor while inpatient  Plan of Care discussed with Dr. Shweta Heller and Anali Leonardo RN. Goals:   Previous Goal Met: No progress toward goal(s)  Active Goal: Meet >75% estimated nutrition needs within 5 days    Nutrition Monitoring and Evaluation:   Behavioral-Environmental Outcomes: Knowledge or skill  Food/Nutrient Intake Outcomes: Parenteral nutrition intake/tolerance  Physical Signs/Symptoms Outcomes: Biochemical data, Constipation, Hemodynamic status    Discharge Planning: Too soon to determine    Rachel Herrera RD, LD on 1/26/2021 at 10:34 AM  Contact: [phone]        Disaster Mode active

## 2021-01-26 NOTE — PROGRESS NOTES
Ventilator check complete; patient has a #8. 0 ET tube secured at the 24 at the lip. Patient is sedated. Patient is not able to follow commands. Breath sounds are diminished. Trachea is midline, Negative for subcutaneous air, and chest excursion is symmetric. Patient is also Negative for cyanosis and is Negative for pitting edema. All alarms are set and audible. Resuscitation bag is at the head of the bed.       Ventilator Settings  Mode FIO2 Rate Tidal Volume Ppl PEEP I:E Ratio   Pressure control  75 % 32  454 ml  31 cm H20 14 cm H20  1:1.49      Peak airway pressure: 33 cm H2O   Minute ventilation: 13.4 l/min       Mildred Whitt, RT

## 2021-01-27 ENCOUNTER — APPOINTMENT (OUTPATIENT)
Dept: GENERAL RADIOLOGY | Age: 61
DRG: 004 | End: 2021-01-27
Attending: INTERNAL MEDICINE
Payer: COMMERCIAL

## 2021-01-27 LAB
ANION GAP SERPL CALC-SCNC: 9 MMOL/L (ref 7–16)
APTT PPP: 34.1 SEC (ref 24.1–35.1)
ARTERIAL PATENCY WRIST A: ABNORMAL
BASE DEFICIT BLD-SCNC: 3 MMOL/L
BASOPHILS # BLD: 0 K/UL (ref 0–0.2)
BASOPHILS NFR BLD: 0 % (ref 0–2)
BDY SITE: ABNORMAL
BUN SERPL-MCNC: 99 MG/DL (ref 8–23)
CALCIUM SERPL-MCNC: 7.8 MG/DL (ref 8.3–10.4)
CHLORIDE SERPL-SCNC: 102 MMOL/L (ref 98–107)
CO2 BLD-SCNC: 24 MMOL/L
CO2 SERPL-SCNC: 24 MMOL/L (ref 21–32)
COLLECT TIME,HTIME: 324
CREAT SERPL-MCNC: 5.37 MG/DL (ref 0.8–1.5)
DIFFERENTIAL METHOD BLD: ABNORMAL
EOSINOPHIL # BLD: 0 K/UL (ref 0–0.8)
EOSINOPHIL NFR BLD: 0 % (ref 0.5–7.8)
ERYTHROCYTE [DISTWIDTH] IN BLOOD BY AUTOMATED COUNT: 15.3 % (ref 11.9–14.6)
ERYTHROCYTE [DISTWIDTH] IN BLOOD BY AUTOMATED COUNT: 15.3 % (ref 11.9–14.6)
EXHALED MINUTE VOLUME, VE: 15.1 L/MIN
FIBRINOGEN PPP-MCNC: 454 MG/DL (ref 190–501)
GAS FLOW.O2 O2 DELIVERY SYS: ABNORMAL L/MIN
GAS FLOW.O2 SETTING OXYMISER: 32 BPM
GLUCOSE BLD STRIP.AUTO-MCNC: 268 MG/DL (ref 65–100)
GLUCOSE BLD STRIP.AUTO-MCNC: 281 MG/DL (ref 65–100)
GLUCOSE BLD STRIP.AUTO-MCNC: 284 MG/DL (ref 65–100)
GLUCOSE BLD STRIP.AUTO-MCNC: 292 MG/DL (ref 65–100)
GLUCOSE BLD STRIP.AUTO-MCNC: 297 MG/DL (ref 65–100)
GLUCOSE BLD STRIP.AUTO-MCNC: 302 MG/DL (ref 65–100)
GLUCOSE SERPL-MCNC: 292 MG/DL (ref 65–100)
HCO3 BLD-SCNC: 22.7 MMOL/L (ref 22–26)
HCT VFR BLD AUTO: 24.5 % (ref 41.1–50.3)
HCT VFR BLD AUTO: 26.3 % (ref 41.1–50.3)
HEMOCCULT STL QL: NEGATIVE
HGB BLD-MCNC: 7.9 G/DL (ref 13.6–17.2)
HGB BLD-MCNC: 8.4 G/DL (ref 13.6–17.2)
IMM GRANULOCYTES # BLD AUTO: 0.3 K/UL (ref 0–0.5)
IMM GRANULOCYTES NFR BLD AUTO: 3 % (ref 0–5)
INR PPP: 1.2
LYMPHOCYTES # BLD: 0.5 K/UL (ref 0.5–4.6)
LYMPHOCYTES NFR BLD: 4 % (ref 13–44)
MAGNESIUM SERPL-MCNC: 2.8 MG/DL (ref 1.8–2.4)
MCH RBC QN AUTO: 29.6 PG (ref 26.1–32.9)
MCH RBC QN AUTO: 29.9 PG (ref 26.1–32.9)
MCHC RBC AUTO-ENTMCNC: 31.9 G/DL (ref 31.4–35)
MCHC RBC AUTO-ENTMCNC: 32.2 G/DL (ref 31.4–35)
MCV RBC AUTO: 92.6 FL (ref 79.6–97.8)
MCV RBC AUTO: 92.8 FL (ref 79.6–97.8)
MONOCYTES # BLD: 0.9 K/UL (ref 0.1–1.3)
MONOCYTES NFR BLD: 6 % (ref 4–12)
NEUTS SEG # BLD: 12 K/UL (ref 1.7–8.2)
NEUTS SEG NFR BLD: 87 % (ref 43–78)
NRBC # BLD: 0 K/UL (ref 0–0.2)
NRBC # BLD: 0 K/UL (ref 0–0.2)
O2/TOTAL GAS SETTING VFR VENT: 60 %
PCO2 BLD: 43 MMHG (ref 35–45)
PEEP RESPIRATORY: 14 CMH2O
PH BLD: 7.33 [PH] (ref 7.35–7.45)
PHOSPHATE SERPL-MCNC: 7.2 MG/DL (ref 2.3–3.7)
PLATELET # BLD AUTO: 104 K/UL (ref 150–450)
PLATELET # BLD AUTO: 119 K/UL (ref 150–450)
PMV BLD AUTO: 10.7 FL (ref 9.4–12.3)
PMV BLD AUTO: 10.9 FL (ref 9.4–12.3)
PO2 BLD: 80 MMHG (ref 75–100)
POTASSIUM SERPL-SCNC: 4.6 MMOL/L (ref 3.5–5.1)
PRESSURE CONTROL, IPC: 18
PROTHROMBIN TIME: 15.8 SEC (ref 12.5–14.7)
RBC # BLD AUTO: 2.64 M/UL (ref 4.23–5.6)
RBC # BLD AUTO: 2.84 M/UL (ref 4.23–5.6)
SAO2 % BLD: 95 % (ref 95–98)
SERVICE CMNT-IMP: ABNORMAL
SERVICE CMNT-IMP: ABNORMAL
SODIUM SERPL-SCNC: 135 MMOL/L (ref 136–145)
SPECIMEN TYPE: ABNORMAL
VENTILATION MODE VENT: ABNORMAL
WBC # BLD AUTO: 13.7 K/UL (ref 4.3–11.1)
WBC # BLD AUTO: 16.8 K/UL (ref 4.3–11.1)

## 2021-01-27 PROCEDURE — 99232 SBSQ HOSP IP/OBS MODERATE 35: CPT | Performed by: INTERNAL MEDICINE

## 2021-01-27 PROCEDURE — 74011636637 HC RX REV CODE- 636/637: Performed by: INTERNAL MEDICINE

## 2021-01-27 PROCEDURE — 65620000000 HC RM CCU GENERAL

## 2021-01-27 PROCEDURE — 36592 COLLECT BLOOD FROM PICC: CPT

## 2021-01-27 PROCEDURE — 74011250637 HC RX REV CODE- 250/637: Performed by: INTERNAL MEDICINE

## 2021-01-27 PROCEDURE — 82803 BLOOD GASES ANY COMBINATION: CPT

## 2021-01-27 PROCEDURE — 71045 X-RAY EXAM CHEST 1 VIEW: CPT

## 2021-01-27 PROCEDURE — 85025 COMPLETE CBC W/AUTO DIFF WBC: CPT

## 2021-01-27 PROCEDURE — 74011000258 HC RX REV CODE- 258: Performed by: INTERNAL MEDICINE

## 2021-01-27 PROCEDURE — 74011000258 HC RX REV CODE- 258: Performed by: NURSE PRACTITIONER

## 2021-01-27 PROCEDURE — 74011250636 HC RX REV CODE- 250/636: Performed by: NURSE PRACTITIONER

## 2021-01-27 PROCEDURE — 74011250636 HC RX REV CODE- 250/636: Performed by: INTERNAL MEDICINE

## 2021-01-27 PROCEDURE — 80048 BASIC METABOLIC PNL TOTAL CA: CPT

## 2021-01-27 PROCEDURE — 74011000250 HC RX REV CODE- 250: Performed by: INTERNAL MEDICINE

## 2021-01-27 PROCEDURE — 94003 VENT MGMT INPAT SUBQ DAY: CPT

## 2021-01-27 PROCEDURE — 84100 ASSAY OF PHOSPHORUS: CPT

## 2021-01-27 PROCEDURE — 85027 COMPLETE CBC AUTOMATED: CPT

## 2021-01-27 PROCEDURE — C9113 INJ PANTOPRAZOLE SODIUM, VIA: HCPCS | Performed by: INTERNAL MEDICINE

## 2021-01-27 PROCEDURE — 74011250637 HC RX REV CODE- 250/637: Performed by: ANESTHESIOLOGY

## 2021-01-27 PROCEDURE — 82962 GLUCOSE BLOOD TEST: CPT

## 2021-01-27 PROCEDURE — 85384 FIBRINOGEN ACTIVITY: CPT

## 2021-01-27 PROCEDURE — 99233 SBSQ HOSP IP/OBS HIGH 50: CPT | Performed by: INTERNAL MEDICINE

## 2021-01-27 PROCEDURE — 82272 OCCULT BLD FECES 1-3 TESTS: CPT

## 2021-01-27 PROCEDURE — 85730 THROMBOPLASTIN TIME PARTIAL: CPT

## 2021-01-27 PROCEDURE — 74011250636 HC RX REV CODE- 250/636: Performed by: ANESTHESIOLOGY

## 2021-01-27 PROCEDURE — 83735 ASSAY OF MAGNESIUM: CPT

## 2021-01-27 PROCEDURE — 90945 DIALYSIS ONE EVALUATION: CPT

## 2021-01-27 PROCEDURE — 85610 PROTHROMBIN TIME: CPT

## 2021-01-27 PROCEDURE — 2709999900 HC NON-CHARGEABLE SUPPLY

## 2021-01-27 RX ADMIN — Medication 125 MCG/HR: at 12:23

## 2021-01-27 RX ADMIN — MIDAZOLAM HYDROCHLORIDE 5 MG/HR: 5 INJECTION, SOLUTION INTRAMUSCULAR; INTRAVENOUS at 05:44

## 2021-01-27 RX ADMIN — PANTOPRAZOLE SODIUM 40 MG: 40 INJECTION, POWDER, FOR SOLUTION INTRAVENOUS at 09:54

## 2021-01-27 RX ADMIN — HYDROCORTISONE SODIUM SUCCINATE 50 MG: 100 INJECTION, POWDER, FOR SOLUTION INTRAMUSCULAR; INTRAVENOUS at 12:16

## 2021-01-27 RX ADMIN — ATRACURIUM BESYLATE 8 MCG/KG/MIN: 10 INJECTION, SOLUTION INTRAVENOUS at 13:36

## 2021-01-27 RX ADMIN — Medication 20 ML: at 13:37

## 2021-01-27 RX ADMIN — INSULIN GLARGINE 32 UNITS: 100 INJECTION, SOLUTION SUBCUTANEOUS at 08:00

## 2021-01-27 RX ADMIN — INSULIN LISPRO 9 UNITS: 100 INJECTION, SOLUTION INTRAVENOUS; SUBCUTANEOUS at 15:39

## 2021-01-27 RX ADMIN — DOCUSATE SODIUM 50 MG AND SENNOSIDES 8.6 MG 1 TABLET: 8.6; 5 TABLET, FILM COATED ORAL at 09:53

## 2021-01-27 RX ADMIN — INSULIN LISPRO 12 UNITS: 100 INJECTION, SOLUTION INTRAVENOUS; SUBCUTANEOUS at 12:24

## 2021-01-27 RX ADMIN — AMIODARONE HYDROCHLORIDE 0.5 MG/MIN: 50 INJECTION, SOLUTION INTRAVENOUS at 17:57

## 2021-01-27 RX ADMIN — SODIUM CHLORIDE: 234 INJECTION, SOLUTION INTRAVENOUS at 17:58

## 2021-01-27 RX ADMIN — HYDROCORTISONE SODIUM SUCCINATE 50 MG: 100 INJECTION, POWDER, FOR SOLUTION INTRAMUSCULAR; INTRAVENOUS at 23:54

## 2021-01-27 RX ADMIN — POLYETHYLENE GLYCOL 3350 17 G: 17 POWDER, FOR SOLUTION ORAL at 09:54

## 2021-01-27 RX ADMIN — MINERAL OIL, PETROLATUM: 425; 568 OINTMENT OPHTHALMIC at 20:58

## 2021-01-27 RX ADMIN — PANTOPRAZOLE SODIUM 40 MG: 40 INJECTION, POWDER, FOR SOLUTION INTRAVENOUS at 20:58

## 2021-01-27 RX ADMIN — I.V. FAT EMULSION 250 ML: 20 EMULSION INTRAVENOUS at 17:58

## 2021-01-27 RX ADMIN — HYDROCORTISONE SODIUM SUCCINATE 50 MG: 100 INJECTION, POWDER, FOR SOLUTION INTRAMUSCULAR; INTRAVENOUS at 17:58

## 2021-01-27 RX ADMIN — ATORVASTATIN CALCIUM 80 MG: 40 TABLET, FILM COATED ORAL at 09:52

## 2021-01-27 RX ADMIN — INSULIN LISPRO 9 UNITS: 100 INJECTION, SOLUTION INTRAVENOUS; SUBCUTANEOUS at 03:32

## 2021-01-27 RX ADMIN — INSULIN LISPRO 9 UNITS: 100 INJECTION, SOLUTION INTRAVENOUS; SUBCUTANEOUS at 00:12

## 2021-01-27 RX ADMIN — HYDROCORTISONE SODIUM SUCCINATE 50 MG: 100 INJECTION, POWDER, FOR SOLUTION INTRAMUSCULAR; INTRAVENOUS at 05:31

## 2021-01-27 RX ADMIN — INSULIN GLARGINE 32 UNITS: 100 INJECTION, SOLUTION SUBCUTANEOUS at 20:57

## 2021-01-27 RX ADMIN — AMIODARONE HYDROCHLORIDE 0.5 MG/MIN: 50 INJECTION, SOLUTION INTRAVENOUS at 03:15

## 2021-01-27 RX ADMIN — SODIUM ZIRCONIUM CYCLOSILICATE 10 G: 10 POWDER, FOR SUSPENSION ORAL at 09:53

## 2021-01-27 RX ADMIN — Medication 20 ML: at 21:00

## 2021-01-27 RX ADMIN — Medication 20 ML: at 05:41

## 2021-01-27 RX ADMIN — INSULIN LISPRO 9 UNITS: 100 INJECTION, SOLUTION INTRAVENOUS; SUBCUTANEOUS at 23:54

## 2021-01-27 RX ADMIN — INSULIN LISPRO 9 UNITS: 100 INJECTION, SOLUTION INTRAVENOUS; SUBCUTANEOUS at 07:59

## 2021-01-27 RX ADMIN — INSULIN LISPRO 9 UNITS: 100 INJECTION, SOLUTION INTRAVENOUS; SUBCUTANEOUS at 20:57

## 2021-01-27 RX ADMIN — MINERAL OIL, PETROLATUM: 425; 568 OINTMENT OPHTHALMIC at 09:54

## 2021-01-27 NOTE — PROGRESS NOTES
I received an update about the patient from the patient's nurse. A prayer was offered for the patient from the beckman outside of the room . This is a covid patient and I did not enter the patient's room  I utilized appropriate ppe when needed.           Lele Bush, [facility]– [place], [facility]

## 2021-01-27 NOTE — PROGRESS NOTES
Critical Care Daily Progress Note: 1/27/2021  Admission Date: 1/6/2021     The patient's chart is reviewed and the patient is discussed with the staff.       Admission Date: 1/6/2021     Length of Stay: 21 days   day 9 intubation  Background: 60 y.o. y/o male with acute hypoxemic respiratory failure secondary to COVID-19.     Onset of COVID-19 symptoms:  + COVID 01/05/21     Notable PMH: obesity, HTN, CAD, dyslipidemia, SWATI, DM, GERD  Subjective:   Remains on vent support-  On 60% fio2-p/f  For 8 hours crrt  Off levophed  Started on tpn as he has been unable to take tube feeds    Current Facility-Administered Medications   Medication Dose Route Frequency    atracurium (TRACRIUM) 1,000 mg in 0.9% sodium chloride 250 mL infusion  0-15 mcg/kg/min IntraVENous TITRATE    TPN ADULT-CENTRAL - dextrose 15% amino acid 5%   IntraVENous QPM    NOREPINephrine (LEVOPHED) 16,000 mcg in dextrose 5% 250 mL infusion  0.5-30 mcg/min IntraVENous TITRATE    TPN ADULT-CENTRAL - dextrose 15% amino acid 5%   IntraVENous QPM    fat emulsion 20% (LIPOSYN, INTRAlipid) infusion 250 mL  250 mL IntraVENous QPM    heparin (porcine) 1,000 unit/mL injection 5,000 Units  5,000 Units Hemodialysis DIALYSIS PRN    senna-docusate (PERICOLACE) 8.6-50 mg per tablet 1 Tab  1 Tab Oral DAILY    amiodarone (CORDARONE) 450 mg in dextrose 5% 250 mL infusion  0.5-1 mg/min IntraVENous TITRATE    pantoprazole (PROTONIX) 40 mg in 0.9% sodium chloride 10 mL injection  40 mg IntraVENous Q12H    promethazine (PHENERGAN) tablet 12.5 mg  12.5 mg Per NG tube Q6H PRN    Or    ondansetron (ZOFRAN) injection 4 mg  4 mg IntraVENous Q6H PRN    polyethylene glycol (MIRALAX) packet 17 g  17 g Per NG tube DAILY PRN    guaiFENesin-dextromethorphan (ROBITUSSIN DM) 100-10 mg/5 mL syrup 10 mL  10 mL Per NG tube Q4H PRN    acetaminophen (TYLENOL) tablet 650 mg  650 mg Per NG tube Q6H PRN    polyethylene glycol (MIRALAX) packet 17 g  17 g Per NG tube DAILY    insulin glargine (LANTUS) injection 32 Units  32 Units SubCUTAneous Q12H    sodium zirconium cyclosilicate (LOKELMA) powder packet 10 g  10 g Oral DAILY    hydrocortisone Sod Succ (PF) (SOLU-CORTEF) injection 50 mg  50 mg IntraVENous Q6H    white petrolatum-mineral oiL (LACRILUBE S.O.P.) ointment   Both Eyes Q12H    white petrolatum-mineral oiL (LACRILUBE S.O.P.) ointment   Both Eyes Q4H PRN    fentaNYL in normal saline (pf) 25 mcg/mL infusion  0-200 mcg/hr IntraVENous TITRATE    midazolam in normal saline (VERSED) 1 mg/mL infusion  0-10 mg/hr IntraVENous TITRATE    atorvastatin (LIPITOR) tablet 80 mg  80 mg Per NG tube DAILY    [Held by provider] clopidogreL (PLAVIX) tablet 75 mg  75 mg Per NG tube DAILY    [Held by provider] metoprolol tartrate (LOPRESSOR) tablet 50 mg  50 mg Per NG tube BID    insulin lispro (HUMALOG) injection   SubCUTAneous Q4H    LORazepam (ATIVAN) injection 1 mg  1 mg IntraVENous Q4H PRN    morphine injection 2 mg  2 mg IntraVENous Q4H PRN    NUTRITIONAL SUPPORT ELECTROLYTE PRN ORDERS   Does Not Apply PRN    sodium chloride (NS) flush 20 mL  20 mL InterCATHeter Q8H    sodium chloride (NS) flush 20 mL  20 mL InterCATHeter PRN    loperamide (IMODIUM) capsule 2 mg  2 mg Oral Q4H PRN    dextrose 40% (GLUTOSE) oral gel 1 Tube  15 g Oral PRN    glucagon (GLUCAGEN) injection 1 mg  1 mg IntraMUSCular PRN    dextrose (D50W) injection syrg 12.5-25 g  25-50 mL IntraVENous PRN    0.9% sodium chloride infusion 250 mL  250 mL IntraVENous PRN    albuterol (PROVENTIL HFA, VENTOLIN HFA, PROAIR HFA) inhaler 2 Puff  2 Puff Inhalation Q4H PRN    influenza vaccine 2020-21 (6 mos+)(PF) (FLUARIX/FLULAVAL/FLUZONE QUAD) injection 0.5 mL  0.5 mL IntraMUSCular PRIOR TO DISCHARGE    hydrALAZINE (APRESOLINE) 20 mg/mL injection 20 mg  20 mg IntraVENous Q6H PRN       Review of Systems   Unobtainable due to patient status.           Objective:     Vitals:    01/27/21 0900 01/27/21 0915 01/27/21 0930 01/27/21 0937   BP:    (!) 165/71   Pulse: 67 69 69 69   Resp: (!) 31 (!) 32 (!) 31    Temp:       SpO2: 96% 95% 95%    Weight:       Height:             Intake/Output Summary (Last 24 hours) at 1/27/2021 1110  Last data filed at 1/27/2021 1046  Gross per 24 hour   Intake 2159.15 ml   Output 409 ml   Net 1750.15 ml         Physical Exam:          Constitutional:  intubated and mechanically ventilated.   EENMT:  Sclera clear, pupils equal, oral mucosa moist  Respiratory: crackles bilateral  Cardiovascular:  RRR with no M,G,R;  Gastrointestinal:  soft with no tenderness; positive bowel sounds present  Musculoskeletal:  warm with no cyanosis, no lower extremity edema  Skin:  no jaundice or ecchymosis  Neurologic: no gross neuro deficits     Psychiatric:  sedated     Flotrac values  CO 9.2  CI 2.4.5  SVV 121   svr  540  LINES:    ET tube 1/17/2021  PICC 1/13  NGT 1/17  Art line 1/18/2021     DRIPS:    Tracrium 8mcg/kg/min  Fentanyl 150mcg/hr  Versed 6mg/hr   levo 0  amio  tpn  COVID-19 medications  Decadron 1/7-16  remdesivir 1/8-1/12  Convalescent plasma 1/7     Anti-infectives  azithro (completed)  Ceftriaxone (completed)  Vanc and cefepime: D 7/7  CXR:        Ventilator Settings  Mode FIO2 Rate Tidal Volume Pressure PEEP   Pressure control  60 %    454 ml     14 cm H20      Peak airway pressure: 31.8 cm H2O   Minute ventilation: 13.7 l/min     ABG:   Recent Labs     01/27/21  0324 01/26/21  0253 01/25/21  0245   PHI 7.33* 7.40 7.16*   PCO2I 43.0 43.6 47.7*   PO2I 80 75 106*   HCO3I 22.7 26.9* 17.2*       LAB  Recent Labs     01/27/21  0741 01/27/21  0330 01/27/21  0004 01/26/21  2016 01/26/21  1546   GLUCPOC 297* 284* 268* 215* 145*     Recent Labs     01/27/21  0321 01/26/21  2237 01/26/21  1616 01/26/21  0330 01/26/21  0330 01/25/21 0311 01/25/21 0311   WBC 13.7* 13.0* 14.2*   < > 15.0*   < > 11.7*   HGB 7.9* 7.9* 8.5*   < > 8.6*   < > 9.9*   HCT 24.5* 24.7* 25.6*   < > 26.7*   < > 31.5*   * 115* 120*   < > 137*   < > 114*   INR 1.2  --   --   --  1.2  --  1.3    < > = values in this interval not displayed. Recent Labs     01/27/21  0321 01/26/21  0330 01/25/21  0311   * 137* 136   K 4.6 4.4 5.0    103 105   CO2 24 29 18*   * 115* 304*   BUN 99* 60* 131*   CREA 5.37* 3.48* 6.02*   MG 2.8* 2.5* 3.0*   PHOS 7.2* 4.4* 8.6*   CA 7.8* 8.0* 8.4     No results for input(s): LCAD, LAC in the last 72 hours.       Patient Active Problem List   Diagnosis Code    Obesity E66.9    Hypertension I10    Bradycardia R00.1    PVC (premature ventricular contraction) I49.3    CAD (coronary artery disease) I25.10    Dyslipidemia, goal LDL below 70 E78.5    [facility]) ([facility] N17.9    Acute hypoxemic respiratory failure (HCC) J96.01    Type 2 diabetes mellitus with hyperosmolarity without nonketotic hyperglycemic-hyperosmolar coma ([facility] E11.00    Pneumonia due to COVID-19 virus U07.1, J12.82    Hypernatremia E87.0    Atrial fibrillation (HCC) I48.91    Hypotension I95.9         Assessment:  (Medical Decision Making)     Hospital Problems  Date Reviewed: 6/3/2020          Codes Class Noted POA    Hypotension ICD-10-CM: I95.9  ICD-9-CM: 458.9  1/26/2021 Unknown    Off levophed for now    Atrial fibrillation ([facility] ICD-10-CM: I48.91  ICD-9-CM: 427.31  1/22/2021 Yes        Hypernatremia ICD-10-CM: E87.0  ICD-9-CM: 276.0  1/8/2021 Unknown        SWATI (acute kidney injury) ([facility] ICD-10-CM: N17.9  ICD-9-CM: 584.9  1/7/2021 Unknown    dialysis    Acute hypoxemic respiratory failure ([facility] ICD-10-CM: J96.01  ICD-9-CM: 518.81  1/7/2021 Unknown    ongoing    Type 2 diabetes mellitus with hyperosmolarity without nonketotic hyperglycemic-hyperosmolar coma ([facility] ICD-10-CM: E11.00  ICD-9-CM: 250.20  1/7/2021 Unknown        * (Principal) Pneumonia due to COVID-19 virus ICD-10-CM: U07.1, J12.82  ICD-9-CM: 480.8  1/7/2021 Unknown        CAD (coronary artery disease) ICD-10-CM: I25.10  ICD-9-CM: 414.00  10/28/2016 Yes    Overview Signed 10/28/2016  8:05 AM by SHELDON Talley     10-27-06 [facility] 90% LAD s/p 2.25 x 20 Synergy drug-eluting stent  (CS)             Obesity ICD-10-CM: E66.9  ICD-9-CM: 278.00  10/6/2015 Yes              Plan:  (Medical Decision Making)   1    crrt- fluid removal today  2    Vent support- ongoing  3    Amiodarone for a fib  4    Weaned off levophed  5    tpn  --    More than 50% of the time documented was spent in face-to-face contact with the patient and in the care of the patient on the floor/unit where the patient is located.     Darel Mcburney, MD

## 2021-01-27 NOTE — DIABETES MGMT
Patient's blood glucose ranged 115-215 yesterday with patient receiving Lantus 32 units, Humalog 9 units, and SoluCortef 250 mg. Blood glucose 297 this morning. Hgb 7. 9. Plt 119, Cr 3.39. GFR 12. Mg 2. 8. Phos 7.2. Patient remains on vent and Levo, amio, and switched to TPN. Will continue to follow along.

## 2021-01-27 NOTE — PROGRESS NOTES
1/27/2021 3:41 PM    Admit Date: 1/6/2021    Admit Diagnosis: Type 2 diabetes mellitus with hyperosmolarity without nonketotic hyperglycemic-hyperosmolar coma ([address] [E11.00]; Lower respiratory tract infection due to COVID-19 virus [U07.1, J22]; Acute hypoxemic respiratory failure (HCC) [J96.01];SWATI (acute kidney injury) ([address] [N17.9]      Subjective:   No ros intubated- tele- nsr      Objective:      Visit Vitals  BP (!) 134/44 (BP 1 Location: Left arm, BP Patient Position: At rest;Head of bed elevated (Comment degrees))   Pulse 67   Temp 97.6 °F (36.4 °C)   Resp 15   Ht 5' 8\" (1.727 m)   Wt 227 lb 15.3 oz (103.4 kg)   SpO2 94%   BMI 34.66 kg/m²       Physical Exam:  Physical Exam will not change cardiac management at this time during covid 19 pandemic          Data Review:   Recent Labs     01/27/21  1057 01/27/21  0321   NA  --  135*   K  --  4.6   BUN  --  99*   CREA  --  5.37*   WBC 16.8* 13.7*   HGB 8.4* 7.9*   HCT 26.3* 24.5*   * 119*   INR  --  1.2       Assessment/Plan:     Principal Problem:    Pneumonia due to COVID-19 virus (1/7/2021)    Active Problems:    Obesity (10/6/2015)      CAD (coronary artery disease) (10/28/2016)Stable. Continue current medical therapy.       Overview: 10-27-06 [facility] 90% LAD s/p 2.25 x 20 Synergy drug-eluting stent  (CS)      SWATI (acute kidney injury) ([address] (1/7/2021)      Acute hypoxemic respiratory failure ([address] (1/7/2021)      Type 2 diabetes mellitus with hyperosmolarity without nonketotic hyperglycemic-hyperosmolar coma ([address] (1/7/2021)      Hypernatremia (1/8/2021)      Atrial fibrillation ([address] (1/22/2021)in nsr- continue amio 0.5mg/min while intubated      Hypotension (1/26/2021)

## 2021-01-27 NOTE — PROGRESS NOTES
Palliative Care Progress Note    Patient: Deondre Shaver. MRN: [id number]  SSN: [id number]    YOB: 1960  Age: 61 y.o. Sex: male       Assessment/Plan:     Chief Complaint/Interval History: paralyzed and sedated. Running SLED today    Principal Diagnosis:    · Dyspnea  R06.00    Additional Diagnoses:   · Acute Respiratory Failure, Unspecified  J96.00  · Frailty  R54  · Counseling, Encounter for Medical Advice  Z71.9  · Encounter for Palliative Care  Z51.5    Palliative Performance Scale (PPS)       Medical Decision Making:   Reviewed and summarized labs and imaging. Spoke with sister via phone. Updated on current condition. She is appreciative of ongoing care and remains hopeful for improvement. She states she would approve for trach should pt be stable to tolerate. I reviewed timeline for need and will update her on pt condition to help with decisions. Will discuss findings with members of the interdisciplinary team.      More than 50% of this 25 minute visit was spent counseling and coordination of care as outlined above. Subjective:     Review of Systems unable to obtain due to mentation     Objective:     Visit Vitals  BP (!) 165/71   Pulse 69   Temp 97.8 °F (36.6 °C)   Resp (!) 31   Ht 5' 8\" (1.727 m)   Wt 227 lb 15.3 oz (103.4 kg)   SpO2 95%   BMI 34.66 kg/m²       Physical Exam:    General:  sedated   Eyes:  Conjunctivae/corneas clear    Nose: Nares normal. Septum midline. Neck: Supple, symmetrical, trachea midline, no JVD   Lungs:   Coarse bilateral bs   Heart:  Regular rate and rhythm, no murmur    Abdomen:   Soft, non-tender, non-distended   Extremities: Normal, atraumatic, no cyanosis or edema   Skin: Skin color, texture, turgor normal. No rash or lesions.    Neurologic: sedated   Psych: sedated     Signed By: Adelfo Palmer MD     January 27, 2021

## 2021-01-27 NOTE — PROGRESS NOTES
Ventilator check complete; patient has a #8. 0 ET tube secured at the 24 at the teeth. Patient is sedated. Patient is not able to follow commands. Breath sounds are diminished. Trachea is midline, Negative for subcutaneous air, and chest excursion is symmetric. Patient is also Negative for cyanosis. All alarms are set and audible. Resuscitation bag is at the head of the bed.       Ventilator Settings  Mode FIO2 Rate Tidal Volume Pressure PEEP I:E Ratio   Pressure control  60 % 32  18 cmH20 14 cm H20  1:1.49      Peak airway pressure: 31.8 cm H2O   Minute ventilation: 13.7 l/min         Libia Simpson RT

## 2021-01-27 NOTE — PROGRESS NOTES
Bedside, Verbal and Written shift change report given to Baldemar Holder RN and Napoleon Adames RN (oncoming nurse) by Yash Pulliam RN (offgoing nurse). Report included the following information SBAR, Kardex, Intake/Output, MAR, Recent Results, Med Rec Status, Cardiac Rhythm NSR and Alarm Parameters . Fentanyl and Versed drips verified.

## 2021-01-27 NOTE — PROGRESS NOTES
Comprehensive Nutrition Assessment    Type and Reason for Visit: Reassess  TPN Management [facility])    Nutrition Recommendations/Plan:   Parenteral Nutrition:  Continue current TPN    15%DEX/ 5%AA at 1 L with 250 ml 20% Lipids daily. · To provide: 1210 kcal/d (69% of needs), 50 grams of protein/d (36% of needs), 150 grams of CHO/d and ~1250 ml of total volume/d (~100% of needs). · Lytes/L:   · Sodium 80 meq (80 meq NaCl). · Other additives: MTE, MVI. · Vitamin and Mineral Supplement Therapy:  · Electrolyte management replacement protocol have been activated. · Labs:   · BMP daily, Phos and Mg every MWF. Continue POC Glucoses/SSI. Malnutrition Assessment:  Malnutrition Status: At risk for malnutrition (specify)(Poor nutrition since 1/19.)    Nutrition Assessment:   Nutrition History: 1/7/2021: Pt reports inability to tolerate any food or liquids for 4 days PTA. Indicates vomiting with all po attempts during this time      Nutrition Background: PMH remarkable for CAD, GERD, HTN, MO. Admitted with Coivd 19, new onset DM with hyperglycemic hyperosmolar state, SWATI on CKD, lactic acidosis. Daily Update:  Remains ventilated, sedated, paralyzed and HD-dependent. TPN was started last evening (1/26) due to ongoing TF intolerance. Attempted SLED on 1/25 but stopped due to AF w/RVR. 8-hr SLED scheduled for today. Abdominal Status (last documented): Soft abdomen with Hypoactive  bowel sounds. Last BM 01/27/21. Pertinent Medications: Solucortef, Lantus AM & PM, SSI coverage, Miralax, Pericolace  Drips: Cordarone, Tracrium, Fentanyl, Versed  IVF: none  Pertinent Labs: potassium 4.6, AM glucose 292, BUN 99, creatinine 5.37, phosphorus 7.2, magnesium 2.8; POC glucose (1/26) 119,125,162,145,215 and (1/26) 268, 284,297 - patient did not receive PM Lantus dose 1/25 and AM Lantus dose 1/26. Nutrition Related Findings:   Double lumen PICC (1/13), Intubated 1/17, NGT 1/17.  Pt was TPN dependent from 1/14-1/19. TF initiated on 1/18. TF held 1/20-1/22 d/t levophed requirements. TF resumed on 1/22 @ 20 ml/hr and not advanced. Held on 1/25 d/t elevated GRVs. SLED initiated on 1/25. TPN started 1/26. Current Nutrition Therapies:  Current Parenteral Nutrition Orders:  · Type and Formula: 5%AA/15%DEX   · Lipids: 250ml, Daily  · Duration: Continuous  · Rate/Volume: 1L/42 ml/hr  · Current PN Order Provides: 1210 calories/day, 50 gm protein/day in ~1250 ml volume/day    Current Intake:   Average Meal Intake: NPO Average Supplement Intake: NPO      Anthropometric Measures:  Height: 5' 8\" (172.7 cm)  Current Body Wt: 103.4 kg (227 lb 15.3 oz)(1/27/21), Weight source: Not specified  BMI: 34.7, Obese class 1 (BMI 30.0-34. 9)  Admission Body Weight: 235 lb(\"Estimated\")  Ideal Body Wt: 154 lbs (70 kg), 130.3 %  Usual Body Wt: 101.6 kg (224 lb)([facility] 6/3/2020 FP office; pt stated # unable to verify ), Percent weight change: -10.4          Estimated Daily Nutrient Needs:  Energy (kcal/day): 7823-7831 (Kcal/kg(25-30 ), Weight Used: Ideal(70 kg - vent, SLED))  Protein (g/day): 140-175(2-2.5 - vent, SLED) Weight Used: (Ideal(70 kg ))  Fluid (ml/day):   (Standard renal)    Nutrition Diagnosis:   · Inadequate protein-energy intake related to altered GI function, impaired respiratory function as evidenced by intubation(NPO, TF on hold d/t elevated GRVs x 2, poss GIB,TPN)    · Food & nutrition-related knowledge deficit related to endocrine dysfunction as evidenced by (new DM dx, A1C 12, minimal exposure to DM information.)    Nutrition Interventions:   Food and/or Nutrient Delivery: Continue NPO, Modify parenteral nutrition  Nutrition Education/Counseling: Education initiated  Coordination of Nutrition Care: Continue to monitor while inpatient  Plan of Care discussed with AQUILINO Moore    Goals:   Previous Goal Met: Progressing toward goal(s)  Active Goal: Meet >75% estimated nutrition needs within 5 days    Nutrition Monitoring and Evaluation:   Behavioral-Environmental Outcomes: Knowledge or skill  Food/Nutrient Intake Outcomes: Parenteral nutrition intake/tolerance  Physical Signs/Symptoms Outcomes: Biochemical data, Constipation, Hemodynamic status    Discharge Planning: Too soon to determine    Oskar Abler.  Vish Mckinnon RD, LD on 1/27/2021 at 10:43 AM  Contact: [phone]        Disaster Mode active

## 2021-01-27 NOTE — PROGRESS NOTES
Ventilator check complete; patient has a #8. 0 ET tube secured at the 24 at the teeth. Patient is sedated. Patient is not able to follow commands. Breath sounds are diminished. Trachea is midline, Negative for subcutaneous air, and chest excursion is symmetric. Patient is also Negative for cyanosis and is Negative for pitting edema. All alarms are set and audible. Resuscitation bag is at the head of the bed.       Ventilator Settings  Mode FIO2 Rate PC Ppl PEEP I:E Ratio   Pressure control  60 % 32 18 cm H20 29 cm H20  14 cm H20  1:1.49      Peak airway pressure: 32 cm H2O   Minute ventilation: 14.4 l/min       Renato Mobley, RT

## 2021-01-27 NOTE — DIALYSIS
Dialysis treatment ended after 6.5 hour d/t interruption in water supply. Dr. Keegan Hammond aware. All blood returned. Lines flushed and capped. Primary RN briefed at bedside.

## 2021-01-27 NOTE — PROGRESS NOTES
[facility] PROGRESS NOTE    Follow up for:    Subjective:   Patient seen and examined on SLED. Tolerating much better today.     ROS:  UTO     Objective:   Exam:  Vitals:    01/27/21 0900 01/27/21 0915 01/27/21 0930 01/27/21 0937   BP:    (!) 165/71   Pulse: 67 69 69 69   Resp: (!) 31 (!) 32 (!) 31    Temp:       SpO2: 96% 95% 95%    Weight:       Height:             Intake/Output Summary (Last 24 hours) at 1/27/2021 1110  Last data filed at 1/27/2021 1046  Gross per 24 hour   Intake 2159.15 ml   Output 409 ml   Net 1750.15 ml       Current Facility-Administered Medications   Medication Dose Route Frequency    atracurium (TRACRIUM) 1,000 mg in 0.9% sodium chloride 250 mL infusion  0-15 mcg/kg/min IntraVENous TITRATE    TPN ADULT-CENTRAL - dextrose 15% amino acid 5%   IntraVENous QPM    NOREPINephrine (LEVOPHED) 16,000 mcg in dextrose 5% 250 mL infusion  0.5-30 mcg/min IntraVENous TITRATE    TPN ADULT-CENTRAL - dextrose 15% amino acid 5%   IntraVENous QPM    fat emulsion 20% (LIPOSYN, INTRAlipid) infusion 250 mL  250 mL IntraVENous QPM    heparin (porcine) 1,000 unit/mL injection 5,000 Units  5,000 Units Hemodialysis DIALYSIS PRN    senna-docusate (PERICOLACE) 8.6-50 mg per tablet 1 Tab  1 Tab Oral DAILY    amiodarone (CORDARONE) 450 mg in dextrose 5% 250 mL infusion  0.5-1 mg/min IntraVENous TITRATE    pantoprazole (PROTONIX) 40 mg in 0.9% sodium chloride 10 mL injection  40 mg IntraVENous Q12H    promethazine (PHENERGAN) tablet 12.5 mg  12.5 mg Per NG tube Q6H PRN    Or    ondansetron (ZOFRAN) injection 4 mg  4 mg IntraVENous Q6H PRN    polyethylene glycol (MIRALAX) packet 17 g  17 g Per NG tube DAILY PRN    guaiFENesin-dextromethorphan (ROBITUSSIN DM) 100-10 mg/5 mL syrup 10 mL  10 mL Per NG tube Q4H PRN    acetaminophen (TYLENOL) tablet 650 mg  650 mg Per NG tube Q6H PRN    polyethylene glycol (MIRALAX) packet 17 g  17 g Per NG tube DAILY    insulin glargine (LANTUS) injection 32 Units 32 Units SubCUTAneous Q12H    sodium zirconium cyclosilicate (LOKELMA) powder packet 10 g  10 g Oral DAILY    hydrocortisone Sod Succ (PF) (SOLU-CORTEF) injection 50 mg  50 mg IntraVENous Q6H    white petrolatum-mineral oiL (LACRILUBE S.O.P.) ointment   Both Eyes Q12H    white petrolatum-mineral oiL (LACRILUBE S.O.P.) ointment   Both Eyes Q4H PRN    fentaNYL in normal saline (pf) 25 mcg/mL infusion  0-200 mcg/hr IntraVENous TITRATE    midazolam in normal saline (VERSED) 1 mg/mL infusion  0-10 mg/hr IntraVENous TITRATE    atorvastatin (LIPITOR) tablet 80 mg  80 mg Per NG tube DAILY    [Held by provider] clopidogreL (PLAVIX) tablet 75 mg  75 mg Per NG tube DAILY    [Held by provider] metoprolol tartrate (LOPRESSOR) tablet 50 mg  50 mg Per NG tube BID    insulin lispro (HUMALOG) injection   SubCUTAneous Q4H    LORazepam (ATIVAN) injection 1 mg  1 mg IntraVENous Q4H PRN    morphine injection 2 mg  2 mg IntraVENous Q4H PRN    NUTRITIONAL SUPPORT ELECTROLYTE PRN ORDERS   Does Not Apply PRN    sodium chloride (NS) flush 20 mL  20 mL InterCATHeter Q8H    sodium chloride (NS) flush 20 mL  20 mL InterCATHeter PRN    loperamide (IMODIUM) capsule 2 mg  2 mg Oral Q4H PRN    dextrose 40% (GLUTOSE) oral gel 1 Tube  15 g Oral PRN    glucagon (GLUCAGEN) injection 1 mg  1 mg IntraMUSCular PRN    dextrose (D50W) injection syrg 12.5-25 g  25-50 mL IntraVENous PRN    0.9% sodium chloride infusion 250 mL  250 mL IntraVENous PRN    albuterol (PROVENTIL HFA, VENTOLIN HFA, PROAIR HFA) inhaler 2 Puff  2 Puff Inhalation Q4H PRN    influenza vaccine 2020-21 (6 mos+)(PF) (FLUARIX/FLULAVAL/FLUZONE QUAD) injection 0.5 mL  0.5 mL IntraMUSCular PRIOR TO DISCHARGE    hydrALAZINE (APRESOLINE) 20 mg/mL injection 20 mg  20 mg IntraVENous Q6H PRN       EXAM  GEN - intubated/sedated   CV - S1, S2, RRR, no rub, murmur, or gallop  Lung - diffusely coarse  Abd - soft, nontender, BS present  Ext - no edema  Access- R IJ temp line    Recent Labs     01/27/21  0321 01/26/21  2237 01/26/21  1616   WBC 13.7* 13.0* 14.2*   HGB 7.9* 7.9* 8.5*   HCT 24.5* 24.7* 25.6*   * 115* 120*        Recent Labs     01/27/21  0321 01/26/21  0330 01/25/21  0311   * 137* 136   K 4.6 4.4 5.0    103 105   CO2 24 29 18*   BUN 99* 60* 131*   CREA 5.37* 3.48* 6.02*   CA 7.8* 8.0* 8.4   * 115* 304*   MG 2.8* 2.5* 3.0*   PHOS 7.2* 4.4* 8.6*       Assessment and Plan:     1. SWATI - 2/2 ATN from COVID. On SLED today and tolerating much better. We will assess daily for SLED. 2. COVID /severe ARDS  Intubated     3. Hypotension   On pressors   Maintain MAP > 65      4. Hyperkalemia   Will correct with HD.     5. Afib w/ RVR-  s/p cardioversion.   amio gtt.         Melissa Morgan MD

## 2021-01-27 NOTE — DIALYSIS
8 HR SLED initiated using  CVC, right IJ. Aspirated and flushed both catheter ports without problem. Machine settings per MD order. 150  DFR  250ml/hr UFR  Reported to primary nurse. Dialysis nurse available as needed. No heparin this treatment, citrasate instead.

## 2021-01-27 NOTE — PROGRESS NOTES
Bedside shift change report given to Prince Faith (oncoming nurse) by [facility], RN (offgoing nurse). Report included the following information SBAR, Kardex, ED Summary, Procedure Summary, Intake/Output, MAR, Recent Results and Cardiac Rhythm SR.     Fentanyl and Versed gtts dual verified

## 2021-01-27 NOTE — PROGRESS NOTES
Chart reviewed. Pt remains CCU covid positive isolation. Intubated/vent (9 days). Amio, fentanyl, and versed gtt per [facility] currently. SLED per Nephrology. Palliative care has spoke with wife and she is okay with trach if needed. CM following for any assist and d/c POC when stable. [facility] feels [facility]. LOS 20 days.

## 2021-01-28 ENCOUNTER — APPOINTMENT (OUTPATIENT)
Dept: GENERAL RADIOLOGY | Age: 61
DRG: 004 | End: 2021-01-28
Attending: INTERNAL MEDICINE
Payer: COMMERCIAL

## 2021-01-28 PROBLEM — I82.452 ACUTE DEEP VEIN THROMBOSIS (DVT) OF LEFT PERONEAL VEIN (HCC): Status: ACTIVE | Noted: 2021-01-28

## 2021-01-28 LAB
ANION GAP SERPL CALC-SCNC: 9 MMOL/L (ref 7–16)
APTT PPP: 32.9 SEC (ref 24.1–35.1)
ARTERIAL PATENCY WRIST A: ABNORMAL
BASE DEFICIT BLD-SCNC: 1 MMOL/L
BASE DEFICIT BLD-SCNC: 2 MMOL/L
BASE DEFICIT BLD-SCNC: 3 MMOL/L
BASOPHILS # BLD: 0 K/UL (ref 0–0.2)
BASOPHILS NFR BLD: 0 % (ref 0–2)
BDY SITE: ABNORMAL
BUN SERPL-MCNC: 76 MG/DL (ref 8–23)
CALCIUM SERPL-MCNC: 7.8 MG/DL (ref 8.3–10.4)
CHLORIDE SERPL-SCNC: 101 MMOL/L (ref 98–107)
CO2 BLD-SCNC: 26 MMOL/L
CO2 BLD-SCNC: 28 MMOL/L
CO2 BLD-SCNC: 30 MMOL/L
CO2 SERPL-SCNC: 26 MMOL/L (ref 21–32)
COLLECT TIME,HTIME: 1530
COLLECT TIME,HTIME: 1700
COLLECT TIME,HTIME: 235
CREAT SERPL-MCNC: 4.15 MG/DL (ref 0.8–1.5)
DIFFERENTIAL METHOD BLD: ABNORMAL
EOSINOPHIL # BLD: 0 K/UL (ref 0–0.8)
EOSINOPHIL NFR BLD: 0 % (ref 0.5–7.8)
ERYTHROCYTE [DISTWIDTH] IN BLOOD BY AUTOMATED COUNT: 15.4 % (ref 11.9–14.6)
ERYTHROCYTE [DISTWIDTH] IN BLOOD BY AUTOMATED COUNT: 15.8 % (ref 11.9–14.6)
EXHALED MINUTE VOLUME, VE: 12 L/MIN
EXHALED MINUTE VOLUME, VE: 14.4 L/MIN
EXHALED MINUTE VOLUME, VE: 7.6 L/MIN
FIBRINOGEN PPP-MCNC: 433 MG/DL (ref 190–501)
GAS FLOW.O2 O2 DELIVERY SYS: ABNORMAL L/MIN
GAS FLOW.O2 SETTING OXYMISER: 32 BPM
GLUCOSE BLD STRIP.AUTO-MCNC: 238 MG/DL (ref 65–100)
GLUCOSE BLD STRIP.AUTO-MCNC: 240 MG/DL (ref 65–100)
GLUCOSE BLD STRIP.AUTO-MCNC: 259 MG/DL (ref 65–100)
GLUCOSE BLD STRIP.AUTO-MCNC: 293 MG/DL (ref 65–100)
GLUCOSE BLD STRIP.AUTO-MCNC: 342 MG/DL (ref 65–100)
GLUCOSE SERPL-MCNC: 286 MG/DL (ref 65–100)
HCO3 BLD-SCNC: 24.6 MMOL/L (ref 22–26)
HCO3 BLD-SCNC: 25.9 MMOL/L (ref 22–26)
HCO3 BLD-SCNC: 27.1 MMOL/L (ref 22–26)
HCT VFR BLD AUTO: 24.8 % (ref 41.1–50.3)
HCT VFR BLD AUTO: 27 % (ref 41.1–50.3)
HGB BLD-MCNC: 7.9 G/DL (ref 13.6–17.2)
HGB BLD-MCNC: 8.3 G/DL (ref 13.6–17.2)
IMM GRANULOCYTES # BLD AUTO: 0.8 K/UL (ref 0–0.5)
IMM GRANULOCYTES NFR BLD AUTO: 5 % (ref 0–5)
INR PPP: 1.2
INSPIRATION.DURATION SETTING TIME VENT: 0.75 SEC
LYMPHOCYTES # BLD: 0.5 K/UL (ref 0.5–4.6)
LYMPHOCYTES NFR BLD: 3 % (ref 13–44)
MCH RBC QN AUTO: 29.5 PG (ref 26.1–32.9)
MCH RBC QN AUTO: 29.5 PG (ref 26.1–32.9)
MCHC RBC AUTO-ENTMCNC: 30.7 G/DL (ref 31.4–35)
MCHC RBC AUTO-ENTMCNC: 31.9 G/DL (ref 31.4–35)
MCV RBC AUTO: 92.5 FL (ref 79.6–97.8)
MCV RBC AUTO: 96.1 FL (ref 79.6–97.8)
MONOCYTES # BLD: 1.3 K/UL (ref 0.1–1.3)
MONOCYTES NFR BLD: 8 % (ref 4–12)
NEUTS SEG # BLD: 14.3 K/UL (ref 1.7–8.2)
NEUTS SEG NFR BLD: 84 % (ref 43–78)
NRBC # BLD: 0 K/UL (ref 0–0.2)
NRBC # BLD: 0.03 K/UL (ref 0–0.2)
O2/TOTAL GAS SETTING VFR VENT: 70 %
PCO2 BLD: 42.4 MMHG (ref 35–45)
PCO2 BLD: 60.3 MMHG (ref 35–45)
PCO2 BLD: 83.5 MMHG (ref 35–45)
PEEP RESPIRATORY: 14 CMH2O
PH BLD: 7.12 [PH] (ref 7.35–7.45)
PH BLD: 7.24 [PH] (ref 7.35–7.45)
PH BLD: 7.37 [PH] (ref 7.35–7.45)
PLATELET # BLD AUTO: 138 K/UL (ref 150–450)
PLATELET # BLD AUTO: 94 K/UL (ref 150–450)
PMV BLD AUTO: 10.7 FL (ref 9.4–12.3)
PMV BLD AUTO: 11 FL (ref 9.4–12.3)
PO2 BLD: 105 MMHG (ref 75–100)
PO2 BLD: 74 MMHG (ref 75–100)
PO2 BLD: 98 MMHG (ref 75–100)
POTASSIUM SERPL-SCNC: 4.4 MMOL/L (ref 3.5–5.1)
PRESSURE CONTROL, IPC: 18
PROTHROMBIN TIME: 15.5 SEC (ref 12.5–14.7)
RBC # BLD AUTO: 2.68 M/UL (ref 4.23–5.6)
RBC # BLD AUTO: 2.81 M/UL (ref 4.23–5.6)
SAO2 % BLD: 94 % (ref 95–98)
SAO2 % BLD: 95 % (ref 95–98)
SAO2 % BLD: 96 % (ref 95–98)
SERVICE CMNT-IMP: ABNORMAL
SODIUM SERPL-SCNC: 136 MMOL/L (ref 138–145)
SPECIMEN TYPE: ABNORMAL
UFH PPP CHRO-ACNC: 0.45 IU/ML (ref 0.3–0.7)
UFH PPP CHRO-ACNC: >1.11 IU/ML (ref 0.3–0.7)
VENTILATION MODE VENT: ABNORMAL
WBC # BLD AUTO: 10.5 K/UL (ref 4.3–11.1)
WBC # BLD AUTO: 17 K/UL (ref 4.3–11.1)

## 2021-01-28 PROCEDURE — 74011000258 HC RX REV CODE- 258: Performed by: ANESTHESIOLOGY

## 2021-01-28 PROCEDURE — 74011250637 HC RX REV CODE- 250/637: Performed by: INTERNAL MEDICINE

## 2021-01-28 PROCEDURE — 74011000258 HC RX REV CODE- 258: Performed by: INTERNAL MEDICINE

## 2021-01-28 PROCEDURE — C9113 INJ PANTOPRAZOLE SODIUM, VIA: HCPCS | Performed by: INTERNAL MEDICINE

## 2021-01-28 PROCEDURE — 85610 PROTHROMBIN TIME: CPT

## 2021-01-28 PROCEDURE — 74011000250 HC RX REV CODE- 250: Performed by: INTERNAL MEDICINE

## 2021-01-28 PROCEDURE — 94003 VENT MGMT INPAT SUBQ DAY: CPT

## 2021-01-28 PROCEDURE — 80048 BASIC METABOLIC PNL TOTAL CA: CPT

## 2021-01-28 PROCEDURE — 71045 X-RAY EXAM CHEST 1 VIEW: CPT

## 2021-01-28 PROCEDURE — 65620000000 HC RM CCU GENERAL

## 2021-01-28 PROCEDURE — 82803 BLOOD GASES ANY COMBINATION: CPT

## 2021-01-28 PROCEDURE — 82962 GLUCOSE BLOOD TEST: CPT

## 2021-01-28 PROCEDURE — 36415 COLL VENOUS BLD VENIPUNCTURE: CPT

## 2021-01-28 PROCEDURE — 74011250636 HC RX REV CODE- 250/636: Performed by: INTERNAL MEDICINE

## 2021-01-28 PROCEDURE — 99232 SBSQ HOSP IP/OBS MODERATE 35: CPT | Performed by: INTERNAL MEDICINE

## 2021-01-28 PROCEDURE — 74011250636 HC RX REV CODE- 250/636: Performed by: ANESTHESIOLOGY

## 2021-01-28 PROCEDURE — 74011636637 HC RX REV CODE- 636/637: Performed by: INTERNAL MEDICINE

## 2021-01-28 PROCEDURE — 85384 FIBRINOGEN ACTIVITY: CPT

## 2021-01-28 PROCEDURE — 85027 COMPLETE CBC AUTOMATED: CPT

## 2021-01-28 PROCEDURE — 99291 CRITICAL CARE FIRST HOUR: CPT | Performed by: INTERNAL MEDICINE

## 2021-01-28 PROCEDURE — 74011000250 HC RX REV CODE- 250: Performed by: EMERGENCY MEDICINE

## 2021-01-28 PROCEDURE — 99233 SBSQ HOSP IP/OBS HIGH 50: CPT | Performed by: INTERNAL MEDICINE

## 2021-01-28 PROCEDURE — 74011250636 HC RX REV CODE- 250/636: Performed by: NURSE PRACTITIONER

## 2021-01-28 PROCEDURE — 85520 HEPARIN ASSAY: CPT

## 2021-01-28 PROCEDURE — 74011000258 HC RX REV CODE- 258: Performed by: NURSE PRACTITIONER

## 2021-01-28 PROCEDURE — 85730 THROMBOPLASTIN TIME PARTIAL: CPT

## 2021-01-28 RX ORDER — HYDROCORTISONE SODIUM SUCCINATE 100 MG/2ML
50 INJECTION, POWDER, FOR SOLUTION INTRAMUSCULAR; INTRAVENOUS EVERY 8 HOURS
Status: DISCONTINUED | OUTPATIENT
Start: 2021-01-28 | End: 2021-01-29

## 2021-01-28 RX ORDER — HEPARIN SODIUM 5000 [USP'U]/100ML
18-36 INJECTION, SOLUTION INTRAVENOUS
Status: DISCONTINUED | OUTPATIENT
Start: 2021-01-28 | End: 2021-02-04

## 2021-01-28 RX ORDER — LABETALOL HYDROCHLORIDE 5 MG/ML
20 INJECTION, SOLUTION INTRAVENOUS ONCE
Status: COMPLETED | OUTPATIENT
Start: 2021-01-28 | End: 2021-01-28

## 2021-01-28 RX ORDER — INSULIN GLARGINE 100 [IU]/ML
45 INJECTION, SOLUTION SUBCUTANEOUS EVERY 12 HOURS
Status: DISCONTINUED | OUTPATIENT
Start: 2021-01-28 | End: 2021-01-29

## 2021-01-28 RX ORDER — INSULIN GLARGINE 100 [IU]/ML
45 INJECTION, SOLUTION SUBCUTANEOUS
Status: COMPLETED | OUTPATIENT
Start: 2021-01-28 | End: 2021-01-28

## 2021-01-28 RX ADMIN — I.V. FAT EMULSION 250 ML: 20 EMULSION INTRAVENOUS at 18:00

## 2021-01-28 RX ADMIN — PANTOPRAZOLE SODIUM 40 MG: 40 INJECTION, POWDER, FOR SOLUTION INTRAVENOUS at 21:07

## 2021-01-28 RX ADMIN — MINERAL OIL, PETROLATUM: 425; 568 OINTMENT OPHTHALMIC at 20:18

## 2021-01-28 RX ADMIN — PANTOPRAZOLE SODIUM 40 MG: 40 INJECTION, POWDER, FOR SOLUTION INTRAVENOUS at 09:45

## 2021-01-28 RX ADMIN — HYDRALAZINE HYDROCHLORIDE 20 MG: 20 INJECTION, SOLUTION INTRAMUSCULAR; INTRAVENOUS at 00:35

## 2021-01-28 RX ADMIN — INSULIN LISPRO 12 UNITS: 100 INJECTION, SOLUTION INTRAVENOUS; SUBCUTANEOUS at 09:45

## 2021-01-28 RX ADMIN — HYDROCORTISONE SODIUM SUCCINATE 50 MG: 100 INJECTION, POWDER, FOR SOLUTION INTRAMUSCULAR; INTRAVENOUS at 21:06

## 2021-01-28 RX ADMIN — Medication 150 MCG/HR: at 18:41

## 2021-01-28 RX ADMIN — HYDROCORTISONE SODIUM SUCCINATE 50 MG: 100 INJECTION, POWDER, FOR SOLUTION INTRAMUSCULAR; INTRAVENOUS at 05:06

## 2021-01-28 RX ADMIN — ASCORBIC ACID, VITAMIN A PALMITATE, CHOLECALCIFEROL, THIAMINE HYDROCHLORIDE, RIBOFLAVIN-5 PHOSPHATE SODIUM, PYRIDOXINE HYDROCHLORIDE, NIACINAMIDE, DEXPANTHENOL, ALPHA-TOCOPHEROL ACETATE, VITAMIN K1, FOLIC ACID, BIOTIN, CYANOCOBALAMIN: 200; 3300; 200; 6; 3.6; 6; 40; 15; 10; 150; 600; 60; 5 INJECTION, SOLUTION INTRAVENOUS at 18:00

## 2021-01-28 RX ADMIN — MIDAZOLAM HYDROCHLORIDE 6 MG/HR: 5 INJECTION, SOLUTION INTRAMUSCULAR; INTRAVENOUS at 03:11

## 2021-01-28 RX ADMIN — INSULIN LISPRO 6 UNITS: 100 INJECTION, SOLUTION INTRAVENOUS; SUBCUTANEOUS at 16:00

## 2021-01-28 RX ADMIN — LABETALOL HYDROCHLORIDE 20 MG: 5 INJECTION INTRAVENOUS at 01:15

## 2021-01-28 RX ADMIN — HEPARIN SODIUM 18 UNITS/KG/HR: 5000 INJECTION, SOLUTION INTRAVENOUS at 09:00

## 2021-01-28 RX ADMIN — Medication 20 ML: at 05:06

## 2021-01-28 RX ADMIN — Medication 20 ML: at 21:07

## 2021-01-28 RX ADMIN — SODIUM ZIRCONIUM CYCLOSILICATE 10 G: 10 POWDER, FOR SUSPENSION ORAL at 09:43

## 2021-01-28 RX ADMIN — INSULIN LISPRO 9 UNITS: 100 INJECTION, SOLUTION INTRAVENOUS; SUBCUTANEOUS at 04:31

## 2021-01-28 RX ADMIN — INSULIN GLARGINE 45 UNITS: 100 INJECTION, SOLUTION SUBCUTANEOUS at 20:17

## 2021-01-28 RX ADMIN — MINERAL OIL, PETROLATUM: 425; 568 OINTMENT OPHTHALMIC at 09:46

## 2021-01-28 RX ADMIN — INSULIN LISPRO 9 UNITS: 100 INJECTION, SOLUTION INTRAVENOUS; SUBCUTANEOUS at 12:15

## 2021-01-28 RX ADMIN — INSULIN LISPRO 6 UNITS: 100 INJECTION, SOLUTION INTRAVENOUS; SUBCUTANEOUS at 20:18

## 2021-01-28 RX ADMIN — Medication 150 MCG/HR: at 07:07

## 2021-01-28 RX ADMIN — ATORVASTATIN CALCIUM 80 MG: 40 TABLET, FILM COATED ORAL at 09:43

## 2021-01-28 RX ADMIN — INSULIN GLARGINE 45 UNITS: 100 INJECTION, SOLUTION SUBCUTANEOUS at 09:45

## 2021-01-28 RX ADMIN — AMIODARONE HYDROCHLORIDE 0.5 MG/MIN: 50 INJECTION, SOLUTION INTRAVENOUS at 09:43

## 2021-01-28 RX ADMIN — HYDROCORTISONE SODIUM SUCCINATE 50 MG: 100 INJECTION, POWDER, FOR SOLUTION INTRAMUSCULAR; INTRAVENOUS at 14:00

## 2021-01-28 RX ADMIN — MIDAZOLAM HYDROCHLORIDE 6 MG/HR: 5 INJECTION, SOLUTION INTRAMUSCULAR; INTRAVENOUS at 21:33

## 2021-01-28 RX ADMIN — Medication 20 ML: at 14:00

## 2021-01-28 NOTE — PROGRESS NOTES
Bedside, Verbal and Written shift change report given to Bree Askew (oncoming nurse) by Trey Doll (offgoing nurse). Report included the following information SBAR, Kardex, ED Summary, Intake/Output, MAR, Recent Results, Med Rec Status and Cardiac Rhythm SR/SB.

## 2021-01-28 NOTE — PROGRESS NOTES
Ventilator check complete; patient has a #8. 0 ET tube secured at the 24 at the lip. Patient is sedated. Patient is not able to follow commands. Breath sounds are coarse. Trachea is midline, Negative for subcutaneous air, and chest excursion is symmetric. Patient is also Negative for cyanosis and is Negative for pitting edema. All alarms are set and audible. Resuscitation bag is at the head of the bed.       Ventilator Settings  Mode FIO2 Rate Tidal Volume Pressure PEEP I:E Ratio   Pressure control  70 %   32    18 14 cm H20  1:1.49      Peak airway pressure: 31 cm H2O   Minute ventilation: 11.3 l/min       Americo Ha, RT

## 2021-01-28 NOTE — PROGRESS NOTES
[facility] PROGRESS NOTE    Follow up for:    Subjective:   Patient seen and examined. SLED yesterday well tolerated.     ROS:  UTO     Objective:   Exam:  Vitals:    01/28/21 0800 01/28/21 0815 01/28/21 0830 01/28/21 0840   BP:    (!) 149/81   Pulse: 62 62 64 64   Resp: (!) 33 (!) 32 (!) 32 (!) 32   Temp:    (!) 95.9 °F (35.5 °C)   SpO2: 96% 96% 96%    Weight:       Height:             Intake/Output Summary (Last 24 hours) at 1/28/2021 0857  Last data filed at 1/28/2021 0701  Gross per 24 hour   Intake 3242.75 ml   Output 2179 ml   Net 1063.75 ml       Current Facility-Administered Medications   Medication Dose Route Frequency    heparin 25,000 units in dextrose 500 mL infusion  18-36 Units/kg/hr IntraVENous TITRATE    hydrocortisone Sod Succ (PF) (SOLU-CORTEF) injection 50 mg  50 mg IntraVENous Q8H    insulin glargine (LANTUS) injection 45 Units  45 Units SubCUTAneous Q12H    atracurium (TRACRIUM) 1,000 mg in 0.9% sodium chloride 250 mL infusion  0-15 mcg/kg/min IntraVENous TITRATE    TPN ADULT-CENTRAL - dextrose 15% amino acid 5%   IntraVENous QPM    NOREPINephrine (LEVOPHED) 16,000 mcg in dextrose 5% 250 mL infusion  0.5-30 mcg/min IntraVENous TITRATE    fat emulsion 20% (LIPOSYN, INTRAlipid) infusion 250 mL  250 mL IntraVENous QPM    heparin (porcine) 1,000 unit/mL injection 5,000 Units  5,000 Units Hemodialysis DIALYSIS PRN    senna-docusate (PERICOLACE) 8.6-50 mg per tablet 1 Tab  1 Tab Oral DAILY    amiodarone (CORDARONE) 450 mg in dextrose 5% 250 mL infusion  0.5-1 mg/min IntraVENous TITRATE    pantoprazole (PROTONIX) 40 mg in 0.9% sodium chloride 10 mL injection  40 mg IntraVENous Q12H    promethazine (PHENERGAN) tablet 12.5 mg  12.5 mg Per NG tube Q6H PRN    Or    ondansetron (ZOFRAN) injection 4 mg  4 mg IntraVENous Q6H PRN    polyethylene glycol (MIRALAX) packet 17 g  17 g Per NG tube DAILY PRN    guaiFENesin-dextromethorphan (ROBITUSSIN DM) 100-10 mg/5 mL syrup 10 mL  10 mL Per NG tube Q4H PRN    acetaminophen (TYLENOL) tablet 650 mg  650 mg Per NG tube Q6H PRN    polyethylene glycol (MIRALAX) packet 17 g  17 g Per NG tube DAILY    sodium zirconium cyclosilicate (LOKELMA) powder packet 10 g  10 g Oral DAILY    white petrolatum-mineral oiL (LACRILUBE S.O.P.) ointment   Both Eyes Q12H    white petrolatum-mineral oiL (LACRILUBE S.O.P.) ointment   Both Eyes Q4H PRN    fentaNYL in normal saline (pf) 25 mcg/mL infusion  0-200 mcg/hr IntraVENous TITRATE    midazolam in normal saline (VERSED) 1 mg/mL infusion  0-10 mg/hr IntraVENous TITRATE    atorvastatin (LIPITOR) tablet 80 mg  80 mg Per NG tube DAILY    [Held by provider] clopidogreL (PLAVIX) tablet 75 mg  75 mg Per NG tube DAILY    [Held by provider] metoprolol tartrate (LOPRESSOR) tablet 50 mg  50 mg Per NG tube BID    insulin lispro (HUMALOG) injection   SubCUTAneous Q4H    LORazepam (ATIVAN) injection 1 mg  1 mg IntraVENous Q4H PRN    morphine injection 2 mg  2 mg IntraVENous Q4H PRN    NUTRITIONAL SUPPORT ELECTROLYTE PRN ORDERS   Does Not Apply PRN    sodium chloride (NS) flush 20 mL  20 mL InterCATHeter Q8H    sodium chloride (NS) flush 20 mL  20 mL InterCATHeter PRN    loperamide (IMODIUM) capsule 2 mg  2 mg Oral Q4H PRN    dextrose 40% (GLUTOSE) oral gel 1 Tube  15 g Oral PRN    glucagon (GLUCAGEN) injection 1 mg  1 mg IntraMUSCular PRN    dextrose (D50W) injection syrg 12.5-25 g  25-50 mL IntraVENous PRN    0.9% sodium chloride infusion 250 mL  250 mL IntraVENous PRN    albuterol (PROVENTIL HFA, VENTOLIN HFA, PROAIR HFA) inhaler 2 Puff  2 Puff Inhalation Q4H PRN    influenza vaccine 2020-21 (6 mos+)(PF) (FLUARIX/FLULAVAL/FLUZONE QUAD) injection 0.5 mL  0.5 mL IntraMUSCular PRIOR TO DISCHARGE    hydrALAZINE (APRESOLINE) 20 mg/mL injection 20 mg  20 mg IntraVENous Q6H PRN       EXAM  GEN - intubated/sedated   CV - S1, S2, RRR, no rub, murmur, or gallop  Lung - diffusely coarse  Abd - soft, nontender, BS present  Ext - no edema  Access- R IJ temp line    Recent Labs     01/28/21  0307 01/27/21  1057 01/27/21  0321   WBC 10.5 16.8* 13.7*   HGB 7.9* 8.4* 7.9*   HCT 24.8* 26.3* 24.5*   PLT 94* 104* 119*        Recent Labs     01/28/21  0307 01/27/21  0321 01/26/21  0330   * 135* 137*   K 4.4 4.6 4.4    102 103   CO2 26 24 29   BUN 76* 99* 60*   CREA 4.15* 5.37* 3.48*   CA 7.8* 7.8* 8.0*   * 292* 115*   MG  --  2.8* 2.5*   PHOS  --  7.2* 4.4*       Assessment and Plan:     1. SWATI - 2/2 ATN from COVID. SLED yesterday better tolerated. Plan on SLED again tomorrow. 2. COVID /severe ARDS  Intubated     3. Hypotension   Off pressors   Maintain MAP > 65      4. Hyperkalemia   Corrected with HD.     5. Afib w/ RVR-  s/p cardioversion.   amio gtt.         Jose Narayanan MD

## 2021-01-28 NOTE — PROGRESS NOTES
[facility] Critical Care COVID-19 Note:    Critical Care Note: 1/28/2021    Nahun Holt. Admission Date: 1/6/2021     Length of Stay: 21 days    Background: 61 y.o. y/o male with acute hypoxemic respiratory failure secondary to COVID-19. Onset of COVID-19 symptoms: 1/5/21    Hospital Course:  Pt admitted 1/7 with fever, cough, fever. Covid + 1/6. Admitted by hospitalists. Started dexa, plasma, remdesivir. Pulm consulted 1/10 with pt requiring CPAP. Intubated 1/17. Nephrology consulted 1/23 for renal failure. US 1/18 with L peroneal vein thrombus. On heparin but stopped due to + FOBT. Notable PMH: obesity, HTN, CAD, dyslipidemia, SWATI, DM, GERD    Drug Allergies: Allergies   Allergen Reactions    Latex Swelling     Had a purcell catheter with swelling of urethra. Only known latex issue in past. Wife remembers this now    Hydrocodone-Acetaminophen Itching     Wife remembers this allergy    Tessalon Perles [Benzonatate] Unknown (comments)       24 Hour events:   Day 11 on vent. On 70% FiO2 and PEEP 14. Paralyzed. Off pressors currently. ROS: intubated, sedated      LINES:    ET tube 1/17/2021  PICC 1/13  NGT 1/17  Art line 1/18/2021    Drips: current dose (range)  Amio  Tracrium  Fentanyl  Versed    Anti-infectives  azithro (completed)  Ceftriaxone (completed)  Vanc and cefepime: (completed)    Visit Vitals  BP (!) 149/51   Pulse 63   Temp 97.7 °F (36.5 °C)   Resp (!) 32   Ht 5' 8\" (1.727 m)   Wt 218 lb 4.1 oz (99 kg)   SpO2 96%   BMI 33.19 kg/m²     Pertinent Exam:            Constitutional:  intubated and mechanically ventilated.   EENMT:  Sclera clear, pupils equal, oral mucosa moist  Respiratory: crackles bilateral  Cardiovascular:  RRR with no M,G,R;  Gastrointestinal:  soft with no tenderness; positive bowel sounds present  Musculoskeletal:  warm with no cyanosis, no lower extremity edema  Skin:  no jaundice or ecchymosis  Neurologic: sedated, paralyzed     Psychiatric: sedated and paralyzed    Pertinent Labs:   Recent Labs     01/28/21  0307 01/27/21  1057 01/27/21  0321   WBC 10.5 16.8* 13.7*   HGB 7.9* 8.4* 7.9*   HCT 24.8* 26.3* 24.5*   PLT 94* 104* 119*   INR 1.2  --  1.2     Recent Labs     01/28/21  0307 01/27/21  0321 01/26/21  0330   * 135* 137*   K 4.4 4.6 4.4    102 103   CO2 26 24 29   * 292* 115*   BUN 76* 99* 60*   CREA 4.15* 5.37* 3.48*   MG  --  2.8* 2.5*   CA 7.8* 7.8* 8.0*   PHOS  --  7.2* 4.4*     Recent Labs     01/27/21  2352 01/27/21  2056 01/27/21  1532   GLUCPOC 259* 281* 292*       SARS-CoV-2 LAB Results  [facility] Test: No results found for: COV2NT   [facility] Test: No results found for: EDPR  [facility] Test: No components found for: [id number]     COVID-19 Meds:  Decadron 1/7-16  remdesivir 1/8-1/12  Convalescent plasma 1/7    Micro Results:    Anti-infectives (start date):    Ventilator Settings:  5' 8\" (1.727 m)  Mode FIO2 Rate Tidal Volume Pressure PEEP   Pressure control  70 %    454 ml     14 cm H20    Peak airway pressure: 31 cm H2O   Minute ventilation: 11.3 l/min    ABG:   Recent Labs     01/28/21  0238 01/27/21  0324 01/26/21  0253   PHI 7.37 7.33* 7.40   PCO2I 42.4 43.0 43.6   PO2I 74* 80 75   HCO3I 24.6 22.7 26.9*       IMPRESSION:   61 y.o. y/o male with acute hypoxemic respiratory failure secondary to COVID-19. PLAN:    -vent day 11. Will need to talk about possible trach with family. On 70% Fio2. Cont paralytics and start proning patient. -rechallenge with heparin given his DVT and high risk of clotting in the setting of COVID. Hgb and signs of GI bleed will be monitored carefully and have to be stopped if recurs. If so will also get GI to see. -increase lantus insulin.   -getting hypertensive now. Will wean hydrocortisone to 50 q 8.   -dialysis per nephrology. -on TPN now due to combination of high residuals and +FOBT.  Monitor response to heparin and if tolerates will rechallenge with TF with reglan.   -not currently on any abx.   -Prophy: heparin, PPI     Full Code    Family updated by phone after rounds.  Sister Charlee Persaud [phone]    The patient is critically ill with respiratory failure, circulatory failure and requires high complexity decision making for assessment and support including frequent ventilator adjustment , frequent evaluation and titration of therapies , application of advanced monitoring technologies and extensive interpretation of multiple databases  Cumulative time devoted to patient care services by me for day of service -Nan Tanner MD

## 2021-01-28 NOTE — PROGRESS NOTES
0005: Patient with acute episode of HTN. BIS also reading 80. Noticed patient had large BM. Patient cleaned and repositioned. BIS now 52 but still with HTN, 's-200. HR also elevated in the 70's, prior to episode HR was low 50's. 8766: PRN hydralazine given with minimal effect. 0047: Fentanyl and Versed increased to cover possible agitation. 0110: BIS in mid 40's but patient still with HTN in 190's. Dr. Marvin Boggs notified and orders to give 20 mg IVP labetalol once. 0118: Labetalol given and SBP now in 120's and HR 57.

## 2021-01-28 NOTE — PROGRESS NOTES
Ventilator check complete; patient has a #8. 0 ET tube secured at the 24 at the lip. Patient is sedated. Patient is not able to follow commands. Breath sounds are coarse. Trachea is midline, Negative for subcutaneous air, and chest excursion is symmetric. Patient is also Negative for cyanosis and is Negative for pitting edema. All alarms are set and audible. Resuscitation bag is at the head of the bed.       Ventilator Settings  Mode FIO2 Rate Tidal Volume Pressure PEEP I:E Ratio   Pressure control  70 %    454 ml     14 cm H20  1:1.49      Peak airway pressure: 31.8 cm H2O   Minute ventilation: 12.8 l/min       Valerie Holliday, RT

## 2021-01-28 NOTE — PROGRESS NOTES
Bedside and Verbal shift change report given to Shaista Cantu (oncoming nurse) by Saida Lara and Antonietta Rodriguez RN (offgoing nurse). Report included the following information SBAR, Kardex, Intake/Output, MAR, Recent Results, Cardiac Rhythm SB and Alarm Parameters .     Fentanyl and versed verified

## 2021-01-28 NOTE — PROGRESS NOTES
Comprehensive Nutrition Assessment    Type and Reason for Visit: Reassess  TPN Management [facility])    Nutrition Recommendations/Plan:   · Parenteral Nutrition:  · Continue current TPN - no changes are needed today  · 15%DEX/ 5%AA at 1 L with 250 ml 20% Lipids daily. · To provide: 1210 kcal/d (69% of needs), 50 grams of protein/d (36% of needs), 150 grams of CHO/d and ~1250 ml of total volume/d (~100% of needs). · Lytes/L:   · Sodium 80 meq (80 meq NaCl). · Other additives: MTE, MVI. · Vitamin and Mineral Supplement Therapy:  · Electrolyte management replacement protocol have been activated. · Labs:   · BMP daily, Phos and Mg every MWF.  Continue POC Glucoses/SSI. Continue current TPN    15%DEX/ 5%AA at 1 L with 250 ml 20% Lipids daily. No changes are needed to the solution today. Malnutrition Assessment:  Malnutrition Status: At risk for malnutrition (specify)(Poor nutrition since 1/19.)  Context: Acute illness    Nutrition Assessment:   Nutrition History: 1/7/2021: Pt reports inability to tolerate any food or liquids for 4 days PTA. Indicates vomiting with all po attempts during this time      Nutrition Background: PMH remarkable for CAD, GERD, HTN, MO. Admitted with Coivd 19, new onset DM with hyperglycemic hyperosmolar state, SWATI on CKD, lactic acidosis. Daily Update:  Remains ventilated, sedated, paralyzed and HD-dependent. TPN was started last evening (1/26) due to ongoing TF intolerance. Attempted SLED on 1/25 but stopped due to AF w/RVR. 8-hr SLED scheduled for today (1/27). No recent report of further dark VELASCO suspicious for GIB. Solucortef frequency decreased from 4 times daily to 3 times daily. Lantus dose increased to 45 units. FiO2 70%. Abdominal Status (last documented): Soft abdomen with Hypoactive  bowel sounds. Last BM 01/27/21.   Pertinent Medications: Solucortef, Lantus AM & PM, SSI coverage, Miralax, Pericolace  Drips: Cordarone, Tracrium, Fentanyl, Versed  IVF: none  Pertinent Labs: potassium 4.4, AM glucose 286, BUN 76, creatinine 4.15; POC glucose (1/27) 230,823,578,050,643,107,156. Nutrition Related Findings:   Double lumen PICC (1/13), Intubated 1/17, NGT 1/17. Pt was TPN dependent from 1/14-1/19. TF initiated on 1/18. TF held 1/20-1/22 d/t levophed requirements. TF resumed on 1/22 @ 20 ml/hr and not advanced. Held on 1/25 d/t elevated GRVs. SLED initiated on 1/25. TPN started 1/26. SLED tolerated 1/27. Lantus increased to 45 units twice daily. Solucortef dose decreased from every 6 hours to every 8 hours. Current Nutrition Therapies:  Current Parenteral Nutrition Orders:  · Type and Formula: 5%AA/15%DEX   · Lipids: 250ml, Daily  · Duration: Continuous  · Rate/Volume: 1L/42 ml/hr  · Current PN Order Provides: 1210 calories/day, 50 gm protein/day in ~1250 ml volume/day    Current Intake:   Average Meal Intake: NPO Average Supplement Intake: NPO      Anthropometric Measures:  Height: 5' 8\" (172.7 cm)  Current Body Wt: 99 kg (218 lb 4.1 oz)(1/28/21), Weight source: Bed scale  BMI: 33.2, Obese class 1 (BMI 30.0-34. 9)  Admission Body Weight: 235 lb(\"Estimated\")  Ideal Body Wt: 154 lbs (70 kg), 130.3 %  Usual Body Wt: 101.6 kg (224 lb)(EMR 6/3/2020 FP office; pt stated # unable to verify ), Percent weight change: -10.4          Estimated Daily Nutrient Needs:  Energy (kcal/day): 2722-6505 (Kcal/kg(25-30 ), Weight Used: Ideal(70 kg - vent, SLED))  Protein (g/day): 140-175(2-2.5 - vent, SLED) Weight Used: (Ideal(70 kg ))  Fluid (ml/day):   (Standard renal)    Nutrition Diagnosis:   · Inadequate protein-energy intake related to altered GI function, impaired respiratory function as evidenced by intubation(NPO, TF on hold d/t elevated GRVs x 2, poss GIB,TPN)    · Food & nutrition-related knowledge deficit related to endocrine dysfunction as evidenced by (new DM dx, A1C 12, minimal exposure to DM information.)    Nutrition Interventions:   Food and/or Nutrient Delivery: Continue NPO, Continue parenteral nutrition  Nutrition Education/Counseling: Education initiated  Coordination of Nutrition Care: Continue to monitor while inpatient  Plan of Care discussed with Dr. Harini Enamorado. Goals:   Previous Goal Met: Progressing toward goal(s)  Active Goal: Meet >75% estimated nutrition needs within 5 days    Nutrition Monitoring and Evaluation:   Behavioral-Environmental Outcomes: Knowledge or skill  Food/Nutrient Intake Outcomes: Parenteral nutrition intake/tolerance  Physical Signs/Symptoms Outcomes: Biochemical data, Constipation, Hemodynamic status    Discharge Planning: Too soon to determine    Micheal Ortiz RD, LD on 1/28/2021 at 10:05 AM  Contact: [phone]        Disaster Mode active

## 2021-01-28 NOTE — PROGRESS NOTES
am  1/28/2021 5:57 AM    Admit Date: 1/6/2021    Admit Diagnosis: Type 2 diabetes mellitus with hyperosmolarity without nonketotic hyperglycemic-hyperosmolar coma ([email or web address] [E11.00]; Lower respiratory tract infection due to COVID-19 virus [U07.1, J22]; Acute hypoxemic respiratory failure (HCC) [J96.01];SWATI (acute kidney injury) ([facility] [N17.9]      Subjective:    Patient remains in nsr on amiodarone gtt.      Objective:      Visit Vitals  BP (!) 149/51   Pulse (!) 54   Temp 97.7 °F (36.5 °C)   Resp (!) 32   Ht 5' 8\" (1.727 m)   Wt 218 lb 4.1 oz (99 kg)   SpO2 98%   BMI 33.19 kg/m²       ROS:  intubated    Physical Exam:    Physical Examination:   General appearance - intubated  Mental status - sedated  Chest/CV - clear to auscultation, no wheezes, rales or rhonchi, symmetric air entry  Heart - normal rate, regular rhythm, normal S1, S2, no murmurs, rubs, clicks or gallops  Abdomen/GI - soft, nontender, nondistended, no masses or organomegaly  Musculoskeletal - no joint tenderness, deformity or swelling  Extremities - peripheral pulses normal, no pedal edema, no clubbing or cyanosis  Skin - normal coloration and turgor, no rashes, no suspicious skin lesions noted    Current Facility-Administered Medications   Medication Dose Route Frequency    atracurium (TRACRIUM) 1,000 mg in 0.9% sodium chloride 250 mL infusion  0-15 mcg/kg/min IntraVENous TITRATE    TPN ADULT-CENTRAL - dextrose 15% amino acid 5%   IntraVENous QPM    NOREPINephrine (LEVOPHED) 16,000 mcg in dextrose 5% 250 mL infusion  0.5-30 mcg/min IntraVENous TITRATE    fat emulsion 20% (LIPOSYN, INTRAlipid) infusion 250 mL  250 mL IntraVENous QPM    heparin (porcine) 1,000 unit/mL injection 5,000 Units  5,000 Units Hemodialysis DIALYSIS PRN    senna-docusate (PERICOLACE) 8.6-50 mg per tablet 1 Tab  1 Tab Oral DAILY    amiodarone (CORDARONE) 450 mg in dextrose 5% 250 mL infusion  0.5-1 mg/min IntraVENous TITRATE    pantoprazole (PROTONIX) 40 mg in 0.9% sodium chloride 10 mL injection  40 mg IntraVENous Q12H    promethazine (PHENERGAN) tablet 12.5 mg  12.5 mg Per NG tube Q6H PRN    Or    ondansetron (ZOFRAN) injection 4 mg  4 mg IntraVENous Q6H PRN    polyethylene glycol (MIRALAX) packet 17 g  17 g Per NG tube DAILY PRN    guaiFENesin-dextromethorphan (ROBITUSSIN DM) 100-10 mg/5 mL syrup 10 mL  10 mL Per NG tube Q4H PRN    acetaminophen (TYLENOL) tablet 650 mg  650 mg Per NG tube Q6H PRN    polyethylene glycol (MIRALAX) packet 17 g  17 g Per NG tube DAILY    insulin glargine (LANTUS) injection 32 Units  32 Units SubCUTAneous Q12H    sodium zirconium cyclosilicate (LOKELMA) powder packet 10 g  10 g Oral DAILY    hydrocortisone Sod Succ (PF) (SOLU-CORTEF) injection 50 mg  50 mg IntraVENous Q6H    white petrolatum-mineral oiL (LACRILUBE S.O.P.) ointment   Both Eyes Q12H    white petrolatum-mineral oiL (LACRILUBE S.O.P.) ointment   Both Eyes Q4H PRN    fentaNYL in normal saline (pf) 25 mcg/mL infusion  0-200 mcg/hr IntraVENous TITRATE    midazolam in normal saline (VERSED) 1 mg/mL infusion  0-10 mg/hr IntraVENous TITRATE    atorvastatin (LIPITOR) tablet 80 mg  80 mg Per NG tube DAILY    [Held by provider] clopidogreL (PLAVIX) tablet 75 mg  75 mg Per NG tube DAILY    [Held by provider] metoprolol tartrate (LOPRESSOR) tablet 50 mg  50 mg Per NG tube BID    insulin lispro (HUMALOG) injection   SubCUTAneous Q4H    LORazepam (ATIVAN) injection 1 mg  1 mg IntraVENous Q4H PRN    morphine injection 2 mg  2 mg IntraVENous Q4H PRN    NUTRITIONAL SUPPORT ELECTROLYTE PRN ORDERS   Does Not Apply PRN    sodium chloride (NS) flush 20 mL  20 mL InterCATHeter Q8H    sodium chloride (NS) flush 20 mL  20 mL InterCATHeter PRN    loperamide (IMODIUM) capsule 2 mg  2 mg Oral Q4H PRN    dextrose 40% (GLUTOSE) oral gel 1 Tube  15 g Oral PRN    glucagon (GLUCAGEN) injection 1 mg  1 mg IntraMUSCular PRN    dextrose (D50W) injection syrg 12.5-25 g  25-50 mL IntraVENous PRN    0.9% sodium chloride infusion 250 mL  250 mL IntraVENous PRN    albuterol (PROVENTIL HFA, VENTOLIN HFA, PROAIR HFA) inhaler 2 Puff  2 Puff Inhalation Q4H PRN    influenza vaccine 2020-21 (6 mos+)(PF) (FLUARIX/FLULAVAL/FLUZONE QUAD) injection 0.5 mL  0.5 mL IntraMUSCular PRIOR TO DISCHARGE    hydrALAZINE (APRESOLINE) 20 mg/mL injection 20 mg  20 mg IntraVENous Q6H PRN       Data Review:   @LABRCNT(Na,K,BUN,CREA,WBC,HGB,HCT,PLT,INR,TRP,TCHOL*,Triglyceride*,LDL*,LDLCPOC HDL*,HDL])@    TELEMETRY: nsr    Assessment/Plan:     Principal Problem:    Pneumonia due to COVID-19 virus (1/7/2021)wean treat as tolerated    Active Problems:    Obesity (10/6/2015)      CAD (coronary artery disease) (10/28/2016) stable cont treatment      Overview: 10-27-06 [facility] 90% LAD s/p 2.25 x 20 Synergy drug-eluting stent  (CS)      SWATI (acute kidney injury) ([facility] [address] (1/7/2021)      Acute hypoxemic respiratory failure ([address] (1/7/2021)      Type 2 diabetes mellitus with hyperosmolarity without nonketotic hyperglycemic-hyperosmolar coma ([address] (1/7/2021)monitor       Hypernatremia (1/8/2021)      Atrial fibrillation (HCC) (1/22/2021)continue amiodarone gtt while intubated      Hypotension (1/26/2021)          Daniel Davies MD

## 2021-01-28 NOTE — DIABETES MGMT
Patient's blood glucose ranged 259-297 yesterday with patient receiving Lantus 64 units, Humalog 66 units, and SoluCortef 200 mg. Blood glucose 286 this morning in lab work. No AM FSBS available yet in Epic. Hgb 7. 9. Plt 94. BUN 76. Cr 4.15. GFR 16. Patient remains on vent, TPN, Amiodarone gtt, requiring dialysis. Provider has decreased steroids to Q8, and increased Lantus to 45 units Q12. Will follow along and see how glucose trends with changes.

## 2021-01-28 NOTE — PROGRESS NOTES
Palliative Care Progress Note    Patient: Danette Eubanks. MRN: [id number]  SSN: [id number]    YOB: 1960  Age: 61 y.o. Sex: male       Assessment/Plan:     Chief Complaint/Interval History: paralyzed and sedated. Principal Diagnosis:    · Dyspnea  R06.00    Additional Diagnoses:   · Acute Respiratory Failure, Unspecified  J96.00  · Frailty  R54  · Counseling, Encounter for Medical Advice  Z71.9  · Encounter for Palliative Care  Z51.5    Palliative Performance Scale (PPS)       Medical Decision Making:   Reviewed and summarized labs and imaging. Family updated by pulmonary. Plans for trach next week noted. Sister understands severity of disease and potential decline. Currently wishes to continue aggressive care in hopes of pt improvement. Will follow  Will discuss findings with members of the interdisciplinary team.      More than 50% of this 25 minute visit was spent counseling and coordination of care as outlined above. Subjective:     Review of Systems unable to obtain due to mentation     Objective:     Visit Vitals  BP (!) 149/81   Pulse 64   Temp (!) 95.9 °F (35.5 °C)   Resp (!) 32   Ht 5' 8\" (1.727 m)   Wt 218 lb 4.1 oz (99 kg)   SpO2 96%   BMI 33.19 kg/m²       Physical Exam:    General:  sedated   Eyes:  Conjunctivae/corneas clear    Nose: Nares normal. Septum midline. Neck: Supple, symmetrical, trachea midline, no JVD   Lungs:   Coarse bilateral bs   Heart:  Regular rate and rhythm, no murmur    Abdomen:   Soft, non-tender, non-distended   Extremities: Normal, atraumatic, no cyanosis or edema   Skin: Skin color, texture, turgor normal. No rash or lesions.    Neurologic: sedated   Psych: sedated     Signed By: Jeff Smith MD     January 28, 2021

## 2021-01-29 ENCOUNTER — APPOINTMENT (OUTPATIENT)
Dept: GENERAL RADIOLOGY | Age: 61
DRG: 004 | End: 2021-01-29
Attending: INTERNAL MEDICINE
Payer: COMMERCIAL

## 2021-01-29 LAB
ANION GAP SERPL CALC-SCNC: 10 MMOL/L (ref 7–16)
APTT PPP: >200 SEC (ref 24.1–35.1)
ARTERIAL PATENCY WRIST A: ABNORMAL
ARTERIAL PATENCY WRIST A: ABNORMAL
BASE DEFICIT BLD-SCNC: 2 MMOL/L
BASE DEFICIT BLD-SCNC: 4 MMOL/L
BASOPHILS # BLD: 0 K/UL (ref 0–0.2)
BASOPHILS # BLD: 0.2 K/UL (ref 0–0.2)
BASOPHILS NFR BLD: 0 % (ref 0–2)
BASOPHILS NFR BLD: 1 % (ref 0–2)
BDY SITE: ABNORMAL
BDY SITE: ABNORMAL
BUN SERPL-MCNC: 103 MG/DL (ref 8–23)
CALCIUM SERPL-MCNC: 7.6 MG/DL (ref 8.3–10.4)
CHLORIDE SERPL-SCNC: 100 MMOL/L (ref 98–107)
CO2 BLD-SCNC: 25 MMOL/L
CO2 BLD-SCNC: 26 MMOL/L
CO2 SERPL-SCNC: 24 MMOL/L (ref 21–32)
COLLECT TIME,HTIME: 237
COLLECT TIME,HTIME: 926
CREAT SERPL-MCNC: 5.26 MG/DL (ref 0.8–1.5)
DIFFERENTIAL METHOD BLD: ABNORMAL
DIFFERENTIAL METHOD BLD: ABNORMAL
EOSINOPHIL # BLD: 0 K/UL (ref 0–0.8)
EOSINOPHIL # BLD: 0.2 K/UL (ref 0–0.8)
EOSINOPHIL NFR BLD: 0 % (ref 0.5–7.8)
EOSINOPHIL NFR BLD: 1 % (ref 0.5–7.8)
ERYTHROCYTE [DISTWIDTH] IN BLOOD BY AUTOMATED COUNT: 15.7 % (ref 11.9–14.6)
ERYTHROCYTE [DISTWIDTH] IN BLOOD BY AUTOMATED COUNT: 15.9 % (ref 11.9–14.6)
EXHALED MINUTE VOLUME, VE: 13.7 L/MIN
EXHALED MINUTE VOLUME, VE: 14.7 L/MIN
FIBRINOGEN PPP-MCNC: 355 MG/DL (ref 190–501)
GAS FLOW.O2 O2 DELIVERY SYS: ABNORMAL L/MIN
GAS FLOW.O2 O2 DELIVERY SYS: ABNORMAL L/MIN
GAS FLOW.O2 SETTING OXYMISER: 32 BPM
GAS FLOW.O2 SETTING OXYMISER: 32 BPM
GLUCOSE BLD STRIP.AUTO-MCNC: 129 MG/DL (ref 65–100)
GLUCOSE BLD STRIP.AUTO-MCNC: 134 MG/DL (ref 65–100)
GLUCOSE BLD STRIP.AUTO-MCNC: 164 MG/DL (ref 65–100)
GLUCOSE BLD STRIP.AUTO-MCNC: 192 MG/DL (ref 65–100)
GLUCOSE BLD STRIP.AUTO-MCNC: 247 MG/DL (ref 65–100)
GLUCOSE BLD STRIP.AUTO-MCNC: 377 MG/DL (ref 65–100)
GLUCOSE SERPL-MCNC: 220 MG/DL (ref 65–100)
HCO3 BLD-SCNC: 23.8 MMOL/L (ref 22–26)
HCO3 BLD-SCNC: 24.2 MMOL/L (ref 22–26)
HCT VFR BLD AUTO: 23 % (ref 41.1–50.3)
HCT VFR BLD AUTO: 25.5 % (ref 41.1–50.3)
HGB BLD-MCNC: 7.3 G/DL (ref 13.6–17.2)
HGB BLD-MCNC: 8 G/DL (ref 13.6–17.2)
IMM GRANULOCYTES # BLD AUTO: 0.9 K/UL (ref 0–0.5)
IMM GRANULOCYTES # BLD AUTO: 2.2 K/UL (ref 0–0.5)
IMM GRANULOCYTES NFR BLD AUTO: 10 % (ref 0–5)
IMM GRANULOCYTES NFR BLD AUTO: 7 % (ref 0–5)
INR PPP: 1.3
INSPIRATION.DURATION SETTING TIME VENT: 0.75 SEC
LYMPHOCYTES # BLD: 0.4 K/UL (ref 0.5–4.6)
LYMPHOCYTES # BLD: 0.9 K/UL (ref 0.5–4.6)
LYMPHOCYTES NFR BLD: 3 % (ref 13–44)
LYMPHOCYTES NFR BLD: 4 % (ref 13–44)
MAGNESIUM SERPL-MCNC: 2.5 MG/DL (ref 1.8–2.4)
MCH RBC QN AUTO: 29.6 PG (ref 26.1–32.9)
MCH RBC QN AUTO: 29.7 PG (ref 26.1–32.9)
MCHC RBC AUTO-ENTMCNC: 31.4 G/DL (ref 31.4–35)
MCHC RBC AUTO-ENTMCNC: 31.7 G/DL (ref 31.4–35)
MCV RBC AUTO: 93.5 FL (ref 79.6–97.8)
MCV RBC AUTO: 94.4 FL (ref 79.6–97.8)
MONOCYTES # BLD: 0.8 K/UL (ref 0.1–1.3)
MONOCYTES # BLD: 2 K/UL (ref 0.1–1.3)
MONOCYTES NFR BLD: 6 % (ref 4–12)
MONOCYTES NFR BLD: 9 % (ref 4–12)
NEUTS SEG # BLD: 10.6 K/UL (ref 1.7–8.2)
NEUTS SEG # BLD: 16.6 K/UL (ref 1.7–8.2)
NEUTS SEG NFR BLD: 75 % (ref 43–78)
NEUTS SEG NFR BLD: 83 % (ref 43–78)
NRBC # BLD: 0.02 K/UL (ref 0–0.2)
NRBC # BLD: 0.05 K/UL (ref 0–0.2)
O2/TOTAL GAS SETTING VFR VENT: 55 %
O2/TOTAL GAS SETTING VFR VENT: 70 %
PCO2 BLD: 47 MMHG (ref 35–45)
PCO2 BLD: 59.6 MMHG (ref 35–45)
PEEP RESPIRATORY: 14 CMH2O
PEEP RESPIRATORY: 14 CMH2O
PH BLD: 7.22 [PH] (ref 7.35–7.45)
PH BLD: 7.31 [PH] (ref 7.35–7.45)
PHOSPHATE SERPL-MCNC: 7.8 MG/DL (ref 2.3–3.7)
PLATELET # BLD AUTO: 130 K/UL (ref 150–450)
PLATELET # BLD AUTO: 154 K/UL (ref 150–450)
PLATELET COMMENTS,PCOM: ADEQUATE
PMV BLD AUTO: 10.8 FL (ref 9.4–12.3)
PMV BLD AUTO: 11.1 FL (ref 9.4–12.3)
PO2 BLD: 106 MMHG (ref 75–100)
PO2 BLD: 129 MMHG (ref 75–100)
POTASSIUM SERPL-SCNC: 4.7 MMOL/L (ref 3.5–5.1)
PRESSURE CONTROL, IPC: 18
PRESSURE CONTROL, IPC: 18
PROTHROMBIN TIME: 15.9 SEC (ref 12.5–14.7)
RBC # BLD AUTO: 2.46 M/UL (ref 4.23–5.6)
RBC # BLD AUTO: 2.7 M/UL (ref 4.23–5.6)
RBC MORPH BLD: ABNORMAL
SAO2 % BLD: 97 % (ref 95–98)
SAO2 % BLD: 99 % (ref 95–98)
SERVICE CMNT-IMP: ABNORMAL
SODIUM SERPL-SCNC: 134 MMOL/L (ref 138–145)
SPECIMEN TYPE: ABNORMAL
SPECIMEN TYPE: ABNORMAL
UFH PPP CHRO-ACNC: 0.67 IU/ML (ref 0.3–0.7)
UFH PPP CHRO-ACNC: 0.85 IU/ML (ref 0.3–0.7)
UFH PPP CHRO-ACNC: 1.01 IU/ML (ref 0.3–0.7)
UFH PPP CHRO-ACNC: >1.22 IU/ML (ref 0.3–0.7)
VENTILATION MODE VENT: ABNORMAL
VENTILATION MODE VENT: ABNORMAL
WBC # BLD AUTO: 12.7 K/UL (ref 4.3–11.1)
WBC # BLD AUTO: 22.1 K/UL (ref 4.3–11.1)
WBC MORPH BLD: ABNORMAL

## 2021-01-29 PROCEDURE — 74011250637 HC RX REV CODE- 250/637: Performed by: INTERNAL MEDICINE

## 2021-01-29 PROCEDURE — 74011000258 HC RX REV CODE- 258: Performed by: NURSE PRACTITIONER

## 2021-01-29 PROCEDURE — 74011250636 HC RX REV CODE- 250/636: Performed by: INTERNAL MEDICINE

## 2021-01-29 PROCEDURE — 90945 DIALYSIS ONE EVALUATION: CPT

## 2021-01-29 PROCEDURE — 65620000000 HC RM CCU GENERAL

## 2021-01-29 PROCEDURE — 74011250636 HC RX REV CODE- 250/636: Performed by: ANESTHESIOLOGY

## 2021-01-29 PROCEDURE — 94003 VENT MGMT INPAT SUBQ DAY: CPT

## 2021-01-29 PROCEDURE — 74011000258 HC RX REV CODE- 258

## 2021-01-29 PROCEDURE — 74011636637 HC RX REV CODE- 636/637: Performed by: INTERNAL MEDICINE

## 2021-01-29 PROCEDURE — 85520 HEPARIN ASSAY: CPT

## 2021-01-29 PROCEDURE — 2709999900 HC NON-CHARGEABLE SUPPLY

## 2021-01-29 PROCEDURE — 82962 GLUCOSE BLOOD TEST: CPT

## 2021-01-29 PROCEDURE — 83735 ASSAY OF MAGNESIUM: CPT

## 2021-01-29 PROCEDURE — 80048 BASIC METABOLIC PNL TOTAL CA: CPT

## 2021-01-29 PROCEDURE — 82803 BLOOD GASES ANY COMBINATION: CPT

## 2021-01-29 PROCEDURE — 74011250636 HC RX REV CODE- 250/636: Performed by: NURSE PRACTITIONER

## 2021-01-29 PROCEDURE — C9113 INJ PANTOPRAZOLE SODIUM, VIA: HCPCS | Performed by: INTERNAL MEDICINE

## 2021-01-29 PROCEDURE — 74011250637 HC RX REV CODE- 250/637: Performed by: ANESTHESIOLOGY

## 2021-01-29 PROCEDURE — 85025 COMPLETE CBC W/AUTO DIFF WBC: CPT

## 2021-01-29 PROCEDURE — 74011000258 HC RX REV CODE- 258: Performed by: INTERNAL MEDICINE

## 2021-01-29 PROCEDURE — 71045 X-RAY EXAM CHEST 1 VIEW: CPT

## 2021-01-29 PROCEDURE — 85730 THROMBOPLASTIN TIME PARTIAL: CPT

## 2021-01-29 PROCEDURE — 74011000250 HC RX REV CODE- 250: Performed by: INTERNAL MEDICINE

## 2021-01-29 PROCEDURE — 36592 COLLECT BLOOD FROM PICC: CPT

## 2021-01-29 PROCEDURE — 84100 ASSAY OF PHOSPHORUS: CPT

## 2021-01-29 PROCEDURE — 85384 FIBRINOGEN ACTIVITY: CPT

## 2021-01-29 PROCEDURE — 74011000250 HC RX REV CODE- 250

## 2021-01-29 PROCEDURE — 99291 CRITICAL CARE FIRST HOUR: CPT | Performed by: INTERNAL MEDICINE

## 2021-01-29 PROCEDURE — 85610 PROTHROMBIN TIME: CPT

## 2021-01-29 PROCEDURE — 99231 SBSQ HOSP IP/OBS SF/LOW 25: CPT | Performed by: INTERNAL MEDICINE

## 2021-01-29 RX ORDER — HYDROCORTISONE SODIUM SUCCINATE 100 MG/2ML
25 INJECTION, POWDER, FOR SOLUTION INTRAMUSCULAR; INTRAVENOUS EVERY 8 HOURS
Status: DISCONTINUED | OUTPATIENT
Start: 2021-01-29 | End: 2021-01-30

## 2021-01-29 RX ORDER — MIDAZOLAM HYDROCHLORIDE 1 MG/ML
4 INJECTION, SOLUTION INTRAMUSCULAR; INTRAVENOUS ONCE
Status: COMPLETED | OUTPATIENT
Start: 2021-01-29 | End: 2021-01-29

## 2021-01-29 RX ORDER — NOREPINEPHRINE BITARTRATE/D5W 4MG/250ML
PLASTIC BAG, INJECTION (ML) INTRAVENOUS
Status: COMPLETED
Start: 2021-01-29 | End: 2021-01-29

## 2021-01-29 RX ORDER — INSULIN GLARGINE 100 [IU]/ML
52 INJECTION, SOLUTION SUBCUTANEOUS EVERY 12 HOURS
Status: DISCONTINUED | OUTPATIENT
Start: 2021-01-29 | End: 2021-01-30

## 2021-01-29 RX ORDER — NOREPINEPHRINE BITARTRATE/D5W 4MG/250ML
.5-3 PLASTIC BAG, INJECTION (ML) INTRAVENOUS
Status: DISCONTINUED | OUTPATIENT
Start: 2021-01-29 | End: 2021-02-04

## 2021-01-29 RX ORDER — METOCLOPRAMIDE HYDROCHLORIDE 5 MG/ML
5 INJECTION INTRAMUSCULAR; INTRAVENOUS EVERY 6 HOURS
Status: DISCONTINUED | OUTPATIENT
Start: 2021-01-29 | End: 2021-02-02

## 2021-01-29 RX ADMIN — HEPARIN SODIUM 15 UNITS/KG/HR: 5000 INJECTION, SOLUTION INTRAVENOUS at 02:48

## 2021-01-29 RX ADMIN — MINERAL OIL, PETROLATUM: 425; 568 OINTMENT OPHTHALMIC at 08:56

## 2021-01-29 RX ADMIN — PANTOPRAZOLE SODIUM 40 MG: 40 INJECTION, POWDER, FOR SOLUTION INTRAVENOUS at 08:57

## 2021-01-29 RX ADMIN — AMIODARONE HYDROCHLORIDE 0.5 MG/MIN: 50 INJECTION, SOLUTION INTRAVENOUS at 01:34

## 2021-01-29 RX ADMIN — INSULIN LISPRO 6 UNITS: 100 INJECTION, SOLUTION INTRAVENOUS; SUBCUTANEOUS at 00:42

## 2021-01-29 RX ADMIN — PANTOPRAZOLE SODIUM 40 MG: 40 INJECTION, POWDER, FOR SOLUTION INTRAVENOUS at 20:11

## 2021-01-29 RX ADMIN — ASCORBIC ACID, VITAMIN A PALMITATE, CHOLECALCIFEROL, THIAMINE HYDROCHLORIDE, RIBOFLAVIN-5 PHOSPHATE SODIUM, PYRIDOXINE HYDROCHLORIDE, NIACINAMIDE, DEXPANTHENOL, ALPHA-TOCOPHEROL ACETATE, VITAMIN K1, FOLIC ACID, BIOTIN, CYANOCOBALAMIN: 200; 3300; 200; 6; 3.6; 6; 40; 15; 10; 150; 600; 60; 5 INJECTION, SOLUTION INTRAVENOUS at 17:19

## 2021-01-29 RX ADMIN — ATORVASTATIN CALCIUM 80 MG: 40 TABLET, FILM COATED ORAL at 08:47

## 2021-01-29 RX ADMIN — INSULIN LISPRO 3 UNITS: 100 INJECTION, SOLUTION INTRAVENOUS; SUBCUTANEOUS at 08:51

## 2021-01-29 RX ADMIN — HYDROCORTISONE SODIUM SUCCINATE 25 MG: 100 INJECTION, POWDER, FOR SOLUTION INTRAMUSCULAR; INTRAVENOUS at 21:10

## 2021-01-29 RX ADMIN — DEXTROSE MONOHYDRATE 16 MCG/MIN: 50 INJECTION, SOLUTION INTRAVENOUS at 09:52

## 2021-01-29 RX ADMIN — INSULIN LISPRO 3 UNITS: 100 INJECTION, SOLUTION INTRAVENOUS; SUBCUTANEOUS at 11:20

## 2021-01-29 RX ADMIN — METOCLOPRAMIDE 5 MG: 5 INJECTION, SOLUTION INTRAMUSCULAR; INTRAVENOUS at 17:13

## 2021-01-29 RX ADMIN — INSULIN GLARGINE 45 UNITS: 100 INJECTION, SOLUTION SUBCUTANEOUS at 08:48

## 2021-01-29 RX ADMIN — HYDROCORTISONE SODIUM SUCCINATE 50 MG: 100 INJECTION, POWDER, FOR SOLUTION INTRAMUSCULAR; INTRAVENOUS at 06:00

## 2021-01-29 RX ADMIN — INSULIN GLARGINE 52 UNITS: 100 INJECTION, SOLUTION SUBCUTANEOUS at 19:12

## 2021-01-29 RX ADMIN — I.V. FAT EMULSION 250 ML: 20 EMULSION INTRAVENOUS at 17:19

## 2021-01-29 RX ADMIN — METOCLOPRAMIDE 5 MG: 5 INJECTION, SOLUTION INTRAMUSCULAR; INTRAVENOUS at 11:20

## 2021-01-29 RX ADMIN — SODIUM ZIRCONIUM CYCLOSILICATE 10 G: 10 POWDER, FOR SUSPENSION ORAL at 08:57

## 2021-01-29 RX ADMIN — Medication 20 ML: at 14:00

## 2021-01-29 RX ADMIN — INSULIN LISPRO 6 UNITS: 100 INJECTION, SOLUTION INTRAVENOUS; SUBCUTANEOUS at 04:23

## 2021-01-29 RX ADMIN — Medication 150 MCG/HR: at 01:35

## 2021-01-29 RX ADMIN — MIDAZOLAM HYDROCHLORIDE 4 MG/HR: 5 INJECTION, SOLUTION INTRAMUSCULAR; INTRAVENOUS at 19:20

## 2021-01-29 RX ADMIN — Medication 150 MCG/HR: at 19:13

## 2021-01-29 RX ADMIN — MIDAZOLAM 4 MG: 1 INJECTION INTRAMUSCULAR; INTRAVENOUS at 18:21

## 2021-01-29 RX ADMIN — METOCLOPRAMIDE 5 MG: 5 INJECTION, SOLUTION INTRAMUSCULAR; INTRAVENOUS at 23:05

## 2021-01-29 RX ADMIN — INSULIN LISPRO 15 UNITS: 100 INJECTION, SOLUTION INTRAVENOUS; SUBCUTANEOUS at 23:06

## 2021-01-29 RX ADMIN — Medication 20 ML: at 05:31

## 2021-01-29 RX ADMIN — MINERAL OIL, PETROLATUM: 425; 568 OINTMENT OPHTHALMIC at 20:11

## 2021-01-29 RX ADMIN — DOCUSATE SODIUM 50 MG AND SENNOSIDES 8.6 MG 1 TABLET: 8.6; 5 TABLET, FILM COATED ORAL at 08:47

## 2021-01-29 RX ADMIN — NOREPINEPHRINE BITARTRATE 16 MCG/MIN: 1 INJECTION, SOLUTION, CONCENTRATE INTRAVENOUS at 09:52

## 2021-01-29 RX ADMIN — POLYETHYLENE GLYCOL 3350 17 G: 17 POWDER, FOR SOLUTION ORAL at 08:48

## 2021-01-29 RX ADMIN — Medication 20 ML: at 21:10

## 2021-01-29 RX ADMIN — HYDROCORTISONE SODIUM SUCCINATE 25 MG: 100 INJECTION, POWDER, FOR SOLUTION INTRAMUSCULAR; INTRAVENOUS at 14:26

## 2021-01-29 RX ADMIN — AMIODARONE HYDROCHLORIDE 0.5 MG/MIN: 50 INJECTION, SOLUTION INTRAVENOUS at 15:24

## 2021-01-29 NOTE — PROGRESS NOTES
Ventilator check complete; patient has a #8. 0 ET tube secured at the 24 at the lip. Patient is sedated. Patient is not able to follow commands. Breath sounds are diminished. Trachea is midline, Negative for subcutaneous air, and chest excursion is symmetric. Patient is also Negative for cyanosis and is Negative for pitting edema. All alarms are set and audible. Resuscitation bag is at the head of the bed.       Ventilator Settings  Mode FIO2 Rate Tidal Volume Pressure PEEP I:E Ratio   Pressure control  70 %    454 ml     14 cm H20  1:1.49      Peak airway pressure: 31 cm H2O   Minute ventilation: 14.3 l/min       Monica Ends, RT

## 2021-01-29 NOTE — PROGRESS NOTES
Chart reviewed. Pt remains CCU covid positive isolation. Intubated/vent (12 days)-Fio2 56%. Plans for trach next week per MD notes. Currently amio, fentanyl, heparin, and versed gtts per [facility] Nephrology following for dialysis needs. CM will continue to follow for any assist and d/c POC when stable. LOS 22 days.

## 2021-01-29 NOTE — PROGRESS NOTES
Bedside, Verbal and Written shift change report given to Kody Kapoor and Catie Pleitez RN (oncoming nurse) by Kaylee Zamora RN (offgoing nurse). Report included the following information SBAR, Kardex, Intake/Output, MAR, Recent Results, Cardiac Rhythm SR and Alarm Parameters .

## 2021-01-29 NOTE — PROGRESS NOTES
8 HR SLED initiated using  CVC, right IJ. Aspirated and flushed both catheter ports without problem. Machine settings per MD order. 150  DFR  250ml/hr UFR. Patient is on heparin drip. Reported to primary nurse. Dialysis nurse available as needed.        01/29/21 0927   Patient Information   Informed Consent Verified Yes   First Use Xray Location Verified Yes   Dialyzer/Set Up Inspection   Dialyzer/Set Up Inspection F-6   Alarms Verified Yes   Test Pass Yes   pH 7.1   Machine Conductivity 14.4   Meter Conductivity 14.1   Reverse Osmosis Safety Checks   Reverse Osmosis Machine Log Completed Yes   Total Chlorine Test Negative   Machine Initiation   Machine Number K1   Procedure Start Time 0927   Unused Lines Clamped Yes   Machine Temperature 95.4 °F (35.2 °C)   Dialysis Initiation   All Connections Secured Yes   NS Bag  Yes   Saline Line Double Clamped Yes   Prime Given   Air Foam Detector Engaged Yes   Dialysate NA (mEq/L) 140   Dialysate K (mEq/L) 4   Dialysate CA (mEq/L) 2.5   Dialysate HCO3 (mEq/L) 35   Citrasate No   During Dialysis    Pulse (Heart Rate) 67   BP (!) 113/53   Transducer Checks Dry   Saline Given (mL) 300 mL   Heparin Bolus (units) 0 units   Continuous Heparin Infusion (Units/hr) 0 Units/hr   Blood Flow Rate (ml/min) 150 ml/min   Dialysate Flow Rate (ml/hr) 300 ml/hr   Arterial Access Pressure (mmHg) 50   Venous Return Pressure (mmHg) 10   Transmembrane Pressure (mmHg) 70 mmHg   Hourly Chamber Checks Yes   Ultrafiltration Rate (ml/hr) 250 ml/hr   CRRT Dialysis Intake/Output ([place])   CRRT Replacement Intake (mL) 300 mL   Hemodialysis Access 01/24/21   Placement Date/Time: 01/24/21 1200   Number of Attempts: 1  Inserted By: Antonio Sotelo NP  Access Location: Internal jugular, right   Central Line Being Utilized Yes   Criteria for Appropriate Use Dialysis/apheresis   Date Accessed  01/29/21   Access Time  0925   Site Assessment Clean, dry, & intact   Date of Last Dressing Change 01/27/21   Dressing Status Clean, dry, & intact   Dressing Type Disk with Chlorhexadine gluconate (CHG); Transparent   Proximal Hub Color/Line Status Yellow; Flushed; Infusing   Distal Hub Color/Line Status Yellow; Flushed; Infusing   $$ Dialysis Charges   $$ Method CRRT   CRRT Dialysis Intake/Output   Patient Location [place]

## 2021-01-29 NOTE — DIABETES MGMT
Patient's blood glucose ranged 238-342 yesterday with patient receiving Lantus 90 units, Humalog 48 units, and SoluCortef 150 mg. Blood glucose 220 in labs this morning. Hgb 7. 3. Na+ 134. . Cr 5.26. Phos 7. 8. Mg 2. 5. GFR 12. Patient remains on vent, TPN, amiodarone gtt, and dialysis. Current plans are to trach patient next week. Will continue to follow.

## 2021-01-29 NOTE — PROGRESS NOTES
Comprehensive Nutrition Assessment    Type and Reason for Visit: Reassess  TPN Management [facility])    Nutrition Recommendations/Plan:   · Parenteral Nutrition:  · Continue current TPN - no changes are needed today  · 15%DEX/ 5%AA at 1 L with 250 ml 20% Lipids daily. · To provide: 1210 kcal/d (69% of needs), 50 grams of protein/d (36% of needs), 150 grams of CHO/d and ~1250 ml of total volume/d (~100% of needs). · Lytes/L:   · Sodium 80 meq (80 meq NaCl). · Other additives: MTE, MVI. · Vitamin and Mineral Supplement Therapy:  · Electrolyte management replacement protocol have been activated. · Labs:   · BMP daily, Phos and Mg every MWF.  Continue POC Glucoses/SSI     Malnutrition Assessment:  Malnutrition Status: At risk for malnutrition (specify)(Poor nutrition since 1/19-1/25.)    Nutrition Assessment:   Nutrition History: 1/7/2021: Pt reports inability to tolerate any food or liquids for 4 days PTA. Indicates vomiting with all po attempts during this time      Nutrition Background: PMH remarkable for CAD, GERD, HTN, MO. Admitted with Coivd 19, new onset DM with hyperglycemic hyperosmolar state, SWATI on CKD, lactic acidosis. Daily Update:  Remains ventilated, sedated, paralyzed and HD-dependent. TPN was started last evening (1/26) due to ongoing TF intolerance. No recent report of further dark VELASCO suspicious for GIB. Solucortef is being weaned, however Lantus doses are being increased due to hyperglycemia staying in the high 200s to 300s. FiO2 70%. 1/29 - weaning of Solucortef continues Lantus increased to 52 units twice daily. Reglan started today with the hope of resuming TF soon. FiO2 56%. Abdominal Status (last documented): Intact, Soft abdomen with Active  bowel sounds. Last BM 01/28/21.   Pertinent Medications: Solucortef, Lantus AM & PM, SSI coverage, Lokelma, Reglan; Miralax and Pericolace - being held  Drips: Cordarone, Tracrium, Fentanyl, Heparin, Versed  IVF: none  Pertinent Labs: potassium 4.7, AM glucose 220, , creatinine 5.26, phosphorus 7.8, magnesium 2.5; POC glucose (1/28) 299-713 and (1/29) 247. Nutrition Related Findings:   Double lumen PICC (1/13), Intubated 1/17, NGT 1/17. Pt was TPN dependent from 1/14-1/19. TF initiated on 1/18. TF held 1/20-1/22 d/t levophed requirements. TF resumed on 1/22 @ 20 ml/hr and not advanced. Held on 1/25 d/t elevated GRVs. SLED initiated on 1/25. TPN started 1/26. Current Nutrition Therapies:  Current Parenteral Nutrition Orders:  · Type and Formula: 5%AA/15%DEX   · Lipids: 250ml, Daily  · Duration: Continuous  · Rate/Volume: 1L/42 ml/hr  · Current PN Order Provides: 1210 calories/day, 50 gm protein/day in ~1250 ml volume/day    Anthropometric Measures:  Height: 5' 8\" (172.7 cm)  Current Body Wt: 103.1 kg (227 lb 4.7 oz)(1/29/21), Weight source: Bed scale  BMI: 34.6, Obese class 1 (BMI 30.0-34. 9)  Admission Body Weight: 235 lb(\"Estimated\")  Ideal Body Wt: 154 lbs (70 kg), 130.3 %  Usual Body Wt: 101.6 kg (224 lb)([facility] 6/3/2020 FP office; pt stated # unable to verify ), Percent weight change: -10.4          Estimated Daily Nutrient Needs:  Energy (kcal/day): 8423-7434 (Kcal/kg(25-30 ), Weight Used: Ideal(70 kg - vent, SLED)  Protein (g/day): 140-175(2-2.5 - vent, SLED) Weight Used: (Ideal(70 kg ))  Fluid (ml/day): (Standard renal)    Nutrition Diagnosis:   · Inadequate protein-energy intake related to altered GI function, impaired respiratory function as evidenced by intubation(NPO, TF on hold d/t elevated GRVs x 2, poss GIB,TPN)    · Food & nutrition-related knowledge deficit related to endocrine dysfunction as evidenced by (new DM dx, A1C 12, minimal exposure to DM information.)    Nutrition Interventions:   Food and/or Nutrient Delivery: Continue NPO, Continue parenteral nutrition  Nutrition Education/Counseling: Education initiated  Coordination of Nutrition Care: Continue to monitor while inpatient  Plan of Care discussed with Dr. Andrei Velasco    Goals:   Previous Goal Met: Progressing toward goal(s)  Active Goal: Meet >75% estimated nutrition needs within 5 days    Nutrition Monitoring and Evaluation:   Behavioral-Environmental Outcomes: Knowledge or skill  Food/Nutrient Intake Outcomes: Parenteral nutrition intake/tolerance  Physical Signs/Symptoms Outcomes: Biochemical data, Constipation, Hemodynamic status    Discharge Planning: Too soon to determine    Thony Bong.  Ivelisse Barrios RD, ERNESTO on 1/29/2021 at 9:51 AM  Contact: [phone]        Disaster Mode active

## 2021-01-29 NOTE — CONSULTS
Nutrition F/U:  Acknowledge TF Consult ordered after earlier encounter today. Plan: Reglan started today. Trophic feeding to start as Nepro @ 10 ml/hr with 40 ml water flush every 4 hours via NGT - 432 calories, 19 gm protein in 414 ml water. RD follow-up daily while on TPN. Carrol Vazquez.  Mónica Hughes  [phone]

## 2021-01-29 NOTE — PROGRESS NOTES
Ventilator check complete; patient has a #8. 0 ET tube secured at the 24 at the lip. Patient is not able to follow commands. Breath sounds are coarse. Trachea is midline, Negative for subcutaneous air, and chest excursion is symmetric. Patient is also Negative for cyanosis and is Negative for pitting edema. All alarms are set and audible. Resuscitation bag is at the head of the bed. Ventilator Settings  Mode FIO2 Rate Tidal Volume Pressure PEEP I:E Ratio   Pressure control  56 %  32     18 14 cm H20  1:1.49      Peak airway pressure: 30.7 cm H2O   Minute ventilation: 12.7 l/min     ABG: No results for input(s): PH, PCO2, PO2, HCO3 in the last 72 hours.       Heladio Patino, RT

## 2021-01-29 NOTE — PROGRESS NOTES
[facility] Critical Care COVID-19 Note:    Critical Care Note: 1/29/2021    Lexus Pearson. Admission Date: 1/6/2021     Length of Stay: 22 days    Background: 61 y.o. y/o male with acute hypoxemic respiratory failure secondary to COVID-19. Onset of COVID-19 symptoms: 1/5/21    Hospital Course:  Pt admitted 1/7 with fever, cough, fever. Covid + 1/6. Admitted by hospitalists. Started dexa, plasma, remdesivir. Pulm consulted 1/10 with pt requiring CPAP. Intubated 1/17. Nephrology consulted 1/23 for renal failure. US 1/18 with L peroneal vein thrombus. On heparin but stopped due to + FOBT. Notable PMH: obesity, HTN, CAD, dyslipidemia, SWATI, DM, GERD    24 Hour events:   Afebrile. WBC stable at 12.7K. Fluid balance positive 3L yesterday. Creatinine up to 5.26, UOP 155ml yesterday. Hyperglycemic at 220. P:F ratio 184. Hgb down from 9.3 to 7.3 with re-initiation of heparin. No obvious bleeding, however. ROS: intubated, sedated    LINES:    ET tube 1/17/2021  PICC 1/13  NGT 1/17  Art line 1/18/2021  Dialysis access 1/24/21  Gimenez 1/17    Drips: current dose (range)  Amio  Tracrium  Fentanyl  Versed    Anti-infectives  azithro (completed)  Ceftriaxone (completed)  Vanc and cefepime: (completed)    Visit Vitals  BP (!) 123/37   Pulse 66   Temp 97.6 °F (36.4 °C)   Resp (!) 66   Ht 5' 8\" (1.727 m)   Wt 227 lb 4.7 oz (103.1 kg)   SpO2 96%   BMI 34.56 kg/m²     Pertinent Exam:            Constitutional:  intubated and mechanically ventilated.   EENMT:  Sclera clear, pupils equal, oral mucosa moist  Respiratory: crackles bilateral  Cardiovascular:  RRR with no M,G,R;  Gastrointestinal:  soft with no tenderness; positive bowel sounds present  Musculoskeletal:  warm with no cyanosis, no lower extremity edema  Skin:  no jaundice or ecchymosis  Neurologic: sedated, paralyzed     Psychiatric: sedated and paralyzed    CXR today    Pertinent Labs:   Recent Labs     01/29/21  0335 01/29/21  0322 01/28/21  1433 01/28/21  0307   WBC 12.7*  --  17.0* 10.5   HGB 7.3*  --  8.3* 7.9*   HCT 23.0*  --  27.0* 24.8*   *  --  138* 94*   INR  --  1.3  --  1.2     Recent Labs     01/29/21  0329 01/28/21  0307 01/27/21  0321   * 136* 135*   K 4.7 4.4 4.6    101 102   CO2 24 26 24   * 286* 292*   * 76* 99*   CREA 5.26* 4.15* 5.37*   MG 2.5*  --  2.8*   CA 7.6* 7.8* 7.8*   PHOS 7.8*  --  7.2*     Recent Labs     01/29/21  0041 01/28/21 2017 01/28/21  1606   GLUCPOC 247* 240* 238*       SARS-CoV-2 LAB Results  [facility] Test: No results found for: COV2NT   [facility] Test: No results found for: EDPR  [facility] Test: No components found for: [id number]     COVID-19 Meds:  Decadron 1/7-16  remdesivir 1/8-1/12  Convalescent plasma 1/7      Ventilator Settings:  5' 8\" (1.727 m)  Mode FIO2 Rate Tidal Volume Pressure PEEP   Pressure control  56 %    454 ml     14 cm H20    Peak airway pressure: 30.7 cm H2O   Minute ventilation: 12.7 l/min    ABG:   Recent Labs     01/29/21  0237 01/28/21  1703 01/28/21  1534   PHI 7.31* 7.24* 7.12*   PCO2I 47.0* 60.3* 83.5*   PO2I 129* 98 105*   HCO3I 23.8 25.9 27.1*       IMPRESSION:   [address] [place]  y.o. y/o male with acute hypoxemic respiratory failure secondary to COVID-19.   Hospital Problems  Date Reviewed: 6/3/2020          Codes Class Noted POA    Acute deep vein thrombosis (DVT) of left peroneal vein (HCC) ICD-10-CM: P19.858  ICD-9-CM: 453.42  1/28/2021 Unknown        Hypotension ICD-10-CM: I95.9  ICD-9-CM: 458.9  1/26/2021 Unknown        Atrial fibrillation (HCC) ICD-10-CM: I48.91  ICD-9-CM: 427.31  1/22/2021 Yes        Hypernatremia ICD-10-CM: E87.0  ICD-9-CM: 276.0  1/8/2021 Unknown        SWATI (acute kidney injury) ([facility] ICD-10-CM: N17.9  ICD-9-CM: 584.9  1/7/2021 Unknown        Acute hypoxemic respiratory failure ([facility] ICD-10-CM: J96.01  ICD-9-CM: 518.81  1/7/2021 Unknown        Type 2 diabetes mellitus with hyperosmolarity without nonketotic hyperglycemic-hyperosmolar coma [facility]) ICD-10-CM: E11.00  ICD-9-CM: 250.20  1/7/2021 Unknown        * (Principal) Pneumonia due to COVID-19 virus ICD-10-CM: U07.1, J12.82  ICD-9-CM: 480.8  1/7/2021 Unknown        CAD (coronary artery disease) ICD-10-CM: I25.10  ICD-9-CM: 414.00  10/28/2016 Yes    Overview Signed 10/28/2016  8:05 AM by SHELDON Connell     10-27-06 [facility] 90% LAD s/p 2.25 x 20 Synergy drug-eluting stent  (CS)             Obesity ICD-10-CM: E66.9  ICD-9-CM: 278.00  10/6/2015 Yes            PLAN:    -Planning for trach next week. -SLED planned today.  -stop paralytics and repeat ABG. -continue heparin and re-check H/H at 2pm.  -add reglan and try tube feeds  -increase lantus insulin.   -wean steroids further.  -dialysis per nephrology.   -Prophy: heparin, PPI     Full Code  Called Sister Patsy Villarreal [phone]. Went to [facility] which was full.     The patient is critically ill with respiratory failure, circulatory failure and requires high complexity decision making for assessment and support including frequent ventilator adjustment , frequent evaluation and titration of therapies , application of advanced monitoring technologies and extensive interpretation of multiple databases  Cumulative time devoted to patient care services by me for day of service -Jenelle Saezn MD

## 2021-01-29 NOTE — PROGRESS NOTES
1/29/2021 10:01 AM    Admit Date: 1/6/2021    Admit Diagnosis: Type 2 diabetes mellitus with hyperosmolarity without nonketotic hyperglycemic-hyperosmolar coma ([address] [E11.00]; Lower respiratory tract infection due to COVID-19 virus [U07.1, J22]; Acute hypoxemic respiratory failure (HCC) [J96.01];SWATI (acute kidney injury) ([address] [N17.9]      Subjective:   No ros - intubated      Objective:      Visit Vitals  BP (!) 113/53   Pulse 67   Temp 97.6 °F (36.4 °C)   Resp (!) 66   Ht 5' 8\" (1.727 m)   Wt 227 lb 4.7 oz (103.1 kg)   SpO2 96%   BMI 34.56 kg/m²       Physical Exam:  Physical Exam will not change cardiac management at this time due to covid 19      Data Review:   Recent Labs     01/29/21  0335 01/29/21  0329   NA  --  134*   K  --  4.7   BUN  --  103*   CREA  --  5.26*   WBC 12.7*  --    HGB 7.3*  --    HCT 23.0*  --    *  --    INR  --  1.3       Assessment/Plan:     Principal Problem:    Pneumonia due to COVID-19 virus (1/7/2021)- per primary    Active Problems:    Obesity (10/6/2015)      CAD (coronary artery disease) (10/28/2016)Stable. Continue current medical therapy. Overview: 10-27-06 [facility] 90% LAD s/p 2.25 x 20 Synergy drug-eluting stent  (CS)      SWATI (acute kidney injury) ([facility] (1/7/2021)      Acute hypoxemic respiratory failure ([address] (1/7/2021)      Type 2 diabetes mellitus with hyperosmolarity without nonketotic hyperglycemic-hyperosmolar coma ([address] (1/7/2021)      Hypernatremia (1/8/2021)      Atrial fibrillation ([address] (1/22/2021)Stable. Continue current medical therapy.    continue amio while intubated and convert to po when extunbated      Hypotension (1/26/2021)      Acute deep vein thrombosis (DVT) of left peroneal vein ([address] (1/28/2021)

## 2021-01-29 NOTE — PROGRESS NOTES
[facility] PROGRESS NOTE    Follow up for:    Subjective:   Patient seen and examined on SLED. Initially hypotensive, but stabilized.     ROS:  UTO     Objective:   Exam:  Vitals:    01/29/21 0743 01/29/21 0745 01/29/21 0927 01/29/21 1001   BP:   (!) 113/53 (!) 130/48   Pulse: 66 66 67 72   Resp: (!) 65 (!) 66     Temp:       SpO2: 96% 96%     Weight:       Height:             Intake/Output Summary (Last 24 hours) at 1/29/2021 1010  Last data filed at 1/29/2021 1001  Gross per 24 hour   Intake 3508.71 ml   Output 278 ml   Net 3230.71 ml       Current Facility-Administered Medications   Medication Dose Route Frequency    insulin glargine (LANTUS) injection 52 Units  52 Units SubCUTAneous Q12H    hydrocortisone Sod Succ (PF) (SOLU-CORTEF) injection 25 mg  25 mg IntraVENous Q8H    metoclopramide HCl (REGLAN) injection 5 mg  5 mg IntraVENous Q6H    NOREPINephrine (LEVOPHED) 4 mg in 5% dextrose 250 mL infusion  0.5-30 mcg/min IntraVENous TITRATE    TPN ADULT-CENTRAL - dextrose 15% amino acid 5%   IntraVENous QPM    heparin 25,000 units in dextrose 500 mL infusion  18-36 Units/kg/hr IntraVENous TITRATE    TPN ADULT-CENTRAL - dextrose 15% amino acid 5%   IntraVENous QPM    atracurium (TRACRIUM) 1,000 mg in 0.9% sodium chloride 250 mL infusion  0-15 mcg/kg/min IntraVENous TITRATE    NOREPINephrine (LEVOPHED) 16,000 mcg in dextrose 5% 250 mL infusion  0.5-30 mcg/min IntraVENous TITRATE    fat emulsion 20% (LIPOSYN, INTRAlipid) infusion 250 mL  250 mL IntraVENous QPM    heparin (porcine) 1,000 unit/mL injection 5,000 Units  5,000 Units Hemodialysis DIALYSIS PRN    senna-docusate (PERICOLACE) 8.6-50 mg per tablet 1 Tab  1 Tab Oral DAILY    amiodarone (CORDARONE) 450 mg in dextrose 5% 250 mL infusion  0.5-1 mg/min IntraVENous TITRATE    pantoprazole (PROTONIX) 40 mg in 0.9% sodium chloride 10 mL injection  40 mg IntraVENous Q12H    ondansetron (ZOFRAN) injection 4 mg  4 mg IntraVENous Q6H PRN    polyethylene glycol (MIRALAX) packet 17 g  17 g Per NG tube DAILY PRN    guaiFENesin-dextromethorphan (ROBITUSSIN DM) 100-10 mg/5 mL syrup 10 mL  10 mL Per NG tube Q4H PRN    acetaminophen (TYLENOL) tablet 650 mg  650 mg Per NG tube Q6H PRN    polyethylene glycol (MIRALAX) packet 17 g  17 g Per NG tube DAILY    sodium zirconium cyclosilicate (LOKELMA) powder packet 10 g  10 g Oral DAILY    white petrolatum-mineral oiL (LACRILUBE S.O.P.) ointment   Both Eyes Q12H    white petrolatum-mineral oiL (LACRILUBE S.O.P.) ointment   Both Eyes Q4H PRN    fentaNYL in normal saline (pf) 25 mcg/mL infusion  0-200 mcg/hr IntraVENous TITRATE    midazolam in normal saline (VERSED) 1 mg/mL infusion  0-10 mg/hr IntraVENous TITRATE    atorvastatin (LIPITOR) tablet 80 mg  80 mg Per NG tube DAILY    [Held by provider] clopidogreL (PLAVIX) tablet 75 mg  75 mg Per NG tube DAILY    [Held by provider] metoprolol tartrate (LOPRESSOR) tablet 50 mg  50 mg Per NG tube BID    insulin lispro (HUMALOG) injection   SubCUTAneous Q4H    LORazepam (ATIVAN) injection 1 mg  1 mg IntraVENous Q4H PRN    morphine injection 2 mg  2 mg IntraVENous Q4H PRN    NUTRITIONAL SUPPORT ELECTROLYTE PRN ORDERS   Does Not Apply PRN    sodium chloride (NS) flush 20 mL  20 mL InterCATHeter Q8H    sodium chloride (NS) flush 20 mL  20 mL InterCATHeter PRN    loperamide (IMODIUM) capsule 2 mg  2 mg Oral Q4H PRN    dextrose 40% (GLUTOSE) oral gel 1 Tube  15 g Oral PRN    glucagon (GLUCAGEN) injection 1 mg  1 mg IntraMUSCular PRN    dextrose (D50W) injection syrg 12.5-25 g  25-50 mL IntraVENous PRN    0.9% sodium chloride infusion 250 mL  250 mL IntraVENous PRN    albuterol (PROVENTIL HFA, VENTOLIN HFA, PROAIR HFA) inhaler 2 Puff  2 Puff Inhalation Q4H PRN    influenza vaccine 2020-21 (6 mos+)(PF) (FLUARIX/FLULAVAL/FLUZONE QUAD) injection 0.5 mL  0.5 mL IntraMUSCular PRIOR TO DISCHARGE    hydrALAZINE (APRESOLINE) 20 mg/mL injection 20 mg  20 mg IntraVENous Q6H PRN       EXAM  GEN - intubated/sedated   CV - S1, S2, RRR, no rub, murmur, or gallop  Lung - diffusely coarse  Abd - soft, nontender, BS present  Ext - no edema  Access- R IJ temp line    Recent Labs     01/29/21  0335 01/28/21  1433 01/28/21  0307   WBC 12.7* 17.0* 10.5   HGB 7.3* 8.3* 7.9*   HCT 23.0* 27.0* 24.8*   * 138* 94*        Recent Labs     01/29/21  0329 01/28/21  0307 01/27/21  0321   * 136* 135*   K 4.7 4.4 4.6    101 102   CO2 24 26 24   * 76* 99*   CREA 5.26* 4.15* 5.37*   CA 7.6* 7.8* 7.8*   * 286* 292*   MG 2.5*  --  2.8*   PHOS 7.8*  --  7.2*       Assessment and Plan:     1. SWATI - 2/2 ATN from COVID. On SLED today. Next dialysis will  be Monday unless acutely indicated over the weekend. 2. COVID /severe ARDS  Intubated     3. Hypotension   Pressors as need with HD. Maintain MAP > 65      4. Hyperkalemia   Correced with HD.     5. Afib w/ RVR-  Now in NSR s/p cardioversion.   amio gtt.         Santos Núñez MD

## 2021-01-30 ENCOUNTER — APPOINTMENT (OUTPATIENT)
Dept: GENERAL RADIOLOGY | Age: 61
DRG: 004 | End: 2021-01-30
Attending: INTERNAL MEDICINE
Payer: COMMERCIAL

## 2021-01-30 LAB
ANION GAP SERPL CALC-SCNC: 7 MMOL/L (ref 7–16)
APTT PPP: 102.2 SEC (ref 24.1–35.1)
ARTERIAL PATENCY WRIST A: ABNORMAL
BASE DEFICIT BLD-SCNC: 1 MMOL/L
BASOPHILS # BLD: 0.2 K/UL (ref 0–0.2)
BASOPHILS NFR BLD: 1 % (ref 0–2)
BDY SITE: ABNORMAL
BUN SERPL-MCNC: 70 MG/DL (ref 8–23)
CALCIUM SERPL-MCNC: 7.6 MG/DL (ref 8.3–10.4)
CHLORIDE SERPL-SCNC: 102 MMOL/L (ref 98–107)
CO2 BLD-SCNC: 26 MMOL/L
CO2 SERPL-SCNC: 28 MMOL/L (ref 21–32)
COLLECT TIME,HTIME: 340
CREAT SERPL-MCNC: 4.06 MG/DL (ref 0.8–1.5)
D DIMER PPP FEU-MCNC: 2.19 UG/ML(FEU)
DIFFERENTIAL METHOD BLD: ABNORMAL
DIFFERENTIAL METHOD BLD: ABNORMAL
EOSINOPHIL # BLD: 0 K/UL (ref 0–0.8)
EOSINOPHIL # BLD: 0.5 K/UL (ref 0–0.8)
EOSINOPHIL NFR BLD MANUAL: 3 % (ref 1–8)
EOSINOPHIL NFR BLD: 0 % (ref 0.5–7.8)
ERYTHROCYTE [DISTWIDTH] IN BLOOD BY AUTOMATED COUNT: 16.3 % (ref 11.9–14.6)
ERYTHROCYTE [DISTWIDTH] IN BLOOD BY AUTOMATED COUNT: 16.4 % (ref 11.9–14.6)
EXHALED MINUTE VOLUME, VE: 14.6 L/MIN
FIBRINOGEN PPP-MCNC: 412 MG/DL (ref 190–501)
GAS FLOW.O2 O2 DELIVERY SYS: ABNORMAL L/MIN
GAS FLOW.O2 SETTING OXYMISER: 32 BPM
GLUCOSE BLD STRIP.AUTO-MCNC: 103 MG/DL (ref 65–100)
GLUCOSE BLD STRIP.AUTO-MCNC: 110 MG/DL (ref 65–100)
GLUCOSE BLD STRIP.AUTO-MCNC: 129 MG/DL (ref 65–100)
GLUCOSE BLD STRIP.AUTO-MCNC: 38 MG/DL (ref 65–100)
GLUCOSE BLD STRIP.AUTO-MCNC: 44 MG/DL (ref 65–100)
GLUCOSE BLD STRIP.AUTO-MCNC: 53 MG/DL (ref 65–100)
GLUCOSE BLD STRIP.AUTO-MCNC: 77 MG/DL (ref 65–100)
GLUCOSE BLD STRIP.AUTO-MCNC: 94 MG/DL (ref 65–100)
GLUCOSE SERPL-MCNC: 116 MG/DL (ref 65–100)
HCO3 BLD-SCNC: 24.8 MMOL/L (ref 22–26)
HCT VFR BLD AUTO: 18.1 % (ref 41.1–50.3)
HCT VFR BLD AUTO: 19.6 % (ref 41.1–50.3)
HCT VFR BLD AUTO: 23.3 % (ref 41.1–50.3)
HGB BLD-MCNC: 5.7 G/DL (ref 13.6–17.2)
HGB BLD-MCNC: 6.3 G/DL (ref 13.6–17.2)
HGB BLD-MCNC: 7.4 G/DL (ref 13.6–17.2)
HISTORY CHECKED?,CKHIST: NORMAL
IMM GRANULOCYTES # BLD AUTO: 2.2 K/UL (ref 0–0.5)
IMM GRANULOCYTES NFR BLD AUTO: 12 % (ref 0–5)
INR PPP: 1.2
INSPIRATION.DURATION SETTING TIME VENT: 0.75 SEC
LYMPHOCYTES # BLD: 0.9 K/UL (ref 0.5–4.6)
LYMPHOCYTES # BLD: 2.9 K/UL (ref 0.5–4.6)
LYMPHOCYTES NFR BLD MANUAL: 16 % (ref 16–44)
LYMPHOCYTES NFR BLD: 5 % (ref 13–44)
MCH RBC QN AUTO: 30 PG (ref 26.1–32.9)
MCH RBC QN AUTO: 30.1 PG (ref 26.1–32.9)
MCHC RBC AUTO-ENTMCNC: 31.8 G/DL (ref 31.4–35)
MCHC RBC AUTO-ENTMCNC: 32.1 G/DL (ref 31.4–35)
MCV RBC AUTO: 93.8 FL (ref 79.6–97.8)
MCV RBC AUTO: 94.3 FL (ref 79.6–97.8)
MONOCYTES # BLD: 1.5 K/UL (ref 0.1–1.3)
MONOCYTES # BLD: 1.6 K/UL (ref 0.1–1.3)
MONOCYTES NFR BLD MANUAL: 9 % (ref 3–9)
MONOCYTES NFR BLD: 8 % (ref 4–12)
MYELOCYTES NFR BLD MANUAL: 1 %
NEUTS BAND NFR BLD MANUAL: 1 % (ref 0–10)
NEUTS SEG # BLD: 13 K/UL (ref 1.7–8.2)
NEUTS SEG # BLD: 13.7 K/UL (ref 1.7–8.2)
NEUTS SEG NFR BLD MANUAL: 70 % (ref 47–75)
NEUTS SEG NFR BLD: 74 % (ref 43–78)
NRBC # BLD: 0.02 K/UL (ref 0–0.2)
NRBC # BLD: 0.07 K/UL (ref 0–0.2)
O2/TOTAL GAS SETTING VFR VENT: 55 %
PCO2 BLD: 44.4 MMHG (ref 35–45)
PEEP RESPIRATORY: 14 CMH2O
PH BLD: 7.36 [PH] (ref 7.35–7.45)
PLATELET # BLD AUTO: 134 K/UL (ref 150–450)
PLATELET # BLD AUTO: 183 K/UL (ref 150–450)
PLATELET COMMENTS,PCOM: ADEQUATE
PLATELET COMMENTS,PCOM: ADEQUATE
PMV BLD AUTO: 11.1 FL (ref 9.4–12.3)
PMV BLD AUTO: 11.2 FL (ref 9.4–12.3)
PO2 BLD: 121 MMHG (ref 75–100)
POTASSIUM SERPL-SCNC: 4.7 MMOL/L (ref 3.5–5.1)
PRESSURE CONTROL, IPC: 18
PROTHROMBIN TIME: 15.4 SEC (ref 12.5–14.7)
RBC # BLD AUTO: 2.09 M/UL (ref 4.23–5.6)
RBC # BLD AUTO: 2.47 M/UL (ref 4.23–5.6)
RBC MORPH BLD: ABNORMAL
SAO2 % BLD: 99 % (ref 95–98)
SERVICE CMNT-IMP: ABNORMAL
SODIUM SERPL-SCNC: 137 MMOL/L (ref 136–145)
SPECIMEN TYPE: ABNORMAL
UFH PPP CHRO-ACNC: 0.31 IU/ML (ref 0.3–0.7)
UFH PPP CHRO-ACNC: 0.6 IU/ML (ref 0.3–0.7)
VENTILATION MODE VENT: ABNORMAL
WBC # BLD AUTO: 18 K/UL (ref 4.3–11.1)
WBC # BLD AUTO: 18.5 K/UL (ref 4.3–11.1)
WBC MORPH BLD: ABNORMAL

## 2021-01-30 PROCEDURE — 74011000258 HC RX REV CODE- 258: Performed by: NURSE PRACTITIONER

## 2021-01-30 PROCEDURE — 85025 COMPLETE CBC W/AUTO DIFF WBC: CPT

## 2021-01-30 PROCEDURE — P9016 RBC LEUKOCYTES REDUCED: HCPCS

## 2021-01-30 PROCEDURE — 85014 HEMATOCRIT: CPT

## 2021-01-30 PROCEDURE — 74011000250 HC RX REV CODE- 250: Performed by: INTERNAL MEDICINE

## 2021-01-30 PROCEDURE — 74011250637 HC RX REV CODE- 250/637: Performed by: INTERNAL MEDICINE

## 2021-01-30 PROCEDURE — 85384 FIBRINOGEN ACTIVITY: CPT

## 2021-01-30 PROCEDURE — 74011250636 HC RX REV CODE- 250/636: Performed by: NURSE PRACTITIONER

## 2021-01-30 PROCEDURE — 99291 CRITICAL CARE FIRST HOUR: CPT | Performed by: INTERNAL MEDICINE

## 2021-01-30 PROCEDURE — 2709999900 HC NON-CHARGEABLE SUPPLY

## 2021-01-30 PROCEDURE — 74011000250 HC RX REV CODE- 250: Performed by: HOSPITALIST

## 2021-01-30 PROCEDURE — 74011636637 HC RX REV CODE- 636/637: Performed by: INTERNAL MEDICINE

## 2021-01-30 PROCEDURE — 94003 VENT MGMT INPAT SUBQ DAY: CPT

## 2021-01-30 PROCEDURE — 85610 PROTHROMBIN TIME: CPT

## 2021-01-30 PROCEDURE — 85520 HEPARIN ASSAY: CPT

## 2021-01-30 PROCEDURE — 85379 FIBRIN DEGRADATION QUANT: CPT

## 2021-01-30 PROCEDURE — 74011000258 HC RX REV CODE- 258: Performed by: INTERNAL MEDICINE

## 2021-01-30 PROCEDURE — 80048 BASIC METABOLIC PNL TOTAL CA: CPT

## 2021-01-30 PROCEDURE — C9113 INJ PANTOPRAZOLE SODIUM, VIA: HCPCS | Performed by: INTERNAL MEDICINE

## 2021-01-30 PROCEDURE — 99231 SBSQ HOSP IP/OBS SF/LOW 25: CPT | Performed by: INTERNAL MEDICINE

## 2021-01-30 PROCEDURE — 82803 BLOOD GASES ANY COMBINATION: CPT

## 2021-01-30 PROCEDURE — 87040 BLOOD CULTURE FOR BACTERIA: CPT

## 2021-01-30 PROCEDURE — 30233N1 TRANSFUSION OF NONAUTOLOGOUS RED BLOOD CELLS INTO PERIPHERAL VEIN, PERCUTANEOUS APPROACH: ICD-10-PCS | Performed by: INTERNAL MEDICINE

## 2021-01-30 PROCEDURE — 86901 BLOOD TYPING SEROLOGIC RH(D): CPT

## 2021-01-30 PROCEDURE — 85730 THROMBOPLASTIN TIME PARTIAL: CPT

## 2021-01-30 PROCEDURE — 74011250636 HC RX REV CODE- 250/636: Performed by: INTERNAL MEDICINE

## 2021-01-30 PROCEDURE — 71045 X-RAY EXAM CHEST 1 VIEW: CPT

## 2021-01-30 PROCEDURE — 36430 TRANSFUSION BLD/BLD COMPNT: CPT

## 2021-01-30 PROCEDURE — 77030041174 HC STOOL COL SYS DIGNSHLD BARD -C

## 2021-01-30 PROCEDURE — 82962 GLUCOSE BLOOD TEST: CPT

## 2021-01-30 PROCEDURE — 87205 SMEAR GRAM STAIN: CPT

## 2021-01-30 PROCEDURE — 86923 COMPATIBILITY TEST ELECTRIC: CPT

## 2021-01-30 PROCEDURE — 65620000000 HC RM CCU GENERAL

## 2021-01-30 PROCEDURE — 36600 WITHDRAWAL OF ARTERIAL BLOOD: CPT

## 2021-01-30 PROCEDURE — 77030014008 HC SPNG HEMSTAT J&J -C

## 2021-01-30 RX ORDER — SUCCINYLCHOLINE CHLORIDE 20 MG/ML INJECTION SOLUTION
140 SOLUTION
Status: DISCONTINUED | OUTPATIENT
Start: 2021-01-30 | End: 2021-01-30

## 2021-01-30 RX ORDER — INSULIN GLARGINE 100 [IU]/ML
45 INJECTION, SOLUTION SUBCUTANEOUS EVERY 12 HOURS
Status: DISCONTINUED | OUTPATIENT
Start: 2021-01-30 | End: 2021-01-31

## 2021-01-30 RX ORDER — HYDROCORTISONE SODIUM SUCCINATE 100 MG/2ML
25 INJECTION, POWDER, FOR SOLUTION INTRAMUSCULAR; INTRAVENOUS EVERY 12 HOURS
Status: DISCONTINUED | OUTPATIENT
Start: 2021-01-30 | End: 2021-02-02

## 2021-01-30 RX ORDER — INSULIN GLARGINE 100 [IU]/ML
45 INJECTION, SOLUTION SUBCUTANEOUS EVERY 12 HOURS
Status: DISCONTINUED | OUTPATIENT
Start: 2021-01-30 | End: 2021-01-30

## 2021-01-30 RX ORDER — SODIUM CHLORIDE 9 MG/ML
250 INJECTION, SOLUTION INTRAVENOUS AS NEEDED
Status: DISCONTINUED | OUTPATIENT
Start: 2021-01-30 | End: 2021-02-04

## 2021-01-30 RX ORDER — ETOMIDATE 2 MG/ML
30 INJECTION INTRAVENOUS ONCE
Status: DISCONTINUED | OUTPATIENT
Start: 2021-01-30 | End: 2021-01-30

## 2021-01-30 RX ADMIN — PANTOPRAZOLE SODIUM 40 MG: 40 INJECTION, POWDER, FOR SOLUTION INTRAVENOUS at 20:17

## 2021-01-30 RX ADMIN — DEXTROSE MONOHYDRATE 25 G: 25 INJECTION, SOLUTION INTRAVENOUS at 23:48

## 2021-01-30 RX ADMIN — MINERAL OIL, PETROLATUM: 425; 568 OINTMENT OPHTHALMIC at 21:29

## 2021-01-30 RX ADMIN — METOCLOPRAMIDE 5 MG: 5 INJECTION, SOLUTION INTRAMUSCULAR; INTRAVENOUS at 17:22

## 2021-01-30 RX ADMIN — DEXTROSE MONOHYDRATE 25 G: 25 INJECTION, SOLUTION INTRAVENOUS at 14:50

## 2021-01-30 RX ADMIN — HEPARIN SODIUM 7 UNITS/KG/HR: 5000 INJECTION, SOLUTION INTRAVENOUS at 10:31

## 2021-01-30 RX ADMIN — Medication 20 ML: at 05:00

## 2021-01-30 RX ADMIN — DEXTROSE MONOHYDRATE 25 G: 25 INJECTION, SOLUTION INTRAVENOUS at 19:54

## 2021-01-30 RX ADMIN — INSULIN GLARGINE 45 UNITS: 100 INJECTION, SOLUTION SUBCUTANEOUS at 09:37

## 2021-01-30 RX ADMIN — POLYETHYLENE GLYCOL 3350 17 G: 17 POWDER, FOR SOLUTION ORAL at 09:30

## 2021-01-30 RX ADMIN — METOCLOPRAMIDE 5 MG: 5 INJECTION, SOLUTION INTRAMUSCULAR; INTRAVENOUS at 05:00

## 2021-01-30 RX ADMIN — HYDROCORTISONE SODIUM SUCCINATE 25 MG: 100 INJECTION, POWDER, FOR SOLUTION INTRAMUSCULAR; INTRAVENOUS at 20:17

## 2021-01-30 RX ADMIN — HYDROCORTISONE SODIUM SUCCINATE 25 MG: 100 INJECTION, POWDER, FOR SOLUTION INTRAMUSCULAR; INTRAVENOUS at 05:00

## 2021-01-30 RX ADMIN — MINERAL OIL, PETROLATUM: 425; 568 OINTMENT OPHTHALMIC at 09:45

## 2021-01-30 RX ADMIN — METOCLOPRAMIDE 5 MG: 5 INJECTION, SOLUTION INTRAMUSCULAR; INTRAVENOUS at 11:30

## 2021-01-30 RX ADMIN — PANTOPRAZOLE SODIUM 40 MG: 40 INJECTION, POWDER, FOR SOLUTION INTRAVENOUS at 09:30

## 2021-01-30 RX ADMIN — ATORVASTATIN CALCIUM 80 MG: 40 TABLET, FILM COATED ORAL at 09:30

## 2021-01-30 RX ADMIN — DOCUSATE SODIUM 50 MG AND SENNOSIDES 8.6 MG 1 TABLET: 8.6; 5 TABLET, FILM COATED ORAL at 09:30

## 2021-01-30 RX ADMIN — SODIUM ZIRCONIUM CYCLOSILICATE 10 G: 10 POWDER, FOR SUSPENSION ORAL at 09:30

## 2021-01-30 RX ADMIN — Medication 20 ML: at 14:00

## 2021-01-30 RX ADMIN — AMIODARONE HYDROCHLORIDE 0.5 MG/MIN: 50 INJECTION, SOLUTION INTRAVENOUS at 20:16

## 2021-01-30 RX ADMIN — NOREPINEPHRINE BITARTRATE 10 MCG/MIN: 1 INJECTION, SOLUTION, CONCENTRATE INTRAVENOUS at 01:44

## 2021-01-30 RX ADMIN — AMIODARONE HYDROCHLORIDE 0.5 MG/MIN: 50 INJECTION, SOLUTION INTRAVENOUS at 04:57

## 2021-01-30 NOTE — PROGRESS NOTES
Covid precautions in place   did not visit with patient    Spoke with staff     Reviewed notes for spiritual concerns  PER NOTES:    [facility]  Sister - yessica        Prayer offered for patient's healing and protection      STAFF IS PROVIDING VERY COMPASSIONATE CARE

## 2021-01-30 NOTE — PROGRESS NOTES
Ventilator check complete; patient has a #8. 0 ET tube secured at the 24 at the lip. Patient is not able to follow commands. Breath sounds are coarse. Trachea is midline, Negative for subcutaneous air, and chest excursion is symmetrical. Patient is also Negative for cyanosis and is Negative for pitting edema. All alarms are set and audible.   Resuscitation bag and mask are at the head of the bed.       Ventilator Settings  Mode FIO2 Rate Tidal Volume Pressure PEEP I:E Ratio   Pressure control  56 %  32      18 14 cm H20  1:1.49       Peak airway pressure: 30.7 cm H2O   Minute ventilation: 12.7 l/min

## 2021-01-30 NOTE — PROGRESS NOTES
1/30/2021 11:25 AM    Admit Date: 1/6/2021    Admit Diagnosis: Type 2 diabetes mellitus with hyperosmolarity without nonketotic hyperglycemic-hyperosmolar coma ([address] [E11.00]; Lower respiratory tract infection due to COVID-19 virus [U07.1, J22]; Acute hypoxemic respiratory failure (HCC) [J96.01];SWATI (acute kidney injury) ([address] [N17.9]      Subjective:   No ros intubated      Objective:      Visit Vitals  BP (!) 125/50 (BP 1 Location: Left lower arm)   Pulse 85   Temp 99.7 °F (37.6 °C)   Resp (!) 32   Ht 5' 8\" (1.727 m)   Wt 236 lb 12.4 oz (107.4 kg)   SpO2 96%   BMI 36.00 kg/m²       Physical Exam:  Physical Exam will not change cardiac management at this time die to covid 19          Data Review:   Recent Labs     01/30/21  0305      K 4.7   BUN 70*   CREA 4.06*   WBC 18.5*   HGB 7.4*   HCT 23.3*      INR 1.2       Assessment/Plan:     Principal Problem:    Pneumonia due to COVID-19 virus (1/7/2021)    Active Problems:    Obesity (10/6/2015)      CAD (coronary artery disease) (10/28/2016)Stable. Continue current medical therapy. Stable. Continue current medical therapy. Overview: 10-27-06 [facility] 90% LAD s/p 2.25 x 20 Synergy drug-eluting stent  (CS)      SWATI (acute kidney injury) ([facility] [address] (1/7/2021)      Acute hypoxemic respiratory failure ([address] (1/7/2021)      Type 2 diabetes mellitus with hyperosmolarity without nonketotic hyperglycemic-hyperosmolar coma ([address] (1/7/2021)      Hypernatremia (1/8/2021)      Atrial fibrillation ([address] (1/22/2021)Improved with current therapy.  Will continue medications   in nsr- change to amio 200mg every day when extubated will sign off      Hypotension (1/26/2021)      Acute deep vein thrombosis (DVT) of left peroneal vein ([facility] [address] (1/28/2021)

## 2021-01-30 NOTE — PROGRESS NOTES
Bedside, Verbal and Written shift change report given to Isaiah Chen and Talisha Still RN (oncoming nurse) by Taurus Fortune RN (offgoing nurse). Report included the following information SBAR, Kardex, Intake/Output, MAR, Recent Results, Cardiac Rhythm SR and Alarm Parameters .

## 2021-01-30 NOTE — PROGRESS NOTES
[facility] Critical Care COVID-19 Note:    Critical Care Note: 1/30/2021    Vesta Nicole. Admission Date: 1/6/2021     Length of Stay: 23 days    Background: 61 y.o. y/o male with acute hypoxemic respiratory failure secondary to COVID-19. Onset of COVID-19 symptoms: 1/5/21    Hospital Course:  Pt admitted 1/7 with fever, cough, fever. Covid + 1/6. Admitted by hospitalists. Started dexa, plasma, remdesivir. Pulm consulted 1/10 with pt requiring CPAP. Intubated 1/17. Nephrology consulted 1/23 for renal failure. US 1/18 with L peroneal vein thrombus. On heparin but stopped due to + FOBT. Also has a fib on amio. Notable PMH: obesity, HTN, CAD, dyslipidemia, SWATI, DM, GERD    24 Hour events: White blood cell elevated today at 18.5K, a considerable increase. Low-grade temp f of 99.8 today. Fluid balance positive 3L yesterday. Hgb down slightly at 7.4. Dialyzed yesterday, though fluid balance +1.7 L despite removal of 1.8 kg's. Most of input appears to be IV fluid. Has had bleeding from hemodialysis access site. Remains on amiodarone drip without any major arrhythmias overnight. P:F ratio is 220, improving. ROS: intubated, sedated    LINES:    ET tube 1/17/2021  PICC 1/13  NGT 1/17  Art line 1/18/2021  Dialysis access 1/24/21  Gimenez 1/17    Drips: current dose (range)  Amio  Heparin  TPN    Anti-infectives  azithro (completed)  Ceftriaxone (completed)  Vanc and cefepime: (completed)    Visit Vitals  BP (!) 178/66 (BP 1 Location: Left lower arm)   Pulse 85   Temp 99.8 °F (37.7 °C)   Resp (!) 35   Ht 5' 8\" (1.727 m)   Wt 236 lb 12.4 oz (107.4 kg)   SpO2 96%   BMI 36.00 kg/m²     Pertinent Exam:            Constitutional:  intubated and mechanically ventilated.   EENMT:  Sclera clear, pupils equal, oral mucosa moist  Respiratory: crackles bilateral  Cardiovascular:  RRR with no M,G,R;  Gastrointestinal:  soft with no tenderness; positive bowel sounds present  Musculoskeletal: warm with no cyanosis, no lower extremity edema  Skin:  no jaundice or ecchymosis  Neurologic: unresponsive  Psychiatric:unresponsive. CXR today    Pertinent Labs:   Recent Labs     01/30/21  0305 01/29/21  1438 01/29/21  0335   WBC 18.5* 22.1* 12.7*   HGB 7.4* 8.0* 7.3*   HCT 23.3* 25.5* 23.0*    154 130*   INR 1.2  --   --      Recent Labs     01/30/21  0305 01/29/21  0329 01/28/21  0307    134* 136*   K 4.7 4.7 4.4    100 101   CO2 28 24 26   * 220* 286*   BUN 70* 103* 76*   CREA 4.06* 5.26* 4.15*   MG  --  2.5*  --    CA 7.6* 7.6* 7.8*   PHOS  --  7.8*  --      Recent Labs     01/30/21  0927 01/30/21  0304 01/29/21  2211   GLUCPOC 94 129* 377*       SARS-CoV-2 LAB Results  [facility] Test: No results found for: COV2NT   [facility] Test: No results found for: EDPR  [facility] Test: No components found for: [id number]     COVID-19 Meds:  Decadron 1/7-16  remdesivir 1/8-1/12  Convalescent plasma 1/7      Ventilator Settings:  5' 8\" (1.727 m)  Mode FIO2 Rate Tidal Volume Pressure PEEP   Pressure control  55 %    454 ml     14 cm H20    Peak airway pressure: 30.7 cm H2O   Minute ventilation: 15.7 l/min    ABG:   Recent Labs     01/30/21  0344 01/29/21  0930 01/29/21  0237   PHI 7.36 7.22* 7.31*   PCO2I 44.4 59.6* 47.0*   PO2I 121* 106* 129*   HCO3I 24.8 24.2 23.8       IMPRESSION:   61 y.o. y/o male with acute hypoxemic respiratory failure secondary to COVID-19.   Hospital Problems  Date Reviewed: 6/3/2020          Codes Class Noted POA    Acute deep vein thrombosis (DVT) of left peroneal vein (HCC) ICD-10-CM: I30.051  ICD-9-CM: 453.42  1/28/2021 Unknown        Hypotension ICD-10-CM: I95.9  ICD-9-CM: 458.9  1/26/2021 Unknown        Atrial fibrillation (HCC) ICD-10-CM: I48.91  ICD-9-CM: 427.31  1/22/2021 Yes        Hypernatremia ICD-10-CM: E87.0  ICD-9-CM: 276.0  1/8/2021 Unknown        SWATI (acute kidney injury) ([facility] ICD-10-CM: N17.9  ICD-9-CM: 584.9  1/7/2021 Unknown        Acute hypoxemic respiratory failure [facility]) ICD-10-CM: J96.01  ICD-9-CM: 518.81  1/7/2021 Unknown        Type 2 diabetes mellitus with hyperosmolarity without nonketotic hyperglycemic-hyperosmolar coma ([facility] [address] ICD-10-CM: E11.00  ICD-9-CM: 250.20  1/7/2021 Unknown        * (Principal) Pneumonia due to COVID-19 virus ICD-10-CM: U07.1, J12.82  ICD-9-CM: 480.8  1/7/2021 Unknown        CAD (coronary artery disease) ICD-10-CM: I25.10  ICD-9-CM: 414.00  10/28/2016 Yes    Overview Signed 10/28/2016  8:05 AM by SHELDON Donald     10-27-06 [facility] 90% LAD s/p 2.25 x 20 Synergy drug-eluting stent  (CS)             Obesity ICD-10-CM: E66.9  ICD-9-CM: 278.00  10/6/2015 Yes            PLAN:    -Acute respiratory failure due to COVID-19: Planning for trach next week. Wean ventilator as able. PF ratio of 220 is improved. T  --hold all sedation and use prns if needed.  -continue heparin drip for DVT, presumed PE. Will recheck D-dimer.  -increase tube feeds and stop TPN. -decrease lantus today.   -wean steroids further to q12 and hopefully off by Monday.  -reculture since had spike in WBC count and start empiric abx if febrile.  -dialysis per nephrology.   -Prophy: heparin, PPI     Full Code  Called Sister Radha Darling [phone].       The patient is critically ill with respiratory failure, circulatory failure and requires high complexity decision making for assessment and support including frequent ventilator adjustment , frequent evaluation and titration of therapies , application of advanced monitoring technologies and extensive interpretation of multiple databases  Cumulative time devoted to patient care services by me for day of service -Cindy Leon MD

## 2021-01-30 NOTE — PROGRESS NOTES
Ventilator check complete; patient has a #8. 0 ET tube secured at the 24 at the lip. Breath sounds are diminished. Trachea is midline, Negative for subcutaneous air, and chest excursion is symmetric. Patient is also Negative for cyanosis and is Negative for pitting edema. All alarms are set and audible. Resuscitation bag is at the head of the bed. Ventilator Settings  Mode FIO2 Rate Tidal Volume Pressure PEEP I:E Ratio   Pressure control  55 %    454 ml     14 cm H20  1:1.49      Peak airway pressure: 30.7 cm H2O   Minute ventilation: 15.7 l/min     ABG: No results for input(s): PH, PCO2, PO2, HCO3 in the last 72 hours.       Vahe Leo, RT

## 2021-01-30 NOTE — PROGRESS NOTES
[facility] PROGRESS NOTE    Follow up for:    Subjective:   Patient seen and examined. Did okay with SLED yesterday.       ROS:  UTO     Objective:   Exam:  Vitals:    01/30/21 0730 01/30/21 0740 01/30/21 0745 01/30/21 0800   BP:   (!) 178/66    Pulse: 75 77 81 77   Resp: (!) 79 19 (!) 33 (!) 37   Temp:   99.8 °F (37.7 °C)    SpO2: 97% 94% 95% 95%   Weight:       Height:             Intake/Output Summary (Last 24 hours) at 1/30/2021 0948  Last data filed at 1/30/2021 0526  Gross per 24 hour   Intake 3503.33 ml   Output 2030 ml   Net 1473.33 ml       Current Facility-Administered Medications   Medication Dose Route Frequency    insulin glargine (LANTUS) injection 45 Units  45 Units SubCUTAneous Q12H    hydrocortisone Sod Succ (PF) (SOLU-CORTEF) injection 25 mg  25 mg IntraVENous Q8H    metoclopramide HCl (REGLAN) injection 5 mg  5 mg IntraVENous Q6H    NOREPINephrine (LEVOPHED) 4 mg in 5% dextrose 250 mL infusion  0.5-30 mcg/min IntraVENous TITRATE    TPN ADULT-CENTRAL - dextrose 15% amino acid 5%   IntraVENous QPM    heparin 25,000 units in dextrose 500 mL infusion  18-36 Units/kg/hr IntraVENous TITRATE    atracurium (TRACRIUM) 1,000 mg in 0.9% sodium chloride 250 mL infusion  0-15 mcg/kg/min IntraVENous TITRATE    NOREPINephrine (LEVOPHED) 16,000 mcg in dextrose 5% 250 mL infusion  0.5-30 mcg/min IntraVENous TITRATE    fat emulsion 20% (LIPOSYN, INTRAlipid) infusion 250 mL  250 mL IntraVENous QPM    heparin (porcine) 1,000 unit/mL injection 5,000 Units  5,000 Units Hemodialysis DIALYSIS PRN    senna-docusate (PERICOLACE) 8.6-50 mg per tablet 1 Tab  1 Tab Oral DAILY    amiodarone (CORDARONE) 450 mg in dextrose 5% 250 mL infusion  0.5-1 mg/min IntraVENous TITRATE    pantoprazole (PROTONIX) 40 mg in 0.9% sodium chloride 10 mL injection  40 mg IntraVENous Q12H    ondansetron (ZOFRAN) injection 4 mg  4 mg IntraVENous Q6H PRN    polyethylene glycol (MIRALAX) packet 17 g  17 g Per NG tube DAILY PRN  guaiFENesin-dextromethorphan (ROBITUSSIN DM) 100-10 mg/5 mL syrup 10 mL  10 mL Per NG tube Q4H PRN    acetaminophen (TYLENOL) tablet 650 mg  650 mg Per NG tube Q6H PRN    polyethylene glycol (MIRALAX) packet 17 g  17 g Per NG tube DAILY    sodium zirconium cyclosilicate (LOKELMA) powder packet 10 g  10 g Oral DAILY    white petrolatum-mineral oiL (LACRILUBE S.O.P.) ointment   Both Eyes Q12H    white petrolatum-mineral oiL (LACRILUBE S.O.P.) ointment   Both Eyes Q4H PRN    fentaNYL in normal saline (pf) 25 mcg/mL infusion  0-200 mcg/hr IntraVENous TITRATE    midazolam in normal saline (VERSED) 1 mg/mL infusion  0-10 mg/hr IntraVENous TITRATE    atorvastatin (LIPITOR) tablet 80 mg  80 mg Per NG tube DAILY    [Held by provider] clopidogreL (PLAVIX) tablet 75 mg  75 mg Per NG tube DAILY    [Held by provider] metoprolol tartrate (LOPRESSOR) tablet 50 mg  50 mg Per NG tube BID    insulin lispro (HUMALOG) injection   SubCUTAneous Q4H    LORazepam (ATIVAN) injection 1 mg  1 mg IntraVENous Q4H PRN    morphine injection 2 mg  2 mg IntraVENous Q4H PRN    NUTRITIONAL SUPPORT ELECTROLYTE PRN ORDERS   Does Not Apply PRN    sodium chloride (NS) flush 20 mL  20 mL InterCATHeter Q8H    sodium chloride (NS) flush 20 mL  20 mL InterCATHeter PRN    loperamide (IMODIUM) capsule 2 mg  2 mg Oral Q4H PRN    dextrose 40% (GLUTOSE) oral gel 1 Tube  15 g Oral PRN    glucagon (GLUCAGEN) injection 1 mg  1 mg IntraMUSCular PRN    dextrose (D50W) injection syrg 12.5-25 g  25-50 mL IntraVENous PRN    0.9% sodium chloride infusion 250 mL  250 mL IntraVENous PRN    albuterol (PROVENTIL HFA, VENTOLIN HFA, PROAIR HFA) inhaler 2 Puff  2 Puff Inhalation Q4H PRN    influenza vaccine 2020-21 (6 mos+)(PF) (FLUARIX/FLULAVAL/FLUZONE QUAD) injection 0.5 mL  0.5 mL IntraMUSCular PRIOR TO DISCHARGE    hydrALAZINE (APRESOLINE) 20 mg/mL injection 20 mg  20 mg IntraVENous Q6H PRN       EXAM  GEN - intubated/sedated   CV - S1, S2, RRR, no rub, murmur, or gallop  Lung - diffusely coarse  Abd - soft, nontender, BS present  Ext - no edema  Access- R IJ temp line    Recent Labs     01/30/21  0305 01/29/21  1438 01/29/21  0335   WBC 18.5* 22.1* 12.7*   HGB 7.4* 8.0* 7.3*   HCT 23.3* 25.5* 23.0*    154 130*        Recent Labs     01/30/21  0305 01/29/21  0329 01/28/21  0307    134* 136*   K 4.7 4.7 4.4    100 101   CO2 28 24 26   BUN 70* 103* 76*   CREA 4.06* 5.26* 4.15*   CA 7.6* 7.6* 7.8*   * 220* 286*   MG  --  2.5*  --    PHOS  --  7.8*  --        Assessment and Plan:     1. SWATI - 2/2 ATN from COVID. SLED yesterday weill tolerated. Next dialysis will  be Monday, possibly HD if he stabilizes some. 2. COVID /severe ARDS  Intubated     3. Hypotension- better. Weaning pressors.      4. Hyperkalemia   Correced with HD.     5. Afib w/ RVR-  Now in NSR s/p cardioversion.   amio gtt.         Preston Vick MD

## 2021-01-30 NOTE — PROGRESS NOTES
Comprehensive Nutrition Assessment    Type and Reason for Visit: Reassess  TPN Management (Pulmonogy)    Nutrition Recommendations/Plan:    Stop TPN after current dose at 1759 today.  Stop IV lipids  Increase TF of Nepro 1.8 by 10 ml/hr q 4 hrs as tolerated to goal rate of 45ml/hr with 110ml water q 4hr to provide 1944 kcal/day (100% of needs), 87.5 grams protein/day (62% of needs), 173 grams CHO/day, and ~ 1450 ml free water/day. 2.    Electrolyte replacement per nutritional support protocols are active on MAR. 3.    Labs: BMP daily, Phos and Magnesium MWF     Malnutrition Assessment:  Malnutrition Status: At risk for malnutrition (specify)(Poor nutrition since 1/19-1/25.)  Context: Acute illness  Findings of clinical characteristics of malnutrition:   Energy Intake:  Mild decrease in energy intake (specify)(Decrease reported for four days)  Weight Loss:  (89% UBE per EMR, pt states 40# loss over 4 days (15% based on stated # and CBW))       Nutrition Assessment:   Nutrition History: 1/7/2021: Pt reports inability to tolerate any food or liquids for 4 days PTA. Indicates vomiting with all po attempts during this time      Nutrition Background: PMH remarkable for CAD, GERD, HTN, MO. Admitted with Coivd 19, new onset DM with hyperglycemic hyperosmolar state, SWATI on CKD, lactic acidosis. Daily Update: Tolerated trophic TF well overnight- Dr. Frannie Burgos agrees to convert to TF as primary means of nutrition. SLED   Abdominal Status (last documented): Intact, Soft abdomen with Active  bowel sounds. Last BM 01/30/21. Pertinent Medications: fentanyl, lantus, SSI, reglan, miralax, pericolace. Versed and levo stopped.    Pertinent Labs:   Lab Results   Component Value Date/Time    Sodium 137 01/30/2021 03:05 AM    Potassium 4.7 01/30/2021 03:05 AM    Chloride 102 01/30/2021 03:05 AM    CO2 28 01/30/2021 03:05 AM    Anion gap 7 01/30/2021 03:05 AM    Glucose 116 (H) 01/30/2021 03:05 AM    Glucose 103 09/16/2008 05:55 AM    BUN 70 (H) 01/30/2021 03:05 AM    Creatinine 4.06 (H) 01/30/2021 03:05 AM    Calcium 7.6 (L) 01/30/2021 03:05 AM    Albumin 2.1 (L) 01/21/2021 03:08 PM    Phosphorus 7.8 (H) 01/29/2021 03:29 AM       Nutrition Related Findings:   Double lumen PICC (1/13), Intubated 1/17, NGT 1/17. Pt was TPN dependent from 1/14-1/19. TF initiated on 1/18. TF held 1/20-1/22 d/t levophed requirements. TF resumed on 1/22 @ 20 ml/hr and not advanced. Held on 1/25 d/t elevated GRVs. SLED initiated on 1/25. TPN started 1/26. Trophic TF started 1/29. TPN d/c 1/30 and TF advancing. Current Nutrition Therapies:  DIET TUBE FEEDING Open order for details. Keep HOB elevated at least 30 degrees. Current Tube Feeding (TF) Orders:   · Feeding Route: Nasogastric  · Formula: Nepro 1.8  · Schedule:Continuous    · Regimen: 45 ml/hr  · Additives/Modulars: (none)  · Water Flushes: 110 ml water every 4 hours  · Goal TF & Flush Orders Provides:  1944 kcal/day (100% of needs), 87.5 grams protein/day (62% of needs), 173 grams CHO/day, and ~ 1450 ml free water/day. Current Parenteral Nutrition Orders:  · Type and Formula: 5%AA/15%DEX   · Lipids: 250ml, Daily  · Duration: Continuous  · Rate/Volume: 1L/42 ml/hr  · Current PN Order Provides: 1210 calories/day, 50 gm protein/day in ~1250 ml volume/day    Current Intake:   Average Meal Intake: NPO Average Supplement Intake: NPO      Anthropometric Measures:  Height: 5' 8\" (172.7 cm)  Current Body Wt: 103.1 kg (227 lb 4.7 oz)(1/29/21), Weight source: Bed scale  BMI: 34.6, Obese class 1 (BMI 30.0-34. 9)  Admission Body Weight: 235 lb(\"Estimated\")  Ideal Body Wt: 154 lbs (70 kg), 130.3 %  Usual Body Wt: 101.6 kg (224 lb)(EMR 6/3/2020 FP office; pt stated # unable to verify ), Percent weight change: -10.4          Estimated Daily Nutrient Needs:  Energy (kcal/day): 0035-4437 (Kcal/kg(25-30 ), Weight Used: Ideal(70 kg - vent, SLED))  Protein (g/day): 140-175(2-2.5 - vent, SLED) Weight Used: (Ideal(70 kg ))  Fluid (ml/day):   (Standard renal)    Nutrition Diagnosis:   · Inadequate protein-energy intake related to altered GI function, impaired respiratory function as evidenced by intubation(NPO, TF on hold d/t elevated GRVs x 2, poss GIB,TPN)    · Food & nutrition-related knowledge deficit related to endocrine dysfunction as evidenced by (new DM dx, A1C 12, minimal exposure to DM information.)    Nutrition Interventions:   Food and/or Nutrient Delivery: Continue NPO, Continue tube feeding, Discontinue parenteral nutrition  Nutrition Education/Counseling: Education initiated  Coordination of Nutrition Care: Continue to monitor while inpatient  Plan of Care discussed with AQUILINO Blum    Goals:   Previous Goal Met: Goal(s) achieved  Active Goal: Meet >75% estimated nutrition needs within 5 days    Nutrition Monitoring and Evaluation:   Behavioral-Environmental Outcomes: Knowledge or skill  Food/Nutrient Intake Outcomes: Enteral nutrition intake/tolerance  Physical Signs/Symptoms Outcomes: Biochemical data, Constipation    Discharge Planning:     Too soon to determine    Darcy Cordova RD, LD  Contact: [phone]      Disaster Mode active

## 2021-01-31 ENCOUNTER — APPOINTMENT (OUTPATIENT)
Dept: GENERAL RADIOLOGY | Age: 61
DRG: 004 | End: 2021-01-31
Attending: INTERNAL MEDICINE
Payer: COMMERCIAL

## 2021-01-31 LAB
ALBUMIN SERPL-MCNC: 1.5 G/DL (ref 3.2–4.6)
ALBUMIN/GLOB SERPL: 0.5 {RATIO} (ref 1.2–3.5)
ALP SERPL-CCNC: 94 U/L (ref 50–136)
ALT SERPL-CCNC: 32 U/L (ref 12–65)
ANION GAP SERPL CALC-SCNC: 9 MMOL/L (ref 7–16)
APTT PPP: 94 SEC (ref 24.1–35.1)
ARTERIAL PATENCY WRIST A: NORMAL
AST SERPL-CCNC: 38 U/L (ref 15–37)
BASE EXCESS BLD CALC-SCNC: 1 MMOL/L
BASOPHILS # BLD: 0.2 K/UL (ref 0–0.2)
BASOPHILS NFR BLD: 1 % (ref 0–2)
BDY SITE: NORMAL
BILIRUB DIRECT SERPL-MCNC: 0.3 MG/DL
BILIRUB SERPL-MCNC: 0.7 MG/DL (ref 0.2–1.1)
BUN SERPL-MCNC: 93 MG/DL (ref 8–23)
CALCIUM SERPL-MCNC: 7.9 MG/DL (ref 8.3–10.4)
CHLORIDE SERPL-SCNC: 101 MMOL/L (ref 98–107)
CO2 BLD-SCNC: 26 MMOL/L
CO2 SERPL-SCNC: 27 MMOL/L (ref 21–32)
COLLECT TIME,HTIME: 245
CREAT SERPL-MCNC: 5.16 MG/DL (ref 0.8–1.5)
DIFFERENTIAL METHOD BLD: ABNORMAL
EOSINOPHIL # BLD: 0.2 K/UL (ref 0–0.8)
EOSINOPHIL NFR BLD: 1 % (ref 0.5–7.8)
ERYTHROCYTE [DISTWIDTH] IN BLOOD BY AUTOMATED COUNT: 17 % (ref 11.9–14.6)
EXHALED MINUTE VOLUME, VE: 16.8 L/MIN
FIBRINOGEN PPP-MCNC: 412 MG/DL (ref 190–501)
GAS FLOW.O2 O2 DELIVERY SYS: NORMAL L/MIN
GAS FLOW.O2 SETTING OXYMISER: 32 BPM
GLOBULIN SER CALC-MCNC: 3 G/DL (ref 2.3–3.5)
GLUCOSE BLD STRIP.AUTO-MCNC: 114 MG/DL (ref 65–100)
GLUCOSE BLD STRIP.AUTO-MCNC: 134 MG/DL (ref 65–100)
GLUCOSE BLD STRIP.AUTO-MCNC: 146 MG/DL (ref 65–100)
GLUCOSE BLD STRIP.AUTO-MCNC: 152 MG/DL (ref 65–100)
GLUCOSE BLD STRIP.AUTO-MCNC: 158 MG/DL (ref 65–100)
GLUCOSE BLD STRIP.AUTO-MCNC: 182 MG/DL (ref 65–100)
GLUCOSE BLD STRIP.AUTO-MCNC: 61 MG/DL (ref 65–100)
GLUCOSE BLD STRIP.AUTO-MCNC: 64 MG/DL (ref 65–100)
GLUCOSE BLD STRIP.AUTO-MCNC: 76 MG/DL (ref 65–100)
GLUCOSE BLD STRIP.AUTO-MCNC: 76 MG/DL (ref 65–100)
GLUCOSE SERPL-MCNC: 75 MG/DL (ref 65–100)
HCO3 BLD-SCNC: 25 MMOL/L (ref 22–26)
HCT VFR BLD AUTO: 20.6 % (ref 41.1–50.3)
HCT VFR BLD AUTO: 23.5 % (ref 41.1–50.3)
HGB BLD-MCNC: 6.8 G/DL (ref 13.6–17.2)
HGB BLD-MCNC: 7.7 G/DL (ref 13.6–17.2)
HISTORY CHECKED?,CKHIST: NORMAL
HISTORY CHECKED?,CKHIST: NORMAL
IMM GRANULOCYTES # BLD AUTO: 1 K/UL (ref 0–0.5)
IMM GRANULOCYTES NFR BLD AUTO: 6 % (ref 0–5)
INR PPP: 1.1
INR PPP: 1.1
INSPIRATION.DURATION SETTING TIME VENT: 0.85 SEC
LYMPHOCYTES # BLD: 1.2 K/UL (ref 0.5–4.6)
LYMPHOCYTES NFR BLD: 7 % (ref 13–44)
MCH RBC QN AUTO: 28.9 PG (ref 26.1–32.9)
MCHC RBC AUTO-ENTMCNC: 32.8 G/DL (ref 31.4–35)
MCV RBC AUTO: 88.3 FL (ref 79.6–97.8)
MONOCYTES # BLD: 1.3 K/UL (ref 0.1–1.3)
MONOCYTES NFR BLD: 8 % (ref 4–12)
NEUTS SEG # BLD: 12.8 K/UL (ref 1.7–8.2)
NEUTS SEG NFR BLD: 77 % (ref 43–78)
NRBC # BLD: 0.03 K/UL (ref 0–0.2)
O2/TOTAL GAS SETTING VFR VENT: 55 %
PCO2 BLD: 37.2 MMHG (ref 35–45)
PEEP RESPIRATORY: 14 CMH2O
PH BLD: 7.44 [PH] (ref 7.35–7.45)
PLATELET # BLD AUTO: 125 K/UL (ref 150–450)
PLATELET COMMENTS,PCOM: ABNORMAL
PMV BLD AUTO: 10.7 FL (ref 9.4–12.3)
PO2 BLD: 85 MMHG (ref 75–100)
POTASSIUM SERPL-SCNC: 4 MMOL/L (ref 3.5–5.1)
PROT SERPL-MCNC: 4.5 G/DL (ref 6.3–8.2)
PROTHROMBIN TIME: 14.8 SEC (ref 12.5–14.7)
PROTHROMBIN TIME: 14.9 SEC (ref 12.5–14.7)
RBC # BLD AUTO: 2.66 M/UL (ref 4.23–5.6)
RBC MORPH BLD: ABNORMAL
RBC MORPH BLD: ABNORMAL
SAO2 % BLD: 97 % (ref 95–98)
SERVICE CMNT-IMP: NORMAL
SODIUM SERPL-SCNC: 137 MMOL/L (ref 138–145)
SPECIMEN TYPE: NORMAL
UFH PPP CHRO-ACNC: 0.19 IU/ML (ref 0.3–0.7)
VENTILATION MODE VENT: NORMAL
WBC # BLD AUTO: 16.7 K/UL (ref 4.3–11.1)
WBC MORPH BLD: ABNORMAL

## 2021-01-31 PROCEDURE — 74011250636 HC RX REV CODE- 250/636: Performed by: INTERNAL MEDICINE

## 2021-01-31 PROCEDURE — 74011000258 HC RX REV CODE- 258: Performed by: NURSE PRACTITIONER

## 2021-01-31 PROCEDURE — 36430 TRANSFUSION BLD/BLD COMPNT: CPT

## 2021-01-31 PROCEDURE — 80076 HEPATIC FUNCTION PANEL: CPT

## 2021-01-31 PROCEDURE — 65620000000 HC RM CCU GENERAL

## 2021-01-31 PROCEDURE — 74011250636 HC RX REV CODE- 250/636: Performed by: NURSE PRACTITIONER

## 2021-01-31 PROCEDURE — 74011000250 HC RX REV CODE- 250: Performed by: INTERNAL MEDICINE

## 2021-01-31 PROCEDURE — 82803 BLOOD GASES ANY COMBINATION: CPT

## 2021-01-31 PROCEDURE — 85025 COMPLETE CBC W/AUTO DIFF WBC: CPT

## 2021-01-31 PROCEDURE — 85384 FIBRINOGEN ACTIVITY: CPT

## 2021-01-31 PROCEDURE — 85520 HEPARIN ASSAY: CPT

## 2021-01-31 PROCEDURE — 82962 GLUCOSE BLOOD TEST: CPT

## 2021-01-31 PROCEDURE — 77030002996 HC SUT SLK J&J -A

## 2021-01-31 PROCEDURE — P9016 RBC LEUKOCYTES REDUCED: HCPCS

## 2021-01-31 PROCEDURE — 74011250637 HC RX REV CODE- 250/637: Performed by: INTERNAL MEDICINE

## 2021-01-31 PROCEDURE — 2709999900 HC NON-CHARGEABLE SUPPLY

## 2021-01-31 PROCEDURE — 80048 BASIC METABOLIC PNL TOTAL CA: CPT

## 2021-01-31 PROCEDURE — C9113 INJ PANTOPRAZOLE SODIUM, VIA: HCPCS | Performed by: INTERNAL MEDICINE

## 2021-01-31 PROCEDURE — 85014 HEMATOCRIT: CPT

## 2021-01-31 PROCEDURE — 74011000250 HC RX REV CODE- 250: Performed by: HOSPITALIST

## 2021-01-31 PROCEDURE — 74011000258 HC RX REV CODE- 258: Performed by: EMERGENCY MEDICINE

## 2021-01-31 PROCEDURE — 74011636637 HC RX REV CODE- 636/637: Performed by: INTERNAL MEDICINE

## 2021-01-31 PROCEDURE — 85610 PROTHROMBIN TIME: CPT

## 2021-01-31 PROCEDURE — 99291 CRITICAL CARE FIRST HOUR: CPT | Performed by: INTERNAL MEDICINE

## 2021-01-31 PROCEDURE — 71045 X-RAY EXAM CHEST 1 VIEW: CPT

## 2021-01-31 PROCEDURE — 94003 VENT MGMT INPAT SUBQ DAY: CPT

## 2021-01-31 PROCEDURE — 85730 THROMBOPLASTIN TIME PARTIAL: CPT

## 2021-01-31 RX ORDER — DEXTROSE MONOHYDRATE 100 MG/ML
50 INJECTION, SOLUTION INTRAVENOUS CONTINUOUS
Status: DISCONTINUED | OUTPATIENT
Start: 2021-01-31 | End: 2021-02-03

## 2021-01-31 RX ORDER — SODIUM CHLORIDE 9 MG/ML
250 INJECTION, SOLUTION INTRAVENOUS AS NEEDED
Status: DISCONTINUED | OUTPATIENT
Start: 2021-01-31 | End: 2021-02-04

## 2021-01-31 RX ADMIN — METOCLOPRAMIDE 5 MG: 5 INJECTION, SOLUTION INTRAMUSCULAR; INTRAVENOUS at 05:29

## 2021-01-31 RX ADMIN — PANTOPRAZOLE SODIUM 40 MG: 40 INJECTION, POWDER, FOR SOLUTION INTRAVENOUS at 09:48

## 2021-01-31 RX ADMIN — Medication 20 ML: at 05:30

## 2021-01-31 RX ADMIN — ATORVASTATIN CALCIUM 80 MG: 40 TABLET, FILM COATED ORAL at 09:48

## 2021-01-31 RX ADMIN — MINERAL OIL, PETROLATUM: 425; 568 OINTMENT OPHTHALMIC at 21:27

## 2021-01-31 RX ADMIN — Medication 20 ML: at 14:00

## 2021-01-31 RX ADMIN — METOCLOPRAMIDE 5 MG: 5 INJECTION, SOLUTION INTRAMUSCULAR; INTRAVENOUS at 01:59

## 2021-01-31 RX ADMIN — Medication 20 ML: at 21:26

## 2021-01-31 RX ADMIN — HYDROCORTISONE SODIUM SUCCINATE 25 MG: 100 INJECTION, POWDER, FOR SOLUTION INTRAMUSCULAR; INTRAVENOUS at 09:48

## 2021-01-31 RX ADMIN — SODIUM ZIRCONIUM CYCLOSILICATE 10 G: 10 POWDER, FOR SUSPENSION ORAL at 09:52

## 2021-01-31 RX ADMIN — DEXTROSE MONOHYDRATE 50 ML/HR: 10 INJECTION, SOLUTION INTRAVENOUS at 07:38

## 2021-01-31 RX ADMIN — METOCLOPRAMIDE 5 MG: 5 INJECTION, SOLUTION INTRAMUSCULAR; INTRAVENOUS at 17:28

## 2021-01-31 RX ADMIN — HYDROCORTISONE SODIUM SUCCINATE 25 MG: 100 INJECTION, POWDER, FOR SOLUTION INTRAMUSCULAR; INTRAVENOUS at 21:25

## 2021-01-31 RX ADMIN — METOCLOPRAMIDE 5 MG: 5 INJECTION, SOLUTION INTRAMUSCULAR; INTRAVENOUS at 11:25

## 2021-01-31 RX ADMIN — AMIODARONE HYDROCHLORIDE 0.5 MG/MIN: 50 INJECTION, SOLUTION INTRAVENOUS at 23:14

## 2021-01-31 RX ADMIN — AMIODARONE HYDROCHLORIDE 0.5 MG/MIN: 50 INJECTION, SOLUTION INTRAVENOUS at 09:48

## 2021-01-31 RX ADMIN — INSULIN LISPRO 2 UNITS: 100 INJECTION, SOLUTION INTRAVENOUS; SUBCUTANEOUS at 21:26

## 2021-01-31 RX ADMIN — DEXTROSE MONOHYDRATE 25 G: 25 INJECTION, SOLUTION INTRAVENOUS at 02:00

## 2021-01-31 RX ADMIN — INSULIN LISPRO 3 UNITS: 100 INJECTION, SOLUTION INTRAVENOUS; SUBCUTANEOUS at 23:15

## 2021-01-31 RX ADMIN — PANTOPRAZOLE SODIUM 40 MG: 40 INJECTION, POWDER, FOR SOLUTION INTRAVENOUS at 21:26

## 2021-01-31 RX ADMIN — METOCLOPRAMIDE 5 MG: 5 INJECTION, SOLUTION INTRAMUSCULAR; INTRAVENOUS at 23:14

## 2021-01-31 RX ADMIN — MINERAL OIL, PETROLATUM: 425; 568 OINTMENT OPHTHALMIC at 09:52

## 2021-01-31 NOTE — PROGRESS NOTES
Patient seen and examined. Patient unresponsive in bed with a positive cough/gag and corneal reflex. Blood sugar checked and patient found to be profoundly hypoglycemic. D50 given with positive response in blood sugar. Review of systems completed. Patient noted to have full body shivering, resulting in patient becoming hypertensive. MD aware of all above.

## 2021-01-31 NOTE — PROGRESS NOTES
Ventilator check complete; patient has a #8. 0 ET tube secured at the 24 at the lip and resecured at same position  Breath sounds are diminished. Trachea is midline, Negative for subcutaneous air, and chest excursion is symmetrical. Patient is also Negative for cyanosis and is Negative for pitting edema. All alarms are set and audible.   Resuscitation bag and mask are at the head of the bed.       Ventilator Settings  Mode FIO2 Rate Tidal Volume Pressure PEEP I:E Ratio   Pressure control  55 % 32   454 ml  18   14 cm H20  1:1.49       Peak airway pressure: 32 cm H2O   Minute ventilation: 12l/min

## 2021-01-31 NOTE — PROGRESS NOTES
[facility] PROGRESS NOTE    Follow up for:    Subjective:   Patient seen and examined. Has bleeding from his dialysis catheter with sandbag in place. Got 3 units of blood yesterday.     ROS:  UTO     Objective:   Exam:  Vitals:    01/31/21 0715 01/31/21 0725 01/31/21 0745 01/31/21 0807   BP:       Pulse: 99 100 (!) 101 (!) 101   Resp: 22 19 (!) 31 (!) 33   Temp:       SpO2: 97% 97% 95% 96%   Weight:       Height:             Intake/Output Summary (Last 24 hours) at 1/31/2021 0839  Last data filed at 1/31/2021 2621  Gross per 24 hour   Intake 2314.37 ml   Output 210 ml   Net 2104.37 ml       Current Facility-Administered Medications   Medication Dose Route Frequency    0.9% sodium chloride infusion 250 mL  250 mL IntraVENous PRN    dextrose 10% infusion  50 mL/hr IntraVENous CONTINUOUS    insulin glargine (LANTUS) injection 45 Units  45 Units SubCUTAneous Q12H    hydrocortisone Sod Succ (PF) (SOLU-CORTEF) injection 25 mg  25 mg IntraVENous Q12H    0.9% sodium chloride infusion 250 mL  250 mL IntraVENous PRN    metoclopramide HCl (REGLAN) injection 5 mg  5 mg IntraVENous Q6H    NOREPINephrine (LEVOPHED) 4 mg in 5% dextrose 250 mL infusion  0.5-30 mcg/min IntraVENous TITRATE    heparin 25,000 units in dextrose 500 mL infusion  18-36 Units/kg/hr IntraVENous TITRATE    atracurium (TRACRIUM) 1,000 mg in 0.9% sodium chloride 250 mL infusion  0-15 mcg/kg/min IntraVENous TITRATE    NOREPINephrine (LEVOPHED) 16,000 mcg in dextrose 5% 250 mL infusion  0.5-30 mcg/min IntraVENous TITRATE    heparin (porcine) 1,000 unit/mL injection 5,000 Units  5,000 Units Hemodialysis DIALYSIS PRN    senna-docusate (PERICOLACE) 8.6-50 mg per tablet 1 Tab  1 Tab Oral DAILY    amiodarone (CORDARONE) 450 mg in dextrose 5% 250 mL infusion  0.5-1 mg/min IntraVENous TITRATE    pantoprazole (PROTONIX) 40 mg in 0.9% sodium chloride 10 mL injection  40 mg IntraVENous Q12H    ondansetron (ZOFRAN) injection 4 mg  4 mg IntraVENous Q6H PRN    polyethylene glycol (MIRALAX) packet 17 g  17 g Per NG tube DAILY PRN    guaiFENesin-dextromethorphan (ROBITUSSIN DM) 100-10 mg/5 mL syrup 10 mL  10 mL Per NG tube Q4H PRN    acetaminophen (TYLENOL) tablet 650 mg  650 mg Per NG tube Q6H PRN    polyethylene glycol (MIRALAX) packet 17 g  17 g Per NG tube DAILY    sodium zirconium cyclosilicate (LOKELMA) powder packet 10 g  10 g Oral DAILY    white petrolatum-mineral oiL (LACRILUBE S.O.P.) ointment   Both Eyes Q12H    white petrolatum-mineral oiL (LACRILUBE S.O.P.) ointment   Both Eyes Q4H PRN    fentaNYL in normal saline (pf) 25 mcg/mL infusion  0-200 mcg/hr IntraVENous TITRATE    midazolam in normal saline (VERSED) 1 mg/mL infusion  0-10 mg/hr IntraVENous TITRATE    atorvastatin (LIPITOR) tablet 80 mg  80 mg Per NG tube DAILY    [Held by provider] clopidogreL (PLAVIX) tablet 75 mg  75 mg Per NG tube DAILY    [Held by provider] metoprolol tartrate (LOPRESSOR) tablet 50 mg  50 mg Per NG tube BID    insulin lispro (HUMALOG) injection   SubCUTAneous Q4H    LORazepam (ATIVAN) injection 1 mg  1 mg IntraVENous Q4H PRN    morphine injection 2 mg  2 mg IntraVENous Q4H PRN    NUTRITIONAL SUPPORT ELECTROLYTE PRN ORDERS   Does Not Apply PRN    sodium chloride (NS) flush 20 mL  20 mL InterCATHeter Q8H    sodium chloride (NS) flush 20 mL  20 mL InterCATHeter PRN    loperamide (IMODIUM) capsule 2 mg  2 mg Oral Q4H PRN    dextrose 40% (GLUTOSE) oral gel 1 Tube  15 g Oral PRN    glucagon (GLUCAGEN) injection 1 mg  1 mg IntraMUSCular PRN    dextrose (D50W) injection syrg 12.5-25 g  25-50 mL IntraVENous PRN    0.9% sodium chloride infusion 250 mL  250 mL IntraVENous PRN    albuterol (PROVENTIL HFA, VENTOLIN HFA, PROAIR HFA) inhaler 2 Puff  2 Puff Inhalation Q4H PRN    influenza vaccine 2020-21 (6 mos+)(PF) (FLUARIX/FLULAVAL/FLUZONE QUAD) injection 0.5 mL  0.5 mL IntraMUSCular PRIOR TO DISCHARGE    hydrALAZINE (APRESOLINE) 20 mg/mL injection 20 mg 20 mg IntraVENous Q6H PRN       EXAM  GEN - intubated/sedated   CV - S1, S2, RRR, no rub, murmur, or gallop  Lung - diffusely coarse  Abd - soft, nontender, BS present  Ext - no edema  Access- R IJ temp line with noted bleeding    Recent Labs     01/31/21  0730 01/30/21  2345 01/30/21  1438 01/30/21  0305   WBC 16.7*  --  18.0* 18.5*   HGB 7.7* 5.7* 6.3* 7.4*   HCT 23.5* 18.1* 19.6* 23.3*   *  --  134* 183        Recent Labs     01/31/21  0306 01/30/21  0305 01/29/21  0329   * 137 134*   K 4.0 4.7 4.7    102 100   CO2 27 28 24   BUN 93* 70* 103*   CREA 5.16* 4.06* 5.26*   CA 7.9* 7.6* 7.6*   GLU 75 116* 220*   MG  --   --  2.5*   PHOS  --   --  7.8*       Assessment and Plan:     1. SWATI - 2/2 ATN from COVID. SLED Friday. Next dialysis will be tomorrow. 2. COVID /severe ARDS  Intubated     3. Hypotension- better. Weaning pressors.      4. Hyperkalemia   Correced with HD.     5. Afib w/ RVR-  Now in NSR s/p cardioversion. amio gtt. 6.  Anemia- transfused PRBC overnight. Still bleeding.   Remains on heparin.        Shruti Zamora MD

## 2021-01-31 NOTE — PROGRESS NOTES
Critical access hospital/[facility] Critical Care COVID-19 Note:    Critical Care Note: 1/31/2021    Lisha Boss. Admission Date: 1/6/2021     Length of Stay: 24 days    Background: 61 y.o. y/o male with acute hypoxemic respiratory failure secondary to COVID-19. Onset of COVID-19 symptoms: 1/5/21    Hospital Course:  Pt admitted 1/7 with fever, cough, fever. Covid + 1/6. Admitted by hospitalists. Started dexa, plasma, remdesivir. Pulm consulted 1/10 with pt requiring CPAP. Intubated 1/17. Nephrology consulted 1/23 for renal failure. US 1/18 with L peroneal vein thrombus. On heparin but stopped due to + FOBT. Also has a fib on amio. Notable PMH: obesity, HTN, CAD, dyslipidemia, SWATI, DM, GERD    24 Hour events:   3U PRBCs yesterday with Hgb down to 5.7, bleeding from HD site. TF at goal.  WBC down. Hypoglycemia on [email or web address]  Remain oliguric. Off sedation since yesterday morning and still not responsive. ROS: intubated, sedated    LINES:    ET tube 1/17/2021  PICC 1/13  NGT 1/17  Art line 1/18/2021  Dialysis access 1/24/21  Gimenez 1/17    Drips: current dose (range)  Amio 0.5  Heparin 7  TPN off  [email or web address]    Anti-infectives  azithro (completed)  Ceftriaxone (completed)  Vanc and cefepime: (completed)    Visit Vitals  BP (!) 124/46   Pulse 98   Temp 98.6 °F (37 °C)   Resp (!) 34   Ht 5' 8\" (1.727 m)   Wt 228 lb 6.3 oz (103.6 kg)   SpO2 96%   BMI 34.73 kg/m²     Pertinent Exam:            Constitutional:  intubated and mechanically ventilated. EENMT:  Sclera clear, pupils equal, oral mucosa moist  Respiratory: crackles bilateral  Cardiovascular:  RRR with no M,G,R;  Gastrointestinal:  soft with no tenderness; positive bowel sounds present  Musculoskeletal:  warm with no cyanosis, no lower extremity edema  Skin:  no jaundice or ecchymosis  Neurologic: unresponsive  Psychiatric:unresponsive.     CXR today    Pertinent Labs:   Recent Labs     01/31/21  0730 01/31/21  0306 01/30/21  2345 01/30/21  1435 01/30/21  0305   WBC 16.7*  --   --  18.0* 18.5*   HGB 7.7*  --  5.7* 6.3* 7.4*   HCT 23.5*  --  18.1* 19.6* 23.3*   *  --   --  134* 183   INR  --  1.1  --   --  1.2     Recent Labs     01/31/21  0306 01/30/21  0305 01/29/21  0329   * 137 134*   K 4.0 4.7 4.7    102 100   CO2 27 28 24   GLU 75 116* 220*   BUN 93* 70* 103*   CREA 5.16* 4.06* 5.26*   MG  --   --  2.5*   CA 7.9* 7.6* 7.6*   PHOS  --   --  7.8*     Recent Labs     01/31/21  1002 01/31/21  0442 01/31/21  0424   GLUCPOC 76 152* 64*       SARS-CoV-2 LAB Results  [facility] Test: No results found for: COV2NT   [facility] Test: No results found for: EDPR  [facility] Test: No components found for: [id number]     COVID-19 Meds:  Decadron 1/7-16  remdesivir 1/8-1/12  Convalescent plasma 1/7    MICRO  Date Source Result   1/30  blood  no growth   1/32   sputum  NRF   1/30  blood  no growth                             Ventilator Settings:  5' 8\" (1.727 m)  Mode FIO2 Rate Tidal Volume Pressure PEEP   Pressure control  55 %    454 ml     14 cm H20    Peak airway pressure: 30.7 cm H2O   Minute ventilation: 15.4 l/min    ABG:   Recent Labs     01/31/21  0247 01/30/21  0344 01/29/21  0930   PHI 7.44 7.36 7.22*   PCO2I 37.2 44.4 59.6*   PO2I 85 121* 106*   HCO3I 25.0 24.8 24.2       IMPRESSION:   61 y.o. y/o male with acute hypoxemic respiratory failure secondary to COVID-19.   Hospital Problems  Date Reviewed: 6/3/2020          Codes Class Noted POA    Acute deep vein thrombosis (DVT) of left peroneal vein (HCC) ICD-10-CM: R35.699  ICD-9-CM: 453.42  1/28/2021 Unknown        Hypotension ICD-10-CM: I95.9  ICD-9-CM: 458.9  1/26/2021 Unknown        Atrial fibrillation (HCC) ICD-10-CM: I48.91  ICD-9-CM: 427.31  1/22/2021 Yes        Hypernatremia ICD-10-CM: E87.0  ICD-9-CM: 276.0  1/8/2021 Unknown        SWATI (acute kidney injury) ([facility] ICD-10-CM: N17.9  ICD-9-CM: 584.9  1/7/2021 Unknown        Acute hypoxemic respiratory failure ([facility] ICD-10-CM: J96.01  ICD-9-CM: 518.81  1/7/2021 Unknown        Type 2 diabetes mellitus with hyperosmolarity without nonketotic hyperglycemic-hyperosmolar coma ([facility] ICD-10-CM: E11.00  ICD-9-CM: 250.20  1/7/2021 Unknown        * (Principal) Pneumonia due to COVID-19 virus ICD-10-CM: U07.1, J12.82  ICD-9-CM: 480.8  1/7/2021 Unknown        CAD (coronary artery disease) ICD-10-CM: I25.10  ICD-9-CM: 414.00  10/28/2016 Yes    Overview Signed 10/28/2016  8:05 AM by SHELDON Miles     10-27-06 [facility] 90% LAD s/p 2.25 x 20 Synergy drug-eluting stent  (CS)             Obesity ICD-10-CM: E66.9  ICD-9-CM: 278.00  10/6/2015 Yes            PLAN:    -Acute respiratory failure due to COVID-19: Planning for trach next week. Wean ventilator as able, but remains very hypoxic  --hold all sedation and use prns if needed. Neuro consult tomorrow if not waking up.  -Will hold heparin drip since required 3U PRBCs yesterday. Will suture HD site and can try to restart slowly tomorrow. Check coags, LFTs. -tube feeds. -hold lantus and check liver enzymes for hypoglycemia   -slowly weaning steroids.  -abx if febrile. Cultures yesterday NG to date.  -dialysis per nephrology.   -Prophy: PPI     Full Code  Called Sister Kaylyn Ruggiero [phone].       The patient is critically ill with respiratory failure, circulatory failure and requires high complexity decision making for assessment and support including frequent ventilator adjustment , frequent evaluation and titration of therapies , application of advanced monitoring technologies and extensive interpretation of multiple databases  Cumulative time devoted to patient care services by me for day of service -Christopher Mccormack MD

## 2021-01-31 NOTE — PROGRESS NOTES
Ventilator check complete; patient has a #8. 0 ET tube secured at the 24 at the lip. Patient is sedated. Patient is not able to follow commands. Breath sounds are coarse. Trachea is midline, Negative for subcutaneous air, and chest excursion is symmetric. Patient is also Negative for cyanosis and is Positive for pitting edema. All alarms are set and audible. Resuscitation bag is at the head of the bed. Ventilator Settings  Mode FIO2 Rate Tidal Volume Pressure PEEP I:E Ratio   Pressure control  55 %   32    18 14 cm H20  1:1.2      Peak airway pressure: 30.7 cm H2O   Minute ventilation: 15.4 l/min     ABG: No results for input(s): PH, PCO2, PO2, HCO3 in the last 72 hours.       Julius Garcia, RT

## 2021-01-31 NOTE — PROGRESS NOTES
Bedside, Verbal and Written shift change report given to Jaspal Bonilla and Ishan Mendez RN (oncoming nurse) by Alison Gillis RN (offgoing nurse). Report included the following information SBAR, Kardex, MAR, Recent Results, Med Rec Status, Cardiac Rhythm sinus tach and Alarm Parameters .

## 2021-02-01 ENCOUNTER — APPOINTMENT (OUTPATIENT)
Dept: GENERAL RADIOLOGY | Age: 61
DRG: 004 | End: 2021-02-01
Attending: INTERNAL MEDICINE
Payer: COMMERCIAL

## 2021-02-01 LAB
ANION GAP SERPL CALC-SCNC: 12 MMOL/L (ref 7–16)
APTT PPP: 36.7 SEC (ref 24.1–35.1)
ARTERIAL PATENCY WRIST A: ABNORMAL
BACTERIA SPEC CULT: NORMAL
BASE DEFICIT BLD-SCNC: 1 MMOL/L
BASOPHILS # BLD: 0.1 K/UL (ref 0–0.2)
BASOPHILS # BLD: 0.1 K/UL (ref 0–0.2)
BASOPHILS NFR BLD: 0 % (ref 0–2)
BASOPHILS NFR BLD: 0 % (ref 0–2)
BDY SITE: ABNORMAL
BUN SERPL-MCNC: 110 MG/DL (ref 8–23)
CALCIUM SERPL-MCNC: 7.4 MG/DL (ref 8.3–10.4)
CHLORIDE SERPL-SCNC: 95 MMOL/L (ref 98–107)
CO2 BLD-SCNC: 25 MMOL/L
CO2 SERPL-SCNC: 24 MMOL/L (ref 21–32)
COLLECT TIME,HTIME: 320
CREAT SERPL-MCNC: 6.23 MG/DL (ref 0.8–1.5)
DIFFERENTIAL METHOD BLD: ABNORMAL
DIFFERENTIAL METHOD BLD: ABNORMAL
EOSINOPHIL # BLD: 0 K/UL (ref 0–0.8)
EOSINOPHIL # BLD: 0.2 K/UL (ref 0–0.8)
EOSINOPHIL NFR BLD: 0 % (ref 0.5–7.8)
EOSINOPHIL NFR BLD: 1 % (ref 0.5–7.8)
ERYTHROCYTE [DISTWIDTH] IN BLOOD BY AUTOMATED COUNT: 16.7 % (ref 11.9–14.6)
ERYTHROCYTE [DISTWIDTH] IN BLOOD BY AUTOMATED COUNT: 16.8 % (ref 11.9–14.6)
EXHALED MINUTE VOLUME, VE: 17.6 L/MIN
FIBRINOGEN PPP-MCNC: 458 MG/DL (ref 190–501)
GAS FLOW.O2 O2 DELIVERY SYS: ABNORMAL L/MIN
GAS FLOW.O2 SETTING OXYMISER: 32 BPM
GLUCOSE BLD STRIP.AUTO-MCNC: 177 MG/DL (ref 65–100)
GLUCOSE BLD STRIP.AUTO-MCNC: 209 MG/DL (ref 65–100)
GLUCOSE BLD STRIP.AUTO-MCNC: 228 MG/DL (ref 65–100)
GLUCOSE BLD STRIP.AUTO-MCNC: 228 MG/DL (ref 65–100)
GLUCOSE SERPL-MCNC: 297 MG/DL (ref 65–100)
GRAM STN SPEC: NORMAL
HCO3 BLD-SCNC: 23.5 MMOL/L (ref 22–26)
HCT VFR BLD AUTO: 20.3 % (ref 41.1–50.3)
HCT VFR BLD AUTO: 28.3 % (ref 41.1–50.3)
HGB BLD-MCNC: 6.9 G/DL (ref 13.6–17.2)
HGB BLD-MCNC: 9.7 G/DL (ref 13.6–17.2)
HISTORY CHECKED?,CKHIST: NORMAL
IMM GRANULOCYTES # BLD AUTO: 1.1 K/UL (ref 0–0.5)
IMM GRANULOCYTES # BLD AUTO: 1.1 K/UL (ref 0–0.5)
IMM GRANULOCYTES NFR BLD AUTO: 4 % (ref 0–5)
IMM GRANULOCYTES NFR BLD AUTO: 6 % (ref 0–5)
INR PPP: 1.1
INSPIRATION.DURATION SETTING TIME VENT: 0.85 SEC
LYMPHOCYTES # BLD: 0.4 K/UL (ref 0.5–4.6)
LYMPHOCYTES # BLD: 0.8 K/UL (ref 0.5–4.6)
LYMPHOCYTES NFR BLD: 2 % (ref 13–44)
LYMPHOCYTES NFR BLD: 4 % (ref 13–44)
MAGNESIUM SERPL-MCNC: 2.1 MG/DL (ref 1.8–2.4)
MCH RBC QN AUTO: 29.4 PG (ref 26.1–32.9)
MCH RBC QN AUTO: 29.4 PG (ref 26.1–32.9)
MCHC RBC AUTO-ENTMCNC: 34 G/DL (ref 31.4–35)
MCHC RBC AUTO-ENTMCNC: 34.3 G/DL (ref 31.4–35)
MCV RBC AUTO: 85.8 FL (ref 79.6–97.8)
MCV RBC AUTO: 86.4 FL (ref 79.6–97.8)
MONOCYTES # BLD: 1.4 K/UL (ref 0.1–1.3)
MONOCYTES # BLD: 1.8 K/UL (ref 0.1–1.3)
MONOCYTES NFR BLD: 7 % (ref 4–12)
MONOCYTES NFR BLD: 7 % (ref 4–12)
NEUTS SEG # BLD: 16.8 K/UL (ref 1.7–8.2)
NEUTS SEG # BLD: 20.7 K/UL (ref 1.7–8.2)
NEUTS SEG NFR BLD: 83 % (ref 43–78)
NEUTS SEG NFR BLD: 85 % (ref 43–78)
NRBC # BLD: 0 K/UL (ref 0–0.2)
NRBC # BLD: 0.02 K/UL (ref 0–0.2)
O2/TOTAL GAS SETTING VFR VENT: 55 %
PCO2 BLD: 34.7 MMHG (ref 35–45)
PEEP RESPIRATORY: 14 CMH2O
PH BLD: 7.44 [PH] (ref 7.35–7.45)
PHOSPHATE SERPL-MCNC: 6.9 MG/DL (ref 2.3–3.7)
PLATELET # BLD AUTO: 121 K/UL (ref 150–450)
PLATELET # BLD AUTO: 125 K/UL (ref 150–450)
PMV BLD AUTO: 10.6 FL (ref 9.4–12.3)
PMV BLD AUTO: 11 FL (ref 9.4–12.3)
PO2 BLD: 98 MMHG (ref 75–100)
POTASSIUM SERPL-SCNC: 3.8 MMOL/L (ref 3.5–5.1)
PRESSURE CONTROL, IPC: 18
PROTHROMBIN TIME: 14.9 SEC (ref 12.5–14.7)
RBC # BLD AUTO: 2.35 M/UL (ref 4.23–5.6)
RBC # BLD AUTO: 3.3 M/UL (ref 4.23–5.6)
SAO2 % BLD: 98 % (ref 95–98)
SERVICE CMNT-IMP: ABNORMAL
SERVICE CMNT-IMP: NORMAL
SODIUM SERPL-SCNC: 131 MMOL/L (ref 136–145)
SPECIMEN TYPE: ABNORMAL
VENTILATION MODE VENT: ABNORMAL
WBC # BLD AUTO: 20.2 K/UL (ref 4.3–11.1)
WBC # BLD AUTO: 24.3 K/UL (ref 4.3–11.1)

## 2021-02-01 PROCEDURE — 74011250636 HC RX REV CODE- 250/636: Performed by: NURSE PRACTITIONER

## 2021-02-01 PROCEDURE — 90945 DIALYSIS ONE EVALUATION: CPT

## 2021-02-01 PROCEDURE — 80048 BASIC METABOLIC PNL TOTAL CA: CPT

## 2021-02-01 PROCEDURE — 82962 GLUCOSE BLOOD TEST: CPT

## 2021-02-01 PROCEDURE — 74011250636 HC RX REV CODE- 250/636: Performed by: INTERNAL MEDICINE

## 2021-02-01 PROCEDURE — 94003 VENT MGMT INPAT SUBQ DAY: CPT

## 2021-02-01 PROCEDURE — 71045 X-RAY EXAM CHEST 1 VIEW: CPT

## 2021-02-01 PROCEDURE — 85384 FIBRINOGEN ACTIVITY: CPT

## 2021-02-01 PROCEDURE — P9016 RBC LEUKOCYTES REDUCED: HCPCS

## 2021-02-01 PROCEDURE — 65620000000 HC RM CCU GENERAL

## 2021-02-01 PROCEDURE — 74011636637 HC RX REV CODE- 636/637: Performed by: INTERNAL MEDICINE

## 2021-02-01 PROCEDURE — 85025 COMPLETE CBC W/AUTO DIFF WBC: CPT

## 2021-02-01 PROCEDURE — 74011250637 HC RX REV CODE- 250/637: Performed by: INTERNAL MEDICINE

## 2021-02-01 PROCEDURE — 85730 THROMBOPLASTIN TIME PARTIAL: CPT

## 2021-02-01 PROCEDURE — 85610 PROTHROMBIN TIME: CPT

## 2021-02-01 PROCEDURE — 36430 TRANSFUSION BLD/BLD COMPNT: CPT

## 2021-02-01 PROCEDURE — C9113 INJ PANTOPRAZOLE SODIUM, VIA: HCPCS | Performed by: INTERNAL MEDICINE

## 2021-02-01 PROCEDURE — 2709999900 HC NON-CHARGEABLE SUPPLY

## 2021-02-01 PROCEDURE — 99291 CRITICAL CARE FIRST HOUR: CPT | Performed by: INTERNAL MEDICINE

## 2021-02-01 PROCEDURE — 74011000258 HC RX REV CODE- 258: Performed by: EMERGENCY MEDICINE

## 2021-02-01 PROCEDURE — 74011000250 HC RX REV CODE- 250: Performed by: INTERNAL MEDICINE

## 2021-02-01 PROCEDURE — 77030041175 HC BAG DIGNSHLD BARD -A

## 2021-02-01 PROCEDURE — 82803 BLOOD GASES ANY COMBINATION: CPT

## 2021-02-01 PROCEDURE — 74011000258 HC RX REV CODE- 258: Performed by: NURSE PRACTITIONER

## 2021-02-01 PROCEDURE — 83735 ASSAY OF MAGNESIUM: CPT

## 2021-02-01 PROCEDURE — 84100 ASSAY OF PHOSPHORUS: CPT

## 2021-02-01 PROCEDURE — 36592 COLLECT BLOOD FROM PICC: CPT

## 2021-02-01 RX ORDER — SODIUM CHLORIDE 9 MG/ML
250 INJECTION, SOLUTION INTRAVENOUS AS NEEDED
Status: DISCONTINUED | OUTPATIENT
Start: 2021-02-01 | End: 2021-02-04

## 2021-02-01 RX ADMIN — PANTOPRAZOLE SODIUM 40 MG: 40 INJECTION, POWDER, FOR SOLUTION INTRAVENOUS at 10:00

## 2021-02-01 RX ADMIN — INSULIN LISPRO 6 UNITS: 100 INJECTION, SOLUTION INTRAVENOUS; SUBCUTANEOUS at 16:32

## 2021-02-01 RX ADMIN — INSULIN LISPRO 3 UNITS: 100 INJECTION, SOLUTION INTRAVENOUS; SUBCUTANEOUS at 11:57

## 2021-02-01 RX ADMIN — Medication 20 ML: at 05:07

## 2021-02-01 RX ADMIN — INSULIN LISPRO 6 UNITS: 100 INJECTION, SOLUTION INTRAVENOUS; SUBCUTANEOUS at 23:00

## 2021-02-01 RX ADMIN — MINERAL OIL, PETROLATUM: 425; 568 OINTMENT OPHTHALMIC at 20:08

## 2021-02-01 RX ADMIN — HYDROCORTISONE SODIUM SUCCINATE 25 MG: 100 INJECTION, POWDER, FOR SOLUTION INTRAMUSCULAR; INTRAVENOUS at 10:00

## 2021-02-01 RX ADMIN — INSULIN LISPRO 9 UNITS: 100 INJECTION, SOLUTION INTRAVENOUS; SUBCUTANEOUS at 19:12

## 2021-02-01 RX ADMIN — METOCLOPRAMIDE 5 MG: 5 INJECTION, SOLUTION INTRAMUSCULAR; INTRAVENOUS at 23:06

## 2021-02-01 RX ADMIN — Medication 20 ML: at 20:09

## 2021-02-01 RX ADMIN — DEXTROSE MONOHYDRATE 50 ML/HR: 10 INJECTION, SOLUTION INTRAVENOUS at 02:37

## 2021-02-01 RX ADMIN — Medication 20 ML: at 14:50

## 2021-02-01 RX ADMIN — METOCLOPRAMIDE 5 MG: 5 INJECTION, SOLUTION INTRAMUSCULAR; INTRAVENOUS at 17:27

## 2021-02-01 RX ADMIN — AMIODARONE HYDROCHLORIDE 0.5 MG/MIN: 50 INJECTION, SOLUTION INTRAVENOUS at 14:49

## 2021-02-01 RX ADMIN — METOCLOPRAMIDE 5 MG: 5 INJECTION, SOLUTION INTRAMUSCULAR; INTRAVENOUS at 05:05

## 2021-02-01 RX ADMIN — METOCLOPRAMIDE 5 MG: 5 INJECTION, SOLUTION INTRAMUSCULAR; INTRAVENOUS at 11:57

## 2021-02-01 RX ADMIN — ATORVASTATIN CALCIUM 80 MG: 40 TABLET, FILM COATED ORAL at 10:00

## 2021-02-01 RX ADMIN — SODIUM ZIRCONIUM CYCLOSILICATE 10 G: 10 POWDER, FOR SUSPENSION ORAL at 10:00

## 2021-02-01 RX ADMIN — HYDRALAZINE HYDROCHLORIDE 20 MG: 20 INJECTION, SOLUTION INTRAMUSCULAR; INTRAVENOUS at 21:59

## 2021-02-01 RX ADMIN — HYDROCORTISONE SODIUM SUCCINATE 25 MG: 100 INJECTION, POWDER, FOR SOLUTION INTRAMUSCULAR; INTRAVENOUS at 20:08

## 2021-02-01 RX ADMIN — PANTOPRAZOLE SODIUM 40 MG: 40 INJECTION, POWDER, FOR SOLUTION INTRAVENOUS at 20:08

## 2021-02-01 RX ADMIN — INSULIN LISPRO 9 UNITS: 100 INJECTION, SOLUTION INTRAVENOUS; SUBCUTANEOUS at 04:06

## 2021-02-01 RX ADMIN — INSULIN LISPRO 6 UNITS: 100 INJECTION, SOLUTION INTRAVENOUS; SUBCUTANEOUS at 08:05

## 2021-02-01 NOTE — PROGRESS NOTES
Due to the patient having covid, I did not enter the patient's room and I wore PPE. After talking with the patient's nurse and receiving an update.  I had a prayer for the patient and family from the hallway outside of the patient's room.        Maricela Harada, [address] [facility], [facility]

## 2021-02-01 NOTE — PROGRESS NOTES
[facility] Critical Care COVID-19 Note:    Critical Care Note: 2/1/2021    Fidencio Severance Mirta Meter. Admission Date: 1/6/2021     Length of Stay: 25 days    Background: 61 y.o. y/o male with acute hypoxemic respiratory failure secondary to COVID-19. Pt admitted 1/7 with fever, cough, fever. Covid + 1/6. Admitted by hospitalists. Started dexa, plasma, remdesivir. Pulm consulted 1/10 with pt requiring CPAP. Intubated 1/17. Nephrology consulted 1/23 for renal failure. US 1/18 with L peroneal vein thrombus. On heparin but stopped due to + FOBT. Also has a fib on amio. Onset of COVID-19 symptoms: 1/5/2021    Notable PMH: obesity, HTN, CAD, dyslipidemia, SWATI, DM, GERD    24 Hour events:  On vent, sedated, on CRRT Hg still dropping - 6.9     ROS: intubated, sedated    LINES:    ET tube 1/17/2021  PICC 1/13  NGT 1/17  Art line 1/18/2021  Dialysis access 1/24/21  Gimenez 1/17     Drips: current dose (range)  Amio 0.5  TF     Anti-infectives  azithro (completed)  Ceftriaxone (completed)  Vanc and cefepime: (completed)     Drips: current dose (range)    Visit Vitals  BP (!) 172/59   Pulse 90   Temp 99.6 °F (37.6 °C)   Resp 27   Ht 5' 8\" (1.727 m)   Wt 229 lb 4.5 oz (104 kg)   SpO2 97%   BMI 34.86 kg/m²     Pertinent Exam:            Constitutional:  intubated and mechanically ventilated.   EENMT:  Sclera clear, pupils equal, oral mucosa moist  Respiratory: crackles   Cardiovascular:  RRR  Gastrointestinal:  soft with no tenderness; positive bowel sounds present  Musculoskeletal:  warm with no cyanosis, no lower extremity edema  Skin:  no jaundice or ecchymosis  Neurologic:sedated, on vent   Psychiatric: sedated     CXR :     Pertinent Labs:   Recent Labs     02/01/21  0302 01/31/21  1359 01/31/21  0730 01/31/21  0730 01/30/21  1438 01/30/21  1438   WBC 20.2*  --   --  16.7*  --  18.0*   HGB 6.9* 6.8*  --  7.7*   < > 6.3*   HCT 20.3* 20.6*  --  23.5*   < > 19.6*   *  --   --  125*  --  134*   INR 1.1 --    < >  --    < >  --     < > = values in this interval not displayed. Recent Labs     02/01/21  0302 01/31/21  1053 01/31/21  0306 01/30/21  0305   *  --  137* 137   K 3.8  --  4.0 4.7   CL 95*  --  101 102   CO2 24  --  27 28   *  --  75 116*   *  --  93* 70*   CREA 6.23*  --  5.16* 4.06*   MG 2.1  --   --   --    CA 7.4*  --  7.9* 7.6*   PHOS 6.9*  --   --   --    ALB  --  1.5*  --   --    TBILI  --  0.7  --   --    ALT  --  32  --   --      Recent Labs     02/01/21  0750 01/31/21  2311 01/31/21  1930   GLUCPOC 209* 182* 158*           Ventilator Settings:  5' 8\" (1.727 m)  Mode FIO2 Rate Tidal Volume Pressure PEEP   Pressure control  55 %    454 ml     14 cm H20    Peak airway pressure: 34.1 cm H2O   Minute ventilation: 17.2 l/min    ABG:   Recent Labs     02/01/21  0326 01/31/21  0247 01/30/21  0344   PHI 7.44 7.44 7.36   PCO2I 34.7* 37.2 44.4   PO2I 98 85 121*   HCO3I 23.5 25.0 24.8       Impression: 61 y.o. y/o male with acute hypoxemic respiratory failure secondary to COVID-19.         PLAN:    -Acute respiratory failure due to COVID-19:- trach tomorrow per Dr.l CHAVEZ  --hold all sedation and use prns if needed. -only D 2 ,will still wait but may need neurology consult of not waking up  - Heparin gtt on hold, transfuse PRBC  -continue TF  - stop D 10   -HD per renal   -Prophy: PPI     Full Code  Called Sister Soledad Lipscomb [phone]. The patient is critically ill with respiratory failure, circulatory failure and requires high complexity decision making for assessment and support including frequent ventilator adjustment , frequent evaluation and titration of therapies , application of advanced monitoring technologies and extensive interpretation of multiple databases.   Cumulative time devoted to patient care services by me for day of service - 30 min     Glenetta Copas, MD

## 2021-02-01 NOTE — PROGRESS NOTES
Physician Progress Note      PATIENT:               Katherine Obrien  CSN #:                  [id number]  :                       1960  ADMIT DATE:       2021 11:02 PM  [address] [place] DATE:  RESPONDING  PROVIDER #:        Chapin GARRISON MD          QUERY TEXT:    Pt admitted with COVID-19. Pt noted to have hypotension, tachycardia. If possible, please document in the progress notes and discharge summary if you are evaluating and/or treating any of the following: The medical record reflects the following:  Risk Factors: 60yr, htn, DM, + COVID-19  Clinical Indicators: hypotension 67/43 map of 54, 71/44 map 56, 69/36 map43, tachycardia, fever 101, Wbc 16.2, hgb 9.7, cr 2.57, procalcitonin 1.38, UOP: 250cc/24hr  Treatment: levophed infusion, IV vancomycin, IV cefpime  Options provided:  -- Cardiogenic Shock  -- Hemorrhagic Shock  -- Septic Shock  -- Hypovolemic Shock  -- Hypovolemia without Shock  -- Hypotension without Shock  -- Other - I will add my own diagnosis  -- Disagree - Not applicable / Not valid  -- Disagree - Clinically unable to determine / Unknown  -- Refer to Clinical Documentation Reviewer    PROVIDER RESPONSE TEXT:    This patient has Septic Shock.     Query created by: Luis Adam on 2021 2:30 PM      Electronically signed by:  Paulino Rodriguez MD 2021 1:26 PM

## 2021-02-01 NOTE — PROGRESS NOTES
Ventilator check complete; patient has a #8. 0 ET tube secured at the 24 at the lip. Patient is sedated. Patient is not able to follow commands. Breath sounds are coarse. Trachea is midline, Negative for subcutaneous air, and chest excursion is symmetrical. Patient is also Negative for cyanosis and is Positive for pitting edema. All alarms are set and audible.   Resuscitation bag and mask are at the head of the bed.       Ventilator Settings  Mode FIO2 Rate Tidal Volume Pressure PEEP I:E Ratio   Pressure control  55 %   32 avg 453cc    18 14 cm H20  1:1.2       Peak airway pressure: 30.7 cm H2O   Minute ventilation: 15.4 l/min

## 2021-02-01 NOTE — PROGRESS NOTES
[facility]        Subjective: SWATI  Pt seen on SLED    Review of Systems -   Can not be obtained. Objective:    Vitals:    02/01/21 0815 02/01/21 0820 02/01/21 0830 02/01/21 0835   BP:       Pulse: 94 93 92 90   Resp: (!) 32 (!) 32 (!) 31 27   Temp:  99.7 °F (37.6 °C)  99.6 °F (37.6 °C)   SpO2: 97% 97% 97% 97%   Weight:       Height:           PE  Gen: intubated  CV:reg rate  Chest:bilat breath sounds  Abd: soft  Ext/Access:rt IJ temp dialysis cath ok, no bleeding from site. [facility]  Recent Labs     02/01/21  0302 01/31/21  1359 01/31/21  1053 01/31/21  0730 01/31/21  0306 01/30/21  1438 01/30/21  1438   WBC 20.2*  --   --  16.7*  --   --  18.0*   HGB 6.9* 6.8*  --  7.7*  --    < > 6.3*   HCT 20.3* 20.6*  --  23.5*  --    < > 19.6*   *  --   --  125*  --   --  134*   INR 1.1  --  1.1  --  1.1  --   --     < > = values in this interval not displayed.      Recent Labs     02/01/21  0302 01/31/21  1053 01/31/21  0306 01/30/21  0305   *  --  137* 137   K 3.8  --  4.0 4.7   CL 95*  --  101 102   CO2 24  --  27 28   *  --  75 116*   *  --  93* 70*   CREA 6.23*  --  5.16* 4.06*   MG 2.1  --   --   --    PHOS 6.9*  --   --   --    CA 7.4*  --  7.9* 7.6*   ALB  --  1.5*  --   --    TBILI  --  0.7  --   --    ALT  --  32  --   --            Radiology    A/P:   Patient Active Problem List   Diagnosis Code    Obesity E66.9    Hypertension I10    Bradycardia R00.1    PVC (premature ventricular contraction) I49.3    CAD (coronary artery disease) I25.10    Dyslipidemia, goal LDL below 70 E78.5    SWATI (acute kidney injury) (HCC) N17.9    Acute hypoxemic respiratory failure (HCC) J96.01    Type 2 diabetes mellitus with hyperosmolarity without nonketotic hyperglycemic-hyperosmolar coma (HCC) E11.00    Pneumonia due to COVID-19 virus U07.1, J12.82    Hypernatremia E87.0    Atrial fibrillation (HCC) I48.91    Hypotension I95.9    Acute deep vein thrombosis (DVT) of left peroneal vein (HCC) I82.452     SWATI - ATN,  Pt on dialysis for clearance. COVID19/ Resp Failure    Hypoteinsion    A Fib with RVR    Anemia - being transfused again today.           Chris Nicolas MD

## 2021-02-01 NOTE — DIALYSIS
8 HR SLED initiated using  Right IJ catheter. Aspirated and flushed both catheter ports without problem. Machine settings per MD order. 150  DFR  500 UFR  No Heparin. Reported to primary nurse. Dialysis nurse available as needed.

## 2021-02-01 NOTE — PROGRESS NOTES
Comprehensive Nutrition Assessment    Type and Reason for Visit: Reassess  Tube Feeding Management ([facility])    Nutrition Recommendations/Plan:    Continue TF as Nepro @ 45 ml/hr and 110 ml water flush every 4 hours.  Start 3 packets ProSourceTF @ 0900 and 2100 daily with 30 ml water flush before and after protein.  TF, water flush and liquid protein will provide 2184 calories/day (>100% calorie goal), 153 gm protein/day (100% protein goal) in 1503 ml water/day. Malnutrition Assessment:  Malnutrition Status: At risk for malnutrition (specify)(Poor nutrition between 1/19-1/25.)    Nutrition Assessment:   Nutrition History: 1/7/2021: Pt reports inability to tolerate any food or liquids for 4 days PTA. Indicates vomiting with all po attempts during this time      Nutrition Background: PMH remarkable for CAD, GERD, HTN, MO. Admitted with Coivd 19, new onset DM with hyperglycemic hyperosmolar state, SWATI on CKD, lactic acidosis. Daily Update:  Remains ventilated, minimally responsive and TF-dependent. D10W was started due to hypoglycemia. Abdominal Status (last documented): Diarrhea abdomen with Active  bowel sounds. Last BM 02/01/21. Pertinent Medications: Solucortef, SSI coverage, Reglan, Lokelma  Drips: Coradrone  IVF: D10W  Pertinent Labs: AM glucose 297, , creatinine 6.23, phosphorus 6.9. Nutrition Related Findings:   Double lumen PICC (1/13), Intubated 1/17, NGT 1/17. Pt was TPN dependent from 1/14-1/19. TF initiated on 1/18. TF held 1/20-1/22 d/t levophed requirements. TF resumed on 1/22 @ 20 ml/hr and not advanced. Held on 1/25 d/t elevated GRVs. SLED initiated on 1/25. TPN started 1/26. Trophic TF started 1/29. TPN d/c 1/30 and TF advancing. TF @ goal 2/1with good GI tolerance reported.       Current Nutrition Therapies:  Current Tube Feeding (TF) Orders:   · Feeding Route: Nasogastric  · Formula: Nepro 1.8  · Schedule:Continuous    · Regimen: 45 ml/hr  · Additives/Modulars: (none)  · Water Flushes: 110 ml water every 4 hours  · Current TF & Flush Orders Provides: at goal  · Goal TF & Flush Orders Provides:  1944 kcal/day (100% of needs), 87.5 grams protein/day (62% of needs), 173 grams CHO/day, and ~ 1450 ml free water/day. Current Intake:   Average Meal Intake: NPO Average Supplement Intake: NPO      Anthropometric Measures:  Height: 5' 8\" (172.7 cm)  Current Body Wt: 104 kg (229 lb 4.5 oz)(2/1/21), Weight source: Bed scale  BMI: 34.9, Obese class 1 (BMI 30.0-34. 9)  Admission Body Weight: 235 lb(\"Estimated\")  Ideal Body Wt: 154 lbs (70 kg), 130.3 %  Usual Body Wt: 101.6 kg (224 lb)(EMR 6/3/2020 FP office; pt stated # unable to verify ), Percent weight change: -10.4          Estimated Daily Nutrient Needs:  Energy (kcal/day): 4326-7780 (Kcal/kg(25-30 ), Weight Used: Ideal(70 kg - vent, SLED))  Protein (g/day): 140-175(2-2.5 - vent, SLED) Weight Used: (Ideal(70 kg ))  Fluid (ml/day):   (Standard renal)    Nutrition Diagnosis:   · Inadequate protein intake related to (TF composition when infusing at goal rate) as evidenced by (the need to add a protein modular to meet protein needs)    · Food & nutrition-related knowledge deficit related to endocrine dysfunction as evidenced by (new DM dx, A1C 12, minimal exposure to DM information.)    Nutrition Interventions:   Food and/or Nutrient Delivery: Continue tube feeding  Nutrition Education/Counseling: Education initiated  Coordination of Nutrition Care: Continue to monitor while inpatient  Plan of Care discussed with Ralf Estevez RN. Goals:   Previous Goal Met: Goal(s) achieved  Active Goal: Maintain TF at its goal rate to meet 100% of daily nutrition needs. Nutrition Monitoring and Evaluation:   Behavioral-Environmental Outcomes: Knowledge or skill  Food/Nutrient Intake Outcomes: Enteral nutrition intake/tolerance  Physical Signs/Symptoms Outcomes: Biochemical data, Constipation    Discharge Planning:     Too soon to determine    Rylan Craig.  La Renee RD, LD on 2/1/2021 at 4:21 PM  Contact: [phone]        Disaster Mode active

## 2021-02-01 NOTE — PROGRESS NOTES
Chart reviewed. Pt remains CCU covid positive isolation. Intubated/vent -55% Fio2. Plan for trach tomorrow per MD notes. SLED per Nephrology. Possibly will get consult per MD for Neurology as pt still not waking up. Amio gtt currently per [facility]. No sedation or paralytics. CM will continue to follow for any assist and d/c POC when stable. LTACH is option when and if MD felt appropriate. Will need precert. LOS 25 days.

## 2021-02-01 NOTE — PROGRESS NOTES
Problem: Falls - Risk of  Goal: *Absence of Falls  Description: Document Starleen Freeze Fall Risk and appropriate interventions in the flowsheet.   Outcome: Progressing Towards Goal  Note: Fall Risk Interventions:  Mobility Interventions: Assess mobility with egress test    Mentation Interventions: Adequate sleep, hydration, pain control    Medication Interventions: Assess postural VS orthostatic hypotension, Bed/chair exit alarm    Elimination Interventions: Bed/chair exit alarm

## 2021-02-02 ENCOUNTER — APPOINTMENT (OUTPATIENT)
Dept: GENERAL RADIOLOGY | Age: 61
DRG: 004 | End: 2021-02-02
Attending: INTERNAL MEDICINE
Payer: COMMERCIAL

## 2021-02-02 LAB
ABO + RH BLD: NORMAL
AMPHET UR QL SCN: NEGATIVE
ANION GAP SERPL CALC-SCNC: 8 MMOL/L (ref 7–16)
APTT PPP: 38.8 SEC (ref 24.1–35.1)
ARTERIAL PATENCY WRIST A: ABNORMAL
BARBITURATES UR QL SCN: NEGATIVE
BASE EXCESS BLD CALC-SCNC: 4 MMOL/L
BASOPHILS # BLD: 0.1 K/UL (ref 0–0.2)
BASOPHILS NFR BLD: 0 % (ref 0–2)
BDY SITE: ABNORMAL
BENZODIAZ UR QL: POSITIVE
BLD PROD TYP BPU: NORMAL
BLOOD BANK CMNT PATIENT-IMP: NORMAL
BLOOD GROUP ANTIBODIES SERPL: NORMAL
BPU ID: NORMAL
BUN SERPL-MCNC: 70 MG/DL (ref 8–23)
CALCIUM SERPL-MCNC: 8.3 MG/DL (ref 8.3–10.4)
CANNABINOIDS UR QL SCN: NEGATIVE
CHLORIDE SERPL-SCNC: 100 MMOL/L (ref 98–107)
CO2 BLD-SCNC: 30 MMOL/L
CO2 SERPL-SCNC: 29 MMOL/L (ref 21–32)
COCAINE UR QL SCN: NEGATIVE
COLLECT TIME,HTIME: 245
CREAT SERPL-MCNC: 4.2 MG/DL (ref 0.8–1.5)
CROSSMATCH RESULT,%XM: NORMAL
DIFFERENTIAL METHOD BLD: ABNORMAL
EOSINOPHIL # BLD: 0 K/UL (ref 0–0.8)
EOSINOPHIL NFR BLD: 0 % (ref 0.5–7.8)
ERYTHROCYTE [DISTWIDTH] IN BLOOD BY AUTOMATED COUNT: 17.1 % (ref 11.9–14.6)
EXHALED MINUTE VOLUME, VE: 14.4 L/MIN
FIBRINOGEN PPP-MCNC: 557 MG/DL (ref 190–501)
GAS FLOW.O2 O2 DELIVERY SYS: ABNORMAL L/MIN
GAS FLOW.O2 SETTING OXYMISER: 32 BPM
GLUCOSE BLD STRIP.AUTO-MCNC: 117 MG/DL (ref 65–100)
GLUCOSE BLD STRIP.AUTO-MCNC: 163 MG/DL (ref 65–100)
GLUCOSE BLD STRIP.AUTO-MCNC: 165 MG/DL (ref 65–100)
GLUCOSE BLD STRIP.AUTO-MCNC: 177 MG/DL (ref 65–100)
GLUCOSE BLD STRIP.AUTO-MCNC: 177 MG/DL (ref 65–100)
GLUCOSE BLD STRIP.AUTO-MCNC: 179 MG/DL (ref 65–100)
GLUCOSE BLD STRIP.AUTO-MCNC: 280 MG/DL (ref 65–100)
GLUCOSE SERPL-MCNC: 175 MG/DL (ref 65–100)
HCO3 BLD-SCNC: 28.7 MMOL/L (ref 22–26)
HCT VFR BLD AUTO: 26.6 % (ref 41.1–50.3)
HGB BLD-MCNC: 9.2 G/DL (ref 13.6–17.2)
IMM GRANULOCYTES # BLD AUTO: 0.8 K/UL (ref 0–0.5)
IMM GRANULOCYTES NFR BLD AUTO: 4 % (ref 0–5)
INR PPP: 1.2
INSPIRATION.DURATION SETTING TIME VENT: 0.85 SEC
LYMPHOCYTES # BLD: 0.5 K/UL (ref 0.5–4.6)
LYMPHOCYTES NFR BLD: 2 % (ref 13–44)
MCH RBC QN AUTO: 29.6 PG (ref 26.1–32.9)
MCHC RBC AUTO-ENTMCNC: 34.6 G/DL (ref 31.4–35)
MCV RBC AUTO: 85.5 FL (ref 79.6–97.8)
METHADONE UR QL: NEGATIVE
MONOCYTES # BLD: 2 K/UL (ref 0.1–1.3)
MONOCYTES NFR BLD: 9 % (ref 4–12)
NEUTS SEG # BLD: 18.5 K/UL (ref 1.7–8.2)
NEUTS SEG NFR BLD: 85 % (ref 43–78)
NRBC # BLD: 0 K/UL (ref 0–0.2)
O2/TOTAL GAS SETTING VFR VENT: 55 %
OPIATES UR QL: POSITIVE
PCO2 BLD: 45.3 MMHG (ref 35–45)
PCP UR QL: NEGATIVE
PEEP RESPIRATORY: 14 CMH2O
PH BLD: 7.41 [PH] (ref 7.35–7.45)
PLATELET # BLD AUTO: 146 K/UL (ref 150–450)
PMV BLD AUTO: 10.3 FL (ref 9.4–12.3)
PO2 BLD: 112 MMHG (ref 75–100)
POTASSIUM SERPL-SCNC: 4.6 MMOL/L (ref 3.5–5.1)
PRESSURE CONTROL, IPC: 18
PROTHROMBIN TIME: 15.2 SEC (ref 12.5–14.7)
RBC # BLD AUTO: 3.11 M/UL (ref 4.23–5.6)
SAO2 % BLD: 98 % (ref 95–98)
SERVICE CMNT-IMP: ABNORMAL
SERVICE CMNT-IMP: ABNORMAL
SODIUM SERPL-SCNC: 137 MMOL/L (ref 138–145)
SPECIMEN EXP DATE BLD: NORMAL
SPECIMEN TYPE: ABNORMAL
STATUS OF UNIT,%ST: NORMAL
UNIT DIVISION, %UDIV: 0
VENTILATION MODE VENT: ABNORMAL
WBC # BLD AUTO: 21.9 K/UL (ref 4.3–11.1)

## 2021-02-02 PROCEDURE — 36600 WITHDRAWAL OF ARTERIAL BLOOD: CPT

## 2021-02-02 PROCEDURE — 71045 X-RAY EXAM CHEST 1 VIEW: CPT

## 2021-02-02 PROCEDURE — 82803 BLOOD GASES ANY COMBINATION: CPT

## 2021-02-02 PROCEDURE — 85610 PROTHROMBIN TIME: CPT

## 2021-02-02 PROCEDURE — 74011250636 HC RX REV CODE- 250/636: Performed by: INTERNAL MEDICINE

## 2021-02-02 PROCEDURE — C9113 INJ PANTOPRAZOLE SODIUM, VIA: HCPCS | Performed by: INTERNAL MEDICINE

## 2021-02-02 PROCEDURE — 31600 PLANNED TRACHEOSTOMY: CPT | Performed by: INTERNAL MEDICINE

## 2021-02-02 PROCEDURE — 0B113F4 BYPASS TRACHEA TO CUTANEOUS WITH TRACHEOSTOMY DEVICE, PERCUTANEOUS APPROACH: ICD-10-PCS | Performed by: INTERNAL MEDICINE

## 2021-02-02 PROCEDURE — 85025 COMPLETE CBC W/AUTO DIFF WBC: CPT

## 2021-02-02 PROCEDURE — 76040000025: Performed by: INTERNAL MEDICINE

## 2021-02-02 PROCEDURE — 85384 FIBRINOGEN ACTIVITY: CPT

## 2021-02-02 PROCEDURE — 74011636637 HC RX REV CODE- 636/637: Performed by: INTERNAL MEDICINE

## 2021-02-02 PROCEDURE — 2709999900 HC NON-CHARGEABLE SUPPLY

## 2021-02-02 PROCEDURE — 80048 BASIC METABOLIC PNL TOTAL CA: CPT

## 2021-02-02 PROCEDURE — 74011250637 HC RX REV CODE- 250/637: Performed by: INTERNAL MEDICINE

## 2021-02-02 PROCEDURE — 99291 CRITICAL CARE FIRST HOUR: CPT | Performed by: INTERNAL MEDICINE

## 2021-02-02 PROCEDURE — 80307 DRUG TEST PRSMV CHEM ANLYZR: CPT

## 2021-02-02 PROCEDURE — 94003 VENT MGMT INPAT SUBQ DAY: CPT

## 2021-02-02 PROCEDURE — 65620000000 HC RM CCU GENERAL

## 2021-02-02 PROCEDURE — 2709999900 HC NON-CHARGEABLE SUPPLY: Performed by: INTERNAL MEDICINE

## 2021-02-02 PROCEDURE — 74011000250 HC RX REV CODE- 250: Performed by: INTERNAL MEDICINE

## 2021-02-02 PROCEDURE — 74011250636 HC RX REV CODE- 250/636: Performed by: NURSE PRACTITIONER

## 2021-02-02 PROCEDURE — 74011000258 HC RX REV CODE- 258: Performed by: NURSE PRACTITIONER

## 2021-02-02 PROCEDURE — 85730 THROMBOPLASTIN TIME PARTIAL: CPT

## 2021-02-02 PROCEDURE — 77030012699 HC VLV SUC CNTRL OCOA -A: Performed by: INTERNAL MEDICINE

## 2021-02-02 RX ORDER — FENTANYL CITRATE 50 UG/ML
100 INJECTION, SOLUTION INTRAMUSCULAR; INTRAVENOUS ONCE
Status: COMPLETED | OUTPATIENT
Start: 2021-02-02 | End: 2021-02-02

## 2021-02-02 RX ORDER — PROPOFOL 10 MG/ML
1 INJECTION, EMULSION INTRAVENOUS
Status: COMPLETED | OUTPATIENT
Start: 2021-02-02 | End: 2021-02-02

## 2021-02-02 RX ORDER — PROPOFOL 10 MG/ML
0-50 VIAL (ML) INTRAVENOUS
Status: DISCONTINUED | OUTPATIENT
Start: 2021-02-02 | End: 2021-02-04

## 2021-02-02 RX ADMIN — MORPHINE SULFATE 2 MG: 2 INJECTION, SOLUTION INTRAMUSCULAR; INTRAVENOUS at 05:39

## 2021-02-02 RX ADMIN — METOCLOPRAMIDE 5 MG: 5 INJECTION, SOLUTION INTRAMUSCULAR; INTRAVENOUS at 05:15

## 2021-02-02 RX ADMIN — AMIODARONE HYDROCHLORIDE 0.5 MG/MIN: 50 INJECTION, SOLUTION INTRAVENOUS at 05:05

## 2021-02-02 RX ADMIN — MINERAL OIL, PETROLATUM: 425; 568 OINTMENT OPHTHALMIC at 09:41

## 2021-02-02 RX ADMIN — ATORVASTATIN CALCIUM 80 MG: 40 TABLET, FILM COATED ORAL at 09:41

## 2021-02-02 RX ADMIN — Medication 20 ML: at 05:05

## 2021-02-02 RX ADMIN — Medication 20 ML: at 21:51

## 2021-02-02 RX ADMIN — MINERAL OIL, PETROLATUM: 425; 568 OINTMENT OPHTHALMIC at 09:42

## 2021-02-02 RX ADMIN — PANTOPRAZOLE SODIUM 40 MG: 40 INJECTION, POWDER, FOR SOLUTION INTRAVENOUS at 09:41

## 2021-02-02 RX ADMIN — AMIODARONE HYDROCHLORIDE 0.5 MG/MIN: 50 INJECTION, SOLUTION INTRAVENOUS at 21:40

## 2021-02-02 RX ADMIN — MINERAL OIL, PETROLATUM: 425; 568 OINTMENT OPHTHALMIC at 20:27

## 2021-02-02 RX ADMIN — INSULIN LISPRO 3 UNITS: 100 INJECTION, SOLUTION INTRAVENOUS; SUBCUTANEOUS at 03:02

## 2021-02-02 RX ADMIN — INSULIN LISPRO 3 UNITS: 100 INJECTION, SOLUTION INTRAVENOUS; SUBCUTANEOUS at 12:03

## 2021-02-02 RX ADMIN — INSULIN LISPRO 3 UNITS: 100 INJECTION, SOLUTION INTRAVENOUS; SUBCUTANEOUS at 19:42

## 2021-02-02 RX ADMIN — Medication 20 ML: at 14:09

## 2021-02-02 RX ADMIN — INSULIN LISPRO 3 UNITS: 100 INJECTION, SOLUTION INTRAVENOUS; SUBCUTANEOUS at 17:00

## 2021-02-02 RX ADMIN — SODIUM ZIRCONIUM CYCLOSILICATE 10 G: 10 POWDER, FOR SUSPENSION ORAL at 09:41

## 2021-02-02 RX ADMIN — PROPOFOL 101.4 MG: 10 INJECTION, EMULSION INTRAVENOUS at 15:30

## 2021-02-02 RX ADMIN — PROPOFOL 10 MCG/KG/MIN: 10 INJECTION, EMULSION INTRAVENOUS at 14:09

## 2021-02-02 RX ADMIN — FENTANYL CITRATE 100 MCG: 50 INJECTION, SOLUTION INTRAMUSCULAR; INTRAVENOUS at 15:42

## 2021-02-02 RX ADMIN — PANTOPRAZOLE SODIUM 40 MG: 40 INJECTION, POWDER, FOR SOLUTION INTRAVENOUS at 20:26

## 2021-02-02 RX ADMIN — HYDRALAZINE HYDROCHLORIDE 20 MG: 20 INJECTION, SOLUTION INTRAMUSCULAR; INTRAVENOUS at 03:15

## 2021-02-02 NOTE — WOUND CARE
Asked by nursing to assess penis, has 1.5cm fissure at meatus likely from purcell, unable to assess if the area is deep or runs along the length of the urethra. Recommend to alternate stat lock to prevent pressure in this area and prevent purcell catheter being pulled tight putting pressure on the penis, Vaseline gauze to area bid and prn. Consider catheter removal as patient is oliguric, concern for edema and possible retention with purcell removal. May need to consider urology if area persists or worsens, stable today.   Will monitor

## 2021-02-02 NOTE — PROGRESS NOTES
[facility] Critical Care COVID-19 Note:    Critical Care Note: 2/2/2021    Jaciel Zacarias. Admission Date: 1/6/2021     Length of Stay: 26 days    Background: 61 y.o. y/o male with acute hypoxemic respiratory failure secondary to COVID-19. Pt admitted 1/7 with fever, cough, fever. Covid + 1/6. Admitted by hospitalists. Started dexa, plasma, remdesivir. Pulm consulted 1/10 with pt requiring CPAP. Intubated 1/17. Nephrology consulted 1/23 for renal failure. US 1/18 with L peroneal vein thrombus. On heparin but stopped due to bleeding from HD catheter. Also has a fib on amio. Onset of COVID-19 symptoms: 1/5/2021    Notable PMH: obesity, HTN, CAD, dyslipidemia, SWATI, DM, GERD    24 Hour events:    Stopped heparin drip after required 2U PRBCs again. Off sedation x 3 days, but not following commands yet. Trach planned today. Tube feeds held at midnight. 4L off with dialysis yesterday. P:F 203 and respiratory status improved. ROS: intubated, sedated    LINES:    ET tube 1/17/2021  PICC 1/13  NGT 1/17  Art line 1/18/2021  Dialysis access 1/24/21  Gimenez 1/17     Drips: current dose (range)  Amio 0.5      Anti-infectives  azithro (completed)  Ceftriaxone (completed)  Vanc and cefepime: (completed)     Drips: current dose (range)    Visit Vitals  BP (!) 166/50 (BP 1 Location: Left lower arm, BP Patient Position: Supine)   Pulse 87   Temp 99.8 °F (37.7 °C)   Resp (!) 35   Ht 5' 8\" (1.727 m)   Wt 223 lb 8.7 oz (101.4 kg)   SpO2 96%   BMI 33.99 kg/m²     Pertinent Exam:            Constitutional:  intubated and mechanically ventilated.   EENMT:  Sclera clear, pupils equal, oral mucosa moist  Respiratory: crackles   Cardiovascular:  RRR  Gastrointestinal:  soft with no tenderness; positive bowel sounds present  Musculoskeletal:  warm with no cyanosis, no lower extremity edema  Skin:  no jaundice or ecchymosis  Neurologic:sedated, on vent   Psychiatric: sedated     CXR : improved slightly      Pertinent Labs:   Recent Labs     02/02/21  0300 02/01/21  1624 02/01/21  0302   WBC 21.9* 24.3* 20.2*   HGB 9.2* 9.7* 6.9*   HCT 26.6* 28.3* 20.3*   * 121* 125*   INR 1.2  --  1.1     Recent Labs     02/02/21  0300 02/01/21  0302 01/31/21  1053 01/31/21  0306   * 131*  --  137*   K 4.6 3.8  --  4.0    95*  --  101   CO2 29 24  --  27   * 297*  --  75   BUN 70* 110*  --  93*   CREA 4.20* 6.23*  --  5.16*   MG  --  2.1  --   --    CA 8.3 7.4*  --  7.9*   PHOS  --  6.9*  --   --    ALB  --   --  1.5*  --    TBILI  --   --  0.7  --    ALT  --   --  32  --      Recent Labs     02/02/21  0728 02/02/21  0259 02/01/21  2201   GLUCPOC 163* 177* 228*       Ventilator Settings:  5' 8\" (1.727 m)  Mode FIO2 Rate Tidal Volume Pressure PEEP   Pressure control  45 %    454 ml     14 cm H20    Peak airway pressure: 30.1 cm H2O   Minute ventilation: 15.6 l/min    ABG:   Recent Labs     02/02/21  0247 02/01/21  0326 01/31/21  0247   PHI 7.41 7.44 7.44   PCO2I 45.3* 34.7* 37.2   PO2I 112* 98 85   HCO3I 28.7* 23.5 25.0       Impression: 61 y.o. y/o male with acute hypoxemic respiratory failure secondary to COVID-19.     PLAN:    -Acute respiratory failure due to COVID-19:- trach today. Oxygenation improving with volume removal.    --hold all sedation except propofol around time of trach. Will test UDS to see if any sedation still in system. Neuro consult tomorrow if not awakening.   - Ask IR if they would consider IVC filter in the patient with lower leg DVT and recurrent need for transfusion with heparin drip.  -continue TF  -d/c steroids and monitor FSBS off lantus. -HD per renal   -Prophy: PPI     Full Code  Called Sister Teof[facility] Robles [phone]. Will ask infection control to review and consider removal of droplet plus precautions!       The patient is critically ill with respiratory failure, circulatory failure and requires high complexity decision making for assessment and support including frequent ventilator adjustment , frequent evaluation and titration of therapies , application of advanced monitoring technologies and extensive interpretation of multiple databases.   Cumulative time devoted to patient care services by me for day of service - 30 min     Mitesh Johns MD

## 2021-02-02 NOTE — PROGRESS NOTES
[facility]        Subjective: SWATI  Pt on vent, sedated    Review of Systems -   Can not be obtained. Objective:    Vitals:    02/02/21 0730 02/02/21 0745 02/02/21 0800 02/02/21 0902   BP:   (!) 166/50    Pulse: 89 88 87 89   Resp: (!) 41 (!) 33 (!) 35 (!) 32   Temp:   99.8 °F (37.7 °C)    SpO2: 96% 97% 96% 96%   Weight:       Height:           PE  Gen: intubated  CV:reg rate  Chest:bilat breath sounds  Abd: soft  Ext/Access:rt IJ temp dialysis cath ok, no bleeding from site. [facility]  Recent Labs     02/02/21  0300 02/01/21  1624 02/01/21  0302 01/31/21  1053 01/31/21  1053   WBC 21.9* 24.3* 20.2*  --   --    HGB 9.2* 9.7* 6.9*   < >  --    HCT 26.6* 28.3* 20.3*   < >  --    * 121* 125*  --   --    INR 1.2  --  1.1  --  1.1    < > = values in this interval not displayed. Recent Labs     02/02/21  0300 02/01/21  0302 01/31/21  1053 01/31/21  0306   * 131*  --  137*   K 4.6 3.8  --  4.0    95*  --  101   CO2 29 24  --  27   * 297*  --  75   BUN 70* 110*  --  93*   CREA 4.20* 6.23*  --  5.16*   MG  --  2.1  --   --    PHOS  --  6.9*  --   --    CA 8.3 7.4*  --  7.9*   ALB  --   --  1.5*  --    TBILI  --   --  0.7  --    ALT  --   --  32  --            Radiology    A/P:   Patient Active Problem List   Diagnosis Code    Obesity E66.9    Hypertension I10    Bradycardia R00.1    PVC (premature ventricular contraction) I49.3    CAD (coronary artery disease) I25.10    Dyslipidemia, goal LDL below 70 E78.5    SWATI (acute kidney injury) (HCC) N17.9    Acute hypoxemic respiratory failure (HCC) J96.01    Type 2 diabetes mellitus with hyperosmolarity without nonketotic hyperglycemic-hyperosmolar coma (HCC) E11.00    Pneumonia due to COVID-19 virus U07.1, J12.82    Hypernatremia E87.0    Atrial fibrillation (HCC) I48.91    Hypotension I95.9    Acute deep vein thrombosis (DVT) of left peroneal vein (HCC) I82.452     SWATI - ATN, Still oliguric.  Pt for SLED again tomorrow. COVID19/ Resp Failure    Hypoteinsion    A Fib with RVR    Anemia - monitoring.         Paddy Cowan MD

## 2021-02-02 NOTE — PROGRESS NOTES
Ventilator check complete; patient has a #8. 0 ET tube secured at the 24 at the lip. Patient is not sedated. Patient is not able to follow commands. Breath sounds are diminished. Trachea is midline, Negative for subcutaneous air, and chest excursion is symmetric. Patient is also Negative for cyanosis and is Negative for pitting edema. All alarms are set and audible. Resuscitation bag is at the head of the bed. Ventilator Settings  Mode FIO2 Rate Tidal Volume Pressure PEEP I:E Ratio   Pressure control  45 %   32    18 14 cm H20  1:1.2      Peak airway pressure: 29.4 cm H2O   Minute ventilation: 15.3 l/min     ABG: No results for input(s): PH, PCO2, PO2, HCO3 in the last 72 hours.       Elijah Grayson, RT

## 2021-02-02 NOTE — PROCEDURES
PROCEDURE:  PERCUTANEOUS TRACHEOSTOMY (74342)    02/02/21  4:11 PM    OPERATORS:  DR LUCILLE BAIRD    INDICATION:  RESPIRATORY FAILURE AND FAILURE TO WEAN OFF VENTILATOR. EQUIPMENT:  OLYMPUS Q 180 BRONCHOSCOPE. COOK RHINO PERCUTANEOUS TRACHEOSTOMY KIT  # 8 cuffed SHILEY REGULAR/EXTRA LONG PROX TRACHEOSTOMY TUBE. ANESTHESIA:  PROPOFOL: 70MCG, 35MCG/HR  FENTANYL: 100MCG    SUMMARY OF PROCEDURE:  After obtaining informed consent and adequate anesthesia, the anatomical landmarks were assessed and marked. The skin was prepped and draped with the provided chlorhexidine and drape in the usual fashion. Bronchoscopic guidance was provided throughout the procedure. 5 cc of 1% lidocaine with epinephrine was injected into the skin and subcutaneous tissue of the pretracheal skin. Using the provided scalpel 1.5 cm incision was made in the previously assessed area of the neck (2-3 intertracheal) ring space. After performing blunt dissection with provided manuel clamp and finger, the bronchoscope was positioned in the correct position for full real time view. An introducer needle was then placed into the trachea in mid tracheal line with tip seen in correct position on bronchoscopy. The needle was then tilted caudad and a guidewire was introduced in the usual fashion. After removing the needle a punch dilator was used to dilate the formed stoma, followed by the white introducer catheter. Further dilation # 24/28 dilator to pre marked sign. Following this a #8 PROXIMAL XLT CUFFED tracheostomy was placed without complication. The tracheostomy tube was then sutured in place with provided prolene suture. The ET tube was removed and a bronchoscopy performed through the newly established tracheostomy, confirming proper position of tracheostomy. EBL: 5 ml    There were no immediate complications.      Gaylord Cogan, MD

## 2021-02-02 NOTE — DIABETES MGMT
Patient's blood glucose ranged 177-228 yesterday with patient receiving Humalog 39 units and SoluCortef 50 mg. Blood glucose 163 this morning. Tube feed on hold currently for trach placement today. Hgb 9. 2. BUN 70. Cr 4.20. GFR 15. Will follow along.

## 2021-02-02 NOTE — PROGRESS NOTES
Ventilator check complete; patient has a #8. 0 ET tube secured at the 24 at the lip. Patient is not sedated. Patient is not able to follow commands. Breath sounds are diminished. Trachea is midline, Negative for subcutaneous air, and chest excursion is symmetric. Patient is also Negative for cyanosis and is Negative for pitting edema. All alarms are set and audible. Resuscitation bag is at the head of the bed.       Ventilator Settings  Mode FIO2 Rate Tidal Volume Pressure PEEP I:E Ratio   Pressure control  55 %    454 ml     14 cm H20  1:1.2      Peak airway pressure: 34.4 cm H2O   Minute ventilation: 14.9 l/min       Arian Barbour, RT

## 2021-02-02 NOTE — INTERVAL H&P NOTE
Update History & Physical    The Patient's History and Physical of 1/10/21 was reviewed with the patient and I examined the patient. There was no change. The surgical site was confirmed by the patient and me. Plan:  The risk, benefits, expected outcome, and alternative to the recommended procedure have been discussed with the patient. Patient understands and wants to proceed with the procedure.     Electronically signed by Belynda Skiff, MD on 2/2/2021 at 4:10 PM

## 2021-02-03 ENCOUNTER — APPOINTMENT (OUTPATIENT)
Dept: GENERAL RADIOLOGY | Age: 61
DRG: 004 | End: 2021-02-03
Attending: INTERNAL MEDICINE
Payer: COMMERCIAL

## 2021-02-03 LAB
ANION GAP SERPL CALC-SCNC: 12 MMOL/L (ref 7–16)
APTT PPP: 43.7 SEC (ref 24.1–35.1)
ARTERIAL PATENCY WRIST A: ABNORMAL
BASE EXCESS BLD CALC-SCNC: 1 MMOL/L
BDY SITE: ABNORMAL
BUN SERPL-MCNC: 98 MG/DL (ref 8–23)
CALCIUM SERPL-MCNC: 8.3 MG/DL (ref 8.3–10.4)
CHLORIDE SERPL-SCNC: 99 MMOL/L (ref 98–107)
CO2 BLD-SCNC: 26 MMOL/L
CO2 SERPL-SCNC: 25 MMOL/L (ref 21–32)
COLLECT TIME,HTIME: 200
CREAT SERPL-MCNC: 5.4 MG/DL (ref 0.8–1.5)
EXHALED MINUTE VOLUME, VE: 16.2 L/MIN
FIBRINOGEN PPP-MCNC: 522 MG/DL (ref 190–501)
GAS FLOW.O2 O2 DELIVERY SYS: ABNORMAL L/MIN
GAS FLOW.O2 SETTING OXYMISER: 32 BPM
GLUCOSE BLD STRIP.AUTO-MCNC: 121 MG/DL (ref 65–100)
GLUCOSE BLD STRIP.AUTO-MCNC: 181 MG/DL (ref 65–100)
GLUCOSE BLD STRIP.AUTO-MCNC: 190 MG/DL (ref 65–100)
GLUCOSE BLD STRIP.AUTO-MCNC: 208 MG/DL (ref 65–100)
GLUCOSE BLD STRIP.AUTO-MCNC: 259 MG/DL (ref 65–100)
GLUCOSE SERPL-MCNC: 170 MG/DL (ref 65–100)
HCO3 BLD-SCNC: 24.7 MMOL/L (ref 22–26)
HCT VFR BLD AUTO: 22.9 % (ref 41.1–50.3)
HGB BLD-MCNC: 7.5 G/DL (ref 13.6–17.2)
INR PPP: 1.2
INSPIRATION.DURATION SETTING TIME VENT: 0.85 SEC
MAGNESIUM SERPL-MCNC: 2.4 MG/DL (ref 1.8–2.4)
O2/TOTAL GAS SETTING VFR VENT: 45 %
PCO2 BLD: 34.5 MMHG (ref 35–45)
PEEP RESPIRATORY: 14 CMH2O
PH BLD: 7.46 [PH] (ref 7.35–7.45)
PHOSPHATE SERPL-MCNC: 7.2 MG/DL (ref 2.3–3.7)
PO2 BLD: 101 MMHG (ref 75–100)
POTASSIUM SERPL-SCNC: 4.6 MMOL/L (ref 3.5–5.1)
PRESSURE CONTROL, IPC: 18
PROTHROMBIN TIME: 15.7 SEC (ref 12.5–14.7)
SAO2 % BLD: 98 % (ref 95–98)
SERVICE CMNT-IMP: ABNORMAL
SERVICE CMNT-IMP: ABNORMAL
SODIUM SERPL-SCNC: 136 MMOL/L (ref 138–145)
SPECIMEN TYPE: ABNORMAL
VENTILATION MODE VENT: ABNORMAL

## 2021-02-03 PROCEDURE — 77030018626 HC TU TRACH CUF3 COVD -B

## 2021-02-03 PROCEDURE — 99291 CRITICAL CARE FIRST HOUR: CPT | Performed by: INTERNAL MEDICINE

## 2021-02-03 PROCEDURE — 85384 FIBRINOGEN ACTIVITY: CPT

## 2021-02-03 PROCEDURE — 90945 DIALYSIS ONE EVALUATION: CPT

## 2021-02-03 PROCEDURE — 74011250637 HC RX REV CODE- 250/637: Performed by: INTERNAL MEDICINE

## 2021-02-03 PROCEDURE — 74011000250 HC RX REV CODE- 250: Performed by: INTERNAL MEDICINE

## 2021-02-03 PROCEDURE — 82962 GLUCOSE BLOOD TEST: CPT

## 2021-02-03 PROCEDURE — 83735 ASSAY OF MAGNESIUM: CPT

## 2021-02-03 PROCEDURE — 74011250636 HC RX REV CODE- 250/636: Performed by: INTERNAL MEDICINE

## 2021-02-03 PROCEDURE — C9113 INJ PANTOPRAZOLE SODIUM, VIA: HCPCS | Performed by: INTERNAL MEDICINE

## 2021-02-03 PROCEDURE — 85610 PROTHROMBIN TIME: CPT

## 2021-02-03 PROCEDURE — 74011000258 HC RX REV CODE- 258: Performed by: INTERNAL MEDICINE

## 2021-02-03 PROCEDURE — 74011636637 HC RX REV CODE- 636/637: Performed by: INTERNAL MEDICINE

## 2021-02-03 PROCEDURE — 36592 COLLECT BLOOD FROM PICC: CPT

## 2021-02-03 PROCEDURE — 84100 ASSAY OF PHOSPHORUS: CPT

## 2021-02-03 PROCEDURE — 74011250636 HC RX REV CODE- 250/636: Performed by: NURSE PRACTITIONER

## 2021-02-03 PROCEDURE — 94003 VENT MGMT INPAT SUBQ DAY: CPT

## 2021-02-03 PROCEDURE — 85014 HEMATOCRIT: CPT

## 2021-02-03 PROCEDURE — 65620000000 HC RM CCU GENERAL

## 2021-02-03 PROCEDURE — 82803 BLOOD GASES ANY COMBINATION: CPT

## 2021-02-03 PROCEDURE — 74011000258 HC RX REV CODE- 258: Performed by: NURSE PRACTITIONER

## 2021-02-03 PROCEDURE — 80048 BASIC METABOLIC PNL TOTAL CA: CPT

## 2021-02-03 PROCEDURE — 85730 THROMBOPLASTIN TIME PARTIAL: CPT

## 2021-02-03 PROCEDURE — 71045 X-RAY EXAM CHEST 1 VIEW: CPT

## 2021-02-03 PROCEDURE — 2709999900 HC NON-CHARGEABLE SUPPLY

## 2021-02-03 RX ADMIN — ACETAMINOPHEN 650 MG: 325 TABLET, FILM COATED ORAL at 08:13

## 2021-02-03 RX ADMIN — HYDRALAZINE HYDROCHLORIDE 20 MG: 20 INJECTION, SOLUTION INTRAMUSCULAR; INTRAVENOUS at 15:38

## 2021-02-03 RX ADMIN — MINERAL OIL, PETROLATUM: 425; 568 OINTMENT OPHTHALMIC at 08:17

## 2021-02-03 RX ADMIN — INSULIN LISPRO 3 UNITS: 100 INJECTION, SOLUTION INTRAVENOUS; SUBCUTANEOUS at 12:08

## 2021-02-03 RX ADMIN — INSULIN LISPRO 9 UNITS: 100 INJECTION, SOLUTION INTRAVENOUS; SUBCUTANEOUS at 19:30

## 2021-02-03 RX ADMIN — INSULIN LISPRO 3 UNITS: 100 INJECTION, SOLUTION INTRAVENOUS; SUBCUTANEOUS at 04:29

## 2021-02-03 RX ADMIN — SODIUM ZIRCONIUM CYCLOSILICATE 10 G: 10 POWDER, FOR SUSPENSION ORAL at 08:13

## 2021-02-03 RX ADMIN — Medication 20 ML: at 21:39

## 2021-02-03 RX ADMIN — PANTOPRAZOLE SODIUM 40 MG: 40 INJECTION, POWDER, FOR SOLUTION INTRAVENOUS at 08:16

## 2021-02-03 RX ADMIN — Medication 20 ML: at 05:00

## 2021-02-03 RX ADMIN — INSULIN LISPRO 6 UNITS: 100 INJECTION, SOLUTION INTRAVENOUS; SUBCUTANEOUS at 15:51

## 2021-02-03 RX ADMIN — INSULIN LISPRO 3 UNITS: 100 INJECTION, SOLUTION INTRAVENOUS; SUBCUTANEOUS at 08:13

## 2021-02-03 RX ADMIN — ACETAMINOPHEN 650 MG: 325 TABLET, FILM COATED ORAL at 19:47

## 2021-02-03 RX ADMIN — MINERAL OIL, PETROLATUM: 425; 568 OINTMENT OPHTHALMIC at 20:02

## 2021-02-03 RX ADMIN — ATORVASTATIN CALCIUM 80 MG: 40 TABLET, FILM COATED ORAL at 08:13

## 2021-02-03 RX ADMIN — LANSOPRAZOLE 30 MG: KIT at 21:39

## 2021-02-03 RX ADMIN — AMIODARONE HYDROCHLORIDE 0.5 MG/MIN: 50 INJECTION, SOLUTION INTRAVENOUS at 11:21

## 2021-02-03 RX ADMIN — Medication 20 ML: at 13:38

## 2021-02-03 RX ADMIN — NOREPINEPHRINE BITARTRATE 4 MCG/MIN: 1 INJECTION, SOLUTION, CONCENTRATE INTRAVENOUS at 09:20

## 2021-02-03 RX ADMIN — NOREPINEPHRINE BITARTRATE 4 MCG/MIN: 1 INJECTION, SOLUTION, CONCENTRATE INTRAVENOUS at 10:25

## 2021-02-03 RX ADMIN — MORPHINE SULFATE 2 MG: 2 INJECTION, SOLUTION INTRAMUSCULAR; INTRAVENOUS at 12:35

## 2021-02-03 NOTE — CONSULTS
[facility] Consult Note       Primary GI Physician: Dr Rosalee Marks    Referring Provider:  Dr Fausto Easley Date:  2/3/2021    Admit Date:  1/6/2021    Chief Complaint:  PEG evaluation      Subjective:     History of Present Illness:  Patient is a 61 y.o. male with PMH of including but not limited to  for coronary artery disease, GERD, hypertension, who is seen in consultation at the request of Dr. Hope Opitz for PEG evaluation. He was admitted 1/7/21 with fever, cough and congestion with COVID positive 1/5/21. He also reported losing 30 pound in a week. He was treated with dexa, plasma, remdesivir. Pulmonology consulted 1/10 with pt requiring CPAP. He was intubated 1/17. Nephrology consulted 1/23 for renal failure. US 1/18 with L peroneal vein thrombus. He was started on heparin but this was stopped due to bleeding from HD catheter. Andrew Gutierrez is also has a fib on amio. He got tracch 2/2/21. HD with SLED today. He has been maintaining adequate BP's but is requiring pressure support while on HD today. He is scheduled for IVC filter tomorrow. He is somnolent, not on sedation today. Cannot contribute much in the way of PMH or ROS. Hgb is down today form 9.2 yesterday to 7.5 today. Brown loose stool in FMS. Small BR blood in suction canister with trach placed today.        PMH:  Past Medical History:   Diagnosis Date    Acute deep vein thrombosis (DVT) of left peroneal vein (HCC) 1/28/2021    Atrial fibrillation ([facility] [address] 01/22/2021    Gastrointestinal disorder     reflux    GERD (gastroesophageal reflux disease)     Hypertension     Lower respiratory tract infection due to COVID-19 virus 1/7/2021    Nausea & vomiting     Obesity 10/6/2015    Other ill-defined conditions(799.89)     dyspnea since surgery, wheezing, SOB    Type 2 diabetes mellitus with hyperosmolarity without nonketotic hyperglycemic-hyperosmolar coma ([address] 1/7/2021    Unstable angina ([address] 10/27/2016       PSH:  Past Surgical History:   Procedure Laterality Date    HX CHOLECYSTECTOMY      HX ORTHOPAEDIC      rotator cuff; knee    HX UROLOGICAL      kidney stone surgeries x 3    [place] CARDIAC SURG PROCEDURE UNLIST      stent       Allergies: Allergies   Allergen Reactions    Latex Swelling     Had a purcell catheter with swelling of urethra. Only known latex issue in past. Wife remembers this now    Hydrocodone-Acetaminophen Itching     Wife remembers this allergy    Tessalon Perles [Benzonatate] Unknown (comments)       Home Medications:  Prior to Admission medications    Medication Sig Start Date End Date Taking? Authorizing Provider   metoprolol tartrate (LOPRESSOR) 50 mg tablet Take 1 Tab by mouth two (2) times a day. 6/3/20  Yes Louis Blackwell MD   clopidogreL (Plavix) 75 mg tab Take 1 Tab by mouth daily. 6/3/20  Yes Louis Blackwell MD   nitroglycerin (NITROSTAT) 0.4 mg SL tablet 1 Tab by SubLINGual route every five (5) minutes as needed for Chest Pain. 6/3/20  Yes Louis Blackwell MD   magnesium oxide (MAG-OX) 400 mg tablet Take 1 Tab by mouth daily. 6/17/19  Yes Louis Blackwell MD   Aspirin, Buffered 81 mg tab Take 81 mg by mouth daily. Yes Provider, Historical   atorvastatin (LIPITOR) 80 mg tablet Take 1 Tab by mouth daily.  3/19/20   Louis Blackwell MD       Hospital Medications:  Current Facility-Administered Medications   Medication Dose Route Frequency    propofol (DIPRIVAN) 10 mg/mL infusion  0-50 mcg/kg/min IntraVENous TITRATE    0.9% sodium chloride infusion 250 mL  250 mL IntraVENous PRN    0.9% sodium chloride infusion 250 mL  250 mL IntraVENous PRN    dextrose 10% infusion  50 mL/hr IntraVENous CONTINUOUS    0.9% sodium chloride infusion 250 mL  250 mL IntraVENous PRN    0.9% sodium chloride infusion 250 mL  250 mL IntraVENous PRN    NOREPINephrine (LEVOPHED) 4 mg in 5% dextrose 250 mL infusion  0.5-30 mcg/min IntraVENous TITRATE    heparin 25,000 units in dextrose 500 mL infusion  18-36 Units/kg/hr IntraVENous TITRATE    atracurium (TRACRIUM) 1,000 mg in 0.9% sodium chloride 250 mL infusion  0-15 mcg/kg/min IntraVENous TITRATE    NOREPINephrine (LEVOPHED) 16,000 mcg in dextrose 5% 250 mL infusion  0.5-30 mcg/min IntraVENous TITRATE    heparin (porcine) 1,000 unit/mL injection 5,000 Units  5,000 Units Hemodialysis DIALYSIS PRN    senna-docusate (PERICOLACE) 8.6-50 mg per tablet 1 Tab  1 Tab Oral DAILY    amiodarone (CORDARONE) 450 mg in dextrose 5% 250 mL infusion  0.5-1 mg/min IntraVENous TITRATE    pantoprazole (PROTONIX) 40 mg in 0.9% sodium chloride 10 mL injection  40 mg IntraVENous Q12H    ondansetron (ZOFRAN) injection 4 mg  4 mg IntraVENous Q6H PRN    polyethylene glycol (MIRALAX) packet 17 g  17 g Per NG tube DAILY PRN    guaiFENesin-dextromethorphan (ROBITUSSIN DM) 100-10 mg/5 mL syrup 10 mL  10 mL Per NG tube Q4H PRN    acetaminophen (TYLENOL) tablet 650 mg  650 mg Per NG tube Q6H PRN    polyethylene glycol (MIRALAX) packet 17 g  17 g Per NG tube DAILY    sodium zirconium cyclosilicate (LOKELMA) powder packet 10 g  10 g Oral DAILY    white petrolatum-mineral oiL (LACRILUBE S.O.P.) ointment   Both Eyes Q12H    white petrolatum-mineral oiL (LACRILUBE S.O.P.) ointment   Both Eyes Q4H PRN    fentaNYL in normal saline (pf) 25 mcg/mL infusion  0-200 mcg/hr IntraVENous TITRATE    midazolam in normal saline (VERSED) 1 mg/mL infusion  0-10 mg/hr IntraVENous TITRATE    atorvastatin (LIPITOR) tablet 80 mg  80 mg Per NG tube DAILY    [Held by provider] clopidogreL (PLAVIX) tablet 75 mg  75 mg Per NG tube DAILY    [Held by provider] metoprolol tartrate (LOPRESSOR) tablet 50 mg  50 mg Per NG tube BID    insulin lispro (HUMALOG) injection   SubCUTAneous Q4H    LORazepam (ATIVAN) injection 1 mg  1 mg IntraVENous Q4H PRN    morphine injection 2 mg  2 mg IntraVENous Q4H PRN    NUTRITIONAL SUPPORT ELECTROLYTE PRN ORDERS   Does Not Apply PRN    sodium chloride (NS) flush 20 mL  20 mL InterCATHeter Q8H    sodium chloride (NS) flush 20 mL  20 mL InterCATHeter PRN    loperamide (IMODIUM) capsule 2 mg  2 mg Oral Q4H PRN    dextrose 40% (GLUTOSE) oral gel 1 Tube  15 g Oral PRN    glucagon (GLUCAGEN) injection 1 mg  1 mg IntraMUSCular PRN    dextrose (D50W) injection syrg 12.5-25 g  25-50 mL IntraVENous PRN    0.9% sodium chloride infusion 250 mL  250 mL IntraVENous PRN    albuterol (PROVENTIL HFA, VENTOLIN HFA, PROAIR HFA) inhaler 2 Puff  2 Puff Inhalation Q4H PRN    influenza vaccine 2020-21 (6 mos+)(PF) (FLUARIX/FLULAVAL/FLUZONE QUAD) injection 0.5 mL  0.5 mL IntraMUSCular PRIOR TO DISCHARGE    hydrALAZINE (APRESOLINE) 20 mg/mL injection 20 mg  20 mg IntraVENous Q6H PRN       Social History:  Social History     Tobacco Use    Smoking status: Never Smoker    Smokeless tobacco: Never Used   Substance Use Topics    Alcohol use: No       Pt denies any history of drug use, blood transfusions, or tattoos. Family History:  History reviewed. No pertinent family history. Review of Systems:  unable to obtain due to AMS    Diet:  NG TF    Objective:     Physical Exam:  Vitals:  Visit Vitals  BP (!) 144/53   Pulse 94   Temp 99 °F (37.2 °C)   Resp (!) 39   Ht 5' 8\" (1.727 m)   Wt 105 kg (231 lb 7.7 oz)   SpO2 93%   BMI 35.20 kg/m²     Gen:  Ill appearing, somnolent   Skin:  Extremities and face reveal no rashes  HEENT: Sclerae anicteric. Cardiovascular: Regular rate and rhythm. No murmurs, gallops, or rubs. Respiratory:  Course BS, trach and vent  GI:  Abdomen nondistended, obese, soft, and nontender. Normal active bowel sounds. No significant scars  Rectal:  Deferred  Musculoskeletal:  +2 pitting edema of the lower legs.     Neurological:  Somnolent       Laboratory:    Recent Labs     02/03/21  0311 02/02/21  0300 02/01/21  1624 02/01/21  0302 01/31/21  1359 01/31/21  1053   WBC  --  21.9* 24.3* 20.2*  --   --    HGB 7.5* 9.2* 9.7* 6.9* 6.8*  --    HCT 22.9* 26.6* 28.3* 20.3* 20.6*  --    PLT  --  146* 121* 125*  --   --    MCV  --  85.5 85.8 86.4  --   --    * 137*  --  131*  --   --    K 4.6 4.6  --  3.8  --   --    CL 99 100  --  95*  --   --    CO2 25 29  --  24  --   --    BUN 98* 70*  --  110*  --   --    CREA 5.40* 4.20*  --  6.23*  --   --    CA 8.3 8.3  --  7.4*  --   --    MG 2.4  --   --  2.1  --   --    * 175*  --  297*  --   --    AP  --   --   --   --   --  94   ALT  --   --   --   --   --  32   TBILI  --   --   --   --   --  0.7   CBIL  --   --   --   --   --  0.3   ALB  --   --   --   --   --  1.5*   TP  --   --   --   --   --  4.5*   PTP 15.7* 15.2*  --  14.9*  --  14.8*   INR 1.2 1.2  --  1.1  --  1.1   APTT 43.7* 38.8*  --  36.7*  --   --           Assessment:     Principal Problem:    Pneumonia due to COVID-19 virus (1/7/2021)    Active Problems:    Obesity (10/6/2015)      CAD (coronary artery disease) (10/28/2016)      Overview: 10-27-06 [facility] 90% LAD s/p 2.25 x 20 Synergy drug-eluting stent  (CS)      SWATI (acute kidney injury) ([facility] [address] (1/7/2021)      Acute hypoxemic respiratory failure (HCC) (1/7/2021)      Type 2 diabetes mellitus with hyperosmolarity without nonketotic hyperglycemic-hyperosmolar coma (HCC) (1/7/2021)      Hypernatremia (1/8/2021)      Atrial fibrillation (HCC) (1/22/2021)      Hypotension (1/26/2021)      Acute deep vein thrombosis (DVT) of left peroneal vein (HCC) (1/28/2021)      61 y.o. male with PMH of including but not limited to  for coronary artery disease, GERD, hypertension, who is seen in consultation at the request of Dr. Eugene Frey for PEG evaluation. He was dx with COVID 1/5 and has had complications of respiratory failure s/o intubation and trach (2/2/21), renal failure on HD,  L peroneal vein thrombus (not on anticoagulation due to bleeding). He is somnolent, not sedated on vent. Has required pressure support only with HD.         Plan:     - EGD with PEG tomorrow  - NPO after MN including TF  - IVC planned tomorrow as well    Sissy Postin, NP  Patient is seen and examined in collaboration with Dr. Sherin Davis. Assessment and plan as per Dr. Ginger Frederick. GI--Patient seen and examined. Chart reviewed. Will proceed with an EGD/PEG tomorrow. Risks (bleed, perforation, infection, untoward CV or resp. Event, mortality, etc.), benefits, and alternatives to be discussed with the family. We will proceed if they so agree.   Thanks Swati Lopez MD

## 2021-02-03 NOTE — PROGRESS NOTES
[facility]        Subjective: SWATI  Pt on vent, sedated  Pt seen on SLED    Review of Systems -   Can not be obtained. Objective:    Vitals:    02/03/21 0730 02/03/21 0800 02/03/21 0830 02/03/21 0835   BP:    (!) 144/53   Pulse: 97 100 96 94   Resp: (!) 39 (!) 39 (!) 39    Temp:       SpO2: 95% 95% 93%    Weight:       Height:           PE  Gen: intubated  CV:reg rate  Chest:bilat breath sounds  Abd: soft  Ext/Access:rt IJ temp dialysis cath ok, no bleeding from site. [facility]  Recent Labs     02/03/21  0311 02/02/21  0300 02/01/21  1624 02/01/21  0302   WBC  --  21.9* 24.3* 20.2*   HGB 7.5* 9.2* 9.7* 6.9*   HCT 22.9* 26.6* 28.3* 20.3*   PLT  --  146* 121* 125*   INR 1.2 1.2  --  1.1     Recent Labs     02/03/21  0311 02/02/21  0300 02/01/21  0302 01/31/21  1053   * 137* 131*  --    K 4.6 4.6 3.8  --    CL 99 100 95*  --    CO2 25 29 24  --    * 175* 297*  --    BUN 98* 70* 110*  --    CREA 5.40* 4.20* 6.23*  --    MG 2.4  --  2.1  --    PHOS 7.2*  --  6.9*  --    CA 8.3 8.3 7.4*  --    ALB  --   --   --  1.5*   TBILI  --   --   --  0.7   ALT  --   --   --  32           Radiology    A/P:   Patient Active Problem List   Diagnosis Code    Obesity E66.9    Hypertension I10    Bradycardia R00.1    PVC (premature ventricular contraction) I49.3    CAD (coronary artery disease) I25.10    Dyslipidemia, goal LDL below 70 E78.5    SWATI (acute kidney injury) (HCC) N17.9    Acute hypoxemic respiratory failure (HCC) J96.01    Type 2 diabetes mellitus with hyperosmolarity without nonketotic hyperglycemic-hyperosmolar coma (HCC) E11.00    Pneumonia due to COVID-19 virus U07.1, J12.82    Hypernatremia E87.0    Atrial fibrillation (HCC) I48.91    Hypotension I95.9    Acute deep vein thrombosis (DVT) of left peroneal vein (HCC) I82.452     SWATI - ATN, Still oliguric. Pt on dialysis for clearance. He is not on heparin at the moment as Hgb is drifting down.     COVID19/ Resp Failure    Hypoteinsion    A Fib with RVR    Anemia - monitoring.         Duc Navarrete MD

## 2021-02-03 NOTE — PROGRESS NOTES
Patient clotted off SLED around 1300. Able to return blood. MD notified. Will not restring at this time, per Dr. Bert Cabrera.

## 2021-02-03 NOTE — PROGRESS NOTES
[address] [facility] Critical Care COVID-19 Note:    Critical Care Note: 2/3/2021    Elliott Kocheikh Marcum. Admission Date: 1/6/2021     Length of Stay: 27 days    Background: 61 y.o. y/o male with acute hypoxemic respiratory failure secondary to COVID-19. Pt admitted 1/7 with fever, cough, fever. Covid + 1/6. Admitted by hospitalists. Started dexa, plasma, remdesivir. Pulm consulted 1/10 with pt requiring CPAP. Intubated 1/17. Nephrology consulted 1/23 for renal failure. US 1/18 with L peroneal vein thrombus. On heparin but stopped due to bleeding from HD catheter. Also has a fib on amio. Onset of COVID-19 symptoms: 1/5/2021    Notable PMH: obesity, HTN, CAD, dyslipidemia, SWATI, DM, GERD    24 Hour events:    Lyn Barlow yesterday. P:F 224     ROS: intubated, sedated    LINES:    Tracheostomy size #8 prox XLT, cuffed Shiley (2/2)  PICC 1/13  NGT 1/17  Art line 1/18/2021  Dialysis access 1/24/21  Gimenez 1/17     Drips: current dose (range)  Amio 0.5      Anti-infectives  azithro (completed)  Ceftriaxone (completed)  Vanc and cefepime: (completed)     Drips: current dose (range)    Visit Vitals  BP (!) 122/37   Pulse 90   Temp 99 °F (37.2 °C)   Resp (!) 32   Ht 5' 8\" (1.727 m)   Wt 231 lb 7.7 oz (105 kg)   SpO2 96%   BMI 35.20 kg/m²     Pertinent Exam:            Constitutional:  intubated and mechanically ventilated.   EENMT:  Sclera clear, pupils equal, oral mucosa moist  Respiratory: crackles   Cardiovascular:  RRR  Gastrointestinal:  soft with no tenderness; positive bowel sounds present  Musculoskeletal:  warm with no cyanosis, trace edema  Skin:  no jaundice or ecchymosis  Neurologic:sedated, on vent   Psychiatric: sedated     CXR : improved slightly      Pertinent Labs:   Recent Labs     02/03/21  0311 02/02/21  0300 02/01/21  1624 02/01/21  0302   WBC  --  21.9* 24.3* 20.2*   HGB 7.5* 9.2* 9.7* 6.9*   HCT 22.9* 26.6* 28.3* 20.3*   PLT  --  146* 121* 125*   INR 1.2 1.2  --  1.1     Recent Labs 02/03/21  0311 02/02/21  0300 02/01/21  0302 01/31/21  1053   * 137* 131*  --    K 4.6 4.6 3.8  --    CL 99 100 95*  --    CO2 25 29 24  --    * 175* 297*  --    BUN 98* 70* 110*  --    CREA 5.40* 4.20* 6.23*  --    MG 2.4  --  2.1  --    CA 8.3 8.3 7.4*  --    PHOS 7.2*  --  6.9*  --    ALB  --   --   --  1.5*   TBILI  --   --   --  0.7   ALT  --   --   --  32     Recent Labs     02/03/21  0722 02/02/21  2302 02/02/21  1942   GLUCPOC 190* 117* 165*       Ventilator Settings:  5' 8\" (1.727 m)  Mode FIO2 Rate Tidal Volume Pressure PEEP   Pressure control  34 %    454 ml     14 cm H20    Peak airway pressure: 30.5 cm H2O   Minute ventilation: 16.9 l/min    ABG:   Recent Labs     02/03/21  0204 02/02/21  0247 02/01/21  0326   PHI 7.46* 7.41 7.44   PCO2I 34.5* 45.3* 34.7*   PO2I 101* 112* 98   HCO3I 24.7 28.7* 23.5       Impression: 61 y.o. y/o male with acute hypoxemic respiratory failure secondary to COVID-19.     PLAN:    -Acute respiratory failure due to COVID-19:- trach yesterday. Oxygenation improving with volume removal.   Can wean aggressively. -continue TF  -d/c steroids and monitor FSBS off lantus. -remove Art line  -start PT/OT as awakens  -consult GI for PEG  -HD per renal, planning SLED today.  -Prophy: PPI     Full Code  Called Sister Kaylyn Ruggiero [phone]. Will ask infection control to review and consider removal of droplet plus precautions! The patient is critically ill with respiratory failure, circulatory failure and requires high complexity decision making for assessment and support including frequent ventilator adjustment , frequent evaluation and titration of therapies , application of advanced monitoring technologies and extensive interpretation of multiple databases.   Cumulative time devoted to patient care services by me for day of service - 30 min     Gaylord Cogan, MD

## 2021-02-03 NOTE — DIABETES MGMT
Patient is s/p trach placement yesterday. Blood glucose ranged 117-179 yesterday with patient receiving Humalog 12 units. Blood glucose 170 in lab work this morning. Hbg 7. 5. Cr 5.40. GFR 12. Phos 7.2. Patient remains on vent. Will follow along.

## 2021-02-03 NOTE — DIALYSIS
8 HR SLED initiated using  Right IJ catheter. Aspirated and flushed both catheter ports without problem. Machine settings per MD order. 175  DFR  500 UFR  No Heparin     Reported to primary nurse. Dialysis nurse available as needed.

## 2021-02-03 NOTE — PROGRESS NOTES
Ventilator check complete; patient has a #8.0 tracheostomy. Patient is sedated. Patient is not able to follow commands. Breath sounds are coarse. Trachea is midline, Negative for subcutaneous air, and chest excursion is symmetric. Patient is also Negative for cyanosis and is Positive for pitting edema. All alarms are set and audible. Resuscitation bag is at the head of the bed. Ventilator Settings  Mode FIO2 Rate Tidal Volume Pressure PEEP I:E Ratio   Pressure control  34 %   32    18 cm H2O 14 cm H20  1:1.2      Peak airway pressure: 30.5 cm H2O   Minute ventilation: 16.9 l/min     ABG: No results for input(s): PH, PCO2, PO2, HCO3 in the last 72 hours.       Alka Andersen, RT

## 2021-02-03 NOTE — PROGRESS NOTES
Ventilator check complete; patient has a #8.0 tracheostomy. Patient is not sedated. Patient is not able to follow commands. Breath sounds are coarse. Trachea is midline, Negative for subcutaneous air, and chest excursion is symmetric. Patient is also Negative for cyanosis and is Negative for pitting edema. All alarms are set and audible. Resuscitation bag is at the head of the bed.       Ventilator Settings  Mode FIO2 Rate Tidal Volume Pressure PEEP I:E Ratio   Pressure control  45 %    454 ml     14 cm H20  1:1.2      Peak airway pressure: 32.4 cm H2O   Minute ventilation: 16.4 l/min       Geovani Deras, RT

## 2021-02-03 NOTE — PROGRESS NOTES
Chart reviewed. Pt now off covid precautions in CCU. Trach yesterday/vent -34% Fio2. Remains on Amio and levo gtts. SLED per Nephrology. LTACH/[facility] following for any LTACH needs and can send precert when appropriate per MD. LOS 27 days. CM following.

## 2021-02-03 NOTE — PROGRESS NOTES
Bedside, Verbal and Written shift change report given to [phone] (oncoming nurse) by Dylan Jean Baptiste (offgoing nurse). Report included the following information SBAR, Kardex, ED Summary, Procedure Summary, Intake/Output, MAR, Accordion, Recent Results, Med Rec Status, Cardiac Rhythm NSR and Alarm Parameters .

## 2021-02-04 ENCOUNTER — APPOINTMENT (OUTPATIENT)
Dept: GENERAL RADIOLOGY | Age: 61
DRG: 004 | End: 2021-02-04
Attending: INTERNAL MEDICINE
Payer: COMMERCIAL

## 2021-02-04 ENCOUNTER — APPOINTMENT (OUTPATIENT)
Dept: INTERVENTIONAL RADIOLOGY/VASCULAR | Age: 61
DRG: 004 | End: 2021-02-04
Attending: INTERNAL MEDICINE
Payer: COMMERCIAL

## 2021-02-04 LAB
ANION GAP SERPL CALC-SCNC: 9 MMOL/L (ref 7–16)
APTT PPP: 42.6 SEC (ref 24.1–35.1)
ARTERIAL PATENCY WRIST A: YES
BACTERIA SPEC CULT: NORMAL
BACTERIA SPEC CULT: NORMAL
BASE EXCESS BLD CALC-SCNC: 4 MMOL/L
BDY SITE: ABNORMAL
BUN SERPL-MCNC: 77 MG/DL (ref 8–23)
CALCIUM SERPL-MCNC: 7.9 MG/DL (ref 8.3–10.4)
CHLORIDE SERPL-SCNC: 100 MMOL/L (ref 98–107)
CO2 BLD-SCNC: 28 MMOL/L
CO2 SERPL-SCNC: 29 MMOL/L (ref 21–32)
COLLECT TIME,HTIME: 215
CREAT SERPL-MCNC: 4.48 MG/DL (ref 0.8–1.5)
ERYTHROCYTE [DISTWIDTH] IN BLOOD BY AUTOMATED COUNT: 17.5 % (ref 11.9–14.6)
EXHALED MINUTE VOLUME, VE: 14.8 L/MIN
FIBRINOGEN PPP-MCNC: 626 MG/DL (ref 190–501)
GAS FLOW.O2 O2 DELIVERY SYS: ABNORMAL L/MIN
GAS FLOW.O2 SETTING OXYMISER: 32 BPM
GLUCOSE BLD STRIP.AUTO-MCNC: 139 MG/DL (ref 65–100)
GLUCOSE BLD STRIP.AUTO-MCNC: 154 MG/DL (ref 65–100)
GLUCOSE BLD STRIP.AUTO-MCNC: 156 MG/DL (ref 65–100)
GLUCOSE BLD STRIP.AUTO-MCNC: 157 MG/DL (ref 65–100)
GLUCOSE BLD STRIP.AUTO-MCNC: 157 MG/DL (ref 65–100)
GLUCOSE SERPL-MCNC: 125 MG/DL (ref 65–100)
HCO3 BLD-SCNC: 27 MMOL/L (ref 22–26)
HCT VFR BLD AUTO: 23.1 % (ref 41.1–50.3)
HGB BLD-MCNC: 7.5 G/DL (ref 13.6–17.2)
INR PPP: 1.2
INSPIRATION.DURATION SETTING TIME VENT: 0.85 SEC
MCH RBC QN AUTO: 29 PG (ref 26.1–32.9)
MCHC RBC AUTO-ENTMCNC: 32.5 G/DL (ref 31.4–35)
MCV RBC AUTO: 89.2 FL (ref 79.6–97.8)
NRBC # BLD: 0 K/UL (ref 0–0.2)
O2/TOTAL GAS SETTING VFR VENT: 40 %
PCO2 BLD: 35 MMHG (ref 35–45)
PEEP RESPIRATORY: 14 CMH2O
PH BLD: 7.5 [PH] (ref 7.35–7.45)
PLATELET # BLD AUTO: 188 K/UL (ref 150–450)
PMV BLD AUTO: 9.7 FL (ref 9.4–12.3)
PO2 BLD: 57 MMHG (ref 75–100)
POTASSIUM SERPL-SCNC: 4.6 MMOL/L (ref 3.5–5.1)
PRESSURE CONTROL, IPC: 18
PROTHROMBIN TIME: 15 SEC (ref 12.5–14.7)
RBC # BLD AUTO: 2.59 M/UL (ref 4.23–5.6)
SAO2 % BLD: 92 % (ref 95–98)
SERVICE CMNT-IMP: ABNORMAL
SERVICE CMNT-IMP: ABNORMAL
SERVICE CMNT-IMP: NORMAL
SERVICE CMNT-IMP: NORMAL
SODIUM SERPL-SCNC: 138 MMOL/L (ref 138–145)
SPECIMEN TYPE: ABNORMAL
VENTILATION MODE VENT: ABNORMAL
WBC # BLD AUTO: 20.4 K/UL (ref 4.3–11.1)

## 2021-02-04 PROCEDURE — 77030006998

## 2021-02-04 PROCEDURE — 36592 COLLECT BLOOD FROM PICC: CPT

## 2021-02-04 PROCEDURE — 36600 WITHDRAWAL OF ARTERIAL BLOOD: CPT

## 2021-02-04 PROCEDURE — 76040000025: Performed by: INTERNAL MEDICINE

## 2021-02-04 PROCEDURE — 86901 BLOOD TYPING SEROLOGIC RH(D): CPT

## 2021-02-04 PROCEDURE — 77030016831 HC CATH GASTMY PEG1 BSC -B: Performed by: INTERNAL MEDICINE

## 2021-02-04 PROCEDURE — 85027 COMPLETE CBC AUTOMATED: CPT

## 2021-02-04 PROCEDURE — 0DH68UZ INSERTION OF FEEDING DEVICE INTO STOMACH, VIA NATURAL OR ARTIFICIAL OPENING ENDOSCOPIC: ICD-10-PCS | Performed by: INTERNAL MEDICINE

## 2021-02-04 PROCEDURE — 80048 BASIC METABOLIC PNL TOTAL CA: CPT

## 2021-02-04 PROCEDURE — 74011636637 HC RX REV CODE- 636/637: Performed by: INTERNAL MEDICINE

## 2021-02-04 PROCEDURE — 77030040393 HC DRSG OPTIFOAM GENT MDII -B

## 2021-02-04 PROCEDURE — 82962 GLUCOSE BLOOD TEST: CPT

## 2021-02-04 PROCEDURE — 74011000258 HC RX REV CODE- 258: Performed by: NURSE PRACTITIONER

## 2021-02-04 PROCEDURE — 74011250636 HC RX REV CODE- 250/636: Performed by: NURSE PRACTITIONER

## 2021-02-04 PROCEDURE — 74011250636 HC RX REV CODE- 250/636: Performed by: INTERNAL MEDICINE

## 2021-02-04 PROCEDURE — 74011250637 HC RX REV CODE- 250/637: Performed by: INTERNAL MEDICINE

## 2021-02-04 PROCEDURE — 77030040361 HC SLV COMPR DVT MDII -B

## 2021-02-04 PROCEDURE — 74011000250 HC RX REV CODE- 250: Performed by: INTERNAL MEDICINE

## 2021-02-04 PROCEDURE — 82803 BLOOD GASES ANY COMBINATION: CPT

## 2021-02-04 PROCEDURE — 3E0G76Z INTRODUCTION OF NUTRITIONAL SUBSTANCE INTO UPPER GI, VIA NATURAL OR ARTIFICIAL OPENING: ICD-10-PCS | Performed by: INTERNAL MEDICINE

## 2021-02-04 PROCEDURE — 99233 SBSQ HOSP IP/OBS HIGH 50: CPT | Performed by: INTERNAL MEDICINE

## 2021-02-04 PROCEDURE — 2709999900 HC NON-CHARGEABLE SUPPLY: Performed by: INTERNAL MEDICINE

## 2021-02-04 PROCEDURE — 85730 THROMBOPLASTIN TIME PARTIAL: CPT

## 2021-02-04 PROCEDURE — 71045 X-RAY EXAM CHEST 1 VIEW: CPT

## 2021-02-04 PROCEDURE — 85384 FIBRINOGEN ACTIVITY: CPT

## 2021-02-04 PROCEDURE — 94003 VENT MGMT INPAT SUBQ DAY: CPT

## 2021-02-04 PROCEDURE — 74011000258 HC RX REV CODE- 258: Performed by: INTERNAL MEDICINE

## 2021-02-04 PROCEDURE — 2709999900 HC NON-CHARGEABLE SUPPLY

## 2021-02-04 PROCEDURE — 65620000000 HC RM CCU GENERAL

## 2021-02-04 PROCEDURE — 86923 COMPATIBILITY TEST ELECTRIC: CPT

## 2021-02-04 PROCEDURE — 85610 PROTHROMBIN TIME: CPT

## 2021-02-04 RX ORDER — PROPOFOL 10 MG/ML
0-50 VIAL (ML) INTRAVENOUS
Status: DISCONTINUED | OUTPATIENT
Start: 2021-02-04 | End: 2021-02-04

## 2021-02-04 RX ORDER — OXYCODONE HCL 5 MG/5 ML
5 SOLUTION, ORAL ORAL
Status: DISCONTINUED | OUTPATIENT
Start: 2021-02-04 | End: 2021-02-26 | Stop reason: HOSPADM

## 2021-02-04 RX ORDER — CEFAZOLIN SODIUM/WATER 2 G/20 ML
2 SYRINGE (ML) INTRAVENOUS
Status: DISCONTINUED | OUTPATIENT
Start: 2021-02-04 | End: 2021-02-10

## 2021-02-04 RX ADMIN — Medication 20 ML: at 14:00

## 2021-02-04 RX ADMIN — LANSOPRAZOLE 30 MG: KIT at 20:15

## 2021-02-04 RX ADMIN — MORPHINE SULFATE 2 MG: 2 INJECTION, SOLUTION INTRAMUSCULAR; INTRAVENOUS at 16:09

## 2021-02-04 RX ADMIN — MORPHINE SULFATE 2 MG: 2 INJECTION, SOLUTION INTRAMUSCULAR; INTRAVENOUS at 09:32

## 2021-02-04 RX ADMIN — AMIODARONE HYDROCHLORIDE 0.5 MG/MIN: 50 INJECTION, SOLUTION INTRAVENOUS at 04:07

## 2021-02-04 RX ADMIN — INSULIN LISPRO 3 UNITS: 100 INJECTION, SOLUTION INTRAVENOUS; SUBCUTANEOUS at 20:15

## 2021-02-04 RX ADMIN — ACETAMINOPHEN 650 MG: 325 TABLET, FILM COATED ORAL at 19:19

## 2021-02-04 RX ADMIN — MINERAL OIL, PETROLATUM: 425; 568 OINTMENT OPHTHALMIC at 08:24

## 2021-02-04 RX ADMIN — MINERAL OIL, PETROLATUM: 425; 568 OINTMENT OPHTHALMIC at 20:16

## 2021-02-04 RX ADMIN — Medication 20 ML: at 05:49

## 2021-02-04 RX ADMIN — AMIODARONE HYDROCHLORIDE 0.5 MG/MIN: 50 INJECTION, SOLUTION INTRAVENOUS at 18:00

## 2021-02-04 RX ADMIN — PROPOFOL INJECTABLE EMULSION 25 MCG/KG/MIN: 10 INJECTION, EMULSION INTRAVENOUS at 12:20

## 2021-02-04 RX ADMIN — Medication 20 ML: at 22:50

## 2021-02-04 RX ADMIN — INSULIN LISPRO 3 UNITS: 100 INJECTION, SOLUTION INTRAVENOUS; SUBCUTANEOUS at 07:45

## 2021-02-04 RX ADMIN — OXYCODONE HYDROCHLORIDE 5 MG: 5 SOLUTION ORAL at 18:00

## 2021-02-04 RX ADMIN — INSULIN LISPRO 3 UNITS: 100 INJECTION, SOLUTION INTRAVENOUS; SUBCUTANEOUS at 15:35

## 2021-02-04 RX ADMIN — INSULIN LISPRO 3 UNITS: 100 INJECTION, SOLUTION INTRAVENOUS; SUBCUTANEOUS at 23:11

## 2021-02-04 RX ADMIN — DEXTROSE 2 MCG/MIN: 5 SOLUTION INTRAVENOUS at 12:30

## 2021-02-04 NOTE — PROGRESS NOTES
TRANSFER - IN REPORT:    Verbal report received from Lisa Monroe RN(name) on Kaitlin Huang.  being received from CCU(unit) for ordered procedure      Report consisted of patients Situation, Background, Assessment and   Recommendations(SBAR). Information from the following report(s) SBAR and Kardex was reviewed with the receiving nurse. Opportunity for questions and clarification was provided. Assessment completed upon patients arrival to unit and care assumed.

## 2021-02-04 NOTE — PROCEDURES
PERCUTANEOUS ENDOSCOPIC GASTROSTOMY (PEG)    DATE of PROCEDURE: 2/4/2021    MEDICATIONS ADMINISTERED: MAC    ASSISTANT: Yazmin    INSTRUMENT: GIF    PROCEDURE:  After obtaining informed consent, the patient was left in the supine position and sedated. The endoscope was advanced under direct vision without difficulty. The esophagus, stomach (including retroflexed views) and duodenum were evaluated. PEG placement via standard push method was performed. Re-endoscopy after the PEG confirmed appropriate position/location of inner bolster in the anterior wall, body of the stomach. The patient was taken to the recovery area in stable condition. FINDINGS:  ESOPHAGUS: normal    STOMACH: Appropriate PEG site identified by 1:1 palpation and transillumination of the skin. After the site was cleansed with chlorhexidine solution and a sterile drape was placed, lidocaine was injected into the skin and gastric wall. A cutaneous incision was made and the catheter advanced through the incision into the stomach. The needle was removed from the catheter and a wire advanced through the catheter into the stomach. The wire was grasped with a snare and removed through the mouth. The PEG tube was then pushed over the wire through the mouth and into the stomach. Post-placement endoscopic appearance was satisfactory. Skin at 3.5cm. DUODENUM: normal. Normal transverse views of the papilla. Estimated blood loss: 0-minimal     Specimens obtained during procedure:       PLAN:  - May use PEG tube immediately for medications  - May use PEG tube tomorrow morning for tube feeds per nutrition recommendations  - Keep head of bed elevated 30 degrees while PEG tube in use  - Check residuals qshift. Hold tube feeds if residual >500 ml. Recheck residual in 2 hours and call house officer if still >500 ml.    - Rotate external bolster daily.   - Clean PEG tube site with soap and water daily  - Keep PEG site clean and dry  - Do not place bandage under external bolster      Precilla MD Scout  [facility], [place]

## 2021-02-04 NOTE — PROGRESS NOTES
Ventilator check complete; patient has a #8.0 tracheostomy. Patient is sedated. Patient is not able to follow commands. Breath sounds are coarse. Trachea is midline, Negative for subcutaneous air, and chest excursion is symmetric. Patient is also Negative for cyanosis and is Positive for pitting edema. All alarms are set and audible. Resuscitation bag is at the head of the bed. Ventilator Settings  Mode FIO2 Rate Tidal Volume Pressure PEEP I:E Ratio   Pressure control  50 %   26    18 10 cm H20  1:1.72      Peak airway pressure: 26.8 cm H2O   Minute ventilation: 18.3 l/min     ABG: No results for input(s): PH, PCO2, PO2, HCO3 in the last 72 hours.       Skippy Lieu, RT

## 2021-02-04 NOTE — PROGRESS NOTES
Date of Surgery Update:  Isaac Perry was seen and examined. History and physical has been reviewed. The patient has been examined. There have been no significant clinical changes since the completion of the originally dated History and Physical.  Will proceed with an EGD/PEG. Risks (bleed, perforation, infection, untoward CV or resp.event, mortality, etc.), benefits, and alternatives were discussed with the patient's sister who agrees to proceed.   Thanks Chan Persaud MD      Signed By: Chan Persaud MD     February 4, 2021 2:01 PM

## 2021-02-04 NOTE — DIABETES MGMT
Patient's blood glucose ranged 121-259 yesterday with patient receiving  Humalog 24 units. Blood glucose 125 on lab work this morning. Hgb 7. 5. Cr 3.97. GFR 16. Patient s/p trach, remains on vent with Ana Paularo CHAYITO. Will follow along.

## 2021-02-04 NOTE — PROGRESS NOTES
[facility] Nephrology        Subjective: SWATI  Pt on vent, sedated  Pt started on dialysis on 1/25/21. Review of Systems -   Can not be obtained. Objective:    Vitals:    02/04/21 0559 02/04/21 0659 02/04/21 0700 02/04/21 0800   BP: 126/60 128/60  (!) 141/64   Pulse: 87 89  96   Resp: (!) 32 (!) 31  (!) 36   Temp:   100.1 °F (37.8 °C)    SpO2: 96% 98%  96%   Weight:       Height:           PE  Gen: intubated  CV:reg rate  Chest:bilat breath sounds  Abd: soft  Ext/Access:rt IJ temp dialysis cath ok, . [facility]  Recent Labs     02/04/21  0304 02/03/21  0311 02/02/21  0300 02/01/21  1624   WBC 20.4*  --  21.9* 24.3*   HGB 7.5* 7.5* 9.2* 9.7*   HCT 23.1* 22.9* 26.6* 28.3*     --  146* 121*   INR 1.2 1.2 1.2  --      Recent Labs     02/04/21  0304 02/03/21  0311 02/02/21  0300    136* 137*   K 4.6 4.6 4.6    99 100   CO2 29 25 29   * 170* 175*   BUN 77* 98* 70*   CREA 4.48* 5.40* 4.20*   MG  --  2.4  --    PHOS  --  7.2*  --    CA 7.9* 8.3 8.3           Radiology    A/P:   Patient Active Problem List   Diagnosis Code    Obesity E66.9    Hypertension I10    Bradycardia R00.1    PVC (premature ventricular contraction) I49.3    CAD (coronary artery disease) I25.10    Dyslipidemia, goal LDL below 70 E78.5    SWATI (acute kidney injury) ([facility] [address] N17.9    Acute hypoxemic respiratory failure (HCC) J96.01    Type 2 diabetes mellitus with hyperosmolarity without nonketotic hyperglycemic-hyperosmolar coma (HCC) E11.00    Pneumonia due to COVID-19 virus U07.1, J12.82    Hypernatremia E87.0    Atrial fibrillation (HCC) I48.91    Hypotension I95.9    Acute deep vein thrombosis (DVT) of left peroneal vein (HCC) I82.452     SWATI - ATN, Still oliguric. For SLED again tomorrow. No signs of renal recovery. COVID19/ Resp Failure    Hypoteinsion    A Fib with RVR    Anemia - monitoring.         Stephanie Cade MD

## 2021-02-04 NOTE — PROGRESS NOTES
Ventilator check complete; patient has a #8.0 tracheostomy. Patient is not sedated. Patient is not able to follow commands. Breath sounds are coarse. Trachea is midline, Negative for subcutaneous air, and chest excursion is symmetric. Patient is also Negative for cyanosis and is Negative for pitting edema. All alarms are set and audible. Resuscitation bag is at the head of the bed.       Ventilator Settings  Mode FIO2 Rate Tidal Volume Pressure PEEP I:E Ratio   Pressure control  40 %    454 ml     14 cm H20  1:1.2      Peak airway pressure: 32.3 cm H2O   Minute ventilation: 18.4 l/min         Starlett Royal, RT

## 2021-02-04 NOTE — PROGRESS NOTES
This was a follow-up visit to the patient. I received an update about the patient from the medical staff. A spiritual presence, emotional support and prayer were extended to the patient.          Tommy Moulton, [address] [facility], [facility]

## 2021-02-04 NOTE — PROGRESS NOTES
[facility] Critical Care COVID-19 Note:    Critical Care Note: 2/4/2021    Nahun Holt. Admission Date: 1/6/2021     Length of Stay: 28 days    Background: 61 y.o. y/o male with acute hypoxemic respiratory failure secondary to COVID-19. Pt admitted 1/7 with fever, cough, fever. Covid + 1/6. Admitted by hospitalists. Started dexa, plasma, remdesivir. Pulm consulted 1/10 with pt requiring CPAP. Intubated 1/17. Nephrology consulted 1/23 for renal failure. US 1/18 with L peroneal vein thrombus. On heparin but stopped due to bleeding from HD catheter.  Also has a fib on amio. Trached on 2/2. Onset of COVID-19 symptoms: 1/5/2021    Notable PMH: obesity, HTN, CAD, dyslipidemia, SWATI, DM, GERD    Drug Allergies: Allergies   Allergen Reactions    Latex Swelling     Had a purcell catheter with swelling of urethra. Only known latex issue in past. Wife remembers this now    Hydrocodone-Acetaminophen Itching     Wife remembers this allergy    Tessalon Perles [Benzonatate] Unknown (comments)       24 Hour events:   Patient without acute events overnight. Tmax 100.9. To go for IVC filter and PEG placement today. Hgb low but stable. On 50% through trach currently. Currently on PC 18 with RR set at 28. He is tachypneic on his own to high 30's. PEEP set at 14. ROS: intubated, sedated    Lines: (insertion date)   Tracheostomy size #8 prox XLT, cuffed Hunter (2/2)  PICC 1/13  NGT 1/17  Art line 1/18/2021  Dialysis access 1/24/21  Purcell 1/17    Drips: current dose (range)  Amio    Visit Vitals  /60   Pulse 89   Temp 100.1 °F (37.8 °C)   Resp (!) 31   Ht 5' 8\" (1.727 m)   Wt 227 lb 4.7 oz (103.1 kg)   SpO2 98%   BMI 34.56 kg/m²     Pertinent Exam:            Constitutional:  trached and mechanically ventilated. Appears uncomfortable  EENMT:  Sclera clear, pupils equal, oral mucosa moist  Respiratory: crackles bilaterally. Tachypneic.    Cardiovascular:  RRR with no M,G,R;  Gastrointestinal: soft with no tenderness; positive bowel sounds present  Musculoskeletal:  warm with no cyanosis, 2+ B lower extremity edema  Skin:  no jaundice or ecchymosis  Neurologic: Tracks. Not following commands well otherwise. Psychiatric: Not currently sedated. Appears uncomfortable, grimacing at times. Pertinent Labs:   Recent Labs     02/04/21  0304 02/03/21  0311 02/02/21  0300 02/01/21  1624 02/01/21  1624   WBC 20.4*  --  21.9*  --  24.3*   HGB 7.5* 7.5* 9.2*  --  9.7*   HCT 23.1* 22.9* 26.6*  --  28.3*     --  146*  --  121*   INR 1.2 1.2 1.2   < >  --     < > = values in this interval not displayed. Recent Labs     02/04/21  0304 02/03/21 0311 02/02/21  0300    136* 137*   K 4.6 4.6 4.6    99 100   CO2 29 25 29   * 170* 175*   BUN 77* 98* 70*   CREA 4.48* 5.40* 4.20*   MG  --  2.4  --    CA 7.9* 8.3 8.3   PHOS  --  7.2*  --      Recent Labs     02/03/21  2305 02/03/21  1929 02/03/21  1543   GLUCPOC 121* 259* 208*       SARS-CoV-2 LAB Results  [facility] Test: No results found for: COV2NT   [facility] Test: No results found for: EDPR  [facility] Test: No components found for: [id number]     COVID-19 Meds:  Dexamethasone 6mg daily (EOT 1/16)  Convalescent plasma transfusion (1/7)  Remdesivir (EOT 1/12)    Micro Results:    Anti-infectives (start date):  azithro (completed)  Ceftriaxone (completed)  Vanc and cefepime: (completed)    Ventilator Settings:  5' 8\" (1.727 m)  Mode FIO2 Rate Tidal Volume Pressure PEEP   Pressure control  50 %(post ABG)    454 ml     14 cm H20    Peak airway pressure: 33.4 cm H2O   Minute ventilation: 19.5 l/min    ABG:   Recent Labs     02/04/21  0216 02/03/21  0204 02/02/21  0247   PHI 7.50* 7.46* 7.41   PCO2I 35.0 34.5* 45.3*   PO2I 57* 101* 112*   HCO3I 27.0* 24.7 28.7*       IMPRESSION:   61 y.o. y/o male with acute hypoxemic respiratory failure secondary to COVID-19. SWATI on SLED. Now trached. PLAN:  -trached, on 50% FiO2. Still quite tachypneic.  Somewhat unclear if this is due to his ongoing pulmonary disease, pain, anxiety or a combination. Will turn down the set rate on his vent as he likely does not need a set rate in the 30's. Will come down on his PEEP as well and monitor for any drop in his sats. -will add on prn oxycodone for pain control.   -to have IVC filter placed today due to DVT and drops in hgb with multiple attempts at anticoagulation. -PEG placement per GI today.   -amio for a fib.   -s/p treatments for COVID. Now off droplet precuations. -SLED with volume removal as per nephrology.   -borderline fevers yesterday. WBC elevated but on steroids recently. Repeat pct in the am and monitor for any spikes in temps today. If this occurs will repeat all cultures. Holding off an abx at present.   -tolerating tf so far.    -needs SCD on R LE.   -cont prevacid      Full Code      Tiffanie Younger MD

## 2021-02-04 NOTE — PROGRESS NOTES
Bedside, Verbal and Written shift change report given to Gloria Yang (oncoming nurse) by Cheri Delaney (offgoing nurse).  Report included the following information SBAR, Kardex, Procedure Summary, Intake/Output, MAR, Recent Results, Med Rec Status and Cardiac Rhythm SR.

## 2021-02-05 ENCOUNTER — APPOINTMENT (OUTPATIENT)
Dept: GENERAL RADIOLOGY | Age: 61
DRG: 004 | End: 2021-02-05
Attending: INTERNAL MEDICINE
Payer: COMMERCIAL

## 2021-02-05 LAB
ANION GAP SERPL CALC-SCNC: 11 MMOL/L (ref 7–16)
APTT PPP: 38.3 SEC (ref 24.1–35.1)
ARTERIAL PATENCY WRIST A: YES
BASE EXCESS BLD CALC-SCNC: 3 MMOL/L
BDY SITE: ABNORMAL
BUN SERPL-MCNC: 100 MG/DL (ref 8–23)
CALCIUM SERPL-MCNC: 8.3 MG/DL (ref 8.3–10.4)
CHLORIDE SERPL-SCNC: 99 MMOL/L (ref 98–107)
CO2 BLD-SCNC: 29 MMOL/L
CO2 SERPL-SCNC: 27 MMOL/L (ref 21–32)
COLLECT TIME,HTIME: 304
CREAT SERPL-MCNC: 5.79 MG/DL (ref 0.8–1.5)
ERYTHROCYTE [DISTWIDTH] IN BLOOD BY AUTOMATED COUNT: 17.2 % (ref 11.9–14.6)
EXHALED MINUTE VOLUME, VE: 12.6 L/MIN
FIBRINOGEN PPP-MCNC: 675 MG/DL (ref 190–501)
GAS FLOW.O2 O2 DELIVERY SYS: ABNORMAL L/MIN
GAS FLOW.O2 SETTING OXYMISER: 26 BPM
GLUCOSE BLD STRIP.AUTO-MCNC: 136 MG/DL (ref 65–100)
GLUCOSE BLD STRIP.AUTO-MCNC: 158 MG/DL (ref 65–100)
GLUCOSE BLD STRIP.AUTO-MCNC: 163 MG/DL (ref 65–100)
GLUCOSE BLD STRIP.AUTO-MCNC: 163 MG/DL (ref 65–100)
GLUCOSE BLD STRIP.AUTO-MCNC: 165 MG/DL (ref 65–100)
GLUCOSE BLD STRIP.AUTO-MCNC: 208 MG/DL (ref 65–100)
GLUCOSE SERPL-MCNC: 140 MG/DL (ref 65–100)
HCO3 BLD-SCNC: 27.4 MMOL/L (ref 22–26)
HCT VFR BLD AUTO: 22.2 % (ref 41.1–50.3)
HGB BLD-MCNC: 7 G/DL (ref 13.6–17.2)
INR PPP: 1.2
MAGNESIUM SERPL-MCNC: 2.3 MG/DL (ref 1.8–2.4)
MCH RBC QN AUTO: 28.8 PG (ref 26.1–32.9)
MCHC RBC AUTO-ENTMCNC: 31.5 G/DL (ref 31.4–35)
MCV RBC AUTO: 91.4 FL (ref 79.6–97.8)
NRBC # BLD: 0 K/UL (ref 0–0.2)
O2/TOTAL GAS SETTING VFR VENT: 50 %
PCO2 BLD: 41.5 MMHG (ref 35–45)
PEEP RESPIRATORY: 10 CMH2O
PH BLD: 7.43 [PH] (ref 7.35–7.45)
PHOSPHATE SERPL-MCNC: 6.9 MG/DL (ref 2.3–3.7)
PLATELET # BLD AUTO: 185 K/UL (ref 150–450)
PMV BLD AUTO: 10.1 FL (ref 9.4–12.3)
PO2 BLD: 116 MMHG (ref 75–100)
POTASSIUM SERPL-SCNC: 4.6 MMOL/L (ref 3.5–5.1)
PRESSURE CONTROL, IPC: 18
PROCALCITONIN SERPL-MCNC: 12.97 NG/ML
PROTHROMBIN TIME: 15.9 SEC (ref 12.5–14.7)
RBC # BLD AUTO: 2.43 M/UL (ref 4.23–5.6)
SAO2 % BLD: 99 % (ref 95–98)
SERVICE CMNT-IMP: ABNORMAL
SERVICE CMNT-IMP: ABNORMAL
SODIUM SERPL-SCNC: 137 MMOL/L (ref 136–145)
SPECIMEN TYPE: ABNORMAL
VENTILATION MODE VENT: ABNORMAL
WBC # BLD AUTO: 13.2 K/UL (ref 4.3–11.1)

## 2021-02-05 PROCEDURE — 85610 PROTHROMBIN TIME: CPT

## 2021-02-05 PROCEDURE — 87205 SMEAR GRAM STAIN: CPT

## 2021-02-05 PROCEDURE — 94003 VENT MGMT INPAT SUBQ DAY: CPT

## 2021-02-05 PROCEDURE — 83735 ASSAY OF MAGNESIUM: CPT

## 2021-02-05 PROCEDURE — 71045 X-RAY EXAM CHEST 1 VIEW: CPT

## 2021-02-05 PROCEDURE — 82962 GLUCOSE BLOOD TEST: CPT

## 2021-02-05 PROCEDURE — 74011250637 HC RX REV CODE- 250/637: Performed by: INTERNAL MEDICINE

## 2021-02-05 PROCEDURE — 85730 THROMBOPLASTIN TIME PARTIAL: CPT

## 2021-02-05 PROCEDURE — 87040 BLOOD CULTURE FOR BACTERIA: CPT

## 2021-02-05 PROCEDURE — 36600 WITHDRAWAL OF ARTERIAL BLOOD: CPT

## 2021-02-05 PROCEDURE — 74011250636 HC RX REV CODE- 250/636: Performed by: INTERNAL MEDICINE

## 2021-02-05 PROCEDURE — 2709999900 HC NON-CHARGEABLE SUPPLY

## 2021-02-05 PROCEDURE — 82803 BLOOD GASES ANY COMBINATION: CPT

## 2021-02-05 PROCEDURE — 74011000258 HC RX REV CODE- 258: Performed by: INTERNAL MEDICINE

## 2021-02-05 PROCEDURE — 36415 COLL VENOUS BLD VENIPUNCTURE: CPT

## 2021-02-05 PROCEDURE — 90945 DIALYSIS ONE EVALUATION: CPT

## 2021-02-05 PROCEDURE — 99233 SBSQ HOSP IP/OBS HIGH 50: CPT | Performed by: INTERNAL MEDICINE

## 2021-02-05 PROCEDURE — 87186 SC STD MICRODIL/AGAR DIL: CPT

## 2021-02-05 PROCEDURE — 85384 FIBRINOGEN ACTIVITY: CPT

## 2021-02-05 PROCEDURE — 85027 COMPLETE CBC AUTOMATED: CPT

## 2021-02-05 PROCEDURE — 84145 PROCALCITONIN (PCT): CPT

## 2021-02-05 PROCEDURE — 80048 BASIC METABOLIC PNL TOTAL CA: CPT

## 2021-02-05 PROCEDURE — 87077 CULTURE AEROBIC IDENTIFY: CPT

## 2021-02-05 PROCEDURE — 84100 ASSAY OF PHOSPHORUS: CPT

## 2021-02-05 PROCEDURE — 74011636637 HC RX REV CODE- 636/637: Performed by: INTERNAL MEDICINE

## 2021-02-05 PROCEDURE — 65620000000 HC RM CCU GENERAL

## 2021-02-05 RX ORDER — VANCOMYCIN 1.75 GRAM/500 ML IN 0.9 % SODIUM CHLORIDE INTRAVENOUS
1750 ONCE
Status: DISCONTINUED | OUTPATIENT
Start: 2021-02-05 | End: 2021-02-05

## 2021-02-05 RX ORDER — VANCOMYCIN 1.75 GRAM/500 ML IN 0.9 % SODIUM CHLORIDE INTRAVENOUS
1750 ONCE
Status: DISCONTINUED | OUTPATIENT
Start: 2021-02-05 | End: 2021-02-06

## 2021-02-05 RX ADMIN — LANSOPRAZOLE 30 MG: KIT at 20:01

## 2021-02-05 RX ADMIN — PIPERACILLIN SODIUM AND TAZOBACTAM SODIUM 3.38 G: 3; .375 INJECTION, POWDER, LYOPHILIZED, FOR SOLUTION INTRAVENOUS at 09:10

## 2021-02-05 RX ADMIN — INSULIN LISPRO 3 UNITS: 100 INJECTION, SOLUTION INTRAVENOUS; SUBCUTANEOUS at 07:38

## 2021-02-05 RX ADMIN — MORPHINE SULFATE 2 MG: 2 INJECTION, SOLUTION INTRAMUSCULAR; INTRAVENOUS at 10:41

## 2021-02-05 RX ADMIN — POLYETHYLENE GLYCOL 3350 17 G: 17 POWDER, FOR SOLUTION ORAL at 08:00

## 2021-02-05 RX ADMIN — ACETAMINOPHEN 650 MG: 325 TABLET, FILM COATED ORAL at 07:34

## 2021-02-05 RX ADMIN — INSULIN LISPRO 3 UNITS: 100 INJECTION, SOLUTION INTRAVENOUS; SUBCUTANEOUS at 03:31

## 2021-02-05 RX ADMIN — MORPHINE SULFATE 2 MG: 2 INJECTION, SOLUTION INTRAMUSCULAR; INTRAVENOUS at 01:22

## 2021-02-05 RX ADMIN — MINERAL OIL, PETROLATUM: 425; 568 OINTMENT OPHTHALMIC at 20:01

## 2021-02-05 RX ADMIN — MINERAL OIL, PETROLATUM: 425; 568 OINTMENT OPHTHALMIC at 08:00

## 2021-02-05 RX ADMIN — MORPHINE SULFATE 2 MG: 2 INJECTION, SOLUTION INTRAMUSCULAR; INTRAVENOUS at 14:40

## 2021-02-05 RX ADMIN — LANSOPRAZOLE 30 MG: KIT at 09:00

## 2021-02-05 RX ADMIN — Medication 20 ML: at 05:59

## 2021-02-05 RX ADMIN — PIPERACILLIN SODIUM AND TAZOBACTAM SODIUM 3.38 G: 3; .375 INJECTION, POWDER, LYOPHILIZED, FOR SOLUTION INTRAVENOUS at 18:20

## 2021-02-05 RX ADMIN — INSULIN LISPRO 3 UNITS: 100 INJECTION, SOLUTION INTRAVENOUS; SUBCUTANEOUS at 15:34

## 2021-02-05 RX ADMIN — ACETAMINOPHEN 650 MG: 325 TABLET, FILM COATED ORAL at 01:22

## 2021-02-05 RX ADMIN — INSULIN LISPRO 3 UNITS: 100 INJECTION, SOLUTION INTRAVENOUS; SUBCUTANEOUS at 20:01

## 2021-02-05 RX ADMIN — DOCUSATE SODIUM 50 MG AND SENNOSIDES 8.6 MG 1 TABLET: 8.6; 5 TABLET, FILM COATED ORAL at 08:00

## 2021-02-05 RX ADMIN — ACETAMINOPHEN 650 MG: 325 TABLET, FILM COATED ORAL at 21:39

## 2021-02-05 RX ADMIN — SODIUM ZIRCONIUM CYCLOSILICATE 10 G: 10 POWDER, FOR SUSPENSION ORAL at 08:00

## 2021-02-05 RX ADMIN — VANCOMYCIN HYDROCHLORIDE 1750 MG: 10 INJECTION, POWDER, LYOPHILIZED, FOR SOLUTION INTRAVENOUS at 09:10

## 2021-02-05 RX ADMIN — Medication 20 ML: at 14:00

## 2021-02-05 RX ADMIN — Medication 20 ML: at 22:00

## 2021-02-05 RX ADMIN — ACETAMINOPHEN 650 MG: 325 TABLET, FILM COATED ORAL at 15:36

## 2021-02-05 RX ADMIN — ATORVASTATIN CALCIUM 80 MG: 40 TABLET, FILM COATED ORAL at 08:00

## 2021-02-05 RX ADMIN — INSULIN LISPRO 6 UNITS: 100 INJECTION, SOLUTION INTRAVENOUS; SUBCUTANEOUS at 23:30

## 2021-02-05 RX ADMIN — MORPHINE SULFATE 2 MG: 2 INJECTION, SOLUTION INTRAMUSCULAR; INTRAVENOUS at 20:07

## 2021-02-05 NOTE — PROGRESS NOTES
Comprehensive Nutrition Assessment    Type and Reason for Visit: Reassess  Tube Feeding Management (Pulmonary)    Nutrition Recommendations/Plan:    Continue Nepro at 45 ml/hr and Prosource TF 3 packets BID (0900, 2100) with 30 ml water flush before and after.  Decrease free water flush to 75 ml/hr.  TF + water flush + PSTF will provide: 2184 kcal, 153 g pro, 1355 ml free water     Malnutrition Assessment:  Malnutrition Status: At risk for malnutrition (specify)(Poor nutrition between 1/19-1/25.)    Nutrition Assessment:   Nutrition History: 1/7/2021: Pt reports inability to tolerate any food or liquids for 4 days PTA. Indicates vomiting with all po attempts during this time      Nutrition Background: PMH remarkable for CAD, GERD, HTN, MO. Admitted with Coivd 19, new onset DM with hyperglycemic hyperosmolar state, SWATI on CKD, lactic acidosis. Daily Update:  Patient seen and discussed with RN. TF currently on hold for IVC filter. SLED to run again today. Will need tunneled cath some time next week as renal status still not recovering. Abdominal Status (last documented): Diarrhea, Obese(peg) abdomen with Hypoactive  bowel sounds. Last BM 02/04/21. FMS in place  Pertinent Medications: Amio in dex, SSI, Prevacid, Zosyn, Miralax, Pericolace, Lokelma, Vanco, Levo yesterday but currently off (? For SLED)  Pertinent Labs: Na 137, K4.6, BUN/Crea 100/5.79, Phos 6.9, Mg 2.3, POC glucoses 136-163 last 24 hrs  Edema: 2+ pitting general and all extremities     Nutrition Related Findings:   Double lumen PICC (1/13), Intubated 1/17, NGT 1/17. Pt was TPN dependent from 1/14-1/19. TF initiated on 1/18. TF held 1/20-1/22 d/t levophed requirements. TF resumed on 1/22 @ 20 ml/hr and not advanced. Held on 1/25 d/t elevated GRVs. SLED initiated on 1/25. TPN started 1/26. Trophic TF started 1/29. TPN d/c 1/30 and TF advancing. TF @ goal 2/1. Trach placed 2/2. PEG placed 2/4.       Current Nutrition Therapies:  DIET TUBE FEEDING Open order for details. Keep HOB elevated at least 30 degrees. Current Tube Feeding (TF) Orders:   · Feeding Route: PEG  · Formula: Nepro 1.8  · Schedule:Continuous    · Regimen: 45 ml/hr  · Additives/Modulars: (Prosource TF 6 packets per day)  · Water Flushes: 110 ml water every 4 hours  · Current TF & Flush Orders Provides: held  · Goal TF & Flush Orders Provides: 2184 calories/day (100% calorie goal), 153 gm protein/day (100% protein goal) in 1503 ml water/day. Current Intake:   Average Meal Intake: NPO Average Supplement Intake: NPO      Anthropometric Measures:  Height: 5' 8\" (172.7 cm)  Current Body Wt: 104.6 kg (230 lb 9.6 oz)(2/5), Weight source: Bed scale  BMI: 35.1, Obese class 2 (BMI 35.0-39. 9)  Admission Body Weight: 235 lb(\"Estimated\")  Ideal Body Wt: 154 lbs (70 kg), 130.3 %  Usual Body Wt: 101.6 kg (224 lb)([facility] 6/3/2020 FP office; pt stated # unable to verify ), Percent weight change: -10.4          Estimated Daily Nutrient Needs:  Energy (kcal/day): 4575-1007 (Kcal/kg(30-35 - vent, SLED), Weight Used: Ideal(70 kg - vent, SLED))  Protein (g/day): 140-175(2-2.5 - vent, SLED) Weight Used: (Ideal(70 kg ))  Fluid (ml/day):   (Standard renal)    Nutrition Diagnosis:   · Inadequate oral intake related to impaired respiratory function, increased demand for energy/nutrients as evidenced by (NPO, on vent, SLED)    · Food & nutrition-related knowledge deficit related to endocrine dysfunction as evidenced by (new DM dx, A1C 12, minimal exposure to DM information.)    Nutrition Interventions:   Food and/or Nutrient Delivery: Continue tube feeding  Nutrition Education/Counseling: Education initiated  Coordination of Nutrition Care: Continue to monitor while inpatient  Plan of Care discussed with AQUILINO Smiley    Goals:   Previous Goal Met: Goal(s) achieved  Active Goal: Maintain TF at its goal rate to meet 100% of daily nutrition needs.     Nutrition Monitoring and Evaluation:   Behavioral-Environmental Outcomes: Knowledge or skill  Food/Nutrient Intake Outcomes: Enteral nutrition intake/tolerance  Physical Signs/Symptoms Outcomes: Biochemical data, Constipation    Discharge Planning:     Too soon to determine    [address] [place], LD on 2/5/2021 at 12:09 PM  Contact: [phone]        Disaster Mode active

## 2021-02-05 NOTE — PROGRESS NOTES
Ventilator check complete; patient has a #8.0 tracheostomy. Patient is not sedated. Patient is not able to follow commands. Breath sounds are coarse. Trachea is midline, Negative for subcutaneous air, and chest excursion is symmetric. Patient is also Negative for cyanosis and is Positive for pitting edema. All alarms are set and audible. Resuscitation bag is at the head of the bed. Ventilator Settings  Mode FIO2 Rate Tidal Volume Pressure PEEP I:E Ratio   Pressure control  40 %    454 ml     10 cm H20  1:1.72      Peak airway pressure: 27.3 cm H2O   Minute ventilation: 14 l/min     ABG: No results for input(s): PH, PCO2, PO2, HCO3 in the last 72 hours.       Antwan Child

## 2021-02-05 NOTE — PROGRESS NOTES
Bedside, Verbal and Written shift change report given to Lizz Rodriguez RN (oncoming nurse) by Von Cuenca RN (offgoing nurse). Report included the following information SBAR, Kardex, Procedure Summary, Intake/Output, MAR, Recent Results, Med Rec Status, Cardiac Rhythm NSR and Dual Neuro Assessment.

## 2021-02-05 NOTE — PROGRESS NOTES
[facility] Progress Note         Admit Date:  1/6/2021    Today's Date:  2/5/2021    CC:  Peg Placement     Subjective:     Patient remains in ICU. Trach with vent. On SLED HD.  PEG yesterday.       Medications:   Current Facility-Administered Medications   Medication Dose Route Frequency    piperacillin-tazobactam (ZOSYN) 3.375 g in 0.9% sodium chloride (MBP/ADV) 100 mL MBP  3.375 g IntraVENous Q8H    Vancomycin Intermittent dosing per pharmacy    Other Rx Dosing/Monitoring    oxyCODONE (ROXICODONE) 5 mg/5 mL oral solution 5 mg  5 mg Oral Q4H PRN    ceFAZolin (ANCEF) 2 g/20 mL in sterile water IV syringe  2 g IntraVENous ENDO ONCE    NOREPINephrine (LEVOPHED) 16,000 mcg in dextrose 5% 250 mL infusion  0.5-30 mcg/min IntraVENous TITRATE    lansoprazole (PREVACID) 3 mg/mL oral suspension 30 mg  30 mg Per NG tube Q12H    senna-docusate (PERICOLACE) 8.6-50 mg per tablet 1 Tab  1 Tab Oral DAILY    amiodarone (CORDARONE) 450 mg in dextrose 5% 250 mL infusion  0.5-1 mg/min IntraVENous TITRATE    ondansetron (ZOFRAN) injection 4 mg  4 mg IntraVENous Q6H PRN    polyethylene glycol (MIRALAX) packet 17 g  17 g Per NG tube DAILY PRN    guaiFENesin-dextromethorphan (ROBITUSSIN DM) 100-10 mg/5 mL syrup 10 mL  10 mL Per NG tube Q4H PRN    acetaminophen (TYLENOL) tablet 650 mg  650 mg Per NG tube Q6H PRN    polyethylene glycol (MIRALAX) packet 17 g  17 g Per NG tube DAILY    sodium zirconium cyclosilicate (LOKELMA) powder packet 10 g  10 g Oral DAILY    white petrolatum-mineral oiL (LACRILUBE S.O.P.) ointment   Both Eyes Q12H    white petrolatum-mineral oiL (LACRILUBE S.O.P.) ointment   Both Eyes Q4H PRN    atorvastatin (LIPITOR) tablet 80 mg  80 mg Per NG tube DAILY    [Held by provider] clopidogreL (PLAVIX) tablet 75 mg  75 mg Per NG tube DAILY    [Held by provider] metoprolol tartrate (LOPRESSOR) tablet 50 mg  50 mg Per NG tube BID    insulin lispro (HUMALOG) injection   SubCUTAneous Q4H  LORazepam (ATIVAN) injection 1 mg  1 mg IntraVENous Q4H PRN    morphine injection 2 mg  2 mg IntraVENous Q4H PRN    NUTRITIONAL SUPPORT ELECTROLYTE PRN ORDERS   Does Not Apply PRN    sodium chloride (NS) flush 20 mL  20 mL InterCATHeter Q8H    sodium chloride (NS) flush 20 mL  20 mL InterCATHeter PRN    loperamide (IMODIUM) capsule 2 mg  2 mg Oral Q4H PRN    dextrose 40% (GLUTOSE) oral gel 1 Tube  15 g Oral PRN    glucagon (GLUCAGEN) injection 1 mg  1 mg IntraMUSCular PRN    dextrose (D50W) injection syrg 12.5-25 g  25-50 mL IntraVENous PRN    albuterol (PROVENTIL HFA, VENTOLIN HFA, PROAIR HFA) inhaler 2 Puff  2 Puff Inhalation Q4H PRN    influenza vaccine 2020-21 (6 mos+)(PF) (FLUARIX/FLULAVAL/FLUZONE QUAD) injection 0.5 mL  0.5 mL IntraMUSCular PRIOR TO DISCHARGE    hydrALAZINE (APRESOLINE) 20 mg/mL injection 20 mg  20 mg IntraVENous Q6H PRN       Review of Systems:  ROS was obtained, with pertinent positives as listed above. No chest pain or SOB. Diet:      Objective:   Vitals:  Visit Vitals  /62   Pulse 91   Temp 99.8 °F (37.7 °C)   Resp (!) 33   Ht 5' 8\" (1.727 m)   Wt 104.6 kg (230 lb 9.6 oz)   SpO2 90%   BMI 35.06 kg/m²     Intake/Output:  02/05 0701 - 02/05 1900  In: 300   Out: 4200   02/03 1901 - 02/05 0700  In: 933.8 [I.V.:633.8]  Out: 1270 [Urine:270; Drains:1000]  Exam:  General appearance: alert, cooperative, no distress  Lungs: clear to auscultation bilaterally anteriorly  Heart: regular rate and rhythm  Abdomen: soft, non-tender.  Bowel sounds normal. No masses, no organomegaly  Extremities: extremities normal, atraumatic, no cyanosis or edema  Neuro:  alert and oriented    Data Review (Labs):    Recent Labs     02/05/21  0340 02/04/21  0304 02/03/21  0311   WBC 13.2* 20.4*  --    HGB 7.0* 7.5* 7.5*   HCT 22.2* 23.1* 22.9*    188  --    MCV 91.4 89.2  --     138 136*   K 4.6 4.6 4.6   CL 99 100 99   CO2 27 29 25   * 77* 98*   CREA 5.79* 4.48* 5.40*   CA 8.3 7. 9* 8.3   MG 2.3  --  2.4   * 125* 170*   PTP 15.9* 15.0* 15.7*   INR 1.2 1.2 1.2   APTT 38.3* 42.6* 43.7*       EGD/ PEG 2/4/21 Dr Arce/Claudette  PROCEDURE:  After obtaining informed consent, the patient was left in the supine position and sedated. The endoscope was advanced under direct vision without difficulty. The esophagus, stomach (including retroflexed views) and duodenum were evaluated. PEG placement via standard push method was performed. Re-endoscopy after the PEG confirmed appropriate position/location of inner bolster in the anterior wall, body of the stomach. The patient was taken to the recovery area in stable condition. FINDINGS:  ESOPHAGUS: normal  STOMACH: Appropriate PEG site identified by 1:1 palpation and transillumination of the skin. After the site was cleansed with chlorhexidine solution and a sterile drape was placed, lidocaine was injected into the skin and gastric wall. A cutaneous incision was made and the catheter advanced through the incision into the stomach. The needle was removed from the catheter and a wire advanced through the catheter into the stomach. The wire was grasped with a snare and removed through the mouth. The PEG tube was then pushed over the wire through the mouth and into the stomach. Post-placement endoscopic appearance was satisfactory. Skin at 3.5cm. DUODENUM: normal. Normal transverse views of the papilla. PLAN:  - May use PEG tube immediately for medications  - May use PEG tube tomorrow morning for tube feeds per nutrition recommendations  - Keep head of bed elevated 30 degrees while PEG tube in use  - Check residuals qshift. Hold tube feeds if residual >500 ml. Recheck residual in 2 hours and call house officer if still >500 ml.    - Rotate external bolster daily.   - Clean PEG tube site with soap and water daily  - Keep PEG site clean and dry  - Do not place bandage under external bolster       Assessment:     Principal Problem:    Pneumonia due to COVID-19 virus (1/7/2021)    Active Problems:    Obesity (10/6/2015)      CAD (coronary artery disease) (10/28/2016)      Overview: 10-27-06 [facility] 90% LAD s/p 2.25 x 20 Synergy drug-eluting stent  (CS)      SWATI (acute kidney injury) ([facility] [address] (1/7/2021)      Acute hypoxemic respiratory failure ([address] (1/7/2021)      Type 2 diabetes mellitus with hyperosmolarity without nonketotic hyperglycemic-hyperosmolar coma ([address] (1/7/2021)      Hypernatremia (1/8/2021)      Atrial fibrillation ([address] (1/22/2021)      Hypotension (1/26/2021)      Acute deep vein thrombosis (DVT) of left peroneal vein ([facility] [address] (1/28/2021)    61 y.o. male with PMH of including but not limited to  for coronary artery disease, GERD, hypertension, who is seen in consultation at the request of Dr. Beatrice Lloyd for PEG evaluation. He was dx with COVID 1/5 and has had complications of respiratory failure s/o intubation and trach (2/2/21), renal failure on HD,  L peroneal vein thrombus (not on anticoagulation due to bleeding). He is somnolent, not sedated on vent. Has required pressure support only with HD. PEG placed yesterday w/o difficulty. Plan:      - PEG may be used for meds and tube feeds per nutrition recommendations  - Keep head of bed elevated 30 degrees while PEG tube in use  - Check residuals qshift. Hold tube feeds if residual >500 ml. Recheck residual in 2 hours and call house officer if still >500 ml.    - Rotate external bolster daily. - Clean PEG tube site with soap and water daily  - Keep PEG site clean and dry  - Do not place bandage under external bolster  - GI will sign off, please call with any questions or concerns     Laverne Cobb NP  Patient is seen and examined in collaboration with Dr. Rom Davis. Assessment and plan as per Dr. Charles Martinez.     GI--addendum--Patient seen and examined. Agree with above. Appears stable post PEG. Will sign off. Please call if needed.   Thanks Marilee Lombardo MD

## 2021-02-05 NOTE — PROGRESS NOTES
Pharmacokinetic Consult to Pharmacist    Clarke Arredondo. is a 61 y.o. male being treated with vancomycin. Height: 5' 8\" (172.7 cm)  Weight: 104.6 kg (230 lb 9.6 oz)  Lab Results   Component Value Date/Time     (H) 02/05/2021 03:40 AM    Creatinine 5.79 (H) 02/05/2021 03:40 AM    WBC 13.2 (H) 02/05/2021 03:40 AM    Procalcitonin 12.97 02/05/2021 03:40 AM    Lactic acid 1.6 01/07/2021 06:24 AM      Estimated Creatinine Clearance: 15.9 mL/min (A) (based on SCr of 5.79 mg/dL (H)). CULTURES:  pending    Day 1 of vancomycin. Goal trough is 15-20. Vancomycin dose initiated at 1750 mg x 1, then will dose intermittently. Will continue to follow patient and order levels when clinically indicated.     Thank you,  Patsy Dorantes, PharmD, [address]  Clinical Pharmacist  [phone]

## 2021-02-05 NOTE — PROGRESS NOTES
Ventilator check complete; patient has a #8.0 XLT tracheostomy. Patient is sedated. Patient is not able to follow commands. Breath sounds are coarse. Trachea is midline, Negative for subcutaneous air, and chest excursion is symmetric. Patient is also Negative for cyanosis and is Negative for pitting edema. All alarms are set and audible. Resuscitation bag is at the head of the bed.       Ventilator Settings  Mode FIO2 Rate PC  PEEP I:E Ratio   Pressure control  50 % 26 18 cm H20  10 cm H20  1:1.72      Peak airway pressure: 27 cm H2O   Minute ventilation: 12.9 l/min       RT Xiao

## 2021-02-05 NOTE — DIABETES MGMT
Patient admitted with pneumonia due to COVID-19 virus. Blood glucose ranged 125-157 yesterday with patient receiving Humalog 12 units. Blood glucose this morning was 140. Reviewed patient current regimen: Humalog SSI q4h. Per chart review patient has PEG tube placed yesterday. Glucose levels overall stable on current regimen.

## 2021-02-05 NOTE — PROGRESS NOTES
[facility] Critical Care COVID-19 Note:    Critical Care Note: 2/5/2021    Michael Titus. Admission Date: 1/6/2021     Length of Stay: 29 days    Background: 61 y.o. y/o male with acute hypoxemic respiratory failure secondary to COVID-19. Pt admitted 1/7 with fever, cough, fever. Covid + 1/6. Admitted by hospitalists. Started on dexamethasone, convalescent plasma, and remdesivir. Pulm consulted 1/10 with pt requiring CPAP but pt decompensated and was intubated 1/17. Nephrology consulted 1/23 for renal failure. US 1/18 with L peroneal vein thrombus. He was started on heparin but stopped due to bleeding from HD catheter. He was started on HD on 1/25. Trached on 2/2 secondary to inability to wean from the vent. Pt developed afib requiring amio. PEG placed 2/4. Onset of COVID-19 symptoms: 1/5/2021    Notable PMH: obesity, HTN, CAD, dyslipidemia, SWATI, DM, GERD    Drug Allergies: Allergies   Allergen Reactions    Latex Swelling     Had a purcell catheter with swelling of urethra. Only known latex issue in past. Wife remembers this now    Hydrocodone-Acetaminophen Itching     Wife remembers this allergy    Tessalon Perles [Benzonatate] Unknown (comments)       24 Hour events: No events overnight. PEG placed by GI 2/4. Pt with Tmax 100.7* and received several doses of tylenol. Pt to run 8* SLED today and then to go to IR for IVC filter after SLED. Pt does have some mild shaking that started last night per RN. ROS: intubated    Lines: (insertion date)   Tracheostomy size #8 prox XLT, cuffed Shiley (2/2)  PICC 1/13  NGT 1/17  Art line 1/18/2021  Dialysis access 1/24/21  Purcell 1/17  Rectal tube 1/30    Drips: current dose (range)  Amio    Visit Vitals  BP (!) 155/72   Pulse 85   Temp 99.6 °F (37.6 °C)   Resp 29   Ht 5' 8\" (1.727 m)   Wt 230 lb 9.6 oz (104.6 kg)   SpO2 93%   BMI 35.06 kg/m²     Pertinent Exam:            Constitutional:  trached and mechanically ventilated.  Appears uncomfortable with some shaking  EENMT:  Sclera clear, pupils equal, oral mucosa moist  Respiratory: crackles bilaterally   Cardiovascular:  RRR with no M,G,R;  Gastrointestinal:  soft with no tenderness; positive bowel sounds present  Musculoskeletal:  warm with no cyanosis, 2+ pitting B lower extremity edema up to thigh  Skin:  no jaundice or ecchymosis  Neurologic: unresponsive    Psychiatric: unresponsive. Pertinent Labs:   Recent Labs     02/05/21 0340 02/04/21 0304 02/03/21 0311   WBC 13.2* 20.4*  --    HGB 7.0* 7.5* 7.5*   HCT 22.2* 23.1* 22.9*    188  --    INR 1.2 1.2 1.2     Recent Labs     02/05/21 0340 02/04/21 0304 02/03/21 0311    138 136*   K 4.6 4.6 4.6   CL 99 100 99   CO2 27 29 25   * 125* 170*   * 77* 98*   CREA 5.79* 4.48* 5.40*   MG 2.3  --  2.4   CA 8.3 7.9* 8.3   PHOS 6.9*  --  7.2*     Recent Labs     02/05/21  0316 02/04/21  2311 02/04/21  1936   GLUCPOC 158* 157* 157*       SARS-CoV-2 LAB Results  [facility] Test: No results found for: COV2NT   [facility] Test: No results found for: EDPR  [facility] Test: No components found for: [id number]     COVID-19 Meds:  Dexamethasone 6mg daily (EOT 1/16)  Convalescent plasma transfusion (1/7)  Remdesivir (EOT 1/12)    Micro Results:  BC: negative (1/30)  Sputum cx: normal resp vianney: (1/30)    Anti-infectives (start date):  azithro (completed)  Ceftriaxone (completed)  Vanc and cefepime: (completed)    Ventilator Settings:  5' 8\" (1.727 m)  Mode FIO2 Rate Tidal Volume Pressure PEEP   Pressure control  39 %    454 ml     10 cm H20    Peak airway pressure: 27.3 cm H2O   Minute ventilation: 14 l/min    ABG:   Recent Labs     02/05/21 0306 02/04/21 0216 02/03/21  0204   PHI 7.43 7.50* 7.46*   PCO2I 41.5 35.0 34.5*   PO2I 116* 57* 101*   HCO3I 27.4* 27.0* 24.7       IMPRESSION:   61 y.o. y/o male with acute hypoxemic respiratory failure secondary to COVID-19. SWATI on SLED. Now trached.        PLAN:  -trached, on 40% FiO2, PEEP 10cm H2O with rate 26. Some shaking noted but unclear if it is secondary to pain, fever, anxiety, or some neuro involvement. NO head CT obtained but pt is still now waking up or following commands.      -will add on prn oxycodone for pain control.   -to have IVC filter placed today due to DVT and drops in hgb with multiple attempts at anticoagulation.   -amio for a fib.   -s/p treatments for COVID. Now off droplet precuations. -SLED with volume removal as per nephrology. -persistent fevers with PCT 12.97, panculture and consider starting broad spectrum abx after cultures completed   -tolerating tf so far, but on hold for IVC filter placement today.   -needs SCD on R LE.   -cont prevacid      Full Code      March SHELDON Cesar      Lungs:  B rhonchi. Suctioned with some purulent appearing secretions present. Heart:  RRR with no Murmur/Rubs/Gallops    Additional Comments:  Patient currently on 40% FiO2. Somewhat less tachypneic with RR in the upper 20's. Febrile in last 24 hours with pct 13. Will start vanc, zosyn. Pan culture. Some shaking reported by PA. Will monitor for now as nothing definitely seizure like noted. In the meantime continue to try and wean from vent. Needs to be less tachypneic before trying PS. Amio for a fib and getting SLED. IVC filter today. HGB 7 today, cont to monitor and transfuse for any further drop. I have spoken with and examined the patient. I agree with the above assessment and plan as documented.     Vipul Bonner MD

## 2021-02-05 NOTE — PROGRESS NOTES
Bedside and Verbal shift change report given to Shweta Milligan RN (oncoming nurse) by Deleta AQUILINO Jules (offgoing nurse). Report included the following information SBAR, Kardex, OR Summary, Procedure Summary and MAR.

## 2021-02-05 NOTE — PROGRESS NOTES
Chart reviewed, remains CCU non-covid unit now. Trach/vent -40% Fio2 per RT. PEG placed. For IVC filter today per MD notes. SLED per Nephrology and possibly HD cath as renal not recovered presently. Pt on LTACH/[facility] list for possibly LTACH needs. CM following. LOS 29 days.

## 2021-02-05 NOTE — PROGRESS NOTES
[facility]        Subjective: SWATI  Pt on vent, sedated  Pt started on dialysis on 1/25/21. Review of Systems -   Can not be obtained. Objective:    Vitals:    02/05/21 0630 02/05/21 0659 02/05/21 0730 02/05/21 0759   BP: (!) 155/72 (!) 153/71 (!) 156/71 (!) 157/72   Pulse: 85 90 91 92   Resp: 29 27 (!) 31 30   Temp:   (!) 101.3 °F (38.5 °C)    SpO2: 93% 93% 91% 93%   Weight:       Height:           PE  Gen: intubated  CV:reg rate  Chest:bilat breath sounds  Abd: soft  Ext/Access:rt IJ temp dialysis cath ok, . Skye Walter [facility]  Recent Labs     02/05/21 0340 02/04/21  0304 02/03/21  0311   WBC 13.2* 20.4*  --    HGB 7.0* 7.5* 7.5*   HCT 22.2* 23.1* 22.9*    188  --    INR 1.2 1.2 1.2     Recent Labs     02/05/21 0340 02/04/21  0304 02/03/21  0311    138 136*   K 4.6 4.6 4.6   CL 99 100 99   CO2 27 29 25   * 125* 170*   * 77* 98*   CREA 5.79* 4.48* 5.40*   MG 2.3  --  2.4   PHOS 6.9*  --  7.2*   CA 8.3 7.9* 8.3           Radiology    A/P:   Patient Active Problem List   Diagnosis Code    Obesity E66.9    Hypertension I10    Bradycardia R00.1    PVC (premature ventricular contraction) I49.3    CAD (coronary artery disease) I25.10    Dyslipidemia, goal LDL below 70 E78.5    SWATI (acute kidney injury) ([facility] [address] N17.9    Acute hypoxemic respiratory failure (HCC) J96.01    Type 2 diabetes mellitus with hyperosmolarity without nonketotic hyperglycemic-hyperosmolar coma (HCC) E11.00    Pneumonia due to COVID-19 virus U07.1, J12.82    Hypernatremia E87.0    Atrial fibrillation (HCC) I48.91    Hypotension I95.9    Acute deep vein thrombosis (DVT) of left peroneal vein (HCC) I82.452     SWATI - ATN, Still oliguric. For SLED again later today. . No signs of renal recovery. Will probably need to go for a tunnelled HD cath sometime next week. [facility]  Addendum: Pt seen on SLED at 10:13am.  On dialysis for clearance.   COVID19/ Resp Failure    Hypoteinsion    A Fib with RVR    Anemia - monitoring.         Fiona Valenzuela MD

## 2021-02-05 NOTE — PROGRESS NOTES
8 hour CRRT started using R temp IJ cath, line aspirates and flushes well. No heparin given. UF rate 300ml/hr will advance to 500ml/hr by primary RN as tolerated by BP, no pressors on at this time. Bp was soft SBP 90s earlier, now 144/67.      Report given to SHAHEED Maldonado RN.     02/05/21 0980   Patient Information   Informed Consent Verified Yes   First Use Xray Location Verified Yes   Dialyzer/Set Up Inspection   Dialyzer/Set Up Inspection F-6   Alarms Verified Yes   Test Pass Yes   pH 7.2   Machine Conductivity 14.3   Meter Conductivity 14.1   Reverse Osmosis Safety Checks   Reverse Osmosis Machine Log Completed Yes   Total Chlorine Test Negative   Machine Initiation   Machine Number k1   Procedure Start Time 1005   Unused Lines Clamped Yes   Machine Temperature 95 °F (35 °C)   Dialysis Initiation   All Connections Secured Yes   NS Bag  Yes   Saline Line Double Clamped Yes   Prime Given   Air Foam Detector Engaged Yes   Dialysate NA (mEq/L) 140   Dialysate K (mEq/L) 4   Dialysate CA (mEq/L) 2.5   Dialysate HCO3 (mEq/L) 35   Citrasate Yes   During Dialysis    Pulse (Heart Rate) 91   BP (!) 144/67   Saline Given (mL) 300 mL   Heparin Bolus (units) 0 units   Continuous Heparin Infusion (Units/hr) 0 Units/hr   Blood Flow Rate (ml/min) 200 ml/min   Dialysate Flow Rate (ml/hr) 300 ml/hr   Arterial Access Pressure (mmHg) 100   Venous Return Pressure (mmHg) 50   Transmembrane Pressure (mmHg) 90 mmHg   Hourly Chamber Checks Yes   Ultrafiltration Rate (ml/hr) 300 ml/hr  (RN will advance to 500ml/hr as tolerated by BP)   CRRT Dialysis Intake/Output ([place])   CRRT Replacement Intake (mL) 300 mL   UF Effluent Output (RMVD) Dialysis (mL) 0 mL   Net Fluid Balance (mL) 300   Hemodialysis Access 01/24/21   Placement Date/Time: 01/24/21 1200   Number of Attempts: 1  Inserted By: Charles Duncan NP  Access Location: Internal jugular, right   Central Line Being Utilized Yes   Criteria for Appropriate Use Dialysis/apheresis   Date Accessed  02/05/21   Access Time  1007   Site Assessment Clean, dry, & intact   Date of Last Dressing Change 02/03/21   Dressing Status Clean, dry, & intact   Dressing Type Disk with Chlorhexadine gluconate (CHG)   Proximal Hub Color/Line Status Yellow; Infusing   Distal Hub Color/Line Status Yellow; Infusing   $$ Dialysis Charges   $$ Method CRRT  (8 hours)   CRRT Dialysis Intake/Output   Patient Location [place]

## 2021-02-06 ENCOUNTER — APPOINTMENT (OUTPATIENT)
Dept: GENERAL RADIOLOGY | Age: 61
DRG: 004 | End: 2021-02-06
Attending: INTERNAL MEDICINE
Payer: COMMERCIAL

## 2021-02-06 LAB
ABO + RH BLD: NORMAL
ANION GAP SERPL CALC-SCNC: 6 MMOL/L (ref 7–16)
APTT PPP: 47.7 SEC (ref 24.1–35.1)
ARTERIAL PATENCY WRIST A: YES
BASE EXCESS BLD CALC-SCNC: 7 MMOL/L
BDY SITE: ABNORMAL
BLD PROD TYP BPU: NORMAL
BLOOD BANK CMNT PATIENT-IMP: NORMAL
BLOOD GROUP ANTIBODIES SERPL: NORMAL
BPU ID: NORMAL
BUN SERPL-MCNC: 49 MG/DL (ref 8–23)
CALCIUM SERPL-MCNC: 7.7 MG/DL (ref 8.3–10.4)
CHLORIDE SERPL-SCNC: 103 MMOL/L (ref 98–107)
CO2 BLD-SCNC: 32 MMOL/L
CO2 SERPL-SCNC: 32 MMOL/L (ref 21–32)
COLLECT TIME,HTIME: 240
CREAT SERPL-MCNC: 3.45 MG/DL (ref 0.8–1.5)
CROSSMATCH RESULT,%XM: NORMAL
ERYTHROCYTE [DISTWIDTH] IN BLOOD BY AUTOMATED COUNT: 17 % (ref 11.9–14.6)
EXHALED MINUTE VOLUME, VE: 14.8 L/MIN
FIBRINOGEN PPP-MCNC: 737 MG/DL (ref 190–501)
GAS FLOW.O2 O2 DELIVERY SYS: ABNORMAL L/MIN
GAS FLOW.O2 SETTING OXYMISER: 26 BPM
GLUCOSE BLD STRIP.AUTO-MCNC: 157 MG/DL (ref 65–100)
GLUCOSE BLD STRIP.AUTO-MCNC: 164 MG/DL (ref 65–100)
GLUCOSE BLD STRIP.AUTO-MCNC: 169 MG/DL (ref 65–100)
GLUCOSE BLD STRIP.AUTO-MCNC: 180 MG/DL (ref 65–100)
GLUCOSE BLD STRIP.AUTO-MCNC: 205 MG/DL (ref 65–100)
GLUCOSE BLD STRIP.AUTO-MCNC: 207 MG/DL (ref 65–100)
GLUCOSE SERPL-MCNC: 166 MG/DL (ref 65–100)
HCO3 BLD-SCNC: 31.2 MMOL/L (ref 22–26)
HCT VFR BLD AUTO: 21 % (ref 41.1–50.3)
HCT VFR BLD AUTO: 23.7 % (ref 41.1–50.3)
HGB BLD-MCNC: 6.7 G/DL (ref 13.6–17.2)
HGB BLD-MCNC: 7.7 G/DL (ref 13.6–17.2)
HISTORY CHECKED?,CKHIST: NORMAL
INR PPP: 1.3
INSPIRATION.DURATION SETTING TIME VENT: 0.85 SEC
MCH RBC QN AUTO: 29.1 PG (ref 26.1–32.9)
MCHC RBC AUTO-ENTMCNC: 31.9 G/DL (ref 31.4–35)
MCV RBC AUTO: 91.3 FL (ref 79.6–97.8)
NRBC # BLD: 0 K/UL (ref 0–0.2)
O2/TOTAL GAS SETTING VFR VENT: 45 %
PCO2 BLD: 40.7 MMHG (ref 35–45)
PEEP RESPIRATORY: 10 CMH2O
PH BLD: 7.49 [PH] (ref 7.35–7.45)
PLATELET # BLD AUTO: 171 K/UL (ref 150–450)
PMV BLD AUTO: 9.5 FL (ref 9.4–12.3)
PO2 BLD: 69 MMHG (ref 75–100)
POTASSIUM SERPL-SCNC: 3.6 MMOL/L (ref 3.5–5.1)
PRESSURE CONTROL, IPC: 18
PROTHROMBIN TIME: 16.3 SEC (ref 12.5–14.7)
RBC # BLD AUTO: 2.3 M/UL (ref 4.23–5.6)
SAO2 % BLD: 95 % (ref 95–98)
SERVICE CMNT-IMP: ABNORMAL
SERVICE CMNT-IMP: ABNORMAL
SODIUM SERPL-SCNC: 141 MMOL/L (ref 136–145)
SPECIMEN EXP DATE BLD: NORMAL
SPECIMEN TYPE: ABNORMAL
STATUS OF UNIT,%ST: NORMAL
UNIT DIVISION, %UDIV: 0
VANCOMYCIN SERPL-MCNC: 25.4 UG/ML
VENTILATION MODE VENT: ABNORMAL
WBC # BLD AUTO: 11.1 K/UL (ref 4.3–11.1)

## 2021-02-06 PROCEDURE — 74011250636 HC RX REV CODE- 250/636: Performed by: NURSE PRACTITIONER

## 2021-02-06 PROCEDURE — 74011250637 HC RX REV CODE- 250/637: Performed by: INTERNAL MEDICINE

## 2021-02-06 PROCEDURE — 74011636637 HC RX REV CODE- 636/637: Performed by: INTERNAL MEDICINE

## 2021-02-06 PROCEDURE — P9016 RBC LEUKOCYTES REDUCED: HCPCS

## 2021-02-06 PROCEDURE — 80048 BASIC METABOLIC PNL TOTAL CA: CPT

## 2021-02-06 PROCEDURE — 99232 SBSQ HOSP IP/OBS MODERATE 35: CPT | Performed by: INTERNAL MEDICINE

## 2021-02-06 PROCEDURE — 71045 X-RAY EXAM CHEST 1 VIEW: CPT

## 2021-02-06 PROCEDURE — 86923 COMPATIBILITY TEST ELECTRIC: CPT

## 2021-02-06 PROCEDURE — 86901 BLOOD TYPING SEROLOGIC RH(D): CPT

## 2021-02-06 PROCEDURE — 65620000000 HC RM CCU GENERAL

## 2021-02-06 PROCEDURE — 74011000258 HC RX REV CODE- 258: Performed by: INTERNAL MEDICINE

## 2021-02-06 PROCEDURE — 74011250636 HC RX REV CODE- 250/636: Performed by: INTERNAL MEDICINE

## 2021-02-06 PROCEDURE — 82962 GLUCOSE BLOOD TEST: CPT

## 2021-02-06 PROCEDURE — 85027 COMPLETE CBC AUTOMATED: CPT

## 2021-02-06 PROCEDURE — 85610 PROTHROMBIN TIME: CPT

## 2021-02-06 PROCEDURE — 94003 VENT MGMT INPAT SUBQ DAY: CPT

## 2021-02-06 PROCEDURE — 82803 BLOOD GASES ANY COMBINATION: CPT

## 2021-02-06 PROCEDURE — 85730 THROMBOPLASTIN TIME PARTIAL: CPT

## 2021-02-06 PROCEDURE — 77030040393 HC DRSG OPTIFOAM GENT MDII -B

## 2021-02-06 PROCEDURE — 80202 ASSAY OF VANCOMYCIN: CPT

## 2021-02-06 PROCEDURE — 2709999900 HC NON-CHARGEABLE SUPPLY

## 2021-02-06 PROCEDURE — 36430 TRANSFUSION BLD/BLD COMPNT: CPT

## 2021-02-06 PROCEDURE — 36600 WITHDRAWAL OF ARTERIAL BLOOD: CPT

## 2021-02-06 PROCEDURE — 85018 HEMOGLOBIN: CPT

## 2021-02-06 PROCEDURE — 74011000258 HC RX REV CODE- 258: Performed by: NURSE PRACTITIONER

## 2021-02-06 PROCEDURE — 85384 FIBRINOGEN ACTIVITY: CPT

## 2021-02-06 RX ORDER — AMIODARONE HYDROCHLORIDE 200 MG/1
200 TABLET ORAL DAILY
Status: DISCONTINUED | OUTPATIENT
Start: 2021-02-06 | End: 2021-02-26 | Stop reason: HOSPADM

## 2021-02-06 RX ORDER — SODIUM CHLORIDE 9 MG/ML
250 INJECTION, SOLUTION INTRAVENOUS AS NEEDED
Status: DISCONTINUED | OUTPATIENT
Start: 2021-02-06 | End: 2021-02-15

## 2021-02-06 RX ADMIN — ATORVASTATIN CALCIUM 80 MG: 40 TABLET, FILM COATED ORAL at 08:15

## 2021-02-06 RX ADMIN — INSULIN LISPRO 3 UNITS: 100 INJECTION, SOLUTION INTRAVENOUS; SUBCUTANEOUS at 12:16

## 2021-02-06 RX ADMIN — MINERAL OIL, PETROLATUM: 425; 568 OINTMENT OPHTHALMIC at 08:20

## 2021-02-06 RX ADMIN — LANSOPRAZOLE 30 MG: KIT at 20:49

## 2021-02-06 RX ADMIN — AMIODARONE HYDROCHLORIDE 200 MG: 200 TABLET ORAL at 09:06

## 2021-02-06 RX ADMIN — PIPERACILLIN SODIUM AND TAZOBACTAM SODIUM 3.38 G: 3; .375 INJECTION, POWDER, LYOPHILIZED, FOR SOLUTION INTRAVENOUS at 08:15

## 2021-02-06 RX ADMIN — INSULIN LISPRO 3 UNITS: 100 INJECTION, SOLUTION INTRAVENOUS; SUBCUTANEOUS at 23:08

## 2021-02-06 RX ADMIN — INSULIN LISPRO 6 UNITS: 100 INJECTION, SOLUTION INTRAVENOUS; SUBCUTANEOUS at 04:55

## 2021-02-06 RX ADMIN — ACETAMINOPHEN 650 MG: 325 TABLET, FILM COATED ORAL at 04:51

## 2021-02-06 RX ADMIN — INSULIN LISPRO 6 UNITS: 100 INJECTION, SOLUTION INTRAVENOUS; SUBCUTANEOUS at 20:47

## 2021-02-06 RX ADMIN — INSULIN LISPRO 3 UNITS: 100 INJECTION, SOLUTION INTRAVENOUS; SUBCUTANEOUS at 08:24

## 2021-02-06 RX ADMIN — Medication 20 ML: at 13:09

## 2021-02-06 RX ADMIN — DOCUSATE SODIUM 50 MG AND SENNOSIDES 8.6 MG 1 TABLET: 8.6; 5 TABLET, FILM COATED ORAL at 08:15

## 2021-02-06 RX ADMIN — LANSOPRAZOLE 30 MG: KIT at 09:06

## 2021-02-06 RX ADMIN — MORPHINE SULFATE 2 MG: 2 INJECTION, SOLUTION INTRAMUSCULAR; INTRAVENOUS at 12:12

## 2021-02-06 RX ADMIN — Medication 20 ML: at 22:00

## 2021-02-06 RX ADMIN — Medication 20 ML: at 06:00

## 2021-02-06 RX ADMIN — MINERAL OIL, PETROLATUM: 425; 568 OINTMENT OPHTHALMIC at 20:48

## 2021-02-06 RX ADMIN — AMIODARONE HYDROCHLORIDE 0.5 MG/MIN: 50 INJECTION, SOLUTION INTRAVENOUS at 02:41

## 2021-02-06 RX ADMIN — PIPERACILLIN SODIUM AND TAZOBACTAM SODIUM 3.38 G: 3; .375 INJECTION, POWDER, LYOPHILIZED, FOR SOLUTION INTRAVENOUS at 01:15

## 2021-02-06 RX ADMIN — INSULIN LISPRO 3 UNITS: 100 INJECTION, SOLUTION INTRAVENOUS; SUBCUTANEOUS at 16:18

## 2021-02-06 RX ADMIN — MORPHINE SULFATE 2 MG: 2 INJECTION, SOLUTION INTRAMUSCULAR; INTRAVENOUS at 00:15

## 2021-02-06 RX ADMIN — ACETAMINOPHEN 650 MG: 325 TABLET, FILM COATED ORAL at 19:04

## 2021-02-06 RX ADMIN — MORPHINE SULFATE 2 MG: 2 INJECTION, SOLUTION INTRAMUSCULAR; INTRAVENOUS at 19:28

## 2021-02-06 RX ADMIN — MORPHINE SULFATE 2 MG: 2 INJECTION, SOLUTION INTRAMUSCULAR; INTRAVENOUS at 23:08

## 2021-02-06 RX ADMIN — PIPERACILLIN SODIUM AND TAZOBACTAM SODIUM 3.38 G: 3; .375 INJECTION, POWDER, LYOPHILIZED, FOR SOLUTION INTRAVENOUS at 20:48

## 2021-02-06 NOTE — PROGRESS NOTES
[facility] Critical Care COVID-19 Note:    Critical Care Note: 2/6/2021    Michael Titus. Admission Date: 1/6/2021     Length of Stay: 30 days    Background: 61 y.o. y/o male with acute hypoxemic respiratory failure secondary to COVID-19. Pt admitted 1/7 with fever, cough, fever. Covid + 1/6. Admitted by hospitalists. Started on dexamethasone, convalescent plasma, and remdesivir. Pulm consulted 1/10 with pt requiring CPAP but pt decompensated and was intubated 1/17. Nephrology consulted 1/23 for renal failure. US 1/18 with L peroneal vein thrombus. He was started on heparin but stopped due to bleeding from HD catheter. He was started on HD on 1/25. Trached on 2/2 secondary to inability to wean from the vent. Pt developed afib requiring amio. PEG placed 2/4. Onset of COVID-19 symptoms: 1/5/2021    Notable PMH: obesity, HTN, CAD, dyslipidemia, SWATI, DM, GERD    Drug Allergies: Allergies   Allergen Reactions    Latex Swelling     Had a purcell catheter with swelling of urethra. Only known latex issue in past. Wife remembers this now    Hydrocodone-Acetaminophen Itching     Wife remembers this allergy    Tessalon Perles [Benzonatate] Unknown (comments)       24 Hour events: Pt did not have IVC filter placed yesterday due to 8 hour dialysis run. Currently scheduled for Monday. Ran for 7 hours and then clotted off. Remains intubated this morning. Currently on 45% satting well. Tmax 101.7 in last 24 hours. Cultures were sent with that temperature and he was started on vanc and zosyn.    Hgb down to 6.7.       ROS: intubated    Lines: (insertion date)   Tracheostomy size #8 prox XLT, cuffed Shiley (2/2)  PEG 2/4  PICC 1/13  Art line 1/18/2021  Dialysis access 1/24/21  Purcell 1/17  Rectal tube 1/30    Drips: current dose (range)  Amio    Visit Vitals  BP (!) 119/59   Pulse 78   Temp (!) 100.5 °F (38.1 °C)   Resp 26   Ht 5' 8\" (1.727 m)   Wt 234 lb 9.1 oz (106.4 kg)   SpO2 100%   BMI 35.67 kg/m²     Pertinent Exam:            Constitutional:  trached and mechanically ventilated. Appears more comfortable today. EENMT:  Sclera clear, pupils equal, oral mucosa moist  Respiratory: crackles bilaterally   Cardiovascular:  RRR with no M,G,R;  Gastrointestinal:  soft with no tenderness; positive bowel sounds present  Musculoskeletal:  warm with no cyanosis, 2+ pitting B lower extremity edema up to thigh  Skin:  no jaundice or ecchymosis  Neurologic: Asleep but awakens, calm. Psychiatric: as above.        Pertinent Labs:   Recent Labs     02/06/21 0313 02/05/21 0340 02/04/21  0304   WBC 11.1 13.2* 20.4*   HGB 6.7* 7.0* 7.5*   HCT 21.0* 22.2* 23.1*    185 188   INR 1.3 1.2 1.2     Recent Labs     02/06/21 0313 02/05/21  0340 02/04/21  0304    137 138   K 3.6 4.6 4.6    99 100   CO2 32 27 29   * 140* 125*   BUN 49* 100* 77*   CREA 3.45* 5.79* 4.48*   MG  --  2.3  --    CA 7.7* 8.3 7.9*   PHOS  --  6.9*  --      Recent Labs     02/06/21  0454 02/05/21 2327 02/05/21 1952   GLUCPOC 207* 208* 165*       SARS-CoV-2 LAB Results  [facility] Test: No results found for: COV2NT   [facility] Test: No results found for: EDPR  [facility] Test: No components found for: [id number]     COVID-19 Meds:  Dexamethasone 6mg daily (EOT 1/16)  Convalescent plasma transfusion (1/7)  Remdesivir (EOT 1/12)    Micro Results:  BC: negative (1/30)  Sputum cx: normal resp vianney: (1/30)    Anti-infectives (start date):  azithro (completed)  Ceftriaxone (completed)  Vanc and cefepime: (completed)  Vanc and zosyn (started 2/5)    Ventilator Settings:  5' 8\" (1.727 m)  Mode FIO2 Rate Tidal Volume Pressure PEEP   Pressure control  45 %    525 ml     10 cm H20    Peak airway pressure: 27 cm H2O   Minute ventilation: 13.6 l/min    ABG:   Recent Labs     02/06/21  0244 02/05/21  0306 02/04/21  0216   PHI 7.49* 7.43 7.50*   PCO2I 40.7 41.5 35.0   PO2I 69* 116* 57*   HCO3I 31.2* 27.4* 27.0*       IMPRESSION:   61 y.o. y/o male with acute hypoxemic respiratory failure secondary to COVID-19. DVT unable to tolerate anticoagulation. SWATI on SLED. Now trached, PEG. PLAN:  -trached, cont to wean vent.   -transition IV amio to  daily.   -cont sled per nephrology. -IVC filter on Monday. Did not tolerate anticoagulation with multiple attempts. Continues to require intermittent transfusions. Getting 1unit PRBC today. -on TF and a goal through PEG.   -restarted abx, day 2 vanc/zosyn. Follow up cultures and monitor fever curve.    -prn oxycodone for pain control.   -s/p treatments for COVID. Now off droplet precuations. -needs SCD on R LE.   -cont prevacid  -Monday consider [facility] evaluation.        Full Code      Bertis Habermann, MD

## 2021-02-06 NOTE — PROGRESS NOTES
Ventilator check complete; patient has a #8.0 tracheostomy. Patient is not sedated. Patient is able to follow commands. Breath sounds are coarse. Trachea is midline, Negative for subcutaneous air, and chest excursion is symmetric. Patient is also Negative for cyanosis and is Positive for pitting edema. All alarms are set and audible. Resuscitation bag is at the head of the bed.       Ventilator Settings  Mode FIO2 Rate Tidal Volume Pressure PEEP I:E Ratio   Pressure control  44 %   26    18 cm H20 10 cm H20  1:1.72      Peak airway pressure: 26.8 cm H2O   Minute ventilation: 13.2 l/min       Genice Frankel, RT

## 2021-02-06 NOTE — PROGRESS NOTES
[address] [facility]        Subjective: SWATI  Pt on vent, sedated  Pt started on dialysis on 1/25/21. Review of Systems -   Can not be obtained. Objective:    Vitals:    02/06/21 0729 02/06/21 0736 02/06/21 0800 02/06/21 0824   BP: (!) 119/59  (!) 117/58    Pulse: 79 78 78 79   Resp: 26 26 25 26   Temp:       SpO2: 100% 100% 100% 99%   Weight:       Height:           PE  Gen: intubated  CV:reg rate  Chest:bilat breath sounds  Abd: soft  Ext/Access:rt IJ temp dialysis cath ok, . Macjerome Money [facility]  Recent Labs     02/06/21 0313 02/05/21  0340 02/04/21  0304   WBC 11.1 13.2* 20.4*   HGB 6.7* 7.0* 7.5*   HCT 21.0* 22.2* 23.1*    185 188   INR 1.3 1.2 1.2     Recent Labs     02/06/21 0313 02/05/21  0340 02/04/21  0304    137 138   K 3.6 4.6 4.6    99 100   CO2 32 27 29   * 140* 125*   BUN 49* 100* 77*   CREA 3.45* 5.79* 4.48*   MG  --  2.3  --    PHOS  --  6.9*  --    CA 7.7* 8.3 7.9*           Radiology    A/P:   Patient Active Problem List   Diagnosis Code    Obesity E66.9    Hypertension I10    Bradycardia R00.1    PVC (premature ventricular contraction) I49.3    CAD (coronary artery disease) I25.10    Dyslipidemia, goal LDL below 70 E78.5    SWATI (acute kidney injury) ([facility] [address] N17.9    Acute hypoxemic respiratory failure (HCC) J96.01    Type 2 diabetes mellitus with hyperosmolarity without nonketotic hyperglycemic-hyperosmolar coma (HCC) E11.00    Pneumonia due to COVID-19 virus U07.1, J12.82    Hypernatremia E87.0    Atrial fibrillation (HCC) I48.91    Hypotension I95.9    Acute deep vein thrombosis (DVT) of left peroneal vein (HCC) I82.452     SWATI - ATN, Still oliguric. For SLED again on Monday 2/8/21. . No signs of renal recovery. Will probably need to go for a tunnelled HD cath sometime next week.     COVID19/ Resp Failure    Hypoteinsion    A Fib with RVR    Anemia - intermittent transfusion      Maria Luz Quintanilla MD

## 2021-02-06 NOTE — PROGRESS NOTES
Bedside, Verbal and Written shift change report given to Sudheer Strange RN (oncoming nurse) by Camryn Arias RN (offgoing nurse). Report included the following information SBAR, Kardex, Procedure Summary, Intake/Output, MAR, Recent Results, Med Rec Status, Cardiac Rhythm NSR, Alarm Parameters  and Dual Neuro Assessment.

## 2021-02-07 ENCOUNTER — APPOINTMENT (OUTPATIENT)
Dept: GENERAL RADIOLOGY | Age: 61
DRG: 004 | End: 2021-02-07
Attending: INTERNAL MEDICINE
Payer: COMMERCIAL

## 2021-02-07 LAB
ABO + RH BLD: NORMAL
ANION GAP SERPL CALC-SCNC: 7 MMOL/L (ref 7–16)
APTT PPP: 46.2 SEC (ref 24.1–35.1)
ARTERIAL PATENCY WRIST A: YES
BASE EXCESS BLD CALC-SCNC: 5 MMOL/L
BDY SITE: ABNORMAL
BLD PROD TYP BPU: NORMAL
BLOOD GROUP ANTIBODIES SERPL: NORMAL
BPU ID: NORMAL
BUN SERPL-MCNC: 71 MG/DL (ref 8–23)
CALCIUM SERPL-MCNC: 7.8 MG/DL (ref 8.3–10.4)
CHLORIDE SERPL-SCNC: 102 MMOL/L (ref 98–107)
CO2 BLD-SCNC: 31 MMOL/L
CO2 SERPL-SCNC: 31 MMOL/L (ref 21–32)
COLLECT TIME,HTIME: 308
CREAT SERPL-MCNC: 4.59 MG/DL (ref 0.8–1.5)
CROSSMATCH RESULT,%XM: NORMAL
ERYTHROCYTE [DISTWIDTH] IN BLOOD BY AUTOMATED COUNT: 16.7 % (ref 11.9–14.6)
EXHALED MINUTE VOLUME, VE: 11.9 L/MIN
FIBRINOGEN PPP-MCNC: 693 MG/DL (ref 190–501)
GAS FLOW.O2 O2 DELIVERY SYS: ABNORMAL L/MIN
GAS FLOW.O2 SETTING OXYMISER: 26 BPM
GLUCOSE BLD STRIP.AUTO-MCNC: 176 MG/DL (ref 65–100)
GLUCOSE BLD STRIP.AUTO-MCNC: 193 MG/DL (ref 65–100)
GLUCOSE BLD STRIP.AUTO-MCNC: 197 MG/DL (ref 65–100)
GLUCOSE BLD STRIP.AUTO-MCNC: 212 MG/DL (ref 65–100)
GLUCOSE BLD STRIP.AUTO-MCNC: 215 MG/DL (ref 65–100)
GLUCOSE SERPL-MCNC: 176 MG/DL (ref 65–100)
HCO3 BLD-SCNC: 30 MMOL/L (ref 22–26)
HCT VFR BLD AUTO: 23.7 % (ref 41.1–50.3)
HGB BLD-MCNC: 7.7 G/DL (ref 13.6–17.2)
INR PPP: 1.2
MCH RBC QN AUTO: 29.3 PG (ref 26.1–32.9)
MCHC RBC AUTO-ENTMCNC: 32.5 G/DL (ref 31.4–35)
MCV RBC AUTO: 90.1 FL (ref 79.6–97.8)
NRBC # BLD: 0 K/UL (ref 0–0.2)
O2/TOTAL GAS SETTING VFR VENT: 40 %
PCO2 BLD: 43.4 MMHG (ref 35–45)
PEEP RESPIRATORY: 8 CMH2O
PH BLD: 7.45 [PH] (ref 7.35–7.45)
PLATELET # BLD AUTO: 165 K/UL (ref 150–450)
PMV BLD AUTO: 9.6 FL (ref 9.4–12.3)
PO2 BLD: 78 MMHG (ref 75–100)
POTASSIUM SERPL-SCNC: 3.5 MMOL/L (ref 3.5–5.1)
PRESSURE CONTROL, IPC: 16
PROTHROMBIN TIME: 15.4 SEC (ref 12.5–14.7)
RBC # BLD AUTO: 2.63 M/UL (ref 4.23–5.6)
SAO2 % BLD: 96 % (ref 95–98)
SERVICE CMNT-IMP: ABNORMAL
SERVICE CMNT-IMP: ABNORMAL
SODIUM SERPL-SCNC: 140 MMOL/L (ref 136–145)
SPECIMEN EXP DATE BLD: NORMAL
SPECIMEN TYPE: ABNORMAL
STATUS OF UNIT,%ST: NORMAL
UNIT DIVISION, %UDIV: 0
VENTILATION MODE VENT: ABNORMAL
WBC # BLD AUTO: 14.6 K/UL (ref 4.3–11.1)

## 2021-02-07 PROCEDURE — 85610 PROTHROMBIN TIME: CPT

## 2021-02-07 PROCEDURE — 85384 FIBRINOGEN ACTIVITY: CPT

## 2021-02-07 PROCEDURE — 74011250636 HC RX REV CODE- 250/636: Performed by: INTERNAL MEDICINE

## 2021-02-07 PROCEDURE — 77030006998

## 2021-02-07 PROCEDURE — 99233 SBSQ HOSP IP/OBS HIGH 50: CPT | Performed by: INTERNAL MEDICINE

## 2021-02-07 PROCEDURE — 85027 COMPLETE CBC AUTOMATED: CPT

## 2021-02-07 PROCEDURE — 2709999900 HC NON-CHARGEABLE SUPPLY

## 2021-02-07 PROCEDURE — 74011636637 HC RX REV CODE- 636/637: Performed by: INTERNAL MEDICINE

## 2021-02-07 PROCEDURE — 74011000258 HC RX REV CODE- 258: Performed by: INTERNAL MEDICINE

## 2021-02-07 PROCEDURE — 82962 GLUCOSE BLOOD TEST: CPT

## 2021-02-07 PROCEDURE — 74011250637 HC RX REV CODE- 250/637: Performed by: INTERNAL MEDICINE

## 2021-02-07 PROCEDURE — 77030041175 HC BAG DIGNSHLD BARD -A

## 2021-02-07 PROCEDURE — 80048 BASIC METABOLIC PNL TOTAL CA: CPT

## 2021-02-07 PROCEDURE — 65620000000 HC RM CCU GENERAL

## 2021-02-07 PROCEDURE — 82803 BLOOD GASES ANY COMBINATION: CPT

## 2021-02-07 PROCEDURE — 94003 VENT MGMT INPAT SUBQ DAY: CPT

## 2021-02-07 PROCEDURE — 85730 THROMBOPLASTIN TIME PARTIAL: CPT

## 2021-02-07 PROCEDURE — 71045 X-RAY EXAM CHEST 1 VIEW: CPT

## 2021-02-07 PROCEDURE — 36600 WITHDRAWAL OF ARTERIAL BLOOD: CPT

## 2021-02-07 RX ORDER — FENTANYL CITRATE 50 UG/ML
50 INJECTION, SOLUTION INTRAMUSCULAR; INTRAVENOUS
Status: DISCONTINUED | OUTPATIENT
Start: 2021-02-07 | End: 2021-02-18

## 2021-02-07 RX ORDER — FENTANYL CITRATE-0.9 % NACL/PF 25 MCG/ML
0-200 PLASTIC BAG, INJECTION (ML) INJECTION
Status: DISCONTINUED | OUTPATIENT
Start: 2021-02-07 | End: 2021-02-15

## 2021-02-07 RX ORDER — INSULIN GLARGINE 100 [IU]/ML
15 INJECTION, SOLUTION SUBCUTANEOUS DAILY
Status: DISCONTINUED | OUTPATIENT
Start: 2021-02-07 | End: 2021-02-26 | Stop reason: HOSPADM

## 2021-02-07 RX ORDER — MIDAZOLAM IN NACL,ISO-OSMOT/PF 100MG/0.1L
0-10 VIAL (ML) INTRAVENOUS
Status: DISCONTINUED | OUTPATIENT
Start: 2021-02-07 | End: 2021-02-15

## 2021-02-07 RX ADMIN — INSULIN LISPRO 6 UNITS: 100 INJECTION, SOLUTION INTRAVENOUS; SUBCUTANEOUS at 12:21

## 2021-02-07 RX ADMIN — PIPERACILLIN SODIUM AND TAZOBACTAM SODIUM 3.38 G: 3; .375 INJECTION, POWDER, LYOPHILIZED, FOR SOLUTION INTRAVENOUS at 21:33

## 2021-02-07 RX ADMIN — OXYCODONE HYDROCHLORIDE 5 MG: 5 SOLUTION ORAL at 07:44

## 2021-02-07 RX ADMIN — MORPHINE SULFATE 2 MG: 2 INJECTION, SOLUTION INTRAMUSCULAR; INTRAVENOUS at 04:40

## 2021-02-07 RX ADMIN — INSULIN LISPRO 3 UNITS: 100 INJECTION, SOLUTION INTRAVENOUS; SUBCUTANEOUS at 16:56

## 2021-02-07 RX ADMIN — OXYCODONE HYDROCHLORIDE 5 MG: 5 SOLUTION ORAL at 01:17

## 2021-02-07 RX ADMIN — INSULIN GLARGINE 15 UNITS: 100 INJECTION, SOLUTION SUBCUTANEOUS at 13:02

## 2021-02-07 RX ADMIN — FENTANYL CITRATE 50 MCG: 50 INJECTION, SOLUTION INTRAMUSCULAR; INTRAVENOUS at 10:25

## 2021-02-07 RX ADMIN — INSULIN LISPRO 6 UNITS: 100 INJECTION, SOLUTION INTRAVENOUS; SUBCUTANEOUS at 07:43

## 2021-02-07 RX ADMIN — INSULIN LISPRO 3 UNITS: 100 INJECTION, SOLUTION INTRAVENOUS; SUBCUTANEOUS at 20:55

## 2021-02-07 RX ADMIN — Medication 20 ML: at 21:33

## 2021-02-07 RX ADMIN — INSULIN LISPRO 3 UNITS: 100 INJECTION, SOLUTION INTRAVENOUS; SUBCUTANEOUS at 04:53

## 2021-02-07 RX ADMIN — Medication 2 MG/HR: at 15:16

## 2021-02-07 RX ADMIN — ATORVASTATIN CALCIUM 80 MG: 40 TABLET, FILM COATED ORAL at 08:13

## 2021-02-07 RX ADMIN — HYDRALAZINE HYDROCHLORIDE 20 MG: 20 INJECTION, SOLUTION INTRAMUSCULAR; INTRAVENOUS at 06:31

## 2021-02-07 RX ADMIN — AMIODARONE HYDROCHLORIDE 200 MG: 200 TABLET ORAL at 08:13

## 2021-02-07 RX ADMIN — ACETAMINOPHEN 650 MG: 325 TABLET, FILM COATED ORAL at 12:26

## 2021-02-07 RX ADMIN — LORAZEPAM 1 MG: 2 INJECTION INTRAMUSCULAR; INTRAVENOUS at 09:15

## 2021-02-07 RX ADMIN — LORAZEPAM 1 MG: 2 INJECTION INTRAMUSCULAR; INTRAVENOUS at 13:24

## 2021-02-07 RX ADMIN — MINERAL OIL, PETROLATUM: 425; 568 OINTMENT OPHTHALMIC at 08:14

## 2021-02-07 RX ADMIN — FENTANYL CITRATE 50 MCG: 50 INJECTION, SOLUTION INTRAMUSCULAR; INTRAVENOUS at 08:13

## 2021-02-07 RX ADMIN — LANSOPRAZOLE 30 MG: KIT at 21:33

## 2021-02-07 RX ADMIN — Medication 20 ML: at 13:03

## 2021-02-07 RX ADMIN — LORAZEPAM 1 MG: 2 INJECTION INTRAMUSCULAR; INTRAVENOUS at 05:15

## 2021-02-07 RX ADMIN — PIPERACILLIN SODIUM AND TAZOBACTAM SODIUM 3.38 G: 3; .375 INJECTION, POWDER, LYOPHILIZED, FOR SOLUTION INTRAVENOUS at 08:21

## 2021-02-07 RX ADMIN — Medication 20 ML: at 06:00

## 2021-02-07 RX ADMIN — INSULIN LISPRO 3 UNITS: 100 INJECTION, SOLUTION INTRAVENOUS; SUBCUTANEOUS at 23:16

## 2021-02-07 RX ADMIN — LANSOPRAZOLE 30 MG: KIT at 08:13

## 2021-02-07 RX ADMIN — Medication 50 MCG/HR: at 10:28

## 2021-02-07 RX ADMIN — MINERAL OIL, PETROLATUM: 425; 568 OINTMENT OPHTHALMIC at 21:33

## 2021-02-07 NOTE — PROGRESS NOTES
Bedside, Verbal and Written shift change report given to Chitra Napoles RN (oncoming nurse) by Indiana Davis RN (offgoing nurse). Report included the following information SBAR, Kardex, Intake/Output, MAR, Recent Results, Cardiac Rhythm NSR and Alarm Parameters . Versed and fentanyl gtts dual verified in MAR.

## 2021-02-07 NOTE — PROGRESS NOTES
Ventilator check complete; patient has a #8.0 XLT tracheostomy. Patient is not sedated. Patient is able to follow commands. Breath sounds are coarse and diminished. Trachea is midline, Negative for subcutaneous air, and chest excursion is symmetric. Patient is also Negative for cyanosis and is Positive for pitting edema. All alarms are set and audible. Resuscitation bag is at the head of the bed.       Ventilator Settings  Mode FIO2 Rate PC  PEEP I:E Ratio   Pressure control  40 % 26  16 cm H20  8 cm H20  1:1.89      Peak airway pressure: 26 cm H2O   Minute ventilation: 16.4 l/min       Star Pulling, RT

## 2021-02-07 NOTE — PROGRESS NOTES
PT Note:  PT orders received and chart review initiated. Attempted re-eval, however, patient too sedated to participate today. Will attempt another time/day as schedule permits.   A Kisha Jimenez, PT, DPT

## 2021-02-07 NOTE — PROGRESS NOTES
Patient's tachypnea (RR 40s) discussed with Dr. Johnny Hurley. Orders received for PRN fentanyl. Will administer and continue to monitor.

## 2021-02-07 NOTE — PROGRESS NOTES
[facility]        Subjective: SWATI  Pt on vent, sedated  Pt started on dialysis on 1/25/21. Review of Systems -   Can not be obtained. Objective:    Vitals:    02/07/21 0729 02/07/21 0759 02/07/21 0829 02/07/21 0832   BP: (!) 151/65 (!) 141/63 131/60    Pulse: 92 95 93 93   Resp: 30 (!) 47 (!) 42 25   Temp:       SpO2: 91% 91% 93% 92%   Weight:       Height:           PE  Gen: intubated  CV:reg rate  Chest:bilat breath sounds  Abd: soft  Ext/Access:rt IJ temp dialysis cath ok, . Martell Felicity [facility]  Recent Labs     02/07/21  0359 02/06/21  1609 02/06/21  0313 02/05/21  0340   WBC 14.6*  --  11.1 13.2*   HGB 7.7* 7.7* 6.7* 7.0*   HCT 23.7* 23.7* 21.0* 22.2*     --  171 185   INR 1.2  --  1.3 1.2     Recent Labs     02/07/21  0359 02/06/21  0313 02/05/21  0340    141 137   K 3.5 3.6 4.6    103 99   CO2 31 32 27   * 166* 140*   BUN 71* 49* 100*   CREA 4.59* 3.45* 5.79*   MG  --   --  2.3   PHOS  --   --  6.9*   CA 7.8* 7.7* 8.3           Radiology    A/P:   Patient Active Problem List   Diagnosis Code    Obesity E66.9    Hypertension I10    Bradycardia R00.1    PVC (premature ventricular contraction) I49.3    CAD (coronary artery disease) I25.10    Dyslipidemia, goal LDL below 70 E78.5    SWATI (acute kidney injury) ([facility] [address] N17.9    Acute hypoxemic respiratory failure (HCC) J96.01    Type 2 diabetes mellitus with hyperosmolarity without nonketotic hyperglycemic-hyperosmolar coma (HCC) E11.00    Pneumonia due to COVID-19 virus U07.1, J12.82    Hypernatremia E87.0    Atrial fibrillation (HCC) I48.91    Hypotension I95.9    Acute deep vein thrombosis (DVT) of left peroneal vein (HCC) I82.452     SWATI - ATN, Still oliguric. For SLED again on Monday 2/8/21, after IVC filter. .. No signs of renal recovery.  Will IR for a tunnelled HD cath tomorrow, when he goes for IVC filter.,    COVID19/ Resp Failure    Hypoteinsion    A Fib with RVR    Anemia - intermittent transfusion      Hannah Saldana MD

## 2021-02-07 NOTE — PROGRESS NOTES
[facility] Critical Care COVID-19 Note:    Critical Care Note: 2/7/2021    Caye Dandy [place] May. Admission Date: 1/6/2021     Length of Stay: 31 days    Background: 61 y.o. y/o male with acute hypoxemic respiratory failure secondary to COVID-19. Pt admitted 1/7 with fever, cough, fever. Covid + 1/6. Admitted by hospitalists. Started on dexamethasone, convalescent plasma, and remdesivir. Pulm consulted 1/10 with pt requiring CPAP but pt decompensated and was intubated 1/17. Nephrology consulted 1/23 for renal failure. US 1/18 with L peroneal vein thrombus. He was started on heparin but stopped due to bleeding from HD catheter. He was started on HD on 1/25. Trached on 2/2 secondary to inability to wean from the vent. Pt developed afib requiring amio. PEG placed 2/4. Onset of COVID-19 symptoms: 1/5/2021    Notable PMH: obesity, HTN, CAD, dyslipidemia, SWATI, DM, GERD    Drug Allergies: Allergies   Allergen Reactions    Latex Swelling     Had a purcell catheter with swelling of urethra. Only known latex issue in past. Wife remembers this now    Hydrocodone-Acetaminophen Itching     Wife remembers this allergy    Tessalon Perles [Benzonatate] Unknown (comments)       24 Hour events:   No major issues overnight. Is beginning to wake up but also become more tachypneic with RR in the 40's at times. Febrile last night to 101. Cultured on 2/5 for this reason. Sputum with GNR and staph. On vanc and zosyn. ROS: trached    Lines: (insertion date)   Tracheostomy size #8 prox XLT, cuffed Shiley (2/2)  PEG 2/4  PICC 1/13  Art line 1/18/2021  Dialysis access 1/24/21  Purcell 1/17  Rectal tube 1/30    Drips: current dose (range)  none    Visit Vitals  BP (!) 151/65   Pulse 92   Temp 100.1 °F (37.8 °C)   Resp 30   Ht 5' 8\" (1.727 m)   Wt 232 lb 9.4 oz (105.5 kg)   SpO2 91%   BMI 35.36 kg/m²     Pertinent Exam:            Constitutional:  trached and mechanically ventilated. Appears more tachypeic today. EENMT:  Sclera clear, pupils equal, oral mucosa moist  Respiratory: crackles bilaterally   Cardiovascular:  RRR with no M,G,R;  Gastrointestinal:  soft with no tenderness; positive bowel sounds present  Musculoskeletal:  warm with no cyanosis, 2+ pitting B lower extremity edema up to thigh  Skin:  no jaundice or ecchymosis  Neurologic: Asleep but awakens, tachypneic. Psychiatric: as above. Pertinent Labs:   Recent Labs     02/07/21  0359 02/06/21  1609 02/06/21  0313 02/05/21  0340   WBC 14.6*  --  11.1 13.2*   HGB 7.7* 7.7* 6.7* 7.0*   HCT 23.7* 23.7* 21.0* 22.2*     --  171 185   INR 1.2  --  1.3 1.2     Recent Labs     02/07/21  0359 02/06/21  0313 02/05/21  0340    141 137   K 3.5 3.6 4.6    103 99   CO2 31 32 27   * 166* 140*   BUN 71* 49* 100*   CREA 4.59* 3.45* 5.79*   MG  --   --  2.3   CA 7.8* 7.7* 8.3   PHOS  --   --  6.9*     Recent Labs     02/06/21  2303 02/06/21  2042 02/06/21  1617   GLUCPOC 157* 205* 169*       SARS-CoV-2 LAB Results  [facility] Test: No results found for: COV2NT   [facility] Test: No results found for: EDPR  [facility] Test: No components found for: [id number]     COVID-19 Meds:  Dexamethasone 6mg daily (EOT 1/16)  Convalescent plasma transfusion (1/7)  Remdesivir (EOT 1/12)    Micro Results:  BC: negative (1/30)  Sputum cx: normal resp vianney: (1/30)    Anti-infectives (start date):  azithro (completed)  Ceftriaxone (completed)  Vanc and cefepime: (completed)  Vanc and zosyn (started 2/5)    Ventilator Settings:  5' 8\" (1.727 m)  Mode FIO2 Rate Tidal Volume Pressure PEEP   Pressure control  49 %    525 ml     8 cm H20    Peak airway pressure: 30 cm H2O   Minute ventilation: 18.4 l/min    ABG:   Recent Labs     02/07/21  0311 02/06/21  0244 02/05/21  0306   PHI 7.45 7.49* 7.43   PCO2I 43.4 40.7 41.5   PO2I 78 69* 116*   HCO3I 30.0* 31.2* 27.4*         IMPRESSION:   61 y.o. y/o male with acute hypoxemic respiratory failure secondary to COVID-19.  DVT unable to tolerate anticoagulation. SWATI on SLED. Now trached, PEG. Increasing tachypnea may simply be due to his ongoing parenchymal lung disease as above. PLAN:  -trached, cont to wean vent. Down to 40% Fio2 though becoming more tachypneic. Will try prn fentanyl to see if this helps. -on po amio for a fib  -cont sled per nephrology. -IVC filter on Monday. Did not tolerate anticoagulation with multiple attempts. Continues to require intermittent transfusions. Getting 1unit PRBC today. -on TF and a goal through PEG.   -restarted abx, day 3 vanc/zosyn. Sputum with GNR and staph. Still having fevers. -prn oxycodone for pain control. Adding fentanyl as above  -s/p treatments for COVID. Now off droplet precautions. -needs SCD on R LE.   -cont prevacid  -consult placed for PT starting tomorrow  -Monday consider [facility] evaluation.        Full Code      Neha Pulido MD

## 2021-02-08 ENCOUNTER — APPOINTMENT (OUTPATIENT)
Dept: INTERVENTIONAL RADIOLOGY/VASCULAR | Age: 61
DRG: 004 | End: 2021-02-08
Attending: INTERNAL MEDICINE
Payer: COMMERCIAL

## 2021-02-08 ENCOUNTER — APPOINTMENT (OUTPATIENT)
Dept: GENERAL RADIOLOGY | Age: 61
DRG: 004 | End: 2021-02-08
Attending: INTERNAL MEDICINE
Payer: COMMERCIAL

## 2021-02-08 LAB
ANION GAP SERPL CALC-SCNC: 10 MMOL/L (ref 7–16)
APTT PPP: 46.7 SEC (ref 24.1–35.1)
ARTERIAL PATENCY WRIST A: YES
BACTERIA SPEC CULT: ABNORMAL
BASE EXCESS BLD CALC-SCNC: 5 MMOL/L
BDY SITE: ABNORMAL
BUN SERPL-MCNC: 91 MG/DL (ref 8–23)
CALCIUM SERPL-MCNC: 7.9 MG/DL (ref 8.3–10.4)
CHLORIDE SERPL-SCNC: 102 MMOL/L (ref 98–107)
CO2 BLD-SCNC: 33 MMOL/L
CO2 SERPL-SCNC: 30 MMOL/L (ref 21–32)
COLLECT TIME,HTIME: 314
CREAT SERPL-MCNC: 5.58 MG/DL (ref 0.8–1.5)
ERYTHROCYTE [DISTWIDTH] IN BLOOD BY AUTOMATED COUNT: 16.6 % (ref 11.9–14.6)
EXHALED MINUTE VOLUME, VE: 12.1 L/MIN
FIBRINOGEN PPP-MCNC: 752 MG/DL (ref 190–501)
GAS FLOW.O2 O2 DELIVERY SYS: ABNORMAL L/MIN
GAS FLOW.O2 SETTING OXYMISER: 22 BPM
GLUCOSE BLD STRIP.AUTO-MCNC: 132 MG/DL (ref 65–100)
GLUCOSE BLD STRIP.AUTO-MCNC: 152 MG/DL (ref 65–100)
GLUCOSE BLD STRIP.AUTO-MCNC: 166 MG/DL (ref 65–100)
GLUCOSE BLD STRIP.AUTO-MCNC: 189 MG/DL (ref 65–100)
GLUCOSE SERPL-MCNC: 129 MG/DL (ref 65–100)
GRAM STN SPEC: ABNORMAL
HCO3 BLD-SCNC: 31.3 MMOL/L (ref 22–26)
HCT VFR BLD AUTO: 24.5 % (ref 41.1–50.3)
HGB BLD-MCNC: 7.7 G/DL (ref 13.6–17.2)
INR PPP: 1.2
MAGNESIUM SERPL-MCNC: 1.9 MG/DL (ref 1.8–2.4)
MCH RBC QN AUTO: 28.9 PG (ref 26.1–32.9)
MCHC RBC AUTO-ENTMCNC: 31.4 G/DL (ref 31.4–35)
MCV RBC AUTO: 92.1 FL (ref 79.6–97.8)
NRBC # BLD: 0 K/UL (ref 0–0.2)
O2/TOTAL GAS SETTING VFR VENT: 50 %
PCO2 BLD: 54.5 MMHG (ref 35–45)
PEEP RESPIRATORY: 8 CMH2O
PH BLD: 7.37 [PH] (ref 7.35–7.45)
PHOSPHATE SERPL-MCNC: 5.2 MG/DL (ref 2.3–3.7)
PLATELET # BLD AUTO: 150 K/UL (ref 150–450)
PMV BLD AUTO: 10.2 FL (ref 9.4–12.3)
PO2 BLD: 61 MMHG (ref 75–100)
POTASSIUM SERPL-SCNC: 3.5 MMOL/L (ref 3.5–5.1)
PRESSURE CONTROL, IPC: 16
PROTHROMBIN TIME: 15.2 SEC (ref 12.5–14.7)
RBC # BLD AUTO: 2.66 M/UL (ref 4.23–5.6)
SAO2 % BLD: 89 % (ref 95–98)
SERVICE CMNT-IMP: ABNORMAL
SODIUM SERPL-SCNC: 142 MMOL/L (ref 136–145)
SPECIMEN TYPE: ABNORMAL
VENTILATION MODE VENT: ABNORMAL
WBC # BLD AUTO: 13.6 K/UL (ref 4.3–11.1)

## 2021-02-08 PROCEDURE — 77030010507 HC ADH SKN DERMBND J&J -B

## 2021-02-08 PROCEDURE — 77030004629 HC CATH ANGI SZ AUR COOK -B

## 2021-02-08 PROCEDURE — 85610 PROTHROMBIN TIME: CPT

## 2021-02-08 PROCEDURE — 74011250636 HC RX REV CODE- 250/636: Performed by: INTERNAL MEDICINE

## 2021-02-08 PROCEDURE — 77030018719 HC DRSG PTCH ANTIMIC J&J -A

## 2021-02-08 PROCEDURE — 36558 INSERT TUNNELED CV CATH: CPT

## 2021-02-08 PROCEDURE — 74011000258 HC RX REV CODE- 258: Performed by: INTERNAL MEDICINE

## 2021-02-08 PROCEDURE — 77030002916 HC SUT ETHLN J&J -A

## 2021-02-08 PROCEDURE — 80048 BASIC METABOLIC PNL TOTAL CA: CPT

## 2021-02-08 PROCEDURE — XW033E5 INTRODUCTION OF REMDESIVIR ANTI-INFECTIVE INTO PERIPHERAL VEIN, PERCUTANEOUS APPROACH, NEW TECHNOLOGY GROUP 5: ICD-10-PCS | Performed by: INTERNAL MEDICINE

## 2021-02-08 PROCEDURE — 83735 ASSAY OF MAGNESIUM: CPT

## 2021-02-08 PROCEDURE — 94003 VENT MGMT INPAT SUBQ DAY: CPT

## 2021-02-08 PROCEDURE — 74011250637 HC RX REV CODE- 250/637: Performed by: INTERNAL MEDICINE

## 2021-02-08 PROCEDURE — 71045 X-RAY EXAM CHEST 1 VIEW: CPT

## 2021-02-08 PROCEDURE — C1894 INTRO/SHEATH, NON-LASER: HCPCS

## 2021-02-08 PROCEDURE — 82803 BLOOD GASES ANY COMBINATION: CPT

## 2021-02-08 PROCEDURE — 37191 INS ENDOVAS VENA CAVA FILTR: CPT

## 2021-02-08 PROCEDURE — 90945 DIALYSIS ONE EVALUATION: CPT

## 2021-02-08 PROCEDURE — 36600 WITHDRAWAL OF ARTERIAL BLOOD: CPT

## 2021-02-08 PROCEDURE — 97164 PT RE-EVAL EST PLAN CARE: CPT

## 2021-02-08 PROCEDURE — 77030006998

## 2021-02-08 PROCEDURE — 2709999900 HC NON-CHARGEABLE SUPPLY

## 2021-02-08 PROCEDURE — 02H633Z INSERTION OF INFUSION DEVICE INTO RIGHT ATRIUM, PERCUTANEOUS APPROACH: ICD-10-PCS | Performed by: RADIOLOGY

## 2021-02-08 PROCEDURE — 74011000250 HC RX REV CODE- 250: Performed by: INTERNAL MEDICINE

## 2021-02-08 PROCEDURE — C1750 CATH, HEMODIALYSIS,LONG-TERM: HCPCS

## 2021-02-08 PROCEDURE — 84100 ASSAY OF PHOSPHORUS: CPT

## 2021-02-08 PROCEDURE — 97168 OT RE-EVAL EST PLAN CARE: CPT

## 2021-02-08 PROCEDURE — 74011636637 HC RX REV CODE- 636/637: Performed by: INTERNAL MEDICINE

## 2021-02-08 PROCEDURE — 65620000000 HC RM CCU GENERAL

## 2021-02-08 PROCEDURE — 97530 THERAPEUTIC ACTIVITIES: CPT

## 2021-02-08 PROCEDURE — B5181ZA FLUOROSCOPY OF SUPERIOR VENA CAVA USING LOW OSMOLAR CONTRAST, GUIDANCE: ICD-10-PCS | Performed by: RADIOLOGY

## 2021-02-08 PROCEDURE — 85384 FIBRINOGEN ACTIVITY: CPT

## 2021-02-08 PROCEDURE — 85027 COMPLETE CBC AUTOMATED: CPT

## 2021-02-08 PROCEDURE — 77030003028 HC SUT VCRL J&J -A

## 2021-02-08 PROCEDURE — 74011000258 HC RX REV CODE- 258

## 2021-02-08 PROCEDURE — 0JH63XZ INSERTION OF TUNNELED VASCULAR ACCESS DEVICE INTO CHEST SUBCUTANEOUS TISSUE AND FASCIA, PERCUTANEOUS APPROACH: ICD-10-PCS | Performed by: RADIOLOGY

## 2021-02-08 PROCEDURE — C1769 GUIDE WIRE: HCPCS

## 2021-02-08 PROCEDURE — 06H03DZ INSERTION OF INTRALUMINAL DEVICE INTO INFERIOR VENA CAVA, PERCUTANEOUS APPROACH: ICD-10-PCS | Performed by: RADIOLOGY

## 2021-02-08 PROCEDURE — 82962 GLUCOSE BLOOD TEST: CPT

## 2021-02-08 PROCEDURE — 85730 THROMBOPLASTIN TIME PARTIAL: CPT

## 2021-02-08 PROCEDURE — C1880 VENA CAVA FILTER: HCPCS

## 2021-02-08 PROCEDURE — 77030018798 HC PMP KT ENTRL FED COVD -A

## 2021-02-08 PROCEDURE — 74011000250 HC RX REV CODE- 250: Performed by: RADIOLOGY

## 2021-02-08 PROCEDURE — 74011000250 HC RX REV CODE- 250

## 2021-02-08 PROCEDURE — 74011000636 HC RX REV CODE- 636: Performed by: RADIOLOGY

## 2021-02-08 PROCEDURE — 74011250636 HC RX REV CODE- 250/636: Performed by: RADIOLOGY

## 2021-02-08 PROCEDURE — 99233 SBSQ HOSP IP/OBS HIGH 50: CPT | Performed by: INTERNAL MEDICINE

## 2021-02-08 PROCEDURE — 97112 NEUROMUSCULAR REEDUCATION: CPT

## 2021-02-08 RX ORDER — LIDOCAINE HYDROCHLORIDE AND EPINEPHRINE 10; 10 MG/ML; UG/ML
2-20 INJECTION, SOLUTION INFILTRATION; PERINEURAL ONCE
Status: DISCONTINUED | OUTPATIENT
Start: 2021-02-08 | End: 2021-02-08

## 2021-02-08 RX ORDER — HEPARIN SODIUM 200 [USP'U]/100ML
1000 INJECTION, SOLUTION INTRAVENOUS AS NEEDED
Status: DISCONTINUED | OUTPATIENT
Start: 2021-02-08 | End: 2021-02-08

## 2021-02-08 RX ORDER — FENTANYL CITRATE 50 UG/ML
25-50 INJECTION, SOLUTION INTRAMUSCULAR; INTRAVENOUS
Status: DISCONTINUED | OUTPATIENT
Start: 2021-02-08 | End: 2021-02-08

## 2021-02-08 RX ORDER — NOREPINEPHRINE BITARTRATE/D5W 4MG/250ML
.5-3 PLASTIC BAG, INJECTION (ML) INTRAVENOUS
Status: DISCONTINUED | OUTPATIENT
Start: 2021-02-08 | End: 2021-02-14

## 2021-02-08 RX ORDER — LIDOCAINE HYDROCHLORIDE AND EPINEPHRINE 20; 10 MG/ML; UG/ML
2-20 INJECTION, SOLUTION INFILTRATION; PERINEURAL ONCE
Status: COMPLETED | OUTPATIENT
Start: 2021-02-08 | End: 2021-02-08

## 2021-02-08 RX ORDER — INSULIN GLARGINE 100 [IU]/ML
7 INJECTION, SOLUTION SUBCUTANEOUS ONCE
Status: COMPLETED | OUTPATIENT
Start: 2021-02-08 | End: 2021-02-08

## 2021-02-08 RX ORDER — LIDOCAINE HYDROCHLORIDE 20 MG/ML
2-20 INJECTION, SOLUTION INFILTRATION; PERINEURAL ONCE
Status: COMPLETED | OUTPATIENT
Start: 2021-02-08 | End: 2021-02-08

## 2021-02-08 RX ORDER — LIDOCAINE HYDROCHLORIDE 20 MG/ML
2-20 INJECTION, SOLUTION INFILTRATION; PERINEURAL ONCE
Status: DISCONTINUED | OUTPATIENT
Start: 2021-02-08 | End: 2021-02-08

## 2021-02-08 RX ORDER — HEPARIN SODIUM 1000 [USP'U]/ML
2000-5000 INJECTION, SOLUTION INTRAVENOUS; SUBCUTANEOUS ONCE
Status: COMPLETED | OUTPATIENT
Start: 2021-02-08 | End: 2021-02-08

## 2021-02-08 RX ORDER — NOREPINEPHRINE BITARTRATE/D5W 4MG/250ML
PLASTIC BAG, INJECTION (ML) INTRAVENOUS
Status: ACTIVE
Start: 2021-02-08 | End: 2021-02-09

## 2021-02-08 RX ORDER — LIDOCAINE HYDROCHLORIDE AND EPINEPHRINE 20; 5 MG/ML; UG/ML
2-20 INJECTION, SOLUTION EPIDURAL; INFILTRATION; INTRACAUDAL; PERINEURAL ONCE
Status: DISCONTINUED | OUTPATIENT
Start: 2021-02-08 | End: 2021-02-08

## 2021-02-08 RX ADMIN — HEPARIN SODIUM 2000 UNITS: 200 INJECTION, SOLUTION INTRAVENOUS at 11:30

## 2021-02-08 RX ADMIN — MINERAL OIL, PETROLATUM: 425; 568 OINTMENT OPHTHALMIC at 21:33

## 2021-02-08 RX ADMIN — PIPERACILLIN SODIUM AND TAZOBACTAM SODIUM 3.38 G: 3; .375 INJECTION, POWDER, LYOPHILIZED, FOR SOLUTION INTRAVENOUS at 21:30

## 2021-02-08 RX ADMIN — MORPHINE SULFATE 2 MG: 2 INJECTION, SOLUTION INTRAMUSCULAR; INTRAVENOUS at 03:35

## 2021-02-08 RX ADMIN — PIPERACILLIN SODIUM AND TAZOBACTAM SODIUM 3.38 G: 3; .375 INJECTION, POWDER, LYOPHILIZED, FOR SOLUTION INTRAVENOUS at 08:19

## 2021-02-08 RX ADMIN — NOREPINEPHRINE-DEXTROSE IV SOLUTION 4 MG/250ML-5% 10 MCG/MIN: 4-5/250 SOLUTION at 12:08

## 2021-02-08 RX ADMIN — ATORVASTATIN CALCIUM 80 MG: 40 TABLET, FILM COATED ORAL at 08:18

## 2021-02-08 RX ADMIN — INSULIN GLARGINE 7 UNITS: 100 INJECTION, SOLUTION SUBCUTANEOUS at 08:27

## 2021-02-08 RX ADMIN — VANCOMYCIN HYDROCHLORIDE 500 MG: 500 INJECTION, POWDER, LYOPHILIZED, FOR SOLUTION INTRAVENOUS at 23:39

## 2021-02-08 RX ADMIN — Medication 20 ML: at 13:17

## 2021-02-08 RX ADMIN — AMIODARONE HYDROCHLORIDE 200 MG: 200 TABLET ORAL at 08:18

## 2021-02-08 RX ADMIN — INSULIN LISPRO 3 UNITS: 100 INJECTION, SOLUTION INTRAVENOUS; SUBCUTANEOUS at 16:23

## 2021-02-08 RX ADMIN — NOREPINEPHRINE-DEXTROSE IV SOLUTION 4 MG/250ML-5% 10 MCG/MIN: 4-5/250 SOLUTION at 19:11

## 2021-02-08 RX ADMIN — IOPAMIDOL 25 ML: 612 INJECTION, SOLUTION INTRAVENOUS at 11:50

## 2021-02-08 RX ADMIN — LIDOCAINE HYDROCHLORIDE,EPINEPHRINE BITARTRATE 150 MG: 20; .01 INJECTION, SOLUTION INFILTRATION; PERINEURAL at 11:30

## 2021-02-08 RX ADMIN — LIDOCAINE HYDROCHLORIDE 150 MG: 20 INJECTION, SOLUTION INFILTRATION; PERINEURAL at 11:23

## 2021-02-08 RX ADMIN — LANSOPRAZOLE 30 MG: KIT at 08:19

## 2021-02-08 RX ADMIN — MINERAL OIL, PETROLATUM: 425; 568 OINTMENT OPHTHALMIC at 08:19

## 2021-02-08 RX ADMIN — HEPARIN SODIUM 3200 UNITS: 1000 INJECTION INTRAVENOUS; SUBCUTANEOUS at 11:31

## 2021-02-08 RX ADMIN — INSULIN LISPRO 3 UNITS: 100 INJECTION, SOLUTION INTRAVENOUS; SUBCUTANEOUS at 13:43

## 2021-02-08 RX ADMIN — Medication 20 ML: at 21:35

## 2021-02-08 RX ADMIN — Medication 20 ML: at 06:00

## 2021-02-08 RX ADMIN — LANSOPRAZOLE 30 MG: KIT at 21:34

## 2021-02-08 RX ADMIN — FENTANYL CITRATE 50 MCG: 50 INJECTION, SOLUTION INTRAMUSCULAR; INTRAVENOUS at 04:08

## 2021-02-08 RX ADMIN — INSULIN LISPRO 3 UNITS: 100 INJECTION, SOLUTION INTRAVENOUS; SUBCUTANEOUS at 20:17

## 2021-02-08 NOTE — PROCEDURES
Department of Interventional Radiology  [phone]        Interventional Radiology Brief Procedure Note    Patient: Moriah Briseno. MRN: [id number]  SSN: [id number]    YOB: 1960  Age: 61 y.o. Sex: male      Date of Procedure: 2/8/2021    Pre-Procedure Diagnosis: DVT, renal failure    Post-Procedure Diagnosis: SAME    Procedure(s): Tunneled Central Venous Catheter, IVC filter    Brief Description of Procedure: as above    Performed By: Marc Newton MD     Assistants: None    Anesthesia:Lidocaine    Estimated Blood Loss: Less than 10ml    Specimens:  None    Implants:  IVC filter, tunneled diaylsis catheter    Findings: no caval thrombus.   Patent IJ     Complications: None    Recommendations: ok to use     Follow Up: as needed    Signed By: Marc Newton MD     February 8, 2021

## 2021-02-08 NOTE — PROGRESS NOTES
Ventilator check complete; patient has a #8.0 XLT tracheostomy.  Patient is sedated.  Patient is not able to follow commands.  Breath sounds are coarse.  Trachea is midline, Negative for subcutaneous air, and chest excursion is symmetric. Patient is also Negative for cyanosis and is Positive for pitting edema.  All alarms are set and audible.  Resuscitation bag is at the head of the bed.      Ventilator Settings  Mode FIO2 Rate PC Ppl PEEP I:E Ratio   Pressure control  50 % 22 16 cm H20 25 cm H20 8 cm H20  1:2.38      Peak airway pressure: 26 cm H2O   Minute ventilation: 10.9 l/min       Carlos Alberto Arzate, RT

## 2021-02-08 NOTE — PROGRESS NOTES
ACUTE OT GOALS:  (Developed with and agreed upon by patient and/or caregiver.)  NEW GOALS (ADDED 2021)  1. Patient will complete self-grooming tasks in supported sitting with mod A and adaptive equipment as needed. 2. Patient will complete functional transfers with max A and adaptive equipment as needed. 3. Patient will tolerate 30 minutes OT treatment with 2-3 rest breaks as needed to increase activity tolerance for ADL performance. 4. Patient will tolerate 15 minutes BUE therapeutic exercises to increase functional use of BUE during ADL performance.        Timeframe: 7 days      OCCUPATIONAL THERAPY ASSESSMENT: Daily Note and Re-evaluation OT Treatment Day # [address] is a 61 y.o. male   PRIMARY DIAGNOSIS: Pneumonia due to COVID-19 virus  Type 2 diabetes mellitus with hyperosmolarity without nonketotic hyperglycemic-hyperosmolar coma (HCC) [E11.00]  Lower respiratory tract infection due to COVID-19 virus [U07.1, J22]  Acute hypoxemic respiratory failure (HCC) [J96.01]  SW[facility]acute [facility]) ([facility] [N17.9]  Procedure(s) (LRB):  PERCUTANEOUS ENDOSCOPIC GASTROSTOMY TUBE INSERTION ROOM 3302  *Pt on vent with trach/ amio gtt (N/A)  ESOPHAGOGASTRODUODENOSCOPY (EGD) (N/A)  4 Days Post-Op  Reason for Referral:  Fever, SOB, confusion, decreased balance and mobility  ICD-10: Treatment Diagnosis: Generalized Muscle Weakness (M62.81)  INPATIENT: Payor: [facility] / Plan: Sylvia Eckert PPO / Product Type: Commerical /   ASSESSMENT:     REHAB RECOMMENDATIONS:   Recommendation to date pending progress:  [address] [place] [address]   Equipment:    To Be Determined     PRIOR LEVEL OF FUNCTION:  (Prior to Hospitalization)  INITIAL/CURRENT LEVEL OF FUNCTION:  (Most Recently Demonstrated)   Bathing:   Independent  Dressing:   Independent  Feeding/Grooming:   Independent  Toileting:   Independent  Functional Mobility:   Independent Bathing:   Total Assistance  Dressing:   Total Assistance  Feeding/Grooming:   Total Assistance  Toileting:   Total Assistance  Functional Mobility:   Total Assistance     ASSESSMENT:  Mr. Mg Ortega presented generally weak with deficits in balance, endurance, and mobility impacting ADLs. Pt lives with his sister and is independent at baseline, works and drives, does not own or use any DME. Presents in ICU today with trach and resting on pressure support. Currently requires total A x 2 for all functional transfers. Total A for all self-care and grooming tasks. Addressed overall positioning/posture to decrease risk of developing secondary complications. Bed placed in  Chair position with therapeutic exercises completed. Re-evaluation completed with goals updated to reflect pt's current status. Will continue OT efforts as indicated.       SUBJECTIVE:   Mr. Mg Ortega states Unable to verbalize    SOCIAL HISTORY/LIVING ENVIRONMENT:   Home Environment: Private residence  One/Two Story Residence: One story  Living Alone: No  Support Systems: Family member(s)    OBJECTIVE:     PAIN: VITAL SIGNS: LINES/DRAINS:   Pre Treatment: Pain Screen  Pain Scale 1: Numeric (0 - 10)  Pain Intensity 1: 0  Post Treatment: 0   IVs, flexi-seal, purcell  O2 Device: Tracheostomy, Ventilator     GROSS EVALUATION:  BUE Within Functional Limits Abnormal/ Functional Abnormal/ Non-Functional (see comments) Not Tested Comments:   AROM [] [] [x] []    PROM [] [] [x] []    Strength [] [] [x] []    Balance [] [] [x] [] Requires supported sitting    Posture [] [] [x] [] Total A; requires supported sitting    Sensation [] [] [x] []    Coordination [] [] [x] []    Tone [] [] [x] []    Edema [] [] [x] [] 3+ pitting edema throughout   Activity Tolerance [] [] [x] []     [] [] [] []      COGNITION/  PERCEPTION: Intact Impaired   (see comments) Comments:   Orientation [] []    Vision [] []    Hearing [] [x]    Judgment/ Insight [] [x]    Attention [] [x] Lethargic/confused Memory [] [x] confused   Command Following [] [x] Minimal command following but will open eyes   Emotional Regulation [] [x]     [] []      ACTIVITIES OF DAILY LIVING: I Mod I S SBA CGA Min Mod Max Total NT Comments   BASIC ADLs:              Bathing/ Showering [] [] [] [] [] [] [] [] [] [x]    Toileting [] [] [] [] [] [] [] [] [] [x]    Dressing [] [] [] [] [] [] [] [] [] [x]    Feeding [] [] [] [] [] [] [] [] [] [x]    Grooming [] [] [] [] [] [] [] [] [] [x]    Personal Device Care [] [] [] [] [] [] [] [] [] [x]    Functional Mobility [] [] [] [] [] [] [] [] [x] [] X 2   I=Independent, Mod I=Modified Independent, S=Supervision, SBA=Standby Assistance, CGA=Contact Guard Assistance,   Min=Minimal Assistance, Mod=Moderate Assistance, Max=Maximal Assistance, Total=Total Assistance, NT=Not Tested    MOBILITY: I Mod I S SBA CGA Min Mod Max Total  NT x2 Comments:   Supine to sit [] [] [] [] [] [] [] [] [x] [] [x]    Sit to supine [] [] [] [] [] [] [] [] [x] [] [x]    Sit to stand [] [] [] [] [] [] [] [] [] [] []    Bed to chair [] [] [] [] [] [] [] [] [] [] []    I=Independent, Mod I=Modified Independent, S=Supervision, SBA=Standby Assistance, CGA=Contact Guard Assistance,   Min=Minimal Assistance, Mod=Moderate Assistance, Max=Maximal Assistance, Total=Total Assistance, NT=Not [facility] 6 Clicks   Daily Activity Inpatient Short Form        How much help from another person does the patient currently need. .. Total A Lot A Little None   1. Putting on and taking off regular lower body clothing? [x] 1   [] 2   [] 3   [] 4   2. Bathing (including washing, rinsing, drying)? [x] 1   [] 2   [] 3   [] 4   3. Toileting, which includes using toilet, bedpan or urinal?   [x] 1   [] 2   [] 3   [] 4   4. Putting on and taking off regular upper body clothing? [x] 1   [] 2   [] 3   [] 4   5. Taking care of personal grooming such as brushing teeth? [x] 1   [] 2   [] 3   [] 4   6. Eating meals? [x] 1   [] 2   [] 3   [] 4   © 2007, Trustees of [facility] [employer], under license to [employer]. All rights reserved     Score:  Initial: 19 Most Recent: 6 (Date: 2/8/2021 )   Interpretation of Tool:  Represents activities that are increasingly more difficult (i.e. Bed mobility, Transfers, Gait). PLAN:   FREQUENCY/DURATION: OT Plan of Care: 3 times/week for duration of hospital stay or until stated goals are met, whichever comes first.    PROBLEM LIST:   (Skilled intervention is medically necessary to address:)  1. Decreased ADL/Functional Activities  2. Decreased Activity Tolerance  3. Decreased Balance  4. Decreased Strength  5. Decreased Transfer Abilities   INTERVENTIONS PLANNED:   (Benefits and precautions of occupational therapy have been discussed with the patient.)  1. Self Care Training  2. Therapeutic Activity  3. Therapeutic Exercise/HEP  4. Neuromuscular Re-education  5. Education     TREATMENT:     EVALUATION: Moderate Complexity : (Untimed Charge)    TREATMENT:   ( $$ Neuromuscular Re-Education: 23-37 mins   )  Neuromuscular Re-education (23 Minutes): Neuromuscular Re-education included Coordination training, Hemibody awareness training, Postural training and Visual field awareness training to improve Balance, Coordination, Postural Control and Proprioception.    Date:  2/8/2021 Date:   Date:     Activity/Exercise Parameters Parameters Parameters   Wrist flexion/extension 2x10 reps BUE     Elbow flexion/extension 2x10 reps BUE     Supination/Pronation GE 2 x 10 reps BUE     Shoulder flexion 1 x 5 reps BUE                              AFTER TREATMENT POSITION/PRECAUTIONS:  Bed and RN notified    INTERDISCIPLINARY COLLABORATION:  RN/PCT, PT/PTA and OT/SIN    TOTAL TREATMENT DURATION:  OT Patient Time In/Time Out  Time In: 1772  Time Out: Wan 110, OT

## 2021-02-08 NOTE — PROGRESS NOTES
Bedside, Verbal and Written shift change report given to [address], RN (oncoming nurse) by Rudolph Pretty RN (offgoing nurse). Report included the following information SBAR, Kardex, ED Summary, Procedure Summary, Intake/Output, MAR, Cardiac Rhythm NSR and Dual Neuro Assessment.      Fentanyl and versed gtts dual verified

## 2021-02-08 NOTE — PROGRESS NOTES
ACUTE PHYSICAL THERAPY GOALS:  (Developed with and agreed upon by patient and/or caregiver.)  UPDATED 21  LTG:  (1.)Mr. Torres will move from supine to sit and sit to supine , scoot up and down and roll side to side in bed with MODERATE ASSIST within 7 treatment day(s). (2.)Mr. Torres will tolerate sitting up in recliner chair for 2 hours with stable vital signs to increase upright tolerance within 7 treatment day(s). (3.)Mr. Torres will maintain static/dynamic sitting x 15 minutes with MINIMAL ASSISTANCE for improved balance within 7 treatment days. (4.)Mr. Torres will follow 50 % of commands for increased participation in therapeutic activity/exercise within 7 treatment days. ________________________________________________________________________________________________      PHYSICAL THERAPY: Re-evaluation and AM Treatment Day # 1    Estela Bernheim. is a 61 y.o. male   PRIMARY DIAGNOSIS: Pneumonia due to COVID-19 virus  Type 2 diabetes mellitus with hyperosmolarity without nonketotic hyperglycemic-hyperosmolar coma ([facility] [E11.00]  Lower respiratory tract infection due to COVID-19 virus [U07.1, J22]  Acute hypoxemic respiratory failure ([facility] [J96.01]  SWATI (acute kidney injury) ([facility] [N17.9]  Procedure(s) (LRB):  PERCUTANEOUS ENDOSCOPIC GASTROSTOMY TUBE INSERTION ROOM 3302  *Pt on vent with trach/ amio gtt (N/A)  ESOPHAGOGASTRODUODENOSCOPY (EGD) (N/A)  4 Days Post-Op    ASSESSMENT:     REHAB RECOMMENDATIONS: CURRENT LEVEL OF FUNCTION:  (Most Recently Demonstrated)   Recommendation to date pending progress:  [address] [place] [address] Avenue  Equipment:    To Be Determined Bed Mobility:   Total Assistance x 2  Sit to Stand:   Not tested  Transfers:   Not tested  Gait/Mobility:   Not tested     ASSESSMENT:  Mr. Mickey Calloway was re-evaluated today in CCU on vent via trach. He was intermittently alert but unable to really follow any commands.   Pt edematous in B LEs with wincing appreciated during B LE PROM. Pt presents today with grossly decreased AROM and strength in B LEs. He required totalA for rolling and OT present given pt's decreased activity tolerance. Pt's goals have been adjusted and he could benefit from skilled PT to address above deficits.          SUBJECTIVE:   Mr. Maggie Marrufo was unable to verbalize today    SOCIAL HISTORY/ LIVING ENVIRONMENT: See eval  Home Environment: Private residence  One/Two Story Residence: One story  Living Alone: No  Support Systems: Family member(s)  OBJECTIVE:     PAIN: VITAL SIGNS: LINES/DRAINS:   Pre Treatment: Pain Screen  Pain Scale 1: Numeric (0 - 10)  Pain Intensity 1: 0  Post Treatment: 0   Gimenez Catheter, IV, PEG and Rectal Tube  O2 Device: Tracheostomy, Ventilator     GROSS EVALUATION:  B LEs Within Functional Limits Abnormal/ Functional Abnormal/ Non-Functional (see comments) Not Tested Comments:   AROM [] [] [x] [] No AROM observed   PROM [] [x] [] []    Strength [] [] [x] []    Balance [] [] [x] [] POOR supported sitting with bed in chair position   Posture [] [] [] []    Sensation [] [] [x] [] No withdrawal to pain on B LEs   Coordination [] [] [] []    Tone [] [] [] []    Edema [] [] [x] [] Pitting in B LEs   Activity Tolerance [] [] [x] [] Increased secretions with mobility    [] [] [] []      COGNITION/  PERCEPTION: Intact Impaired   (see comments) Comments:   Orientation [] [x]    Vision [] []    Hearing [] []    Command Following [] [x] Little to none today   Safety Awareness [] [x]     [] []        MOBILITY: I Mod I S SBA CGA Min Mod Max Total  NT x2 Comments:   Bed Mobility    Rolling [] [] [] [] [] [] [] [] [x] [] [x]    Supine to Sit [] [] [] [] [] [] [] [] [x] [] [] Bed to semi-chair position   Scooting [] [] [] [] [] [] [] [] [x] [] [x]    Sit to Supine [] [] [] [] [] [] [] [] [] [] []    Transfers    Sit to Stand [] [] [] [] [] [] [] [] [] [] []    Bed to Chair [] [] [] [] [] [] [] [] [] [] []    Stand to Sit [] [] [] [] [] [] [] [] [] [] []    I=Independent, Mod I=Modified Independent, S=Supervision, SBA=Standby Assistance, CGA=Contact Guard Assistance,   Min=Minimal Assistance, Mod=Moderate Assistance, Max=Maximal Assistance, Total=Total Assistance, NT=Not Tested    GAIT: I Mod I S SBA CGA Min Mod Max Total  NT x2 Comments:   Level of Assistance [] [] [] [] [] [] [] [] [] [x] []    Distance NT    DME N/A    Gait Quality NT    I=Independent, Mod I=Modified Independent, S=Supervision, SBA=Standby Assistance, CGA=Contact Guard Assistance,   Min=Minimal Assistance, Mod=Moderate Assistance, Max=Maximal Assistance, Total=Total Assistance, NT=Not [facility]       How much difficulty does the patient currently have. .. Unable A Lot A Little None   1. Turning over in bed (including adjusting bedclothes, sheets and blankets)? [x] 1   [] 2   [] 3   [] 4   2. Sitting down on and standing up from a chair with arms ( e.g., wheelchair, bedside commode, etc.)   [x] 1   [] 2   [] 3   [] 4   3. Moving from lying on back to sitting on the side of the bed? [x] 1   [] 2   [] 3   [] 4   How much help from another person does the patient currently need. .. Total A Lot A Little None   4. Moving to and from a bed to a chair (including a wheelchair)? [x] 1   [] 2   [] 3   [] 4   5. Need to walk in hospital room? [x] 1   [] 2   [] 3   [] 4   6. Climbing 3-5 steps with a railing? [x] 1   [] 2   [] 3   [] 4   © 2007, Trustees of [address] [place] [address] [address] [address], under license to [employer]. All rights reserved     Score:  Initial: 20 Most Recent: 6(Date: 2/8/21 )    Interpretation of Tool:  Represents activities that are increasingly more difficult (i.e. Bed mobility, Transfers, Gait).   PLAN:   FREQUENCY/DURATION: PT Plan of Care: 3 times/week for duration of hospital stay or until stated goals are met, whichever comes first.  TREATMENT:   Re-evaluation    TREATMENT:   ($$ Therapeutic Activity: 23-37 mins )  Co-Treatment PT/OT necessary due to patient's decreased overall endurance/tolerance levels, as well as need for high level skilled assistance to complete functional transfers/mobility and functional tasks  Therapeutic Activity (23 Minutes): Therapeutic activity included Rolling, Supine to Sit, Scooting, Sitting balance  and B LEs exercises to improve functional Mobility, Strength, ROM and Activity tolerance.       Date:  2/8/21 Date:   Date: Date: Date:     DF stretch 2 x 10 sec B PROM       Heel slides 1 x 2 R LE PROM       APs 1 x 5 B LE PROM                               B = bilateral; AA = active assistive; A = active; P = passive          AFTER TREATMENT POSITION/PRECAUTIONS:  Bed and RN notified    INTERDISCIPLINARY COLLABORATION:  RN/PCT, PT/PTA and OT/SIN    TOTAL TREATMENT DURATION:  PT Patient Time In/Time Out  Time In: 0850  Time Out: José Miguel 23 Launie Klinefelter, PT, DPT

## 2021-02-08 NOTE — PROGRESS NOTES
Ventilator check complete; patient has a #8.0 XLT tracheostomy. Patient is sedated. Patient is not able to follow commands. Breath sounds are coarse. Trachea is midline, Negative for subcutaneous air, and chest excursion is symmetric. Patient is also Negative for cyanosis and is Positive for pitting edema. All alarms are set and audible. Resuscitation bag is at the head of the bed. Ventilator Settings  Mode FIO2 Rate Tidal Volume Pressure PEEP I:E Ratio   Pressure control  58 %    525 ml     8 cm H20  1:2.22      Peak airway pressure: 28.1 cm H2O   Minute ventilation: 10.9 l/min     ABG: No results for input(s): PH, PCO2, PO2, HCO3 in the last 72 hours.       Minerva Sharpe, RT

## 2021-02-08 NOTE — DIABETES MGMT
Patient's blood glucose ranged 176-215 yesterday with patient receiving Lantus 15 units and Humalog 24 units. Blood glucose 132 this morning. Patient s/p trach and PEG tube. Patient remains on vent. Hgb 7. 7. Cr 5.58. GFR 11. Will follow along.

## 2021-02-08 NOTE — PROGRESS NOTES
[facility]        Subjective: SWATI, Pt started on dialysis on 1/25/21. Review of Systems -   Encephalopathy   Can not be obtained. Objective:    Vitals:    02/08/21 0713 02/08/21 0729 02/08/21 0759 02/08/21 0813   BP:  (!) 128/58 131/60    Pulse: 76 77 78 76   Resp: (!) 32 30 30 29   Temp:  98.9 °F (37.2 °C)     SpO2: 93% 92% 92% 92%   Weight:       Height:           PE  Gen: intubated  CV:reg rate  Chest:bilat bronchi   Abd: soft  Ext: +++  Access:rt IJ temp dialysis cath ok, . [facility]  Recent Labs     02/08/21  0256 02/07/21  0359 02/06/21  1609 02/06/21  0313   WBC 13.6* 14.6*  --  11.1   HGB 7.7* 7.7* 7.7* 6.7*   HCT 24.5* 23.7* 23.7* 21.0*    165  --  171   INR 1.2 1.2  --  1.3     Recent Labs     02/08/21  0256 02/07/21  0359 02/06/21  0313    140 141   K 3.5 3.5 3.6    102 103   CO2 30 31 32   * 176* 166*   BUN 91* 71* 49*   CREA 5.58* 4.59* 3.45*   MG 1.9  --   --    PHOS 5.2*  --   --    CA 7.9* 7.8* 7.7*           Radiology    A/P:   Patient Active Problem List   Diagnosis Code    Obesity E66.9    Hypertension I10    Bradycardia R00.1    PVC (premature ventricular contraction) I49.3    CAD (coronary artery disease) I25.10    Dyslipidemia, goal LDL below 70 E78.5    SWATI (acute kidney injury) ([facility] [address] N17.9    Acute hypoxemic respiratory failure (HCC) J96.01    Type 2 diabetes mellitus with hyperosmolarity without nonketotic hyperglycemic-hyperosmolar coma (HCC) E11.00    Pneumonia due to COVID-19 virus U07.1, J12.82    Hypernatremia E87.0    Atrial fibrillation (HCC) I48.91    Hypotension I95.9    Acute deep vein thrombosis (DVT) of left peroneal vein (HCC) I82.452     SWATI - ATN, Still oliguric. Volume overload   For SLED again today , after IVC filter. No signs of renal recovery. Consult IR for a tunnelled HD when he goes for IVC filter today . ,    COVID19/ Resp Failure    Hypotension    A Fib with RVR    Anemia - intermittent transfusion      Rolando Mazariegos MD

## 2021-02-08 NOTE — PROGRESS NOTES
TRANSFER - OUT REPORT:    Verbal report given to Ella Sara mago Loco.  being transferred to CCU for routine post - op       Report consisted of patients Situation, Background, Assessment and   Recommendations(SBAR). Information from the following report(s) SBAR, Kardex, Procedure Summary and MAR was reviewed with the receiving nurse. Lines:   PICC Double Lumen 48/15/24 Right;Basilic (Active)   Central Line Being Utilized Yes 02/08/21 0729   Criteria for Appropriate Use Irritant/vesicant medication 02/08/21 0729   Site Assessment Clean, dry, & intact 02/08/21 0729   Phlebitis Assessment 0 02/08/21 0729   Infiltration Assessment 0 02/08/21 0729   Arm Circumference (cm) 33 cm 01/13/21 1605   Date of Last Dressing Change 02/03/21 02/08/21 0729   Dressing Status Clean, dry, & intact 02/08/21 0729   Action Taken Open ports on tubing capped 01/31/21 1940   External Catheter Length (cm) 0 centimeters 01/16/21 1600   Dressing Type Transparent;Disk with Chlorhexadine gluconate (CHG) 02/08/21 0729   Hub Color/Line Status Red;Patent; Flushed 02/08/21 0729   Positive Blood Return (Site #1) Yes 02/08/21 0729   Hub Color/Line Status Purple;Patent; Flushed 02/08/21 0729   Positive Blood Return (Site #2) Yes 02/08/21 0729   Alcohol Cap Used No 02/08/21 0729        Opportunity for questions and clarification was provided. Patient transported with Respiratory therapist. Fentanyl drip halted at 1127 due to low BP. Versed drip halted at 1136 due to low BP.

## 2021-02-08 NOTE — PROGRESS NOTES
Chart reviewed. Pt remains CCU, now off covid precautions. Trach/vent -60% Fio2. Fentanyl and versed gtts. PEG placed already. SLED today after IVC filter/and possibly HD tunneled cath in IR. CM following for LTACH needs/on waiting list already, but pt is not stable at this point per MD notes. Will continue to follow. LOS 32 days.

## 2021-02-08 NOTE — PROGRESS NOTES
Orders received to admin 7 units lantus instead of the scheduled 15 units due to NPO status and current bgl per Dr. Madiha Lomeli

## 2021-02-08 NOTE — DIALYSIS
8 HR SLED initiated using  Right chest catheter. Aspirated and flushed both catheter ports without problem. Machine settings per MD order. 200  DFR  500 UFR  Reported to primary nurse. Dialysis nurse available as needed.

## 2021-02-09 ENCOUNTER — APPOINTMENT (OUTPATIENT)
Dept: GENERAL RADIOLOGY | Age: 61
DRG: 004 | End: 2021-02-09
Attending: INTERNAL MEDICINE
Payer: COMMERCIAL

## 2021-02-09 PROBLEM — J95.851 VAP (VENTILATOR-ASSOCIATED PNEUMONIA) (HCC): Status: ACTIVE | Noted: 2021-02-09

## 2021-02-09 LAB
ANION GAP SERPL CALC-SCNC: 3 MMOL/L (ref 7–16)
APTT PPP: 46.5 SEC (ref 24.1–35.1)
ARTERIAL PATENCY WRIST A: YES
BASE EXCESS BLD CALC-SCNC: 7 MMOL/L
BDY SITE: ABNORMAL
BUN SERPL-MCNC: 44 MG/DL (ref 8–23)
CALCIUM SERPL-MCNC: 7.7 MG/DL (ref 8.3–10.4)
CHLORIDE SERPL-SCNC: 103 MMOL/L (ref 98–107)
CO2 BLD-SCNC: 35 MMOL/L
CO2 SERPL-SCNC: 34 MMOL/L (ref 21–32)
COLLECT TIME,HTIME: 338
CREAT SERPL-MCNC: 3.16 MG/DL (ref 0.8–1.5)
ERYTHROCYTE [DISTWIDTH] IN BLOOD BY AUTOMATED COUNT: 16.5 % (ref 11.9–14.6)
EXHALED MINUTE VOLUME, VE: 9.8 L/MIN
FIBRINOGEN PPP-MCNC: 734 MG/DL (ref 190–501)
GAS FLOW.O2 O2 DELIVERY SYS: ABNORMAL L/MIN
GAS FLOW.O2 SETTING OXYMISER: 22 BPM
GLUCOSE BLD STRIP.AUTO-MCNC: 171 MG/DL (ref 65–100)
GLUCOSE BLD STRIP.AUTO-MCNC: 175 MG/DL (ref 65–100)
GLUCOSE BLD STRIP.AUTO-MCNC: 180 MG/DL (ref 65–100)
GLUCOSE BLD STRIP.AUTO-MCNC: 193 MG/DL (ref 65–100)
GLUCOSE BLD STRIP.AUTO-MCNC: 195 MG/DL (ref 65–100)
GLUCOSE BLD STRIP.AUTO-MCNC: 198 MG/DL (ref 65–100)
GLUCOSE BLD STRIP.AUTO-MCNC: 201 MG/DL (ref 65–100)
GLUCOSE SERPL-MCNC: 185 MG/DL (ref 65–100)
HCO3 BLD-SCNC: 33.5 MMOL/L (ref 22–26)
HCT VFR BLD AUTO: 23.5 % (ref 41.1–50.3)
HGB BLD-MCNC: 7.1 G/DL (ref 13.6–17.2)
INR PPP: 1.2
INSPIRATION.DURATION SETTING TIME VENT: 0.85 SEC
MCH RBC QN AUTO: 29.1 PG (ref 26.1–32.9)
MCHC RBC AUTO-ENTMCNC: 30.2 G/DL (ref 31.4–35)
MCV RBC AUTO: 96.3 FL (ref 79.6–97.8)
NRBC # BLD: 0 K/UL (ref 0–0.2)
O2/TOTAL GAS SETTING VFR VENT: 100 %
PCO2 BLD: 60.1 MMHG (ref 35–45)
PEEP RESPIRATORY: 10 CMH2O
PH BLD: 7.35 [PH] (ref 7.35–7.45)
PLATELET # BLD AUTO: 120 K/UL (ref 150–450)
PMV BLD AUTO: 9.7 FL (ref 9.4–12.3)
PO2 BLD: 185 MMHG (ref 75–100)
POTASSIUM SERPL-SCNC: 3.7 MMOL/L (ref 3.5–5.1)
PRESSURE CONTROL, IPC: 16
PROTHROMBIN TIME: 15.1 SEC (ref 12.5–14.7)
RBC # BLD AUTO: 2.44 M/UL (ref 4.23–5.6)
SAO2 % BLD: 100 % (ref 95–98)
SERVICE CMNT-IMP: ABNORMAL
SERVICE CMNT-IMP: ABNORMAL
SODIUM SERPL-SCNC: 140 MMOL/L (ref 138–145)
SPECIMEN TYPE: ABNORMAL
VENTILATION MODE VENT: ABNORMAL
WBC # BLD AUTO: 12.3 K/UL (ref 4.3–11.1)

## 2021-02-09 PROCEDURE — 74011250637 HC RX REV CODE- 250/637: Performed by: INTERNAL MEDICINE

## 2021-02-09 PROCEDURE — 82962 GLUCOSE BLOOD TEST: CPT

## 2021-02-09 PROCEDURE — 74011636637 HC RX REV CODE- 636/637: Performed by: INTERNAL MEDICINE

## 2021-02-09 PROCEDURE — 74011250636 HC RX REV CODE- 250/636: Performed by: INTERNAL MEDICINE

## 2021-02-09 PROCEDURE — 74011000258 HC RX REV CODE- 258: Performed by: INTERNAL MEDICINE

## 2021-02-09 PROCEDURE — 90945 DIALYSIS ONE EVALUATION: CPT

## 2021-02-09 PROCEDURE — 65620000000 HC RM CCU GENERAL

## 2021-02-09 PROCEDURE — 71045 X-RAY EXAM CHEST 1 VIEW: CPT

## 2021-02-09 PROCEDURE — 99232 SBSQ HOSP IP/OBS MODERATE 35: CPT | Performed by: INTERNAL MEDICINE

## 2021-02-09 PROCEDURE — 85384 FIBRINOGEN ACTIVITY: CPT

## 2021-02-09 PROCEDURE — 2709999900 HC NON-CHARGEABLE SUPPLY

## 2021-02-09 PROCEDURE — 85027 COMPLETE CBC AUTOMATED: CPT

## 2021-02-09 PROCEDURE — 77030018798 HC PMP KT ENTRL FED COVD -A

## 2021-02-09 PROCEDURE — 74011250637 HC RX REV CODE- 250/637: Performed by: ANESTHESIOLOGY

## 2021-02-09 PROCEDURE — 80048 BASIC METABOLIC PNL TOTAL CA: CPT

## 2021-02-09 PROCEDURE — 85730 THROMBOPLASTIN TIME PARTIAL: CPT

## 2021-02-09 PROCEDURE — 85610 PROTHROMBIN TIME: CPT

## 2021-02-09 PROCEDURE — 36600 WITHDRAWAL OF ARTERIAL BLOOD: CPT

## 2021-02-09 PROCEDURE — 94003 VENT MGMT INPAT SUBQ DAY: CPT

## 2021-02-09 PROCEDURE — 82803 BLOOD GASES ANY COMBINATION: CPT

## 2021-02-09 RX ADMIN — LANSOPRAZOLE 30 MG: KIT at 08:18

## 2021-02-09 RX ADMIN — INSULIN GLARGINE 15 UNITS: 100 INJECTION, SOLUTION SUBCUTANEOUS at 09:00

## 2021-02-09 RX ADMIN — PIPERACILLIN SODIUM AND TAZOBACTAM SODIUM 3.38 G: 3; .375 INJECTION, POWDER, LYOPHILIZED, FOR SOLUTION INTRAVENOUS at 15:17

## 2021-02-09 RX ADMIN — INSULIN LISPRO 3 UNITS: 100 INJECTION, SOLUTION INTRAVENOUS; SUBCUTANEOUS at 08:31

## 2021-02-09 RX ADMIN — LANSOPRAZOLE 30 MG: KIT at 20:01

## 2021-02-09 RX ADMIN — Medication 20 ML: at 06:00

## 2021-02-09 RX ADMIN — ATORVASTATIN CALCIUM 80 MG: 40 TABLET, FILM COATED ORAL at 08:17

## 2021-02-09 RX ADMIN — Medication 20 ML: at 14:00

## 2021-02-09 RX ADMIN — INSULIN LISPRO 6 UNITS: 100 INJECTION, SOLUTION INTRAVENOUS; SUBCUTANEOUS at 11:59

## 2021-02-09 RX ADMIN — INSULIN LISPRO 3 UNITS: 100 INJECTION, SOLUTION INTRAVENOUS; SUBCUTANEOUS at 23:01

## 2021-02-09 RX ADMIN — POLYETHYLENE GLYCOL 3350 17 G: 17 POWDER, FOR SOLUTION ORAL at 08:17

## 2021-02-09 RX ADMIN — FENTANYL CITRATE 50 MCG: 50 INJECTION, SOLUTION INTRAMUSCULAR; INTRAVENOUS at 09:08

## 2021-02-09 RX ADMIN — INSULIN LISPRO 3 UNITS: 100 INJECTION, SOLUTION INTRAVENOUS; SUBCUTANEOUS at 00:34

## 2021-02-09 RX ADMIN — INSULIN LISPRO 3 UNITS: 100 INJECTION, SOLUTION INTRAVENOUS; SUBCUTANEOUS at 03:54

## 2021-02-09 RX ADMIN — MINERAL OIL, PETROLATUM: 425; 568 OINTMENT OPHTHALMIC at 08:17

## 2021-02-09 RX ADMIN — INSULIN LISPRO 3 UNITS: 100 INJECTION, SOLUTION INTRAVENOUS; SUBCUTANEOUS at 16:00

## 2021-02-09 RX ADMIN — DOCUSATE SODIUM 50 MG AND SENNOSIDES 8.6 MG 1 TABLET: 8.6; 5 TABLET, FILM COATED ORAL at 08:17

## 2021-02-09 RX ADMIN — FENTANYL CITRATE 50 MCG: 50 INJECTION, SOLUTION INTRAMUSCULAR; INTRAVENOUS at 20:01

## 2021-02-09 RX ADMIN — FENTANYL CITRATE 50 MCG: 50 INJECTION, SOLUTION INTRAMUSCULAR; INTRAVENOUS at 23:01

## 2021-02-09 RX ADMIN — AMIODARONE HYDROCHLORIDE 200 MG: 200 TABLET ORAL at 08:17

## 2021-02-09 RX ADMIN — PIPERACILLIN SODIUM AND TAZOBACTAM SODIUM 3.38 G: 3; .375 INJECTION, POWDER, LYOPHILIZED, FOR SOLUTION INTRAVENOUS at 08:17

## 2021-02-09 RX ADMIN — FENTANYL CITRATE 50 MCG: 50 INJECTION, SOLUTION INTRAMUSCULAR; INTRAVENOUS at 15:56

## 2021-02-09 RX ADMIN — INSULIN LISPRO 3 UNITS: 100 INJECTION, SOLUTION INTRAVENOUS; SUBCUTANEOUS at 20:01

## 2021-02-09 RX ADMIN — Medication 20 ML: at 22:00

## 2021-02-09 RX ADMIN — ACETAMINOPHEN 650 MG: 325 TABLET, FILM COATED ORAL at 20:01

## 2021-02-09 NOTE — DIABETES MGMT
Patient blood glucose ranged 129-189 yesterday with patient receiving Lantus 7 units and Humalog 15 units. Blood glucose this morning was 180. Reviewed patient current regimen: Lantus 15 units daily and Humalog SSI q4h resistant scale. Per chart review patient s/p trach, PEG. Glycemic control overall stable on current regimen. Will continue to follow along loosely.

## 2021-02-09 NOTE — PROCEDURES
12 HR SLED initiated using  R CVC. Aspirated and flushed both catheter ports without problem. Machine settings per MD order. 150  DFR  500 UFR   Reported to primary nurse. Dialysis nurse available as needed.     Nura Manuel RN

## 2021-02-09 NOTE — PROGRESS NOTES
Ventilator check complete; patient has a #8.0 tracheostomy. Patient is sedated. Patient is not able to follow commands. Breath sounds are coarse. Trachea is midline, Negative for subcutaneous air, and chest excursion is symmetric. Patient is also Negative for cyanosis and is Positive for pitting edema. All alarms are set and audible. Resuscitation bag is at the head of the bed. Ventilator Settings  Mode FIO2 Rate Tidal Volume Pressure PEEP I:E Ratio   Pressure control  40 %    525 ml     10 cm H20  1:2.22      Peak airway pressure: 28.3 cm H2O   Minute ventilation: 13.9 l/min     ABG: No results for input(s): PH, PCO2, PO2, HCO3 in the last 72 hours.       Fouzia Meade, RT

## 2021-02-09 NOTE — PROGRESS NOTES
[facility]        Subjective: SWATI, Pt started on dialysis on 1/25/21. Review of Systems -   Encephalopathy   Can not be obtained. Objective:    Vitals:    02/09/21 0600 02/09/21 0615 02/09/21 0630 02/09/21 0717   BP: (!) 111/56 (!) 119/59 (!) 112/59    Pulse: 72 75 79 78   Resp: 23 28 24 (!) 32   Temp:       SpO2: 97% 97% 97% 96%   Weight:       Height:           PE  Gen: intubated  CV:reg rate  Chest:bilat bronchi   Abd: soft  Ext: +++  Access:rt IJ temp dialysis cath ok, . [facility]  Recent Labs     02/09/21 0327 02/08/21  0256 02/07/21  0359   WBC 12.3* 13.6* 14.6*   HGB 7.1* 7.7* 7.7*   HCT 23.5* 24.5* 23.7*   * 150 165   INR 1.2 1.2 1.2     Recent Labs     02/09/21 0327 02/08/21  0256 02/07/21  0359    142 140   K 3.7 3.5 3.5    102 102   CO2 34* 30 31   * 129* 176*   BUN 44* 91* 71*   CREA 3.16* 5.58* 4.59*   MG  --  1.9  --    PHOS  --  5.2*  --    CA 7.7* 7.9* 7.8*           Radiology    A/P:   Patient Active Problem List   Diagnosis Code    Obesity E66.9    Hypertension I10    Bradycardia R00.1    PVC (premature ventricular contraction) I49.3    CAD (coronary artery disease) I25.10    Dyslipidemia, goal LDL below 70 E78.5    [facility]I [facility]) ([facility] N17.9    Acute hypoxemic respiratory failure ([facility]) J96.01    Type 2 diabetes mellitus with hyperosmolarity without nonketotic hyperglycemic-hyperosmolar coma ([facility]) E11.00    Pneumonia due to COVID-19 virus U07.1, J12.82    Hypernatremia E87.0    Atrial fibrillation ([facility]) I48.91    Hypotension I95.9    Acute deep vein thrombosis (DVT) of left peroneal vein ([facility]) I82.452    VAP (ventilator-associated pneumonia) ([facility]) J95.851     SWATI - ATN, oliguric. Volume overload   For SLED again today ,   Remove tem. Cath.     COVID19/ Resp Failure    Hypotension    A Fib with RVR    Anemia - intermittent transfusion      Latricia Caldwell MD

## 2021-02-09 NOTE — PROGRESS NOTES
[facility] Critical Care COVID-19 Note:    Critical Care Note: 2/9/2021    Lucien Concepcion. Admission Date: 1/6/2021     Length of Stay: 33 days    Background: 61 y.o. y/o male with acute hypoxemic respiratory failure secondary to COVID-19. Pt admitted 1/7 with fever, cough, fever. Covid + 1/6. Admitted by hospitalists. Started on dexamethasone, convalescent plasma, and remdesivir. Pulm consulted 1/10 with pt requiring CPAP but pt decompensated and was intubated 1/17. Nephrology consulted 1/23 for renal failure. US 1/18 with L peroneal vein thrombus. He was started on heparin but stopped due to bleeding from HD catheter. He was started on HD on 1/25. Trached on 2/2 secondary to inability to wean from the vent. Pt developed afib requiring amio. PEG placed 2/4. IVC filter 2/8. Onset of COVID-19 symptoms: 1/5/2021    Notable PMH: obesity, HTN, CAD, dyslipidemia, SWATI, DM, GERD    Drug Allergies: Allergies   Allergen Reactions    Latex Swelling     Had a purcell catheter with swelling of urethra. Only known latex issue in past. Wife remembers this now    Hydrocodone-Acetaminophen Itching     Wife remembers this allergy    Tessalon Perles [Benzonatate] Unknown (comments)       24 Hour events:   Patient had IVC filter placed 2/8. At the same time had tunneled HD cath. Started SLED last night, clotted with a little over an hour remaining in his 8 hour session. With that his FiO2 needs are down to 40% this morning and his CXR is mildly improved. Sedation off, more alert but tachypneic this morning.    Sputum with staph and klebsiella      ROS: trached    Lines: (insertion date)   Tracheostomy size #8 prox XLT, cuffed Shiley (2/2)  PEG 2/4  PICC 1/13  Art line 1/18/2021  Dialysis access 1/24/21, tunneled 2/8  Purcell 1/17  Rectal tube 1/30  IVC filter 2/8    Drips: current dose (range)  none    Visit Vitals  BP (!) 112/59   Pulse 78   Temp 98.8 °F (37.1 °C)   Resp (!) 32   Ht 5' 8\" (1.727 m) Wt 226 lb 3.1 oz (102.6 kg)   SpO2 96%   BMI 34.39 kg/m²     Pertinent Exam:            Constitutional:  trached and mechanically ventilated. Appears more tachypeic today.    EENMT:  Sclera clear, pupils equal, oral mucosa moist  Respiratory: crackles bilaterally   Cardiovascular:  RRR with no M,G,R;  Gastrointestinal:  soft with no tenderness; positive bowel sounds present  Musculoskeletal:  warm with no cyanosis, 3+ pitting B lower extremity edema up to thigh  Skin:  no jaundice or ecchymosis  Neurologic: opens eyes    Psychiatric: opens eyes      Pertinent Labs:   Recent Labs     02/09/21 0327 02/08/21  0256 02/07/21  0359   WBC 12.3* 13.6* 14.6*   HGB 7.1* 7.7* 7.7*   HCT 23.5* 24.5* 23.7*   * 150 165   INR 1.2 1.2 1.2     Recent Labs     02/09/21 0327 02/08/21  0256 02/07/21  0359    142 140   K 3.7 3.5 3.5    102 102   CO2 34* 30 31   * 129* 176*   BUN 44* 91* 71*   CREA 3.16* 5.58* 4.59*   MG  --  1.9  --    CA 7.7* 7.9* 7.8*   PHOS  --  5.2*  --      Recent Labs     02/09/21 0329 02/09/21  0029 02/08/21 2012   GLUCPOC 195* 193* 189*       SARS-CoV-2 LAB Results  [facility] Test: No results found for: COV2NT   [facility] Test: No results found for: EDPR  [facility] Test: No components found for: [id number]     COVID-19 Meds:  Dexamethasone 6mg daily (EOT 1/16)  Convalescent plasma transfusion (1/7)  Remdesivir (EOT 1/12)    Micro Results:  BC: negative (1/30)  Sputum cx: normal resp vianney: (1/30)  Sputum 2/5 - moderate s aureus, moderate GNR    Anti-infectives (start date):  azithro (completed)  Ceftriaxone (completed)  Vanc and cefepime: (completed)  Vanc and zosyn (started 2/5)    Ventilator Settings:  5' 8\" (1.727 m)  Mode FIO2 Rate Tidal Volume Pressure PEEP   Pressure control  40 %    525 ml     10 cm H20    Peak airway pressure: 28.3 cm H2O   Minute ventilation: 13.9 l/min    ABG:   Recent Labs     02/09/21  0340 02/08/21  0324 02/07/21  0311   PHI 7.35 7.37 7.45   PCO2I 60.1* 54.5* 43.4   PO2I 185* 61* 78   HCO3I 33.5* 31.3* 30.0*         IMPRESSION:   61 y.o. y/o male with acute hypoxemic respiratory failure secondary to COVID-19. DVT unable to tolerate anticoagulation. SWATI on SLED. Now trached, PEG. Increasing tachypnea, worsening CXR. Concerning for combination of superimposed bacterial PNA with staph and GNR in sputum as well as pulmonary edema. Some better after Monday SLED session. Sputum now with MSSA and klebsiella. PLAN:  -trached, O2 needs up over the weekend without SLED. Better with volume removal  -dialysis per nephrology.   -cont vanc and zosyn for 1 more day (day 5) then decrease to ancef which should cover both. -IVC filter placed for DVT and bleeding on anticoagulation.   -on po amio for a fib  -on TF and a goal through PEG. -back off sedation. Monitor tolerance. -s/p treatments for COVID. Now off droplet precautions. -needs SCD on R LE.   -cont prevacid  -consult placed for PT. Can start working with him today.   -seems too unstable for [facility]ency at this point. Hold for now but close.        Full Code      Fabi Amato MD

## 2021-02-09 NOTE — PROGRESS NOTES
A follow up visit was made to the patient. Emotional support, spiritual presence and   prayer were provided for the patient. He has a trach, his eyes were open, he was hearing my voice as I prayed for him.        Mitch Melgar, [address] [facility] [place][note 2], [facility]

## 2021-02-09 NOTE — PROGRESS NOTES
Ventilator check complete; patient has a #8.0 tracheostomy. Patient is sedated. Patient is not able to follow commands. Breath sounds are coarse. Trachea is midline, Negative for subcutaneous air, and chest excursion is symmetric. Patient is also Negative for cyanosis and is Positive for pitting edema. All alarms are set and audible. Resuscitation bag is at the head of the bed.       Ventilator Settings  Mode FIO2 Rate Tidal Volume Pressure PEEP I:E Ratio   Pressure control  60 %   22 525 ml     10 cm H20  1:2.22      Peak airway pressure: 25.4 cm H2O   Minute ventilation: 8.5 l/min       Sunil Betancourt, RT

## 2021-02-09 NOTE — PROGRESS NOTES
Bedside and Verbal shift change report given to Anushka Marlow RN (oncoming nurse) by Sobeida Rose RN (offgoing nurse). Report included the following information SBAR, ED Summary, Intake/Output, MAR, Recent Results and Alarm Parameters .

## 2021-02-10 ENCOUNTER — APPOINTMENT (OUTPATIENT)
Dept: GENERAL RADIOLOGY | Age: 61
DRG: 004 | End: 2021-02-10
Attending: INTERNAL MEDICINE
Payer: COMMERCIAL

## 2021-02-10 LAB
ANION GAP SERPL CALC-SCNC: 3 MMOL/L (ref 7–16)
APTT PPP: 45.8 SEC (ref 24.1–35.1)
ARTERIAL PATENCY WRIST A: YES
BACTERIA SPEC CULT: NORMAL
BACTERIA SPEC CULT: NORMAL
BASE EXCESS BLD CALC-SCNC: 8 MMOL/L
BDY SITE: ABNORMAL
BUN SERPL-MCNC: 15 MG/DL (ref 8–23)
CALCIUM SERPL-MCNC: 7.8 MG/DL (ref 8.3–10.4)
CHLORIDE SERPL-SCNC: 105 MMOL/L (ref 98–107)
CO2 BLD-SCNC: 35 MMOL/L
CO2 SERPL-SCNC: 32 MMOL/L (ref 21–32)
COLLECT TIME,HTIME: 308
CREAT SERPL-MCNC: 1.38 MG/DL (ref 0.8–1.5)
ERYTHROCYTE [DISTWIDTH] IN BLOOD BY AUTOMATED COUNT: 16.2 % (ref 11.9–14.6)
EXHALED MINUTE VOLUME, VE: 13.9 L/MIN
FIBRINOGEN PPP-MCNC: 783 MG/DL (ref 190–501)
GAS FLOW.O2 O2 DELIVERY SYS: ABNORMAL L/MIN
GAS FLOW.O2 SETTING OXYMISER: 22 BPM
GLUCOSE BLD STRIP.AUTO-MCNC: 146 MG/DL (ref 65–100)
GLUCOSE BLD STRIP.AUTO-MCNC: 177 MG/DL (ref 65–100)
GLUCOSE BLD STRIP.AUTO-MCNC: 181 MG/DL (ref 65–100)
GLUCOSE BLD STRIP.AUTO-MCNC: 186 MG/DL (ref 65–100)
GLUCOSE BLD STRIP.AUTO-MCNC: 201 MG/DL (ref 65–100)
GLUCOSE BLD STRIP.AUTO-MCNC: 230 MG/DL (ref 65–100)
GLUCOSE SERPL-MCNC: 141 MG/DL (ref 65–100)
HCO3 BLD-SCNC: 33.3 MMOL/L (ref 22–26)
HCT VFR BLD AUTO: 24.7 % (ref 41.1–50.3)
HGB BLD-MCNC: 7.7 G/DL (ref 13.6–17.2)
INR PPP: 1.2
INSPIRATION.DURATION SETTING TIME VENT: 0.85 SEC
MAGNESIUM SERPL-MCNC: 1.6 MG/DL (ref 1.8–2.4)
MCH RBC QN AUTO: 28.8 PG (ref 26.1–32.9)
MCHC RBC AUTO-ENTMCNC: 31.2 G/DL (ref 31.4–35)
MCV RBC AUTO: 92.5 FL (ref 79.6–97.8)
NRBC # BLD: 0 K/UL (ref 0–0.2)
O2/TOTAL GAS SETTING VFR VENT: 50 %
PCO2 BLD: 48.3 MMHG (ref 35–45)
PEEP RESPIRATORY: 10 CMH2O
PH BLD: 7.45 [PH] (ref 7.35–7.45)
PHOSPHATE SERPL-MCNC: 2 MG/DL (ref 2.3–3.7)
PLATELET # BLD AUTO: 116 K/UL (ref 150–450)
PMV BLD AUTO: 10.4 FL (ref 9.4–12.3)
PO2 BLD: 100 MMHG (ref 75–100)
POTASSIUM SERPL-SCNC: 3.6 MMOL/L (ref 3.5–5.1)
PRESSURE CONTROL, IPC: 16
PROTHROMBIN TIME: 15.4 SEC (ref 12.5–14.7)
RBC # BLD AUTO: 2.67 M/UL (ref 4.23–5.6)
SAO2 % BLD: 98 % (ref 95–98)
SERVICE CMNT-IMP: ABNORMAL
SERVICE CMNT-IMP: ABNORMAL
SERVICE CMNT-IMP: NORMAL
SERVICE CMNT-IMP: NORMAL
SODIUM SERPL-SCNC: 140 MMOL/L (ref 138–145)
SPECIMEN TYPE: ABNORMAL
VANCOMYCIN SERPL-MCNC: 15.5 UG/ML
VENTILATION MODE VENT: ABNORMAL
WBC # BLD AUTO: 12.9 K/UL (ref 4.3–11.1)

## 2021-02-10 PROCEDURE — 74011000258 HC RX REV CODE- 258: Performed by: INTERNAL MEDICINE

## 2021-02-10 PROCEDURE — 74011250636 HC RX REV CODE- 250/636: Performed by: INTERNAL MEDICINE

## 2021-02-10 PROCEDURE — 74011250637 HC RX REV CODE- 250/637: Performed by: INTERNAL MEDICINE

## 2021-02-10 PROCEDURE — 65620000000 HC RM CCU GENERAL

## 2021-02-10 PROCEDURE — 82962 GLUCOSE BLOOD TEST: CPT

## 2021-02-10 PROCEDURE — 90945 DIALYSIS ONE EVALUATION: CPT

## 2021-02-10 PROCEDURE — 2709999900 HC NON-CHARGEABLE SUPPLY

## 2021-02-10 PROCEDURE — 94003 VENT MGMT INPAT SUBQ DAY: CPT

## 2021-02-10 PROCEDURE — 77030018798 HC PMP KT ENTRL FED COVD -A

## 2021-02-10 PROCEDURE — 77030040393 HC DRSG OPTIFOAM GENT MDII -B

## 2021-02-10 PROCEDURE — 85027 COMPLETE CBC AUTOMATED: CPT

## 2021-02-10 PROCEDURE — 80202 ASSAY OF VANCOMYCIN: CPT

## 2021-02-10 PROCEDURE — 36600 WITHDRAWAL OF ARTERIAL BLOOD: CPT

## 2021-02-10 PROCEDURE — 85384 FIBRINOGEN ACTIVITY: CPT

## 2021-02-10 PROCEDURE — 77030006998

## 2021-02-10 PROCEDURE — 74011636637 HC RX REV CODE- 636/637: Performed by: INTERNAL MEDICINE

## 2021-02-10 PROCEDURE — 84100 ASSAY OF PHOSPHORUS: CPT

## 2021-02-10 PROCEDURE — 93005 ELECTROCARDIOGRAM TRACING: CPT | Performed by: INTERNAL MEDICINE

## 2021-02-10 PROCEDURE — 82803 BLOOD GASES ANY COMBINATION: CPT

## 2021-02-10 PROCEDURE — 71045 X-RAY EXAM CHEST 1 VIEW: CPT

## 2021-02-10 PROCEDURE — 99233 SBSQ HOSP IP/OBS HIGH 50: CPT | Performed by: INTERNAL MEDICINE

## 2021-02-10 PROCEDURE — 36415 COLL VENOUS BLD VENIPUNCTURE: CPT

## 2021-02-10 PROCEDURE — 85610 PROTHROMBIN TIME: CPT

## 2021-02-10 PROCEDURE — 80048 BASIC METABOLIC PNL TOTAL CA: CPT

## 2021-02-10 PROCEDURE — 87040 BLOOD CULTURE FOR BACTERIA: CPT

## 2021-02-10 PROCEDURE — 74011000250 HC RX REV CODE- 250: Performed by: INTERNAL MEDICINE

## 2021-02-10 PROCEDURE — 85730 THROMBOPLASTIN TIME PARTIAL: CPT

## 2021-02-10 PROCEDURE — 83735 ASSAY OF MAGNESIUM: CPT

## 2021-02-10 RX ORDER — MIDAZOLAM HYDROCHLORIDE 1 MG/ML
2 INJECTION, SOLUTION INTRAMUSCULAR; INTRAVENOUS
Status: DISCONTINUED | OUTPATIENT
Start: 2021-02-10 | End: 2021-02-11

## 2021-02-10 RX ORDER — OXYCODONE HCL 5 MG/5 ML
5 SOLUTION, ORAL ORAL EVERY 6 HOURS
Status: DISCONTINUED | OUTPATIENT
Start: 2021-02-10 | End: 2021-02-10

## 2021-02-10 RX ORDER — OXYCODONE HCL 5 MG/5 ML
5 SOLUTION, ORAL ORAL EVERY 6 HOURS
Status: DISCONTINUED | OUTPATIENT
Start: 2021-02-11 | End: 2021-02-23

## 2021-02-10 RX ORDER — DEXMEDETOMIDINE HYDROCHLORIDE 4 UG/ML
.1-1.5 INJECTION, SOLUTION INTRAVENOUS
Status: DISCONTINUED | OUTPATIENT
Start: 2021-02-10 | End: 2021-02-14

## 2021-02-10 RX ADMIN — DOCUSATE SODIUM 50 MG AND SENNOSIDES 8.6 MG 1 TABLET: 8.6; 5 TABLET, FILM COATED ORAL at 08:37

## 2021-02-10 RX ADMIN — DEXMEDETOMIDINE HYDROCHLORIDE 0.6 MCG/KG/HR: 100 INJECTION, SOLUTION INTRAVENOUS at 23:26

## 2021-02-10 RX ADMIN — LORAZEPAM 1 MG: 2 INJECTION INTRAMUSCULAR; INTRAVENOUS at 05:21

## 2021-02-10 RX ADMIN — Medication 20 ML: at 13:18

## 2021-02-10 RX ADMIN — FENTANYL CITRATE 50 MCG: 50 INJECTION, SOLUTION INTRAMUSCULAR; INTRAVENOUS at 16:51

## 2021-02-10 RX ADMIN — INSULIN LISPRO 6 UNITS: 100 INJECTION, SOLUTION INTRAVENOUS; SUBCUTANEOUS at 13:17

## 2021-02-10 RX ADMIN — OXYCODONE HYDROCHLORIDE 5 MG: 5 SOLUTION ORAL at 18:15

## 2021-02-10 RX ADMIN — INSULIN LISPRO 3 UNITS: 100 INJECTION, SOLUTION INTRAVENOUS; SUBCUTANEOUS at 20:33

## 2021-02-10 RX ADMIN — AMIODARONE HYDROCHLORIDE 200 MG: 200 TABLET ORAL at 08:37

## 2021-02-10 RX ADMIN — OXYCODONE HYDROCHLORIDE 5 MG: 5 SOLUTION ORAL at 23:12

## 2021-02-10 RX ADMIN — MINERAL OIL, PETROLATUM: 425; 568 OINTMENT OPHTHALMIC at 08:39

## 2021-02-10 RX ADMIN — OXYCODONE HYDROCHLORIDE 5 MG: 5 SOLUTION ORAL at 12:56

## 2021-02-10 RX ADMIN — LORAZEPAM 1 MG: 2 INJECTION INTRAMUSCULAR; INTRAVENOUS at 00:52

## 2021-02-10 RX ADMIN — PIPERACILLIN SODIUM AND TAZOBACTAM SODIUM 3.38 G: 3; .375 INJECTION, POWDER, LYOPHILIZED, FOR SOLUTION INTRAVENOUS at 03:44

## 2021-02-10 RX ADMIN — FENTANYL CITRATE 50 MCG: 50 INJECTION, SOLUTION INTRAMUSCULAR; INTRAVENOUS at 20:33

## 2021-02-10 RX ADMIN — DEXMEDETOMIDINE HYDROCHLORIDE 0.4 MCG/KG/HR: 100 INJECTION, SOLUTION INTRAVENOUS at 07:41

## 2021-02-10 RX ADMIN — POLYETHYLENE GLYCOL 3350 17 G: 17 POWDER, FOR SOLUTION ORAL at 08:37

## 2021-02-10 RX ADMIN — MIDAZOLAM 2 MG: 1 INJECTION INTRAMUSCULAR; INTRAVENOUS at 23:12

## 2021-02-10 RX ADMIN — Medication 20 ML: at 06:00

## 2021-02-10 RX ADMIN — Medication 20 ML: at 22:00

## 2021-02-10 RX ADMIN — GUAIFENESIN AND DEXTROMETHORPHAN 10 ML: 100; 10 SYRUP ORAL at 23:12

## 2021-02-10 RX ADMIN — DEXMEDETOMIDINE HYDROCHLORIDE 0.5 MCG/KG/HR: 100 INJECTION, SOLUTION INTRAVENOUS at 14:18

## 2021-02-10 RX ADMIN — SODIUM ZIRCONIUM CYCLOSILICATE 10 G: 10 POWDER, FOR SUSPENSION ORAL at 08:38

## 2021-02-10 RX ADMIN — INSULIN GLARGINE 15 UNITS: 100 INJECTION, SOLUTION SUBCUTANEOUS at 08:51

## 2021-02-10 RX ADMIN — INSULIN LISPRO 3 UNITS: 100 INJECTION, SOLUTION INTRAVENOUS; SUBCUTANEOUS at 16:55

## 2021-02-10 RX ADMIN — HYDRALAZINE HYDROCHLORIDE 20 MG: 20 INJECTION, SOLUTION INTRAMUSCULAR; INTRAVENOUS at 03:00

## 2021-02-10 RX ADMIN — ACETAMINOPHEN 650 MG: 325 TABLET, FILM COATED ORAL at 05:47

## 2021-02-10 RX ADMIN — FENTANYL CITRATE 50 MCG: 50 INJECTION, SOLUTION INTRAMUSCULAR; INTRAVENOUS at 02:25

## 2021-02-10 RX ADMIN — SODIUM CHLORIDE 200 MG: 9 INJECTION, SOLUTION INTRAVENOUS at 13:59

## 2021-02-10 RX ADMIN — LANSOPRAZOLE 30 MG: KIT at 08:38

## 2021-02-10 RX ADMIN — Medication 50 MCG/HR: at 21:30

## 2021-02-10 RX ADMIN — INSULIN LISPRO 3 UNITS: 100 INJECTION, SOLUTION INTRAVENOUS; SUBCUTANEOUS at 23:16

## 2021-02-10 RX ADMIN — LANSOPRAZOLE 30 MG: KIT at 20:33

## 2021-02-10 RX ADMIN — ATORVASTATIN CALCIUM 80 MG: 40 TABLET, FILM COATED ORAL at 08:37

## 2021-02-10 RX ADMIN — INSULIN LISPRO 6 UNITS: 100 INJECTION, SOLUTION INTRAVENOUS; SUBCUTANEOUS at 08:36

## 2021-02-10 RX ADMIN — FENTANYL CITRATE 50 MCG: 50 INJECTION, SOLUTION INTRAMUSCULAR; INTRAVENOUS at 04:42

## 2021-02-10 NOTE — PROGRESS NOTES
Pt completed 12 hour run of CRRT. When attempting to flush back venous, line clotted off and unable to rinse back. Flushed arterial side with no difficulty. 100 ml of NS rinsed back. Lines flushed and capped.

## 2021-02-10 NOTE — PROGRESS NOTES
Pt resting no distress noted. Trache care done     02/10/21 1508   Patient Observations   Pulse (Heart Rate) 87   Resp Rate 30   O2 Sat (%) 100 %   Airway - Tracheostomy Tube 02/02/21 Shiley proximal XLT   Placement Date/Time: 02/02/21 1558   Number of Attempts: 1  Inserted By: dr price  Present on Admission/Arrival: No  Airway Types: Tracheostomy  Trach Tube Type: Shiley proximal XLT   Cuff Pressure 28 cmH20   Site Assessment Clean, dry, & intact   Trach Dressing Changed Yes   Trach Cleaned With Normal saline   Trach Tie Changed No   Trach Cannula Changed Yes   Inner Cannula #8 Shiley; Cuffed, cuff inflated   [employer] of the lock   Side Secured Centered   Spare Trach at Bedside Yes   Spare Trach Tube One Size Smaller at Bedside Yes   Ambu Bag With Mask at Bedside Yes   Respiratory   Respiratory (WDL) X   Patient on Vent Yes - If patient is on vent, add Doc Flowsheet Ventilator ().    Respiratory Pattern Tachypneic   Chest/Tracheal Assessment Chest expansion, symmetrical   Breath Sounds Bilateral Coarse   Cough Cough with suction   Airway Clearance   Suction Trach   Suction Device Inline suction catheter   Suction Catheter Size 14 Fr   Sputum Method Obtained Tracheal   Sputum Amount Small   Sputum Color/Odor Tan;Clear   Sputum Consistency Thin   Vent Settings   FIO2 (%) 95 %   SpO2/FIO2 Ratio 105.26   CMV Rate Set 22   PC Set 16   PEEP/VENT (cm H2O) 10 cm H20   I:E Ratio 1:2.22   Insp Time (sec) 0.85 sec   Insp Rise Time (sec) 0.25   Insp Rise Time % 9.2 %   Flow Trigger 2   Ventilator Measurements   Resp Rate Observed 30   Vt Exhaled (Machine Breath) (ml) 413 ml   Ve Observed (l/min) 10 l/min   PIP Observed (cm H2O) 28.4 cm H2O   MAP (cm H2O) 18.1   I:E Ratio Actual 1:1.3   Dynamic Compliance (ml/cm H20) 27 ml/cm H20   Safety & Alarms   Backup Mode Checked/Apnea Yes   Pressure Max 40 cm H2O   Pressure Min 5 cm H2O   Ve Min 5   Ve Max 25   RR Min 10   RR Max 50   Vent Method/Mode   Ventilation Method Conventional   Ventilator Mode Pressure control   Ventilator Mode ID 2

## 2021-02-10 NOTE — CONSULTS
Infectious Disease Consult    Impression:   · Fevers in setting of prolonged, critical illness with covid and multiple complications, detailed below  · Covid diagnosed 1/6 with ARDS and prolonged vent wean, intubated 1/17 s/p trach 2/2, s/p CP/RDV/steroids  · Renal failure related to covid requiring dialysis  · Thrombus related to covid s/p IVC filter placement  · MSSA and kleb in sputum 2/5, VAP vs colonization, no change in secretions/vent settings that would indicate VAP    DDx for fever includes ARDS, drug, encephalitis, line related, thrombus, IA (CDI vs urine vs cathleen vs other enteritis), pulmonary, SSTI. CNS infection is highly unlikely and mental status has not changed. Imaging doesn't show acute infiltrate, vent settings haven't changed and no major secretions so I think the resp cultures are probably colonizers. However, if this was a tracheitis he has already been treated x 6 days so will dc vanc/zosyn today. Blood cultures negative but he has a PICC that's been in place x 1 month so will get repeat peripheral and PICC cultures today and would consider replacing that line. Tunneled access looks ok. Abdominal exam benign but might consider imaging; has PEG that appears clean. Does have loose stool, could be CDI and may consider sending stool for study. Urine appears clean. Could be drug fever but seems to have started before abx started. Other meds reviewed, suppose it could be other sedative/pain med related. Does have known thrombus that could cause fever, no obvious skin sources. High risk for fungemia so will do empiric trial of eraxis x 72 hours and monitor. Plan:   · Repeat blood cultures today; peripheral and from PICC  · Empiric trial of eraxis x 72 hours  · Discontinue zosyn and vanc; also discontinued ancef, not sure what this was for? · May need to consider c diff testing? Abdominal exam benign. Might also consider CTAP?  Does have PEG tube which looks ok and no other acute findings but could potentially be source of fever    Anti-infectives:   1. Pip/tazo 2/4-present  2. Vancomycin 2/8 x 1    Subjective:   Date of Consultation:  February 10, 2021  Date of Admission: 1/6/2021   Referring Physician: Candido Larson    Patient is a 61 y.o. male with CAD, HTN who was admitted 1/7 secondary to covid diagnosed 1/6. Pulm was consulted on 1/10 due to worsening respiratory failure requiring CPAP but then was intubated 1/17 and now s/p trach 2/2. His course has further been complicated by renal failure requiring HD 1/25, L peroneal vein thrombus requring IVC filter 2/8, and PEG placement 2/4. He did receive CP/RDV/steroids up front. He developed white count to 22 on 1/29 which has fluctuated since that time, presently at 12.9. He developed low grade temps just over 100 but not sustained 2/3, abx started on 2/5 with a slight drop 2/8-2/9 and now back up today to 100.9. Blood cultures all negative, sputum 2/5 with SA and K pneumo. Remains on vent support at 50% O2, tachypneic. Chest imaging today with diffuse patchy infiltrates bilaterally. Discussed with nursing as patient is trached. Does have watery stool in bag, this is unchanged over the last several days in relation to tube feeds. Urine looks ok and has purcell. Tunneled line for HD is new and appears clean, PICC line in [facility] also appears clean but has been In place for some time. Some excoriations on buttocks but no acutely infected wounds. He can blink appropriately but appears sleepy. No major secretions from the trach.      Patient Active Problem List   Diagnosis Code    Obesity E66.9    Hypertension I10    Bradycardia R00.1    PVC (premature ventricular contraction) I49.3    CAD (coronary artery disease) I25.10    Dyslipidemia, goal LDL below 70 E78.5    SWATI (acute kidney injury) ([facility] [address] [address] N17.9    Acute hypoxemic respiratory failure (HCC) J96.01    Type 2 diabetes mellitus with hyperosmolarity without nonketotic hyperglycemic-hyperosmolar coma ([facility] [address] E11.00  Pneumonia due to COVID-19 virus U07.1, J12.82    Hypernatremia E87.0    Atrial fibrillation ([facility]) I48.91    Hypotension I95.9    Acute deep vein thrombosis (DVT) of left peroneal vein ([facility]) I82.452    VAP (ventilator-associated pneumonia) ([facility]) J95.851     Past Medical History:   Diagnosis Date    Acute deep vein thrombosis (DVT) of left peroneal vein ([facility]) 1/28/2021    Atrial fibrillation ([facility] 01/22/2021    Gastrointestinal disorder     reflux    GERD (gastroesophageal reflux disease)     Hypertension     Lower respiratory tract infection due to COVID-19 virus 1/7/2021    Nausea & vomiting     Obesity 10/6/2015    Other ill-defined conditions(799.89)     dyspnea since surgery, wheezing, SOB    Type 2 diabetes mellitus with hyperosmolarity without nonketotic hyperglycemic-hyperosmolar coma ([facility] 1/7/2021    Unstable angina ([facility] [address] 10/27/2016      History reviewed. No pertinent family history. Social History     Tobacco Use    Smoking status: Never Smoker    Smokeless tobacco: Never Used   Substance Use Topics    Alcohol use: No     Past Surgical History:   Procedure Laterality Date    HX CHOLECYSTECTOMY      HX ORTHOPAEDIC      rotator cuff; knee    HX UROLOGICAL      kidney stone surgeries x 3    IR INSERT TUNL CVC W/O PORT OVER 5 YR  2/8/2021    IR IVC FILTER  2/8/2021    OK CARDIAC SURG PROCEDURE UNLIST      stent      Prior to Admission medications    Medication Sig Start Date End Date Taking? Authorizing Provider   metoprolol tartrate (LOPRESSOR) 50 mg tablet Take 1 Tab by mouth two (2) times a day. 6/3/20  Yes Katie Forbes MD   clopidogreL (Plavix) 75 mg tab Take 1 Tab by mouth daily. 6/3/20  Yes Katie Forbes MD   nitroglycerin (NITROSTAT) 0.4 mg SL tablet 1 Tab by SubLINGual route every five (5) minutes as needed for Chest Pain. 6/3/20  Yes Katie Forbes MD   magnesium oxide (MAG-OX) 400 mg tablet Take 1 Tab by mouth daily.  6/17/19  Yes Pacheco May Miguelangel Burgos MD   Aspirin, Buffered 81 mg tab Take 81 mg by mouth daily. Yes Provider, Historical   atorvastatin (LIPITOR) 80 mg tablet Take 1 Tab by mouth daily. 3/19/20   Katie Forbes MD     Allergies   Allergen Reactions    Latex Swelling     Had a purcell catheter with swelling of urethra. Only known latex issue in past. Wife remembers this now    Hydrocodone-Acetaminophen Itching     Wife remembers this allergy    Tessalon Perles [Benzonatate] Unknown (comments)        Review of Systems:  Review of systems not obtained due to patient factors. Objective:   Blood pressure (!) 101/53, pulse 86, temperature 99.5 °F (37.5 °C), resp. rate (!) 42, height 5' 8\" (1.727 m), weight 100.2 kg (220 lb 14.4 oz), SpO2 98 %. Temp (24hrs), Av.7 °F (37.6 °C), Min:98 °F (36.7 °C), Max:100.9 °F (38.3 °C)       Exam:    General:  Sleepy, appears uncomfortable   Eyes:  Sclera anicteric. Pupils equally round and reactive to light. Mouth/Throat: Dry MM   Neck: Trach in place   Lungs:   Decreased breath sounds bilaterally   CV:  Bradycardic but regular rhythm   Abdomen:   Soft, non-tender.  bowel sounds normal. non-distended   Extremities: Edema diffusely in all 4 extremities   Skin: Skin color, texture, turgor normal. no acute rash or lesions   Lymph nodes: Cervical and supraclavicular normal   Musculoskeletal: No swelling or deformity   Lines/Devices:  Intact, no erythema, drainage or tenderness   Psych: Sleepy but opens eyes   Tunneled line in R chest, PICC in jazzy NICOLE    Data Review:   Recent Results (from the past 24 hour(s))   GLUCOSE, POC    Collection Time: 21 11:59 AM   Result Value Ref Range    Glucose (POC) 201 (H) 65 - 100 mg/dL   GLUCOSE, POC    Collection Time: 21  3:22 PM   Result Value Ref Range    Glucose (POC) 175 (H) 65 - 100 mg/dL   GLUCOSE, POC    Collection Time: 21  7:53 PM   Result Value Ref Range    Glucose (POC) 198 (H) 65 - 100 mg/dL   GLUCOSE, POC    Collection Time: 02/09/21 11:00 PM   Result Value Ref Range    Glucose (POC) 171 (H) 65 - 100 mg/dL   POC G3    Collection Time: 02/10/21  3:10 AM   Result Value Ref Range    Device: VENT      FIO2 (POC) 50 %    pH (POC) 7.45 7.35 - 7.45      pCO2 (POC) 48.3 (H) 35 - 45 MMHG    pO2 (POC) 100 75 - 100 MMHG    HCO3 (POC) 33.3 (H) 22 - 26 MMOL/L    sO2 (POC) 98 95 - 98 %    Base excess (POC) 8 mmol/L    Mode ASSIST CONTROL      Set Rate 22 bpm    PEEP/CPAP (POC) 10 cmH2O    Allens test (POC) YES      Inspiratory Time 0.85 sec    Site RIGHT RADIAL      Specimen type (POC) ARTERIAL      Performed by [email or web address]     CO2, POC 35 MMOL/L    Pressure control 16      Respiratory comment: NurseNotified     Exhaled minute volume 13.90 L/min    COLLECT TIME 308     GLUCOSE, POC    Collection Time: 02/10/21  3:36 AM   Result Value Ref Range    Glucose (POC) 146 (H) 65 - 100 mg/dL   PROTHROMBIN TIME + INR    Collection Time: 02/10/21  3:52 AM   Result Value Ref Range    Prothrombin time 15.4 (H) 12.5 - 14.7 sec    INR 1.2     PTT    Collection Time: 02/10/21  3:52 AM   Result Value Ref Range    aPTT 45.8 (H) 24.1 - 35.1 SEC   FIBRINOGEN    Collection Time: 02/10/21  3:52 AM   Result Value Ref Range    Fibrinogen 783 (H) 190 - 757 mg/dL   METABOLIC PANEL, BASIC    Collection Time: 02/10/21  3:52 AM   Result Value Ref Range    Sodium 140 138 - 145 mmol/L    Potassium 3.6 3.5 - 5.1 mmol/L    Chloride 105 98 - 107 mmol/L    CO2 32 21 - 32 mmol/L    Anion gap 3 (L) 7 - 16 mmol/L    Glucose 141 (H) 65 - 100 mg/dL    BUN 15 8 - 23 MG/DL    Creatinine 1.38 0.8 - 1.5 MG/DL    GFR est AA >60 >60 ml/min/1.73m2    GFR est non-AA 56 (L) >60 ml/min/1.73m2    Calcium 7.8 (L) 8.3 - 10.4 MG/DL   PHOSPHORUS    Collection Time: 02/10/21  3:52 AM   Result Value Ref Range    Phosphorus 2.0 (L) 2.3 - 3.7 MG/DL   MAGNESIUM    Collection Time: 02/10/21  3:52 AM   Result Value Ref Range    Magnesium 1.6 (L) 1.8 - 2.4 mg/dL   CBC W/O DIFF    Collection Time: 02/10/21  3:52 AM   Result Value Ref Range    WBC 12.9 (H) 4.3 - 11.1 K/uL    RBC 2.67 (L) 4.23 - 5.6 M/uL    HGB 7.7 (L) 13.6 - 17.2 g/dL    HCT 24.7 (L) 41.1 - 50.3 %    MCV 92.5 79.6 - 97.8 FL    MCH 28.8 26.1 - 32.9 PG    MCHC 31.2 (L) 31.4 - 35.0 g/dL    RDW 16.2 (H) 11.9 - 14.6 %    PLATELET 252 (L) 248 - 450 K/uL    MPV 10.4 9.4 - 12.3 FL    ABSOLUTE NRBC 0.00 0.0 - 0.2 K/uL   VANCOMYCIN, RANDOM    Collection Time: 02/10/21  3:52 AM   Result Value Ref Range    Vancomycin, random 15.5 UG/ML   EKG, 12 LEAD, SUBSEQUENT    Collection Time: 02/10/21  5:48 AM   Result Value Ref Range    Ventricular Rate 108 BPM    Atrial Rate 108 BPM    P-R Interval 142 ms    QRS Duration 102 ms    Q-T Interval 360 ms    QTC Calculation (Bezet) 482 ms    Calculated P Axis 53 degrees    Calculated R Axis 4 degrees    Calculated T Axis 69 degrees    Diagnosis       Sinus tachycardia with frequent Premature ventricular complexes  Possible Left atrial enlargement  Nonspecific T wave abnormality  Abnormal ECG  When compared with ECG of 21-JAN-2021 21:48,  Sinus rhythm has replaced Atrial fibrillation  Criteria for Septal infarct are no longer Present  Nonspecific T wave abnormality no longer evident in Anterior leads     GLUCOSE, POC    Collection Time: 02/10/21  8:27 AM   Result Value Ref Range    Glucose (POC) 230 (H) 65 - 100 mg/dL        Microbiology:    All Micro Results     Procedure Component Value Units Date/Time    CULTURE, BLOOD [[id number]] Collected: 02/05/21 0949    Order Status: Completed Specimen: Blood Updated: 02/10/21 0732     Special Requests: --        LEFT  ARM       Culture result: NO GROWTH 5 DAYS       CULTURE, BLOOD [[id number]] Collected: 02/05/21 1000    Order Status: Completed Specimen: Blood Updated: 02/10/21 0732     Special Requests: --        LEFT  ARM       Culture result: NO GROWTH 5 DAYS       CULTURE, RESPIRATORY/SPUTUM/BRONCH Brandon Macrina STAIN [[id number]]  (Abnormal)  (Susceptibility) Collected: 02/05/21 0826    Order Status: Completed Specimen: Sputum,ET Suction Updated: 02/08/21 0850     Special Requests: NO SPECIAL REQUESTS        GRAM STAIN 10 TO 25 WBCS SEEN PER OIF      0 TO 1 EPITHELIAL CELLS SEEN PER OIF            MODERATE GRAM POSITIVE COCCI            FEW GRAM NEGATIVE RODS         FEW GRAM POSITIVE RODS        Culture result:       MODERATE STAPHYLOCOCCUS AUREUS                  MODERATE KLEBSIELLA PNEUMONIAE                  MODERATE NORMAL RESPIRATORY JUAN          CULTURE, URINE [[id number]] Collected: 02/05/21 0745    Order Status: Canceled Specimen: Cath Urine     CULTURE, BLOOD [[id number]] Collected: 01/30/21 1438    Order Status: Completed Specimen: Blood Updated: 02/04/21 0741     Special Requests: --        NO SPECIAL REQUESTS  LEFT  Antecubital       Culture result: NO GROWTH 5 DAYS       CULTURE, BLOOD [[id number]] Collected: 01/30/21 1141    Order Status: Completed Specimen: Blood Updated: 02/04/21 0741     Special Requests: ARTL (ART LINE)        Culture result: NO GROWTH 5 DAYS       CULTURE, RESPIRATORY/SPUTUM/BRONCH [address] [[id number]] Collected: 01/30/21 1437    Order Status: Completed Specimen: Sputum from Endotracheal aspirate Updated: 02/01/21 0746     Special Requests: NO SPECIAL REQUESTS        GRAM STAIN 5 TO 25 WBC'S/OIF      0 TO 1 EPITHELIAL CELLS SEEN /OIF      FEW GRAM POSITIVE COCCI         FEW YEAST         4+ MUCUS PRESENT        Culture result:       LIGHT NORMAL RESPIRATORY JUAN          CULTURE, URINE [[id number]] Collected: 01/30/21 1100    Order Status: Canceled Specimen: Cath Urine     CULTURE, BLOOD [[id number]] Collected: 01/18/21 1124    Order Status: Completed Specimen: Blood Updated: 01/23/21 1034     Special Requests: --        RIGHT  HAND       Culture result: NO GROWTH 5 DAYS       CULTURE, BLOOD [[id number]] Collected: 01/18/21 1120    Order Status: Completed Specimen: Blood Updated: 01/23/21 1034     Special Requests: --        LEFT  HAND       Culture result: NO GROWTH 5 DAYS       CULTURE, URINE [[id number]] Collected: 01/18/21 1542    Order Status: Completed Specimen: Cath Urine Updated: 01/21/21 0725     Special Requests: GREY TUBE     Culture result: NO GROWTH 2 DAYS       CULTURE, RESPIRATORY/SPUTUM/BRONCH Brandon Macrina STAIN [[id number]] Collected: 01/18/21 1542    Order Status: Canceled Specimen: Sputum from Tracheal Aspirate     CULTURE, BLOOD [[id number]] Collected: 01/07/21 0115    Order Status: Completed Specimen: Blood Updated: 01/12/21 1150     Special Requests: --        LEFT  HAND       Culture result: NO GROWTH 5 DAYS       CULTURE, BLOOD [[id number]] Collected: 01/07/21 0109    Order Status: Completed Specimen: Blood Updated: 01/12/21 1150     Special Requests: --        LEFT  Antecubital       Culture result: NO GROWTH 5 DAYS             Studies:    CXR per HPI    Signed By: Timmy Rodarte MD     February 10, 2021

## 2021-02-10 NOTE — PROGRESS NOTES
[facility]        Subjective: SWATI, Pt started on dialysis on 1/25/21. Review of Systems -   Encephalopathy   Can not be obtained. Objective:    Vitals:    02/10/21 0600 02/10/21 0700 02/10/21 0717 02/10/21 0800   BP: 135/60 130/60  139/63   Pulse: (!) 103 (!) 101 (!) 101 98   Resp: (!) 42 (!) 44 (!) 41 (!) 42   Temp:    99.5 °F (37.5 °C)   SpO2: 95% 96% 97% 98%   Weight:       Height:           PE  Gen: intubated  CV:reg rate  Chest:bilat bronchi   Abd: soft  Ext: ++ edema   Access:rt IJ temp dialysis cath ok, . [facility]  Recent Labs     02/10/21  0352 02/09/21 0327 02/08/21  0256   WBC 12.9* 12.3* 13.6*   HGB 7.7* 7.1* 7.7*   HCT 24.7* 23.5* 24.5*   * 120* 150   INR 1.2 1.2 1.2     Recent Labs     02/10/21  0352 02/09/21  0327 02/08/21  0256    140 142   K 3.6 3.7 3.5    103 102   CO2 32 34* 30   * 185* 129*   BUN 15 44* 91*   CREA 1.38 3.16* 5.58*   MG 1.6*  --  1.9   PHOS 2.0*  --  5.2*   CA 7.8* 7.7* 7.9*           Radiology    A/P:   Patient Active Problem List   Diagnosis Code    Obesity E66.9    Hypertension I10    Bradycardia R00.1    PVC (premature ventricular contraction) I49.3    CAD (coronary artery disease) I25.10    Dyslipidemia, goal LDL below 70 E78.5    SWATI (acute kidney injury) ([facility] [address] N17.9    Acute hypoxemic respiratory failure (HCC) J96.01    Type 2 diabetes mellitus with hyperosmolarity without nonketotic hyperglycemic-hyperosmolar coma (HCC) E11.00    Pneumonia due to COVID-19 virus U07.1, J12.82    Hypernatremia E87.0    Atrial fibrillation (HCC) I48.91    Hypotension I95.9    Acute deep vein thrombosis (DVT) of left peroneal vein ([facility]) I82.452    VAP (ventilator-associated pneumonia) ([facility]) J95.851     SWATI - ATN, oliguric.   Volume overload   For SLED again today ,       COVID19/ Resp Failure    Hypotension    A Fib with RVR    Anemia - intermittent transfusion      Nelsy Martinez MD

## 2021-02-10 NOTE — PROGRESS NOTES
Physical Therapy Note:    PT treatment attempted today however pt currently running SLED. Will check back tomorrow as pt's condition and schedule permits.      Robel Menon, PT, DPT

## 2021-02-10 NOTE — DIALYSIS
8-12 HR SLED initiated using  Right chest catheter. Aspirated and flushed both catheter ports without problem. Machine settings per MD order. 200  DFR  500 UFR  No Heparin     Reported to primary nurse. Dialysis nurse available as needed.

## 2021-02-10 NOTE — PROGRESS NOTES
Bedside shift change report given to Sherri Bates [facility] (oncoming nurse) by Tisha Conroy RN (offgoing nurse). Report included the following information SBAR, Kardex, Intake/Output, MAR, Recent Results, Cardiac Rhythm NSR and Alarm Parameters .

## 2021-02-10 NOTE — PROGRESS NOTES
Ventilator check complete; patient has a #8.0 tracheostomy. Patient is not sedated. Patient is not able to follow commands. Breath sounds are coarse. Trachea is midline, Negative for subcutaneous air, and chest excursion is symmetric. Patient is also Negative for cyanosis and is Positive for pitting edema. All alarms are set and audible. Resuscitation bag is at the head of the bed.       Ventilator Settings  Mode FIO2 Rate Tidal Volume Pressure PEEP I:E Ratio   Pressure control  50 %   22 525 ml     10 cm H20  1:2.22      Peak airway pressure: 29.3 cm H2O   Minute ventilation: 13.6 l/min       Aayush Palomo, RT

## 2021-02-10 NOTE — PROGRESS NOTES
Occupational Therapy Note:    OT treatment attempted today however pt currently running SLED. Will check back tomorrow as pt's condition and schedule permits.      Magnus Homans, OTR/L, CLT

## 2021-02-10 NOTE — PROGRESS NOTES
A follow up visit was made to the patient. Emotional support, spiritual presence and   prayer were provided for the patient. He was receiving dialysis and has a trach.       Laverne Saba, [address] [facility], [facility]

## 2021-02-10 NOTE — PROGRESS NOTES
[facility] Critical Care COVID-19 Note:    Critical Care Note: 2/10/2021    Renetta Moctezumatingham. Admission Date: 1/6/2021     Length of Stay: 34 days    Background: 61 y.o. y/o male with acute hypoxemic respiratory failure secondary to COVID-19. Pt admitted 1/7 with fever, cough, fever. Covid + 1/6. Admitted by hospitalists. Started on dexamethasone, convalescent plasma, and remdesivir. Pulm consulted 1/10 with pt requiring CPAP but pt decompensated and was intubated 1/17. Nephrology consulted 1/23 for renal failure. US 1/18 with L peroneal vein thrombus. He was started on heparin but stopped due to bleeding from HD catheter. He was started on HD on 1/25. Trached on 2/2 secondary to inability to wean from the vent. Pt developed afib requiring amio. PEG placed 2/4. IVC filter 2/8. Onset of COVID-19 symptoms: 1/5/2021    Notable PMH: obesity, HTN, CAD, dyslipidemia, SWATI, DM, GERD    Drug Allergies: Allergies   Allergen Reactions    Latex Swelling     Had a purcell catheter with swelling of urethra. Only known latex issue in past. Wife remembers this now    Hydrocodone-Acetaminophen Itching     Wife remembers this allergy    Tessalon Perles [Benzonatate] Unknown (comments)       24 Hour events:   Patient ran SLED yesterday. Completed 12 hour run. RT noted tachypnea this morning into the 40's. On 50% Fio2. Febrile to 100.9. Still on vanc and zosyn with cultures just done on 2/5. Still appears tachypneic and uncomfortable on vent. This coincides with him being more alert.        ROS: trached    Lines: (insertion date)   Tracheostomy size #8 prox XLT, cuffed Shiley (2/2)  PEG 2/4  PICC 1/13  Art line 1/18/2021  Dialysis access 1/24/21, tunneled 2/8  Purcell 1/17  Rectal tube 1/30  IVC filter 2/8    Drips: current dose (range)  precedex    Visit Vitals  /60   Pulse (!) 101   Temp (!) 100.9 °F (38.3 °C)   Resp (!) 41   Ht 5' 8\" (1.727 m)   Wt 220 lb 14.4 oz (100.2 kg)   SpO2 97%   BMI 33.59 kg/m²     Pertinent Exam:            Constitutional:  trached and mechanically ventilated. Appears more tachypeic today and less comfortable.    EENMT:  Sclera clear, pupils equal, oral mucosa moist  Respiratory: crackles bilaterally   Cardiovascular:  RRR with no M,G,R;  Gastrointestinal:  soft with no tenderness; positive bowel sounds present  Musculoskeletal:  warm with no cyanosis, 2+ pitting B lower extremity edema up to thigh  Skin:  no jaundice or ecchymosis  Neurologic: opens eyes    Psychiatric: opens eyes      Pertinent Labs:   Recent Labs     02/10/21  0352 02/09/21 0327 02/08/21 0256   WBC 12.9* 12.3* 13.6*   HGB 7.7* 7.1* 7.7*   HCT 24.7* 23.5* 24.5*   * 120* 150   INR 1.2 1.2 1.2     Recent Labs     02/10/21  0352 02/09/21 0327 02/08/21 0256    140 142   K 3.6 3.7 3.5    103 102   CO2 32 34* 30   * 185* 129*   BUN 15 44* 91*   CREA 1.38 3.16* 5.58*   MG 1.6*  --  1.9   CA 7.8* 7.7* 7.9*   PHOS 2.0*  --  5.2*     Recent Labs     02/10/21  0336 02/09/21 2300 02/09/21 1953   GLUCPOC 146* 171* 198*       SARS-CoV-2 LAB Results  [facility] Test: No results found for: COV2NT   [facility] Test: No results found for: EDPR  [facility] Test: No components found for: [id number]     COVID-19 Meds:  Dexamethasone 6mg daily (EOT 1/16)  Convalescent plasma transfusion (1/7)  Remdesivir (EOT 1/12)    Micro Results:  BC: negative (1/30)  Sputum cx: normal resp vianney: (1/30)  Sputum 2/5 - moderate s aureus, moderate GNR    Anti-infectives (start date):  azithro (completed)  Ceftriaxone (completed)  Vanc and cefepime: (completed)  Vanc and zosyn (started 2/5)    Ventilator Settings:  5' 8\" (1.727 m)  Mode FIO2 Rate Tidal Volume Pressure PEEP   Pressure control  50 %    525 ml     10 cm H20    Peak airway pressure: 28 cm H2O   Minute ventilation: 15.9 l/min    ABG:   Recent Labs     02/10/21  0310 02/09/21  0340 02/08/21  0324   PHI 7.45 7.35 7.37   PCO2I 48.3* 60.1* 54.5*   PO2I 100 185* 61* HCO3I 33.3* 33.5* 31.3*         IMPRESSION:   61 y.o. y/o male with acute hypoxemic respiratory failure secondary to COVID-19. DVT unable to tolerate anticoagulation. SWATI on SLED. Now trached, PEG. With lightening sedation recurrent tachypnea, ongoing B infiltrates on CXR. Concerning for combination of superimposed bacterial PNA with staph and klebsiella in sputum as well as pulmonary edema. Getting SLED past 2 days but not much improvement. Now with recurrent fevers. Was planning on tapering abx from vanc and zosyn today, but wonder if ongoing uncovered infection somewhere. Also has DVT not fully treated due to intolerance to anticoagulation, now with IVC filter in place as a possible source of fever. PLAN:  -consult ID to give input on his ongoing fever. Taper abx from here vs repeat culturing.   -trached, cont efforts at vent weaning but cont to get very tachypneic with ongoing B infiltrates. -dialysis per nephrology.   -cont vanc and zosyn for now pending ID input. Day 6. -IVC filter placed for DVT and bleeding on anticoagulation.   -on po amio for a fib  -on TF and a goal through PEG. -back off sedation as tolerated. Just put back on precedex for tachypnea and may need to go back on fentanyl drip today.   -will schedule 5mg oxycodone every 6 hours to help with tachypnea. -s/p treatments for COVID. Now off droplet precautions. -needs SCD on R LE.   -cont prevacid  -PT as able. -seems too unstable for regency at this point. Hold for now but close.  Needs to transition from SLED to HD first though      Full Code      William Callahan MD

## 2021-02-10 NOTE — PROGRESS NOTES
Chart reviewed. Pt remains CCU , non covid isolation now. Trach/PEG/Vent -Fio2 50%. IVC filter placed. SLED per Nephrology. CM following for d/c POC, most likely LTACH. Already on list for liaison to follow, will need precert. LOS 34 days.

## 2021-02-11 ENCOUNTER — APPOINTMENT (OUTPATIENT)
Dept: GENERAL RADIOLOGY | Age: 61
DRG: 004 | End: 2021-02-11
Attending: INTERNAL MEDICINE
Payer: COMMERCIAL

## 2021-02-11 LAB
ANION GAP SERPL CALC-SCNC: 3 MMOL/L (ref 7–16)
APTT PPP: 41.7 SEC (ref 24.1–35.1)
ARTERIAL PATENCY WRIST A: YES
ATRIAL RATE: 108 BPM
BASE EXCESS BLD CALC-SCNC: 9 MMOL/L
BDY SITE: ABNORMAL
BUN SERPL-MCNC: 12 MG/DL (ref 8–23)
CALCIUM SERPL-MCNC: 7.6 MG/DL (ref 8.3–10.4)
CALCULATED P AXIS, ECG09: 53 DEGREES
CALCULATED R AXIS, ECG10: 4 DEGREES
CALCULATED T AXIS, ECG11: 69 DEGREES
CHLORIDE SERPL-SCNC: 105 MMOL/L (ref 98–107)
CO2 BLD-SCNC: 36 MMOL/L
CO2 SERPL-SCNC: 35 MMOL/L (ref 21–32)
COLLECT TIME,HTIME: 310
CREAT SERPL-MCNC: 1.03 MG/DL (ref 0.8–1.5)
DIAGNOSIS, 93000: NORMAL
ERYTHROCYTE [DISTWIDTH] IN BLOOD BY AUTOMATED COUNT: 15.9 % (ref 11.9–14.6)
EXHALED MINUTE VOLUME, VE: 10.1 L/MIN
FIBRINOGEN PPP-MCNC: 649 MG/DL (ref 190–501)
GAS FLOW.O2 O2 DELIVERY SYS: ABNORMAL L/MIN
GAS FLOW.O2 SETTING OXYMISER: 22 BPM
GLUCOSE BLD STRIP.AUTO-MCNC: 174 MG/DL (ref 65–100)
GLUCOSE BLD STRIP.AUTO-MCNC: 191 MG/DL (ref 65–100)
GLUCOSE BLD STRIP.AUTO-MCNC: 192 MG/DL (ref 65–100)
GLUCOSE BLD STRIP.AUTO-MCNC: 192 MG/DL (ref 65–100)
GLUCOSE BLD STRIP.AUTO-MCNC: 210 MG/DL (ref 65–100)
GLUCOSE SERPL-MCNC: 162 MG/DL (ref 65–100)
HCO3 BLD-SCNC: 34.1 MMOL/L (ref 22–26)
HCT VFR BLD AUTO: 21.5 % (ref 41.1–50.3)
HCT VFR BLD AUTO: 25.2 % (ref 41.1–50.3)
HGB BLD-MCNC: 6.7 G/DL (ref 13.6–17.2)
HGB BLD-MCNC: 8 G/DL (ref 13.6–17.2)
HISTORY CHECKED?,CKHIST: NORMAL
INR PPP: 1.2
INSPIRATION.DURATION SETTING TIME VENT: 0.85 SEC
MCH RBC QN AUTO: 29.4 PG (ref 26.1–32.9)
MCHC RBC AUTO-ENTMCNC: 31.2 G/DL (ref 31.4–35)
MCV RBC AUTO: 94.3 FL (ref 79.6–97.8)
NRBC # BLD: 0 K/UL (ref 0–0.2)
O2/TOTAL GAS SETTING VFR VENT: 50 %
P-R INTERVAL, ECG05: 142 MS
PCO2 BLD: 49.9 MMHG (ref 35–45)
PEEP RESPIRATORY: 10 CMH2O
PH BLD: 7.44 [PH] (ref 7.35–7.45)
PLATELET # BLD AUTO: 88 K/UL (ref 150–450)
PMV BLD AUTO: 10.9 FL (ref 9.4–12.3)
PO2 BLD: 134 MMHG (ref 75–100)
POTASSIUM SERPL-SCNC: 3.6 MMOL/L (ref 3.5–5.1)
PRESSURE CONTROL, IPC: 16
PROTHROMBIN TIME: 15.4 SEC (ref 12.5–14.7)
Q-T INTERVAL, ECG07: 360 MS
QRS DURATION, ECG06: 102 MS
QTC CALCULATION (BEZET), ECG08: 482 MS
RBC # BLD AUTO: 2.28 M/UL (ref 4.23–5.6)
SAO2 % BLD: 99 % (ref 95–98)
SERVICE CMNT-IMP: ABNORMAL
SERVICE CMNT-IMP: ABNORMAL
SODIUM SERPL-SCNC: 143 MMOL/L (ref 136–145)
SPECIMEN TYPE: ABNORMAL
VENTILATION MODE VENT: ABNORMAL
VENTRICULAR RATE, ECG03: 108 BPM
WBC # BLD AUTO: 9.8 K/UL (ref 4.3–11.1)

## 2021-02-11 PROCEDURE — 85027 COMPLETE CBC AUTOMATED: CPT

## 2021-02-11 PROCEDURE — 74011250637 HC RX REV CODE- 250/637: Performed by: INTERNAL MEDICINE

## 2021-02-11 PROCEDURE — 94003 VENT MGMT INPAT SUBQ DAY: CPT

## 2021-02-11 PROCEDURE — 65620000000 HC RM CCU GENERAL

## 2021-02-11 PROCEDURE — 74011250636 HC RX REV CODE- 250/636: Performed by: INTERNAL MEDICINE

## 2021-02-11 PROCEDURE — 85384 FIBRINOGEN ACTIVITY: CPT

## 2021-02-11 PROCEDURE — 74011636637 HC RX REV CODE- 636/637: Performed by: INTERNAL MEDICINE

## 2021-02-11 PROCEDURE — 77030040393 HC DRSG OPTIFOAM GENT MDII -B

## 2021-02-11 PROCEDURE — 85610 PROTHROMBIN TIME: CPT

## 2021-02-11 PROCEDURE — 36415 COLL VENOUS BLD VENIPUNCTURE: CPT

## 2021-02-11 PROCEDURE — 99233 SBSQ HOSP IP/OBS HIGH 50: CPT | Performed by: INTERNAL MEDICINE

## 2021-02-11 PROCEDURE — 74011000258 HC RX REV CODE- 258: Performed by: INTERNAL MEDICINE

## 2021-02-11 PROCEDURE — 74011250636 HC RX REV CODE- 250/636: Performed by: EMERGENCY MEDICINE

## 2021-02-11 PROCEDURE — 82962 GLUCOSE BLOOD TEST: CPT

## 2021-02-11 PROCEDURE — 36592 COLLECT BLOOD FROM PICC: CPT

## 2021-02-11 PROCEDURE — 36430 TRANSFUSION BLD/BLD COMPNT: CPT

## 2021-02-11 PROCEDURE — 86901 BLOOD TYPING SEROLOGIC RH(D): CPT

## 2021-02-11 PROCEDURE — 36600 WITHDRAWAL OF ARTERIAL BLOOD: CPT

## 2021-02-11 PROCEDURE — 86923 COMPATIBILITY TEST ELECTRIC: CPT

## 2021-02-11 PROCEDURE — P9016 RBC LEUKOCYTES REDUCED: HCPCS

## 2021-02-11 PROCEDURE — 80048 BASIC METABOLIC PNL TOTAL CA: CPT

## 2021-02-11 PROCEDURE — 77030006998

## 2021-02-11 PROCEDURE — 90945 DIALYSIS ONE EVALUATION: CPT

## 2021-02-11 PROCEDURE — 74011000250 HC RX REV CODE- 250: Performed by: INTERNAL MEDICINE

## 2021-02-11 PROCEDURE — 71045 X-RAY EXAM CHEST 1 VIEW: CPT

## 2021-02-11 PROCEDURE — 85730 THROMBOPLASTIN TIME PARTIAL: CPT

## 2021-02-11 PROCEDURE — 82803 BLOOD GASES ANY COMBINATION: CPT

## 2021-02-11 PROCEDURE — 2709999900 HC NON-CHARGEABLE SUPPLY

## 2021-02-11 PROCEDURE — 85014 HEMATOCRIT: CPT

## 2021-02-11 RX ORDER — SODIUM CHLORIDE 9 MG/ML
250 INJECTION, SOLUTION INTRAVENOUS AS NEEDED
Status: DISCONTINUED | OUTPATIENT
Start: 2021-02-11 | End: 2021-02-15

## 2021-02-11 RX ORDER — HEPARIN SODIUM 1000 [USP'U]/ML
5000 INJECTION, SOLUTION INTRAVENOUS; SUBCUTANEOUS
Status: DISCONTINUED | OUTPATIENT
Start: 2021-02-11 | End: 2021-02-21

## 2021-02-11 RX ORDER — MIDAZOLAM HYDROCHLORIDE 1 MG/ML
2 INJECTION, SOLUTION INTRAMUSCULAR; INTRAVENOUS
Status: DISCONTINUED | OUTPATIENT
Start: 2021-02-11 | End: 2021-02-18

## 2021-02-11 RX ADMIN — HEPARIN SODIUM 5000 UNITS: 1000 INJECTION INTRAVENOUS; SUBCUTANEOUS at 09:36

## 2021-02-11 RX ADMIN — FENTANYL CITRATE 50 MCG: 50 INJECTION, SOLUTION INTRAMUSCULAR; INTRAVENOUS at 21:14

## 2021-02-11 RX ADMIN — INSULIN LISPRO 6 UNITS: 100 INJECTION, SOLUTION INTRAVENOUS; SUBCUTANEOUS at 07:47

## 2021-02-11 RX ADMIN — OXYCODONE HYDROCHLORIDE 5 MG: 5 SOLUTION ORAL at 05:00

## 2021-02-11 RX ADMIN — Medication 50 MCG/HR: at 07:02

## 2021-02-11 RX ADMIN — INSULIN LISPRO 3 UNITS: 100 INJECTION, SOLUTION INTRAVENOUS; SUBCUTANEOUS at 03:38

## 2021-02-11 RX ADMIN — LANSOPRAZOLE 30 MG: KIT at 21:00

## 2021-02-11 RX ADMIN — INSULIN LISPRO 2 UNITS: 100 INJECTION, SOLUTION INTRAVENOUS; SUBCUTANEOUS at 15:24

## 2021-02-11 RX ADMIN — GUAIFENESIN AND DEXTROMETHORPHAN 10 ML: 100; 10 SYRUP ORAL at 03:38

## 2021-02-11 RX ADMIN — SODIUM CHLORIDE 100 MG: 900 INJECTION, SOLUTION INTRAVENOUS at 11:51

## 2021-02-11 RX ADMIN — LANSOPRAZOLE 30 MG: KIT at 08:12

## 2021-02-11 RX ADMIN — Medication 20 ML: at 21:14

## 2021-02-11 RX ADMIN — POLYETHYLENE GLYCOL 3350 17 G: 17 POWDER, FOR SOLUTION ORAL at 08:06

## 2021-02-11 RX ADMIN — DEXMEDETOMIDINE HYDROCHLORIDE 0.6 MCG/KG/HR: 100 INJECTION, SOLUTION INTRAVENOUS at 10:19

## 2021-02-11 RX ADMIN — OXYCODONE HYDROCHLORIDE 5 MG: 5 SOLUTION ORAL at 17:12

## 2021-02-11 RX ADMIN — DOCUSATE SODIUM 50 MG AND SENNOSIDES 8.6 MG 1 TABLET: 8.6; 5 TABLET, FILM COATED ORAL at 08:07

## 2021-02-11 RX ADMIN — ATORVASTATIN CALCIUM 80 MG: 40 TABLET, FILM COATED ORAL at 08:07

## 2021-02-11 RX ADMIN — MIDAZOLAM 2 MG: 1 INJECTION INTRAMUSCULAR; INTRAVENOUS at 03:38

## 2021-02-11 RX ADMIN — OXYCODONE HYDROCHLORIDE 5 MG: 5 SOLUTION ORAL at 11:52

## 2021-02-11 RX ADMIN — MIDAZOLAM 2 MG: 1 INJECTION INTRAMUSCULAR; INTRAVENOUS at 06:34

## 2021-02-11 RX ADMIN — INSULIN LISPRO 3 UNITS: 100 INJECTION, SOLUTION INTRAVENOUS; SUBCUTANEOUS at 11:52

## 2021-02-11 RX ADMIN — Medication 20 ML: at 05:00

## 2021-02-11 RX ADMIN — INSULIN LISPRO 3 UNITS: 100 INJECTION, SOLUTION INTRAVENOUS; SUBCUTANEOUS at 20:43

## 2021-02-11 RX ADMIN — SODIUM ZIRCONIUM CYCLOSILICATE 10 G: 10 POWDER, FOR SUSPENSION ORAL at 08:07

## 2021-02-11 RX ADMIN — FENTANYL CITRATE 50 MCG: 50 INJECTION, SOLUTION INTRAMUSCULAR; INTRAVENOUS at 15:23

## 2021-02-11 RX ADMIN — Medication 20 ML: at 13:35

## 2021-02-11 RX ADMIN — DEXMEDETOMIDINE HYDROCHLORIDE 0.5 MCG/KG/HR: 100 INJECTION, SOLUTION INTRAVENOUS at 17:13

## 2021-02-11 RX ADMIN — AMIODARONE HYDROCHLORIDE 200 MG: 200 TABLET ORAL at 08:07

## 2021-02-11 RX ADMIN — INSULIN GLARGINE 15 UNITS: 100 INJECTION, SOLUTION SUBCUTANEOUS at 08:07

## 2021-02-11 RX ADMIN — MIDAZOLAM HYDROCHLORIDE 2 MG: 1 INJECTION, SOLUTION INTRAMUSCULAR; INTRAVENOUS at 10:32

## 2021-02-11 NOTE — PROGRESS NOTES
Bedside, Verbal and Written shift change report given to JOSE DE JESUS RN  (oncoming nurse) by Ashleigh Elam RN  (offgoing nurse). Report included the following information SBAR, Kardex, ED Summary, Procedure Summary, Intake/Output, MAR, Recent Results, Med Rec Status, Cardiac Rhythm NSR, Alarm Parameters  and Quality Measures     ALL LINES ASSESS / CORTEZ CARE PREFORMED AS WELL AS ORAL CARE     PT FOLLOWS COMMANDS 3X    PT SHAKES HIS HEAD YES WHEN ASKED IF HE IS UNCOMFORTABLE/ REPOSITIONED    H/H LOW THIS AM / RECEIVING UNIT OF BLOOD WILL RE CHECK 1100  .

## 2021-02-11 NOTE — DIABETES MGMT
Patient's blood glucose ranged 141-230 yesterday with patient receiving Lantus 15 units and Humalog 21 units. Blood glucose 210 this morning. Hgb 6. 7. Plt 88. Patient s/p trach and PEG. Patient remains on vent, Nepro TF, and still requiring SLED. Will continue to follow.

## 2021-02-11 NOTE — PROGRESS NOTES
OT note: RN reported pt began 8 hour SLED at 0930 and was given medication to help him relax. RN asked therapy to HOLD today. Will check back tomorrow as time and schedule allows. Thank you.  Elzbieta Espinosa MS, OTR/L

## 2021-02-11 NOTE — PROGRESS NOTES
02/11/21 0710   Patient Observations   Pulse (Heart Rate) 75   Resp Rate 25   O2 Sat (%) 91 %   Airway - Tracheostomy Tube 02/02/21 Shiley proximal XLT   Placement Date/Time: 02/02/21 1558   Number of Attempts: 1  Inserted By: dr price  Present on Admission/Arrival: No  Airway Types: Tracheostomy  Trach Tube Type: Shiley proximal XLT   Cuff Pressure 33 cmH20   Site Assessment Clean, dry, & intact   Trach Dressing Changed No   Trach Tie Changed No   Trach Cannula Changed No   Inner Cannula #8 Shiley; Cuffed, cuff inflated   [employer] of the lock   Side Secured Centered   Spare Trach at Bedside Yes   Spare Trach Tube One Size Smaller at Bedside Yes   Ambu Bag With Mask at Bedside Yes   Respiratory   Respiratory (WDL) X   Patient on Vent Yes - If patient is on vent, add Doc Flowsheet Ventilator ().    Respiratory Pattern Regular   Chest/Tracheal Assessment Chest expansion, symmetrical   Breath Sounds Bilateral Diminished;Coarse   Airway Clearance   Suction Trach   Suction Device Inline suction catheter   Suction Catheter Size 14 Fr   Sputum Method Obtained Tracheal   Sputum Amount Moderate   Sputum Color/Odor Tan;Clear   Sputum Consistency Thick   Vent Settings   FIO2 (%) 48 %   SpO2/FIO2 Ratio 189.58   CMV Rate Set 22   PC Set 16   PEEP/VENT (cm H2O) 10 cm H20   I:E Ratio 1:2.22   Insp Time (sec) 0.86 sec   Insp Rise Time (sec) 0.25   Insp Rise Time % 9.2 %   Flow Trigger 2   Ventilator Measurements   Resp Rate Observed 25   Vt Exhaled (Machine Breath) (ml) 554 ml   Ve Observed (l/min) 12.8 l/min   PIP Observed (cm H2O) 29.8 cm H2O   MAP (cm H2O) 16   I:E Ratio Actual 1:2.17   Dynamic Compliance (ml/cm H20) 34 ml/cm H20   Safety & Alarms   Backup Mode Checked/Apnea Yes   Pressure Max 40 cm H2O   Pressure Min 5 cm H2O   Ve Min 5   Ve Max 25   RR Min 10   RR Max 50   Ambu Bag Yes   Ambu Mask Yes   Vent Method/Mode   Ventilation Method Conventional   Ventilator Mode Pressure control   Ventilator Mode ID 2 Pt resting no distress noted.  Fi02 increased to 50% due SAT of 89%

## 2021-02-11 NOTE — PROGRESS NOTES
PT note:  RN reported pt began 8 hour SLED at 0930 and was given medication to help him relax. RN asked therapy to HOLD today. Will check back tomorrow as time and schedule allows. Thank you.     LOCO MaradiagaT

## 2021-02-11 NOTE — PROGRESS NOTES
Critical Care Daily Progress Note: 2/11/2021  Admission Date: 1/6/2021     The patient's chart is reviewed and the patient is discussed with the staff. Admission Date: 1/6/2021     Length of Stay: 35 days     Background: 61 y.o. y/o male with acute hypoxemic respiratory failure secondary to COVID-19.     Pt admitted 1/7 with fever, cough, fever. Covid + 1/6. Admitted by hospitalists. Started on dexamethasone, convalescent plasma, and remdesivir. Pulm consulted 1/10 with pt requiring CPAP but pt decompensated and was intubated 1/17. Nephrology consulted 1/23 for renal failure. US 1/18 with L peroneal vein thrombus. He was started on heparin but stopped due to bleeding from HD catheter. He was started on HD on 1/25. Trached on 2/2 secondary to inability to wean from the vent. Pt developed afib requiring amio. PEG placed 2/4. IVC filter 2/8.      Onset of COVID-19 symptoms: 1/5/2021     Notable PMH: obesity, HTN, CAD, dyslipidemia, SWATI, DM, GERD     Drug Allergies: Allergies   Allergen Reactions    Latex Swelling       Had a purcell catheter with swelling of urethra.  Only known latex issue in past. Wife remembers this now    Hydrocodone-Acetaminophen Itching       Wife remembers this allergy    Tessalon Perles [Benzonatate] Unknown (comments)       Subjective:   Pt remains on vent,  To have dialysis today-+ 4500 ml since admit  Pt is awake but is still on precedex and fentanyl  Fever better last 24 hours  Lines: (insertion date)   Tracheostomy size #8 prox XLT, cuffed Hunter (2/2)  PEG 2/4  PICC 1/13  Art line 1/18/2021  Dialysis access 1/24/21, tunneled 2/8  Purcell 1/17  Rectal tube 1/30  IVC filter 2/8     Drips: current dose (range)  precedex and fentanyl    Current Facility-Administered Medications   Medication Dose Route Frequency    0.9% sodium chloride infusion 250 mL  250 mL IntraVENous PRN    midazolam (VERSED) injection 2 mg  2 mg IntraVENous Q3H PRN    dexmedeTOMidine in 0.9 % NaCl (PRECEDEX) 400 mcg/100 mL (4 mcg/mL) infusion soln  0.1-1.5 mcg/kg/hr IntraVENous TITRATE    anidulafungin (ERAXIS) 100 mg in 0.9% sodium chloride 130 mL IVPB  100 mg IntraVENous Q24H    oxyCODONE (ROXICODONE) 5 mg/5 mL oral solution 5 mg  5 mg Per G Tube Q6H    NOREPINephrine (LEVOPHED) 4 mg in 5% dextrose 250 mL infusion  0.5-30 mcg/min IntraVENous TITRATE    fentaNYL citrate (PF) injection 50 mcg  50 mcg IntraVENous Q2H PRN    midazolam (PF) in saline (VERSED) 100 mg/100 mL (1 mg/mL) infusion  0-10 mg/hr IntraVENous TITRATE    fentaNYL in normal saline (pf) 25 mcg/mL infusion  0-200 mcg/hr IntraVENous TITRATE    insulin glargine (LANTUS) injection 15 Units  15 Units SubCUTAneous DAILY    0.9% sodium chloride infusion 250 mL  250 mL IntraVENous PRN    amiodarone (CORDARONE) tablet 200 mg  200 mg Oral DAILY    oxyCODONE (ROXICODONE) 5 mg/5 mL oral solution 5 mg  5 mg Oral Q4H PRN    lansoprazole (PREVACID) 3 mg/mL oral suspension 30 mg  30 mg Per NG tube Q12H    senna-docusate (PERICOLACE) 8.6-50 mg per tablet 1 Tab  1 Tab Oral DAILY    ondansetron (ZOFRAN) injection 4 mg  4 mg IntraVENous Q6H PRN    polyethylene glycol (MIRALAX) packet 17 g  17 g Per NG tube DAILY PRN    guaiFENesin-dextromethorphan (ROBITUSSIN DM) 100-10 mg/5 mL syrup 10 mL  10 mL Per NG tube Q4H PRN    acetaminophen (TYLENOL) tablet 650 mg  650 mg Per NG tube Q6H PRN    polyethylene glycol (MIRALAX) packet 17 g  17 g Per NG tube DAILY    sodium zirconium cyclosilicate (LOKELMA) powder packet 10 g  10 g Oral DAILY    white petrolatum-mineral oiL (LACRILUBE S.O.P.) ointment   Both Eyes Q4H PRN    atorvastatin (LIPITOR) tablet 80 mg  80 mg Per NG tube DAILY    [Held by provider] clopidogreL (PLAVIX) tablet 75 mg  75 mg Per NG tube DAILY    [Held by provider] metoprolol tartrate (LOPRESSOR) tablet 50 mg  50 mg Per NG tube BID    insulin lispro (HUMALOG) injection   SubCUTAneous Q4H    NUTRITIONAL SUPPORT ELECTROLYTE PRN ORDERS   Does Not Apply PRN    sodium chloride (NS) flush 20 mL  20 mL InterCATHeter Q8H    sodium chloride (NS) flush 20 mL  20 mL InterCATHeter PRN    loperamide (IMODIUM) capsule 2 mg  2 mg Oral Q4H PRN    dextrose 40% (GLUTOSE) oral gel 1 Tube  15 g Oral PRN    glucagon (GLUCAGEN) injection 1 mg  1 mg IntraMUSCular PRN    dextrose (D50W) injection syrg 12.5-25 g  25-50 mL IntraVENous PRN    albuterol (PROVENTIL HFA, VENTOLIN HFA, PROAIR HFA) inhaler 2 Puff  2 Puff Inhalation Q4H PRN    influenza vaccine 2020-21 (6 mos+)(PF) (FLUARIX/FLULAVAL/FLUZONE QUAD) injection 0.5 mL  0.5 mL IntraMUSCular PRIOR TO DISCHARGE    hydrALAZINE (APRESOLINE) 20 mg/mL injection 20 mg  20 mg IntraVENous Q6H PRN       Review of Systems   Unobtainable due to patient status. Objective:     Vitals:    02/11/21 0710 02/11/21 0714 02/11/21 0726 02/11/21 0744   BP:  129/64  (!) 140/64   Pulse: 75 81  85   Resp: 25 28     Temp:   99.4 °F (37.4 °C)    SpO2: 91% 94%  94%   Weight:       Height:             Intake/Output Summary (Last 24 hours) at 2/11/2021 0905  Last data filed at 2/11/2021 0843  Gross per 24 hour   Intake 2990.09 ml   Output 2621 ml   Net 369.09 ml         Physical Exam:          Constitutional:  intubated and mechanically ventilated.   EENMT:  Sclera clear, pupils equal, oral mucosa moist  Respiratory: rhonchi  Cardiovascular:  RRR with no M,G,R;  Gastrointestinal:  soft with no tenderness; positive bowel sounds present  Musculoskeletal:  warm with no cyanosis, no lower extremity edema  Skin:  no jaundice or ecchymosis  Neurologic: no gross neuro deficits     Psychiatric:  Awake, follows some commands      CXR:        Ventilator Settings  Mode FIO2 Rate Tidal Volume Pressure PEEP   Pressure control  48 %    525 ml     10 cm H20      Peak airway pressure: 29.8 cm H2O   Minute ventilation: 12.8 l/min     ABG:   Recent Labs     02/11/21  0312 02/10/21  0310 02/09/21  0340   PHI 7.44 7.45 7.35   PCO2I 49.9* 48.3* 60.1*   PO2I 134* 100 185*   HCO3I 34.1* 33.3* 33.5*       LAB  Recent Labs     02/11/21  0733 02/10/21  2316 02/10/21  2032 02/10/21  1607 02/10/21  1311   GLUCPOC 210* 181* 186* 177* 201*     Recent Labs     02/11/21  0222 02/10/21  0352 02/09/21  0327   WBC 9.8 12.9* 12.3*   HGB 6.7* 7.7* 7.1*   HCT 21.5* 24.7* 23.5*   PLT 88* 116* 120*   INR 1.2 1.2 1.2     Recent Labs     02/11/21  0222 02/10/21  0352 02/09/21  0327    140 140   K 3.6 3.6 3.7    105 103   CO2 35* 32 34*   * 141* 185*   BUN 12 15 44*   CREA 1.03 1.38 3.16*   MG  --  1.6*  --    PHOS  --  2.0*  --    CA 7.6* 7.8* 7.7*     No results for input(s): LCAD, LAC in the last 72 hours. Patient Active Problem List   Diagnosis Code    Obesity E66.9    Hypertension I10    Bradycardia R00.1    PVC (premature ventricular contraction) I49.3    CAD (coronary artery disease) I25.10    Dyslipidemia, goal LDL below 70 E78.5    SWATI (acute kidney injury) ([facility] N17.9    Acute hypoxemic respiratory failure ([facility]) J96.01    Type 2 diabetes mellitus with hyperosmolarity without nonketotic hyperglycemic-hyperosmolar coma ([facility]) E11.00    Pneumonia due to COVID-19 virus U07.1, J12.82    Hypernatremia E87.0    Atrial fibrillation ([facility]) I48.91    Hypotension I95.9    Acute deep vein thrombosis (DVT) of left peroneal vein ([facility]) I82.452    VAP (ventilator-associated pneumonia) ([facility]) J95.851         Assessment:  (Medical Decision Making)     Hospital Problems  Date Reviewed: 2/5/2021          Codes Class Noted POA    VAP (ventilator-associated pneumonia) ([facility] ICD-10-CM: B81.078  ICD-9-CM: 997.31  2/9/2021 Unknown    Klebsiella and staph on 2/5 culture-pan sensitive- ?  Colonization per id    Acute deep vein thrombosis (DVT) of left peroneal vein (HCC) ICD-10-CM: J11.670  ICD-9-CM: 453.42  1/28/2021 Unknown        Hypotension ICD-10-CM: I95.9  ICD-9-CM: 458.9  1/26/2021 Unknown    Off pressors    Atrial fibrillation ([facility] ICD-10-CM: I48.91  ICD-9-CM: 427.31  1/22/2021 Yes        Hypernatremia ICD-10-CM: E87.0  ICD-9-CM: 276.0  1/8/2021 Unknown        SWATI (acute kidney injury) ([facility] ICD-10-CM: N17.9  ICD-9-CM: 584.9  1/7/2021 Unknown        Acute hypoxemic respiratory failure (HCC) ICD-10-CM: J96.01  ICD-9-CM: 518.81  1/7/2021 Unknown        Type 2 diabetes mellitus with hyperosmolarity without nonketotic hyperglycemic-hyperosmolar coma ([facility] ICD-10-CM: E11.00  ICD-9-CM: 250.20  1/7/2021 Unknown        * (Principal) Pneumonia due to COVID-19 virus ICD-10-CM: U07.1, J12.82  ICD-9-CM: 480.8  1/7/2021 Unknown        CAD (coronary artery disease) ICD-10-CM: I25.10  ICD-9-CM: 414.00  10/28/2016 Yes    Overview Signed 10/28/2016  8:05 AM by SHELDON Connell     10-27-06 [facility] 90% LAD s/p 2.25 x 20 Synergy drug-eluting stent  (CS)             Obesity ICD-10-CM: E66.9  ICD-9-CM: 278.00  10/6/2015 Yes              Plan:  (Medical Decision Making)   1   Sled today for fluid removal  2   On eraxis  3    Vent support- trial today of pressure support  --COVID-19 Meds:  Dexamethasone 6mg daily (EOT 1/16)  Convalescent plasma transfusion (1/7)  Remdesivir (EOT 1/12)     Micro Results:  BC: negative (1/30)  Sputum cx: normal resp vianney: (1/30)  Sputum 2/5 - moderate s aureus, moderate GNR -? colonization     Anti-infectives (start date):  azithro (completed)  Ceftriaxone (completed)  Vanc and cefepime: (completed)  Vanc and zosyn (started 2/5) stopped per id  eraxis started today per id    More than 50% of the time documented was spent in face-to-face contact with the patient and in the care of the patient on the floor/unit where the patient is located.     Griselda Yeung MD

## 2021-02-11 NOTE — PROGRESS NOTES
Pt completed dialysis with 12 hr run total throughout day. Tolerated well. Rinsed back with 250 ml total of NS. Lines flushed and capped.

## 2021-02-11 NOTE — PROGRESS NOTES
Bedside shift change report given to Trudy Newsome, [facility] (oncoming nurse) by Lazarus Gores, RN (offgoing nurse). Report included the following information SBAR, Kardex, Cardiac Rhythm NSR and Alarm Parameters .

## 2021-02-11 NOTE — PROGRESS NOTES
Ventilator check complete; patient has a #8.0 tracheostomy. Patient is not sedated. Patient is able to follow commands. Breath sounds are coarse. Trachea is midline, Negative for subcutaneous air, and chest excursion is symmetric. Patient is also Negative for cyanosis and is Positive for pitting edema. All alarms are set and audible. Resuscitation bag is at the head of the bed.       Ventilator Settings  Mode FIO2 Rate Tidal Volume Pressure PEEP I:E Ratio   Pressure control  50 %   22 525 ml     10 cm H20  1:2.22      Peak airway pressure: 28.3 cm H2O   Minute ventilation: 15.5 l/min       RT Hans

## 2021-02-11 NOTE — PROGRESS NOTES
[facility]        Subjective: SWATI, Pt started on dialysis on 1/25/21. Review of Systems -   Encephalopathy   Can not be obtained. Objective:    Vitals:    02/11/21 0714 02/11/21 0726 02/11/21 0744 02/11/21 0921   BP: 129/64  (!) 140/64 (!) 120/58   Pulse: 81  85 73   Resp: 28      Temp:  99.4 °F (37.4 °C)     SpO2: 94%  94%    Weight:       Height:           PE  Follows some commands   intubated  CV:reg rate  Chest:bilat bronchi   Abd: soft  Ext: ++ edema   Access:rt IJ temp dialysis cath ok, . [facility]  Recent Labs     02/11/21  0222 02/10/21  0352 02/09/21  0327   WBC 9.8 12.9* 12.3*   HGB 6.7* 7.7* 7.1*   HCT 21.5* 24.7* 23.5*   PLT 88* 116* 120*   INR 1.2 1.2 1.2     Recent Labs     02/11/21  0222 02/10/21  0352 02/09/21  0327    140 140   K 3.6 3.6 3.7    105 103   CO2 35* 32 34*   * 141* 185*   BUN 12 15 44*   CREA 1.03 1.38 3.16*   MG  --  1.6*  --    PHOS  --  2.0*  --    CA 7.6* 7.8* 7.7*           Radiology    A/P:   Patient Active Problem List   Diagnosis Code    Obesity E66.9    Hypertension I10    Bradycardia R00.1    PVC (premature ventricular contraction) I49.3    CAD (coronary artery disease) I25.10    Dyslipidemia, goal LDL below 70 E78.5    SWATI (acute kidney injury) ([facility]) N17.9    Acute hypoxemic respiratory failure ([facility]) J96.01    Type 2 diabetes mellitus with hyperosmolarity without nonketotic hyperglycemic-hyperosmolar coma ([facility]) E11.00    Pneumonia due to COVID-19 virus U07.1, J12.82    Hypernatremia E87.0    Atrial fibrillation ([facility]) I48.91    Hypotension I95.9    Acute deep vein thrombosis (DVT) of left peroneal vein ([facility]) I82.452    VAP (ventilator-associated pneumonia) ([facility]) J95.851     SWATI - ATN, oliguric.   Volume overload   For SLED again today and tomorrow   Clotting dialysis system: use minimum of heparin   ,       COVID19/ Resp Failure    Hypotension    A Fib with RVR    Anemia - further drop of Hb  intermittent transfusion      Juany Macdonald MD

## 2021-02-11 NOTE — PROGRESS NOTES
Infectious Disease Progress Note    Impression:   · Fevers in setting of prolonged, critical illness with covid and multiple complications, detailed below  · Covid diagnosed  with ARDS and prolonged vent wean, intubated  s/p trach , s/p CP/RDV/steroids  · Renal failure related to covid requiring dialysis  · Thrombus related to covid s/p IVC filter placement  · MSSA and kleb in sputum , VAP vs colonization, no change in secretions/vent settings that would indicate VAP    Ongoing watery/loose stool, could be CDI and will sending stool for study. High risk for fungemia so continuing empiric trial of eraxis x 72 hours and monitor. Plan:     · Continue empiric trial of eraxis x 72 hours- fever improved overnight. · Will order c. Diff testing today. · If ongoing fever, might also consider CTAP? Does have PEG tube which looks ok and no other acute findings but could potentially be source of fever    Anti-infectives:   1. Eraxis (2/10-  2. Pip/tazo -2/10  3. Vancomycin 2/8 x 1    Subjective: Interval History:  WBC down today. Fevers improved overnight. Patient coughing on ventilator, but following commands today. RR in 35s. Allergies   Allergen Reactions    Latex Swelling     Had a purcell catheter with swelling of urethra. Only known latex issue in past. Wife remembers this now    Hydrocodone-Acetaminophen Itching     Wife remembers this allergy    Tessalon Perles [Benzonatate] Unknown (comments)        Review of Systems:  Review of systems not obtained due to patient factors. Objective:   Blood pressure (!) 146/80, pulse 91, temperature 99.4 °F (37.4 °C), resp. rate (!) 35, height 5' 8\" (1.727 m), weight 101.2 kg (223 lb 1.7 oz), SpO2 92 %.   Temp (24hrs), Av.4 °F (37.4 °C), Min:98.6 °F (37 °C), Max:100 °F (37.8 °C)     Patient was seen and examined on 21 and physical exam remains unchanged since yesterday, unless noted below:    Exam:    General:  Awake, appears uncomfortable; following commands   Eyes:  Sclera anicteric. Pupils equally round and reactive to light. Mouth/Throat: Dry MM   Neck: Trach in place   Lungs:   Decreased breath sounds bilaterally   CV:  Bradycardic but regular rhythm   Abdomen:   Soft, non-tender.  bowel sounds normal. non-distended   Extremities: Edema diffusely in all 4 extremities   Skin: Skin color, texture, turgor normal. no acute rash or lesions   Lymph nodes: Cervical and supraclavicular normal   Musculoskeletal: No swelling or deformity   Lines/Devices:  Intact, no erythema, drainage or tenderness   Psych: Awake today, follows commands   Tunneled line in R chest, PICC in jazzy NICOLE    Data Review:   Recent Results (from the past 24 hour(s))   GLUCOSE, POC    Collection Time: 02/10/21  1:11 PM   Result Value Ref Range    Glucose (POC) 201 (H) 65 - 100 mg/dL   GLUCOSE, POC    Collection Time: 02/10/21  4:07 PM   Result Value Ref Range    Glucose (POC) 177 (H) 65 - 100 mg/dL   GLUCOSE, POC    Collection Time: 02/10/21  8:32 PM   Result Value Ref Range    Glucose (POC) 186 (H) 65 - 100 mg/dL   GLUCOSE, POC    Collection Time: 02/10/21 11:16 PM   Result Value Ref Range    Glucose (POC) 181 (H) 65 - 100 mg/dL   PROTHROMBIN TIME + INR    Collection Time: 02/11/21  2:22 AM   Result Value Ref Range    Prothrombin time 15.4 (H) 12.5 - 14.7 sec    INR 1.2     PTT    Collection Time: 02/11/21  2:22 AM   Result Value Ref Range    aPTT 41.7 (H) 24.1 - 35.1 SEC   FIBRINOGEN    Collection Time: 02/11/21  2:22 AM   Result Value Ref Range    Fibrinogen 649 (H) 190 - 914 mg/dL   METABOLIC PANEL, BASIC    Collection Time: 02/11/21  2:22 AM   Result Value Ref Range    Sodium 143 136 - 145 mmol/L    Potassium 3.6 3.5 - 5.1 mmol/L    Chloride 105 98 - 107 mmol/L    CO2 35 (H) 21 - 32 mmol/L    Anion gap 3 (L) 7 - 16 mmol/L    Glucose 162 (H) 65 - 100 mg/dL    BUN 12 8 - 23 MG/DL    Creatinine 1.03 0.8 - 1.5 MG/DL    GFR est AA >60 >60 ml/min/1.73m2    GFR est non-AA >60 >60 ml/min/1.73m2    Calcium 7.6 (L) 8.3 - 10.4 MG/DL   CBC W/O DIFF    Collection Time: 02/11/21  2:22 AM   Result Value Ref Range    WBC 9.8 4.3 - 11.1 K/uL    RBC 2.28 (L) 4.23 - 5.6 M/uL    HGB 6.7 (LL) 13.6 - 17.2 g/dL    HCT 21.5 (L) 41.1 - 50.3 %    MCV 94.3 79.6 - 97.8 FL    MCH 29.4 26.1 - 32.9 PG    MCHC 31.2 (L) 31.4 - 35.0 g/dL    RDW 15.9 (H) 11.9 - 14.6 %    PLATELET 88 (L) 050 - 450 K/uL    MPV 10.9 9.4 - 12.3 FL    ABSOLUTE NRBC 0.00 0.0 - 0.2 K/uL   RBC, ALLOCATE    Collection Time: 02/11/21  3:00 AM   Result Value Ref Range    HISTORY CHECKED?  Historical check performed    POC G3    Collection Time: 02/11/21  3:12 AM   Result Value Ref Range    Device: VENT      FIO2 (POC) 50 %    pH (POC) 7.44 7.35 - 7.45      pCO2 (POC) 49.9 (H) 35 - 45 MMHG    pO2 (POC) 134 (H) 75 - 100 MMHG    HCO3 (POC) 34.1 (H) 22 - 26 MMOL/L    sO2 (POC) 99 (H) 95 - 98 %    Base excess (POC) 9 mmol/L    Mode ASSIST CONTROL      Set Rate 22 bpm    PEEP/CPAP (POC) 10 cmH2O    Allens test (POC) YES      Inspiratory Time 0.85 sec    Site RIGHT RADIAL      Specimen type (POC) ARTERIAL      Performed by MonroeLulaRT     CO2, POC 36 MMOL/L    Pressure control 16      Respiratory comment: NurseNotified     Exhaled minute volume 10.10 L/min    COLLECT TIME 310     TYPE & SCREEN    Collection Time: 02/11/21  5:23 AM   Result Value Ref Range    Crossmatch Expiration 02/14/2021,2359     ABO/Rh(D) A POSITIVE     Antibody screen NEG     Unit number [id number]     Blood component type RC LR     Unit division 00     Status of unit ISSUED     Crossmatch result Compatible    GLUCOSE, POC    Collection Time: 02/11/21  7:33 AM   Result Value Ref Range    Glucose (POC) 210 (H) 65 - 100 mg/dL        Microbiology:    All Micro Results     Procedure Component Value Units Date/Time    CULTURE, BLOOD [[id number]] Collected: 02/10/21 1358    Order Status: Completed Specimen: Blood Updated: 02/10/21 1426    CULTURE, BLOOD [[id number]] Collected: 02/10/21 1353    Order Status: Completed Specimen: Blood Updated: 02/10/21 1425    CULTURE, BLOOD [[id number]] Collected: 02/05/21 0949    Order Status: Completed Specimen: Blood Updated: 02/10/21 0732     Special Requests: --        LEFT  ARM       Culture result: NO GROWTH 5 DAYS       CULTURE, BLOOD [[id number]] Collected: 02/05/21 1000    Order Status: Completed Specimen: Blood Updated: 02/10/21 0732     Special Requests: --        LEFT  ARM       Culture result: NO GROWTH 5 DAYS       CULTURE, RESPIRATORY/SPUTUM/BRONCH W GRAM STAIN [[id number]]  (Abnormal)  (Susceptibility) Collected: 02/05/21 0826    Order Status: Completed Specimen: Sputum,ET Suction Updated: 02/08/21 0850     Special Requests: NO SPECIAL REQUESTS        GRAM STAIN 10 TO 25 WBCS SEEN PER OIF      0 TO 1 EPITHELIAL CELLS SEEN PER OIF            MODERATE GRAM POSITIVE COCCI            FEW GRAM NEGATIVE RODS         FEW GRAM POSITIVE RODS        Culture result:       MODERATE STAPHYLOCOCCUS AUREUS                  MODERATE KLEBSIELLA PNEUMONIAE                  MODERATE NORMAL RESPIRATORY JUAN          CULTURE, URINE [[id number]] Collected: 02/05/21 0745    Order Status: Canceled Specimen: Cath Urine     CULTURE, BLOOD [[id number]] Collected: 01/30/21 1438    Order Status: Completed Specimen: Blood Updated: 02/04/21 0741     Special Requests: --        NO SPECIAL REQUESTS  LEFT  Antecubital       Culture result: NO GROWTH 5 DAYS       CULTURE, BLOOD [[id number]] Collected: 01/30/21 1141    Order Status: Completed Specimen: Blood Updated: 02/04/21 0741     Special Requests: ARTL (ART LINE)        Culture result: NO GROWTH 5 DAYS       CULTURE, RESPIRATORY/SPUTUM/BRONCH Lennie Brown [[id number]] Collected: 01/30/21 1437    Order Status: Completed Specimen: Sputum from Endotracheal aspirate Updated: 02/01/21 0746     Special Requests: NO SPECIAL REQUESTS        GRAM STAIN 5 TO 25 WBC'S/OIF      0 TO 1 EPITHELIAL CELLS SEEN /OIF      FEW GRAM POSITIVE COCCI FEW YEAST         4+ MUCUS PRESENT        Culture result:       LIGHT NORMAL RESPIRATORY JUAN          CULTURE, URINE [[id number]] Collected: 01/30/21 1100    Order Status: Canceled Specimen: Cath Urine     CULTURE, BLOOD [[id number]] Collected: 01/18/21 1124    Order Status: Completed Specimen: Blood Updated: 01/23/21 1034     Special Requests: --        RIGHT  HAND       Culture result: NO GROWTH 5 DAYS       CULTURE, BLOOD [[id number]] Collected: 01/18/21 1120    Order Status: Completed Specimen: Blood Updated: 01/23/21 1034     Special Requests: --        LEFT  HAND       Culture result: NO GROWTH 5 DAYS       CULTURE, URINE [[id number]] Collected: 01/18/21 1542    Order Status: Completed Specimen: Cath Urine Updated: 01/21/21 0725     Special Requests: GREY TUBE     Culture result: NO GROWTH 2 DAYS       CULTURE, RESPIRATORY/SPUTUM/BRONCH Jerry Loupe STAIN [[id number]] Collected: 01/18/21 1542    Order Status: Canceled Specimen: Sputum from Tracheal Aspirate     CULTURE, BLOOD [[id number]] Collected: 01/07/21 0115    Order Status: Completed Specimen: Blood Updated: 01/12/21 1150     Special Requests: --        LEFT  HAND       Culture result: NO GROWTH 5 DAYS       CULTURE, BLOOD [[id number]] Collected: 01/07/21 0109    Order Status: Completed Specimen: Blood Updated: 01/12/21 1150     Special Requests: --        LEFT  Antecubital       Culture result: NO GROWTH 5 DAYS             Studies:    CXR per HPI    Signed By: ADOLFO Villalobos     February 11, 2021

## 2021-02-11 NOTE — DIALYSIS
8 HR SLED initiated using  Right chest catheter. Aspirated and flushed both catheter ports without problem. Machine settings per MD order. 200  DFR  500 UFR  Heparin 3000 unit bolus, 1000unit/hr   Reported to primary nurse. Dialysis nurse available as needed.

## 2021-02-11 NOTE — PROGRESS NOTES
Pt resting no distress noted. Trache care done     02/11/21 1335   Patient Observations   Pulse (Heart Rate) 79   Resp Rate 24   O2 Sat (%) 99 %   Airway - Tracheostomy Tube 02/02/21 Shiley proximal XLT   Placement Date/Time: 02/02/21 1558   Number of Attempts: 1  Inserted By: dr price  Present on Admission/Arrival: No  Airway Types: Tracheostomy  Trach Tube Type: Shiley proximal XLT   Cuff Pressure 31 cmH20   Site Assessment Intact   Trach Dressing Changed Yes   Trach Cleaned With Normal saline   Trach Tie Changed Yes   Trach Cannula Changed Yes   Inner Cannula #8 Shiley; Cuffed, cuff inflated   [employer] of the lock   Side Secured Centered   Spare Trach at Bedside Yes   Spare Trach Tube One Size Smaller at Bedside Yes   Ambu Bag With Mask at Bedside Yes   Respiratory   Respiratory (WDL) X   Patient on Vent Yes - If patient is on vent, add Doc Flowsheet Ventilator ().    Respiratory Pattern Regular   Chest/Tracheal Assessment Chest expansion, symmetrical   Breath Sounds Bilateral Diminished;Coarse   Cough Cough with suction   Airway Clearance   Suction Trach   Suction Device Inline suction catheter   Suction Catheter Size 14 Fr   Sputum Method Obtained Tracheal   Sputum Amount Small   Sputum Color/Odor Tan;Clear   Sputum Consistency Thin   Vent Settings   FIO2 (%) 49 %   SpO2/FIO2 Ratio 202.04   CMV Rate Set 22   PC Set 16   PEEP/VENT (cm H2O) 10 cm H20   I:E Ratio 1:2.22   Insp Time (sec) 0.86 sec   Insp Rise Time (sec) 0.25   Insp Rise Time % 9.2 %   Flow Trigger 2   Ventilator Measurements   Resp Rate Observed 24   Vt Exhaled (Machine Breath) (ml) 668 ml   Ve Observed (l/min) 14.1 l/min   PIP Observed (cm H2O) 27 cm H2O   MAP (cm H2O) 15.8   I:E Ratio Actual 1:2.13   Dynamic Compliance (ml/cm H20) 42 ml/cm H20   Safety & Alarms   Backup Mode Checked/Apnea Yes   Pressure Max 40 cm H2O   Pressure Min 5 cm H2O   Ve Min 5   Ve Max 25   RR Min 10   RR Max 50   Ambu Bag Yes   Ambu Mask Yes   Vent Method/Mode Ventilation Method Conventional   Ventilator Mode Pressure control   Ventilator Mode ID 2

## 2021-02-12 ENCOUNTER — APPOINTMENT (OUTPATIENT)
Dept: GENERAL RADIOLOGY | Age: 61
DRG: 004 | End: 2021-02-12
Attending: INTERNAL MEDICINE
Payer: COMMERCIAL

## 2021-02-12 PROBLEM — G62.81 CRITICAL ILLNESS POLYNEUROPATHY (HCC): Status: ACTIVE | Noted: 2021-02-12

## 2021-02-12 PROBLEM — E87.0 HYPERNATREMIA: Status: RESOLVED | Noted: 2021-01-08 | Resolved: 2021-02-12

## 2021-02-12 LAB
ABO + RH BLD: NORMAL
ANION GAP SERPL CALC-SCNC: 3 MMOL/L (ref 7–16)
APTT PPP: 35.8 SEC (ref 24.1–35.1)
ARTERIAL PATENCY WRIST A: YES
BASE EXCESS BLD CALC-SCNC: 8 MMOL/L
BDY SITE: ABNORMAL
BLD PROD TYP BPU: NORMAL
BLOOD GROUP ANTIBODIES SERPL: NORMAL
BPU ID: NORMAL
BUN SERPL-MCNC: 15 MG/DL (ref 8–23)
CALCIUM SERPL-MCNC: 7.9 MG/DL (ref 8.3–10.4)
CHLORIDE SERPL-SCNC: 106 MMOL/L (ref 98–107)
CO2 BLD-SCNC: 34 MMOL/L
CO2 SERPL-SCNC: 33 MMOL/L (ref 21–32)
COLLECT TIME,HTIME: 215
CREAT SERPL-MCNC: 1.23 MG/DL (ref 0.8–1.5)
CROSSMATCH RESULT,%XM: NORMAL
ERYTHROCYTE [DISTWIDTH] IN BLOOD BY AUTOMATED COUNT: 16.4 % (ref 11.9–14.6)
EXHALED MINUTE VOLUME, VE: 15 L/MIN
FIBRINOGEN PPP-MCNC: 623 MG/DL (ref 190–501)
GAS FLOW.O2 O2 DELIVERY SYS: ABNORMAL L/MIN
GAS FLOW.O2 SETTING OXYMISER: 22 BPM
GLUCOSE BLD STRIP.AUTO-MCNC: 161 MG/DL (ref 65–100)
GLUCOSE BLD STRIP.AUTO-MCNC: 161 MG/DL (ref 65–100)
GLUCOSE BLD STRIP.AUTO-MCNC: 163 MG/DL (ref 65–100)
GLUCOSE BLD STRIP.AUTO-MCNC: 173 MG/DL (ref 65–100)
GLUCOSE BLD STRIP.AUTO-MCNC: 194 MG/DL (ref 65–100)
GLUCOSE BLD STRIP.AUTO-MCNC: 208 MG/DL (ref 65–100)
GLUCOSE SERPL-MCNC: 141 MG/DL (ref 65–100)
HCO3 BLD-SCNC: 32.9 MMOL/L (ref 22–26)
HCT VFR BLD AUTO: 25.4 % (ref 41.1–50.3)
HGB BLD-MCNC: 7.9 G/DL (ref 13.6–17.2)
INR PPP: 1.2
INSPIRATION.DURATION SETTING TIME VENT: 0.85 SEC
MAGNESIUM SERPL-MCNC: 1.6 MG/DL (ref 1.8–2.4)
MCH RBC QN AUTO: 28.6 PG (ref 26.1–32.9)
MCHC RBC AUTO-ENTMCNC: 31.1 G/DL (ref 31.4–35)
MCV RBC AUTO: 92 FL (ref 79.6–97.8)
NRBC # BLD: 0 K/UL (ref 0–0.2)
O2/TOTAL GAS SETTING VFR VENT: 50 %
PCO2 BLD: 46.2 MMHG (ref 35–45)
PEEP RESPIRATORY: 10 CMH2O
PH BLD: 7.46 [PH] (ref 7.35–7.45)
PHOSPHATE SERPL-MCNC: 1.8 MG/DL (ref 2.3–3.7)
PLATELET # BLD AUTO: 107 K/UL (ref 150–450)
PMV BLD AUTO: 11.2 FL (ref 9.4–12.3)
PO2 BLD: 78 MMHG (ref 75–100)
POTASSIUM SERPL-SCNC: 3.7 MMOL/L (ref 3.5–5.1)
PRESSURE CONTROL, IPC: 16
PROTHROMBIN TIME: 15.3 SEC (ref 12.5–14.7)
RBC # BLD AUTO: 2.76 M/UL (ref 4.23–5.6)
SAO2 % BLD: 96 % (ref 95–98)
SERVICE CMNT-IMP: ABNORMAL
SERVICE CMNT-IMP: ABNORMAL
SODIUM SERPL-SCNC: 142 MMOL/L (ref 136–145)
SPECIMEN EXP DATE BLD: NORMAL
SPECIMEN TYPE: ABNORMAL
STATUS OF UNIT,%ST: NORMAL
UNIT DIVISION, %UDIV: 0
VENTILATION MODE VENT: ABNORMAL
WBC # BLD AUTO: 10.9 K/UL (ref 4.3–11.1)

## 2021-02-12 PROCEDURE — 85027 COMPLETE CBC AUTOMATED: CPT

## 2021-02-12 PROCEDURE — 2709999900 HC NON-CHARGEABLE SUPPLY

## 2021-02-12 PROCEDURE — 74011250637 HC RX REV CODE- 250/637: Performed by: INTERNAL MEDICINE

## 2021-02-12 PROCEDURE — 85730 THROMBOPLASTIN TIME PARTIAL: CPT

## 2021-02-12 PROCEDURE — 84100 ASSAY OF PHOSPHORUS: CPT

## 2021-02-12 PROCEDURE — 99233 SBSQ HOSP IP/OBS HIGH 50: CPT | Performed by: INTERNAL MEDICINE

## 2021-02-12 PROCEDURE — 74011636637 HC RX REV CODE- 636/637: Performed by: INTERNAL MEDICINE

## 2021-02-12 PROCEDURE — 80048 BASIC METABOLIC PNL TOTAL CA: CPT

## 2021-02-12 PROCEDURE — 74011250636 HC RX REV CODE- 250/636: Performed by: EMERGENCY MEDICINE

## 2021-02-12 PROCEDURE — 36600 WITHDRAWAL OF ARTERIAL BLOOD: CPT

## 2021-02-12 PROCEDURE — 85610 PROTHROMBIN TIME: CPT

## 2021-02-12 PROCEDURE — 83735 ASSAY OF MAGNESIUM: CPT

## 2021-02-12 PROCEDURE — 82962 GLUCOSE BLOOD TEST: CPT

## 2021-02-12 PROCEDURE — 74011000258 HC RX REV CODE- 258: Performed by: INTERNAL MEDICINE

## 2021-02-12 PROCEDURE — 74011250636 HC RX REV CODE- 250/636: Performed by: INTERNAL MEDICINE

## 2021-02-12 PROCEDURE — 71045 X-RAY EXAM CHEST 1 VIEW: CPT

## 2021-02-12 PROCEDURE — 85384 FIBRINOGEN ACTIVITY: CPT

## 2021-02-12 PROCEDURE — 97530 THERAPEUTIC ACTIVITIES: CPT

## 2021-02-12 PROCEDURE — 97112 NEUROMUSCULAR REEDUCATION: CPT

## 2021-02-12 PROCEDURE — 77030031476 HC EXCH HEAT MOISTW FLTR HALY -A

## 2021-02-12 PROCEDURE — 82803 BLOOD GASES ANY COMBINATION: CPT

## 2021-02-12 PROCEDURE — 74011000250 HC RX REV CODE- 250: Performed by: INTERNAL MEDICINE

## 2021-02-12 PROCEDURE — 94003 VENT MGMT INPAT SUBQ DAY: CPT

## 2021-02-12 PROCEDURE — 74011250637 HC RX REV CODE- 250/637: Performed by: STUDENT IN AN ORGANIZED HEALTH CARE EDUCATION/TRAINING PROGRAM

## 2021-02-12 PROCEDURE — 65620000000 HC RM CCU GENERAL

## 2021-02-12 RX ORDER — SODIUM,POTASSIUM PHOSPHATES 280-250MG
1 POWDER IN PACKET (EA) ORAL EVERY 6 HOURS
Status: DISCONTINUED | OUTPATIENT
Start: 2021-02-12 | End: 2021-02-13

## 2021-02-12 RX ORDER — MAGNESIUM SULFATE HEPTAHYDRATE 40 MG/ML
2 INJECTION, SOLUTION INTRAVENOUS ONCE
Status: COMPLETED | OUTPATIENT
Start: 2021-02-12 | End: 2021-02-12

## 2021-02-12 RX ORDER — RISPERIDONE 1 MG/ML
0.5 SOLUTION ORAL 2 TIMES DAILY
Status: DISCONTINUED | OUTPATIENT
Start: 2021-02-12 | End: 2021-02-23

## 2021-02-12 RX ADMIN — DOCUSATE SODIUM 50 MG AND SENNOSIDES 8.6 MG 1 TABLET: 8.6; 5 TABLET, FILM COATED ORAL at 09:00

## 2021-02-12 RX ADMIN — Medication 20 ML: at 14:00

## 2021-02-12 RX ADMIN — INSULIN LISPRO 3 UNITS: 100 INJECTION, SOLUTION INTRAVENOUS; SUBCUTANEOUS at 20:59

## 2021-02-12 RX ADMIN — SODIUM CHLORIDE 100 MG: 900 INJECTION, SOLUTION INTRAVENOUS at 11:33

## 2021-02-12 RX ADMIN — DEXMEDETOMIDINE HYDROCHLORIDE 0.6 MCG/KG/HR: 100 INJECTION, SOLUTION INTRAVENOUS at 17:22

## 2021-02-12 RX ADMIN — RISPERIDONE 0.5 MG: 1 SOLUTION ORAL at 11:17

## 2021-02-12 RX ADMIN — INSULIN LISPRO 3 UNITS: 100 INJECTION, SOLUTION INTRAVENOUS; SUBCUTANEOUS at 16:00

## 2021-02-12 RX ADMIN — ATORVASTATIN CALCIUM 80 MG: 40 TABLET, FILM COATED ORAL at 09:00

## 2021-02-12 RX ADMIN — DEXMEDETOMIDINE HYDROCHLORIDE 0.5 MCG/KG/HR: 100 INJECTION, SOLUTION INTRAVENOUS at 00:07

## 2021-02-12 RX ADMIN — OXYCODONE HYDROCHLORIDE 5 MG: 5 SOLUTION ORAL at 00:08

## 2021-02-12 RX ADMIN — Medication 20 ML: at 05:45

## 2021-02-12 RX ADMIN — INSULIN LISPRO 3 UNITS: 100 INJECTION, SOLUTION INTRAVENOUS; SUBCUTANEOUS at 03:20

## 2021-02-12 RX ADMIN — ACETAMINOPHEN 650 MG: 325 TABLET, FILM COATED ORAL at 03:10

## 2021-02-12 RX ADMIN — OXYCODONE HYDROCHLORIDE 5 MG: 5 SOLUTION ORAL at 11:13

## 2021-02-12 RX ADMIN — MAGNESIUM SULFATE HEPTAHYDRATE 2 G: 40 INJECTION, SOLUTION INTRAVENOUS at 05:22

## 2021-02-12 RX ADMIN — POLYETHYLENE GLYCOL 3350 17 G: 17 POWDER, FOR SOLUTION ORAL at 09:00

## 2021-02-12 RX ADMIN — LANSOPRAZOLE 30 MG: KIT at 09:00

## 2021-02-12 RX ADMIN — SODIUM ZIRCONIUM CYCLOSILICATE 10 G: 10 POWDER, FOR SUSPENSION ORAL at 09:00

## 2021-02-12 RX ADMIN — MIDAZOLAM HYDROCHLORIDE 2 MG: 1 INJECTION, SOLUTION INTRAMUSCULAR; INTRAVENOUS at 11:35

## 2021-02-12 RX ADMIN — LANSOPRAZOLE 30 MG: KIT at 20:59

## 2021-02-12 RX ADMIN — POTASSIUM & SODIUM PHOSPHATES POWDER PACK 280-160-250 MG 1 PACKET: 280-160-250 PACK at 05:45

## 2021-02-12 RX ADMIN — INSULIN GLARGINE 15 UNITS: 100 INJECTION, SOLUTION SUBCUTANEOUS at 09:00

## 2021-02-12 RX ADMIN — OXYCODONE HYDROCHLORIDE 5 MG: 5 SOLUTION ORAL at 05:22

## 2021-02-12 RX ADMIN — POTASSIUM & SODIUM PHOSPHATES POWDER PACK 280-160-250 MG 1 PACKET: 280-160-250 PACK at 17:22

## 2021-02-12 RX ADMIN — INSULIN LISPRO 6 UNITS: 100 INJECTION, SOLUTION INTRAVENOUS; SUBCUTANEOUS at 11:17

## 2021-02-12 RX ADMIN — Medication 75 MCG/HR: at 03:36

## 2021-02-12 RX ADMIN — INSULIN LISPRO 3 UNITS: 100 INJECTION, SOLUTION INTRAVENOUS; SUBCUTANEOUS at 00:07

## 2021-02-12 RX ADMIN — POTASSIUM & SODIUM PHOSPHATES POWDER PACK 280-160-250 MG 1 PACKET: 280-160-250 PACK at 12:00

## 2021-02-12 RX ADMIN — OXYCODONE HYDROCHLORIDE 5 MG: 5 SOLUTION ORAL at 17:22

## 2021-02-12 RX ADMIN — AMIODARONE HYDROCHLORIDE 200 MG: 200 TABLET ORAL at 09:00

## 2021-02-12 RX ADMIN — FENTANYL CITRATE 50 MCG: 50 INJECTION, SOLUTION INTRAMUSCULAR; INTRAVENOUS at 10:56

## 2021-02-12 RX ADMIN — RISPERIDONE 0.5 MG: 1 SOLUTION ORAL at 18:33

## 2021-02-12 RX ADMIN — Medication 20 ML: at 21:03

## 2021-02-12 RX ADMIN — FENTANYL CITRATE 50 MCG: 50 INJECTION, SOLUTION INTRAMUSCULAR; INTRAVENOUS at 20:59

## 2021-02-12 RX ADMIN — DEXMEDETOMIDINE HYDROCHLORIDE 0.8 MCG/KG/HR: 100 INJECTION, SOLUTION INTRAVENOUS at 11:33

## 2021-02-12 RX ADMIN — INSULIN LISPRO 3 UNITS: 100 INJECTION, SOLUTION INTRAVENOUS; SUBCUTANEOUS at 08:00

## 2021-02-12 NOTE — PROGRESS NOTES
Bedside, Verbal and Written shift change report given to JOSE DE JESUS RN  (oncoming nurse) by HUBERT RN  (offgoing nurse). Report included the following information SBAR, Kardex, ED Summary, Procedure Summary, Intake/Output, MAR, Recent Results, Med Rec Status, Cardiac Rhythm NSR and Alarm Parameters      DUAL MED VERIFICATION ON FENT.     PT OPENS EYES TO VOICE// FOLLOWS COMMANDS 3X     PT REPOSITIONED ORAL AND CORTEZ CARE PREFORMED     PER NEPHRO NOTE YESTERDAY WILL TERESA RUN SLED AGAIN TODAY

## 2021-02-12 NOTE — PROGRESS NOTES
ACUTE PHYSICAL THERAPY GOALS:  (Developed with and agreed upon by patient and/or caregiver.)  UPDATED 21  LTG:  (1.)Mr. Torres will move from supine to sit and sit to supine , scoot up and down and roll side to side in bed with MODERATE ASSIST within 7 treatment day(s). (2.)Mr. Torres will tolerate sitting up in recliner chair for 2 hours with stable vital signs to increase upright tolerance within 7 treatment day(s). (3.)Mr. Torres will maintain static/dynamic sitting x 15 minutes with MINIMAL ASSISTANCE for improved balance within 7 treatment days. (4.)Mr. Torres will follow 50 % of commands for increased participation in therapeutic activity/exercise within 7 treatment days. ________________________________________________________________________________________________      PHYSICAL THERAPY: Daily Note and AM Treatment Day # 2    Martin Rocha is a 61 y.o. male   PRIMARY DIAGNOSIS: Pneumonia due to COVID-19 virus  Type 2 diabetes mellitus with hyperosmolarity without nonketotic hyperglycemic-hyperosmolar coma ([facility] [E11.00]  Lower respiratory tract infection due to COVID-19 virus [U07.1, J22]  Acute hypoxemic respiratory failure ([facility] [J96.01]  [facility] [N17.9]  Procedure(s) (LRB):  PERCUTANEOUS ENDOSCOPIC GASTROSTOMY TUBE INSERTION ROOM 3302  *Pt on vent with trach/ amio gtt (N/A)  ESOPHAGOGASTRODUODENOSCOPY (EGD) (N/A)  8 Days Post-Op    ASSESSMENT:     REHAB RECOMMENDATIONS: CURRENT LEVEL OF FUNCTION:  (Most Recently Demonstrated)   Recommendation to date pending progress:  [address] [place] [address] Avenue  Equipment:    To Be Determined Bed Mobility:   Total Assistance x 2  Sit to Stand:   Not tested  Transfers:   Total Assistance x 2  Gait/Mobility:   Not tested     ASSESSMENT:  Mr. Alexia Devin was alert and agreeable to PT. Pt required max-totalA x 2 for supine to sit with poor sitting balance. He fatigued quickly with SpO2 around 88-89%.   Pt assisted back to bed and positioned with bed in chair position. OT assisted with neuro re-ed while PT addressed bed mobility. Pt appears to have at least 2 finger subluxation in B UEs. Slight progress in activity tolerance.       SUBJECTIVE:   Mr. Jaclyn Osuna was unable to verbalize today    SOCIAL HISTORY/ LIVING ENVIRONMENT: See eval  Home Environment: Private residence  One/Two Story Residence: One story  Living Alone: No  Support Systems: Family member(s)  OBJECTIVE:     PAIN: VITAL SIGNS: LINES/DRAINS:   Pre Treatment: Pain Screen  Pain Scale 1: Numeric (0 - 10)  Pain Intensity 1: 0  Post Treatment: 0   Gimenez Catheter, PEG and Rectal Tube  O2 Device: Ventilator, Tracheostomy     MOBILITY: I Mod I S SBA CGA Min Mod Max Total  NT x2 Comments:   Bed Mobility    Rolling [] [] [] [] [] [] [] [x] [x] [] [x]    Supine to Sit [] [] [] [] [] [] [] [] [x] [] [x]    Scooting [] [] [] [] [] [] [] [] [x] [] [x]    Sit to Supine [] [] [] [] [] [] [] [] [x] [] [x]    Transfers    Sit to Stand [] [] [] [] [] [] [] [] [] [x] []    Bed to Chair [] [] [] [] [] [] [] [] [] [x] []    Stand to Sit [] [] [] [] [] [] [] [] [] [x] []    I=Independent, Mod I=Modified Independent, S=Supervision, SBA=Standby Assistance, CGA=Contact Guard Assistance,   Min=Minimal Assistance, Mod=Moderate Assistance, Max=Maximal Assistance, Total=Total Assistance, NT=Not Tested    GAIT: I Mod I S SBA CGA Min Mod Max Total  NT x2 Comments:   Level of Assistance [] [] [] [] [] [] [] [] [] [x] []    Distance NT    DME N/A    Gait Quality NT    Weightbearing  Status N/A     I=Independent, Mod I=Modified Independent, S=Supervision, SBA=Standby Assistance, CGA=Contact Guard Assistance,   Min=Minimal Assistance, Mod=Moderate Assistance, Max=Maximal Assistance, Total=Total Assistance, NT=Not Tested    PLAN:   FREQUENCY/DURATION: PT Plan of Care: 3 times/week for duration of hospital stay or until stated goals are met, whichever comes first.  TREATMENT:     TREATMENT:   ($$ Therapeutic Activity: 23-37 mins    )  Co-Treatment PT/OT necessary due to patient's decreased overall endurance/tolerance levels, as well as need for high level skilled assistance to complete functional transfers/mobility and functional tasks  Therapeutic Activity (23 Minutes): Therapeutic activity included Rolling, Supine to Sit, Sit to Supine, Scooting and Sitting balance  to improve functional Mobility, Strength and Activity tolerance.     AFTER TREATMENT POSITION/PRECAUTIONS:  Restraints  and RN notified    INTERDISCIPLINARY COLLABORATION:  RN/PCT, PT/PTA and OT/SIN    TOTAL TREATMENT DURATION:  PT Patient Time In/Time Out  Time In: 1022  Time Out: 2500 S. Adiel Nixon DPT

## 2021-02-12 NOTE — PROGRESS NOTES
[facility]        Subjective: SWATI, Pt started on dialysis on 1/25/21. Review of Systems -   More awake, follows some commands         Objective:    Vitals:    02/12/21 0730 02/12/21 0800 02/12/21 0814 02/12/21 0819   BP:  (!) 103/56 (!) 127/58    Pulse: 74 62 78 75   Resp: 18 (!) 36 23    Temp:       SpO2: 98% 98% 96%    Weight:       Height:           PE  Follows some commands   intubated  CV:reg rate  Chest:bilat bronchi   Abd: soft  Ext: trace  edema   Access:rt IJ temp dialysis cath ok, . [facility]  Recent Labs     02/12/21 0316 02/11/21  1100 02/11/21  0222 02/10/21  0352   WBC 10.9  --  9.8 12.9*   HGB 7.9* 8.0* 6.7* 7.7*   HCT 25.4* 25.2* 21.5* 24.7*   *  --  88* 116*   INR 1.2  --  1.2 1.2     Recent Labs     02/12/21  0316 02/11/21  0222 02/10/21  0352    143 140   K 3.7 3.6 3.6    105 105   CO2 33* 35* 32   * 162* 141*   BUN 15 12 15   CREA 1.23 1.03 1.38   MG 1.6*  --  1.6*   PHOS 1.8*  --  2.0*   CA 7.9* 7.6* 7.8*           Radiology    A/P:   Patient Active Problem List   Diagnosis Code    Obesity E66.9    Hypertension I10    Bradycardia R00.1    PVC (premature ventricular contraction) I49.3    CAD (coronary artery disease) I25.10    Dyslipidemia, goal LDL below 70 E78.5    SWATI (acute kidney injury) ([facility] N17.9    Acute hypoxemic respiratory failure (HCC) J96.01    Type 2 diabetes mellitus with hyperosmolarity without nonketotic hyperglycemic-hyperosmolar coma (HCC) E11.00    Pneumonia due to COVID-19 virus U07.1, J12.82    Atrial fibrillation (HCC) I48.91    Hypotension I95.9    Acute deep vein thrombosis (DVT) of left peroneal vein (HCC) I82.452    VAP (ventilator-associated pneumonia) ([facility]) J95.851    Critical illness polyneuropathy ([facility]) G62.81     SWATI - ATN, anuric .   Volume overload resolved  Phos replacement     SLED tomorrow  ,       COVID19/ Resp Failure    Hypotension Better     A Fib with RVR    Anemia - s/p transfusion      Lis Yarely Mina MD

## 2021-02-12 NOTE — DIABETES MGMT
Patient's blood glucose ranged 174-210 yesterday with patient receiving Lantus 15 units and Humalog 17 units. Blood glucose 161 this morning. Hgb 7. 9. Plt 107. Cr 1.23. Patient s/p trach and PEG. Remains on vent, Nepro TF, and SLED likely again today. Will continue to follow along.

## 2021-02-12 NOTE — PROGRESS NOTES
Ventilator check complete; patient has a #8.0 XLT tracheostomy. Patient is sedated. Patient is able to follow commands. Breath sounds are coarse and diminished. Trachea is midline, Negative for subcutaneous air, and chest excursion is symmetric. Patient is also Negative for cyanosis and is Positive for pitting edema. All alarms are set and audible. Resuscitation bag is at the head of the bed.       Ventilator Settings  Mode FIO2 Rate Tidal Volume Pressure PEEP I:E Ratio   Pressure support  40 %(decreased from 50%)     16 cmH20 10 cm H20  1:2.22      Peak airway pressure: 26 cm H2O   Minute ventilation: 9.8 l/min         Chalo Sharma RT

## 2021-02-12 NOTE — PROGRESS NOTES
Ventilator check complete; patient has a #8.0 tracheostomy. Patient is sedated. Patient is not able to follow commands. Breath sounds are coarse and diminished. Trachea is midline, Negative for subcutaneous air, and chest excursion is symmetric. Patient is also Negative for cyanosis and is Positive for pitting edema. All alarms are set and audible. Resuscitation bag is at the head of the bed.       Ventilator Settings  Mode FIO2 Rate Tidal Volume Pressure PEEP I:E Ratio   Pressure control  50 %   22 525 ml     10 cm H20  1:2.22      Peak airway pressure: 31.7 cm H2O   Minute ventilation: 10.7 l/min       RT Anirudh

## 2021-02-12 NOTE — PROGRESS NOTES
Called Neymar Conn, patient's sister at [phone] and provided update on patient. All questions answered.

## 2021-02-12 NOTE — PROGRESS NOTES
Comprehensive Nutrition Assessment    Type and Reason for Visit: Reassess  Tube Feeding Management ([facility])    Nutrition Recommendations/Plan:    Continue current TF, water flush and liquid protein - Nepro @ 45 ml/hr, 75 ml water every 4 hours and 6 ProSource daily - 2184 calories/day, 153 gm protein/day in 1353 ml water/day. Malnutrition Assessment:  Malnutrition Status: At risk for malnutrition (specify)(Poor nutrition between 1/19-1/25.)  Context: Acute illness    Nutrition Assessment:   Nutrition History: 1/7/2021: Pt reports inability to tolerate any food or liquids for 4 days PTA. Indicates vomiting with all po attempts during this time      Nutrition Background: PMH remarkable for CAD, GERD, HTN, MO. Admitted with Coivd 19, new onset DM with hyperglycemic hyperosmolar state, SWATI on CKD, lactic acidosis. Daily Update:  Remains ventilated via trach. Having SLED almost daily with good response. Abdominal Status (last documented): Passing flatus, Semi-soft, Intact abdomen with Active  bowel sounds. Last BM 02/12/21. Pertinent Medications: Eraxis, Lantus, SSI coverage, Oxycodone, Lokelma,   Drips: Precedex  IVF: none  Pertinent Labs: phosphorus, magnesium. Nutrition Related Findings:   Double lumen PICC (1/13), Intubated 1/17, NGT 1/17. Pt was TPN dependent from 1/14-1/19. TF initiated on 1/18. TF held 1/20-1/22 d/t levophed requirements. TF resumed on 1/22 @ 20 ml/hr and not advanced. Held on 1/25 d/t elevated GRVs. SLED initiated on 1/25. TPN started 1/26. Trophic TF started 1/29. TPN d/c 1/30 and TF advancing. TF @ goal 2/1. Trach placed 2/2. PEG placed 2/4. 2/5 TF infusing at goal rate via PEG since it was resumed post-op.       Current Nutrition Therapies:  Current Tube Feeding (TF) Orders:   · Feeding Route: PEG  · Formula: Nepro 1.8  · Schedule:Continuous    · Regimen: 45 ml/hr  · Additives/Modulars: (Prosource TF 6 packets per day)  · Water Flushes: 75 ml every 4 hours  · Current TF & Flush Orders Provides: 2184 calories/day (100% calorie goal), 153 gm protein/day (100% protein goal) in 1353 ml water/day (meets 100% water goal). · Goal TF & Flush Orders Provides: 2184 calories, 153 gm protein/day (100% protein goal) in 1353 ml water/day. Current Intake:   Average Meal Intake: NPO Average Supplement Intake: NPO      Anthropometric Measures:  Height: 5' 8\" (172.7 cm)  Current Body Wt: 86.8 kg (191 lb 5.8 oz)(2/12/21 question this value), Weight source: Bed scale  BMI: 29.1, Obese class 2 (BMI 35.0-39. 9)  Admission Body Weight: 235 lb(\"Estimated\")  Ideal Body Weight (lbs) (Calculated): 154 lbs (70 kg), 130.3 %  Usual Body Wt: 101.6 kg (224 lb)(EMR 6/3/2020 FP office; pt stated # unable to verify ), Percent weight change: -10.4          Estimated Daily Nutrient Needs:  Energy (kcal/day): 2648-0564 (Kcal/kg(30-35 - vent, SLED), Weight Used: Ideal(70 kg - vent, SLED))  Protein (g/day): 140-175(2-2.5 - vent, SLED) Weight Used: (Ideal(70 kg ))  Fluid (ml/day):   (Standard renal)    Nutrition Diagnosis:   · Inadequate oral intake related to impaired respiratory function, increased demand for energy/nutrients as evidenced by (NPO, on vent, SLED)    · Food & nutrition-related knowledge deficit related to endocrine dysfunction as evidenced by (new DM dx, A1C 12, minimal exposure to DM information.)    Nutrition Interventions:   Food and/or Nutrient Delivery: Continue tube feeding  Nutrition Education/Counseling: Education initiated  Coordination of Nutrition Care: Continue to monitor while inpatient  Plan of Care discussed with AQUILINO Fuentes. Goals:   Previous Goal Met: Goal(s) achieved  Active Goal: Maintain TF at its goal rate to meet 100% of daily nutrition needs.     Nutrition Monitoring and Evaluation:   Behavioral-Environmental Outcomes: Knowledge or skill  Food/Nutrient Intake Outcomes: Enteral nutrition intake/tolerance  Physical Signs/Symptoms Outcomes: Biochemical data, Constipation    Discharge Planning: Too soon to determine    Corrie Hodge.  Sixto Suarez RD, LD on 2/12/2021 at 4:54 PM  Contact: [phone]        Disaster Mode active

## 2021-02-12 NOTE — PROGRESS NOTES
ACUTE OT GOALS:  (Developed with and agreed upon by patient and/or caregiver.)  NEW GOALS (ADDED 2021)  1. Patient will complete self-grooming tasks in supported sitting with mod A and adaptive equipment as needed. 2. Patient will complete functional transfers with max A and adaptive equipment as needed. 3. Patient will tolerate 30 minutes OT treatment with 2-3 rest breaks as needed to increase activity tolerance for ADL performance. 4. Patient will tolerate 15 minutes BUE therapeutic exercises to increase functional use of BUE during ADL performance.         Timeframe: 7 days    OCCUPATIONAL THERAPY: Daily Note OT Treatment Day # 2    Kiara Yuen is a 61 y.o. male   PRIMARY DIAGNOSIS: Pneumonia due to COVID-19 virus  Type 2 diabetes mellitus with hyperosmolarity without nonketotic hyperglycemic-hyperosmolar coma ([facility] [E11.00]  Lower respiratory tract infection due to COVID-19 virus [U07.1, J22]  Acute hypoxemic respiratory failure (HCC) [J96.01]  SWATI (acute kidney injury) ([facility] [N17.9]  Procedure(s) (LRB):  PERCUTANEOUS ENDOSCOPIC GASTROSTOMY TUBE INSERTION ROOM 3302  *Pt on vent with trach/ amio gtt (N/A)  ESOPHAGOGASTRODUODENOSCOPY (EGD) (N/A)  8 Days Post-Op  Payor: [facility] / Plan: Elvia Narayan 77 PPO / Product Type: Commerical /   ASSESSMENT:     REHAB RECOMMENDATIONS: CURRENT LEVEL OF FUNCTION:  (Most Recently Demonstrated)   Recommendation to date pending progress:  [address]  Equipment:    To Be Determined Bathing:   Total Assistance  Dressing:   Total Assistance  Feeding/Grooming:   Total Assistance  Toileting:   Total Assistance  Functional Mobility:   Total Assistance     ASSESSMENT:  Mr. Mary Silva continues to present with significant deficits with overall strength, activity tolerance, ADL performance, and functional mobility. Remains on vent support via trach. More alert today with improved command following.  Max to total A x 2 for supine to sit transfer to edge of bed. Poor sitting balance. BUE subluxations. Upon return back to bed, addressed overall posture and positioning to reduce development of secondary complications. Minimal progress towards goals. Will continue OT efforts as indicated. At this time, patient is appropriate for Co-treatment with physical  therapy due to patient's clinical complexity, decreased overall endurance/tolerance levels, as well as need for high level assistance and cues and intervention to complete functional transfers/mobility and functional tasks in safe manner. Devan Zachary Ozuna Serum.  is appropriate for a multidisciplinary co-treatment of PT and OT to address goals of both disciplines. SUBJECTIVE:   Mr. Kamran Jones states, Unable to verbalize    SOCIAL HISTORY/LIVING ENVIRONMENT:   Home Environment: Private residence  One/Two Story Residence: One story  Living Alone: No  Support Systems: Family member(s)    OBJECTIVE:     PAIN: VITAL SIGNS: LINES/DRAINS:   Pre Treatment: Pain Screen  Pain Scale 1: Numeric (0 - 10)  Pain Intensity 1: 0  Post Treatment: 0   Gimenez Catheter, IV, PEG and Rectal Tube  O2 Device: Ventilator, Tracheostomy     ACTIVITIES OF DAILY LIVING: I Mod I S SBA CGA Min Mod Max Total NT Comments   BASIC ADLs:              Bathing/ Showering [] [] [] [] [] [] [] [] [] [x]    Toileting [] [] [] [] [] [] [] [] [] [x]    Dressing [] [] [] [] [] [] [] [] [] [x]    Feeding [] [] [] [] [] [] [] [] [] [x]    Grooming [] [] [] [] [] [] [] [] [x] []    Personal Device Care [] [] [] [] [] [] [] [] [] [x]    Functional Mobility [] [] [] [] [] [] [] [] [x] [] X 2 for functional transfers.    I=Independent, Mod I=Modified Independent, S=Supervision, SBA=Standby Assistance, CGA=Contact Guard Assistance,   Min=Minimal Assistance, Mod=Moderate Assistance, Max=Maximal Assistance, Total=Total Assistance, NT=Not Tested    MOBILITY: I Mod I S SBA CGA Min Mod Max Total  NT x2 Comments:   Supine to sit [] [] [] [] [] [] [] [] [x] [] [x]    Sit to supine [] [] [] [] [] [] [] [] [x] [] [x]    Sit to stand [] [] [] [] [] [] [] [] [] [x] []    Bed to chair [] [] [] [] [] [] [] [] [] [x] []    I=Independent, Mod I=Modified Independent, S=Supervision, SBA=Standby Assistance, CGA=Contact Guard Assistance,   Min=Minimal Assistance, Mod=Moderate Assistance, Max=Maximal Assistance, Total=Total Assistance, NT=Not Tested    PLAN:   FREQUENCY/DURATION: OT Plan of Care: 3 times/week for duration of hospital stay or until stated goals are met, whichever comes first.    TREATMENT:   TREATMENT:   ( $$ Neuromuscular Re-Education: 23-37 mins   )  Co-Treatment PT/OT necessary due to patient's decreased overall endurance/tolerance levels, as well as need for high level skilled assistance to complete functional transfers/mobility and functional tasks  Neuromuscular Re-education (23 Minutes): Neuromuscular Re-education included Balance Training, Coordination training, Postural training and Sitting balance training to improve Balance, Coordination, Postural Control and Proprioception.     AFTER TREATMENT POSITION/PRECAUTIONS:  Bed, Needs within reach and RN notified    INTERDISCIPLINARY COLLABORATION:  RN/PCT, PT/PTA and OT/SIN    TOTAL TREATMENT DURATION:  OT Patient Time In/Time Out  Time In: 1022  Time Out: 1001 [facility]

## 2021-02-12 NOTE — PROGRESS NOTES
Critical Care Daily Progress Note: 2/12/2021  Admission Date: 1/6/2021     The patient's chart is reviewed and the patient is discussed with the staff. Admission Date: 1/6/2021     Length of Stay: 35 days     Background: 61 y.o. y/o male with acute hypoxemic respiratory failure secondary to COVID-19.     Pt admitted 1/7 with fever, cough. Covid + 1/6. Admitted by hospitalists. Started on dexamethasone, convalescent plasma, and remdesivir. Pulm consulted 1/10 with pt requiring CPAP but pt decompensated and was intubated 1/17. Nephrology consulted 1/23 for renal failure. US 1/18 with L peroneal vein thrombus. He was started on heparin but stopped due to bleeding from HD catheter. He was started on HD on 1/25. Trached on 2/2 secondary to inability to wean from the vent. Pt developed afib requiring amio. PEG placed 2/4. IVC filter 2/8.      Onset of COVID-19 symptoms: 1/5/2021     Notable PMH: obesity, HTN, CAD, dyslipidemia, SWATI, DM, GERD     Drug Allergies: Allergies   Allergen Reactions    Latex Swelling       Had a purcell catheter with swelling of urethra. Only known latex issue in past. Wife remembers this now    Hydrocodone-Acetaminophen Itching       Wife remembers this allergy    Tessalon Perles [Benzonatate] Unknown (comments)       Subjective:   Pt remains on vent, low grade temp overnight, ran 12 hour SLED yesterday. Received 1U PRBC yesterday.      Lines: (insertion date)   Tracheostomy size #8 prox XLT, cuffed Hunter (2/2)  PEG 2/4  PICC 1/13  Dialysis access 1/24/21, tunneled 2/8  Purcell 1/17  Rectal tube 1/30  IVC filter 2/8     Drips: current dose (range)  precedex 0.5  Fentanyl 75    Current Facility-Administered Medications   Medication Dose Route Frequency    potassium, sodium phosphates (NEUTRA-PHOS) packet 1 Packet  1 Packet Oral Q6H    0.9% sodium chloride infusion 250 mL  250 mL IntraVENous PRN    midazolam (VERSED) injection 2 mg  2 mg IntraVENous Q3H PRN    heparin (porcine) 1,000 unit/mL injection 5,000 Units  5,000 Units Injection DIALYSIS PRN    dexmedeTOMidine in 0.9 % NaCl (PRECEDEX) 400 mcg/100 mL (4 mcg/mL) infusion soln  0.1-1.5 mcg/kg/hr IntraVENous TITRATE    anidulafungin (ERAXIS) 100 mg in 0.9% sodium chloride 130 mL IVPB  100 mg IntraVENous Q24H    oxyCODONE (ROXICODONE) 5 mg/5 mL oral solution 5 mg  5 mg Per G Tube Q6H    NOREPINephrine (LEVOPHED) 4 mg in 5% dextrose 250 mL infusion  0.5-30 mcg/min IntraVENous TITRATE    fentaNYL citrate (PF) injection 50 mcg  50 mcg IntraVENous Q2H PRN    midazolam (PF) in saline (VERSED) 100 mg/100 mL (1 mg/mL) infusion  0-10 mg/hr IntraVENous TITRATE    fentaNYL in normal saline (pf) 25 mcg/mL infusion  0-200 mcg/hr IntraVENous TITRATE    insulin glargine (LANTUS) injection 15 Units  15 Units SubCUTAneous DAILY    0.9% sodium chloride infusion 250 mL  250 mL IntraVENous PRN    amiodarone (CORDARONE) tablet 200 mg  200 mg Oral DAILY    oxyCODONE (ROXICODONE) 5 mg/5 mL oral solution 5 mg  5 mg Oral Q4H PRN    lansoprazole (PREVACID) 3 mg/mL oral suspension 30 mg  30 mg Per NG tube Q12H    senna-docusate (PERICOLACE) 8.6-50 mg per tablet 1 Tab  1 Tab Oral DAILY    ondansetron (ZOFRAN) injection 4 mg  4 mg IntraVENous Q6H PRN    polyethylene glycol (MIRALAX) packet 17 g  17 g Per NG tube DAILY PRN    guaiFENesin-dextromethorphan (ROBITUSSIN DM) 100-10 mg/5 mL syrup 10 mL  10 mL Per NG tube Q4H PRN    acetaminophen (TYLENOL) tablet 650 mg  650 mg Per NG tube Q6H PRN    polyethylene glycol (MIRALAX) packet 17 g  17 g Per NG tube DAILY    sodium zirconium cyclosilicate (LOKELMA) powder packet 10 g  10 g Oral DAILY    white petrolatum-mineral oiL (LACRILUBE S.O.P.) ointment   Both Eyes Q4H PRN    atorvastatin (LIPITOR) tablet 80 mg  80 mg Per NG tube DAILY    [Held by provider] clopidogreL (PLAVIX) tablet 75 mg  75 mg Per NG tube DAILY    [Held by provider] metoprolol tartrate (LOPRESSOR) tablet 50 mg 50 mg Per NG tube BID    insulin lispro (HUMALOG) injection   SubCUTAneous Q4H    NUTRITIONAL SUPPORT ELECTROLYTE PRN ORDERS   Does Not Apply PRN    sodium chloride (NS) flush 20 mL  20 mL InterCATHeter Q8H    sodium chloride (NS) flush 20 mL  20 mL InterCATHeter PRN    loperamide (IMODIUM) capsule 2 mg  2 mg Oral Q4H PRN    dextrose 40% (GLUTOSE) oral gel 1 Tube  15 g Oral PRN    glucagon (GLUCAGEN) injection 1 mg  1 mg IntraMUSCular PRN    dextrose (D50W) injection syrg 12.5-25 g  25-50 mL IntraVENous PRN    albuterol (PROVENTIL HFA, VENTOLIN HFA, PROAIR HFA) inhaler 2 Puff  2 Puff Inhalation Q4H PRN    influenza vaccine 2020-21 (6 mos+)(PF) (FLUARIX/FLULAVAL/FLUZONE QUAD) injection 0.5 mL  0.5 mL IntraMUSCular PRIOR TO DISCHARGE    hydrALAZINE (APRESOLINE) 20 mg/mL injection 20 mg  20 mg IntraVENous Q6H PRN       Review of Systems   Unobtainable due to patient status. Objective:     Vitals:    02/12/21 0400 02/12/21 0500 02/12/21 0600 02/12/21 0720   BP: (!) 101/56 (!) 96/50 (!) 88/49    Pulse: 83 75 78    Resp: 23 22 20    Temp:    97 °F (36.1 °C)   SpO2: 98% 99% 99%    Weight:  191 lb 5.8 oz (86.8 kg)     Height:             Intake/Output Summary (Last 24 hours) at 2/12/2021 0739  Last data filed at 2/12/2021 0600  Gross per 24 hour   Intake 2821.5 ml   Output 7962 ml   Net -5140.5 ml         Physical Exam:          Constitutional:  intubated and mechanically ventilated.   EENMT:  Sclera clear, pupils equal, oral mucosa moist  Respiratory: coarse, rhonchi  Cardiovascular:  RRR with no M,G,R  Gastrointestinal:  soft with no tenderness; positive bowel sounds present  Musculoskeletal:  warm with no cyanosis, no lower extremity edema  Skin:  no jaundice or ecchymosis  Neurologic: no gross neuro deficits     Psychiatric:  Awake, follows  commands      CXR:        Ventilator Settings  Mode FIO2 Rate Tidal Volume Pressure PEEP   Pressure control  49 %    525 ml     10 cm H20      Peak airway pressure: 31.9 cm H2O   Minute ventilation: 16.1 l/min     ABG:   Recent Labs     02/12/21  0216 02/11/21  0312 02/10/21  0310   PHI 7.46* 7.44 7.45   PCO2I 46.2* 49.9* 48.3*   PO2I 78 134* 100   HCO3I 32.9* 34.1* 33.3*       LAB  Recent Labs     02/12/21  0727 02/12/21  0314 02/11/21  2308 02/11/21 2037 02/11/21  1523   GLUCPOC 163* 161* 192* 192* 191*     Recent Labs     02/12/21  0316 02/11/21  1100 02/11/21  0222 02/10/21  0352   WBC 10.9  --  9.8 12.9*   HGB 7.9* 8.0* 6.7* 7.7*   HCT 25.4* 25.2* 21.5* 24.7*   *  --  88* 116*   INR 1.2  --  1.2 1.2     Recent Labs     02/12/21  0316 02/11/21  0222 02/10/21  0352    143 140   K 3.7 3.6 3.6    105 105   CO2 33* 35* 32   * 162* 141*   BUN 15 12 15   CREA 1.23 1.03 1.38   MG 1.6*  --  1.6*   PHOS 1.8*  --  2.0*   CA 7.9* 7.6* 7.8*     COVID-19 Meds:  Dexamethasone 6mg daily (EOT 1/16)  Convalescent plasma transfusion (1/7)  Remdesivir (EOT 1/12)     Micro Results:  BC: negative (1/30)  Sputum cx: normal resp vianney: (1/30)  Sputum 2/5 - moderate s aureus, moderate K. pneumoniae     Anti-infectives (start date):  azithro (completed)  Ceftriaxone (completed)  Vanc and cefepime: (completed)       Vanc and zosyn (started 2/5) stopped per id        eraxis (2/11-    Patient Active Problem List   Diagnosis Code    Obesity E66.9    Hypertension I10    Bradycardia R00.1    PVC (premature ventricular contraction) I49.3    CAD (coronary artery disease) I25.10    Dyslipidemia, goal LDL below 70 E78.5    SWATI (acute kidney injury) ([facility] N17.9    Acute hypoxemic respiratory failure (HCC) J96.01    Type 2 diabetes mellitus with hyperosmolarity without nonketotic hyperglycemic-hyperosmolar coma (HCC) E11.00    Pneumonia due to COVID-19 virus U07.1, J12.82    Hypernatremia E87.0    Atrial fibrillation (HCC) I48.91    Hypotension I95.9    Acute deep vein thrombosis (DVT) of left peroneal vein ([facility]) I82.452    VAP (ventilator-associated pneumonia) [facility]) J95.851         Assessment:  (Medical Decision Making)     Hospital Problems  Date Reviewed: 2/5/2021          Codes Class Noted POA    VAP (ventilator-associated pneumonia) ([facility] ICD-10-CM: H43.844  ICD-9-CM: 997.31  2/9/2021 Unknown    Klebsiella and staph on 2/5 culture-pan sensitive- ? Colonization per id    Acute deep vein thrombosis (DVT) of left peroneal vein (HCC) ICD-10-CM: B43.827  ICD-9-CM: 453.42  1/28/2021 Unknown        Hypotension ICD-10-CM: I95.9  ICD-9-CM: 458.9  1/26/2021 Unknown    Off pressors    Atrial fibrillation ([facility] ICD-10-CM: I48.91  ICD-9-CM: 427.31  1/22/2021 Yes        Hypernatremia ICD-10-CM: E87.0  ICD-9-CM: 276.0  1/8/2021 Unknown        SWATI (acute kidney injury) ([facility] ICD-10-CM: N17.9  ICD-9-CM: 584.9  1/7/2021 Unknown        Acute hypoxemic respiratory failure ([facility] ICD-10-CM: J96.01  ICD-9-CM: 518.81  1/7/2021 Unknown        Type 2 diabetes mellitus with hyperosmolarity without nonketotic hyperglycemic-hyperosmolar coma ([facility] ICD-10-CM: E11.00  ICD-9-CM: 250.20  1/7/2021 Unknown        * (Principal) Pneumonia due to COVID-19 virus ICD-10-CM: U07.1, J12.82  ICD-9-CM: 480.8  1/7/2021 Unknown        CAD (coronary artery disease) ICD-10-CM: I25.10  ICD-9-CM: 414.00  10/28/2016 Yes    Overview Signed 10/28/2016  8:05 AM by SHELDON Cramer     10-27-06 [facility] 90% LAD s/p 2.25 x 20 Synergy drug-eluting stent  (CS)             Obesity ICD-10-CM: E66.9  ICD-9-CM: 278.00  10/6/2015 Yes            Patient admitted for fever and cough, COVID + 1/6. Patient had increasing oxygen demands and was intubated 1/17 and ultimately trached for difficulty weaning ventilator. Sputum cultures 2/5 grew K. Pneumoniae and mod. Staph aureus. ID following and patient started on eraxis trial. Kidney function declined, requiring dialysis per nephrology. US showed L peroneal vein thrombus on 1/18, patient had to have heparin gtt stopped due to bleeding and IVC filter was placed 2/8.      Plan:  (Medical Decision Making)   1  Sled yesterday- ran 12 hours, did clot on one point and received 1U PRBC and then was able to complete run. dialysis per nephro  2  On eraxis, started yesterday. Low grade temp overnight  3   Wean vent support as able-  PS trial today?  4. Wean sedation, consider adding scheduled risperdal      More than 50% of the time documented was spent in face-to-face contact with the patient and in the care of the patient on the floor/unit where the patient is located.    Stacie Clark NP   Lungs:  clear  Heart:  RRR with no Murmur/Rubs/Gallops  Neuro alert but has severe weakness-only minimal movement of arms  Additional Comments:  Placed on pressure support -tolerating 12    I have spoken with and examined the patient. I agree with the above assessment and plan as documented.    Arnel CASTREJON MD

## 2021-02-12 NOTE — PROGRESS NOTES
Chart reviewed. Not much change. Remains CCU trach/vent -40% Fio2. SLED per Nephrology. Precedex gtt currently. Off other sedation. Amio PO now. Remains on LTACH list and will need precert when ready per MD for start and tolerating HD. CM following. LOS  36 days.

## 2021-02-12 NOTE — PROGRESS NOTES
Infectious Disease Progress Note    Impression:   · Fevers in setting of prolonged, critical illness with covid and multiple complications, detailed below  · Covid diagnosed 1/6 with ARDS and prolonged vent wean, intubated 1/17 s/p trach 2/2, s/p CP/RDV/steroids  · Renal failure related to covid requiring dialysis  · Thrombus related to covid s/p IVC filter placement  · MSSA and kleb in sputum 2/5, VAP vs colonization, no change in secretions/vent settings that would indicate VAP    Given what seems like improvement would recommend empiric 14 day course of antifungal. Eraxis for now but this can be changed to fluc to complete therapy if needed. Fluc may require renal dose adjustment based on course. C diff testing was cancelled by infection prevention. We are aware that he is receiving tube feeds and did have miralax. However, he had frankly watery stool in a critically ill patient who is high risk for CDI. As he is otherwise improving from a fever standpoint will hold on sending this order, but if fever recurs would send it. We have stopped miralax and docusate to this end given watery stool. Abdominal exam encouragingly remains benign. Plan:     · Plan to complete 14 total days of antifungal (eraxis for now, this can be changed to renally dosed fluc if desired), EOT 2/24  · Hold on C diff testing but if fever recurs would send - miralax and docusate discontinued; happy to discuss with infection control if they have concerns    Thank you for allowing us to participate in the care of this patient, ID will sign off at this time. Please don't hesitate to contact us with further questions or concerns. Anti-infectives:   1. Eraxis (2/10-  2. Pip/tazo 2/4-2/10  3. Vancomycin 2/8 x 1    Subjective: Interval History:  Remains afebrile, 100.2 temp not sustained. More awake today, holding on HD. No new culture data. C diff testing cancelled per IC.        Allergies   Allergen Reactions    Latex Swelling Had a purcell catheter with swelling of urethra. Only known latex issue in past. Wife remembers this now    Hydrocodone-Acetaminophen Itching     Wife remembers this allergy    Tessalon Perles [Benzonatate] Unknown (comments)        Review of Systems:  Review of systems not obtained due to patient factors. Objective:   Blood pressure (!) 127/58, pulse 75, temperature 97.5 °F (36.4 °C), resp. rate 23, height 5' 8\" (1.727 m), weight 86.8 kg (191 lb 5.8 oz), SpO2 96 %. Temp (24hrs), Av.8 °F (37.1 °C), Min:97 °F (36.1 °C), Max:100.2 °F (37.9 °C)     Patient was seen and examined on 21 and physical exam remains unchanged since yesterday, unless noted below:    Exam:    General:  Awake, appears comfortable; following commands   Eyes:  Sclera anicteric. Pupils equally round and reactive to light. Mouth/Throat: Dry MM   Neck: Trach in place   Lungs:   Decreased breath sounds bilaterally   CV:  RRR   Abdomen:   Soft, non-tender.  bowel sounds normal. non-distended   Extremities: Edema diffusely in all 4 extremities   Skin: Skin color, texture, turgor normal. no acute rash or lesions   Lymph nodes: Cervical and supraclavicular normal   Musculoskeletal: No swelling or deformity   Lines/Devices:  Intact, no erythema, drainage or tenderness   Psych: Awake today, follows commands   Tunneled line in R chest, PICC in RUE, purcell    Data Review:   Recent Results (from the past 24 hour(s))   GLUCOSE, POC    Collection Time: 21 11:49 AM   Result Value Ref Range    Glucose (POC) 174 (H) 65 - 100 mg/dL   GLUCOSE, POC    Collection Time: 21  3:23 PM   Result Value Ref Range    Glucose (POC) 191 (H) 65 - 100 mg/dL   GLUCOSE, POC    Collection Time: 21  8:37 PM   Result Value Ref Range    Glucose (POC) 192 (H) 65 - 100 mg/dL   GLUCOSE, POC    Collection Time: 21 11:08 PM   Result Value Ref Range    Glucose (POC) 192 (H) 65 - 100 mg/dL   POC G3    Collection Time: 21  2:16 AM   Result Value Ref Range    Device: VENT      FIO2 (POC) 50 %    pH (POC) 7.46 (H) 7.35 - 7.45      pCO2 (POC) 46.2 (H) 35 - 45 MMHG    pO2 (POC) 78 75 - 100 MMHG    HCO3 (POC) 32.9 (H) 22 - 26 MMOL/L    sO2 (POC) 96 95 - 98 %    Base excess (POC) 8 mmol/L    Mode ASSIST CONTROL      Set Rate 22 bpm    PEEP/CPAP (POC) 10 cmH2O    Allens test (POC) YES      Inspiratory Time 0.85 sec    Site RIGHT RADIAL      Specimen type (POC) ARTERIAL      Performed by BerryLulaRT     CO2, POC 34 MMOL/L    Pressure control 16      Respiratory comment: NurseNotified     Exhaled minute volume 15.00 L/min    COLLECT TIME 215     GLUCOSE, POC    Collection Time: 02/12/21  3:14 AM   Result Value Ref Range    Glucose (POC) 161 (H) 65 - 100 mg/dL   PROTHROMBIN TIME + INR    Collection Time: 02/12/21  3:16 AM   Result Value Ref Range    Prothrombin time 15.3 (H) 12.5 - 14.7 sec    INR 1.2     PTT    Collection Time: 02/12/21  3:16 AM   Result Value Ref Range    aPTT 35.8 (H) 24.1 - 35.1 SEC   FIBRINOGEN    Collection Time: 02/12/21  3:16 AM   Result Value Ref Range    Fibrinogen 623 (H) 190 - 056 mg/dL   METABOLIC PANEL, BASIC    Collection Time: 02/12/21  3:16 AM   Result Value Ref Range    Sodium 142 136 - 145 mmol/L    Potassium 3.7 3.5 - 5.1 mmol/L    Chloride 106 98 - 107 mmol/L    CO2 33 (H) 21 - 32 mmol/L    Anion gap 3 (L) 7 - 16 mmol/L    Glucose 141 (H) 65 - 100 mg/dL    BUN 15 8 - 23 MG/DL    Creatinine 1.23 0.8 - 1.5 MG/DL    GFR est AA >60 >60 ml/min/1.73m2    GFR est non-AA >60 >60 ml/min/1.73m2    Calcium 7.9 (L) 8.3 - 10.4 MG/DL   PHOSPHORUS    Collection Time: 02/12/21  3:16 AM   Result Value Ref Range    Phosphorus 1.8 (L) 2.3 - 3.7 MG/DL   MAGNESIUM    Collection Time: 02/12/21  3:16 AM   Result Value Ref Range    Magnesium 1.6 (L) 1.8 - 2.4 mg/dL   CBC W/O DIFF    Collection Time: 02/12/21  3:16 AM   Result Value Ref Range    WBC 10.9 4.3 - 11.1 K/uL    RBC 2.76 (L) 4.23 - 5.6 M/uL    HGB 7.9 (L) 13.6 - 17.2 g/dL    HCT 25.4 (L) 41.1 - 50.3 %    MCV 92.0 79.6 - 97.8 FL    MCH 28.6 26.1 - 32.9 PG    MCHC 31.1 (L) 31.4 - 35.0 g/dL    RDW 16.4 (H) 11.9 - 14.6 %    PLATELET 424 (L) 404 - 450 K/uL    MPV 11.2 9.4 - 12.3 FL    ABSOLUTE NRBC 0.00 0.0 - 0.2 K/uL   GLUCOSE, POC    Collection Time: 02/12/21  7:27 AM   Result Value Ref Range    Glucose (POC) 163 (H) 65 - 100 mg/dL        Microbiology:    All Micro Results     Procedure Component Value Units Date/Time    CULTURE, BLOOD [[id number]] Collected: 02/10/21 1353    Order Status: Completed Specimen: Blood Updated: 02/12/21 0744     Special Requests: --        LEFT  Antecubital       Culture result: NO GROWTH 2 DAYS       CULTURE, BLOOD [[id number]] Collected: 02/10/21 1358    Order Status: Completed Specimen: Blood Updated: 02/12/21 0744     Special Requests: BLUE PORT        Culture result: NO GROWTH 2 DAYS       C. DIFFICILE AG & TOXIN A/B [[id number]] Collected: 02/11/21 1124    Order Status: Canceled Specimen: Stool     CULTURE, BLOOD [[id number]] Collected: 02/05/21 0949    Order Status: Completed Specimen: Blood Updated: 02/10/21 0732     Special Requests: --        LEFT  ARM       Culture result: NO GROWTH 5 DAYS       CULTURE, BLOOD [[id number]] Collected: 02/05/21 1000    Order Status: Completed Specimen: Blood Updated: 02/10/21 0732     Special Requests: --        LEFT  ARM       Culture result: NO GROWTH 5 DAYS       CULTURE, RESPIRATORY/SPUTUM/BRONCH W GRAM STAIN [[id number]]  (Abnormal)  (Susceptibility) Collected: 02/05/21 0826    Order Status: Completed Specimen: Sputum,ET Suction Updated: 02/08/21 0850     Special Requests: NO SPECIAL REQUESTS        GRAM STAIN 10 TO 25 WBCS SEEN PER OIF      0 TO 1 EPITHELIAL CELLS SEEN PER OIF            MODERATE GRAM POSITIVE COCCI            FEW GRAM NEGATIVE RODS         FEW GRAM POSITIVE RODS        Culture result:       MODERATE STAPHYLOCOCCUS AUREUS                  MODERATE KLEBSIELLA PNEUMONIAE                  MODERATE NORMAL RESPIRATORY JUAN CULTURE, URINE [[id number]] Collected: 02/05/21 0745    Order Status: Canceled Specimen: Cath Urine     CULTURE, BLOOD [[id number]] Collected: 01/30/21 1438    Order Status: Completed Specimen: Blood Updated: 02/04/21 0741     Special Requests: --        NO SPECIAL REQUESTS  LEFT  Antecubital       Culture result: NO GROWTH 5 DAYS       CULTURE, BLOOD [[id number]] Collected: 01/30/21 1141    Order Status: Completed Specimen: Blood Updated: 02/04/21 0741     Special Requests: ARTL (ART LINE)        Culture result: NO GROWTH 5 DAYS       CULTURE, RESPIRATORY/SPUTUM/BRONCH [employer] [[id number]] Collected: 01/30/21 1437    Order Status: Completed Specimen: Sputum from Endotracheal aspirate Updated: 02/01/21 0746     Special Requests: NO SPECIAL REQUESTS        GRAM STAIN 5 TO 25 WBC'S/OIF      0 TO 1 EPITHELIAL CELLS SEEN /OIF      FEW GRAM POSITIVE COCCI         FEW YEAST         4+ MUCUS PRESENT        Culture result:       LIGHT NORMAL RESPIRATORY JUAN          CULTURE, URINE [[id number]] Collected: 01/30/21 1100    Order Status: Canceled Specimen: Cath Urine     CULTURE, BLOOD [[id number]] Collected: 01/18/21 1124    Order Status: Completed Specimen: Blood Updated: 01/23/21 1034     Special Requests: --        RIGHT  HAND       Culture result: NO GROWTH 5 DAYS       CULTURE, BLOOD [[id number]] Collected: 01/18/21 1120    Order Status: Completed Specimen: Blood Updated: 01/23/21 1034     Special Requests: --        LEFT  HAND       Culture result: NO GROWTH 5 DAYS       CULTURE, URINE [[id number]] Collected: 01/18/21 1542    Order Status: Completed Specimen: Cath Urine Updated: 01/21/21 0725     Special Requests: GREY TUBE     Culture result: NO GROWTH 2 DAYS       CULTURE, RESPIRATORY/SPUTUM/BRONCH [employer] STAIN [[id number]] Collected: 01/18/21 1542    Order Status: Canceled Specimen: Sputum from Tracheal Aspirate     CULTURE, BLOOD [[id number]] Collected: 01/07/21 0115    Order Status: Completed Specimen: Blood Updated: 01/12/21 1150     Special Requests: --        LEFT  HAND       Culture result: NO GROWTH 5 DAYS       CULTURE, BLOOD [[id number]] Collected: 01/07/21 0109    Order Status: Completed Specimen: Blood Updated: 01/12/21 1150     Special Requests: --        LEFT  Antecubital       Culture result: NO GROWTH 5 DAYS             Studies:    CXR per HPI    Signed By: Germán Peres MD     February 12, 2021

## 2021-02-13 ENCOUNTER — APPOINTMENT (OUTPATIENT)
Dept: CT IMAGING | Age: 61
DRG: 004 | End: 2021-02-13
Payer: COMMERCIAL

## 2021-02-13 ENCOUNTER — APPOINTMENT (OUTPATIENT)
Dept: GENERAL RADIOLOGY | Age: 61
DRG: 004 | End: 2021-02-13
Attending: INTERNAL MEDICINE
Payer: COMMERCIAL

## 2021-02-13 LAB
ANION GAP SERPL CALC-SCNC: 1 MMOL/L (ref 7–16)
ANION GAP SERPL CALC-SCNC: 5 MMOL/L (ref 7–16)
APTT PPP: 41.4 SEC (ref 24.1–35.1)
ARTERIAL PATENCY WRIST A: YES
ARTERIAL PATENCY WRIST A: YES
BASE EXCESS BLD CALC-SCNC: 7 MMOL/L
BASE EXCESS BLD CALC-SCNC: 7 MMOL/L
BDY SITE: ABNORMAL
BDY SITE: ABNORMAL
BUN SERPL-MCNC: 12 MG/DL (ref 8–23)
BUN SERPL-MCNC: 48 MG/DL (ref 8–23)
CALCIUM SERPL-MCNC: 8 MG/DL (ref 8.3–10.4)
CALCIUM SERPL-MCNC: 8.2 MG/DL (ref 8.3–10.4)
CHLORIDE SERPL-SCNC: 104 MMOL/L (ref 98–107)
CHLORIDE SERPL-SCNC: 106 MMOL/L (ref 98–107)
CO2 BLD-SCNC: 33 MMOL/L
CO2 BLD-SCNC: 34 MMOL/L
CO2 SERPL-SCNC: 31 MMOL/L (ref 21–32)
CO2 SERPL-SCNC: 35 MMOL/L (ref 21–32)
COLLECT TIME,HTIME: 2145
COLLECT TIME,HTIME: 230
CREAT SERPL-MCNC: 0.96 MG/DL (ref 0.8–1.5)
CREAT SERPL-MCNC: 2.81 MG/DL (ref 0.8–1.5)
ERYTHROCYTE [DISTWIDTH] IN BLOOD BY AUTOMATED COUNT: 16 % (ref 11.9–14.6)
EXHALED MINUTE VOLUME, VE: 12.8 L/MIN
EXHALED MINUTE VOLUME, VE: 14.4 L/MIN
FIBRINOGEN PPP-MCNC: 569 MG/DL (ref 190–501)
GAS FLOW.O2 O2 DELIVERY SYS: ABNORMAL L/MIN
GAS FLOW.O2 O2 DELIVERY SYS: ABNORMAL L/MIN
GLUCOSE BLD STRIP.AUTO-MCNC: 145 MG/DL (ref 65–100)
GLUCOSE BLD STRIP.AUTO-MCNC: 167 MG/DL (ref 65–100)
GLUCOSE BLD STRIP.AUTO-MCNC: 181 MG/DL (ref 65–100)
GLUCOSE BLD STRIP.AUTO-MCNC: 193 MG/DL (ref 65–100)
GLUCOSE BLD STRIP.AUTO-MCNC: 208 MG/DL (ref 65–100)
GLUCOSE SERPL-MCNC: 158 MG/DL (ref 65–100)
GLUCOSE SERPL-MCNC: 181 MG/DL (ref 65–100)
HCO3 BLD-SCNC: 31.8 MMOL/L (ref 22–26)
HCO3 BLD-SCNC: 32.6 MMOL/L (ref 22–26)
HCT VFR BLD AUTO: 24.1 % (ref 41.1–50.3)
HGB BLD-MCNC: 7.2 G/DL (ref 13.6–17.2)
INR PPP: 1.2
MAGNESIUM SERPL-MCNC: 2.1 MG/DL (ref 1.8–2.4)
MCH RBC QN AUTO: 28.2 PG (ref 26.1–32.9)
MCHC RBC AUTO-ENTMCNC: 29.9 G/DL (ref 31.4–35)
MCV RBC AUTO: 94.5 FL (ref 79.6–97.8)
NRBC # BLD: 0 K/UL (ref 0–0.2)
O2/TOTAL GAS SETTING VFR VENT: 35 %
O2/TOTAL GAS SETTING VFR VENT: 47 %
PCO2 BLD: 47.1 MMHG (ref 35–45)
PCO2 BLD: 53.3 MMHG (ref 35–45)
PEEP RESPIRATORY: 10 CMH2O
PEEP RESPIRATORY: 10 CMH2O
PH BLD: 7.39 [PH] (ref 7.35–7.45)
PH BLD: 7.44 [PH] (ref 7.35–7.45)
PHOSPHATE SERPL-MCNC: 3.8 MG/DL (ref 2.3–3.7)
PLATELET # BLD AUTO: 133 K/UL (ref 150–450)
PMV BLD AUTO: 11.1 FL (ref 9.4–12.3)
PO2 BLD: 125 MMHG (ref 75–100)
PO2 BLD: 89 MMHG (ref 75–100)
POTASSIUM SERPL-SCNC: 3.4 MMOL/L (ref 3.5–5.1)
POTASSIUM SERPL-SCNC: 4 MMOL/L (ref 3.5–5.1)
PRESSURE SUPPORT SETTING VENT: 12 CMH2O
PRESSURE SUPPORT SETTING VENT: 12 CMH2O
PROTHROMBIN TIME: 15.4 SEC (ref 12.5–14.7)
RBC # BLD AUTO: 2.55 M/UL (ref 4.23–5.6)
SAO2 % BLD: 97 % (ref 95–98)
SAO2 % BLD: 99 % (ref 95–98)
SERVICE CMNT-IMP: ABNORMAL
SODIUM SERPL-SCNC: 140 MMOL/L (ref 136–145)
SODIUM SERPL-SCNC: 142 MMOL/L (ref 136–145)
SPECIMEN TYPE: ABNORMAL
SPECIMEN TYPE: ABNORMAL
TOTAL RESP. RATE, ITRR: 30
TOTAL RESP. RATE, ITRR: 34
VENTILATION MODE VENT: ABNORMAL
VENTILATION MODE VENT: ABNORMAL
WBC # BLD AUTO: 10.4 K/UL (ref 4.3–11.1)

## 2021-02-13 PROCEDURE — 83735 ASSAY OF MAGNESIUM: CPT

## 2021-02-13 PROCEDURE — 70450 CT HEAD/BRAIN W/O DYE: CPT

## 2021-02-13 PROCEDURE — 90945 DIALYSIS ONE EVALUATION: CPT

## 2021-02-13 PROCEDURE — 84100 ASSAY OF PHOSPHORUS: CPT

## 2021-02-13 PROCEDURE — 74011250637 HC RX REV CODE- 250/637: Performed by: INTERNAL MEDICINE

## 2021-02-13 PROCEDURE — 94003 VENT MGMT INPAT SUBQ DAY: CPT

## 2021-02-13 PROCEDURE — 2709999900 HC NON-CHARGEABLE SUPPLY

## 2021-02-13 PROCEDURE — 74011000258 HC RX REV CODE- 258: Performed by: INTERNAL MEDICINE

## 2021-02-13 PROCEDURE — 85730 THROMBOPLASTIN TIME PARTIAL: CPT

## 2021-02-13 PROCEDURE — 74011636637 HC RX REV CODE- 636/637: Performed by: INTERNAL MEDICINE

## 2021-02-13 PROCEDURE — 74011000250 HC RX REV CODE- 250: Performed by: INTERNAL MEDICINE

## 2021-02-13 PROCEDURE — 85610 PROTHROMBIN TIME: CPT

## 2021-02-13 PROCEDURE — 77030040393 HC DRSG OPTIFOAM GENT MDII -B

## 2021-02-13 PROCEDURE — 74011250636 HC RX REV CODE- 250/636: Performed by: EMERGENCY MEDICINE

## 2021-02-13 PROCEDURE — 77030006998

## 2021-02-13 PROCEDURE — 80048 BASIC METABOLIC PNL TOTAL CA: CPT

## 2021-02-13 PROCEDURE — 71045 X-RAY EXAM CHEST 1 VIEW: CPT

## 2021-02-13 PROCEDURE — 82962 GLUCOSE BLOOD TEST: CPT

## 2021-02-13 PROCEDURE — 77030041175 HC BAG DIGNSHLD BARD -A

## 2021-02-13 PROCEDURE — 74011250637 HC RX REV CODE- 250/637: Performed by: STUDENT IN AN ORGANIZED HEALTH CARE EDUCATION/TRAINING PROGRAM

## 2021-02-13 PROCEDURE — C1751 CATH, INF, PER/CENT/MIDLINE: HCPCS

## 2021-02-13 PROCEDURE — 99233 SBSQ HOSP IP/OBS HIGH 50: CPT | Performed by: INTERNAL MEDICINE

## 2021-02-13 PROCEDURE — 74011250636 HC RX REV CODE- 250/636: Performed by: INTERNAL MEDICINE

## 2021-02-13 PROCEDURE — 85384 FIBRINOGEN ACTIVITY: CPT

## 2021-02-13 PROCEDURE — 85027 COMPLETE CBC AUTOMATED: CPT

## 2021-02-13 PROCEDURE — 77030005402 HC CATH RAD ART LN KT TELE -B

## 2021-02-13 PROCEDURE — 82803 BLOOD GASES ANY COMBINATION: CPT

## 2021-02-13 PROCEDURE — 77030032995 HC TRNSDUC BLD DBL VAMP KT EDWD -B

## 2021-02-13 PROCEDURE — 36620 INSERTION CATHETER ARTERY: CPT

## 2021-02-13 PROCEDURE — 65620000000 HC RM CCU GENERAL

## 2021-02-13 PROCEDURE — 36600 WITHDRAWAL OF ARTERIAL BLOOD: CPT

## 2021-02-13 PROCEDURE — 93005 ELECTROCARDIOGRAM TRACING: CPT | Performed by: INTERNAL MEDICINE

## 2021-02-13 RX ADMIN — POTASSIUM & SODIUM PHOSPHATES POWDER PACK 280-160-250 MG 1 PACKET: 280-160-250 PACK at 05:30

## 2021-02-13 RX ADMIN — INSULIN GLARGINE 15 UNITS: 100 INJECTION, SOLUTION SUBCUTANEOUS at 09:00

## 2021-02-13 RX ADMIN — INSULIN LISPRO 6 UNITS: 100 INJECTION, SOLUTION INTRAVENOUS; SUBCUTANEOUS at 07:41

## 2021-02-13 RX ADMIN — HEPARIN SODIUM 5000 UNITS: 1000 INJECTION INTRAVENOUS; SUBCUTANEOUS at 08:07

## 2021-02-13 RX ADMIN — Medication 20 ML: at 23:52

## 2021-02-13 RX ADMIN — INSULIN LISPRO 3 UNITS: 100 INJECTION, SOLUTION INTRAVENOUS; SUBCUTANEOUS at 20:07

## 2021-02-13 RX ADMIN — RISPERIDONE 0.5 MG: 1 SOLUTION ORAL at 18:01

## 2021-02-13 RX ADMIN — RISPERIDONE 0.5 MG: 1 SOLUTION ORAL at 09:00

## 2021-02-13 RX ADMIN — INSULIN LISPRO 3 UNITS: 100 INJECTION, SOLUTION INTRAVENOUS; SUBCUTANEOUS at 04:12

## 2021-02-13 RX ADMIN — SODIUM CHLORIDE 100 MG: 900 INJECTION, SOLUTION INTRAVENOUS at 11:24

## 2021-02-13 RX ADMIN — INSULIN LISPRO 3 UNITS: 100 INJECTION, SOLUTION INTRAVENOUS; SUBCUTANEOUS at 00:24

## 2021-02-13 RX ADMIN — INSULIN LISPRO 3 UNITS: 100 INJECTION, SOLUTION INTRAVENOUS; SUBCUTANEOUS at 16:48

## 2021-02-13 RX ADMIN — DEXMEDETOMIDINE HYDROCHLORIDE 0.6 MCG/KG/HR: 100 INJECTION, SOLUTION INTRAVENOUS at 02:00

## 2021-02-13 RX ADMIN — OXYCODONE HYDROCHLORIDE 5 MG: 5 SOLUTION ORAL at 00:25

## 2021-02-13 RX ADMIN — DEXMEDETOMIDINE HYDROCHLORIDE 0.6 MCG/KG/HR: 100 INJECTION, SOLUTION INTRAVENOUS at 17:00

## 2021-02-13 RX ADMIN — POTASSIUM & SODIUM PHOSPHATES POWDER PACK 280-160-250 MG 1 PACKET: 280-160-250 PACK at 00:25

## 2021-02-13 RX ADMIN — DEXMEDETOMIDINE HYDROCHLORIDE 0.3 MCG/KG/HR: 100 INJECTION, SOLUTION INTRAVENOUS at 23:54

## 2021-02-13 RX ADMIN — SODIUM ZIRCONIUM CYCLOSILICATE 10 G: 10 POWDER, FOR SUSPENSION ORAL at 09:00

## 2021-02-13 RX ADMIN — AMIODARONE HYDROCHLORIDE 200 MG: 200 TABLET ORAL at 09:00

## 2021-02-13 RX ADMIN — Medication 20 ML: at 13:50

## 2021-02-13 RX ADMIN — OXYCODONE HYDROCHLORIDE 5 MG: 5 SOLUTION ORAL at 12:00

## 2021-02-13 RX ADMIN — ATORVASTATIN CALCIUM 80 MG: 40 TABLET, FILM COATED ORAL at 09:00

## 2021-02-13 RX ADMIN — LANSOPRAZOLE 30 MG: KIT at 20:08

## 2021-02-13 RX ADMIN — LANSOPRAZOLE 30 MG: KIT at 09:00

## 2021-02-13 RX ADMIN — OXYCODONE HYDROCHLORIDE 5 MG: 5 SOLUTION ORAL at 17:00

## 2021-02-13 RX ADMIN — FENTANYL CITRATE 50 MCG: 50 INJECTION, SOLUTION INTRAMUSCULAR; INTRAVENOUS at 00:36

## 2021-02-13 RX ADMIN — INSULIN LISPRO 6 UNITS: 100 INJECTION, SOLUTION INTRAVENOUS; SUBCUTANEOUS at 23:52

## 2021-02-13 RX ADMIN — MIDAZOLAM HYDROCHLORIDE 2 MG: 1 INJECTION, SOLUTION INTRAMUSCULAR; INTRAVENOUS at 13:54

## 2021-02-13 RX ADMIN — OXYCODONE HYDROCHLORIDE 5 MG: 5 SOLUTION ORAL at 05:30

## 2021-02-13 RX ADMIN — Medication 20 ML: at 05:30

## 2021-02-13 RX ADMIN — DEXMEDETOMIDINE HYDROCHLORIDE 0.6 MCG/KG/HR: 100 INJECTION, SOLUTION INTRAVENOUS at 09:04

## 2021-02-13 RX ADMIN — FENTANYL CITRATE 50 MCG: 50 INJECTION, SOLUTION INTRAMUSCULAR; INTRAVENOUS at 16:41

## 2021-02-13 NOTE — PROGRESS NOTES
Critical Care Daily Progress Note: 2/13/2021  Admission Date: 1/6/2021     The patient's chart is reviewed and the patient is discussed with the staff. Admission Date: 1/6/2021     Length of Stay: 36 days     Background: 60 y.o. y/o male with acute hypoxemic respiratory failure secondary to COVID-19.     Pt admitted 1/7 with fever, cough. Covid + 1/6. Admitted by hospitalists. Started on dexamethasone, convalescent plasma, and remdesivir. Pulm consulted 1/10 with pt requiring CPAP but pt decompensated and was intubated 1/17. Nephrology consulted 1/23 for renal failure. US 1/18 with L peroneal vein thrombus. He was started on heparin but stopped due to bleeding from HD catheter. He was started on HD on 1/25. Trached on 2/2 secondary to inability to wean from the vent. Pt developed afib requiring amio. PEG placed 2/4. IVC filter 2/8.      Onset of COVID-19 symptoms: 1/5/2021     Notable PMH: obesity, HTN, CAD, dyslipidemia, SWATI, DM, GERD  Subjective:   Just started on crrt with plans for 12 hours.    On pressure support  Lines: (insertion date)   Tracheostomy size #8 prox XLT, cuffed Shiley (2/2)  PEG 2/4  PICC 1/13  Dialysis access 1/24/21, tunneled 2/8  Gimenez 1/17  Rectal tube 1/30  IVC filter 2/8     Drips: current dose (range)  precedex   Fentanyl  stopped      Current Facility-Administered Medications   Medication Dose Route Frequency    risperiDONE (RisperDAL) 1 mg/mL oral solution soln 0.5 mg  0.5 mg Nasogastric BID    0.9% sodium chloride infusion 250 mL  250 mL IntraVENous PRN    midazolam (VERSED) injection 2 mg  2 mg IntraVENous Q3H PRN    heparin (porcine) 1,000 unit/mL injection 5,000 Units  5,000 Units Injection DIALYSIS PRN    dexmedeTOMidine in 0.9 % NaCl (PRECEDEX) 400 mcg/100 mL (4 mcg/mL) infusion soln  0.1-1.5 mcg/kg/hr IntraVENous TITRATE    anidulafungin (ERAXIS) 100 mg in 0.9% sodium chloride 130 mL IVPB  100 mg IntraVENous Q24H    oxyCODONE (ROXICODONE) 5 mg/5 mL oral solution 5 mg  5 mg Per G Tube Q6H    NOREPINephrine (LEVOPHED) 4 mg in 5% dextrose 250 mL infusion  0.5-30 mcg/min IntraVENous TITRATE    fentaNYL citrate (PF) injection 50 mcg  50 mcg IntraVENous Q2H PRN    midazolam (PF) in saline (VERSED) 100 mg/100 mL (1 mg/mL) infusion  0-10 mg/hr IntraVENous TITRATE    fentaNYL in normal saline (pf) 25 mcg/mL infusion  0-200 mcg/hr IntraVENous TITRATE    insulin glargine (LANTUS) injection 15 Units  15 Units SubCUTAneous DAILY    0.9% sodium chloride infusion 250 mL  250 mL IntraVENous PRN    amiodarone (CORDARONE) tablet 200 mg  200 mg Oral DAILY    oxyCODONE (ROXICODONE) 5 mg/5 mL oral solution 5 mg  5 mg Oral Q4H PRN    lansoprazole (PREVACID) 3 mg/mL oral suspension 30 mg  30 mg Per NG tube Q12H    ondansetron (ZOFRAN) injection 4 mg  4 mg IntraVENous Q6H PRN    polyethylene glycol (MIRALAX) packet 17 g  17 g Per NG tube DAILY PRN    guaiFENesin-dextromethorphan (ROBITUSSIN DM) 100-10 mg/5 mL syrup 10 mL  10 mL Per NG tube Q4H PRN    acetaminophen (TYLENOL) tablet 650 mg  650 mg Per NG tube Q6H PRN    sodium zirconium cyclosilicate (LOKELMA) powder packet 10 g  10 g Oral DAILY    white petrolatum-mineral oiL (LACRILUBE S.O.P.) ointment   Both Eyes Q4H PRN    atorvastatin (LIPITOR) tablet 80 mg  80 mg Per NG tube DAILY    [Held by provider] clopidogreL (PLAVIX) tablet 75 mg  75 mg Per NG tube DAILY    [Held by provider] metoprolol tartrate (LOPRESSOR) tablet 50 mg  50 mg Per NG tube BID    insulin lispro (HUMALOG) injection   SubCUTAneous Q4H    NUTRITIONAL SUPPORT ELECTROLYTE PRN ORDERS   Does Not Apply PRN    sodium chloride (NS) flush 20 mL  20 mL InterCATHeter Q8H    sodium chloride (NS) flush 20 mL  20 mL InterCATHeter PRN    loperamide (IMODIUM) capsule 2 mg  2 mg Oral Q4H PRN    dextrose 40% (GLUTOSE) oral gel 1 Tube  15 g Oral PRN    glucagon (GLUCAGEN) injection 1 mg  1 mg IntraMUSCular PRN    dextrose (D50W) injection syrg 12.5-25 g 25-50 mL IntraVENous PRN    albuterol (PROVENTIL HFA, VENTOLIN HFA, PROAIR HFA) inhaler 2 Puff  2 Puff Inhalation Q4H PRN    influenza vaccine 2020-21 (6 mos+)(PF) (FLUARIX/FLULAVAL/FLUZONE QUAD) injection 0.5 mL  0.5 mL IntraMUSCular PRIOR TO DISCHARGE    hydrALAZINE (APRESOLINE) 20 mg/mL injection 20 mg  20 mg IntraVENous Q6H PRN       Review of Systems   Unobtainable due to patient status. Objective:     Vitals:    02/13/21 0600 02/13/21 0659 02/13/21 0726 02/13/21 0803   BP: (!) 143/69 (!) 148/69  (!) 153/73   Pulse: 92 93 95 91   Resp: (!) 33 (!) 32 (!) 33    Temp:  99 °F (37.2 °C)     SpO2: 95% 90% 100%    Weight:       Height:             Intake/Output Summary (Last 24 hours) at 2/13/2021 0815  Last data filed at 2/13/2021 0803  Gross per 24 hour   Intake 1271.4 ml   Output 130 ml   Net 1141.4 ml         Physical Exam:          Constitutional:  intubated and mechanically ventilated.   EENMT:  Sclera clear, pupils equal, oral mucosa moist  Respiratory: rhonchi  Cardiovascular:  RRR with no M,G,R;  Gastrointestinal:  soft with no tenderness; positive bowel sounds present  Musculoskeletal:  warm with no cyanosis, no lower extremity edema  Skin:  no jaundice or ecchymosis  Neurologic: no gross neuro deficits     Psychiatric:  alert and responsive        CXR:        Ventilator Settings  Mode FIO2 Rate Tidal Volume Pressure PEEP   Pressure support  40 %    525 ml  12 cm H2O  10 cm H20      Peak airway pressure: 22.8 cm H2O   Minute ventilation: 14.2 l/min     ABG:   Recent Labs     02/13/21  0235 02/12/21  0216 02/11/21  0312   PHI 7.44 7.46* 7.44   PCO2I 47.1* 46.2* 49.9*   PO2I 125* 78 134*   HCO3I 31.8* 32.9* 34.1*       LAB  Recent Labs     02/13/21  0740 02/13/21  0315 02/12/21  2317 02/12/21  1937 02/12/21  1516   GLUCPOC 208* 193* 194* 173* 161*     Recent Labs     02/13/21  0312 02/12/21  0316 02/11/21  1100 02/11/21 0222   WBC 10.4 10.9  --  9.8   HGB 7.2* 7.9* 8.0* 6.7*   HCT 24.1* 25.4* 25.2* 21.5*   * 107*  --  88*   INR 1.2 1.2  --  1.2     Recent Labs     02/13/21  0312 02/12/21  0316 02/11/21  0222    142 143   K 3.4* 3.7 3.6    106 105   CO2 31 33* 35*   * 141* 162*   BUN 48* 15 12   CREA 2.81* 1.23 1.03   MG 2.1 1.6*  --    PHOS 3.8* 1.8*  --    CA 8.0* 7.9* 7.6*     No results for input(s): LCAD, LAC in the last 72 hours.       Patient Active Problem List   Diagnosis Code    Obesity E66.9    Hypertension I10    Bradycardia R00.1    PVC (premature ventricular contraction) I49.3    CAD (coronary artery disease) I25.10    Dyslipidemia, goal LDL below 70 E78.5    SWATI (acute kidney injury) ([facility] N17.9    Acute hypoxemic respiratory failure (HCC) J96.01    Type 2 diabetes mellitus with hyperosmolarity without nonketotic hyperglycemic-hyperosmolar coma (HCC) E11.00    Pneumonia due to COVID-19 virus U07.1, J12.82    Atrial fibrillation (HCC) I48.91    Hypotension I95.9    Acute deep vein thrombosis (DVT) of left peroneal vein (HCC) I82.452    VAP (ventilator-associated pneumonia) ([facility]) J95.851    Critical illness polyneuropathy ([facility]) G62.81         Assessment:  (Medical Decision Making)     Hospital Problems  Date Reviewed: 2/5/2021          Codes Class Noted POA    Critical illness polyneuropathy ([facility] ICD-10-CM: G62.81  ICD-9-CM: 357.82  2/12/2021 Unknown    Severe weakness    VAP (ventilator-associated pneumonia) ([facility] ICD-10-CM: A52.543  ICD-9-CM: 997.31  2/9/2021 Unknown        Acute deep vein thrombosis (DVT) of left peroneal vein (HCC) ICD-10-CM: M10.829  ICD-9-CM: 453.42  1/28/2021 Unknown        Hypotension ICD-10-CM: I95.9  ICD-9-CM: 458.9  1/26/2021 Unknown        Atrial fibrillation (HCC) ICD-10-CM: I48.91  ICD-9-CM: 427.31  1/22/2021 Yes        SWATI (acute kidney injury) ([facility] ICD-10-CM: N17.9  ICD-9-CM: 584.9  1/7/2021 Unknown    Dialysis, minimal output    Acute hypoxemic respiratory failure ([facility] ICD-10-CM: J96.01  ICD-9-CM: 518.81  1/7/2021 Unknown Type 2 diabetes mellitus with hyperosmolarity without nonketotic hyperglycemic-hyperosmolar coma ([facility] ICD-10-CM: E11.00  ICD-9-CM: 250.20  1/7/2021 Unknown        * (Principal) Pneumonia due to COVID-19 virus ICD-10-CM: U07.1, J12.82  ICD-9-CM: 480.8  1/7/2021 Unknown        CAD (coronary artery disease) (Chronic) ICD-10-CM: I25.10  ICD-9-CM: 414.00  10/28/2016 Yes    Overview Signed 10/28/2016  8:05 AM by Everitt Angelucci, PA     10-27-06 [facility] 90% LAD s/p 2.25 x 20 Synergy drug-eluting stent  (CS)             Obesity (Chronic) ICD-10-CM: E66.9  ICD-9-CM: 278.00  10/6/2015 Yes              Plan:  (Medical Decision Making)   1   crrt - plan for 12 hours today  2   Pressure support trials  3   eraxis per id  4   Now off fentanyl, getting risperdal and precedex  --    More than 50% of the time documented was spent in face-to-face contact with the patient and in the care of the patient on the floor/unit where the patient is located.     Chandrika Lezama MD

## 2021-02-13 NOTE — PROGRESS NOTES
[facility]        Subjective: SWATI, Pt started on dialysis on 1/25/21. Review of Systems -   More awake, follows some commands         Objective:    Vitals:    02/13/21 0842 02/13/21 0900 02/13/21 0902 02/13/21 0929   BP: (!) 117/59   (!) 107/56   Pulse: 96 94 93 92   Resp:  23 28 25   Temp:       SpO2:  97% 97% 98%   Weight:       Height:           PE  Follows some commands   intubated  CV:reg rate  Chest:bilat bronchi   Abd: soft  Ext: trace  edema   Access:rt IJ temp dialysis cath ok, . [facility]  Recent Labs     02/13/21 0312 02/12/21 0316 02/11/21  1100 02/11/21 0222   WBC 10.4 10.9  --  9.8   HGB 7.2* 7.9* 8.0* 6.7*   HCT 24.1* 25.4* 25.2* 21.5*   * 107*  --  88*   INR 1.2 1.2  --  1.2     Recent Labs     02/13/21 0312 02/12/21 0316 02/11/21 0222    142 143   K 3.4* 3.7 3.6    106 105   CO2 31 33* 35*   * 141* 162*   BUN 48* 15 12   CREA 2.81* 1.23 1.03   MG 2.1 1.6*  --    PHOS 3.8* 1.8*  --    CA 8.0* 7.9* 7.6*           Radiology    A/P:   Patient Active Problem List   Diagnosis Code    Obesity E66.9    Hypertension I10    Bradycardia R00.1    PVC (premature ventricular contraction) I49.3    CAD (coronary artery disease) I25.10    Dyslipidemia, goal LDL below 70 E78.5    SWATI (acute kidney injury) ([facility]) N17.9    Acute hypoxemic respiratory failure ([facility]) J96.01    Type 2 diabetes mellitus with hyperosmolarity without nonketotic hyperglycemic-hyperosmolar coma ([facility]) E11.00    Pneumonia due to COVID-19 virus U07.1, J12.82    Atrial fibrillation ([facility]) I48.91    Hypotension I95.9    Acute deep vein thrombosis (DVT) of left peroneal vein ([facility]) I82.452    VAP (ventilator-associated pneumonia) ([facility]) J95.851    Critical illness polyneuropathy ([facility]) G62.81     SWATI - ATN, anuric .   Seen on SED , no complication ,       QISXR34/ Resp Failure    Hypotension Better     A Fib with RVR    Anemia - s/p transfusion      Debbie Javier MD

## 2021-02-13 NOTE — PROGRESS NOTES
Ventilator check complete; patient has a #8.0 XLT tracheostomy. Patient is sedated. Patient is able to follow commands. Breath sounds are coarse. Trachea is midline, Negative for subcutaneous air, and chest excursion is symmetric. Patient is also Negative for cyanosis and is Positive for pitting edema. All alarms are set and audible. Resuscitation bag is at the head of the bed.       Ventilator Settings  Mode FIO2 Rate Tidal Volume Pressure PEEP I:E Ratio   Pressure support  40 %     12 cm H2O  10 cm H20  1:2.04      Peak airway pressure: 22.8 cm H2O   Minute ventilation: 14.2 l/min         Sera Nguyen, RT

## 2021-02-13 NOTE — PROGRESS NOTES
Reviewed notes for spiritual concerns    Noted he is on pressure support      STAFF PROVIDING VERY COMPASSIONATE CARE!

## 2021-02-13 NOTE — PROGRESS NOTES
Ventilator check complete; patient has a #8.0 XLT tracheostomy. Patient is sedated. Patient is able to follow commands. Breath sounds are coarse and diminished. Trachea is midline, Negative for subcutaneous air, and chest excursion is symmetric. Patient is also Negative for cyanosis and is Negative for pitting edema. All alarms are set and audible. Resuscitation bag is at the head of the bed.       Ventilator Settings  Mode FIO2 Rate Tidal Volume Pressure PEEP I:E Ratio   Pressure Control  45 % 22    16 cm H2O  10 cm H20  1:2.04      Peak airway pressure: 32.4 cm H2O   Minute ventilation: 11.1 l/min       Una Browning, RT

## 2021-02-13 NOTE — PROGRESS NOTES
Bedside, Verbal and Written shift change report given to JOSE DE JESUS RN (oncoming nurse) by HUBERT RN  (offgoing nurse). Report included the following information SBAR, Kardex, ED Summary, Procedure Summary, Intake/Output, MAR, Recent Results, Med Rec Status, Cardiac Rhythm NSR, Alarm Parameters  and Quality Measures     PT REPOSITIONED // CORTEZ/ ORAL CARE PREFORMED     ALL LINES AND TUBES ASSESSED

## 2021-02-14 ENCOUNTER — APPOINTMENT (OUTPATIENT)
Dept: GENERAL RADIOLOGY | Age: 61
DRG: 004 | End: 2021-02-14
Attending: INTERNAL MEDICINE
Payer: COMMERCIAL

## 2021-02-14 LAB
ANION GAP SERPL CALC-SCNC: 5 MMOL/L (ref 7–16)
APTT PPP: 39.8 SEC (ref 24.1–35.1)
ARTERIAL PATENCY WRIST A: ABNORMAL
ATRIAL RATE: 81 BPM
BASE EXCESS BLD CALC-SCNC: 6 MMOL/L
BDY SITE: ABNORMAL
BUN SERPL-MCNC: 29 MG/DL (ref 8–23)
CALCIUM SERPL-MCNC: 8.6 MG/DL (ref 8.3–10.4)
CALCULATED P AXIS, ECG09: 40 DEGREES
CALCULATED R AXIS, ECG10: 8 DEGREES
CALCULATED T AXIS, ECG11: 34 DEGREES
CHLORIDE SERPL-SCNC: 104 MMOL/L (ref 98–107)
CO2 BLD-SCNC: 32 MMOL/L
CO2 SERPL-SCNC: 31 MMOL/L (ref 21–32)
COLLECT TIME,HTIME: 330
CREAT SERPL-MCNC: 1.65 MG/DL (ref 0.8–1.5)
DIAGNOSIS, 93000: NORMAL
ERYTHROCYTE [DISTWIDTH] IN BLOOD BY AUTOMATED COUNT: 16.3 % (ref 11.9–14.6)
EXHALED MINUTE VOLUME, VE: 7.5 L/MIN
FIBRINOGEN PPP-MCNC: 553 MG/DL (ref 190–501)
GAS FLOW.O2 O2 DELIVERY SYS: ABNORMAL L/MIN
GLUCOSE BLD STRIP.AUTO-MCNC: 208 MG/DL (ref 65–100)
GLUCOSE BLD STRIP.AUTO-MCNC: 213 MG/DL (ref 65–100)
GLUCOSE BLD STRIP.AUTO-MCNC: 229 MG/DL (ref 65–100)
GLUCOSE BLD STRIP.AUTO-MCNC: 235 MG/DL (ref 65–100)
GLUCOSE BLD STRIP.AUTO-MCNC: 249 MG/DL (ref 65–100)
GLUCOSE BLD STRIP.AUTO-MCNC: 258 MG/DL (ref 65–100)
GLUCOSE SERPL-MCNC: 212 MG/DL (ref 65–100)
HCO3 BLD-SCNC: 30.9 MMOL/L (ref 22–26)
HCT VFR BLD AUTO: 25.6 % (ref 41.1–50.3)
HGB BLD-MCNC: 7.7 G/DL (ref 13.6–17.2)
INR PPP: 1.1
MCH RBC QN AUTO: 28.5 PG (ref 26.1–32.9)
MCHC RBC AUTO-ENTMCNC: 30.1 G/DL (ref 31.4–35)
MCV RBC AUTO: 94.8 FL (ref 79.6–97.8)
NRBC # BLD: 0 K/UL (ref 0–0.2)
O2/TOTAL GAS SETTING VFR VENT: 35 %
P-R INTERVAL, ECG05: 146 MS
PCO2 BLD: 47.6 MMHG (ref 35–45)
PEEP RESPIRATORY: 10 CMH2O
PH BLD: 7.42 [PH] (ref 7.35–7.45)
PLATELET # BLD AUTO: 174 K/UL (ref 150–450)
PMV BLD AUTO: 11.3 FL (ref 9.4–12.3)
PO2 BLD: 66 MMHG (ref 75–100)
POTASSIUM SERPL-SCNC: 4 MMOL/L (ref 3.5–5.1)
PRESSURE SUPPORT SETTING VENT: 12 CMH2O
PROTHROMBIN TIME: 14.1 SEC (ref 12.5–14.7)
Q-T INTERVAL, ECG07: 410 MS
QRS DURATION, ECG06: 104 MS
QTC CALCULATION (BEZET), ECG08: 476 MS
RBC # BLD AUTO: 2.7 M/UL (ref 4.23–5.6)
SAO2 % BLD: 93 % (ref 95–98)
SERVICE CMNT-IMP: ABNORMAL
SERVICE CMNT-IMP: ABNORMAL
SODIUM SERPL-SCNC: 140 MMOL/L (ref 136–145)
SPECIMEN TYPE: ABNORMAL
TOTAL RESP. RATE, ITRR: 28
VENTILATION MODE VENT: ABNORMAL
VENTRICULAR RATE, ECG03: 81 BPM
WBC # BLD AUTO: 13.2 K/UL (ref 4.3–11.1)

## 2021-02-14 PROCEDURE — 74011000258 HC RX REV CODE- 258

## 2021-02-14 PROCEDURE — 85027 COMPLETE CBC AUTOMATED: CPT

## 2021-02-14 PROCEDURE — 82803 BLOOD GASES ANY COMBINATION: CPT

## 2021-02-14 PROCEDURE — 80048 BASIC METABOLIC PNL TOTAL CA: CPT

## 2021-02-14 PROCEDURE — 85610 PROTHROMBIN TIME: CPT

## 2021-02-14 PROCEDURE — 71045 X-RAY EXAM CHEST 1 VIEW: CPT

## 2021-02-14 PROCEDURE — 74011250637 HC RX REV CODE- 250/637: Performed by: STUDENT IN AN ORGANIZED HEALTH CARE EDUCATION/TRAINING PROGRAM

## 2021-02-14 PROCEDURE — 74011250637 HC RX REV CODE- 250/637: Performed by: INTERNAL MEDICINE

## 2021-02-14 PROCEDURE — 74011000250 HC RX REV CODE- 250

## 2021-02-14 PROCEDURE — 2709999900 HC NON-CHARGEABLE SUPPLY

## 2021-02-14 PROCEDURE — 94003 VENT MGMT INPAT SUBQ DAY: CPT

## 2021-02-14 PROCEDURE — 74011636637 HC RX REV CODE- 636/637: Performed by: INTERNAL MEDICINE

## 2021-02-14 PROCEDURE — 99291 CRITICAL CARE FIRST HOUR: CPT | Performed by: INTERNAL MEDICINE

## 2021-02-14 PROCEDURE — 74011250636 HC RX REV CODE- 250/636: Performed by: INTERNAL MEDICINE

## 2021-02-14 PROCEDURE — 85730 THROMBOPLASTIN TIME PARTIAL: CPT

## 2021-02-14 PROCEDURE — 85384 FIBRINOGEN ACTIVITY: CPT

## 2021-02-14 PROCEDURE — 74011000258 HC RX REV CODE- 258: Performed by: INTERNAL MEDICINE

## 2021-02-14 PROCEDURE — 77030031476 HC EXCH HEAT MOISTW FLTR HALY -A

## 2021-02-14 PROCEDURE — 65620000000 HC RM CCU GENERAL

## 2021-02-14 RX ORDER — NOREPINEPHRINE BITARTRATE/D5W 4MG/250ML
PLASTIC BAG, INJECTION (ML) INTRAVENOUS
Status: COMPLETED
Start: 2021-02-14 | End: 2021-02-14

## 2021-02-14 RX ORDER — DEXMEDETOMIDINE HYDROCHLORIDE 4 UG/ML
.1-1.5 INJECTION, SOLUTION INTRAVENOUS
Status: DISCONTINUED | OUTPATIENT
Start: 2021-02-14 | End: 2021-02-22

## 2021-02-14 RX ORDER — NOREPINEPHRINE BITARTRATE/D5W 4MG/250ML
.5-3 PLASTIC BAG, INJECTION (ML) INTRAVENOUS
Status: DISCONTINUED | OUTPATIENT
Start: 2021-02-14 | End: 2021-02-17

## 2021-02-14 RX ADMIN — INSULIN LISPRO 6 UNITS: 100 INJECTION, SOLUTION INTRAVENOUS; SUBCUTANEOUS at 03:37

## 2021-02-14 RX ADMIN — OXYCODONE HYDROCHLORIDE 5 MG: 5 SOLUTION ORAL at 05:29

## 2021-02-14 RX ADMIN — OXYCODONE HYDROCHLORIDE 5 MG: 5 SOLUTION ORAL at 00:01

## 2021-02-14 RX ADMIN — Medication 20 ML: at 21:32

## 2021-02-14 RX ADMIN — NOREPINEPHRINE-DEXTROSE IV SOLUTION 4 MG/250ML-5% 4 MCG/MIN: 4-5/250 SOLUTION at 04:30

## 2021-02-14 RX ADMIN — NOREPINEPHRINE-DEXTROSE IV SOLUTION 4 MG/250ML-5% 12 MCG/MIN: 4-5/250 SOLUTION at 10:58

## 2021-02-14 RX ADMIN — Medication 20 ML: at 15:26

## 2021-02-14 RX ADMIN — HYDRALAZINE HYDROCHLORIDE 20 MG: 20 INJECTION, SOLUTION INTRAMUSCULAR; INTRAVENOUS at 00:32

## 2021-02-14 RX ADMIN — INSULIN LISPRO 6 UNITS: 100 INJECTION, SOLUTION INTRAVENOUS; SUBCUTANEOUS at 20:42

## 2021-02-14 RX ADMIN — INSULIN GLARGINE 15 UNITS: 100 INJECTION, SOLUTION SUBCUTANEOUS at 08:52

## 2021-02-14 RX ADMIN — LANSOPRAZOLE 30 MG: KIT at 08:51

## 2021-02-14 RX ADMIN — OXYCODONE HYDROCHLORIDE 5 MG: 5 SOLUTION ORAL at 11:02

## 2021-02-14 RX ADMIN — AMIODARONE HYDROCHLORIDE 200 MG: 200 TABLET ORAL at 08:51

## 2021-02-14 RX ADMIN — INSULIN LISPRO 6 UNITS: 100 INJECTION, SOLUTION INTRAVENOUS; SUBCUTANEOUS at 11:02

## 2021-02-14 RX ADMIN — DEXTROSE MONOHYDRATE 4 MCG/MIN: 50 INJECTION, SOLUTION INTRAVENOUS at 04:30

## 2021-02-14 RX ADMIN — SODIUM CHLORIDE 100 MG: 900 INJECTION, SOLUTION INTRAVENOUS at 12:07

## 2021-02-14 RX ADMIN — SODIUM CHLORIDE 500 ML: 900 INJECTION, SOLUTION INTRAVENOUS at 10:00

## 2021-02-14 RX ADMIN — FENTANYL CITRATE 50 MCG: 50 INJECTION, SOLUTION INTRAMUSCULAR; INTRAVENOUS at 00:24

## 2021-02-14 RX ADMIN — LANSOPRAZOLE 30 MG: KIT at 20:39

## 2021-02-14 RX ADMIN — RISPERIDONE 0.5 MG: 1 SOLUTION ORAL at 08:51

## 2021-02-14 RX ADMIN — SODIUM ZIRCONIUM CYCLOSILICATE 10 G: 10 POWDER, FOR SUSPENSION ORAL at 08:50

## 2021-02-14 RX ADMIN — RISPERIDONE 0.5 MG: 1 SOLUTION ORAL at 17:21

## 2021-02-14 RX ADMIN — NOREPINEPHRINE-DEXTROSE IV SOLUTION 4 MG/250ML-5% 14 MCG/MIN: 4-5/250 SOLUTION at 12:07

## 2021-02-14 RX ADMIN — INSULIN LISPRO 6 UNITS: 100 INJECTION, SOLUTION INTRAVENOUS; SUBCUTANEOUS at 15:23

## 2021-02-14 RX ADMIN — Medication 20 ML: at 05:29

## 2021-02-14 RX ADMIN — INSULIN LISPRO 9 UNITS: 100 INJECTION, SOLUTION INTRAVENOUS; SUBCUTANEOUS at 08:56

## 2021-02-14 RX ADMIN — ATORVASTATIN CALCIUM 80 MG: 40 TABLET, FILM COATED ORAL at 08:51

## 2021-02-14 NOTE — PROGRESS NOTES
Ventilator check complete; patient has a #8.0 XLT-Proximal tracheostomy. Patient is sedated. Patient is not able to follow commands. Breath sounds are coarse. Trachea is midline, Negative for subcutaneous air, and chest excursion is symmetric. Patient is also Negative for cyanosis and is Negative for pitting edema. All alarms are set and audible. Resuscitation bag is at the head of the bed.       Ventilator Settings  Mode FIO2 Rate Tidal Volume Pressure  Support PEEP I:E Ratio   Pressure support  30%      12 cm H2O  10 cm H20        Peak airway pressure: 22.1 cm H2O   Minute ventilation: 10.7 l/min     Marija Ritter RT

## 2021-02-14 NOTE — PROCEDURES
Call to CCU patient requiring multiple ABGs and blood pressure is volatile requested art line placement. Good hand hygiene was followed and a timeout was called patient was correctly identified in the right radial artery was chosen for the procedure and identified correctly. A modified Chace's test was performed and adequate collateral circulation was noted. The area was prepped and draped in usual aseptic technique. Under ultrasound guidance the radial artery was accessed with a 20-gauge arrow catheter, the wire was advanced bright red blood return was noted in the catheter. The catheter was advanced over the wire and the needle and the wire were removed. Pulsating return through the catheter was noted. It was connected to the transducer and had good waveform. It was secured in place with 0 silk using 2 anchor sutures. This was reinforced with Tegaderm. There were no complications encountered during this procedure and the patient's capillary refill was noted to be good after the procedure.

## 2021-02-14 NOTE — PROGRESS NOTES
Had lethargy, bradycardia at 2045 and had arterial line placement, head CT. Nursing reports improved at this point. Plan for ABG/CXR in am.  ECG now as well.   Trey Martinez MD

## 2021-02-14 NOTE — PROGRESS NOTES
Patient is now opening eyes to stimulus and moving extremities. Is not following commands. Head CT is done. Lorna Baron MD notified.

## 2021-02-14 NOTE — PROGRESS NOTES
Bedside, Verbal and Written shift change report given to JOSE DE JESUS RN  (oncoming nurse) by HUBERT RN  (offgoing nurse).  Report included the following information SBAR, Kardex, ED Summary, OR Summary, Procedure Summary, Intake/Output, MAR, Recent Results, Med Rec Status, Cardiac Rhythm NSR W/ PVC, Alarm Parameters  and Quality Measures     PT VERY LETHARGIC THIS AM/ OPENS EYES TO VOICE     PT REPOSITIONED AND ORAL CARE PREFORMED / [facility] CARE     PT NOW ON LEVO AFTER PERIOD OF HYPO TENSION AND BRADYCARDIA // HR NOW 88 W/ FREQUENT PVC'S    CT HEAD PREFORMED LAST NIGHT WNL     EKG LAST NIGHT NSR

## 2021-02-14 NOTE — PROGRESS NOTES
[facility]        Subjective: SWATI, Pt started on dialysis on 1/25/21. Review of Systems -   Obtunded , follows some commands         Objective:    Vitals:    02/14/21 0851 02/14/21 0900 02/14/21 0929 02/14/21 0930   BP: (!) 125/56  (!) 127/56    Pulse: 96 96 91 (!) 101   Resp:       Temp:       SpO2:  97% 99% 99%   Weight:       Height:           PE  Follows some commands   intubated  CV:reg rate  Chest:bilat bronchi   Abd: soft  Ext: trace  edema   Access: R. P. Cath: OK, . [facility]  Recent Labs     02/14/21 0317 02/13/21 0312 02/12/21 0316   WBC 13.2* 10.4 10.9   HGB 7.7* 7.2* 7.9*   HCT 25.6* 24.1* 25.4*    133* 107*   INR 1.1 1.2 1.2     Recent Labs     02/14/21 0317 02/13/21 2008 02/13/21 0312 02/12/21 0316    142 140 142   K 4.0 4.0 3.4* 3.7    106 104 106   CO2 31 35* 31 33*   * 158* 181* 141*   BUN 29* 12 48* 15   CREA 1.65* 0.96 2.81* 1.23   MG  --   --  2.1 1.6*   PHOS  --   --  3.8* 1.8*   CA 8.6 8.2* 8.0* 7.9*           Radiology    A/P:   Patient Active Problem List   Diagnosis Code    Obesity E66.9    Hypertension I10    Bradycardia R00.1    PVC (premature ventricular contraction) I49.3    CAD (coronary artery disease) I25.10    Dyslipidemia, goal LDL below 70 E78.5    SWATI (acute kidney injury) (HCC) N17.9    Acute hypoxemic respiratory failure (HCC) J96.01    Type 2 diabetes mellitus with hyperosmolarity without nonketotic hyperglycemic-hyperosmolar coma ([facility]) E11.00    Pneumonia due to COVID-19 virus U07.1, J12.82    Atrial fibrillation (HCC) I48.91    Hypotension I95.9    Acute deep vein thrombosis (DVT) of left peroneal vein ([facility]) I82.452    VAP (ventilator-associated pneumonia) ([facility]) J95.851    Critical illness polyneuropathy ([facility]) G62.81     SWATI - ATN, anuric . SED yesterday with 6 l fluid removal.  Pt may be intravascular depleted.  IVF bolus   Hold dialysis today and tomorrow        COVID19/ Resp Failure    Hypotension    A Fib with RVR    Anemia - s/p transfusion      Yuri Zambrano MD

## 2021-02-14 NOTE — PROGRESS NOTES
At 2045, patient's heart rate dropped to 46. Blood pressure 96/55(69). When I came on shift, patient was opening his eyes to voice and mouthing words. Patient is increasingly lethargic. Will not open eyes to pain or move extremities. He grimaces with pain. Taken off dialysis at 2001. Labs sent. Felipe Murguia MD notified and in the room. Would like to place an art line and get an arterial blood gas. Would also like to get a head CT. Patient started moving extremities while MD was placing art line.      ABG results:  PH 7.39  CO2 53.3  O2 89  HCO3 32.6

## 2021-02-14 NOTE — PROGRESS NOTES
Ventilator check complete; patient has a #8 XLT tracheostomy. Patient is sedated. Patient is able to follow some commands. Breath sounds are diminished. Trachea is midline, Negative for subcutaneous air, and chest excursion is symmetric. Patient is also Negative for cyanosis and is Negative for pitting edema. All alarms are set and audible. Resuscitation bag is at the head of the bed.       Ventilator Settings  Mode FIO2 Rate Tidal Volume Pressure PEEP I:E Ratio   Pressure support  45 %     12 cm H2O  10 cm H20  1:2.04      Peak airway pressure: 22 cm H2O   Minute ventilation: 11 l/min         fredia Net, RT

## 2021-02-14 NOTE — PROGRESS NOTES
Critical Care Daily Progress Note: 2/14/2021  Admission Date: 1/6/2021     The patient's chart is reviewed and the patient is discussed with the staff. Admission Date: 1/6/2021     Length of Stay: 37 days     Background: 60 y.o. y/o male with acute hypoxemic respiratory failure secondary to COVID-19.     Pt admitted 1/7 with fever, cough. Covid + 1/6. Admitted by hospitalists. Started on dexamethasone, convalescent plasma, and remdesivir. Pulm consulted 1/10 with pt requiring CPAP but pt decompensated and was intubated 1/17. Nephrology consulted 1/23 for renal failure. US 1/18 with L peroneal vein thrombus. He was started on heparin but stopped due to bleeding from HD catheter. He was started on HD on 1/25. Trached on 2/2 secondary to inability to wean from the vent. Pt developed afib requiring amio. PEG placed 2/4. IVC filter 2/8.      Onset of COVID-19 symptoms: 1/5/2021     Notable PMH: obesity, HTN, CAD, dyslipidemia, SWATI, DM, GERD  Subjective:   Episode of bradycardia, hypotension and unresponsiveness last pm after coming off of dialysis  This am less responsive and is totally off sedation.   Now on levophed- pt has idicated to nurses he wants off the ventilator,  Minimal urine output  Current Facility-Administered Medications   Medication Dose Route Frequency    NOREPINephrine (LEVOPHED) 4 mg in 5% dextrose 250 mL infusion  0.5-30 mcg/min IntraVENous TITRATE    dexmedeTOMidine in 0.9 % NaCl (PRECEDEX) 400 mcg/100 mL (4 mcg/mL) infusion soln  0.1-1.5 mcg/kg/hr IntraVENous TITRATE    sodium chloride 0.9 % bolus infusion 500 mL  500 mL IntraVENous ONCE    risperiDONE (RisperDAL) 1 mg/mL oral solution soln 0.5 mg  0.5 mg Nasogastric BID    0.9% sodium chloride infusion 250 mL  250 mL IntraVENous PRN    midazolam (VERSED) injection 2 mg  2 mg IntraVENous Q3H PRN    heparin (porcine) 1,000 unit/mL injection 5,000 Units  5,000 Units Injection DIALYSIS PRN    anidulafungin (ERAXIS) 100 mg in 0.9% sodium chloride 130 mL IVPB  100 mg IntraVENous Q24H    oxyCODONE (ROXICODONE) 5 mg/5 mL oral solution 5 mg  5 mg Per G Tube Q6H    fentaNYL citrate (PF) injection 50 mcg  50 mcg IntraVENous Q2H PRN    midazolam (PF) in saline (VERSED) 100 mg/100 mL (1 mg/mL) infusion  0-10 mg/hr IntraVENous TITRATE    fentaNYL in normal saline (pf) 25 mcg/mL infusion  0-200 mcg/hr IntraVENous TITRATE    insulin glargine (LANTUS) injection 15 Units  15 Units SubCUTAneous DAILY    0.9% sodium chloride infusion 250 mL  250 mL IntraVENous PRN    amiodarone (CORDARONE) tablet 200 mg  200 mg Oral DAILY    oxyCODONE (ROXICODONE) 5 mg/5 mL oral solution 5 mg  5 mg Oral Q4H PRN    lansoprazole (PREVACID) 3 mg/mL oral suspension 30 mg  30 mg Per NG tube Q12H    ondansetron (ZOFRAN) injection 4 mg  4 mg IntraVENous Q6H PRN    polyethylene glycol (MIRALAX) packet 17 g  17 g Per NG tube DAILY PRN    guaiFENesin-dextromethorphan (ROBITUSSIN DM) 100-10 mg/5 mL syrup 10 mL  10 mL Per NG tube Q4H PRN    acetaminophen (TYLENOL) tablet 650 mg  650 mg Per NG tube Q6H PRN    sodium zirconium cyclosilicate (LOKELMA) powder packet 10 g  10 g Oral DAILY    white petrolatum-mineral oiL (LACRILUBE S.O.P.) ointment   Both Eyes Q4H PRN    atorvastatin (LIPITOR) tablet 80 mg  80 mg Per NG tube DAILY    [Held by provider] clopidogreL (PLAVIX) tablet 75 mg  75 mg Per NG tube DAILY    [Held by provider] metoprolol tartrate (LOPRESSOR) tablet 50 mg  50 mg Per NG tube BID    insulin lispro (HUMALOG) injection   SubCUTAneous Q4H    NUTRITIONAL SUPPORT ELECTROLYTE PRN ORDERS   Does Not Apply PRN    sodium chloride (NS) flush 20 mL  20 mL InterCATHeter Q8H    sodium chloride (NS) flush 20 mL  20 mL InterCATHeter PRN    loperamide (IMODIUM) capsule 2 mg  2 mg Oral Q4H PRN    dextrose 40% (GLUTOSE) oral gel 1 Tube  15 g Oral PRN    glucagon (GLUCAGEN) injection 1 mg  1 mg IntraMUSCular PRN    dextrose (D50W) injection syrg 12.5-25 g  25-50 mL IntraVENous PRN    albuterol (PROVENTIL HFA, VENTOLIN HFA, PROAIR HFA) inhaler 2 Puff  2 Puff Inhalation Q4H PRN    influenza vaccine 2020-21 (6 mos+)(PF) (FLUARIX/FLULAVAL/FLUZONE QUAD) injection 0.5 mL  0.5 mL IntraMUSCular PRIOR TO DISCHARGE    hydrALAZINE (APRESOLINE) 20 mg/mL injection 20 mg  20 mg IntraVENous Q6H PRN       Review of Systems   Unobtainable due to patient status. Objective:     Vitals:    02/14/21 0851 02/14/21 0900 02/14/21 0929 02/14/21 0930   BP: (!) 125/56  (!) 127/56    Pulse: 96 96 91 (!) 101   Resp:       Temp:       SpO2:  97% 99% 99%   Weight:       Height:             Intake/Output Summary (Last 24 hours) at 2/14/2021 1012  Last data filed at 2/14/2021 0856  Gross per 24 hour   Intake 3737.42 ml   Output 5779 ml   Net -2041.58 ml         Physical Exam:          Constitutional:  intubated and mechanically ventilated.   EENMT:  Sclera clear, pupils equal, oral mucosa moist  Respiratory:  rhonchi  Cardiovascular:  RRR with no M,G,R;  Gastrointestinal:  soft with no tenderness; positive bowel sounds present  Musculoskeletal:  warm with no cyanosis, no lower extremity edema  Skin:  no jaundice or ecchymosis  Neurologic: no gross neuro deficits     Psychiatric:  Lethargic but he does opens his eyes to verbal stimulation and nods      Lines: (insertion date)   Tracheostomy size #8 prox XLT, cuffed Shiley (2/2)  PEG 2/4  PICC 1/13  Dialysis access 1/24/21, tunneled 2/8  Gimenez 1/17  Rectal tube 1/30  IVC filter 2/8     Drips: current dose (range)  precedex   Fentanyl  stopped  CXR:        CT- head -negative  Ventilator Settings  Mode FIO2 Rate Tidal Volume Pressure PEEP   Pressure support  45 %    525 ml  12 cm H2O  10 cm H20      Peak airway pressure: 22 cm H2O   Minute ventilation: 11 l/min     ABG:   Recent Labs     02/14/21  0332 02/13/21  2149 02/13/21  0235   PHI 7.42 7.39 7.44   PCO2I 47.6* 53.3* 47.1*   PO2I 66* 89 125*   HCO3I 30.9* 32.6* 31.8*       LAB  Recent Labs 02/14/21  0855 02/14/21  0319 02/13/21  2349 02/13/21  1926 02/13/21  1648   GLUCPOC 258* 213* 208* 167* 181*     Recent Labs     02/14/21 0317 02/13/21 0312 02/12/21 0316   WBC 13.2* 10.4 10.9   HGB 7.7* 7.2* 7.9*   HCT 25.6* 24.1* 25.4*    133* 107*   INR 1.1 1.2 1.2     Recent Labs     02/14/21 0317 02/13/21 2008 02/13/21 0312 02/12/21 0316    142 140 142   K 4.0 4.0 3.4* 3.7    106 104 106   CO2 31 35* 31 33*   * 158* 181* 141*   BUN 29* 12 48* 15   CREA 1.65* 0.96 2.81* 1.23   MG  --   --  2.1 1.6*   PHOS  --   --  3.8* 1.8*   CA 8.6 8.2* 8.0* 7.9*     No results for input(s): LCAD, LAC in the last 72 hours.       Patient Active Problem List   Diagnosis Code    Obesity E66.9    Hypertension I10    Bradycardia R00.1    PVC (premature ventricular contraction) I49.3    CAD (coronary artery disease) I25.10    Dyslipidemia, goal LDL below 70 E78.5    SWATI (acute kidney injury) ([facility] N17.9    Acute hypoxemic respiratory failure ([facility]) J96.01    Type 2 diabetes mellitus with hyperosmolarity without nonketotic hyperglycemic-hyperosmolar coma ([facility]) E11.00    Pneumonia due to COVID-19 virus U07.1, J12.82    Atrial fibrillation ([facility]) I48.91    Hypotension I95.9    Acute deep vein thrombosis (DVT) of left peroneal vein ([facility]) I82.452    VAP (ventilator-associated pneumonia) ([facility]) J95.851    Critical illness polyneuropathy ([facility]) G62.81         Assessment:  (Medical Decision Making)     Hospital Problems  Date Reviewed: 2/5/2021          Codes Class Noted POA    Critical illness polyneuropathy ([facility] ICD-10-CM: F33.52  ICD-9-CM: 357.82  2/12/2021 Unknown    Severe weakness    VAP (ventilator-associated pneumonia) ([facility] ICD-10-CM: Z43.176  ICD-9-CM: 997.31  2/9/2021 Unknown        Acute deep vein thrombosis (DVT) of left peroneal vein (HCC) ICD-10-CM: T25.280  ICD-9-CM: 453.42  1/28/2021 Unknown        Hypotension ICD-10-CM: I95.9  ICD-9-CM: 458.9  1/26/2021 Unknown    Critically ill on pressors    Atrial fibrillation (HCC) ICD-10-CM: I48.91  ICD-9-CM: 427.31  1/22/2021 Yes        SWATI (acute kidney injury) ([facility] ICD-10-CM: N17.9  ICD-9-CM: 584.9  1/7/2021 Unknown    oliguric    Acute hypoxemic respiratory failure (HCC) ICD-10-CM: J96.01  ICD-9-CM: 518.81  1/7/2021 Unknown    On vent support- pressors support    Type 2 diabetes mellitus with hyperosmolarity without nonketotic hyperglycemic-hyperosmolar coma ([facility] ICD-10-CM: E11.00  ICD-9-CM: 250.20  1/7/2021 Unknown        * (Principal) Pneumonia due to COVID-19 virus ICD-10-CM: U07.1, J12.82  ICD-9-CM: 480.8  1/7/2021 Unknown        CAD (coronary artery disease) (Chronic) ICD-10-CM: I25.10  ICD-9-CM: 414.00  10/28/2016 Yes    Overview Signed 10/28/2016  8:05 AM by SHELDON Lagunas     10-27-06 [facility] 90% LAD s/p 2.25 x 20 Synergy drug-eluting stent  (CS)             Obesity (Chronic) ICD-10-CM: E66.9  ICD-9-CM: 278.00  10/6/2015 Yes              Plan:  (Medical Decision Making)   1    pressure support- when more stable  2   Dialysis per renal- hold today  3    Levophed  4    Called Rika his sister  5    Pt seems depressed  Add lexapro  --  Critical care time 38 minutes  More than 50% of the time documented was spent in face-to-face contact with the patient and in the care of the patient on the floor/unit where the patient is located.     Siomara Fowler MD

## 2021-02-14 NOTE — PROGRESS NOTES
Arterial blood pressure 130/33 (57). Cuff pressure 121/58 (83). Dayne Black MD notified. MD is okay with low MAP as long as systolic is above 536.

## 2021-02-15 ENCOUNTER — APPOINTMENT (OUTPATIENT)
Dept: GENERAL RADIOLOGY | Age: 61
DRG: 004 | End: 2021-02-15
Attending: INTERNAL MEDICINE
Payer: COMMERCIAL

## 2021-02-15 PROBLEM — I48.91 ATRIAL FIBRILLATION (HCC): Status: RESOLVED | Noted: 2021-01-22 | Resolved: 2021-02-15

## 2021-02-15 PROBLEM — G93.40 ENCEPHALOPATHY: Status: ACTIVE | Noted: 2021-02-15

## 2021-02-15 LAB
ANION GAP SERPL CALC-SCNC: 5 MMOL/L (ref 7–16)
APTT PPP: 32.7 SEC (ref 24.1–35.1)
ARTERIAL PATENCY WRIST A: ABNORMAL
BACTERIA SPEC CULT: NORMAL
BACTERIA SPEC CULT: NORMAL
BASE EXCESS BLD CALC-SCNC: 5 MMOL/L
BASE EXCESS BLD CALC-SCNC: 5 MMOL/L
BASE EXCESS BLD CALC-SCNC: 6 MMOL/L
BDY SITE: ABNORMAL
BUN SERPL-MCNC: 61 MG/DL (ref 8–23)
CALCIUM SERPL-MCNC: 8.3 MG/DL (ref 8.3–10.4)
CHLORIDE SERPL-SCNC: 102 MMOL/L (ref 98–107)
CO2 BLD-SCNC: 35 MMOL/L
CO2 BLD-SCNC: 35 MMOL/L
CO2 BLD-SCNC: 36 MMOL/L
CO2 SERPL-SCNC: 31 MMOL/L (ref 21–32)
COLLECT TIME,HTIME: 1120
COLLECT TIME,HTIME: 350
COLLECT TIME,HTIME: 520
CREAT SERPL-MCNC: 3.5 MG/DL (ref 0.8–1.5)
ERYTHROCYTE [DISTWIDTH] IN BLOOD BY AUTOMATED COUNT: 15.9 % (ref 11.9–14.6)
EXHALED MINUTE VOLUME, VE: 6.3 L/MIN
EXHALED MINUTE VOLUME, VE: 8.9 L/MIN
FIBRINOGEN PPP-MCNC: 494 MG/DL (ref 190–501)
GAS FLOW.O2 O2 DELIVERY SYS: ABNORMAL L/MIN
GAS FLOW.O2 SETTING OXYMISER: 20 BPM
GLUCOSE BLD STRIP.AUTO-MCNC: 168 MG/DL (ref 65–100)
GLUCOSE BLD STRIP.AUTO-MCNC: 181 MG/DL (ref 65–100)
GLUCOSE BLD STRIP.AUTO-MCNC: 186 MG/DL (ref 65–100)
GLUCOSE BLD STRIP.AUTO-MCNC: 192 MG/DL (ref 65–100)
GLUCOSE BLD STRIP.AUTO-MCNC: 208 MG/DL (ref 65–100)
GLUCOSE BLD STRIP.AUTO-MCNC: 222 MG/DL (ref 65–100)
GLUCOSE BLD STRIP.AUTO-MCNC: 248 MG/DL (ref 65–100)
GLUCOSE SERPL-MCNC: 181 MG/DL (ref 65–100)
HCO3 BLD-SCNC: 32.6 MMOL/L (ref 22–26)
HCO3 BLD-SCNC: 32.9 MMOL/L (ref 22–26)
HCO3 BLD-SCNC: 33.6 MMOL/L (ref 22–26)
HCT VFR BLD AUTO: 31 % (ref 41.1–50.3)
HGB BLD-MCNC: 9.4 G/DL (ref 13.6–17.2)
INR PPP: 1.1
MAGNESIUM SERPL-MCNC: 2.2 MG/DL (ref 1.8–2.4)
MCH RBC QN AUTO: 29.5 PG (ref 26.1–32.9)
MCHC RBC AUTO-ENTMCNC: 30.3 G/DL (ref 31.4–35)
MCV RBC AUTO: 97.2 FL (ref 79.6–97.8)
NRBC # BLD: 0 K/UL (ref 0–0.2)
O2/TOTAL GAS SETTING VFR VENT: 45 %
PCO2 BLD: 64.8 MMHG (ref 35–45)
PCO2 BLD: 66.8 MMHG (ref 35–45)
PCO2 BLD: 71 MMHG (ref 35–45)
PEEP RESPIRATORY: 10 CMH2O
PEEP RESPIRATORY: 10 CMH2O
PEEP RESPIRATORY: 8 CMH2O
PH BLD: 7.27 [PH] (ref 7.35–7.45)
PH BLD: 7.3 [PH] (ref 7.35–7.45)
PH BLD: 7.32 [PH] (ref 7.35–7.45)
PHOSPHATE SERPL-MCNC: 6.5 MG/DL (ref 2.3–3.7)
PLATELET # BLD AUTO: 202 K/UL (ref 150–450)
PMV BLD AUTO: 10.1 FL (ref 9.4–12.3)
PO2 BLD: 132 MMHG (ref 75–100)
PO2 BLD: 161 MMHG (ref 75–100)
PO2 BLD: 172 MMHG (ref 75–100)
POTASSIUM SERPL-SCNC: 4.5 MMOL/L (ref 3.5–5.1)
PRESSURE SUPPORT SETTING VENT: 12 CMH2O
PRESSURE SUPPORT SETTING VENT: 8 CMH2O
PROCALCITONIN SERPL-MCNC: 2.2 NG/ML
PROTHROMBIN TIME: 14.4 SEC (ref 12.5–14.7)
RBC # BLD AUTO: 3.19 M/UL (ref 4.23–5.6)
SAO2 % BLD: 99 % (ref 95–98)
SERVICE CMNT-IMP: ABNORMAL
SERVICE CMNT-IMP: NORMAL
SERVICE CMNT-IMP: NORMAL
SODIUM SERPL-SCNC: 138 MMOL/L (ref 136–145)
SPECIMEN TYPE: ABNORMAL
TOTAL RESP. RATE, ITRR: 22
TOTAL RESP. RATE, ITRR: 27
VENTILATION MODE VENT: ABNORMAL
VT SETTING VENT: 500 ML
WBC # BLD AUTO: 12.4 K/UL (ref 4.3–11.1)

## 2021-02-15 PROCEDURE — 74011250637 HC RX REV CODE- 250/637: Performed by: INTERNAL MEDICINE

## 2021-02-15 PROCEDURE — 74011000250 HC RX REV CODE- 250

## 2021-02-15 PROCEDURE — 82803 BLOOD GASES ANY COMBINATION: CPT

## 2021-02-15 PROCEDURE — 85027 COMPLETE CBC AUTOMATED: CPT

## 2021-02-15 PROCEDURE — 80048 BASIC METABOLIC PNL TOTAL CA: CPT

## 2021-02-15 PROCEDURE — 85384 FIBRINOGEN ACTIVITY: CPT

## 2021-02-15 PROCEDURE — 74011636637 HC RX REV CODE- 636/637: Performed by: INTERNAL MEDICINE

## 2021-02-15 PROCEDURE — 2709999900 HC NON-CHARGEABLE SUPPLY

## 2021-02-15 PROCEDURE — 74011000258 HC RX REV CODE- 258

## 2021-02-15 PROCEDURE — 85730 THROMBOPLASTIN TIME PARTIAL: CPT

## 2021-02-15 PROCEDURE — 87040 BLOOD CULTURE FOR BACTERIA: CPT

## 2021-02-15 PROCEDURE — 71045 X-RAY EXAM CHEST 1 VIEW: CPT

## 2021-02-15 PROCEDURE — 74011250636 HC RX REV CODE- 250/636: Performed by: INTERNAL MEDICINE

## 2021-02-15 PROCEDURE — 83735 ASSAY OF MAGNESIUM: CPT

## 2021-02-15 PROCEDURE — 77030031476 HC EXCH HEAT MOISTW FLTR HALY -A

## 2021-02-15 PROCEDURE — 94003 VENT MGMT INPAT SUBQ DAY: CPT

## 2021-02-15 PROCEDURE — 99233 SBSQ HOSP IP/OBS HIGH 50: CPT | Performed by: INTERNAL MEDICINE

## 2021-02-15 PROCEDURE — 82962 GLUCOSE BLOOD TEST: CPT

## 2021-02-15 PROCEDURE — 74011250636 HC RX REV CODE- 250/636: Performed by: NURSE PRACTITIONER

## 2021-02-15 PROCEDURE — 74011250637 HC RX REV CODE- 250/637: Performed by: STUDENT IN AN ORGANIZED HEALTH CARE EDUCATION/TRAINING PROGRAM

## 2021-02-15 PROCEDURE — 85610 PROTHROMBIN TIME: CPT

## 2021-02-15 PROCEDURE — 84145 PROCALCITONIN (PCT): CPT

## 2021-02-15 PROCEDURE — 84100 ASSAY OF PHOSPHORUS: CPT

## 2021-02-15 PROCEDURE — 51798 US URINE CAPACITY MEASURE: CPT

## 2021-02-15 PROCEDURE — 65620000000 HC RM CCU GENERAL

## 2021-02-15 RX ADMIN — Medication 20 ML: at 14:00

## 2021-02-15 RX ADMIN — SODIUM CHLORIDE 250 ML: 900 INJECTION, SOLUTION INTRAVENOUS at 16:00

## 2021-02-15 RX ADMIN — SODIUM CHLORIDE 250 ML: 900 INJECTION, SOLUTION INTRAVENOUS at 17:04

## 2021-02-15 RX ADMIN — OXYCODONE HYDROCHLORIDE 5 MG: 5 SOLUTION ORAL at 11:45

## 2021-02-15 RX ADMIN — INSULIN LISPRO 3 UNITS: 100 INJECTION, SOLUTION INTRAVENOUS; SUBCUTANEOUS at 04:22

## 2021-02-15 RX ADMIN — OXYCODONE HYDROCHLORIDE 5 MG: 5 SOLUTION ORAL at 00:17

## 2021-02-15 RX ADMIN — SODIUM CHLORIDE 250 ML: 900 INJECTION, SOLUTION INTRAVENOUS at 21:54

## 2021-02-15 RX ADMIN — ATORVASTATIN CALCIUM 80 MG: 40 TABLET, FILM COATED ORAL at 08:50

## 2021-02-15 RX ADMIN — RISPERIDONE 0.5 MG: 1 SOLUTION ORAL at 08:53

## 2021-02-15 RX ADMIN — INSULIN LISPRO 6 UNITS: 100 INJECTION, SOLUTION INTRAVENOUS; SUBCUTANEOUS at 00:18

## 2021-02-15 RX ADMIN — INSULIN LISPRO 3 UNITS: 100 INJECTION, SOLUTION INTRAVENOUS; SUBCUTANEOUS at 15:29

## 2021-02-15 RX ADMIN — OXYCODONE HYDROCHLORIDE 5 MG: 5 SOLUTION ORAL at 05:47

## 2021-02-15 RX ADMIN — NOREPINEPHRINE-DEXTROSE IV SOLUTION 4 MG/250ML-5% 10 MCG/MIN: 4-5/250 SOLUTION at 07:44

## 2021-02-15 RX ADMIN — SODIUM CHLORIDE 500 ML: 900 INJECTION, SOLUTION INTRAVENOUS at 20:43

## 2021-02-15 RX ADMIN — SODIUM CHLORIDE 250 ML: 900 INJECTION, SOLUTION INTRAVENOUS at 15:00

## 2021-02-15 RX ADMIN — INSULIN LISPRO 9 UNITS: 100 INJECTION, SOLUTION INTRAVENOUS; SUBCUTANEOUS at 12:05

## 2021-02-15 RX ADMIN — SODIUM CHLORIDE 250 ML: 900 INJECTION, SOLUTION INTRAVENOUS at 14:25

## 2021-02-15 RX ADMIN — SODIUM CHLORIDE 250 ML: 900 INJECTION, SOLUTION INTRAVENOUS at 19:54

## 2021-02-15 RX ADMIN — INSULIN GLARGINE 15 UNITS: 100 INJECTION, SOLUTION SUBCUTANEOUS at 08:53

## 2021-02-15 RX ADMIN — SODIUM CHLORIDE 250 ML: 900 INJECTION, SOLUTION INTRAVENOUS at 16:32

## 2021-02-15 RX ADMIN — Medication 20 ML: at 05:47

## 2021-02-15 RX ADMIN — NOREPINEPHRINE-DEXTROSE IV SOLUTION 4 MG/250ML-5% 12 MCG/MIN: 4-5/250 SOLUTION at 15:33

## 2021-02-15 RX ADMIN — INSULIN LISPRO 3 UNITS: 100 INJECTION, SOLUTION INTRAVENOUS; SUBCUTANEOUS at 08:58

## 2021-02-15 RX ADMIN — Medication 20 ML: at 21:53

## 2021-02-15 RX ADMIN — NOREPINEPHRINE-DEXTROSE IV SOLUTION 4 MG/250ML-5% 8 MCG/MIN: 4-5/250 SOLUTION at 00:54

## 2021-02-15 RX ADMIN — INSULIN LISPRO 6 UNITS: 100 INJECTION, SOLUTION INTRAVENOUS; SUBCUTANEOUS at 19:57

## 2021-02-15 RX ADMIN — SODIUM CHLORIDE 250 ML: 900 INJECTION, SOLUTION INTRAVENOUS at 15:32

## 2021-02-15 RX ADMIN — LANSOPRAZOLE 30 MG: KIT at 20:47

## 2021-02-15 RX ADMIN — ACETAMINOPHEN 650 MG: 325 TABLET, FILM COATED ORAL at 04:33

## 2021-02-15 RX ADMIN — LANSOPRAZOLE 30 MG: KIT at 08:53

## 2021-02-15 RX ADMIN — SODIUM CHLORIDE 500 ML: 900 INJECTION, SOLUTION INTRAVENOUS at 12:00

## 2021-02-15 RX ADMIN — SODIUM CHLORIDE 250 ML: 900 INJECTION, SOLUTION INTRAVENOUS at 17:37

## 2021-02-15 RX ADMIN — AMIODARONE HYDROCHLORIDE 200 MG: 200 TABLET ORAL at 09:00

## 2021-02-15 NOTE — PROGRESS NOTES
Ventilator check complete; patient has a #8.0 tracheostomy. Patient is sedated. Patient is not able to follow commands. Breath sounds are diminished. Trachea is midline, Negative for subcutaneous air, and chest excursion is symmetric. Patient is also Negative for cyanosis and is Negative for pitting edema. All alarms are set and audible. Resuscitation bag is at the head of the bed. Ventilator Settings  Mode FIO2 Rate Tidal Volume Pressure PEEP I:E Ratio   Pressure support  96 %    525 ml  12 cm H2O  10 cm H20  1:2.04      Peak airway pressure: 22.1 cm H2O   Minute ventilation: 12.3 l/min     ABG: No results for input(s): PH, PCO2, PO2, HCO3 in the last 72 hours.       Loree Evans, RT

## 2021-02-15 NOTE — PROGRESS NOTES
Renal Progress Note    Admission Date: 1/6/2021   Subjective:          Objective:     Physical Exam:    Patient Vitals for the past 8 hrs:   BP Temp Pulse Resp SpO2 Weight   02/15/21 0731 -- -- (!) 108 (!) 94 100 % --   02/15/21 0600 -- -- 90 24 100 % --   02/15/21 0559 127/60 100.1 °F (37.8 °C) 90 22 100 % --   02/15/21 0554 -- -- -- -- -- 82.6 kg (182 lb 1.6 oz)   02/15/21 0545 -- -- 91 25 100 % --   02/15/21 0530 -- -- 92 27 100 % --   02/15/21 0529 (!) 116/59 -- 92 23 100 % --   02/15/21 0515 -- -- 91 26 100 % --   02/15/21 0500 -- -- 90 28 100 % --   02/15/21 0459 123/60 -- 91 24 100 % --   02/15/21 0445 -- -- 92 22 100 % --   02/15/21 0430 -- -- 91 25 100 % --   02/15/21 0429 (!) 129/58 -- 94 26 100 % --   02/15/21 0415 -- -- 98 24 100 % --   02/15/21 0400 135/62 (!) 100.9 °F (38.3 °C) 91 25 100 % --   02/15/21 0350 -- -- (!) 102 22 100 % --   02/15/21 0345 -- -- 92 21 100 % --   02/15/21 0330 -- -- 91 26 100 % --   02/15/21 0329 (!) 145/67 -- 92 23 100 % --   02/15/21 0315 -- -- 91 25 100 % --   02/15/21 0300 138/65 -- 90 24 99 % --   02/15/21 0259 -- -- 91 26 99 % --   02/15/21 0258 (!) 146/68 -- 90 29 99 % --   02/15/21 0245 -- -- 98 30 98 % --   02/15/21 0230 -- -- 89 27 99 % --   02/15/21 0215 -- -- 87 23 97 % --   02/15/21 0200 (!) 113/56 -- 90 25 98 % --   02/15/21 0145 -- -- 92 21 98 % --   02/15/21 0130 -- -- 93 28 98 % --   02/15/21 0129 (!) 113/56 -- 93 26 98 % --   02/15/21 0115 -- -- 93 25 97 % --          Current Facility-Administered Medications   Medication Dose Route Frequency    NOREPINephrine (LEVOPHED) 4 mg in 5% dextrose 250 mL infusion  0.5-30 mcg/min IntraVENous TITRATE    dexmedeTOMidine in 0.9 % NaCl (PRECEDEX) 400 mcg/100 mL (4 mcg/mL) infusion soln  0.1-1.5 mcg/kg/hr IntraVENous TITRATE    risperiDONE (RisperDAL) 1 mg/mL oral solution soln 0.5 mg  0.5 mg Nasogastric BID    0.9% sodium chloride infusion 250 mL  250 mL IntraVENous PRN    midazolam (VERSED) injection 2 mg  2 mg IntraVENous Q3H PRN    heparin (porcine) 1,000 unit/mL injection 5,000 Units  5,000 Units Injection DIALYSIS PRN    anidulafungin (ERAXIS) 100 mg in 0.9% sodium chloride 130 mL IVPB  100 mg IntraVENous Q24H    oxyCODONE (ROXICODONE) 5 mg/5 mL oral solution 5 mg  5 mg Per G Tube Q6H    fentaNYL citrate (PF) injection 50 mcg  50 mcg IntraVENous Q2H PRN    midazolam (PF) in saline (VERSED) 100 mg/100 mL (1 mg/mL) infusion  0-10 mg/hr IntraVENous TITRATE    fentaNYL in normal saline (pf) 25 mcg/mL infusion  0-200 mcg/hr IntraVENous TITRATE    insulin glargine (LANTUS) injection 15 Units  15 Units SubCUTAneous DAILY    0.9% sodium chloride infusion 250 mL  250 mL IntraVENous PRN    amiodarone (CORDARONE) tablet 200 mg  200 mg Oral DAILY    oxyCODONE (ROXICODONE) 5 mg/5 mL oral solution 5 mg  5 mg Oral Q4H PRN    lansoprazole (PREVACID) 3 mg/mL oral suspension 30 mg  30 mg Per NG tube Q12H    ondansetron (ZOFRAN) injection 4 mg  4 mg IntraVENous Q6H PRN    polyethylene glycol (MIRALAX) packet 17 g  17 g Per NG tube DAILY PRN    guaiFENesin-dextromethorphan (ROBITUSSIN DM) 100-10 mg/5 mL syrup 10 mL  10 mL Per NG tube Q4H PRN    acetaminophen (TYLENOL) tablet 650 mg  650 mg Per NG tube Q6H PRN    sodium zirconium cyclosilicate (LOKELMA) powder packet 10 g  10 g Oral DAILY    white petrolatum-mineral oiL (LACRILUBE S.O.P.) ointment   Both Eyes Q4H PRN    atorvastatin (LIPITOR) tablet 80 mg  80 mg Per NG tube DAILY    [Held by provider] clopidogreL (PLAVIX) tablet 75 mg  75 mg Per NG tube DAILY    [Held by provider] metoprolol tartrate (LOPRESSOR) tablet 50 mg  50 mg Per NG tube BID    insulin lispro (HUMALOG) injection   SubCUTAneous Q4H    NUTRITIONAL SUPPORT ELECTROLYTE PRN ORDERS   Does Not Apply PRN    sodium chloride (NS) flush 20 mL  20 mL InterCATHeter Q8H    sodium chloride (NS) flush 20 mL  20 mL InterCATHeter PRN    loperamide (IMODIUM) capsule 2 mg  2 mg Oral Q4H PRN    dextrose 40% (GLUTOSE) oral gel 1 Tube  15 g Oral PRN    glucagon (GLUCAGEN) injection 1 mg  1 mg IntraMUSCular PRN    dextrose (D50W) injection syrg 12.5-25 g  25-50 mL IntraVENous PRN    albuterol (PROVENTIL HFA, VENTOLIN HFA, PROAIR HFA) inhaler 2 Puff  2 Puff Inhalation Q4H PRN    influenza vaccine 2020-21 (6 mos+)(PF) (FLUARIX/FLULAVAL/FLUZONE QUAD) injection 0.5 mL  0.5 mL IntraMUSCular PRIOR TO DISCHARGE    hydrALAZINE (APRESOLINE) 20 mg/mL injection 20 mg  20 mg IntraVENous Q6H PRN            Data Review:     LABS:   Recent Results (from the past 12 hour(s))   GLUCOSE, POC    Collection Time: 02/15/21 12:08 AM   Result Value Ref Range    Glucose (POC) 208 (H) 65 - 100 mg/dL   POC G3    Collection Time: 02/15/21  3:53 AM   Result Value Ref Range    Device: VENT      FIO2 (POC) 45 %    pH (POC) 7.30 (L) 7.35 - 7.45      pCO2 (POC) 66.8 (HH) 35 - 45 MMHG    pO2 (POC) 132 (H) 75 - 100 MMHG    HCO3 (POC) 32.6 (H) 22 - 26 MMOL/L    sO2 (POC) 99 (H) 95 - 98 %    Base excess (POC) 5 mmol/L    Mode VENTILATOR/SPONTANEOUS/PRESSURE SUPPORT      PEEP/CPAP (POC) 10 cmH2O    Pressure support 8 cmH2O    Allens test (POC) NOT APPLICABLE      Total resp.  rate 27      Site DRAWN FROM ARTERIAL LINE      Specimen type (POC) ARTERIAL      Performed by [email or web address]     CO2, POC 35 MMOL/L    Respiratory comment: NurseNotified     Exhaled minute volume 6.30 L/min    COLLECT TIME 350     GLUCOSE, POC    Collection Time: 02/15/21  4:14 AM   Result Value Ref Range    Glucose (POC) 181 (H) 65 - 100 mg/dL   PROTHROMBIN TIME + INR    Collection Time: 02/15/21  4:18 AM   Result Value Ref Range    Prothrombin time 14.4 12.5 - 14.7 sec    INR 1.1     PTT    Collection Time: 02/15/21  4:18 AM   Result Value Ref Range    aPTT 32.7 24.1 - 35.1 SEC   FIBRINOGEN    Collection Time: 02/15/21  4:18 AM   Result Value Ref Range    Fibrinogen 494 190 - 732 mg/dL   METABOLIC PANEL, BASIC    Collection Time: 02/15/21  4:18 AM   Result Value Ref Range Sodium 138 136 - 145 mmol/L    Potassium 4.5 3.5 - 5.1 mmol/L    Chloride 102 98 - 107 mmol/L    CO2 31 21 - 32 mmol/L    Anion gap 5 (L) 7 - 16 mmol/L    Glucose 181 (H) 65 - 100 mg/dL    BUN 61 (H) 8 - 23 MG/DL    Creatinine 3.50 (H) 0.8 - 1.5 MG/DL    GFR est AA 23 (L) >60 ml/min/1.73m2    GFR est non-AA 19 (L) >60 ml/min/1.73m2    Calcium 8.3 8.3 - 10.4 MG/DL   PHOSPHORUS    Collection Time: 02/15/21  4:18 AM   Result Value Ref Range    Phosphorus 6.5 (H) 2.3 - 3.7 MG/DL   MAGNESIUM    Collection Time: 02/15/21  4:18 AM   Result Value Ref Range    Magnesium 2.2 1.8 - 2.4 mg/dL   CBC W/O DIFF    Collection Time: 02/15/21  4:18 AM   Result Value Ref Range    WBC 12.4 (H) 4.3 - 11.1 K/uL    RBC 3.19 (L) 4.23 - 5.6 M/uL    HGB 9.4 (L) 13.6 - 17.2 g/dL    HCT 31.0 (L) 41.1 - 50.3 %    MCV 97.2 79.6 - 97.8 FL    MCH 29.5 26.1 - 32.9 PG    MCHC 30.3 (L) 31.4 - 35.0 g/dL    RDW 15.9 (H) 11.9 - 14.6 %    PLATELET 079 060 - 977 K/uL    MPV 10.1 9.4 - 12.3 FL    ABSOLUTE NRBC 0.00 0.0 - 0.2 K/uL   POC G3    Collection Time: 02/15/21  5:24 AM   Result Value Ref Range    Device: VENT      FIO2 (POC) 45 %    pH (POC) 7.27 (L) 7.35 - 7.45      pCO2 (POC) 71.0 (HH) 35 - 45 MMHG    pO2 (POC) 172 (H) 75 - 100 MMHG    HCO3 (POC) 32.9 (H) 22 - 26 MMOL/L    sO2 (POC) 99 (H) 95 - 98 %    Base excess (POC) 5 mmol/L    Mode VENTILATOR/SPONTANEOUS/PRESSURE SUPPORT      PEEP/CPAP (POC) 10 cmH2O    Pressure support 12 cmH2O    Allens test (POC) NOT APPLICABLE      Total resp.  rate 22      Site DRAWN FROM ARTERIAL LINE      Specimen type (POC) ARTERIAL      Performed by LoweTabithaRRT     CO2, POC 35 MMOL/L    Respiratory comment: PhysicianNotified     Exhaled minute volume 8.90 L/min    COLLECT TIME 520           Plan:     Principal Problem:    Pneumonia due to COVID-19 virus (1/7/2021)    Active Problems:    Obesity (10/6/2015)      CAD (coronary artery disease) (10/28/2016)      Overview: 10-27-06 C 90% LAD s/p 2.25 x 20 Synergy drug-eluting stent  (CS)      SWATI (acute kidney injury) ([address] (1/7/2021)      Acute hypoxemic respiratory failure ([address] (1/7/2021)      Type 2 diabetes mellitus with hyperosmolarity without nonketotic hyperglycemic-hyperosmolar coma ([address] (1/7/2021)      Atrial fibrillation ([address] (1/22/2021)      Hypotension (1/26/2021)      Acute deep vein thrombosis (DVT) of left peroneal vein (HCC) (1/28/2021)      VAP (ventilator-associated pneumonia) ([address] (2/9/2021)      Critical illness polyneuropathy ([address] (2/12/2021)       SWATI - ATN, anuric   - 1st dialysis was 1/25/21  In setting of covid pneumonia. SED last was Saturday. Tolerated well.    - planning next tx on Tues        COVID19/ Resp Failure     Hypotension- still on some levophed     A Fib with RVR     Anemia - s/p transfusion

## 2021-02-15 NOTE — PROGRESS NOTES
PT Note:  Treatment attempted this AM but pt with worsening mentation and increased respiratory requirements. Will hold today and re-attempt pending schedule/pt status.   Thanks,  Ibrahima Campoverde, PT, DPT

## 2021-02-15 NOTE — DIABETES MGMT
Patient's blood glucose ranged 208-258 yesterday with patient receiving Lantus 15 units and Humalog 39 units. Blood glucose 181 this morning. Hgb 9. 4. Cr 3.50. GFR 19. Patient remains on vent. Has trach. Peg tube with Nepro TF. On Levo. Still febrile. If blood glucose remains elevated provider could consider small increase in basal insulin. Will follow along.

## 2021-02-15 NOTE — PROGRESS NOTES
[facility]. Admission Date: 1/6/2021         60 y.o. y/o male with acute hypoxemic respiratory failure secondary to COVID-19.     Pt admitted 1/7 with fever, cough. Covid + 1/6. Admitted by hospitalists. Started on dexamethasone, convalescent plasma, and remdesivir. Pulm consulted 1/10 with pt requiring CPAP but pt decompensated and was intubated 1/17. Nephrology consulted 1/23 for renal failure and was started on dialysis. US 1/18 with L peroneal vein thrombus. He was started on heparin but stopped due to bleeding from HD catheter. He was started on HD on 1/25. Trached on 2/2 secondary to inability to wean from the vent. Pt developed afib requiring amio. PEG placed 2/4. IVC filter 2/8. ICU Daily Progress Note: 2/15/2021  Subjective:   Pt currently on Levophed 10 mcg. Art line in place for monitoring. Pt responds to pain only by grimacing, does not open eyes, or follow any commands. No sedation since 2/14. Vent support via trach, currently on pressure support. Febrile overnight to 100.9 with worsening mentation over last several days per RN.       Current Facility-Administered Medications   Medication Dose Route Frequency    NOREPINephrine (LEVOPHED) 4 mg in 5% dextrose 250 mL infusion  0.5-30 mcg/min IntraVENous TITRATE    dexmedeTOMidine in 0.9 % NaCl (PRECEDEX) 400 mcg/100 mL (4 mcg/mL) infusion soln  0.1-1.5 mcg/kg/hr IntraVENous TITRATE    [Held by provider] risperiDONE (RisperDAL) 1 mg/mL oral solution soln 0.5 mg  0.5 mg Nasogastric BID    0.9% sodium chloride infusion 250 mL  250 mL IntraVENous PRN    midazolam (VERSED) injection 2 mg  2 mg IntraVENous Q3H PRN    heparin (porcine) 1,000 unit/mL injection 5,000 Units  5,000 Units Injection DIALYSIS PRN    anidulafungin (ERAXIS) 100 mg in 0.9% sodium chloride 130 mL IVPB  100 mg IntraVENous Q24H    oxyCODONE (ROXICODONE) 5 mg/5 mL oral solution 5 mg  5 mg Per G Tube Q6H    fentaNYL citrate (PF) injection 50 mcg  50 mcg IntraVENous Q2H PRN    insulin glargine (LANTUS) injection 15 Units  15 Units SubCUTAneous DAILY    0.9% sodium chloride infusion 250 mL  250 mL IntraVENous PRN    amiodarone (CORDARONE) tablet 200 mg  200 mg Oral DAILY    oxyCODONE (ROXICODONE) 5 mg/5 mL oral solution 5 mg  5 mg Oral Q4H PRN    lansoprazole (PREVACID) 3 mg/mL oral suspension 30 mg  30 mg Per NG tube Q12H    ondansetron (ZOFRAN) injection 4 mg  4 mg IntraVENous Q6H PRN    polyethylene glycol (MIRALAX) packet 17 g  17 g Per NG tube DAILY PRN    guaiFENesin-dextromethorphan (ROBITUSSIN DM) 100-10 mg/5 mL syrup 10 mL  10 mL Per NG tube Q4H PRN    acetaminophen (TYLENOL) tablet 650 mg  650 mg Per NG tube Q6H PRN    sodium zirconium cyclosilicate (LOKELMA) powder packet 10 g  10 g Oral DAILY    white petrolatum-mineral oiL (LACRILUBE S.O.P.) ointment   Both Eyes Q4H PRN    atorvastatin (LIPITOR) tablet 80 mg  80 mg Per NG tube DAILY    [Held by provider] clopidogreL (PLAVIX) tablet 75 mg  75 mg Per NG tube DAILY    [Held by provider] metoprolol tartrate (LOPRESSOR) tablet 50 mg  50 mg Per NG tube BID    insulin lispro (HUMALOG) injection   SubCUTAneous Q4H    NUTRITIONAL SUPPORT ELECTROLYTE PRN ORDERS   Does Not Apply PRN    sodium chloride (NS) flush 20 mL  20 mL InterCATHeter Q8H    sodium chloride (NS) flush 20 mL  20 mL InterCATHeter PRN    loperamide (IMODIUM) capsule 2 mg  2 mg Oral Q4H PRN    dextrose 40% (GLUTOSE) oral gel 1 Tube  15 g Oral PRN    glucagon (GLUCAGEN) injection 1 mg  1 mg IntraMUSCular PRN    dextrose (D50W) injection syrg 12.5-25 g  25-50 mL IntraVENous PRN    albuterol (PROVENTIL HFA, VENTOLIN HFA, PROAIR HFA) inhaler 2 Puff  2 Puff Inhalation Q4H PRN    influenza vaccine 2020-21 (6 mos+)(PF) (FLUARIX/FLULAVAL/FLUZONE QUAD) injection 0.5 mL  0.5 mL IntraMUSCular PRIOR TO DISCHARGE    hydrALAZINE (APRESOLINE) 20 mg/mL injection 20 mg  20 mg IntraVENous Q6H PRN         Objective:     Vitals:    02/15/21 0830 02/15/21 0845 02/15/21 0900 02/15/21 0915   BP:       Pulse: 92 90 94 89   Resp: (!) 95 (!) 70 (!) 77 (!) 71   Temp:       SpO2: 100% 100% 100% 100%   Weight:       Height:         Intake and Output:       Physical Exam:          GEN: acutely ill, vent support via tracheostomy  HEENT:  PERRL, no alar flaring or epistaxis, oral mucosa moist without cyanosis,   NECK:  no JVD, no retractions, no thyromegaly or masses,   LUNGS:  Bilateral lung sounds diminished; no wheezing  HEART:  RRR with no M,G,R;  ABDOMEN:  soft with no tenderness; positive bowel sounds present; tube feedings infusing  EXTREMITIES:  warm with no cyanosis, no lower leg edema  SKIN:  no jaundice or ecchymosis   NEURO:  sedated on vent    LINES:  PICC  Trach XLT rosalioley #8 cuffed  Art line  HD catheter  PEG  flexiseal    DRIPS:  Levophed 12    NUTRITION:  Tube feeds via PEG      Ventilator Settings  Mode FIO2 Rate Tidal Volume Pressure PEEP   Pressure support  96 %    525 ml  12 cm H2O  10 cm H20      Peak airway pressure: 22.1 cm H2O   Minute ventilation: 12.3 l/min       CHEST XRAY:       LAB  Recent Labs     02/15/21  0418 02/14/21 0317 02/13/21 0312   WBC 12.4* 13.2* 10.4   HGB 9.4* 7.7* 7.2*   HCT 31.0* 25.6* 24.1*    174 133*     Recent Labs     02/15/21  0418 02/14/21  0317 02/13/21  2008 02/13/21  0312    140 142 140   K 4.5 4.0 4.0 3.4*    104 106 104   CO2 31 31 35* 31   * 212* 158* 181*   BUN 61* 29* 12 48*   CREA 3.50* 1.65* 0.96 2.81*   MG 2.2  --   --  2.1   PHOS 6.5*  --   --  3.8*   INR 1.1 1.1  --  1.2     ABG:        Cultures:  Respiratory cultures 2/5 with staph aureus and klebsiella pneumoniae  Respiratory culture on 1/30 with 4+ mucous, few yeast, few GPC  Blood cultures-all NGTD    ECHO 1/26  SUMMARY:  -  Left ventricle: The ventricle was mildly dilated. Systolic function was  normal. Ejection fraction was estimated in the range of 55 % to 60 %.    Although  no diagnostic regional wall motion abnormality was identified, this   possibility  cannot be completely excluded on the basis of this study. There was mild  concentric hypertrophy. Left ventricular diastolic function is normal with an  average E/e of 10.4.  -  Right ventricle: The size was at the upper limits of normal. There was   mild  pulmonary artery hypertension.  -  Left atrium: The atrium was mildly dilated. -  Right atrium: The atrium was mildly dilated. -  Inferior vena cava, hepatic veins: The inferior vena cava was dilated. The  respirophasic change in diameter was more than 50%. Assessment:     Patient Active Problem List   Diagnosis Code    Obesity E66.9    Hypertension I10    Bradycardia R00.1    PVC (premature ventricular contraction) I49.3    CAD (coronary artery disease) I25.10    Dyslipidemia, goal LDL below 70 E78.5    SWATI (acute kidney injury) ([facility] N17.9    Acute hypoxemic respiratory failure (HCC) J96.01    Type 2 diabetes mellitus with hyperosmolarity without nonketotic hyperglycemic-hyperosmolar coma ([facility] E11.00    Pneumonia due to COVID-19 virus U07.1, J12.82    Hypotension I95.9    Acute deep vein thrombosis (DVT) of left peroneal vein ([facility]) I82.452    VAP (ventilator-associated pneumonia) ([facility]) J95.851    Critical illness polyneuropathy ([facility]) G62.81    Encephalopathy G93.40            PLAN:    Hospital Problems  Date Reviewed: 2/5/2021          Codes Class Noted POA    Encephalopathy ICD-10-CM: G93.40  ICD-9-CM: 348.30  2/15/2021 Unknown    Acute, likely metabolic possibly iatrogenic due to sedation and benzodiazepines.          Hospital Problems  Date Reviewed: 2/5/2021          Codes Class Noted POA    Critical illness polyneuropathy ([facility] ICD-10-CM: G62.81  ICD-9-CM: 357.82  2/12/2021 Unknown        VAP (ventilator-associated pneumonia) ([facility] ICD-10-CM: M78.312  ICD-9-CM: 997.31  2/9/2021 Unknown    --getting eraxis-respiratory cultures on 1/30 with few yeast  --Respiratory cultures on 2/5 with moderate staph and klebsiella-completed zosyn 2/10 and vanc 2/9  -CXR improving    Acute deep vein thrombosis (DVT) of left peroneal vein (HCC) ICD-10-CM: H37.853  ICD-9-CM: 453.42  1/28/2021 Unknown    --anticoagulation stopped on 1/28 due to bleeding-IVC filter placed on 2/8    Hypotension ICD-10-CM: I95.9  ICD-9-CM: 458.9  1/26/2021 Unknown    -currently on levophed-keep MAP >60; wean as tolerated; has been on this since 2/14  --febrile overnight-pan culture-hold further antibiotics for now; concern fever could also be neurological in nature; add procalcitonin to am labs. -give NS 500ml to see if BP fluid responsive    Atrial fibrillation (HCC) ICD-10-CM: I48.91  ICD-9-CM: 427.31  1/22/2021 Yes    -currently in sinus rhythm. PO amiodarone-per cardiology    SWATI (acute kidney injury) [facility]) ICD-10-CM: N17.9  ICD-9-CM: 584.9  1/7/2021 Unknown    -pt managed by nephrology-dialysis at their discretion-scheduled next for 2/16; BUN/creatinine 61/3.5 2/15; pt is anuric    Acute hypoxemic respiratory failure [facility]) ICD-10-CM: J96.01  ICD-9-CM: 518.81  1/7/2021 Unknown    -trach placed 2/2; worsening respiratory acidosis; placed back on rate-repeat ABG; infectious vs neurological change?     Type 2 diabetes mellitus with hyperosmolarity without nonketotic hyperglycemic-hyperosmolar coma ([facility] ICD-10-CM: E11.00  ICD-9-CM: 250.20  1/7/2021 Unknown    -lantus with schedule humalog; tube feedings    * (Principal) Pneumonia due to COVID-19 virus ICD-10-CM: U07.1, J12.82  ICD-9-CM: 480.8  1/7/2021 Unknown        CAD (coronary artery disease) (Chronic) ICD-10-CM: I25.10  ICD-9-CM: 414.00  10/28/2016 Yes    Overview Signed 10/28/2016  8:05 AM by SHELDON Aguayo     10-27-06 [facility] 90% LAD s/p 2.25 x 20 Synergy drug-eluting stent  (CS)             Obesity (Chronic) ICD-10-CM: E66.9  ICD-9-CM: 278.00  10/6/2015 Yes            SINGH Soto    More than 50% of time documented was spent in face-to-face contact with the patient and in the care of the patient on the floor/unit where the patient is located. The patient has been seen and examined by me personally, the chart,labs, and radiographic studies have been reviewed. Chest: Coarse bilaterally  Extremities: 1+ vania    I agree with the above assessment and plan. Patient is weak and for now requires full ventilatory support. I would stop eraxis- yeast in the airway is of no significance and patient febrile despite its start by ID on 2/10  Fevers likely from known DVTs. Check Pro palmira  Check CVP and give more fluids if less than 12  Maintain MAP 60mmHg +  MRI brain if able due to persistent encephalopathy.   Stop all CNS depressants and observe    Idania Parikh MD.

## 2021-02-15 NOTE — PROGRESS NOTES
Bedside report received from [facility]. Pt has trach-vent: PS/CPAP, Peep 10, 45% Fi02, RR 20's-30's. Lung sounds clear and diminished at bases. Pt opens eyes to pain and grimaces to pain, but does not withdraw from pain on any extremities. Abdomen soft and intact with active bowel sounds- PEG in place and infusing tube feedings at goal with minimal gastric residuals. Pulses palpable and present all extremities- trace edema present in BUEs and 1+ pitting edema in BLEs. No purcell present- orders to bladder scan BID. FMS in place and draining brown liquid stool- site c/d/i. Levo infusing at 12mcg/min- will wean as appropriate. Extra trachs at bedside. Pt placed supine, mouth care provided. Pt Temp 99.2 oral, HR SR, BP stable with levo gtt, 02 100%.

## 2021-02-15 NOTE — PROGRESS NOTES
Ventilator check complete; patient has a #8.0 tracheostomy. Patient is not sedated. Patient is not able to follow commands. Breath sounds are coarse and diminished. Trachea is midline, Negative for subcutaneous air, and chest excursion is symmetric. Patient is also Negative for cyanosis and is Negative for pitting edema. All alarms are set and audible. Resuscitation bag is at the head of the bed.       Ventilator Settings  Mode FIO2 Rate Tidal Volume Pressure  Support PEEP I:E Ratio   Pressure support  45 %      8 cm H2O  10 cm H20        Peak airway pressure: 18.5 cm H2O   Minute ventilation: 12.6 l/min       Emiliana Askew RT

## 2021-02-15 NOTE — PROGRESS NOTES
Chart reviewed as pt remains CCU. Trach/vent -FIo2 back up 96% per RT notes. CRRT per Nephrology. Mentation worse per notes/staff. CM continues to follow for any assist and d/c POC when stable per MD for [facility]. Will need precert. LOS 39 days.

## 2021-02-15 NOTE — PROGRESS NOTES
Occupational Therapy Note:    Treatment attempted this AM but pt with worsening mentation and increased respiratory requirements. Will hold today and re-attempt pending schedule/pt status.     Jeannine Dupree, OTR/L, CLT

## 2021-02-16 ENCOUNTER — APPOINTMENT (OUTPATIENT)
Dept: MRI IMAGING | Age: 61
DRG: 004 | End: 2021-02-16
Attending: NURSE PRACTITIONER
Payer: COMMERCIAL

## 2021-02-16 ENCOUNTER — APPOINTMENT (OUTPATIENT)
Dept: GENERAL RADIOLOGY | Age: 61
DRG: 004 | End: 2021-02-16
Attending: INTERNAL MEDICINE
Payer: COMMERCIAL

## 2021-02-16 PROBLEM — D64.9 ANEMIA: Status: ACTIVE | Noted: 2021-02-16

## 2021-02-16 LAB
ANION GAP SERPL CALC-SCNC: 6 MMOL/L (ref 7–16)
APTT PPP: 35.4 SEC (ref 24.1–35.1)
ARTERIAL PATENCY WRIST A: ABNORMAL
BASE EXCESS BLD CALC-SCNC: 0 MMOL/L
BDY SITE: ABNORMAL
BUN SERPL-MCNC: 76 MG/DL (ref 8–23)
CALCIUM SERPL-MCNC: 7.7 MG/DL (ref 8.3–10.4)
CHLORIDE SERPL-SCNC: 103 MMOL/L (ref 98–107)
CO2 BLD-SCNC: 28 MMOL/L
CO2 SERPL-SCNC: 28 MMOL/L (ref 21–32)
COLLECT TIME,HTIME: 318
CREAT SERPL-MCNC: 4.55 MG/DL (ref 0.8–1.5)
ERYTHROCYTE [DISTWIDTH] IN BLOOD BY AUTOMATED COUNT: 15.9 % (ref 11.9–14.6)
ERYTHROCYTE [DISTWIDTH] IN BLOOD BY AUTOMATED COUNT: 15.9 % (ref 11.9–14.6)
EXHALED MINUTE VOLUME, VE: 11.6 L/MIN
FIBRINOGEN PPP-MCNC: 440 MG/DL (ref 190–501)
GAS FLOW.O2 O2 DELIVERY SYS: ABNORMAL L/MIN
GAS FLOW.O2 SETTING OXYMISER: 20 BPM
GLUCOSE BLD STRIP.AUTO-MCNC: 153 MG/DL (ref 65–100)
GLUCOSE BLD STRIP.AUTO-MCNC: 155 MG/DL (ref 65–100)
GLUCOSE BLD STRIP.AUTO-MCNC: 167 MG/DL (ref 65–100)
GLUCOSE BLD STRIP.AUTO-MCNC: 188 MG/DL (ref 65–100)
GLUCOSE BLD STRIP.AUTO-MCNC: 189 MG/DL (ref 65–100)
GLUCOSE SERPL-MCNC: 143 MG/DL (ref 65–100)
HCO3 BLD-SCNC: 26.4 MMOL/L (ref 22–26)
HCT VFR BLD AUTO: 19.8 % (ref 41.1–50.3)
HCT VFR BLD AUTO: 20.4 % (ref 41.1–50.3)
HCT VFR BLD AUTO: 20.5 % (ref 41.1–50.3)
HCT VFR BLD AUTO: 20.6 % (ref 41.1–50.3)
HGB BLD-MCNC: 6 G/DL (ref 13.6–17.2)
HGB BLD-MCNC: 6.2 G/DL (ref 13.6–17.2)
HGB BLD-MCNC: 6.2 G/DL (ref 13.6–17.2)
HGB BLD-MCNC: 6.3 G/DL (ref 13.6–17.2)
HISTORY CHECKED?,CKHIST: NORMAL
HISTORY CHECKED?,CKHIST: NORMAL
INR PPP: 1.2
INSPIRATION.DURATION SETTING TIME VENT: 0.9 SEC
MCH RBC QN AUTO: 29.1 PG (ref 26.1–32.9)
MCH RBC QN AUTO: 29.3 PG (ref 26.1–32.9)
MCHC RBC AUTO-ENTMCNC: 30.2 G/DL (ref 31.4–35)
MCHC RBC AUTO-ENTMCNC: 30.6 G/DL (ref 31.4–35)
MCV RBC AUTO: 95.8 FL (ref 79.6–97.8)
MCV RBC AUTO: 96.2 FL (ref 79.6–97.8)
NRBC # BLD: 0 K/UL (ref 0–0.2)
NRBC # BLD: 0 K/UL (ref 0–0.2)
O2/TOTAL GAS SETTING VFR VENT: 45 %
PCO2 BLD: 54.6 MMHG (ref 35–45)
PEEP RESPIRATORY: 8 CMH2O
PH BLD: 7.29 [PH] (ref 7.35–7.45)
PLATELET # BLD AUTO: 224 K/UL (ref 150–450)
PLATELET # BLD AUTO: 228 K/UL (ref 150–450)
PMV BLD AUTO: 10.3 FL (ref 9.4–12.3)
PMV BLD AUTO: 11.3 FL (ref 9.4–12.3)
PO2 BLD: 94 MMHG (ref 75–100)
POTASSIUM SERPL-SCNC: 4.3 MMOL/L (ref 3.5–5.1)
PRESSURE SUPPORT SETTING VENT: 10 CMH2O
PROTHROMBIN TIME: 15.5 SEC (ref 12.5–14.7)
RBC # BLD AUTO: 2.13 M/UL (ref 4.23–5.6)
RBC # BLD AUTO: 2.15 M/UL (ref 4.23–5.6)
SAO2 % BLD: 96 % (ref 95–98)
SERVICE CMNT-IMP: ABNORMAL
SERVICE CMNT-IMP: ABNORMAL
SODIUM SERPL-SCNC: 137 MMOL/L (ref 138–145)
SPECIMEN TYPE: ABNORMAL
VENTILATION MODE VENT: ABNORMAL
VT SETTING VENT: 500 ML
WBC # BLD AUTO: 10.2 K/UL (ref 4.3–11.1)
WBC # BLD AUTO: 11.8 K/UL (ref 4.3–11.1)

## 2021-02-16 PROCEDURE — 87186 SC STD MICRODIL/AGAR DIL: CPT

## 2021-02-16 PROCEDURE — 74011000250 HC RX REV CODE- 250

## 2021-02-16 PROCEDURE — 85384 FIBRINOGEN ACTIVITY: CPT

## 2021-02-16 PROCEDURE — 70551 MRI BRAIN STEM W/O DYE: CPT

## 2021-02-16 PROCEDURE — 71045 X-RAY EXAM CHEST 1 VIEW: CPT

## 2021-02-16 PROCEDURE — 87070 CULTURE OTHR SPECIMN AEROBIC: CPT

## 2021-02-16 PROCEDURE — 74011636637 HC RX REV CODE- 636/637: Performed by: INTERNAL MEDICINE

## 2021-02-16 PROCEDURE — 74011250637 HC RX REV CODE- 250/637: Performed by: INTERNAL MEDICINE

## 2021-02-16 PROCEDURE — 65620000000 HC RM CCU GENERAL

## 2021-02-16 PROCEDURE — 90945 DIALYSIS ONE EVALUATION: CPT

## 2021-02-16 PROCEDURE — 82962 GLUCOSE BLOOD TEST: CPT

## 2021-02-16 PROCEDURE — 74011000258 HC RX REV CODE- 258

## 2021-02-16 PROCEDURE — 74011250636 HC RX REV CODE- 250/636: Performed by: INTERNAL MEDICINE

## 2021-02-16 PROCEDURE — 87077 CULTURE AEROBIC IDENTIFY: CPT

## 2021-02-16 PROCEDURE — 86901 BLOOD TYPING SEROLOGIC RH(D): CPT

## 2021-02-16 PROCEDURE — 85730 THROMBOPLASTIN TIME PARTIAL: CPT

## 2021-02-16 PROCEDURE — 85027 COMPLETE CBC AUTOMATED: CPT

## 2021-02-16 PROCEDURE — 85610 PROTHROMBIN TIME: CPT

## 2021-02-16 PROCEDURE — 85014 HEMATOCRIT: CPT

## 2021-02-16 PROCEDURE — 36430 TRANSFUSION BLD/BLD COMPNT: CPT

## 2021-02-16 PROCEDURE — P9016 RBC LEUKOCYTES REDUCED: HCPCS

## 2021-02-16 PROCEDURE — 99233 SBSQ HOSP IP/OBS HIGH 50: CPT | Performed by: INTERNAL MEDICINE

## 2021-02-16 PROCEDURE — 80048 BASIC METABOLIC PNL TOTAL CA: CPT

## 2021-02-16 PROCEDURE — 82803 BLOOD GASES ANY COMBINATION: CPT

## 2021-02-16 PROCEDURE — 86923 COMPATIBILITY TEST ELECTRIC: CPT

## 2021-02-16 PROCEDURE — 77030031476 HC EXCH HEAT MOISTW FLTR HALY -A

## 2021-02-16 PROCEDURE — 94003 VENT MGMT INPAT SUBQ DAY: CPT

## 2021-02-16 PROCEDURE — 2709999900 HC NON-CHARGEABLE SUPPLY

## 2021-02-16 RX ORDER — SODIUM CHLORIDE 9 MG/ML
250 INJECTION, SOLUTION INTRAVENOUS AS NEEDED
Status: DISCONTINUED | OUTPATIENT
Start: 2021-02-16 | End: 2021-02-18

## 2021-02-16 RX ADMIN — INSULIN LISPRO 3 UNITS: 100 INJECTION, SOLUTION INTRAVENOUS; SUBCUTANEOUS at 19:02

## 2021-02-16 RX ADMIN — HEPARIN SODIUM 5000 UNITS: 1000 INJECTION INTRAVENOUS; SUBCUTANEOUS at 09:01

## 2021-02-16 RX ADMIN — SODIUM ZIRCONIUM CYCLOSILICATE 10 G: 10 POWDER, FOR SUSPENSION ORAL at 09:50

## 2021-02-16 RX ADMIN — INSULIN LISPRO 2 UNITS: 100 INJECTION, SOLUTION INTRAVENOUS; SUBCUTANEOUS at 09:50

## 2021-02-16 RX ADMIN — INSULIN LISPRO 3 UNITS: 100 INJECTION, SOLUTION INTRAVENOUS; SUBCUTANEOUS at 03:52

## 2021-02-16 RX ADMIN — INSULIN LISPRO 3 UNITS: 100 INJECTION, SOLUTION INTRAVENOUS; SUBCUTANEOUS at 16:00

## 2021-02-16 RX ADMIN — INSULIN LISPRO 3 UNITS: 100 INJECTION, SOLUTION INTRAVENOUS; SUBCUTANEOUS at 00:21

## 2021-02-16 RX ADMIN — Medication 20 ML: at 14:00

## 2021-02-16 RX ADMIN — LANSOPRAZOLE 30 MG: KIT at 09:46

## 2021-02-16 RX ADMIN — SODIUM CHLORIDE 250 ML: 900 INJECTION, SOLUTION INTRAVENOUS at 09:22

## 2021-02-16 RX ADMIN — AMIODARONE HYDROCHLORIDE 200 MG: 200 TABLET ORAL at 09:46

## 2021-02-16 RX ADMIN — Medication 20 ML: at 05:19

## 2021-02-16 RX ADMIN — SODIUM CHLORIDE 250 ML: 900 INJECTION, SOLUTION INTRAVENOUS at 03:06

## 2021-02-16 RX ADMIN — SODIUM CHLORIDE 250 ML: 900 INJECTION, SOLUTION INTRAVENOUS at 03:58

## 2021-02-16 RX ADMIN — NOREPINEPHRINE-DEXTROSE IV SOLUTION 4 MG/250ML-5% 2 MCG/MIN: 4-5/250 SOLUTION at 03:00

## 2021-02-16 RX ADMIN — ACETAMINOPHEN 650 MG: 325 TABLET, FILM COATED ORAL at 03:52

## 2021-02-16 RX ADMIN — ACETAMINOPHEN 650 MG: 325 TABLET, FILM COATED ORAL at 19:03

## 2021-02-16 RX ADMIN — Medication 20 ML: at 21:01

## 2021-02-16 RX ADMIN — INSULIN LISPRO 3 UNITS: 100 INJECTION, SOLUTION INTRAVENOUS; SUBCUTANEOUS at 23:50

## 2021-02-16 RX ADMIN — SODIUM CHLORIDE 250 ML: 900 INJECTION, SOLUTION INTRAVENOUS at 00:23

## 2021-02-16 RX ADMIN — GUAIFENESIN AND DEXTROMETHORPHAN 10 ML: 100; 10 SYRUP ORAL at 03:09

## 2021-02-16 RX ADMIN — INSULIN LISPRO 2 UNITS: 100 INJECTION, SOLUTION INTRAVENOUS; SUBCUTANEOUS at 11:37

## 2021-02-16 RX ADMIN — SODIUM CHLORIDE 250 ML: 900 INJECTION, SOLUTION INTRAVENOUS at 01:21

## 2021-02-16 RX ADMIN — ATORVASTATIN CALCIUM 80 MG: 40 TABLET, FILM COATED ORAL at 09:46

## 2021-02-16 RX ADMIN — INSULIN GLARGINE 15 UNITS: 100 INJECTION, SOLUTION SUBCUTANEOUS at 09:47

## 2021-02-16 RX ADMIN — HYDRALAZINE HYDROCHLORIDE 20 MG: 20 INJECTION, SOLUTION INTRAMUSCULAR; INTRAVENOUS at 19:10

## 2021-02-16 RX ADMIN — LANSOPRAZOLE 30 MG: KIT at 21:01

## 2021-02-16 NOTE — PROGRESS NOTES
[facility] PROGRESS NOTE    Follow up for: SWATI on CRRT     Subjective:   Patient seen and examined. Chart, notes, labs, imaging, results all reviewed. Seen on CRRT - off vasopressin .  Hemodynamically improving     Intubated and ventilated     ROS:  UTO     Objective:   Exam:  Vitals:    02/16/21 0711 02/16/21 0715 02/16/21 0730 02/16/21 0910   BP:    (!) 158/48   Pulse: 90 90 90 93   Resp: 29 (!) 41 (!) 70    Temp:       SpO2: 99% 99% 100%    Weight:       Height:             Intake/Output Summary (Last 24 hours) at 2/16/2021 0919  Last data filed at 2/16/2021 0910  Gross per 24 hour   Intake 16454.63 ml   Output 0 ml   Net 99342.63 ml       Current Facility-Administered Medications   Medication Dose Route Frequency    0.9% sodium chloride infusion 250 mL  250 mL IntraVENous PRN    sodium chloride 0.9 % bolus infusion 1,000 mL  1,000 mL IntraVENous PRN    sodium chloride 0.9 % bolus infusion 250 mL  250 mL IntraVENous PRN    NOREPINephrine (LEVOPHED) 4 mg in 5% dextrose 250 mL infusion  0.5-30 mcg/min IntraVENous TITRATE    dexmedeTOMidine in 0.9 % NaCl (PRECEDEX) 400 mcg/100 mL (4 mcg/mL) infusion soln  0.1-1.5 mcg/kg/hr IntraVENous TITRATE    [Held by provider] risperiDONE (RisperDAL) 1 mg/mL oral solution soln 0.5 mg  0.5 mg Nasogastric BID    [Held by provider] midazolam (VERSED) injection 2 mg  2 mg IntraVENous Q3H PRN    heparin (porcine) 1,000 unit/mL injection 5,000 Units  5,000 Units Injection DIALYSIS PRN    [Held by provider] oxyCODONE (ROXICODONE) 5 mg/5 mL oral solution 5 mg  5 mg Per G Tube Q6H    [Held by provider] fentaNYL citrate (PF) injection 50 mcg  50 mcg IntraVENous Q2H PRN    insulin glargine (LANTUS) injection 15 Units  15 Units SubCUTAneous DAILY    amiodarone (CORDARONE) tablet 200 mg  200 mg Oral DAILY    [Held by provider] oxyCODONE (ROXICODONE) 5 mg/5 mL oral solution 5 mg  5 mg Oral Q4H PRN    lansoprazole (PREVACID) 3 mg/mL oral suspension 30 mg  30 mg Per NG tube Q12H    ondansetron (ZOFRAN) injection 4 mg  4 mg IntraVENous Q6H PRN    polyethylene glycol (MIRALAX) packet 17 g  17 g Per NG tube DAILY PRN    guaiFENesin-dextromethorphan (ROBITUSSIN DM) 100-10 mg/5 mL syrup 10 mL  10 mL Per NG tube Q4H PRN    acetaminophen (TYLENOL) tablet 650 mg  650 mg Per NG tube Q6H PRN    sodium zirconium cyclosilicate (LOKELMA) powder packet 10 g  10 g Oral DAILY    white petrolatum-mineral oiL (LACRILUBE S.O.P.) ointment   Both Eyes Q4H PRN    atorvastatin (LIPITOR) tablet 80 mg  80 mg Per NG tube DAILY    [Held by provider] clopidogreL (PLAVIX) tablet 75 mg  75 mg Per NG tube DAILY    [Held by provider] metoprolol tartrate (LOPRESSOR) tablet 50 mg  50 mg Per NG tube BID    insulin lispro (HUMALOG) injection   SubCUTAneous Q4H    NUTRITIONAL SUPPORT ELECTROLYTE PRN ORDERS   Does Not Apply PRN    sodium chloride (NS) flush 20 mL  20 mL InterCATHeter Q8H    sodium chloride (NS) flush 20 mL  20 mL InterCATHeter PRN    loperamide (IMODIUM) capsule 2 mg  2 mg Oral Q4H PRN    dextrose 40% (GLUTOSE) oral gel 1 Tube  15 g Oral PRN    glucagon (GLUCAGEN) injection 1 mg  1 mg IntraMUSCular PRN    dextrose (D50W) injection syrg 12.5-25 g  25-50 mL IntraVENous PRN    albuterol (PROVENTIL HFA, VENTOLIN HFA, PROAIR HFA) inhaler 2 Puff  2 Puff Inhalation Q4H PRN    influenza vaccine 2020-21 (6 mos+)(PF) (FLUARIX/FLULAVAL/FLUZONE QUAD) injection 0.5 mL  0.5 mL IntraMUSCular PRIOR TO DISCHARGE    hydrALAZINE (APRESOLINE) 20 mg/mL injection 20 mg  20 mg IntraVENous Q6H PRN       EXAM  GEN -trach a nd vent   CV - S1, S2, RRR, no rub, murmur, or gallop  Lung - clear to auscultation bilaterally  Abd - soft, nontender, BS present  Ext - no edema    Recent Labs     02/16/21  0431 02/16/21  0346 02/15/21  0418 02/14/21  0317   WBC  --  10.2 12.4* 13.2*   HGB 6.0* 6.2* 9.4* 7.7*   HCT 19.8* 20.5* 31.0* 25.6*   PLT  --  224 202 174        Recent Labs     02/16/21  0346 02/15/21  0418 02/14/21  0317   * 138 140   K 4.3 4.5 4.0    102 104   CO2 28 31 31   BUN 76* 61* 29*   CREA 4.55* 3.50* 1.65*   CA 7.7* 8.3 8.6   * 181* 212*   MG  --  2.2  --    PHOS  --  6.5*  --        Assessment and Plan:     SWATI - ATN, anuric   - 1st dialysis was 1/25/21  In setting of covid pneumonia. SED last was Saturday. Tolerated well.    - on SLED today - dialed down the UF to 350 ml/hr to improve his hemodynamics         COVID19/ Resp Failure     Hypotension- still on some levophed     A Fib with RVR     Anemia - s/p transfusion    Discussed with NP  Shayy Moon and ICU nurse     Olman Gar MD

## 2021-02-16 NOTE — PROGRESS NOTES
Bedside and Verbal shift change report given to Hyun Franks RN (oncoming nurse) by Agnes Longo RN (offgoing nurse). Report included the following information SBAR, Intake/Output, Recent Results and Alarm Parameters .

## 2021-02-16 NOTE — PROGRESS NOTES
A follow up visit was made to the patient. Emotional support, spiritual presence and   prayer were provided for the patient. His sister, Mihir Kim was present. I had an extended conversation with her concerning end of life matters.       Roderick Kimball, [address] [facility], [facility]

## 2021-02-16 NOTE — DIALYSIS
12 HR SLED initiated using  R CVC. Aspirated and flushed both catheter ports without problem. Machine settings per MD order. 150  DFR  500UFR  Heparin 3000 unit bolus and 500 ml/hr. Reported to primary nurse. Dialysis nurse available as needed.     Brian Rios RN

## 2021-02-16 NOTE — PROGRESS NOTES
Occupational Therapy Note:    OT treatment attempted this a.m. however pt currently with a Hgb at 6 and running SLED. Will hold therapy today and check back tomorrow pending pt's condition.     Charles Dunn, OTR/MASSIEL, CLT

## 2021-02-16 NOTE — DIABETES MGMT
Patient's blood glucose ranged 168-248 yesterday with patient receiving Lantus 15 units and Humalog 30 units. Blood glucose 143 in lab work this morning. Hgb 6. Cr 4.55. GFR 14. Patient remains on vent. Has trach. Peg tube with Nepro TF. On Levo. Still febrile. Will follow along.

## 2021-02-16 NOTE — PROGRESS NOTES
Bedside report received from Donya Galloway RN. Pt has trach with vent: PRVC, peep 8, RR 20's, Fi02 45%. Pt will open eyes to voice and follow commands. Pt too weak to move extremities but will stick out tongue upon request and nod appropriately. BLE's move spontaneously. Lung sounds coarse. Pulses palpable and present all extremities- trace edema BUE's and 1+ pitting edema in BLE's. Abdomen soft and intact with active bowel sounds. PEG in place and infusing tube feedings at goal with minimal gastric residuals. FMS in place- patent, unremarkable, draining liquid stool. Pt anuric- receives dialysis per Nephro. Written order for PRN bolus to keep CVP above 12- will follow order. Levo infusing at 12mcg/min- will wean as appropriate. Extra trach at bedside. VSS with levo.

## 2021-02-16 NOTE — PROGRESS NOTES
Ventilator check complete; patient has a #8.0 tracheostomy. Patient is not sedated. Patient is able to follow commands. Breath sounds are coarse. Trachea is midline, Negative for subcutaneous air, and chest excursion is symmetric. Patient is also Negative for cyanosis and is Negative for pitting edema. All alarms are set and audible. Resuscitation bag is at the head of the bed.       Ventilator Settings  Mode FIO2 Rate Tidal Volume Pressure PEEP I:E Ratio   SIMV, PRVC  45 %   20 500 ml  10 cm H2O  8 cm H20  1:2.04      Peak airway pressure: 29.3 cm H2O   Minute ventilation: 10.2 l/min       Mario Alberto Mendez RT

## 2021-02-16 NOTE — PROGRESS NOTES
PT Note:  Patient on 12 hour SLED. Also Hgb low at 6. Will hold PT today.   A Leticia Koehler, PT, DPT

## 2021-02-16 NOTE — PROGRESS NOTES
Occupational Therapy Note:    Discussed therapy POC with RN. Pt has experienced a decline in functional status and is currently not appropriate for therapy services. Please re-consult occupational therapy services in the future pending improved medical/functional status.     Joe Huggins, OTR/L, CLT

## 2021-02-16 NOTE — PROGRESS NOTES
[facility]. Admission Date: 1/6/2021         60 y.o. y/o male with acute hypoxemic respiratory failure secondary to COVID-19.     Pt admitted 1/7 with fever, cough. Covid + 1/6. Admitted by hospitalists. Started on dexamethasone, convalescent plasma, and remdesivir. Pulm consulted 1/10 with pt requiring CPAP but pt decompensated and was intubated 1/17. Nephrology consulted 1/23 for renal failure and was started on dialysis. US 1/18 with L peroneal vein thrombus. He was started on heparin but stopped due to bleeding from HD catheter. He was started on HD on 1/25. Trached on 2/2 secondary to inability to wean from the vent. Pt developed afib requiring amio. PEG placed 2/4. IVC filter 2/8. ICU Daily Progress Note: 2/16/2021  Subjective:   Pt continues to have low grade temps overnight to 100.8. Was able to wean vasopressors overnight. CVP currently 7. Pt opens eyes, nods appropriately and follows commands. Denies pain, breathing feels ok. Moves all extremities. No sedation since 2/14. Vent support via trach, currently on SIMV 40%.     Current Facility-Administered Medications   Medication Dose Route Frequency    0.9% sodium chloride infusion 250 mL  250 mL IntraVENous PRN    sodium chloride 0.9 % bolus infusion 1,000 mL  1,000 mL IntraVENous PRN    sodium chloride 0.9 % bolus infusion 250 mL  250 mL IntraVENous PRN    NOREPINephrine (LEVOPHED) 4 mg in 5% dextrose 250 mL infusion  0.5-30 mcg/min IntraVENous TITRATE    dexmedeTOMidine in 0.9 % NaCl (PRECEDEX) 400 mcg/100 mL (4 mcg/mL) infusion soln  0.1-1.5 mcg/kg/hr IntraVENous TITRATE    [Held by provider] risperiDONE (RisperDAL) 1 mg/mL oral solution soln 0.5 mg  0.5 mg Nasogastric BID    [Held by provider] midazolam (VERSED) injection 2 mg  2 mg IntraVENous Q3H PRN    heparin (porcine) 1,000 unit/mL injection 5,000 Units  5,000 Units Injection DIALYSIS PRN    [Held by provider] oxyCODONE (ROXICODONE) 5 mg/5 mL oral solution 5 mg  5 mg Per G Tube Q6H    [Held by provider] fentaNYL citrate (PF) injection 50 mcg  50 mcg IntraVENous Q2H PRN    insulin glargine (LANTUS) injection 15 Units  15 Units SubCUTAneous DAILY    amiodarone (CORDARONE) tablet 200 mg  200 mg Oral DAILY    [Held by provider] oxyCODONE (ROXICODONE) 5 mg/5 mL oral solution 5 mg  5 mg Oral Q4H PRN    lansoprazole (PREVACID) 3 mg/mL oral suspension 30 mg  30 mg Per NG tube Q12H    ondansetron (ZOFRAN) injection 4 mg  4 mg IntraVENous Q6H PRN    polyethylene glycol (MIRALAX) packet 17 g  17 g Per NG tube DAILY PRN    guaiFENesin-dextromethorphan (ROBITUSSIN DM) 100-10 mg/5 mL syrup 10 mL  10 mL Per NG tube Q4H PRN    acetaminophen (TYLENOL) tablet 650 mg  650 mg Per NG tube Q6H PRN    sodium zirconium cyclosilicate (LOKELMA) powder packet 10 g  10 g Oral DAILY    white petrolatum-mineral oiL (LACRILUBE S.O.P.) ointment   Both Eyes Q4H PRN    atorvastatin (LIPITOR) tablet 80 mg  80 mg Per NG tube DAILY    [Held by provider] clopidogreL (PLAVIX) tablet 75 mg  75 mg Per NG tube DAILY    [Held by provider] metoprolol tartrate (LOPRESSOR) tablet 50 mg  50 mg Per NG tube BID    insulin lispro (HUMALOG) injection   SubCUTAneous Q4H    NUTRITIONAL SUPPORT ELECTROLYTE PRN ORDERS   Does Not Apply PRN    sodium chloride (NS) flush 20 mL  20 mL InterCATHeter Q8H    sodium chloride (NS) flush 20 mL  20 mL InterCATHeter PRN    loperamide (IMODIUM) capsule 2 mg  2 mg Oral Q4H PRN    dextrose 40% (GLUTOSE) oral gel 1 Tube  15 g Oral PRN    glucagon (GLUCAGEN) injection 1 mg  1 mg IntraMUSCular PRN    dextrose (D50W) injection syrg 12.5-25 g  25-50 mL IntraVENous PRN    albuterol (PROVENTIL HFA, VENTOLIN HFA, PROAIR HFA) inhaler 2 Puff  2 Puff Inhalation Q4H PRN    influenza vaccine 2020-21 (6 mos+)(PF) (FLUARIX/FLULAVAL/FLUZONE QUAD) injection 0.5 mL  0.5 mL IntraMUSCular PRIOR TO DISCHARGE    hydrALAZINE (APRESOLINE) 20 mg/mL injection 20 mg  20 mg IntraVENous Q6H PRN Objective:     Vitals:    02/16/21 0711 02/16/21 0715 02/16/21 0730 02/16/21 0910   BP:    (!) 158/48   Pulse: 90 90 90 93   Resp: 29 (!) 41 (!) 70    Temp:       SpO2: 99% 99% 100%    Weight:       Height:         Intake and Output:       Physical Exam:          GEN: acutely ill, vent support via tracheostomy  HEENT:  PERRL, no alar flaring or epistaxis, oral mucosa moist without cyanosis,   NECK:  no JVD, no retractions, no thyromegaly or masses,   LUNGS:  Bilateral lung sounds diminished; no wheezing  HEART:  RRR with no M,G,R;  ABDOMEN:  soft with no tenderness; positive bowel sounds present; tube feedings infusing  EXTREMITIES:  warm with no cyanosis, no lower leg edema  SKIN:  no jaundice or ecchymosis   NEURO:  sedated on vent    LINES:  PICC  Trach XLT shiley #8 cuffed  Art line  HD catheter  PEG  flexiseal    DRIPS:  levophed    NUTRITION:  Tube feeds via PEG      Ventilator Settings  Mode FIO2 Rate Tidal Volume Pressure PEEP   SIMV, PRVC  44 %    500 ml  10 cm H2O  8 cm H20      Peak airway pressure: 17.9 cm H2O   Minute ventilation: 10.1 l/min       CHEST XRAY:       LAB  Recent Labs     02/16/21  0431 02/16/21  0346 02/15/21  0418 02/14/21  0317   WBC  --  10.2 12.4* 13.2*   HGB 6.0* 6.2* 9.4* 7.7*   HCT 19.8* 20.5* 31.0* 25.6*   PLT  --  224 202 174     Recent Labs     02/16/21  0346 02/15/21  0418 02/14/21  0317   * 138 140   K 4.3 4.5 4.0    102 104   CO2 28 31 31   * 181* 212*   BUN 76* 61* 29*   CREA 4.55* 3.50* 1.65*   MG  --  2.2  --    PHOS  --  6.5*  --    INR 1.2 1.1 1.1     ABG:      Cultures:  Respiratory cultures 2/5 with staph aureus and klebsiella pneumoniae  Respiratory culture on 1/30 with 4+ mucous, few yeast, few GPC  Blood cultures-all NGTD    ECHO 1/26  SUMMARY:  -  Left ventricle: The ventricle was mildly dilated. Systolic function was  normal. Ejection fraction was estimated in the range of 55 % to 60 %.    Although  no diagnostic regional wall motion abnormality was identified, this   possibility  cannot be completely excluded on the basis of this study. There was mild  concentric hypertrophy. Left ventricular diastolic function is normal with an  average E/e of 10.4.  -  Right ventricle: The size was at the upper limits of normal. There was   mild  pulmonary artery hypertension.  -  Left atrium: The atrium was mildly dilated. -  Right atrium: The atrium was mildly dilated. -  Inferior vena cava, hepatic veins: The inferior vena cava was dilated. The  respirophasic change in diameter was more than 50%. MRI:  IMPRESSION  1. No evidence of acute infarction. 2. Large bilateral mastoid effusions. Correlation for associated symptoms is  recommended. 3. Otherwise unremarkable noncontrast MRI of the brain for patient age. Assessment:     Patient Active Problem List   Diagnosis Code    Obesity E66.9    Hypertension I10    Bradycardia R00.1    PVC (premature ventricular contraction) I49.3    CAD (coronary artery disease) I25.10    Dyslipidemia, goal LDL below 70 E78.5    SWATI (acute kidney injury) ([facility] N17.9    Acute hypoxemic respiratory failure ([facility]) J96.01    Type 2 diabetes mellitus with hyperosmolarity without nonketotic hyperglycemic-hyperosmolar coma ([facility] [address] E11.00    Pneumonia due to COVID-19 virus U07.1, J12.82    Hypotension I95.9    Acute deep vein thrombosis (DVT) of left peroneal vein ([facility]) I82.452    VAP (ventilator-associated pneumonia) ([facility]) J95.851    Critical illness polyneuropathy ([facility]) G62.81    Encephalopathy G93.40            PLAN:  Hospital Problems  Date Reviewed: 2/5/2021          Codes Class Noted POA    Anemia ICD-10-CM: D64.9  ICD-9-CM: 285.9  2/16/2021 Unknown    --dilutional due to 8L IVF given yesterday to help wean vasopressors; Pt off vasopressors and starting SLED;   Recheck Hgb at 2pm;  Type and crossmatch completed-no transfusion orders at this time    Encephalopathy ICD-10-CM: G93.40  ICD-9-CM: 348.30  2/15/2021 Unknown    -improved; likely iatrogenic due to ICU stay, benzos and other sedating medications. Critical illness polyneuropathy ([facility] ICD-10-CM: G62.81  ICD-9-CM: 357.82  2/12/2021 Unknown        VAP (ventilator-associated pneumonia) ([facility] ICD-10-CM: I09.954  ICD-9-CM: 997.31  2/9/2021 Unknown    -completed Pip/tazo 2/4-2/10  -Vancomycin 2/8 x 1--ID following    Acute deep vein thrombosis (DVT) of left peroneal vein (HCC) ICD-10-CM: W41.066  ICD-9-CM: 453.42  1/28/2021 Unknown    --IVC filter placed, but no restart on antiplatelet or anticoagulant    Hypotension ICD-10-CM: I95.9  ICD-9-CM: 458.9  1/26/2021 Unknown    --off vasopressors, but starting SLED; per nephrology, use vasopressors for dialysis if needed. SWATI (acute kidney injury) [facility]) ICD-10-CM: N17.9  ICD-9-CM: 584.9  1/7/2021 Unknown    --aneuric, dialysis per nephrology. Acute hypoxemic respiratory failure [facility]) ICD-10-CM: J96.01  ICD-9-CM: 518.81  1/7/2021 Unknown    -wean vent now that pt more responsive;  Try pressure support during the day, may need to rest on rate at night    Type 2 diabetes mellitus with hyperosmolarity without nonketotic hyperglycemic-hyperosmolar coma ([facility] ICD-10-CM: E11.00  ICD-9-CM: 250.20  1/7/2021 Unknown    -tube feeds with Lantus and humalog; POC 's-250's    * (Principal) Pneumonia due to COVID-19 virus ICD-10-CM: U07.1, J12.82  ICD-9-CM: 480.8  1/7/2021 Unknown        CAD (coronary artery disease) (Chronic) ICD-10-CM: I25.10  ICD-9-CM: 414.00  10/28/2016 Yes    Overview Signed 10/28/2016  8:05 AM by SHELDON Mathew     10-27-06 [facility] 90% LAD s/p 2.25 x 20 Synergy drug-eluting stent  (CS)             Obesity (Chronic) ICD-10-CM: E66.9  ICD-9-CM: 278.00  10/6/2015 Yes            Lexie Schaumann, FNP-BC    The patient has been seen and examined by me personally, the chart,labs, and radiographic studies have been reviewed.     Chest: Coarse  Extremities: 1-2 + edema    Volume resuscitation was effective  On SLED  Off pressors  CVP 12  I agree with the above assessment and plan. Discussed with sister who is at the bedside.   Joseph Calixto MD.

## 2021-02-16 NOTE — PROGRESS NOTES
Ventilator check complete; patient has a #8.0 tracheostomy. Patient is not sedated. Patient is able to follow commands. Breath sounds are diminished. Trachea is midline, Negative for subcutaneous air, and chest excursion is symmetric. Patient is also Negative for cyanosis and is Negative for pitting edema. All alarms are set and audible. Resuscitation bag is at the head of the bed. Ventilator Settings  Mode FIO2 Rate Tidal Volume Pressure PEEP I:E Ratio   SIMV, PRVC  44 %    500 ml  10 cm H2O  8 cm H20  1:2.04      Peak airway pressure: 17.9 cm H2O   Minute ventilation: 10.1 l/min     ABG: No results for input(s): PH, PCO2, PO2, HCO3 in the last 72 hours.       Melany Evans, RT

## 2021-02-17 ENCOUNTER — APPOINTMENT (OUTPATIENT)
Dept: GENERAL RADIOLOGY | Age: 61
DRG: 004 | End: 2021-02-17
Attending: INTERNAL MEDICINE
Payer: COMMERCIAL

## 2021-02-17 PROBLEM — I95.9 HYPOTENSION: Status: RESOLVED | Noted: 2021-01-26 | Resolved: 2021-02-17

## 2021-02-17 PROBLEM — G93.40 ENCEPHALOPATHY: Status: RESOLVED | Noted: 2021-02-15 | Resolved: 2021-02-17

## 2021-02-17 LAB
ANION GAP SERPL CALC-SCNC: 3 MMOL/L (ref 7–16)
APTT PPP: 43.5 SEC (ref 24.1–35.1)
ARTERIAL PATENCY WRIST A: ABNORMAL
BASE EXCESS BLD CALC-SCNC: 8 MMOL/L
BDY SITE: ABNORMAL
BUN SERPL-MCNC: 34 MG/DL (ref 8–23)
CALCIUM SERPL-MCNC: 8 MG/DL (ref 8.3–10.4)
CHLORIDE SERPL-SCNC: 103 MMOL/L (ref 98–107)
CO2 BLD-SCNC: 37 MMOL/L
CO2 SERPL-SCNC: 33 MMOL/L (ref 21–32)
COLLECT TIME,HTIME: 400
CREAT SERPL-MCNC: 2.55 MG/DL (ref 0.8–1.5)
ERYTHROCYTE [DISTWIDTH] IN BLOOD BY AUTOMATED COUNT: 15.6 % (ref 11.9–14.6)
EXHALED MINUTE VOLUME, VE: 11.1 L/MIN
FIBRINOGEN PPP-MCNC: 458 MG/DL (ref 190–501)
GAS FLOW.O2 O2 DELIVERY SYS: ABNORMAL L/MIN
GLUCOSE BLD STRIP.AUTO-MCNC: 149 MG/DL (ref 65–100)
GLUCOSE BLD STRIP.AUTO-MCNC: 149 MG/DL (ref 65–100)
GLUCOSE BLD STRIP.AUTO-MCNC: 162 MG/DL (ref 65–100)
GLUCOSE BLD STRIP.AUTO-MCNC: 187 MG/DL (ref 65–100)
GLUCOSE BLD STRIP.AUTO-MCNC: 202 MG/DL (ref 65–100)
GLUCOSE SERPL-MCNC: 174 MG/DL (ref 65–100)
HCO3 BLD-SCNC: 35.4 MMOL/L (ref 22–26)
HCT VFR BLD AUTO: 22.9 % (ref 41.1–50.3)
HGB BLD-MCNC: 7.2 G/DL (ref 13.6–17.2)
INR PPP: 1.2
MAGNESIUM SERPL-MCNC: 2 MG/DL (ref 1.8–2.4)
MCH RBC QN AUTO: 29.6 PG (ref 26.1–32.9)
MCHC RBC AUTO-ENTMCNC: 31.4 G/DL (ref 31.4–35)
MCV RBC AUTO: 94.2 FL (ref 79.6–97.8)
NRBC # BLD: 0 K/UL (ref 0–0.2)
O2/TOTAL GAS SETTING VFR VENT: 45 %
PCO2 BLD: 60.5 MMHG (ref 35–45)
PEEP RESPIRATORY: 10 CMH2O
PH BLD: 7.38 [PH] (ref 7.35–7.45)
PHOSPHATE SERPL-MCNC: 3.8 MG/DL (ref 2.3–3.7)
PLATELET # BLD AUTO: 199 K/UL (ref 150–450)
PMV BLD AUTO: 10.2 FL (ref 9.4–12.3)
PO2 BLD: 176 MMHG (ref 75–100)
POTASSIUM SERPL-SCNC: 4.1 MMOL/L (ref 3.5–5.1)
PRESSURE SUPPORT SETTING VENT: 12 CMH2O
PROTHROMBIN TIME: 15.9 SEC (ref 12.5–14.7)
RBC # BLD AUTO: 2.43 M/UL (ref 4.23–5.6)
SAO2 % BLD: 99 % (ref 95–98)
SERVICE CMNT-IMP: ABNORMAL
SERVICE CMNT-IMP: ABNORMAL
SODIUM SERPL-SCNC: 139 MMOL/L (ref 138–145)
SPECIMEN TYPE: ABNORMAL
TOTAL RESP. RATE, ITRR: 28
VENTILATION MODE VENT: ABNORMAL
WBC # BLD AUTO: 12.3 K/UL (ref 4.3–11.1)

## 2021-02-17 PROCEDURE — 74011250637 HC RX REV CODE- 250/637: Performed by: INTERNAL MEDICINE

## 2021-02-17 PROCEDURE — 80048 BASIC METABOLIC PNL TOTAL CA: CPT

## 2021-02-17 PROCEDURE — 85027 COMPLETE CBC AUTOMATED: CPT

## 2021-02-17 PROCEDURE — 83735 ASSAY OF MAGNESIUM: CPT

## 2021-02-17 PROCEDURE — 85384 FIBRINOGEN ACTIVITY: CPT

## 2021-02-17 PROCEDURE — 74011000250 HC RX REV CODE- 250: Performed by: NURSE PRACTITIONER

## 2021-02-17 PROCEDURE — 74011636637 HC RX REV CODE- 636/637: Performed by: INTERNAL MEDICINE

## 2021-02-17 PROCEDURE — 74011250636 HC RX REV CODE- 250/636: Performed by: INTERNAL MEDICINE

## 2021-02-17 PROCEDURE — 85610 PROTHROMBIN TIME: CPT

## 2021-02-17 PROCEDURE — 84100 ASSAY OF PHOSPHORUS: CPT

## 2021-02-17 PROCEDURE — 65620000000 HC RM CCU GENERAL

## 2021-02-17 PROCEDURE — 99233 SBSQ HOSP IP/OBS HIGH 50: CPT | Performed by: INTERNAL MEDICINE

## 2021-02-17 PROCEDURE — 82962 GLUCOSE BLOOD TEST: CPT

## 2021-02-17 PROCEDURE — 77030031476 HC EXCH HEAT MOISTW FLTR HALY -A

## 2021-02-17 PROCEDURE — 94003 VENT MGMT INPAT SUBQ DAY: CPT

## 2021-02-17 PROCEDURE — 82803 BLOOD GASES ANY COMBINATION: CPT

## 2021-02-17 PROCEDURE — 71045 X-RAY EXAM CHEST 1 VIEW: CPT

## 2021-02-17 PROCEDURE — 85730 THROMBOPLASTIN TIME PARTIAL: CPT

## 2021-02-17 RX ORDER — METOPROLOL TARTRATE 5 MG/5ML
5 INJECTION INTRAVENOUS ONCE
Status: COMPLETED | OUTPATIENT
Start: 2021-02-17 | End: 2021-02-17

## 2021-02-17 RX ADMIN — HYDRALAZINE HYDROCHLORIDE 20 MG: 20 INJECTION, SOLUTION INTRAMUSCULAR; INTRAVENOUS at 21:01

## 2021-02-17 RX ADMIN — LANSOPRAZOLE 30 MG: KIT at 21:02

## 2021-02-17 RX ADMIN — Medication 20 ML: at 21:02

## 2021-02-17 RX ADMIN — AMIODARONE HYDROCHLORIDE 200 MG: 200 TABLET ORAL at 08:19

## 2021-02-17 RX ADMIN — INSULIN GLARGINE 15 UNITS: 100 INJECTION, SOLUTION SUBCUTANEOUS at 08:18

## 2021-02-17 RX ADMIN — Medication 20 ML: at 14:00

## 2021-02-17 RX ADMIN — INSULIN LISPRO 6 UNITS: 100 INJECTION, SOLUTION INTRAVENOUS; SUBCUTANEOUS at 08:18

## 2021-02-17 RX ADMIN — INSULIN LISPRO 3 UNITS: 100 INJECTION, SOLUTION INTRAVENOUS; SUBCUTANEOUS at 04:30

## 2021-02-17 RX ADMIN — Medication 20 ML: at 05:21

## 2021-02-17 RX ADMIN — ACETAMINOPHEN 650 MG: 325 TABLET, FILM COATED ORAL at 05:24

## 2021-02-17 RX ADMIN — SODIUM ZIRCONIUM CYCLOSILICATE 10 G: 10 POWDER, FOR SUSPENSION ORAL at 08:18

## 2021-02-17 RX ADMIN — INSULIN LISPRO 3 UNITS: 100 INJECTION, SOLUTION INTRAVENOUS; SUBCUTANEOUS at 15:25

## 2021-02-17 RX ADMIN — LANSOPRAZOLE 30 MG: KIT at 08:18

## 2021-02-17 RX ADMIN — METOPROLOL TARTRATE 5 MG: 5 INJECTION INTRAVENOUS at 09:35

## 2021-02-17 RX ADMIN — ATORVASTATIN CALCIUM 80 MG: 40 TABLET, FILM COATED ORAL at 08:19

## 2021-02-17 RX ADMIN — LOPERAMIDE HYDROCHLORIDE 2 MG: 2 CAPSULE ORAL at 09:34

## 2021-02-17 NOTE — PROGRESS NOTES
Ventilator check complete; patient has a #8.0 tracheostomy. Patient is not sedated. Patient is able to follow commands. Breath sounds are coarse. Trachea is midline, Negative for subcutaneous air, and chest excursion is symmetric. Patient is also Negative for cyanosis and is Negative for pitting edema. All alarms are set and audible. Resuscitation bag is at the head of the bed. Ventilator Settings  Mode FIO2 Rate Tidal Volume Pressure PEEP I:E Ratio   Pressure support  29 %     14 cm H2O 10 cm H20       Peak airway pressure: 24.2 cm H2O   Minute ventilation: 16.8 l/min     ABG: No results for input(s): PH, PCO2, PO2, HCO3 in the last 72 hours.       Heladio Patino, RT

## 2021-02-17 NOTE — PROGRESS NOTES
PT Note:  Per chart pt has been unable to participate in treatment for past several days given decline in status. PT will sign off at this time. Please re-consult services in future when/if needed.   Thank you,  Haley Infante, PT, DPT

## 2021-02-17 NOTE — PROGRESS NOTES
A follow up visit was made to the patient. Emotional support, spiritual presence and   prayer were provided for the patient.       Bg Chan, [address] [facility], [facility]

## 2021-02-17 NOTE — DIABETES MGMT
Patient's blood glucose ranged 143-189 yesterday with patient receiving Lantus 15 units and Humalog 19 units. Blood glucose 174 this morning. Hbg 7. 2. Cr 2.55. GFR 28. Patient remains on vent. Has trach. Peg tube and Nepro TF. Still febrile.  Will follow along

## 2021-02-17 NOTE — PROGRESS NOTES
Ventilator check complete; patient has a #8.0 tracheostomy. Patient is sedated. Patient is not able to follow commands. Breath sounds are diminished. Trachea is midline, Negative for subcutaneous air, and chest excursion is symmetric. Patient is also Negative for cyanosis and is Positive for pitting edema. All alarms are set and audible. Resuscitation bag is at the head of the bed.       Ventilator Settings  Mode FIO2 Rate Tidal Volume Pressure PEEP I:E Ratio   Pressure support  45 %    500 ml  12 cm H2O  10 cm H20  1:2.04      Peak airway pressure: 21.8 cm H2O   Minute ventilation: 15.3 l/min       Lucinda Omalley, RT

## 2021-02-17 NOTE — PROGRESS NOTES
Ramos Allen Falcon. Admission Date: 1/6/2021         60 y.o. y/o male with acute hypoxemic respiratory failure secondary to COVID-19.     Pt admitted 1/7 with fever, cough. Covid + 1/6. Admitted by hospitalists. Started on dexamethasone, convalescent plasma, and remdesivir. Pulm consulted 1/10 with pt requiring CPAP but pt decompensated and was intubated 1/17. Nephrology consulted 1/23 for renal failure and was started on dialysis. US 1/18 with L peroneal vein thrombus. He was started on heparin but stopped due to bleeding from HD catheter. He was started on HD on 1/25. Trached on 2/2 secondary to inability to wean from the vent. Pt developed afib requiring amio. PEG placed 2/4. IVC filter 2/8. ICU Daily Progress Note: 2/17/2021  Subjective:     Awake and alert, follows commands. Is weak  Tolerated HD yesterday is off pressors.       Current Facility-Administered Medications   Medication Dose Route Frequency    0.9% sodium chloride infusion 250 mL  250 mL IntraVENous PRN    0.9% sodium chloride infusion 250 mL  250 mL IntraVENous PRN    sodium chloride 0.9 % bolus infusion 1,000 mL  1,000 mL IntraVENous PRN    sodium chloride 0.9 % bolus infusion 250 mL  250 mL IntraVENous PRN    NOREPINephrine (LEVOPHED) 4 mg in 5% dextrose 250 mL infusion  0.5-30 mcg/min IntraVENous TITRATE    dexmedeTOMidine in 0.9 % NaCl (PRECEDEX) 400 mcg/100 mL (4 mcg/mL) infusion soln  0.1-1.5 mcg/kg/hr IntraVENous TITRATE    [Held by provider] risperiDONE (RisperDAL) 1 mg/mL oral solution soln 0.5 mg  0.5 mg Nasogastric BID    [Held by provider] midazolam (VERSED) injection 2 mg  2 mg IntraVENous Q3H PRN    heparin (porcine) 1,000 unit/mL injection 5,000 Units  5,000 Units Injection DIALYSIS PRN    [Held by provider] oxyCODONE (ROXICODONE) 5 mg/5 mL oral solution 5 mg  5 mg Per G Tube Q6H    [Held by provider] fentaNYL citrate (PF) injection 50 mcg  50 mcg IntraVENous Q2H PRN    insulin glargine (LANTUS) injection 15 Units  15 Units SubCUTAneous DAILY    amiodarone (CORDARONE) tablet 200 mg  200 mg Oral DAILY    [Held by provider] oxyCODONE (ROXICODONE) 5 mg/5 mL oral solution 5 mg  5 mg Oral Q4H PRN    lansoprazole (PREVACID) 3 mg/mL oral suspension 30 mg  30 mg Per NG tube Q12H    ondansetron (ZOFRAN) injection 4 mg  4 mg IntraVENous Q6H PRN    polyethylene glycol (MIRALAX) packet 17 g  17 g Per NG tube DAILY PRN    guaiFENesin-dextromethorphan (ROBITUSSIN DM) 100-10 mg/5 mL syrup 10 mL  10 mL Per NG tube Q4H PRN    acetaminophen (TYLENOL) tablet 650 mg  650 mg Per NG tube Q6H PRN    sodium zirconium cyclosilicate (LOKELMA) powder packet 10 g  10 g Oral DAILY    white petrolatum-mineral oiL (LACRILUBE S.O.P.) ointment   Both Eyes Q4H PRN    atorvastatin (LIPITOR) tablet 80 mg  80 mg Per NG tube DAILY    [Held by provider] clopidogreL (PLAVIX) tablet 75 mg  75 mg Per NG tube DAILY    [Held by provider] metoprolol tartrate (LOPRESSOR) tablet 50 mg  50 mg Per NG tube BID    insulin lispro (HUMALOG) injection   SubCUTAneous Q4H    NUTRITIONAL SUPPORT ELECTROLYTE PRN ORDERS   Does Not Apply PRN    sodium chloride (NS) flush 20 mL  20 mL InterCATHeter Q8H    sodium chloride (NS) flush 20 mL  20 mL InterCATHeter PRN    loperamide (IMODIUM) capsule 2 mg  2 mg Oral Q4H PRN    dextrose 40% (GLUTOSE) oral gel 1 Tube  15 g Oral PRN    glucagon (GLUCAGEN) injection 1 mg  1 mg IntraMUSCular PRN    dextrose (D50W) injection syrg 12.5-25 g  25-50 mL IntraVENous PRN    albuterol (PROVENTIL HFA, VENTOLIN HFA, PROAIR HFA) inhaler 2 Puff  2 Puff Inhalation Q4H PRN    influenza vaccine 2020-21 (6 mos+)(PF) (FLUARIX/FLULAVAL/FLUZONE QUAD) injection 0.5 mL  0.5 mL IntraMUSCular PRIOR TO DISCHARGE    hydrALAZINE (APRESOLINE) 20 mg/mL injection 20 mg  20 mg IntraVENous Q6H PRN         Objective:     Vitals:    02/17/21 0545 02/17/21 0629 02/17/21 0659 02/17/21 0755   BP:  138/63 (!) 164/69    Pulse: 91 95 (!) 110 98   Resp: (!) 71 (!) 77 (!) 73 (!) 34   Temp:       SpO2: 96% 96% 96% 95%   Weight:       Height:         Intake and Output:       Physical Exam:          GEN: acutely ill, vent support via tracheostomy  HEENT:  PERRL, no alar flaring or epistaxis, oral mucosa moist without cyanosis,   NECK:  no JVD, no retractions, no thyromegaly or masses,   LUNGS:  Bilateral lung sounds diminished; no wheezing  HEART:  RRR with no M,G,R;  ABDOMEN:  soft with no tenderness; positive bowel sounds present; tube feedings infusing  EXTREMITIES:  warm with no cyanosis, no lower leg edema  SKIN:  no jaundice or ecchymosis   NEURO:  sedated on vent    LINES:  PICC  Trach XLT shiley #8 cuffed  Art line  HD catheter  PEG  flexiseal    DRIPS:  none     NUTRITION:  Tube feeds via PEG      Ventilator Settings  Mode FIO2 Rate Tidal Volume Pressure PEEP   Pressure support  29 %    500 ml  14 cm H2O(increased to 14 to help with work of breathing)  10 cm H20      Peak airway pressure: 24.2 cm H2O   Minute ventilation: 16.8 l/min       CHEST XRAY:       LAB  Recent Labs     02/17/21  0316 02/16/21  1136 02/16/21  0431 02/16/21  0346   WBC 12.3* 11.8*  --  10.2   HGB 7.2* 6.3*  6.2* 6.0* 6.2*   HCT 22.9* 20.6*  20.4* 19.8* 20.5*    228  --  224     Recent Labs     02/17/21  0316 02/16/21  0346 02/15/21  0418    137* 138   K 4.1 4.3 4.5    103 102   CO2 33* 28 31   * 143* 181*   BUN 34* 76* 61*   CREA 2.55* 4.55* 3.50*   MG 2.0  --  2.2   PHOS 3.8*  --  6.5*   INR 1.2 1.2 1.1     ABG:      Cultures:  Respiratory cultures 2/5 with staph aureus and klebsiella pneumoniae  Respiratory culture on 1/30 with 4+ mucous, few yeast, few GPC  Blood cultures-all NGTD    ECHO 1/26  SUMMARY:  -  Left ventricle: The ventricle was mildly dilated. Systolic function was  normal. Ejection fraction was estimated in the range of 55 % to 60 %.    Although  no diagnostic regional wall motion abnormality was identified, this   possibility  cannot be completely excluded on the basis of this study. There was mild  concentric hypertrophy. Left ventricular diastolic function is normal with an  average E/e of 10.4.  -  Right ventricle: The size was at the upper limits of normal. There was   mild  pulmonary artery hypertension.  -  Left atrium: The atrium was mildly dilated. -  Right atrium: The atrium was mildly dilated. -  Inferior vena cava, hepatic veins: The inferior vena cava was dilated. The  respirophasic change in diameter was more than 50%. MRI:  IMPRESSION  1. No evidence of acute infarction. 2. Large bilateral mastoid effusions. Correlation for associated symptoms is  recommended. 3. Otherwise unremarkable noncontrast MRI of the brain for patient age. Assessment:     Patient Active Problem List   Diagnosis Code    Obesity E66.9    Hypertension I10    Bradycardia R00.1    PVC (premature ventricular contraction) I49.3    CAD (coronary artery disease) I25.10    Dyslipidemia, goal LDL below 70 E78.5    SWATI (acute kidney injury) ([facility] N17.9    Acute hypoxemic respiratory failure (HCC) J96.01    Type 2 diabetes mellitus with hyperosmolarity without nonketotic hyperglycemic-hyperosmolar coma ([facility] E11.00    Pneumonia due to COVID-19 virus U07.1, J12.82    Hypotension I95.9    Acute deep vein thrombosis (DVT) of left peroneal vein ([facility]) I82.452    VAP (ventilator-associated pneumonia) ([facility]) J95.851    Critical illness polyneuropathy ([facility]) G62.81    Encephalopathy G93.40    Anemia D64.9            PLAN:  Hospital Problems  Date Reviewed: 2/5/2021          Codes Class Noted POA    Anemia ICD-10-CM: D64.9  ICD-9-CM: 285.9  2/16/2021 Unknown    --better after dialysis. Continue to monitor. Pt with IVC filter, but should consider restarting anticoagulation.       Critical illness polyneuropathy ([facility] ICD-10-CM: Z74.00  ICD-9-CM: 357.82  2/12/2021 Unknown    Needs PT and OT    VAP (ventilator-associated pneumonia) ([facility] [address] ICD-10-CM: A95.244  ICD-9-CM: 997.31  2/9/2021 Unknown        Acute deep vein thrombosis (DVT) of left peroneal vein [facility]) ICD-10-CM: P06.138  ICD-9-CM: 453.42  1/28/2021 Unknown     Pt with IVC filter, but should consider restarting    SWATI (acute kidney injury) ([facility] [address] ICD-10-CM: N17.9  ICD-9-CM: 584.9  1/7/2021 Unknown    --pt tolerated SLED yesterday. Acute hypoxemic respiratory failure [facility]) ICD-10-CM: J96.01  ICD-9-CM: 518.81  1/7/2021 Unknown    --Pt on PS; try trach collar. Type 2 diabetes mellitus with hyperosmolarity without nonketotic hyperglycemic-hyperosmolar coma [facility]) ICD-10-CM: E11.00  ICD-9-CM: 250.20  1/7/2021 Unknown    --tolerating tube feedings with Lantus and Humalog sceduled    * (Principal) Pneumonia due to COVID-19 virus ICD-10-CM: U07.1, J12.82  ICD-9-CM: 480.8  1/7/2021 Unknown    --completed antibiotics; Most recent sputum and blood cultures-NGTD. CAD (coronary artery disease) (Chronic) ICD-10-CM: I25.10  ICD-9-CM: 414.00  10/28/2016 Yes    Overview Signed 10/28/2016  8:05 AM by SHELDON Welsh     10-27-06 [facility] 90% LAD s/p 2.25 x 20 Synergy drug-eluting stent  (CS)             Hypertension ICD-10-CM: I10  ICD-9-CM: 401.9  10/27/2016 Yes    --pt off vasopressors 2/16 prior to SLED;  Now hypertensive; Pt took Metoprolol 50mg BID at home. Will trial metoprolol 5mg IV x1 to see if tolerates. Obesity (Chronic) ICD-10-CM: E66.9  ICD-9-CM: 278.00  10/6/2015 Yes          --Reinitate PT/OT. Pt is awake and nods that he is eager to try. ADOLFO Phelps-BC    The patient has been seen and examined by me personally, the chart,labs, and radiographic studies have been reviewed. The patient has been seen and examined by me personally, the chart,labs, and radiographic studies have been reviewed. Chest:CTA  Extremities: 1+ edema    I agree with the above assessment and plan.     Virgilio Stout MD.

## 2021-02-17 NOTE — PROGRESS NOTES
Chart reviewed post providers round. Remains CCU. Trach/vent -29% Fio2. SLED per Nephrology. PT/OT per notes to start. Referral has been sent to [facility]. Awaiting HD to start precert with [facility]. CM following. LOS 41 days.

## 2021-02-17 NOTE — PROGRESS NOTES
Renal Progress Note    Admission Date: 1/6/2021   Subjective:      Awake on vent, via trach    Objective:     Physical Exam:    Patient Vitals for the past 8 hrs:   BP Temp Pulse Resp SpO2   02/17/21 0810 -- 99.4 °F (37.4 °C) 97 (!) 35 95 %   02/17/21 0755 -- -- 98 (!) 34 95 %   02/17/21 0729 (!) 146/65 -- 95 (!) 97 95 %   02/17/21 0659 (!) 164/69 -- (!) 110 (!) 73 96 %   02/17/21 0629 138/63 -- 95 (!) 77 96 %   02/17/21 0545 -- -- 91 (!) 71 96 %   02/17/21 0530 -- -- 87 (!) 84 95 %   02/17/21 0529 124/60 -- 86 (!) 69 95 %   02/17/21 0515 -- -- 88 (!) 59 94 %   02/17/21 0500 -- -- 87 (!) 58 94 %   02/17/21 0459 (!) 125/58 100.2 °F (37.9 °C) 87 (!) 69 94 %   02/17/21 0445 -- -- 95 (!) 74 93 %   02/17/21 0430 -- -- 91 (!) 87 93 %   02/17/21 0429 134/63 -- 91 (!) 72 93 %   02/17/21 0415 -- -- 92 (!) 95 94 %   02/17/21 0400 -- -- 94 (!) 64 98 %   02/17/21 0359 136/61 -- 91 (!) 61 98 %   02/17/21 0354 -- -- 92 (!) 34 98 %   02/17/21 0345 -- -- 89 (!) 60 98 %   02/17/21 0330 -- -- 85 (!) 69 98 %   02/17/21 0329 125/61 -- 84 (!) 66 98 %   02/17/21 0315 -- -- 86 (!) 58 98 %   02/17/21 0300 -- -- 83 (!) 55 98 %   02/17/21 0259 121/60 -- 84 (!) 56 98 %   02/17/21 0245 -- -- 83 (!) 65 98 %   02/17/21 0230 -- -- 85 (!) 72 99 %   02/17/21 0229 (!) 118/58 -- 84 (!) 56 98 %   02/17/21 0215 -- -- 84 (!) 43 98 %   02/17/21 0200 -- -- 84 (!) 49 98 %   02/17/21 0159 (!) 120/59 -- 87 (!) 59 98 %   02/17/21 0145 -- -- 87 (!) 59 98 %   02/17/21 0130 -- -- 82 (!) 69 98 %   02/17/21 0129 (!) 120/58 -- 79 (!) 54 98 %      Gen: comfortable , NAD  HEENT: dry membranes  CV: S1, S2  Lungs: Clear bilaterally  Extem: no edema     Current Facility-Administered Medications   Medication Dose Route Frequency    metoprolol (LOPRESSOR) injection 5 mg  5 mg IntraVENous ONCE    0.9% sodium chloride infusion 250 mL  250 mL IntraVENous PRN    0.9% sodium chloride infusion 250 mL  250 mL IntraVENous PRN    sodium chloride 0.9 % bolus infusion 1,000 mL 1,000 mL IntraVENous PRN    sodium chloride 0.9 % bolus infusion 250 mL  250 mL IntraVENous PRN    dexmedeTOMidine in 0.9 % NaCl (PRECEDEX) 400 mcg/100 mL (4 mcg/mL) infusion soln  0.1-1.5 mcg/kg/hr IntraVENous TITRATE    [Held by provider] risperiDONE (RisperDAL) 1 mg/mL oral solution soln 0.5 mg  0.5 mg Nasogastric BID    [Held by provider] midazolam (VERSED) injection 2 mg  2 mg IntraVENous Q3H PRN    heparin (porcine) 1,000 unit/mL injection 5,000 Units  5,000 Units Injection DIALYSIS PRN    [Held by provider] oxyCODONE (ROXICODONE) 5 mg/5 mL oral solution 5 mg  5 mg Per G Tube Q6H    [Held by provider] fentaNYL citrate (PF) injection 50 mcg  50 mcg IntraVENous Q2H PRN    insulin glargine (LANTUS) injection 15 Units  15 Units SubCUTAneous DAILY    amiodarone (CORDARONE) tablet 200 mg  200 mg Oral DAILY    [Held by provider] oxyCODONE (ROXICODONE) 5 mg/5 mL oral solution 5 mg  5 mg Oral Q4H PRN    lansoprazole (PREVACID) 3 mg/mL oral suspension 30 mg  30 mg Per NG tube Q12H    ondansetron (ZOFRAN) injection 4 mg  4 mg IntraVENous Q6H PRN    polyethylene glycol (MIRALAX) packet 17 g  17 g Per NG tube DAILY PRN    guaiFENesin-dextromethorphan (ROBITUSSIN DM) 100-10 mg/5 mL syrup 10 mL  10 mL Per NG tube Q4H PRN    acetaminophen (TYLENOL) tablet 650 mg  650 mg Per NG tube Q6H PRN    sodium zirconium cyclosilicate (LOKELMA) powder packet 10 g  10 g Oral DAILY    white petrolatum-mineral oiL (LACRILUBE S.O.P.) ointment   Both Eyes Q4H PRN    atorvastatin (LIPITOR) tablet 80 mg  80 mg Per NG tube DAILY    [Held by provider] clopidogreL (PLAVIX) tablet 75 mg  75 mg Per NG tube DAILY    [Held by provider] metoprolol tartrate (LOPRESSOR) tablet 50 mg  50 mg Per NG tube BID    insulin lispro (HUMALOG) injection   SubCUTAneous Q4H    NUTRITIONAL SUPPORT ELECTROLYTE PRN ORDERS   Does Not Apply PRN    sodium chloride (NS) flush 20 mL  20 mL InterCATHeter Q8H    sodium chloride (NS) flush 20 mL  20 mL InterCATHeter PRN    loperamide (IMODIUM) capsule 2 mg  2 mg Oral Q4H PRN    dextrose 40% (GLUTOSE) oral gel 1 Tube  15 g Oral PRN    glucagon (GLUCAGEN) injection 1 mg  1 mg IntraMUSCular PRN    dextrose (D50W) injection syrg 12.5-25 g  25-50 mL IntraVENous PRN    albuterol (PROVENTIL HFA, VENTOLIN HFA, PROAIR HFA) inhaler 2 Puff  2 Puff Inhalation Q4H PRN    influenza vaccine 2020-21 (6 mos+)(PF) (FLUARIX/FLULAVAL/FLUZONE QUAD) injection 0.5 mL  0.5 mL IntraMUSCular PRIOR TO DISCHARGE    hydrALAZINE (APRESOLINE) 20 mg/mL injection 20 mg  20 mg IntraVENous Q6H PRN            Data Review:     LABS:   Recent Results (from the past 12 hour(s))   GLUCOSE, POC    Collection Time: 02/16/21 11:24 PM   Result Value Ref Range    Glucose (POC) 188 (H) 65 - 100 mg/dL   PROTHROMBIN TIME + INR    Collection Time: 02/17/21  3:16 AM   Result Value Ref Range    Prothrombin time 15.9 (H) 12.5 - 14.7 sec    INR 1.2     PTT    Collection Time: 02/17/21  3:16 AM   Result Value Ref Range    aPTT 43.5 (H) 24.1 - 35.1 SEC   FIBRINOGEN    Collection Time: 02/17/21  3:16 AM   Result Value Ref Range    Fibrinogen 458 190 - 983 mg/dL   METABOLIC PANEL, BASIC    Collection Time: 02/17/21  3:16 AM   Result Value Ref Range    Sodium 139 138 - 145 mmol/L    Potassium 4.1 3.5 - 5.1 mmol/L    Chloride 103 98 - 107 mmol/L    CO2 33 (H) 21 - 32 mmol/L    Anion gap 3 (L) 7 - 16 mmol/L    Glucose 174 (H) 65 - 100 mg/dL    BUN 34 (H) 8 - 23 MG/DL    Creatinine 2.55 (H) 0.8 - 1.5 MG/DL    GFR est AA 33 (L) >60 ml/min/1.73m2    GFR est non-AA 28 (L) >60 ml/min/1.73m2    Calcium 8.0 (L) 8.3 - 10.4 MG/DL   PHOSPHORUS    Collection Time: 02/17/21  3:16 AM   Result Value Ref Range    Phosphorus 3.8 (H) 2.3 - 3.7 MG/DL   MAGNESIUM    Collection Time: 02/17/21  3:16 AM   Result Value Ref Range    Magnesium 2.0 1.8 - 2.4 mg/dL   CBC W/O DIFF    Collection Time: 02/17/21  3:16 AM   Result Value Ref Range    WBC 12.3 (H) 4.3 - 11.1 K/uL    RBC 2.43 (L) 4.23 - 5.6 M/uL    HGB 7.2 (L) 13.6 - 17.2 g/dL    HCT 22.9 (L) 41.1 - 50.3 %    MCV 94.2 79.6 - 97.8 FL    MCH 29.6 26.1 - 32.9 PG    MCHC 31.4 31.4 - 35.0 g/dL    RDW 15.6 (H) 11.9 - 14.6 %    PLATELET 744 701 - 255 K/uL    MPV 10.2 9.4 - 12.3 FL    ABSOLUTE NRBC 0.00 0.0 - 0.2 K/uL   POC G3    Collection Time: 02/17/21  4:03 AM   Result Value Ref Range    Device: VENT      FIO2 (POC) 45 %    pH (POC) 7.38 7.35 - 7.45      pCO2 (POC) 60.5 (HH) 35 - 45 MMHG    pO2 (POC) 176 (H) 75 - 100 MMHG    HCO3 (POC) 35.4 (H) 22 - 26 MMOL/L    sO2 (POC) 99 (H) 95 - 98 %    Base excess (POC) 8 mmol/L    Mode VENTILATOR/SPONTANEOUS/PRESSURE SUPPORT      PEEP/CPAP (POC) 10 cmH2O    Pressure support 12 cmH2O    Allens test (POC) NOT APPLICABLE      Total resp.  rate 28      Site DRAWN FROM ARTERIAL LINE      Specimen type (POC) ARTERIAL      Performed by Elzbieta Romero     CO2, POC 37 MMOL/L    Respiratory comment: NurseNotified     Exhaled minute volume 11.10 L/min    COLLECT TIME 400     GLUCOSE, POC    Collection Time: 02/17/21  8:12 AM   Result Value Ref Range    Glucose (POC) 202 (H) 65 - 100 mg/dL         Plan:     Principal Problem:    Pneumonia due to COVID-19 virus (1/7/2021)    Active Problems:    Obesity (10/6/2015)      Hypertension (10/27/2016)      CAD (coronary artery disease) (10/28/2016)      Overview: 10-27-06 [facility] 90% LAD s/p 2.25 x 20 Synergy drug-eluting stent  (CS)      SWATI (acute kidney injury) ([facility] [address] (1/7/2021)      Acute hypoxemic respiratory failure ([facility] [address] (1/7/2021)      Type 2 diabetes mellitus with hyperosmolarity without nonketotic hyperglycemic-hyperosmolar coma ([facility] [address] (1/7/2021)      Acute deep vein thrombosis (DVT) of left peroneal vein (HCC) (1/28/2021)      VAP (ventilator-associated pneumonia) ([facility] [address] (2/9/2021)      [facility] (2/12/2021)      Anemia (2/16/2021)       SWATI - ATN, anuric   - 1st dialysis was 1/25/21  In setting of covid pneumonia.   - on SLED Tuesday - dialed down the UF to 350 ml/hr to improve his hemodynamics   - still oligoanuric.  Will plan on tx tomorrow.        COVID19/ Resp Failure     Hypotension- off pressor for now     A Fib with RVR     Anemia - s/p transfusion

## 2021-02-18 ENCOUNTER — APPOINTMENT (OUTPATIENT)
Dept: GENERAL RADIOLOGY | Age: 61
DRG: 004 | End: 2021-02-18
Attending: INTERNAL MEDICINE
Payer: COMMERCIAL

## 2021-02-18 LAB
ANION GAP SERPL CALC-SCNC: 4 MMOL/L (ref 7–16)
APTT PPP: 39.2 SEC (ref 24.1–35.1)
ARTERIAL PATENCY WRIST A: ABNORMAL
BASE EXCESS BLD CALC-SCNC: 5 MMOL/L
BDY SITE: ABNORMAL
BUN SERPL-MCNC: 59 MG/DL (ref 8–23)
CALCIUM SERPL-MCNC: 8.1 MG/DL (ref 8.3–10.4)
CHLORIDE SERPL-SCNC: 104 MMOL/L (ref 98–107)
CO2 BLD-SCNC: 32 MMOL/L
CO2 SERPL-SCNC: 31 MMOL/L (ref 21–32)
COLLECT TIME,HTIME: 400
CREAT SERPL-MCNC: 4.03 MG/DL (ref 0.8–1.5)
ERYTHROCYTE [DISTWIDTH] IN BLOOD BY AUTOMATED COUNT: 15.9 % (ref 11.9–14.6)
EXHALED MINUTE VOLUME, VE: 13.6 L/MIN
FIBRINOGEN PPP-MCNC: 478 MG/DL (ref 190–501)
GAS FLOW.O2 O2 DELIVERY SYS: ABNORMAL L/MIN
GLUCOSE BLD STRIP.AUTO-MCNC: 125 MG/DL (ref 65–100)
GLUCOSE BLD STRIP.AUTO-MCNC: 158 MG/DL (ref 65–100)
GLUCOSE BLD STRIP.AUTO-MCNC: 165 MG/DL (ref 65–100)
GLUCOSE BLD STRIP.AUTO-MCNC: 181 MG/DL (ref 65–100)
GLUCOSE SERPL-MCNC: 173 MG/DL (ref 65–100)
HCO3 BLD-SCNC: 30.2 MMOL/L (ref 22–26)
HCT VFR BLD AUTO: 23.8 % (ref 41.1–50.3)
HGB BLD-MCNC: 7.3 G/DL (ref 13.6–17.2)
INR PPP: 1.1
MCH RBC QN AUTO: 29.3 PG (ref 26.1–32.9)
MCHC RBC AUTO-ENTMCNC: 30.7 G/DL (ref 31.4–35)
MCV RBC AUTO: 95.6 FL (ref 79.6–97.8)
NRBC # BLD: 0 K/UL (ref 0–0.2)
O2/TOTAL GAS SETTING VFR VENT: 30 %
PCO2 BLD: 46.2 MMHG (ref 35–45)
PEEP RESPIRATORY: 10 CMH2O
PH BLD: 7.42 [PH] (ref 7.35–7.45)
PLATELET # BLD AUTO: 241 K/UL (ref 150–450)
PMV BLD AUTO: 10.3 FL (ref 9.4–12.3)
PO2 BLD: 87 MMHG (ref 75–100)
POTASSIUM SERPL-SCNC: 3.8 MMOL/L (ref 3.5–5.1)
PRESSURE SUPPORT SETTING VENT: 14 CMH2O
PROTHROMBIN TIME: 14.9 SEC (ref 12.5–14.7)
RBC # BLD AUTO: 2.49 M/UL (ref 4.23–5.6)
SAO2 % BLD: 97 % (ref 95–98)
SERVICE CMNT-IMP: ABNORMAL
SERVICE CMNT-IMP: ABNORMAL
SODIUM SERPL-SCNC: 139 MMOL/L (ref 136–145)
SPECIMEN TYPE: ABNORMAL
TOTAL RESP. RATE, ITRR: 28
VENTILATION MODE VENT: ABNORMAL
WBC # BLD AUTO: 11 K/UL (ref 4.3–11.1)

## 2021-02-18 PROCEDURE — 77030006998

## 2021-02-18 PROCEDURE — 74011250637 HC RX REV CODE- 250/637: Performed by: INTERNAL MEDICINE

## 2021-02-18 PROCEDURE — 94003 VENT MGMT INPAT SUBQ DAY: CPT

## 2021-02-18 PROCEDURE — 82962 GLUCOSE BLOOD TEST: CPT

## 2021-02-18 PROCEDURE — 82803 BLOOD GASES ANY COMBINATION: CPT

## 2021-02-18 PROCEDURE — 74011250636 HC RX REV CODE- 250/636: Performed by: INTERNAL MEDICINE

## 2021-02-18 PROCEDURE — 65620000000 HC RM CCU GENERAL

## 2021-02-18 PROCEDURE — 85610 PROTHROMBIN TIME: CPT

## 2021-02-18 PROCEDURE — 97530 THERAPEUTIC ACTIVITIES: CPT

## 2021-02-18 PROCEDURE — 97164 PT RE-EVAL EST PLAN CARE: CPT

## 2021-02-18 PROCEDURE — 74011636637 HC RX REV CODE- 636/637: Performed by: INTERNAL MEDICINE

## 2021-02-18 PROCEDURE — 97535 SELF CARE MNGMENT TRAINING: CPT

## 2021-02-18 PROCEDURE — 85384 FIBRINOGEN ACTIVITY: CPT

## 2021-02-18 PROCEDURE — 77030040393 HC DRSG OPTIFOAM GENT MDII -B

## 2021-02-18 PROCEDURE — 85730 THROMBOPLASTIN TIME PARTIAL: CPT

## 2021-02-18 PROCEDURE — 77030031476 HC EXCH HEAT MOISTW FLTR HALY -A

## 2021-02-18 PROCEDURE — 99233 SBSQ HOSP IP/OBS HIGH 50: CPT | Performed by: INTERNAL MEDICINE

## 2021-02-18 PROCEDURE — 97112 NEUROMUSCULAR REEDUCATION: CPT

## 2021-02-18 PROCEDURE — 80048 BASIC METABOLIC PNL TOTAL CA: CPT

## 2021-02-18 PROCEDURE — 2709999900 HC NON-CHARGEABLE SUPPLY

## 2021-02-18 PROCEDURE — 85027 COMPLETE CBC AUTOMATED: CPT

## 2021-02-18 PROCEDURE — 71045 X-RAY EXAM CHEST 1 VIEW: CPT

## 2021-02-18 PROCEDURE — 97168 OT RE-EVAL EST PLAN CARE: CPT

## 2021-02-18 PROCEDURE — 90935 HEMODIALYSIS ONE EVALUATION: CPT

## 2021-02-18 RX ADMIN — SODIUM ZIRCONIUM CYCLOSILICATE 10 G: 10 POWDER, FOR SUSPENSION ORAL at 08:24

## 2021-02-18 RX ADMIN — METOPROLOL TARTRATE 50 MG: 50 TABLET, FILM COATED ORAL at 18:22

## 2021-02-18 RX ADMIN — INSULIN LISPRO 3 UNITS: 100 INJECTION, SOLUTION INTRAVENOUS; SUBCUTANEOUS at 16:06

## 2021-02-18 RX ADMIN — LANSOPRAZOLE 30 MG: KIT at 21:51

## 2021-02-18 RX ADMIN — AMIODARONE HYDROCHLORIDE 200 MG: 200 TABLET ORAL at 08:23

## 2021-02-18 RX ADMIN — LANSOPRAZOLE 30 MG: KIT at 08:24

## 2021-02-18 RX ADMIN — METOPROLOL TARTRATE 50 MG: 50 TABLET, FILM COATED ORAL at 08:23

## 2021-02-18 RX ADMIN — ATORVASTATIN CALCIUM 80 MG: 40 TABLET, FILM COATED ORAL at 08:23

## 2021-02-18 RX ADMIN — Medication 20 ML: at 05:22

## 2021-02-18 RX ADMIN — ONDANSETRON 4 MG: 2 INJECTION INTRAMUSCULAR; INTRAVENOUS at 13:07

## 2021-02-18 RX ADMIN — INSULIN GLARGINE 15 UNITS: 100 INJECTION, SOLUTION SUBCUTANEOUS at 08:24

## 2021-02-18 RX ADMIN — LOPERAMIDE HYDROCHLORIDE 2 MG: 2 CAPSULE ORAL at 16:06

## 2021-02-18 RX ADMIN — INSULIN LISPRO 3 UNITS: 100 INJECTION, SOLUTION INTRAVENOUS; SUBCUTANEOUS at 00:27

## 2021-02-18 RX ADMIN — INSULIN LISPRO 3 UNITS: 100 INJECTION, SOLUTION INTRAVENOUS; SUBCUTANEOUS at 20:59

## 2021-02-18 RX ADMIN — Medication 20 ML: at 13:12

## 2021-02-18 RX ADMIN — HYDRALAZINE HYDROCHLORIDE 20 MG: 20 INJECTION, SOLUTION INTRAMUSCULAR; INTRAVENOUS at 05:21

## 2021-02-18 RX ADMIN — INSULIN LISPRO 3 UNITS: 100 INJECTION, SOLUTION INTRAVENOUS; SUBCUTANEOUS at 05:21

## 2021-02-18 RX ADMIN — Medication 20 ML: at 21:51

## 2021-02-18 RX ADMIN — INSULIN LISPRO 3 UNITS: 100 INJECTION, SOLUTION INTRAVENOUS; SUBCUTANEOUS at 08:23

## 2021-02-18 NOTE — DIALYSIS
3 hour hemodialysis treatment completed. Patient's b/p initially dropped but responded well to a reduced UF rate. Net fluid removal of 1L today. CVC ports flushed with normal saline and clamped. Post treatment b/p 128/60, HR 87. Updated primary RN.

## 2021-02-18 NOTE — PROGRESS NOTES
Renal Progress Note    Admission Date: 1/6/2021   Subjective:      Awake on vent, via trach    Objective:     Physical Exam:    Patient Vitals for the past 8 hrs:   BP Temp Pulse Resp SpO2   02/18/21 0823 (!) 153/46 -- 94 -- --   02/18/21 0800 -- -- 88 29 99 %   02/18/21 0759 (!) 147/65 -- 89 (!) 46 99 %   02/18/21 0745 -- -- 89 -- 99 %   02/18/21 0730 -- -- 91 -- 99 %   02/18/21 0715 -- -- 85 -- 99 %   02/18/21 0700 (!) 146/65 99.1 °F (37.3 °C) 87 24 99 %   02/18/21 0659 (!) 146/65 -- 87 -- 99 %   02/18/21 0600 -- -- 96 (!) 44 98 %   02/18/21 0530 -- -- 87 (!) 41 97 %   02/18/21 0529 (!) 163/75 -- 85 (!) 63 97 %   02/18/21 0521 (!) 202/64 -- -- -- --   02/18/21 0515 -- -- 87 (!) 44 94 %   02/18/21 0500 -- -- 86 (!) 89 96 %   02/18/21 0459 (!) 164/74 -- 88 (!) 89 95 %   02/18/21 0445 -- -- 92 (!) 73 95 %   02/18/21 0430 -- -- 89 (!) 71 95 %   02/18/21 0429 (!) 164/77 -- 88 (!) 70 95 %   02/18/21 0415 -- -- 88 (!) 60 96 %   02/18/21 0400 -- -- 83 (!) 66 97 %   02/18/21 0359 (!) 154/71 98.5 °F (36.9 °C) 84 (!) 56 96 %   02/18/21 0345 -- -- 84 (!) 57 97 %   02/18/21 0335 -- -- 84 24 97 %   02/18/21 0330 -- -- 82 (!) 86 98 %   02/18/21 0329 137/64 -- 83 (!) 83 99 %   02/18/21 0315 -- -- 81 (!) 67 98 %   02/18/21 0300 -- -- 85 (!) 49 98 %   02/18/21 0259 (!) 126/59 -- 85 (!) 71 98 %   02/18/21 0245 -- -- 85 (!) 56 98 %   02/18/21 0230 -- -- 76 (!) 43 99 %   02/18/21 0229 (!) 120/56 -- 75 (!) 56 100 %   02/18/21 0215 -- -- 71 (!) 48 100 %   02/18/21 0200 -- -- 70 (!) 41 99 %   02/18/21 0159 (!) 116/57 -- 76 (!) 43 99 %   02/18/21 0145 -- -- 77 (!) 34 99 %   02/18/21 0130 -- -- 80 28 98 %   02/18/21 0129 (!) 116/56 -- 77 (!) 39 99 %   02/18/21 0115 -- -- 79 27 98 %   02/18/21 0100 -- -- 79 (!) 31 98 %      Gen: comfortable , NAD  HEENT: dry membranes  CV: S1, S2  Lungs: Clear bilaterally  Extem: no edema     Current Facility-Administered Medications   Medication Dose Route Frequency    dexmedeTOMidine in 0.9 % NaCl (PRECEDEX) 400 mcg/100 mL (4 mcg/mL) infusion soln  0.1-1.5 mcg/kg/hr IntraVENous TITRATE    [Held by provider] risperiDONE (RisperDAL) 1 mg/mL oral solution soln 0.5 mg  0.5 mg Nasogastric BID    heparin (porcine) 1,000 unit/mL injection 5,000 Units  5,000 Units Injection DIALYSIS PRN    [Held by provider] oxyCODONE (ROXICODONE) 5 mg/5 mL oral solution 5 mg  5 mg Per G Tube Q6H    insulin glargine (LANTUS) injection 15 Units  15 Units SubCUTAneous DAILY    amiodarone (CORDARONE) tablet 200 mg  200 mg Oral DAILY    [Held by provider] oxyCODONE (ROXICODONE) 5 mg/5 mL oral solution 5 mg  5 mg Oral Q4H PRN    lansoprazole (PREVACID) 3 mg/mL oral suspension 30 mg  30 mg Per NG tube Q12H    ondansetron (ZOFRAN) injection 4 mg  4 mg IntraVENous Q6H PRN    polyethylene glycol (MIRALAX) packet 17 g  17 g Per NG tube DAILY PRN    guaiFENesin-dextromethorphan (ROBITUSSIN DM) 100-10 mg/5 mL syrup 10 mL  10 mL Per NG tube Q4H PRN    acetaminophen (TYLENOL) tablet 650 mg  650 mg Per NG tube Q6H PRN    sodium zirconium cyclosilicate (LOKELMA) powder packet 10 g  10 g Oral DAILY    white petrolatum-mineral oiL (LACRILUBE S.O.P.) ointment   Both Eyes Q4H PRN    atorvastatin (LIPITOR) tablet 80 mg  80 mg Per NG tube DAILY    [Held by provider] clopidogreL (PLAVIX) tablet 75 mg  75 mg Per NG tube DAILY    metoprolol tartrate (LOPRESSOR) tablet 50 mg  50 mg Per NG tube BID    insulin lispro (HUMALOG) injection   SubCUTAneous Q4H    NUTRITIONAL SUPPORT ELECTROLYTE PRN ORDERS   Does Not Apply PRN    sodium chloride (NS) flush 20 mL  20 mL InterCATHeter Q8H    sodium chloride (NS) flush 20 mL  20 mL InterCATHeter PRN    loperamide (IMODIUM) capsule 2 mg  2 mg Oral Q4H PRN    glucagon (GLUCAGEN) injection 1 mg  1 mg IntraMUSCular PRN    dextrose (D50W) injection syrg 12.5-25 g  25-50 mL IntraVENous PRN    albuterol (PROVENTIL HFA, VENTOLIN HFA, PROAIR HFA) inhaler 2 Puff  2 Puff Inhalation Q4H PRN    influenza vaccine 2020-21 (6 mos+)(PF) (FLUARIX/FLULAVAL/FLUZONE QUAD) injection 0.5 mL  0.5 mL IntraMUSCular PRIOR TO DISCHARGE    hydrALAZINE (APRESOLINE) 20 mg/mL injection 20 mg  20 mg IntraVENous Q6H PRN            Data Review:     LABS:   Recent Results (from the past 12 hour(s))   GLUCOSE, POC    Collection Time: 02/17/21 11:43 PM   Result Value Ref Range    Glucose (POC) 187 (H) 65 - 100 mg/dL   PROTHROMBIN TIME + INR    Collection Time: 02/18/21  3:28 AM   Result Value Ref Range    Prothrombin time 14.9 (H) 12.5 - 14.7 sec    INR 1.1     PTT    Collection Time: 02/18/21  3:28 AM   Result Value Ref Range    aPTT 39.2 (H) 24.1 - 35.1 SEC   FIBRINOGEN    Collection Time: 02/18/21  3:28 AM   Result Value Ref Range    Fibrinogen 478 190 - 095 mg/dL   METABOLIC PANEL, BASIC    Collection Time: 02/18/21  3:28 AM   Result Value Ref Range    Sodium 139 136 - 145 mmol/L    Potassium 3.8 3.5 - 5.1 mmol/L    Chloride 104 98 - 107 mmol/L    CO2 31 21 - 32 mmol/L    Anion gap 4 (L) 7 - 16 mmol/L    Glucose 173 (H) 65 - 100 mg/dL    BUN 59 (H) 8 - 23 MG/DL    Creatinine 4.03 (H) 0.8 - 1.5 MG/DL    GFR est AA 20 (L) >60 ml/min/1.73m2    GFR est non-AA 16 (L) >60 ml/min/1.73m2    Calcium 8.1 (L) 8.3 - 10.4 MG/DL   CBC W/O DIFF    Collection Time: 02/18/21  3:28 AM   Result Value Ref Range    WBC 11.0 4.3 - 11.1 K/uL    RBC 2.49 (L) 4.23 - 5.6 M/uL    HGB 7.3 (L) 13.6 - 17.2 g/dL    HCT 23.8 (L) 41.1 - 50.3 %    MCV 95.6 79.6 - 97.8 FL    MCH 29.3 26.1 - 32.9 PG    MCHC 30.7 (L) 31.4 - 35.0 g/dL    RDW 15.9 (H) 11.9 - 14.6 %    PLATELET 813 272 - 451 K/uL    MPV 10.3 9.4 - 12.3 FL    ABSOLUTE NRBC 0.00 0.0 - 0.2 K/uL   POC G3    Collection Time: 02/18/21  4:06 AM   Result Value Ref Range    Device: VENT      FIO2 (POC) 30 %    pH (POC) 7.42 7.35 - 7.45      pCO2 (POC) 46.2 (H) 35 - 45 MMHG    pO2 (POC) 87 75 - 100 MMHG    HCO3 (POC) 30.2 (H) 22 - 26 MMOL/L    sO2 (POC) 97 95 - 98 %    Base excess (POC) 5 mmol/L    Mode VENTILATOR/SPONTANEOUS/PRESSURE SUPPORT      PEEP/CPAP (POC) 10 cmH2O    Pressure support 14 cmH2O    Allens test (POC) NOT APPLICABLE      Total resp. rate 28      Site DRAWN FROM ARTERIAL LINE      Specimen type (POC) ARTERIAL      Performed by Dhiraj     CO2, POC 32 MMOL/L    Respiratory comment: NurseNotified     Exhaled minute volume 13.60 L/min    COLLECT TIME 400     GLUCOSE, POC    Collection Time: 02/18/21  7:09 AM   Result Value Ref Range    Glucose (POC) 181 (H) 65 - 100 mg/dL         Plan:     Principal Problem:    Pneumonia due to COVID-19 virus (1/7/2021)    Active Problems:    Obesity (10/6/2015)      Hypertension (10/27/2016)      CAD (coronary artery disease) (10/28/2016)      Overview: 10-27-06 [facility] 90% LAD s/p 2.25 x 20 Synergy drug-eluting stent  (CS)      SWATI (acute kidney injury) ([address] (1/7/2021)      Acute hypoxemic respiratory failure ([address] (1/7/2021)      Type 2 diabetes mellitus with hyperosmolarity without nonketotic hyperglycemic-hyperosmolar coma ([address] (1/7/2021)      Acute deep vein thrombosis (DVT) of left peroneal vein ([address] (1/28/2021)      VAP (ventilator-associated pneumonia) ([address] (2/9/2021)      Critical illness polyneuropathy ([address] (2/12/2021)      Anemia (2/16/2021)       SWATI - ATN, anuric   - 1st dialysis was 1/25/21  In setting of covid pneumonia. - out of isolation   - better hemodynamics. Will plan for intermittent HD today         COVID19/ Resp Failure     Hypotension- resolved.      A Fib with RVR     Anemia - s/p transfusion    Addendum:  The patient was seen on dialysis at 10:25 . BP dropped some. CVP is low. Will turn off the ultrafiltration component. . Catheter is functioning well.

## 2021-02-18 NOTE — PROGRESS NOTES
Ventilator check complete; patient has a #6. 0XL Distal tracheostomy. Patient is not sedated. Patient is able to follow commands. Breath sounds are coarse. Trachea is midline, Negative for subcutaneous air, and chest excursion is symmetric. Patient is also Negative for cyanosis and is Negative for pitting edema. All alarms are set and audible. Resuscitation bag is at the head of the bed. Ventilator Settings  Mode FIO2 Rate Tidal Volume Pressure PEEP I:E Ratio   Pressure support  29 %     14 cm H2O  10 cm H20        Peak airway pressure: 24.3 cm H2O   Minute ventilation: 14.3 l/min     ABG: No results for input(s): PH, PCO2, PO2, HCO3 in the last 72 hours.       Dasha Kuhn, RT

## 2021-02-18 NOTE — PROGRESS NOTES
Kaylee Hebert. Admission Date: 1/6/2021         60 y.o. y/o male with acute hypoxemic respiratory failure secondary to COVID-19.     Pt admitted 1/7 with fever, cough. Covid + 1/6. Admitted by hospitalists. Started on dexamethasone, convalescent plasma, and remdesivir. Pulm consulted 1/10 with pt requiring CPAP but pt decompensated and was intubated 1/17. Nephrology consulted 1/23 for renal failure and was started on dialysis. US 1/18 with L peroneal vein thrombus. He was started on heparin but stopped due to bleeding from HD catheter. He was started on HD on 1/25. Trached on 2/2 secondary to inability to wean from the vent. Pt developed afib requiring amio. PEG placed 2/4. IVC filter 2/8. ICU Daily Progress Note: 2/18/2021  Subjective:     Awake and alert, follows commands. Appears depressed  Is weak, but moves all extremities  Tolerated SLED 2/16, yesterday is off pressors.     Participating with PT    Current Facility-Administered Medications   Medication Dose Route Frequency    0.9% sodium chloride infusion 250 mL  250 mL IntraVENous PRN    0.9% sodium chloride infusion 250 mL  250 mL IntraVENous PRN    dexmedeTOMidine in 0.9 % NaCl (PRECEDEX) 400 mcg/100 mL (4 mcg/mL) infusion soln  0.1-1.5 mcg/kg/hr IntraVENous TITRATE    [Held by provider] risperiDONE (RisperDAL) 1 mg/mL oral solution soln 0.5 mg  0.5 mg Nasogastric BID    heparin (porcine) 1,000 unit/mL injection 5,000 Units  5,000 Units Injection DIALYSIS PRN    [Held by provider] oxyCODONE (ROXICODONE) 5 mg/5 mL oral solution 5 mg  5 mg Per G Tube Q6H    insulin glargine (LANTUS) injection 15 Units  15 Units SubCUTAneous DAILY    amiodarone (CORDARONE) tablet 200 mg  200 mg Oral DAILY    [Held by provider] oxyCODONE (ROXICODONE) 5 mg/5 mL oral solution 5 mg  5 mg Oral Q4H PRN    lansoprazole (PREVACID) 3 mg/mL oral suspension 30 mg  30 mg Per NG tube Q12H    ondansetron (ZOFRAN) injection 4 mg  4 mg IntraVENous Q6H PRN    polyethylene glycol (MIRALAX) packet 17 g  17 g Per NG tube DAILY PRN    guaiFENesin-dextromethorphan (ROBITUSSIN DM) 100-10 mg/5 mL syrup 10 mL  10 mL Per NG tube Q4H PRN    acetaminophen (TYLENOL) tablet 650 mg  650 mg Per NG tube Q6H PRN    sodium zirconium cyclosilicate (LOKELMA) powder packet 10 g  10 g Oral DAILY    white petrolatum-mineral oiL (LACRILUBE S.O.P.) ointment   Both Eyes Q4H PRN    atorvastatin (LIPITOR) tablet 80 mg  80 mg Per NG tube DAILY    [Held by provider] clopidogreL (PLAVIX) tablet 75 mg  75 mg Per NG tube DAILY    metoprolol tartrate (LOPRESSOR) tablet 50 mg  50 mg Per NG tube BID    insulin lispro (HUMALOG) injection   SubCUTAneous Q4H    NUTRITIONAL SUPPORT ELECTROLYTE PRN ORDERS   Does Not Apply PRN    sodium chloride (NS) flush 20 mL  20 mL InterCATHeter Q8H    sodium chloride (NS) flush 20 mL  20 mL InterCATHeter PRN    loperamide (IMODIUM) capsule 2 mg  2 mg Oral Q4H PRN    dextrose 40% (GLUTOSE) oral gel 1 Tube  15 g Oral PRN    glucagon (GLUCAGEN) injection 1 mg  1 mg IntraMUSCular PRN    dextrose (D50W) injection syrg 12.5-25 g  25-50 mL IntraVENous PRN    albuterol (PROVENTIL HFA, VENTOLIN HFA, PROAIR HFA) inhaler 2 Puff  2 Puff Inhalation Q4H PRN    influenza vaccine 2020-21 (6 mos+)(PF) (FLUARIX/FLULAVAL/FLUZONE QUAD) injection 0.5 mL  0.5 mL IntraMUSCular PRIOR TO DISCHARGE    hydrALAZINE (APRESOLINE) 20 mg/mL injection 20 mg  20 mg IntraVENous Q6H PRN         Objective:     Vitals:    02/18/21 0530 02/18/21 0600 02/18/21 0659 02/18/21 0700   BP:   (!) 146/65 (!) 146/65   Pulse: 87 96 87 87   Resp: (!) 41 (!) 44  24   Temp:    99.1 °F (37.3 °C)   SpO2: 97% 98% 99% 99%   Weight:       Height:         Intake and Output:       Physical Exam:          GEN: acutely ill, vent support via tracheostomy  HEENT:  PERRL, no alar flaring or epistaxis, oral mucosa moist without cyanosis,   NECK:  no JVD, no retractions, no thyromegaly or masses,   LUNGS:  Bilateral lung sounds diminished; no wheezing  HEART:  RRR with no M,G,R;  ABDOMEN:  soft with no tenderness; positive bowel sounds present; tube feedings infusing  EXTREMITIES:  warm with no cyanosis, no lower leg edema  SKIN:  no jaundice or ecchymosis   NEURO: on vent, wakes easily to voice. Follows commands; moves all extremities    LINES:  PICC  Trach XLT shiley #8 cuffed  Art line  HD catheter  PEG  flexiseal    DRIPS:  none     NUTRITION:  Tube feeds via PEG      Ventilator Settings  Mode FIO2 Rate Tidal Volume Pressure PEEP   Pressure control(for resp distress)  39 %    500 ml  14 cm H2O  10 cm H20      Peak airway pressure: 30 cm H2O   Minute ventilation: 13.2 l/min       CHEST XRAY:       LAB  Recent Labs     02/18/21 0328 02/17/21 0316 02/16/21  1136   WBC 11.0 12.3* 11.8*   HGB 7.3* 7.2* 6.3*  6.2*   HCT 23.8* 22.9* 20.6*  20.4*    199 228     Recent Labs     02/18/21 0328 02/17/21 0316 02/16/21  0346    139 137*   K 3.8 4.1 4.3    103 103   CO2 31 33* 28   * 174* 143*   BUN 59* 34* 76*   CREA 4.03* 2.55* 4.55*   MG  --  2.0  --    PHOS  --  3.8*  --    INR 1.1 1.2 1.2     ABG:      Cultures:  Respiratory cultures 2/5 with staph aureus and klebsiella pneumoniae  Respiratory culture on 1/30 with 4+ mucous, few yeast, few GPC  Blood cultures-all NGTD    ECHO 1/26  SUMMARY:  -  Left ventricle: The ventricle was mildly dilated. Systolic function was  normal. Ejection fraction was estimated in the range of 55 % to 60 %. Although  no diagnostic regional wall motion abnormality was identified, this   possibility  cannot be completely excluded on the basis of this study. There was mild  concentric hypertrophy. Left ventricular diastolic function is normal with an  average E/e of 10.4.  -  Right ventricle: The size was at the upper limits of normal. There was   mild  pulmonary artery hypertension.  -  Left atrium: The atrium was mildly dilated.   -  Right atrium: The atrium was mildly dilated. -  Inferior vena cava, hepatic veins: The inferior vena cava was dilated. The  respirophasic change in diameter was more than 50%. MRI:  IMPRESSION  1. No evidence of acute infarction. 2. Large bilateral mastoid effusions. Correlation for associated symptoms is  recommended. 3. Otherwise unremarkable noncontrast MRI of the brain for patient age. Assessment:     Patient Active Problem List   Diagnosis Code    Obesity E66.9    Hypertension I10    Bradycardia R00.1    PVC (premature ventricular contraction) I49.3    CAD (coronary artery disease) I25.10    Dyslipidemia, goal LDL below 70 E78.5    SWATI (acute kidney injury) ([facility] N17.9    Acute hypoxemic respiratory failure (HCC) J96.01    Type 2 diabetes mellitus with hyperosmolarity without nonketotic hyperglycemic-hyperosmolar coma (HCC) E11.00    Pneumonia due to COVID-19 virus U07.1, J12.82    Acute deep vein thrombosis (DVT) of left peroneal vein (HCC) I82.452    VAP (ventilator-associated pneumonia) ([facility]) J95.851    Critical illness polyneuropathy ([facility]) G62.81    Anemia D64.9            PLAN:  Hospital Problems  Date Reviewed: 2/5/2021          Codes Class Noted POA    Anemia ICD-10-CM: D64.9  ICD-9-CM: 285.9  2/16/2021 Unknown    -stable and improved without transfusion; monitor    Critical illness polyneuropathy ([facility] ICD-10-CM: G62.81  ICD-9-CM: 357.82  2/12/2021 Unknown        VAP (ventilator-associated pneumonia) ([facility] ICD-10-CM: E96.532  ICD-9-CM: 997.31  2/9/2021 Unknown    --continues on vent; wean PS and attempt CPAP    Acute deep vein thrombosis (DVT) of left peroneal vein (HCC) ICD-10-CM: U17.606  ICD-9-CM: 453.42  1/28/2021 Unknown    -IVC filter in place. Consider restarting OAC if hemoglobin remains stable    SWATI (acute kidney injury) [facility]) ICD-10-CM: N17.9  ICD-9-CM: 584.9  1/7/2021 Unknown    --nephrology managing dialysis. Tolerated SLED on 2/16 and off pressors.   Attempt to try HD    Acute hypoxemic respiratory failure [facility]) ICD-10-CM: J96.01  ICD-9-CM: 518.81  1/7/2021 Unknown    --Continuing weaning ventilator  --PT/OT to re-engage     Type 2 diabetes mellitus with hyperosmolarity without nonketotic hyperglycemic-hyperosmolar coma ([facility] ICD-10-CM: E11.00  ICD-9-CM: 250.20  1/7/2021 Unknown    -BG stable with tube feedings, lantus and humalog    * (Principal) Pneumonia due to COVID-19 virus ICD-10-CM: U07.1, J12.82  ICD-9-CM: 480.8  1/7/2021 Unknown    --off all antibiotics 2/15    CAD (coronary artery disease) (Chronic) ICD-10-CM: I25.10  ICD-9-CM: 414.00  10/28/2016 Yes    Overview Signed 10/28/2016  8:05 AM by SHELDON Welsh     10-27-06 [facility] 90% LAD s/p 2.25 x 20 Synergy drug-eluting stent  (CS)             Hypertension ICD-10-CM: I10  ICD-9-CM: 401.9  10/27/2016 Yes    -restart home Metoprolol; monitor while transitioning from SLED to HD. Obesity (Chronic) ICD-10-CM: E66.9  ICD-9-CM: 278.00  10/6/2015 Yes          -remove flexi-seal/fecal management. --If can tolerate HD, can consider LTAC. SINGH Phelps      The patient has been seen and examined by me personally, the chart,labs, and radiographic studies have been reviewed. Chest: CTA-   Extremities: 1+ edema    I agree with the above assessment and plan.     Virgilio Stout MD.

## 2021-02-18 NOTE — DIALYSIS
Hemodialysis initiated at bedside via right IJ dialysis catheter. Ports aspirated and flushed without difficulty. Dressing c/d/i. Machine settings verified. Today is 1st HD run. Will monitor.

## 2021-02-18 NOTE — PROGRESS NOTES
PS trial initiated this am. Ventilator check complete; patient has a #6.0 XLT shiley tracheostomy. Patient is not sedated. Patient is able to follow commands. Breath sounds are clear and diminished. Trachea is midline, Negative for subcutaneous air, and chest excursion is symmetric. Patient is also Negative for cyanosis and is Positive for pitting edema. All alarms are set and audible. Resuscitation bag is at the head of the bed. Ventilator Settings  Mode FIO2 Rate Tidal Volume Pressure PEEP I:E Ratio   Pressure support  40 %    0 ml  14 cm H2O  10 cm H20  0      Peak airway pressure: 24 cm H2O   Minute ventilation: 16.3 l/min     ABG: See in results.        [address], RT

## 2021-02-18 NOTE — PROGRESS NOTES
Spoke with sister, Rika, regarding POC for d/c needs with LTACH. Discussed at length their goals of care to wean from ventilator and possible needs for STR from LTACH. Made aware waiting to make sure pt can tolerate HD and that MD will make final decision to whether pt is stable for d/c to LTACH. Aware will need insurance pre-certification and that Sherly, liaison will possible call for consent or any question with insurance. Agrees with POC. CM following.

## 2021-02-18 NOTE — PROGRESS NOTES
ACUTE PHYSICAL THERAPY GOALS:  (Developed with and agreed upon by patient and/or caregiver.)  UPDATED 21  LTG:  (1.)Mr. Torres will move from supine to sit and sit to supine , scoot up and down and roll side to side in bed with MINIMAL ASSIST within 7 treatment day(s). (2.)Mr. Torres will tolerate sitting up in recliner chair for 2 hours with stable vital signs to increase upright tolerance within 7 treatment day(s). (3.)Mr. Torres will maintain static/dynamic sitting x 15 minutes with MINIMAL ASSISTANCE for improved balance within 7 treatment days. (4.)Mr. Torres will participate in therapeutic activity/exercises x 24 minutes for increased strength within 7 treatment days. (5.)Mr. Torres will ambulate with MODERATE ASSIST for 5 feet with the least restrictive device within 7 treatment day(s). ________________________________________________________________________________________________      PHYSICAL THERAPY: Re-evaluation and AM Treatment Day # 1    Walda Carrel. is a 61 y.o. male   PRIMARY DIAGNOSIS: Pneumonia due to COVID-19 virus  Type 2 diabetes mellitus with hyperosmolarity without nonketotic hyperglycemic-hyperosmolar coma ([facility] [E11.00]  Lower respiratory tract infection due to COVID-19 virus [U07.1, J22]  Acute hypoxemic respiratory failure ([facility] [J96.01]  SWATI (acute kidney injury) ([facility] [N17.9]  Procedure(s) (LRB):  PERCUTANEOUS ENDOSCOPIC GASTROSTOMY TUBE INSERTION ROOM 3302  *Pt on vent with trach/ amio gtt (N/A)  ESOPHAGOGASTRODUODENOSCOPY (EGD) (N/A)  14 Days Post-Op    ASSESSMENT:     REHAB RECOMMENDATIONS: CURRENT LEVEL OF FUNCTION:  (Most Recently Demonstrated)   Recommendation to date pending progress:  [address] [place] [address]  Equipment:    To Be Determined Bed Mobility:   Total Assistance x 2  Sit to Stand:   Not tested  Transfers:   Not tested  Gait/Mobility:   Not tested     ASSESSMENT:  Mr. Gordon Lomeli was re-evaluated today in CCU on vent via trach. He was alert and able to follow majority of commands today. Pt has decreased strength and AROM in B LEs (R>L). He required totalA for rolling and OT present given pt's decreased activity tolerance. Pt's goals have been adjusted and he could benefit from skilled PT to address above deficits.          SUBJECTIVE:   Mr. Gordon Lomeli was unable to verbalize today    SOCIAL HISTORY/ LIVING ENVIRONMENT: See eval  Home Environment: Private residence  One/Two Story Residence: One story  Living Alone: No  Support Systems: Family member(s)  OBJECTIVE:     PAIN: VITAL SIGNS: LINES/DRAINS:   Pre Treatment: Pain Screen  Pain Scale 1: Numeric (0 - 10)  Pain Intensity 1: 0  Post Treatment: 0   Gimenez Catheter, IV, PEG and Rectal Tube  O2 Device: Tracheostomy     GROSS EVALUATION:  B LEs Within Functional Limits Abnormal/ Functional Abnormal/ Non-Functional (see comments) Not Tested Comments:   AROM [] [x] [] [] R > L   PROM [] [x] [] []    Strength [] [] [x] []    Balance [] [] [x] [] POOR sitting   Posture [] [] [] []    Sensation [] [] [x] []    Coordination [] [] [] []    Tone [] [] [] []    Edema [] [] [x] [] Edema in B LEs   Activity Tolerance [] [] [x] [] Fatigued quickly with supported    [] [] [] []      COGNITION/  PERCEPTION: Intact Impaired   (see comments) Comments:   Orientation [] [x]    Vision [] []    Hearing [] []    Command Following [] [x] Decreased but followed commands   Safety Awareness [] [x]     [] []        MOBILITY: I Mod I S SBA CGA Min Mod Max Total  NT x2 Comments:   Bed Mobility    Rolling [] [] [] [] [] [] [] [x] [x] [] [x]    Supine to Sit [] [] [] [] [] [] [] [x] [x] [] [x]    Scooting [] [] [] [] [] [] [] [] [x] [] [x]    Sit to Supine [] [] [] [] [] [] [] [] [x] [] [x]    Transfers    Sit to Stand [] [] [] [] [] [] [] [] [] [] []    Bed to Chair [] [] [] [] [] [] [] [] [] [] []    Stand to Sit [] [] [] [] [] [] [] [] [] [] []    I=Independent, Mod I=Modified Independent, S=Supervision, SBA=Standby Assistance, CGA=Contact Guard Assistance,   Min=Minimal Assistance, Mod=Moderate Assistance, Max=Maximal Assistance, Total=Total Assistance, NT=Not Tested    GAIT: I Mod I S SBA CGA Min Mod Max Total  NT x2 Comments:   Level of Assistance [] [] [] [] [] [] [] [] [] [x] []    Distance NT    DME N/A    Gait Quality NT    I=Independent, Mod I=Modified Independent, S=Supervision, SBA=Standby Assistance, CGA=Contact Guard Assistance,   Min=Minimal Assistance, Mod=Moderate Assistance, Max=Maximal Assistance, Total=Total Assistance, NT=Not Tested  [facility] AM-PAC™ “6 Clicks”   Basic Mobility Inpatient Short Form       How much difficulty does the patient currently have... Unable A Lot A Little None   1.  Turning over in bed (including adjusting bedclothes, sheets and blankets)?   [] 1   [x] 2   [] 3   [] 4   2.  Sitting down on and standing up from a chair with arms ( e.g., wheelchair, bedside commode, etc.)   [x] 1   [] 2   [] 3   [] 4   3.  Moving from lying on back to sitting on the side of the bed?   [] 1   [x] 2   [] 3   [] 4   How much help from another person does the patient currently need... Total A Lot A Little None   4.  Moving to and from a bed to a chair (including a wheelchair)?   [x] 1   [] 2   [] 3   [] 4   5.  Need to walk in hospital room?   [x] 1   [] 2   [] 3   [] 4   6.  Climbing 3-5 steps with a railing?   [x] 1   [] 2   [] 3   [] 4   © 2007, Trustees of [facility], under license to [employer]. All rights reserved     Score:  Initial: 20 Most Recent: 8(Date: 2/18/21 )    Interpretation of Tool:  Represents activities that are increasingly more difficult (i.e. Bed mobility, Transfers, Gait).  PLAN:   FREQUENCY/DURATION: PT Plan of Care: 3 times/week for duration of hospital stay or until stated goals are met, whichever comes first.  TREATMENT:   Re-evaluation    TREATMENT:   ($$ Therapeutic Activity: 23-37 mins    )  Co-Treatment PT/OT necessary due to patient's decreased overall  endurance/tolerance levels, as well as need for high level skilled assistance to complete functional transfers/mobility and functional tasks  Therapeutic Activity (23 Minutes): Therapeutic activity included Rolling, Supine to Sit, Sit to Supine, Scooting and Sitting balance  to improve functional Mobility, Strength, ROM and Activity tolerance.       Date:  2/8/21 Date:   Date: Date: Date:     DF stretch 2 x 10 sec B PROM       Heel slides 1 x 2 R LE PROM       APs 1 x 5 B LE PROM                               B = bilateral; AA = active assistive; A = active; P = passive          AFTER TREATMENT POSITION/PRECAUTIONS:  Bed and RN notified    INTERDISCIPLINARY COLLABORATION:  RN/PCT, PT/PTA and OT/SIN    TOTAL TREATMENT DURATION:  PT Patient Time In/Time Out  Time In: 0849  Time Out: [address] Denae Alcala, PT, DPT

## 2021-02-18 NOTE — PROGRESS NOTES
Bedside, Verbal and Written shift change report given to Noe Fishman RN (oncoming nurse) by Lino Pena RN (offgoing nurse). Report included the following information SBAR, Kardex, ED Summary, Procedure Summary, Intake/Output, MAR, Recent Results and Cardiac Rhythm NSR.

## 2021-02-18 NOTE — DIABETES MGMT
Patient's blood glucose ranged 149-202 yesterday with patient receiving Lantus 15 units and Humalog 12 units. Blood glucose 181 this morning. Hgb 7. 3. Cr 4.03. GFR 16. Patient remains on vent. Has trach. Peg tube and Nepro TF. Still febrile. SLED today. Will follow along.

## 2021-02-18 NOTE — PROGRESS NOTES
ACUTE OT GOALS:  (Developed with and agreed upon by patient and/or caregiver.)  NEW GOALS   1. Patient will complete self-grooming tasks in supported sitting with mod A and adaptive equipment as needed. 2. Patient will complete functional transfers with max A and adaptive equipment as needed. 3. Patient will tolerate 30 minutes OT treatment with 2-3 rest breaks as needed to increase activity tolerance for ADL performance. 4. Patient will tolerate 15 minutes BUE therapeutic exercises to increase functional use of BUE during ADL performance.        Timeframe: 7 days      OCCUPATIONAL THERAPY ASSESSMENT: Daily Note and Re-evaluation OT Treatment Day # 807 N Karel Us is a 61 y.o. male   PRIMARY DIAGNOSIS: Pneumonia due to COVID-19 virus  Type 2 diabetes mellitus with hyperosmolarity without nonketotic hyperglycemic-hyperosmolar coma (HCC) [E11.00]  Lower respiratory tract infection due to COVID-19 virus [U07.1, J22]  Acute hypoxemic respiratory failure (HCC) [J96.01]  SWATI (acute kidney injury) ([facility] [N17.9]  Procedure(s) (LRB):  PERCUTANEOUS ENDOSCOPIC GASTROSTOMY TUBE INSERTION ROOM 3302  *Pt on vent with trach/ amio gtt (N/A)  ESOPHAGOGASTRODUODENOSCOPY (EGD) (N/A)  14 Days Post-Op  Reason for Referral:  Fever, SOB, confusion, decreased balance and mobility  ICD-10: Treatment Diagnosis: Generalized Muscle Weakness (M62.81)  INPATIENT: Payor: [employer] / Plan: [employer] PPO / Product Type: Commerical /   ASSESSMENT:     REHAB RECOMMENDATIONS:   Recommendation to date pending progress:  [address] [place] [address]   Equipment:    To Be Determined     PRIOR LEVEL OF FUNCTION:  (Prior to Hospitalization)  INITIAL/CURRENT LEVEL OF FUNCTION:  (Most Recently Demonstrated)   Bathing:   Independent  Dressing:   Independent  Feeding/Grooming:   Independent  Toileting:   Independent  Functional Mobility:   Independent Bathing:   Total Assistance  Dressing:   Total Assistance  Feeding/Grooming:   Total Assistance  Toileting:   Total Assistance  Functional Mobility:   Total Assistance     ASSESSMENT:  Mr. Autumn Valadez presented generally weak with deficits in balance, endurance, and mobility impacting ADLs. Pt lives with his sister and is independent at baseline, works and drives, does not own or use any DME. Presents in ICU today with trach and resting on pressure support. Re-evaluation completed today after recent decline in medical status and subsequent d/c from therapy services. Alert and interactive today. Total A x2 for functional transfers and total A required to maintain upright sitting. ADL performance limited d/t BUE weakness and poor sitting balance/tolerance. Total A for washing face and combing hair. Fatigues quickly. At this time, Moriah Wasserman is functioning below baseline for ADLs and functional mobility. Pt would benefit from skilled OT services to address OT goals and plan of care.       SUBJECTIVE:   Mr. Autumn Valadez states Unable to verbalize    SOCIAL HISTORY/LIVING ENVIRONMENT:   Home Environment: Private residence  One/Two Story Residence: One story  Living Alone: No  Support Systems: Family member(s)    OBJECTIVE:     PAIN: VITAL SIGNS: LINES/DRAINS:   Pre Treatment: Pain Screen  Pain Scale 1: FLACC  Pain Intensity 1: 0  Post Treatment: 0   IVs, flexi-seal, purcell  O2 Device: Tracheostomy     GROSS EVALUATION:  BUE Within Functional Limits Abnormal/ Functional Abnormal/ Non-Functional (see comments) Not Tested Comments:   AROM [] [] [x] []    PROM [] [] [x] []    Strength [] [] [x] []    Balance [] [] [x] [] Requires supported sitting    Posture [] [] [x] [] Total A; requires supported sitting    Sensation [] [] [x] []    Coordination [] [] [x] []    Tone [] [] [x] []    Edema [] [] [x] []    Activity Tolerance [] [] [x] []     [] [] [] []      COGNITION/  PERCEPTION: Intact Impaired   (see comments) Comments:   Orientation [] []    Vision [] []   Hearing [] [x]    Judgment/ Insight [] [x]    Attention [] [x] Lethargic/confused   Memory [] [x] confused   Command Following [] [x] Minimal command following but will open eyes   Emotional Regulation [] [x]     [] []      ACTIVITIES OF DAILY LIVING: I Mod I S SBA CGA Min Mod Max Total NT Comments   BASIC ADLs:              Bathing/ Showering [] [] [] [] [] [] [] [] [] [x]    Toileting [] [] [] [] [] [] [] [] [] [x]    Dressing [] [] [] [] [] [] [] [] [] [x]    Feeding [] [] [] [] [] [] [] [] [] [x]    Grooming [] [] [] [] [] [] [] [] [] [x]    Personal Device Care [] [] [] [] [] [] [] [] [] [x]    Functional Mobility [] [] [] [] [] [] [] [] [x] [] X 2   I=Independent, Mod I=Modified Independent, S=Supervision, SBA=Standby Assistance, CGA=Contact Guard Assistance,   Min=Minimal Assistance, Mod=Moderate Assistance, Max=Maximal Assistance, Total=Total Assistance, NT=Not Tested    MOBILITY: I Mod I S SBA CGA Min Mod Max Total  NT x2 Comments:   Supine to sit [] [] [] [] [] [] [] [] [x] [] [x]    Sit to supine [] [] [] [] [] [] [] [] [x] [] [x]    Sit to stand [] [] [] [] [] [] [] [] [] [] []    Bed to chair [] [] [] [] [] [] [] [] [] [] []    I=Independent, Mod I=Modified Independent, S=Supervision, SBA=Standby Assistance, CGA=Contact Guard Assistance,   Min=Minimal Assistance, Mod=Moderate Assistance, Max=Maximal Assistance, Total=Total Assistance, NT=Not Tested    [facility] AM-PAC™ “6 Clicks”   Daily Activity Inpatient Short Form        How much help from another person does the patient currently need... Total A Lot A Little None   1.  Putting on and taking off regular lower body clothing?   [x] 1   [] 2   [] 3   [] 4   2.  Bathing (including washing, rinsing, drying)?   [x] 1   [] 2   [] 3   [] 4   3.  Toileting, which includes using toilet, bedpan or urinal?   [x] 1   [] 2   [] 3   [] 4   4.  Putting on and taking off regular upper body clothing?   [x] 1   [] 2   [] 3   [] 4   5.  Taking care of personal  grooming such as brushing teeth? [x] 1   [] 2   [] 3   [] 4   6. Eating meals? [x] 1   [] 2   [] 3   [] 4   © 2007, Trustees of [facility], under license to [employer] All rights reserved     Score:  Initial: 19 Most Recent: 6 (Date: 2/18/2021 )   Interpretation of Tool:  Represents activities that are increasingly more difficult (i.e. Bed mobility, Transfers, Gait). PLAN:   FREQUENCY/DURATION: OT Plan of Care: 3 times/week for duration of hospital stay or until stated goals are met, whichever comes first.    PROBLEM LIST:   (Skilled intervention is medically necessary to address:)  1. Decreased ADL/Functional Activities  2. Decreased Activity Tolerance  3. Decreased Balance  4. Decreased Strength  5. Decreased Transfer Abilities   INTERVENTIONS PLANNED:   (Benefits and precautions of occupational therapy have been discussed with the patient.)  1. Self Care Training  2. Therapeutic Activity  3. Therapeutic Exercise/HEP  4. Neuromuscular Re-education  5. Education     TREATMENT:     EVALUATION:Re-evaluation    TREATMENT:   ($$ Self Care/Home Management: 8-22 mins$$ Neuromuscular Re-Education: 8-22 mins   )  Neuromuscular Re-education (23 Minutes): Neuromuscular Re-education included Coordination training, Hemibody awareness training, Postural training and Visual field awareness training to improve Balance, Coordination, Postural Control and Proprioception.      Date:  2/8/2021 Date:   Date:     Activity/Exercise Parameters Parameters Parameters   Wrist flexion/extension 2x10 reps BUE     Elbow flexion/extension 2x10 reps BUE     Supination/Pronation GE 2 x 10 reps BUE     Shoulder flexion 1 x 5 reps BUE                              AFTER TREATMENT POSITION/PRECAUTIONS:  Bed and RN notified    INTERDISCIPLINARY COLLABORATION:  RN/PCT, PT/PTA and OT/SIN    TOTAL TREATMENT DURATION:  OT Patient Time In/Time Out  Time In: 6530  Time Out: Wan [address], OT

## 2021-02-18 NOTE — PROGRESS NOTES
Pt discussed with Kelsey Sorenson NP and states they will attempt HD today to see if pt can tolerate. Will ask vilma Bryant to start precert today in hope will have approval for next week when pt might be ready for transition. Will call sister to discuss POC.

## 2021-02-19 ENCOUNTER — APPOINTMENT (OUTPATIENT)
Dept: GENERAL RADIOLOGY | Age: 61
DRG: 004 | End: 2021-02-19
Attending: INTERNAL MEDICINE
Payer: COMMERCIAL

## 2021-02-19 LAB
ANION GAP SERPL CALC-SCNC: 3 MMOL/L (ref 7–16)
APTT PPP: 34.9 SEC (ref 24.1–35.1)
ARTERIAL PATENCY WRIST A: ABNORMAL
BACTERIA SPEC CULT: ABNORMAL
BACTERIA SPEC CULT: ABNORMAL
BASE EXCESS BLDV CALC-SCNC: 6 MMOL/L
BDY SITE: ABNORMAL
BUN SERPL-MCNC: 47 MG/DL (ref 8–23)
CALCIUM SERPL-MCNC: 8.4 MG/DL (ref 8.3–10.4)
CHLORIDE SERPL-SCNC: 104 MMOL/L (ref 98–107)
CO2 BLD-SCNC: 33 MMOL/L
CO2 SERPL-SCNC: 33 MMOL/L (ref 21–32)
COLLECT TIME,HTIME: 442
CREAT SERPL-MCNC: 3.01 MG/DL (ref 0.8–1.5)
ERYTHROCYTE [DISTWIDTH] IN BLOOD BY AUTOMATED COUNT: 15.9 % (ref 11.9–14.6)
ERYTHROCYTE [DISTWIDTH] IN BLOOD BY AUTOMATED COUNT: 16.1 % (ref 11.9–14.6)
EXHALED MINUTE VOLUME, VE: 10.6 L/MIN
FIBRINOGEN PPP-MCNC: 443 MG/DL (ref 190–501)
GAS FLOW.O2 O2 DELIVERY SYS: ABNORMAL L/MIN
GLUCOSE BLD STRIP.AUTO-MCNC: 135 MG/DL (ref 65–100)
GLUCOSE BLD STRIP.AUTO-MCNC: 140 MG/DL (ref 65–100)
GLUCOSE BLD STRIP.AUTO-MCNC: 146 MG/DL (ref 65–100)
GLUCOSE BLD STRIP.AUTO-MCNC: 147 MG/DL (ref 65–100)
GLUCOSE BLD STRIP.AUTO-MCNC: 156 MG/DL (ref 65–100)
GLUCOSE BLD STRIP.AUTO-MCNC: 176 MG/DL (ref 65–100)
GLUCOSE BLD STRIP.AUTO-MCNC: 185 MG/DL (ref 65–100)
GLUCOSE SERPL-MCNC: 141 MG/DL (ref 65–100)
GRAM STN SPEC: ABNORMAL
HCO3 BLDV-SCNC: 31.5 MMOL/L (ref 23–28)
HCT VFR BLD AUTO: 24 % (ref 41.1–50.3)
HCT VFR BLD AUTO: 24.3 % (ref 41.1–50.3)
HGB BLD-MCNC: 7.3 G/DL (ref 13.6–17.2)
HGB BLD-MCNC: 7.3 G/DL (ref 13.6–17.2)
MAGNESIUM SERPL-MCNC: 2.3 MG/DL (ref 1.8–2.4)
MCH RBC QN AUTO: 28.7 PG (ref 26.1–32.9)
MCH RBC QN AUTO: 29.7 PG (ref 26.1–32.9)
MCHC RBC AUTO-ENTMCNC: 30 G/DL (ref 31.4–35)
MCHC RBC AUTO-ENTMCNC: 30.4 G/DL (ref 31.4–35)
MCV RBC AUTO: 95.7 FL (ref 79.6–97.8)
MCV RBC AUTO: 97.6 FL (ref 79.6–97.8)
NRBC # BLD: 0 K/UL (ref 0–0.2)
NRBC # BLD: 0 K/UL (ref 0–0.2)
O2/TOTAL GAS SETTING VFR VENT: 40 %
PCO2 BLDV: 52.2 MMHG (ref 41–51)
PEEP RESPIRATORY: 8 CMH2O
PH BLDV: 7.39 [PH] (ref 7.32–7.42)
PHOSPHATE SERPL-MCNC: 3.6 MG/DL (ref 2.3–3.7)
PLATELET # BLD AUTO: 251 K/UL (ref 150–450)
PLATELET # BLD AUTO: 262 K/UL (ref 150–450)
PMV BLD AUTO: 10.1 FL (ref 9.4–12.3)
PMV BLD AUTO: 10.2 FL (ref 9.4–12.3)
PO2 BLDV: 39 MMHG
POTASSIUM SERPL-SCNC: 3.9 MMOL/L (ref 3.5–5.1)
PRESSURE SUPPORT SETTING VENT: 12 CMH2O
RBC # BLD AUTO: 2.46 M/UL (ref 4.23–5.6)
RBC # BLD AUTO: 2.54 M/UL (ref 4.23–5.6)
SAO2 % BLDV: 71 % (ref 65–88)
SERVICE CMNT-IMP: ABNORMAL
SODIUM SERPL-SCNC: 140 MMOL/L (ref 138–145)
SPECIMEN TYPE: ABNORMAL
TOTAL RESP. RATE, ITRR: 22
UFH PPP CHRO-ACNC: 0.36 IU/ML (ref 0.3–0.7)
UFH PPP CHRO-ACNC: <0.1 IU/ML (ref 0.3–0.7)
VENTILATION MODE VENT: ABNORMAL
WBC # BLD AUTO: 11.2 K/UL (ref 4.3–11.1)
WBC # BLD AUTO: 12.4 K/UL (ref 4.3–11.1)

## 2021-02-19 PROCEDURE — 82803 BLOOD GASES ANY COMBINATION: CPT

## 2021-02-19 PROCEDURE — 74011636637 HC RX REV CODE- 636/637: Performed by: INTERNAL MEDICINE

## 2021-02-19 PROCEDURE — 74011250637 HC RX REV CODE- 250/637: Performed by: INTERNAL MEDICINE

## 2021-02-19 PROCEDURE — 85730 THROMBOPLASTIN TIME PARTIAL: CPT

## 2021-02-19 PROCEDURE — 80048 BASIC METABOLIC PNL TOTAL CA: CPT

## 2021-02-19 PROCEDURE — 83735 ASSAY OF MAGNESIUM: CPT

## 2021-02-19 PROCEDURE — 85520 HEPARIN ASSAY: CPT

## 2021-02-19 PROCEDURE — 74011250636 HC RX REV CODE- 250/636: Performed by: NURSE PRACTITIONER

## 2021-02-19 PROCEDURE — 82962 GLUCOSE BLOOD TEST: CPT

## 2021-02-19 PROCEDURE — 65620000000 HC RM CCU GENERAL

## 2021-02-19 PROCEDURE — 2709999900 HC NON-CHARGEABLE SUPPLY

## 2021-02-19 PROCEDURE — 85384 FIBRINOGEN ACTIVITY: CPT

## 2021-02-19 PROCEDURE — 84100 ASSAY OF PHOSPHORUS: CPT

## 2021-02-19 PROCEDURE — 74011250637 HC RX REV CODE- 250/637: Performed by: NURSE PRACTITIONER

## 2021-02-19 PROCEDURE — 85027 COMPLETE CBC AUTOMATED: CPT

## 2021-02-19 PROCEDURE — 71045 X-RAY EXAM CHEST 1 VIEW: CPT

## 2021-02-19 PROCEDURE — 36592 COLLECT BLOOD FROM PICC: CPT

## 2021-02-19 PROCEDURE — 99233 SBSQ HOSP IP/OBS HIGH 50: CPT | Performed by: INTERNAL MEDICINE

## 2021-02-19 PROCEDURE — 94003 VENT MGMT INPAT SUBQ DAY: CPT

## 2021-02-19 PROCEDURE — 77030040393 HC DRSG OPTIFOAM GENT MDII -B

## 2021-02-19 RX ORDER — HEPARIN SODIUM 5000 [USP'U]/ML
40 INJECTION, SOLUTION INTRAVENOUS; SUBCUTANEOUS ONCE
Status: COMPLETED | OUTPATIENT
Start: 2021-02-20 | End: 2021-02-19

## 2021-02-19 RX ORDER — HEPARIN SODIUM 5000 [USP'U]/100ML
18-36 INJECTION, SOLUTION INTRAVENOUS
Status: DISCONTINUED | OUTPATIENT
Start: 2021-02-19 | End: 2021-02-21

## 2021-02-19 RX ORDER — METOPROLOL TARTRATE 50 MG/1
50 TABLET ORAL EVERY 12 HOURS
Status: DISCONTINUED | OUTPATIENT
Start: 2021-02-19 | End: 2021-02-26 | Stop reason: HOSPADM

## 2021-02-19 RX ADMIN — INSULIN LISPRO 3 UNITS: 100 INJECTION, SOLUTION INTRAVENOUS; SUBCUTANEOUS at 16:14

## 2021-02-19 RX ADMIN — ACETAMINOPHEN 650 MG: 325 TABLET, FILM COATED ORAL at 20:08

## 2021-02-19 RX ADMIN — SODIUM ZIRCONIUM CYCLOSILICATE 10 G: 10 POWDER, FOR SUSPENSION ORAL at 08:23

## 2021-02-19 RX ADMIN — LANSOPRAZOLE 30 MG: KIT at 20:07

## 2021-02-19 RX ADMIN — INSULIN GLARGINE 15 UNITS: 100 INJECTION, SOLUTION SUBCUTANEOUS at 08:23

## 2021-02-19 RX ADMIN — LANSOPRAZOLE 30 MG: KIT at 08:23

## 2021-02-19 RX ADMIN — METOPROLOL TARTRATE 50 MG: 50 TABLET, FILM COATED ORAL at 20:09

## 2021-02-19 RX ADMIN — INSULIN LISPRO 3 UNITS: 100 INJECTION, SOLUTION INTRAVENOUS; SUBCUTANEOUS at 01:04

## 2021-02-19 RX ADMIN — LOPERAMIDE HYDROCHLORIDE 2 MG: 2 CAPSULE ORAL at 23:33

## 2021-02-19 RX ADMIN — AMIODARONE HYDROCHLORIDE 200 MG: 200 TABLET ORAL at 08:21

## 2021-02-19 RX ADMIN — ATORVASTATIN CALCIUM 80 MG: 40 TABLET, FILM COATED ORAL at 08:21

## 2021-02-19 RX ADMIN — Medication 20 ML: at 20:10

## 2021-02-19 RX ADMIN — LOPERAMIDE HYDROCHLORIDE 2 MG: 2 CAPSULE ORAL at 20:09

## 2021-02-19 RX ADMIN — HEPARIN SODIUM 3550 UNITS: 5000 INJECTION INTRAVENOUS; SUBCUTANEOUS at 23:26

## 2021-02-19 RX ADMIN — INSULIN LISPRO 3 UNITS: 100 INJECTION, SOLUTION INTRAVENOUS; SUBCUTANEOUS at 08:23

## 2021-02-19 RX ADMIN — Medication 20 ML: at 14:43

## 2021-02-19 RX ADMIN — METOPROLOL TARTRATE 50 MG: 50 TABLET, FILM COATED ORAL at 08:21

## 2021-02-19 RX ADMIN — HEPARIN SODIUM 18 UNITS/KG/HR: 5000 INJECTION, SOLUTION INTRAVENOUS at 09:23

## 2021-02-19 RX ADMIN — Medication 20 ML: at 06:00

## 2021-02-19 NOTE — PROGRESS NOTES
A follow up visit was made to the patient. Emotional support, spiritual presence and   prayer were provided for the patient. His eyes were open. He was awake. He has a trach.  I received an update from the medical team.      Nyle Schwab, [facility], [facility]

## 2021-02-19 NOTE — PROGRESS NOTES
Ventilator check complete; patient has a #8.0 tracheostomy. Patient is not sedated. Patient is able to follow commands. Breath sounds are coarse and diminished. Trachea is midline, Negative for subcutaneous air, and chest excursion is symmetric. Patient is also Negative for cyanosis and is Negative for pitting edema. All alarms are set and audible. Resuscitation bag is at the head of the bed. Ventilator Settings  Mode FIO2 Rate Tidal Volume Pressure PEEP I:E Ratio   Pressure support  40 %    0 ml  10 cm H2O  8 cm H20  1:3.7      Peak airway pressure: 18.1 cm H2O   Minute ventilation: 9.9 l/min     ABG: No results for input(s): PH, PCO2, PO2, HCO3 in the last 72 hours.       Shey Figueroa, RT

## 2021-02-19 NOTE — PROGRESS NOTES
Chart reviewed as pt remains CCU. Trach/vent -39% fio2 per RT. Heparin gtt. Tolerated HD yesterday. PT/OT per MD. Bryan Acosta started yesterday with ROXY/Jeannette and sister agrees with POC. CM following. LOS 43 days.

## 2021-02-19 NOTE — PROGRESS NOTES
Toshia De La Torre Jose Gomes. Admission Date: 1/6/2021         60 y.o. y/o male with acute hypoxemic respiratory failure secondary to COVID-19.     Pt admitted 1/7 with fever, cough. Covid + 1/6. Admitted by hospitalists. Started on dexamethasone, convalescent plasma, and remdesivir. Pulm consulted 1/10 with pt requiring CPAP but pt decompensated and was intubated 1/17. Nephrology consulted 1/23 for renal failure and was started on dialysis. US 1/18 with L peroneal vein thrombus. He was started on heparin but stopped due to bleeding from HD catheter. He was started on HD on 1/25. Trached on 2/2 secondary to inability to wean from the vent. Pt developed afib requiring amio. PEG placed 2/4. IVC filter 2/8. ICU Daily Progress Note: 2/19/2021  Subjective:     Awake and alert, follows commands.   Appears depressed  Is weak, but moves all extremities  Tolerated HD 2/18, no need of vasopressor support  Participating with PT/OT  Maintained on PS overnight    Current Facility-Administered Medications   Medication Dose Route Frequency    dexmedeTOMidine in 0.9 % NaCl (PRECEDEX) 400 mcg/100 mL (4 mcg/mL) infusion soln  0.1-1.5 mcg/kg/hr IntraVENous TITRATE    [Held by provider] risperiDONE (RisperDAL) 1 mg/mL oral solution soln 0.5 mg  0.5 mg Nasogastric BID    heparin (porcine) 1,000 unit/mL injection 5,000 Units  5,000 Units Injection DIALYSIS PRN    [Held by provider] oxyCODONE (ROXICODONE) 5 mg/5 mL oral solution 5 mg  5 mg Per G Tube Q6H    insulin glargine (LANTUS) injection 15 Units  15 Units SubCUTAneous DAILY    amiodarone (CORDARONE) tablet 200 mg  200 mg Oral DAILY    [Held by provider] oxyCODONE (ROXICODONE) 5 mg/5 mL oral solution 5 mg  5 mg Oral Q4H PRN    lansoprazole (PREVACID) 3 mg/mL oral suspension 30 mg  30 mg Per NG tube Q12H    ondansetron (ZOFRAN) injection 4 mg  4 mg IntraVENous Q6H PRN    polyethylene glycol (MIRALAX) packet 17 g  17 g Per NG tube DAILY PRN    guaiFENesin-dextromethorphan (ROBITUSSIN DM) 100-10 mg/5 mL syrup 10 mL  10 mL Per NG tube Q4H PRN    acetaminophen (TYLENOL) tablet 650 mg  650 mg Per NG tube Q6H PRN    sodium zirconium cyclosilicate (LOKELMA) powder packet 10 g  10 g Oral DAILY    white petrolatum-mineral oiL (LACRILUBE S.O.P.) ointment   Both Eyes Q4H PRN    atorvastatin (LIPITOR) tablet 80 mg  80 mg Per NG tube DAILY    [Held by provider] clopidogreL (PLAVIX) tablet 75 mg  75 mg Per NG tube DAILY    metoprolol tartrate (LOPRESSOR) tablet 50 mg  50 mg Per NG tube BID    insulin lispro (HUMALOG) injection   SubCUTAneous Q4H    NUTRITIONAL SUPPORT ELECTROLYTE PRN ORDERS   Does Not Apply PRN    sodium chloride (NS) flush 20 mL  20 mL InterCATHeter Q8H    sodium chloride (NS) flush 20 mL  20 mL InterCATHeter PRN    loperamide (IMODIUM) capsule 2 mg  2 mg Oral Q4H PRN    glucagon (GLUCAGEN) injection 1 mg  1 mg IntraMUSCular PRN    dextrose (D50W) injection syrg 12.5-25 g  25-50 mL IntraVENous PRN    albuterol (PROVENTIL HFA, VENTOLIN HFA, PROAIR HFA) inhaler 2 Puff  2 Puff Inhalation Q4H PRN    influenza vaccine 2020-21 (6 mos+)(PF) (FLUARIX/FLULAVAL/FLUZONE QUAD) injection 0.5 mL  0.5 mL IntraMUSCular PRIOR TO DISCHARGE    hydrALAZINE (APRESOLINE) 20 mg/mL injection 20 mg  20 mg IntraVENous Q6H PRN         Objective:     Vitals:    02/19/21 0559 02/19/21 0659 02/19/21 0700 02/19/21 0740   BP: (!) 166/78 (!) 163/79 (!) 163/79    Pulse: 89 92 92 96   Resp: (!) 91  24 (!) 64   Temp:   99.2 °F (37.3 °C)    SpO2: 99% 99% 99% 97%   Weight:       Height:         Intake and Output:     Intake/Output Summary (Last 24 hours) at 2/19/2021 0752  Last data filed at 2/19/2021 0615  Gross per 24 hour   Intake 1195 ml   Output 1300 ml   Net -105 ml       Physical Exam:          GEN: acutely ill, vent support via tracheostomy  HEENT:  PERRL, no alar flaring or epistaxis, oral mucosa moist without cyanosis,   NECK:  no JVD, no retractions, no thyromegaly or masses,   LUNGS: Bilateral lung sounds diminished; no wheezing  HEART:  RRR with no M,G,R;  ABDOMEN:  soft with no tenderness; positive bowel sounds present; tube feedings infusing  EXTREMITIES:  warm with no cyanosis, no lower leg edema  SKIN:  no jaundice or ecchymosis   NEURO: on vent, wakes easily to voice. Follows commands; moves all extremities    LINES:  PICC  Trach XLT shiley #8 cuffed  Art line  HD catheter  PEG  flexiseal    DRIPS:  none     NUTRITION:  Tube feeds via PEG      Ventilator Settings  Mode FIO2 Rate Tidal Volume Pressure PEEP   Pressure support  39 %    0 ml  12 cm H2O  8 cm H20      Peak airway pressure: 19.9 cm H2O   Minute ventilation: 13.7 l/min       CHEST XRAY:     LAB  Recent Labs     02/19/21  0406 02/18/21 0328 02/17/21 0316   WBC 12.4* 11.0 12.3*   HGB 7.3* 7.3* 7.2*   HCT 24.3* 23.8* 22.9*    241 199     Recent Labs     02/19/21  0406 02/18/21 0328 02/17/21 0316    139 139   K 3.9 3.8 4.1    104 103   CO2 33* 31 33*   * 173* 174*   BUN 47* 59* 34*   CREA 3.01* 4.03* 2.55*   MG 2.3  --  2.0   PHOS 3.6  --  3.8*   INR  --  1.1 1.2     ABG:      Cultures:  Respiratory cultures 2/5 with staph aureus and klebsiella pneumoniae  Respiratory culture on 1/30 with 4+ mucous, few yeast, few GPC  Blood cultures-all NGTD    ECHO 1/26  SUMMARY:  -  Left ventricle: The ventricle was mildly dilated. Systolic function was  normal. Ejection fraction was estimated in the range of 55 % to 60 %. Although  no diagnostic regional wall motion abnormality was identified, this   possibility  cannot be completely excluded on the basis of this study. There was mild  concentric hypertrophy. Left ventricular diastolic function is normal with an  average E/e of 10.4.  -  Right ventricle: The size was at the upper limits of normal. There was   mild  pulmonary artery hypertension.  -  Left atrium: The atrium was mildly dilated. -  Right atrium: The atrium was mildly dilated.   -  Inferior vena cava, hepatic veins: The inferior vena cava was dilated. The  respirophasic change in diameter was more than 50%. MRI:  IMPRESSION  1. No evidence of acute infarction. 2. Large bilateral mastoid effusions. Correlation for associated symptoms is  recommended. 3. Otherwise unremarkable noncontrast MRI of the brain for patient age. Assessment:     Patient Active Problem List   Diagnosis Code    Obesity E66.9    Hypertension I10    Bradycardia R00.1    PVC (premature ventricular contraction) I49.3    CAD (coronary artery disease) I25.10    Dyslipidemia, goal LDL below 70 E78.5    SWATI (acute kidney injury) ([facility] [address] N17.9    Acute hypoxemic respiratory failure (HCC) J96.01    Type 2 diabetes mellitus with hyperosmolarity without nonketotic hyperglycemic-hyperosmolar coma (HCC) E11.00    Pneumonia due to COVID-19 virus U07.1, J12.82    Acute deep vein thrombosis (DVT) of left peroneal vein (HCC) I82.452    VAP (ventilator-associated pneumonia) (HCC) J95.851    Critical illness polyneuropathy ([facility]) G62.81    Anemia D64.9            PLAN:  Hospital Problems  Date Reviewed: 2/5/2021          Codes Class Noted POA    Anemia ICD-10-CM: D64.9  ICD-9-CM: 285.9  2/16/2021 Unknown    -stable and improved without transfusion; monitor    Critical illness polyneuropathy ([facility] ICD-10-CM: G62.81  ICD-9-CM: 357.82  2/12/2021 Unknown        VAP (ventilator-associated pneumonia) ([facility] ICD-10-CM: E37.491  ICD-9-CM: 997.31  2/9/2021 Unknown    --continues on vent; wean PS and attempt CPAP    Acute deep vein thrombosis (DVT) of left peroneal vein (HCC) ICD-10-CM: Q77.449  ICD-9-CM: 453.42  1/28/2021 Unknown    -IVC filter in place. Start Heparin gtt and monitor Hgb and any signs of bleeding    SWATI (acute kidney injury) ([facility] ICD-10-CM: N17.9  ICD-9-CM: 584.9  1/7/2021 Unknown    --nephrology managing dialysis. Pt had mild hypotension early in HD run, therefore low UF rate and minimal fluid removed.   Follow to see how tolerates next run. Acute hypoxemic respiratory failure (HCC) ICD-10-CM: J96.01  ICD-9-CM: 518.81  1/7/2021 Unknown    --Continuing weaning ventilator; Pt on PS overnight. Attempt CPAP today; would like to see if can get to t collar  --PT/OT engaging    Type 2 diabetes mellitus with hyperosmolarity without nonketotic hyperglycemic-hyperosmolar coma ([facility] [address] ICD-10-CM: E11.00  ICD-9-CM: 250.20  1/7/2021 Unknown    -BG stable with tube feedings, lantus and humalog    * (Principal) Pneumonia due to COVID-19 virus ICD-10-CM: U07.1, J12.82  ICD-9-CM: 480.8  1/7/2021 Unknown    --off all antibiotics 2/15    CAD (coronary artery disease) (Chronic) ICD-10-CM: I25.10  ICD-9-CM: 414.00  10/28/2016 Yes    Overview Signed 10/28/2016  8:05 AM by SHELDON Nye     10-27-06 [facility] 90% LAD s/p 2.25 x 20 Synergy drug-eluting stent  (CS)             Hypertension ICD-10-CM: I10  ICD-9-CM: 401.9  10/27/2016 Yes    -restarted home Metoprolol; pt continues to have hypertension through evening. Change metolprolol to q12 instead of BID. May need to add another antihypertensive, but would like to see if pt can tolerate full run of HD prior. Obesity (Chronic) ICD-10-CM: E66.9  ICD-9-CM: 278.00  10/6/2015 Yes    -tolerating tube feeds      --pre certification for LTACH initiated. Sonia Cha MECHELLE-BC    The patient has been seen and examined by me personally, the chart,labs, and radiographic studies have been reviewed. Chest: CTA, tolerating PSV and switched to TP trial  Extremities: 1+ edema    I agree with the above assessment and plan.     Ginny Rebollar MD.

## 2021-02-19 NOTE — DIABETES MGMT
Patient blood glucose ranged 125-181 yesterday with patient receiving Lanuts 15 units and Humalog 18 units. Blood glucose this morning was 176. Reviewed patient current regimen: Lantus 15 units daily and Humalog SSI q4h very insulin resistant scale. Glycemic control overall in hospital target goals on current regimen. Per chart review precert has been started for [facility]. Will follow along loosely.

## 2021-02-19 NOTE — PROGRESS NOTES
Renal Progress Note    Admission Date: 1/6/2021   Subjective:      Awake on vent, via trach    Objective:     Physical Exam:    Patient Vitals for the past 8 hrs:   BP Temp Pulse Resp SpO2   02/19/21 0853 -- -- 80 20 99 %   02/19/21 0821 (!) 163/79 -- 80 -- --   02/19/21 0740 -- -- 96 (!) 64 97 %   02/19/21 0700 (!) 163/79 99.2 °F (37.3 °C) 92 24 99 %   02/19/21 0659 (!) 163/79 -- 92 -- 99 %   02/19/21 0559 (!) 166/78 -- 89 (!) 91 99 %   02/19/21 0529 (!) 153/74 -- 87 (!) 70 98 %   02/19/21 0459 (!) 155/81 -- 88 (!) 51 99 %   02/19/21 0429 (!) 162/78 -- 87 (!) 71 99 %   02/19/21 0359 (!) 153/72 -- 84 -- 99 %   02/19/21 0329 (!) 154/75 99.2 °F (37.3 °C) 81 18 99 %   02/19/21 0307 -- -- 83 21 99 %   02/19/21 0259 (!) 148/70 -- 81 20 99 %   02/19/21 0229 (!) 166/86 -- 91 28 99 %   02/19/21 0205 (!) 159/77 -- 85 22 99 %   02/19/21 0200 -- -- 87 26 99 %   02/19/21 0145 -- -- 80 20 99 %   02/19/21 0130 -- -- 80 27 99 %   02/19/21 0129 (!) 151/73 -- 83 (!) 31 99 %   02/19/21 0115 -- -- 82 21 99 %   02/19/21 0100 -- -- 86 -- 99 %   02/19/21 0059 (!) 149/72 -- 87 -- 98 %      Gen: comfortable , NAD  HEENT: dry membranes  CV: S1, S2  Lungs: Clear bilaterally  Extem: no edema     Current Facility-Administered Medications   Medication Dose Route Frequency    metoprolol tartrate (LOPRESSOR) tablet 50 mg  50 mg Per NG tube Q12H    heparin 25,000 units in dextrose 500 mL infusion  18-36 Units/kg/hr IntraVENous TITRATE    dexmedeTOMidine in 0.9 % NaCl (PRECEDEX) 400 mcg/100 mL (4 mcg/mL) infusion soln  0.1-1.5 mcg/kg/hr IntraVENous TITRATE    [Held by provider] risperiDONE (RisperDAL) 1 mg/mL oral solution soln 0.5 mg  0.5 mg Nasogastric BID    heparin (porcine) 1,000 unit/mL injection 5,000 Units  5,000 Units Injection DIALYSIS PRN    [Held by provider] oxyCODONE (ROXICODONE) 5 mg/5 mL oral solution 5 mg  5 mg Per G Tube Q6H    insulin glargine (LANTUS) injection 15 Units  15 Units SubCUTAneous DAILY    amiodarone (CORDARONE) tablet 200 mg  200 mg Oral DAILY    [Held by provider] oxyCODONE (ROXICODONE) 5 mg/5 mL oral solution 5 mg  5 mg Oral Q4H PRN    lansoprazole (PREVACID) 3 mg/mL oral suspension 30 mg  30 mg Per NG tube Q12H    ondansetron (ZOFRAN) injection 4 mg  4 mg IntraVENous Q6H PRN    polyethylene glycol (MIRALAX) packet 17 g  17 g Per NG tube DAILY PRN    guaiFENesin-dextromethorphan (ROBITUSSIN DM) 100-10 mg/5 mL syrup 10 mL  10 mL Per NG tube Q4H PRN    acetaminophen (TYLENOL) tablet 650 mg  650 mg Per NG tube Q6H PRN    sodium zirconium cyclosilicate (LOKELMA) powder packet 10 g  10 g Oral DAILY    white petrolatum-mineral oiL (LACRILUBE S.O.P.) ointment   Both Eyes Q4H PRN    atorvastatin (LIPITOR) tablet 80 mg  80 mg Per NG tube DAILY    [Held by provider] clopidogreL (PLAVIX) tablet 75 mg  75 mg Per NG tube DAILY    insulin lispro (HUMALOG) injection   SubCUTAneous Q4H    NUTRITIONAL SUPPORT ELECTROLYTE PRN ORDERS   Does Not Apply PRN    sodium chloride (NS) flush 20 mL  20 mL InterCATHeter Q8H    sodium chloride (NS) flush 20 mL  20 mL InterCATHeter PRN    loperamide (IMODIUM) capsule 2 mg  2 mg Oral Q4H PRN    glucagon (GLUCAGEN) injection 1 mg  1 mg IntraMUSCular PRN    dextrose (D50W) injection syrg 12.5-25 g  25-50 mL IntraVENous PRN    albuterol (PROVENTIL HFA, VENTOLIN HFA, PROAIR HFA) inhaler 2 Puff  2 Puff Inhalation Q4H PRN    influenza vaccine 2020-21 (6 mos+)(PF) (FLUARIX/FLULAVAL/FLUZONE QUAD) injection 0.5 mL  0.5 mL IntraMUSCular PRIOR TO DISCHARGE    hydrALAZINE (APRESOLINE) 20 mg/mL injection 20 mg  20 mg IntraVENous Q6H PRN            Data Review:     LABS:   Recent Results (from the past 12 hour(s))   GLUCOSE, POC    Collection Time: 02/19/21  1:02 AM   Result Value Ref Range    Glucose (POC) 156 (H) 65 - 100 mg/dL   FIBRINOGEN    Collection Time: 02/19/21  4:06 AM   Result Value Ref Range    Fibrinogen 443 190 - 579 mg/dL   METABOLIC PANEL, BASIC    Collection Time: 02/19/21  4:06 AM   Result Value Ref Range    Sodium 140 138 - 145 mmol/L    Potassium 3.9 3.5 - 5.1 mmol/L    Chloride 104 98 - 107 mmol/L    CO2 33 (H) 21 - 32 mmol/L    Anion gap 3 (L) 7 - 16 mmol/L    Glucose 141 (H) 65 - 100 mg/dL    BUN 47 (H) 8 - 23 MG/DL    Creatinine 3.01 (H) 0.8 - 1.5 MG/DL    GFR est AA 27 (L) >60 ml/min/1.73m2    GFR est non-AA 23 (L) >60 ml/min/1.73m2    Calcium 8.4 8.3 - 10.4 MG/DL   PHOSPHORUS    Collection Time: 02/19/21  4:06 AM   Result Value Ref Range    Phosphorus 3.6 2.3 - 3.7 MG/DL   MAGNESIUM    Collection Time: 02/19/21  4:06 AM   Result Value Ref Range    Magnesium 2.3 1.8 - 2.4 mg/dL   CBC W/O DIFF    Collection Time: 02/19/21  4:06 AM   Result Value Ref Range    WBC 12.4 (H) 4.3 - 11.1 K/uL    RBC 2.54 (L) 4.23 - 5.6 M/uL    HGB 7.3 (L) 13.6 - 17.2 g/dL    HCT 24.3 (L) 41.1 - 50.3 %    MCV 95.7 79.6 - 97.8 FL    MCH 28.7 26.1 - 32.9 PG    MCHC 30.0 (L) 31.4 - 35.0 g/dL    RDW 16.1 (H) 11.9 - 14.6 %    PLATELET 286 271 - 427 K/uL    MPV 10.1 9.4 - 12.3 FL    ABSOLUTE NRBC 0.00 0.0 - 0.2 K/uL   GLUCOSE, POC    Collection Time: 02/19/21  4:17 AM   Result Value Ref Range    Glucose (POC) 135 (H) 65 - 100 mg/dL   POC VENOUS BLOOD GAS    Collection Time: 02/19/21  4:45 AM   Result Value Ref Range    Device: VENT      FIO2 (POC) 40 %    pH, venous (POC) 7.39 7.32 - 7.42      pCO2, venous (POC) 52.2 (H) 41 - 51 MMHG    pO2, venous (POC) 39 mmHg    HCO3, venous (POC) 31.5 (H) 23 - 28 MMOL/L    sO2, venous (POC) 71 65 - 88 %    Base excess, venous (POC) 6 mmol/L    Mode VENTILATOR/SPONTANEOUS/PRESSURE SUPPORT      PEEP/CPAP (POC) 8 cmH2O    Pressure support 12 cmH2O    Allens test (POC) NOT APPLICABLE      Total resp.  rate 22      Site OTHER      Specimen type (POC) VENOUS BLOOD      Performed by Gayle     CO2, POC 33 MMOL/L    Respiratory comment: NurseNotified     Exhaled minute volume 10.60 L/min    COLLECT TIME 442     GLUCOSE, POC    Collection Time: 02/19/21  7:20 AM Result Value Ref Range    Glucose (POC) 176 (H) 65 - 100 mg/dL         Plan:     Principal Problem:    Pneumonia due to COVID-19 virus (1/7/2021)    Active Problems:    Obesity (10/6/2015)      Hypertension (10/27/2016)      CAD (coronary artery disease) (10/28/2016)      Overview: 10-27-06 [facility] 90% LAD s/p 2.25 x 20 Synergy drug-eluting stent  (CS)      SWATI (acute kidney injury) ([address] (1/7/2021)      Acute hypoxemic respiratory failure ([address] (1/7/2021)      Type 2 diabetes mellitus with hyperosmolarity without nonketotic hyperglycemic-hyperosmolar coma ([address] (1/7/2021)      Acute deep vein thrombosis (DVT) of left peroneal vein ([address] (1/28/2021)      VAP (ventilator-associated pneumonia) ([address] (2/9/2021)      Critical illness polyneuropathy ([address] (2/12/2021)      Anemia (2/16/2021)       SWATI - ATN, anuric   - 1st dialysis was 1/25/21  In setting of covid pneumonia. - out of isolation   - better hemodynamics. Tolerated standard HD yesterday.   - minimal UF but that was because his CVP was low  - considering LTAC     DVT- anticoagulating  COVID19/ Resp Failure     Hypotension- resolved.      A Fib with RVR     Anemia - s/p transfusion

## 2021-02-19 NOTE — PROGRESS NOTES
Bedside, Verbal and Written shift change report given to Mary Kay Del Cid RN (oncoming nurse) by Elvia Strauss RN (offgoing nurse). Report included the following information SBAR, Kardex, Intake/Output, MAR, Recent Results and Cardiac Rhythm NSR.

## 2021-02-19 NOTE — PROGRESS NOTES
Ventilator check complete; patient has a #8 XLT tracheostomy. Patient is not sedated. Patient is able to follow commands. Breath sounds are coarse. Trachea is midline, Negative for subcutaneous air, and chest excursion is symmetric. Patient is also Negative for cyanosis and is Negative for pitting edema. All alarms are set and audible. Resuscitation bag is at the head of the bed.       Ventilator Settings  Mode FIO2 Rate Pressure support  PEEP I:E Ratio   Pressure support  40 %  12 cm H20   8 cm H20        Peak airway pressure: 20 cm H2O   Minute ventilation: 9.4 l/min     Jocelyn Bowles, RT

## 2021-02-19 NOTE — PROGRESS NOTES
Bedside and Verbal shift change report given to Elvia Strauss RN (oncoming nurse) by Mary Kay Del Cid RN (offgoing nurse). Report included the following information SBAR, Kardex, Intake/Output and Recent Results.    Pt repositioned

## 2021-02-19 NOTE — PROGRESS NOTES
Comprehensive Nutrition Assessment    Type and Reason for Visit: Reassess  Tube Feeding Management [facility])    Nutrition Recommendations/Plan:   · Continue current TF, water flush and liquid protein - Nepro @ 45 ml/hr, 75 ml water every 4 hours and 6 ProSource daily - 2184 calories/day, 153 gm protein/day in 1353 ml water/day. Malnutrition Assessment:  Malnutrition Status: At risk for malnutrition (specify)(Poor nutrition between 1/19-1/25.)  Context: Acute illness  Findings of clinical characteristics of malnutrition:   Energy Intake:  Mild decrease in energy intake (specify)(Decrease reported for four days)  Weight Loss:  (89% UBE per EMR, pt states 40# loss over 4 days (15% based on stated # and CBW))     Body Fat Loss:  Unable to assess,     Muscle Mass Loss:  Unable to assess,    Fluid Accumulation:  Unable to assess,     Strength:  Not performed     Nutrition Assessment:   Nutrition History: 1/7/2021: Pt reports inability to tolerate any food or liquids for 4 days PTA. Indicates vomiting with all po attempts during this time      Nutrition Background: PMH remarkable for CAD, GERD, HTN, MO. Admitted with Coivd 19, new onset DM with hyperglycemic hyperosmolar state, SWATI on CKD, lactic acidosis. Daily Update:  Remains on vent support via trach. TF continues to infuse at goal rate via PEG. Abdominal Status (last documented): Obese, Other (comment)(PEG) abdomen with Active  bowel sounds. Last BM 02/18/21. Pertinent Medications: Lantus, SSI coverage   Drips: Heparin  Pertinent Labs: BUN 47, creatinine 3.01; POC glucose (2/18) 181,125,165,158 and (2/19) 173,487,074,249      Nutrition Related Findings:   Double lumen PICC (1/13), Intubated 1/17, NGT 1/17. Pt was TPN dependent from 1/14-1/19. TF initiated on 1/18. TF held 1/20-1/22 d/t levophed requirements. TF resumed on 1/22 @ 20 ml/hr and not advanced. Held on 1/25 d/t elevated GRVs. SLED initiated on 1/25. TPN started 1/26.  Trophic TF started 1/29. TPN d/c 1/30 and TF advancing. TF @ goal 2/1. Trach placed 2/2. PEG placed 2/4. 2/5 TF infusing at goal rate via PEG since it was resumed post-op. 2/18 regular HD. Current Nutrition Therapies:  Current Tube Feeding (TF) Orders:   · Feeding Route: PEG  · Formula: Nepro 1.8  · Schedule:Continuous    · Regimen: 45 ml/hr  · Additives/Modulars: (Prosource TF 6 packets per day)  · Water Flushes: 75 ml every 4 hours  · Current TF & Flush Orders Provides: 2184 calories/day (100% calorie goal), 153 gm protein/day (100% protein goal) in 1353 ml water/day (meets 100% water goal). · Goal TF & Flush Orders Provides: 2184 calories, 153 gm protein/day (100% protein goal) in 1353 ml water/day. Current Intake:   Average Meal Intake: NPO Average Supplement Intake: NPO      Anthropometric Measures:  Height: 5' 8\" (172.7 cm)  Current Body Wt: 88.5 kg (195 lb 1.7 oz)(2/18/21 CCU), Weight source: Bed scale  BMI: 29.7, Obese class 2 (BMI 35.0-39. 9)  Admission Body Weight: 235 lb(\"Estimated\")  Ideal Body Weight (lbs) (Calculated): 154 lbs (70 kg), 130.3 %  Usual Body Wt: 101.6 kg (224 lb)([facility] 6/3/2020 FP office; pt stated # unable to verify ), Percent weight change: -10.4          Estimated Daily Nutrient Needs:  Energy (kcal/day): 9445-7299 (Kcal/kg(30-35 - vent, SLED), Weight Used: Ideal(70 kg - vent, SLED))  Protein (g/day): 140-175(2-2.5 - vent, SLED) Weight Used: (Ideal(70 kg ))  Fluid (ml/day):   (Standard renal)    Nutrition Diagnosis:   · Inadequate oral intake related to impaired respiratory function, increased demand for energy/nutrients as evidenced by (NPO, on vent, SLED)    · Food & nutrition-related knowledge deficit related to endocrine dysfunction as evidenced by (new DM dx, A1C 12, minimal exposure to DM information.)    Nutrition Interventions:   Food and/or Nutrient Delivery: Continue tube feeding  Nutrition Education/Counseling: Education initiated  Coordination of Nutrition Care: Continue to monitor while inpatient  Plan of Care discussed with Frnaklyn Escudero RN. Goals:   Previous Goal Met: Goal(s) achieved  Active Goal: Maintain TF at its goal rate to meet 100% of daily nutrition needs. Nutrition Monitoring and Evaluation:   Behavioral-Environmental Outcomes: Knowledge or skill  Food/Nutrient Intake Outcomes: Enteral nutrition intake/tolerance  Physical Signs/Symptoms Outcomes: Biochemical data, Constipation    Discharge Planning: Too soon to determine    Corrie Suarez RD, LD on 2/19/2021 at 4:17 PM  Contact: [phone]        Disaster Mode active

## 2021-02-20 ENCOUNTER — APPOINTMENT (OUTPATIENT)
Dept: GENERAL RADIOLOGY | Age: 61
DRG: 004 | End: 2021-02-20
Attending: INTERNAL MEDICINE
Payer: COMMERCIAL

## 2021-02-20 LAB
ABO + RH BLD: NORMAL
ANION GAP SERPL CALC-SCNC: 4 MMOL/L (ref 7–16)
ARTERIAL PATENCY WRIST A: ABNORMAL
BACTERIA SPEC CULT: NORMAL
BACTERIA SPEC CULT: NORMAL
BASE EXCESS BLDV CALC-SCNC: 4 MMOL/L
BDY SITE: ABNORMAL
BLD PROD TYP BPU: NORMAL
BLD PROD TYP BPU: NORMAL
BLOOD GROUP ANTIBODIES SERPL: NORMAL
BPU ID: NORMAL
BPU ID: NORMAL
BUN SERPL-MCNC: 66 MG/DL (ref 8–23)
CALCIUM SERPL-MCNC: 8 MG/DL (ref 8.3–10.4)
CHLORIDE SERPL-SCNC: 102 MMOL/L (ref 98–107)
CO2 BLD-SCNC: 34 MMOL/L
CO2 SERPL-SCNC: 33 MMOL/L (ref 21–32)
COLLECT TIME,HTIME: 450
CREAT SERPL-MCNC: 4.27 MG/DL (ref 0.8–1.5)
CROSSMATCH RESULT,%XM: NORMAL
CROSSMATCH RESULT,%XM: NORMAL
ERYTHROCYTE [DISTWIDTH] IN BLOOD BY AUTOMATED COUNT: 15.9 % (ref 11.9–14.6)
EXHALED MINUTE VOLUME, VE: 9.4 L/MIN
FIBRINOGEN PPP-MCNC: 449 MG/DL (ref 190–501)
GAS FLOW.O2 O2 DELIVERY SYS: ABNORMAL L/MIN
GLUCOSE BLD STRIP.AUTO-MCNC: 126 MG/DL (ref 65–100)
GLUCOSE BLD STRIP.AUTO-MCNC: 159 MG/DL (ref 65–100)
GLUCOSE BLD STRIP.AUTO-MCNC: 160 MG/DL (ref 65–100)
GLUCOSE BLD STRIP.AUTO-MCNC: 188 MG/DL (ref 65–100)
GLUCOSE BLD STRIP.AUTO-MCNC: 198 MG/DL (ref 65–100)
GLUCOSE SERPL-MCNC: 183 MG/DL (ref 65–100)
HCO3 BLDV-SCNC: 32 MMOL/L (ref 23–28)
HCT VFR BLD AUTO: 22.6 % (ref 41.1–50.3)
HCT VFR BLD AUTO: 23.9 % (ref 41.1–50.3)
HGB BLD-MCNC: 6.8 G/DL (ref 13.6–17.2)
HGB BLD-MCNC: 7.8 G/DL (ref 13.6–17.2)
HISTORY CHECKED?,CKHIST: NORMAL
HISTORY CHECKED?,CKHIST: NORMAL
MCH RBC QN AUTO: 29.4 PG (ref 26.1–32.9)
MCHC RBC AUTO-ENTMCNC: 30.1 G/DL (ref 31.4–35)
MCV RBC AUTO: 97.8 FL (ref 79.6–97.8)
NRBC # BLD: 0 K/UL (ref 0–0.2)
O2/TOTAL GAS SETTING VFR VENT: 40 %
PCO2 BLDV: 59.7 MMHG (ref 41–51)
PEEP RESPIRATORY: 8 CMH2O
PH BLDV: 7.34 [PH] (ref 7.32–7.42)
PLATELET # BLD AUTO: 239 K/UL (ref 150–450)
PMV BLD AUTO: 10.4 FL (ref 9.4–12.3)
PO2 BLDV: 41 MMHG
POTASSIUM SERPL-SCNC: 3.7 MMOL/L (ref 3.5–5.1)
PRESSURE SUPPORT SETTING VENT: 12 CMH2O
RBC # BLD AUTO: 2.31 M/UL (ref 4.23–5.6)
SAO2 % BLDV: 72 % (ref 65–88)
SERVICE CMNT-IMP: ABNORMAL
SERVICE CMNT-IMP: ABNORMAL
SERVICE CMNT-IMP: NORMAL
SERVICE CMNT-IMP: NORMAL
SODIUM SERPL-SCNC: 139 MMOL/L (ref 136–145)
SPECIMEN EXP DATE BLD: NORMAL
SPECIMEN TYPE: ABNORMAL
STATUS OF UNIT,%ST: NORMAL
STATUS OF UNIT,%ST: NORMAL
TOTAL RESP. RATE, ITRR: 18
UFH PPP CHRO-ACNC: 0.66 IU/ML (ref 0.3–0.7)
UNIT DIVISION, %UDIV: 0
UNIT DIVISION, %UDIV: 0
VENTILATION MODE VENT: ABNORMAL
WBC # BLD AUTO: 10.5 K/UL (ref 4.3–11.1)

## 2021-02-20 PROCEDURE — 86901 BLOOD TYPING SEROLOGIC RH(D): CPT

## 2021-02-20 PROCEDURE — 65620000000 HC RM CCU GENERAL

## 2021-02-20 PROCEDURE — 74011250637 HC RX REV CODE- 250/637: Performed by: INTERNAL MEDICINE

## 2021-02-20 PROCEDURE — 90935 HEMODIALYSIS ONE EVALUATION: CPT

## 2021-02-20 PROCEDURE — P9016 RBC LEUKOCYTES REDUCED: HCPCS

## 2021-02-20 PROCEDURE — 2709999900 HC NON-CHARGEABLE SUPPLY

## 2021-02-20 PROCEDURE — 86923 COMPATIBILITY TEST ELECTRIC: CPT

## 2021-02-20 PROCEDURE — 82803 BLOOD GASES ANY COMBINATION: CPT

## 2021-02-20 PROCEDURE — 74011250636 HC RX REV CODE- 250/636: Performed by: NURSE PRACTITIONER

## 2021-02-20 PROCEDURE — 85520 HEPARIN ASSAY: CPT

## 2021-02-20 PROCEDURE — 85384 FIBRINOGEN ACTIVITY: CPT

## 2021-02-20 PROCEDURE — 82962 GLUCOSE BLOOD TEST: CPT

## 2021-02-20 PROCEDURE — 80048 BASIC METABOLIC PNL TOTAL CA: CPT

## 2021-02-20 PROCEDURE — 99233 SBSQ HOSP IP/OBS HIGH 50: CPT | Performed by: INTERNAL MEDICINE

## 2021-02-20 PROCEDURE — 74011636637 HC RX REV CODE- 636/637: Performed by: INTERNAL MEDICINE

## 2021-02-20 PROCEDURE — 85014 HEMATOCRIT: CPT

## 2021-02-20 PROCEDURE — 36592 COLLECT BLOOD FROM PICC: CPT

## 2021-02-20 PROCEDURE — 71045 X-RAY EXAM CHEST 1 VIEW: CPT

## 2021-02-20 PROCEDURE — 74011250637 HC RX REV CODE- 250/637: Performed by: NURSE PRACTITIONER

## 2021-02-20 PROCEDURE — 94003 VENT MGMT INPAT SUBQ DAY: CPT

## 2021-02-20 PROCEDURE — 36430 TRANSFUSION BLD/BLD COMPNT: CPT

## 2021-02-20 PROCEDURE — 85027 COMPLETE CBC AUTOMATED: CPT

## 2021-02-20 RX ORDER — SODIUM CHLORIDE 9 MG/ML
250 INJECTION, SOLUTION INTRAVENOUS AS NEEDED
Status: DISCONTINUED | OUTPATIENT
Start: 2021-02-20 | End: 2021-02-25 | Stop reason: SDUPTHER

## 2021-02-20 RX ADMIN — LOPERAMIDE HYDROCHLORIDE 2 MG: 2 CAPSULE ORAL at 08:22

## 2021-02-20 RX ADMIN — SODIUM ZIRCONIUM CYCLOSILICATE 10 G: 10 POWDER, FOR SUSPENSION ORAL at 08:24

## 2021-02-20 RX ADMIN — INSULIN GLARGINE 15 UNITS: 100 INJECTION, SOLUTION SUBCUTANEOUS at 08:22

## 2021-02-20 RX ADMIN — ATORVASTATIN CALCIUM 80 MG: 40 TABLET, FILM COATED ORAL at 08:22

## 2021-02-20 RX ADMIN — LANSOPRAZOLE 30 MG: KIT at 21:57

## 2021-02-20 RX ADMIN — Medication 20 ML: at 14:41

## 2021-02-20 RX ADMIN — INSULIN LISPRO 3 UNITS: 100 INJECTION, SOLUTION INTRAVENOUS; SUBCUTANEOUS at 08:22

## 2021-02-20 RX ADMIN — AMIODARONE HYDROCHLORIDE 200 MG: 200 TABLET ORAL at 08:22

## 2021-02-20 RX ADMIN — ACETAMINOPHEN 650 MG: 325 TABLET, FILM COATED ORAL at 17:01

## 2021-02-20 RX ADMIN — INSULIN LISPRO 3 UNITS: 100 INJECTION, SOLUTION INTRAVENOUS; SUBCUTANEOUS at 12:21

## 2021-02-20 RX ADMIN — METOPROLOL TARTRATE 50 MG: 50 TABLET, FILM COATED ORAL at 08:22

## 2021-02-20 RX ADMIN — Medication 20 ML: at 21:58

## 2021-02-20 RX ADMIN — Medication 20 ML: at 05:00

## 2021-02-20 RX ADMIN — LANSOPRAZOLE 30 MG: KIT at 08:24

## 2021-02-20 RX ADMIN — HEPARIN SODIUM 22 UNITS/KG/HR: 5000 INJECTION, SOLUTION INTRAVENOUS at 00:24

## 2021-02-20 RX ADMIN — METOPROLOL TARTRATE 50 MG: 50 TABLET, FILM COATED ORAL at 21:56

## 2021-02-20 RX ADMIN — INSULIN LISPRO 3 UNITS: 100 INJECTION, SOLUTION INTRAVENOUS; SUBCUTANEOUS at 03:05

## 2021-02-20 RX ADMIN — INSULIN LISPRO 3 UNITS: 100 INJECTION, SOLUTION INTRAVENOUS; SUBCUTANEOUS at 21:56

## 2021-02-20 NOTE — PROGRESS NOTES
Reviewed notes for spiritual concerns    PER NOTES:    Working well with staff    Palliative care involved    [address] [facility] admission      Staff continues to provide very compassionate care!

## 2021-02-20 NOTE — PROGRESS NOTES
Darcy Rubalcava. Admission Date: 1/6/2021         60 y.o. y/o male with acute hypoxemic respiratory failure secondary to COVID-19.     Pt admitted 1/7 with fever, cough. Covid + 1/6. Admitted by hospitalists. Started on dexamethasone, convalescent plasma, and remdesivir. Pulm consulted 1/10 with pt requiring CPAP but pt decompensated and was intubated 1/17. Nephrology consulted 1/23 for renal failure and was started on dialysis. US 1/18 with L peroneal vein thrombus. He was started on heparin but stopped due to bleeding from HD catheter. He was started on HD on 1/25. Trached on 2/2 secondary to inability to wean from the vent. Pt developed afib requiring amio. PEG placed 2/4. IVC filter 2/8. ICU Daily Progress Note: 2/20/2021  Subjective:     Awake and alert, follows commands.     Is weak, but moves all extremities  Participating with PT/OT  Maintained on PS overnight but tolerated TP x 4 h yesterday    Current Facility-Administered Medications   Medication Dose Route Frequency    0.9% sodium chloride infusion 250 mL  250 mL IntraVENous PRN    metoprolol tartrate (LOPRESSOR) tablet 50 mg  50 mg Per NG tube Q12H    heparin 25,000 units in dextrose 500 mL infusion  18-36 Units/kg/hr IntraVENous TITRATE    dexmedeTOMidine in 0.9 % NaCl (PRECEDEX) 400 mcg/100 mL (4 mcg/mL) infusion soln  0.1-1.5 mcg/kg/hr IntraVENous TITRATE    [Held by provider] risperiDONE (RisperDAL) 1 mg/mL oral solution soln 0.5 mg  0.5 mg Nasogastric BID    heparin (porcine) 1,000 unit/mL injection 5,000 Units  5,000 Units Injection DIALYSIS PRN    [Held by provider] oxyCODONE (ROXICODONE) 5 mg/5 mL oral solution 5 mg  5 mg Per G Tube Q6H    insulin glargine (LANTUS) injection 15 Units  15 Units SubCUTAneous DAILY    amiodarone (CORDARONE) tablet 200 mg  200 mg Oral DAILY    [Held by provider] oxyCODONE (ROXICODONE) 5 mg/5 mL oral solution 5 mg  5 mg Oral Q4H PRN    lansoprazole (PREVACID) 3 mg/mL oral suspension 30 mg 30 mg Per NG tube Q12H    ondansetron (ZOFRAN) injection 4 mg  4 mg IntraVENous Q6H PRN    polyethylene glycol (MIRALAX) packet 17 g  17 g Per NG tube DAILY PRN    guaiFENesin-dextromethorphan (ROBITUSSIN DM) 100-10 mg/5 mL syrup 10 mL  10 mL Per NG tube Q4H PRN    acetaminophen (TYLENOL) tablet 650 mg  650 mg Per NG tube Q6H PRN    sodium zirconium cyclosilicate (LOKELMA) powder packet 10 g  10 g Oral DAILY    white petrolatum-mineral oiL (LACRILUBE S.O.P.) ointment   Both Eyes Q4H PRN    atorvastatin (LIPITOR) tablet 80 mg  80 mg Per NG tube DAILY    [Held by provider] clopidogreL (PLAVIX) tablet 75 mg  75 mg Per NG tube DAILY    insulin lispro (HUMALOG) injection   SubCUTAneous Q4H    NUTRITIONAL SUPPORT ELECTROLYTE PRN ORDERS   Does Not Apply PRN    sodium chloride (NS) flush 20 mL  20 mL InterCATHeter Q8H    sodium chloride (NS) flush 20 mL  20 mL InterCATHeter PRN    loperamide (IMODIUM) capsule 2 mg  2 mg Oral Q4H PRN    glucagon (GLUCAGEN) injection 1 mg  1 mg IntraMUSCular PRN    dextrose (D50W) injection syrg 12.5-25 g  25-50 mL IntraVENous PRN    albuterol (PROVENTIL HFA, VENTOLIN HFA, PROAIR HFA) inhaler 2 Puff  2 Puff Inhalation Q4H PRN    influenza vaccine 2020-21 (6 mos+)(PF) (FLUARIX/FLULAVAL/FLUZONE QUAD) injection 0.5 mL  0.5 mL IntraMUSCular PRIOR TO DISCHARGE    hydrALAZINE (APRESOLINE) 20 mg/mL injection 20 mg  20 mg IntraVENous Q6H PRN         Objective:     Vitals:    02/20/21 0629 02/20/21 0659 02/20/21 0700 02/20/21 0732   BP: 135/65 139/70 139/70    Pulse: 66 68 68    Resp: 19 16 16    Temp:   99.2 °F (37.3 °C)    SpO2: 100% 100% 100% 96%   Weight:       Height:         Intake and Output:     Intake/Output Summary (Last 24 hours) at 2/20/2021 0820  Last data filed at 2/20/2021 5248  Gross per 24 hour   Intake 2065.68 ml   Output 300 ml   Net 1765.68 ml       Physical Exam:          GEN: acutely ill, comfortable on TP  HEENT:  PERRL, no alar flaring or epistaxis, oral mucosa moist without cyanosis,   NECK:  no JVD, no retractions, no thyromegaly or masses,   LUNGS:  Bilateral lung sounds diminished; no wheezing  HEART:  RRR with no M,G,R;  ABDOMEN:  soft with no tenderness; positive bowel sounds present; tube feedings infusing  EXTREMITIES:  warm with no cyanosis, no lower leg edema  SKIN:  no jaundice or ecchymosis   NEURO: on vent, wakes easily to voice. Follows commands; moves all extremities    LINES:  PICC  Trach XLT shiley #8 cuffed  Art line  HD catheter  PEG  flexiseal    DRIPS:  none     NUTRITION:  Tube feeds via PEG      Ventilator Settings  Mode FIO2 Rate Tidal Volume Pressure PEEP   Spontaneous  40 %    0 ml  12 cm H2O  8 cm H20      Peak airway pressure: 20 cm H2O   Minute ventilation: 11 l/min       CHEST XRAY:     LAB  Recent Labs     02/20/21  0441 02/19/21  0852 02/19/21  0406   WBC 10.5 11.2* 12.4*   HGB 6.8* 7.3* 7.3*   HCT 22.6* 24.0* 24.3*    251 262     Recent Labs     02/20/21  0441 02/19/21  0406 02/18/21  0328    140 139   K 3.7 3.9 3.8    104 104   CO2 33* 33* 31   * 141* 173*   BUN 66* 47* 59*   CREA 4.27* 3.01* 4.03*   MG  --  2.3  --    PHOS  --  3.6  --    INR  --   --  1.1     ABG:      Cultures:  Respiratory cultures 2/5 with staph aureus and klebsiella pneumoniae  Respiratory culture on 1/30 with 4+ mucous, few yeast, few GPC  Blood cultures-all NGTD    ECHO 1/26  SUMMARY:  -  Left ventricle: The ventricle was mildly dilated. Systolic function was  normal. Ejection fraction was estimated in the range of 55 % to 60 %. Although  no diagnostic regional wall motion abnormality was identified, this   possibility  cannot be completely excluded on the basis of this study. There was mild  concentric hypertrophy. Left ventricular diastolic function is normal with an  average E/e of 10.4.  -  Right ventricle:  The size was at the upper limits of normal. There was   mild  pulmonary artery hypertension.  -  Left atrium: The atrium was mildly dilated. -  Right atrium: The atrium was mildly dilated. -  Inferior vena cava, hepatic veins: The inferior vena cava was dilated. The  respirophasic change in diameter was more than 50%. MRI:  IMPRESSION  1. No evidence of acute infarction. 2. Large bilateral mastoid effusions. Correlation for associated symptoms is  recommended. 3. Otherwise unremarkable noncontrast MRI of the brain for patient age. Assessment:     Patient Active Problem List   Diagnosis Code    Obesity E66.9    Hypertension I10    Bradycardia R00.1    PVC (premature ventricular contraction) I49.3    CAD (coronary artery disease) I25.10    Dyslipidemia, goal LDL below 70 E78.5    SWATI (acute kidney injury) ([facility] [address] N17.9    Acute hypoxemic respiratory failure (HCC) J96.01    Type 2 diabetes mellitus with hyperosmolarity without nonketotic hyperglycemic-hyperosmolar coma ([facility] E11.00    Pneumonia due to COVID-19 virus U07.1, J12.82    Acute deep vein thrombosis (DVT) of left peroneal vein ([facility]) I82.452    VAP (ventilator-associated pneumonia) ([facility]) J95.851    Critical illness polyneuropathy ([facility]) G62.81    Anemia D64.9            PLAN:  Hospital Problems  Date Reviewed: 2/5/2021          Codes Class Noted POA    Anemia ICD-10-CM: D64.9  ICD-9-CM: 285.9  2/16/2021 Unknown    BPR on heparin which was stopped, Hb down to 6.8- to get 2U PRBC now- this is the third trial of AC, patient bleeds every time and therefor [facility] is now contraindicated. Critical illness polyneuropathy ([facility] [address] ICD-10-CM: G62.81  ICD-9-CM: 357.82  2/12/2021 Unknown        VAP (ventilator-associated pneumonia) ([facility] ICD-10-CM: Y90.459  ICD-9-CM: 997.31  2/9/2021 Unknown    --Tolerating TP x 4 h yesterday on vent overnight, now back on TP - continue as tolerated    Acute deep vein thrombosis (DVT) of left peroneal vein (HCC) ICD-10-CM: C28.145  ICD-9-CM: 453.42  1/28/2021 Unknown    -IVC filter in place.   Start Heparin gtt and monitor Hgb and any signs of bleeding    SWATI (acute kidney injury) [facility]) ICD-10-CM: N17.9  ICD-9-CM: 584.9  1/7/2021 Unknown    --nephrology managing dialysis. Pt had mild hypotension early in HD run, therefore low UF rate and minimal fluid removed. Follow to see how tolerates next run. Acute hypoxemic respiratory failure [facility]) ICD-10-CM: J96.01  ICD-9-CM: 518.81  1/7/2021 Unknown    See above  --PT/OT engaging    Type 2 diabetes mellitus with hyperosmolarity without nonketotic hyperglycemic-hyperosmolar coma ([facility] [address] ICD-10-CM: E11.00  ICD-9-CM: 250.20  1/7/2021 Unknown    -BG stable with tube feedings, lantus and humalog    * (Principal) Pneumonia due to COVID-19 virus ICD-10-CM: U07.1, J12.82  ICD-9-CM: 480.8  1/7/2021 Unknown    --off all antibiotics 2/15    CAD (coronary artery disease) (Chronic) ICD-10-CM: I25.10  ICD-9-CM: 414.00  10/28/2016 Yes    Overview Signed 10/28/2016  8:05 AM by SHELDON Rodriguez     10-27-06 [facility] 90% LAD s/p 2.25 x 20 Synergy drug-eluting stent  (CS)             Hypertension ICD-10-CM: I10  ICD-9-CM: 401.9  10/27/2016 Yes    -restarted home Metoprolol; pt continues to have hypertension through evening. Change metolprolol to q12 instead of BID. Bp better controlled     Obesity (Chronic) ICD-10-CM: E66.9  ICD-9-CM: 278.00  10/6/2015 Yes    -tolerating tube feeds      --pre certification for LTACH initiated.    Continue current supportive care    Darlina Sandifer, MD

## 2021-02-20 NOTE — PROGRESS NOTES
Bedside, Verbal and Written shift change report given to AQUILINO Ames (oncoming nurse) by AQUILINO Bailey (offgoing nurse). Report included the following information SBAR, Kardex, ED Summary, Procedure Summary, Intake/Output, MAR, Recent Results and Cardiac Rhythm NSR. Heparin gtt verified at bedside

## 2021-02-20 NOTE — PROGRESS NOTES
Renal Progress Note    Admission Date: 1/6/2021   Subjective:      Awake on vent, via trach  Nods appropriately to questions    Objective:     Physical Exam:    Patient Vitals for the past 8 hrs:   BP Temp Pulse Resp SpO2 Weight   02/20/21 0732 -- -- -- -- 96 % --   02/20/21 0700 139/70 99.2 °F (37.3 °C) 68 16 100 % --   02/20/21 0659 139/70 -- 68 16 100 % --   02/20/21 0629 135/65 -- 66 19 100 % --   02/20/21 0559 132/64 -- 69 28 100 % --   02/20/21 0529 132/63 -- 70 -- 100 % --   02/20/21 0459 125/63 -- 69 20 100 % --   02/20/21 0455 -- -- 68 18 100 % --   02/20/21 0429 (!) 125/58 -- 72 21 100 % --   02/20/21 0359 (!) 127/59 -- 71 16 100 % --   02/20/21 0329 (!) 145/67 -- 78 23 99 % --   02/20/21 0300 (!) 144/70 98.6 °F (37 °C) 81 28 99 % --   02/20/21 0241 -- -- 75 24 98 % --   02/20/21 0229 125/60 -- 75 23 99 % --   02/20/21 0159 (!) 118/58 -- 74 25 98 % --   02/20/21 0129 (!) 107/52 -- 72 19 98 % --   02/20/21 0108 -- -- -- -- -- 89.5 kg (197 lb 5 oz)   02/20/21 0059 (!) 97/55 -- 65 16 99 % --   02/20/21 0029 (!) 113/56 -- (!) 59 21 100 % --      Gen: comfortable , NAD  HEENT: dry membranes  CV: S1, S2  Lungs: Clear bilaterally  Extem: no edema     Current Facility-Administered Medications   Medication Dose Route Frequency    0.9% sodium chloride infusion 250 mL  250 mL IntraVENous PRN    metoprolol tartrate (LOPRESSOR) tablet 50 mg  50 mg Per NG tube Q12H    heparin 25,000 units in dextrose 500 mL infusion  18-36 Units/kg/hr IntraVENous TITRATE    dexmedeTOMidine in 0.9 % NaCl (PRECEDEX) 400 mcg/100 mL (4 mcg/mL) infusion soln  0.1-1.5 mcg/kg/hr IntraVENous TITRATE    [Held by provider] risperiDONE (RisperDAL) 1 mg/mL oral solution soln 0.5 mg  0.5 mg Nasogastric BID    heparin (porcine) 1,000 unit/mL injection 5,000 Units  5,000 Units Injection DIALYSIS PRN    [Held by provider] oxyCODONE (ROXICODONE) 5 mg/5 mL oral solution 5 mg  5 mg Per G Tube Q6H    insulin glargine (LANTUS) injection 15 Units  15 Units SubCUTAneous DAILY    amiodarone (CORDARONE) tablet 200 mg  200 mg Oral DAILY    [Held by provider] oxyCODONE (ROXICODONE) 5 mg/5 mL oral solution 5 mg  5 mg Oral Q4H PRN    lansoprazole (PREVACID) 3 mg/mL oral suspension 30 mg  30 mg Per NG tube Q12H    ondansetron (ZOFRAN) injection 4 mg  4 mg IntraVENous Q6H PRN    polyethylene glycol (MIRALAX) packet 17 g  17 g Per NG tube DAILY PRN    guaiFENesin-dextromethorphan (ROBITUSSIN DM) 100-10 mg/5 mL syrup 10 mL  10 mL Per NG tube Q4H PRN    acetaminophen (TYLENOL) tablet 650 mg  650 mg Per NG tube Q6H PRN    sodium zirconium cyclosilicate (LOKELMA) powder packet 10 g  10 g Oral DAILY    white petrolatum-mineral oiL (LACRILUBE S.O.P.) ointment   Both Eyes Q4H PRN    atorvastatin (LIPITOR) tablet 80 mg  80 mg Per NG tube DAILY    [Held by provider] clopidogreL (PLAVIX) tablet 75 mg  75 mg Per NG tube DAILY    insulin lispro (HUMALOG) injection   SubCUTAneous Q4H    NUTRITIONAL SUPPORT ELECTROLYTE PRN ORDERS   Does Not Apply PRN    sodium chloride (NS) flush 20 mL  20 mL InterCATHeter Q8H    sodium chloride (NS) flush 20 mL  20 mL InterCATHeter PRN    loperamide (IMODIUM) capsule 2 mg  2 mg Oral Q4H PRN    glucagon (GLUCAGEN) injection 1 mg  1 mg IntraMUSCular PRN    dextrose (D50W) injection syrg 12.5-25 g  25-50 mL IntraVENous PRN    albuterol (PROVENTIL HFA, VENTOLIN HFA, PROAIR HFA) inhaler 2 Puff  2 Puff Inhalation Q4H PRN    influenza vaccine 2020-21 (6 mos+)(PF) (FLUARIX/FLULAVAL/FLUZONE QUAD) injection 0.5 mL  0.5 mL IntraMUSCular PRIOR TO DISCHARGE    hydrALAZINE (APRESOLINE) 20 mg/mL injection 20 mg  20 mg IntraVENous Q6H PRN            Data Review:     LABS:   Recent Results (from the past 12 hour(s))   GLUCOSE, POC    Collection Time: 02/19/21 10:25 PM   Result Value Ref Range    Glucose (POC) 146 (H) 65 - 100 mg/dL   GLUCOSE, POC    Collection Time: 02/20/21  2:45 AM   Result Value Ref Range    Glucose (POC) 198 (H) 65 - 100 mg/dL FIBRINOGEN    Collection Time: 02/20/21  4:41 AM   Result Value Ref Range    Fibrinogen 449 190 - 054 mg/dL   METABOLIC PANEL, BASIC    Collection Time: 02/20/21  4:41 AM   Result Value Ref Range    Sodium 139 136 - 145 mmol/L    Potassium 3.7 3.5 - 5.1 mmol/L    Chloride 102 98 - 107 mmol/L    CO2 33 (H) 21 - 32 mmol/L    Anion gap 4 (L) 7 - 16 mmol/L    Glucose 183 (H) 65 - 100 mg/dL    BUN 66 (H) 8 - 23 MG/DL    Creatinine 4.27 (H) 0.8 - 1.5 MG/DL    GFR est AA 18 (L) >60 ml/min/1.73m2    GFR est non-AA 15 (L) >60 ml/min/1.73m2    Calcium 8.0 (L) 8.3 - 10.4 MG/DL   CBC W/O DIFF    Collection Time: 02/20/21  4:41 AM   Result Value Ref Range    WBC 10.5 4.3 - 11.1 K/uL    RBC 2.31 (L) 4.23 - 5.6 M/uL    HGB 6.8 (LL) 13.6 - 17.2 g/dL    HCT 22.6 (L) 41.1 - 50.3 %    MCV 97.8 79.6 - 97.8 FL    MCH 29.4 26.1 - 32.9 PG    MCHC 30.1 (L) 31.4 - 35.0 g/dL    RDW 15.9 (H) 11.9 - 14.6 %    PLATELET 832 029 - 613 K/uL    MPV 10.4 9.4 - 12.3 FL    ABSOLUTE NRBC 0.00 0.0 - 0.2 K/uL   HEPARIN XA UFH    Collection Time: 02/20/21  4:41 AM   Result Value Ref Range    Heparin Xa UFH 0.66 0.3 - 0.7 IU/mL   POC VENOUS BLOOD GAS    Collection Time: 02/20/21  4:50 AM   Result Value Ref Range    Device: VENT      FIO2 (POC) 40 %    pH, venous (POC) 7.34 7.32 - 7.42      pCO2, venous (POC) 59.7 (H) 41 - 51 MMHG    pO2, venous (POC) 41 mmHg    HCO3, venous (POC) 32.0 (H) 23 - 28 MMOL/L    sO2, venous (POC) 72 65 - 88 %    Base excess, venous (POC) 4 mmol/L    Mode VENTILATOR/SPONTANEOUS/PRESSURE SUPPORT      PEEP/CPAP (POC) 8 cmH2O    Pressure support 12 cmH2O    Allens test (POC) NOT APPLICABLE      Total resp.  rate 18      Site OTHER      Specimen type (POC) VENOUS BLOOD      Performed by Gayle     CO2, POC 34 MMOL/L    Respiratory comment: NurseNotified     Exhaled minute volume 9.40 L/min    COLLECT TIME 450           Plan:     Principal Problem:    Pneumonia due to COVID-19 virus (1/7/2021)    Active Problems:    Obesity (10/6/2015)      Hypertension (10/27/2016)      CAD (coronary artery disease) (10/28/2016)      Overview: 10-27-06 [facility] 90% LAD s/p 2.25 x 20 Synergy drug-eluting stent  (CS)      SWATI (acute kidney injury) ([address] (1/7/2021)      Acute hypoxemic respiratory failure ([address] (1/7/2021)      Type 2 diabetes mellitus with hyperosmolarity without nonketotic hyperglycemic-hyperosmolar coma ([address] (1/7/2021)      Acute deep vein thrombosis (DVT) of left peroneal vein ([address] (1/28/2021)      VAP (ventilator-associated pneumonia) ([address] (2/9/2021)      Critical illness polyneuropathy ([address] (2/12/2021)      Anemia (2/16/2021)       SWATI - ATN, anuric   - 1st dialysis was 1/25/21  In setting of covid pneumonia. - out of isolation   - better hemodynamics. Tolerated standard HD Thursday  - next tx today - still oliguric  - minimal UF but that was because his CVP was low  - considering LTAC     DVT- anticoagulating  COVID19/ Resp Failure     Hypotension- resolved.      A Fib with RVR     Anemia - s/p transfusion    Addendum:  The patient was seen on dialysis at 2:34 PM .  BP dropped but had some respiratory issue. Catheter is functioning well.

## 2021-02-20 NOTE — DIALYSIS
HD started using R temp IJ catheter. Line aspirates and flushes, UF goal 1 liter today. 3k/2.5ca dialysate bath. No heparin given. Report given to AQUILINO Oliver.

## 2021-02-20 NOTE — PROGRESS NOTES
Ventilator check complete; patient has a #8.0 XLT tracheostomy. Patient is not sedated. Patient is able to follow commands. Breath sounds are coarse. Trachea is midline, Negative for subcutaneous air, and chest excursion is symmetric. Patient is also Negative for cyanosis and is Negative for pitting edema. All alarms are set and audible. Resuscitation bag is at the head of the bed.       Ventilator Settings  Mode FIO2 Rate Pressure control  PEEP I:E Ratio   Pressure control(switched to rest overnight)  40 % 14 14 cm H20   8 cm H20  1:3.7      Peak airway pressure: 26 cm H2O   Minute ventilation: 11.6 l/min       Lilia Carrasco RT

## 2021-02-20 NOTE — PROGRESS NOTES
Ventilator check complete; patient has a #8.0 tracheostomy. Patient is not sedated. Patient is able to follow commands. Breath sounds are diminished. Trachea is midline, Negative for subcutaneous air, and chest excursion is symmetric. Patient is also Negative for cyanosis and is Negative for pitting edema. All alarms are set and audible. Resuscitation bag is at the head of the bed. Ventilator Settings  Mode FIO2 Rate Pressure PEEP I:E Ratio   Pressure support  40 %    12 cm H2O  8 cm H20  1:3.7      Peak airway pressure: 19.6 cm H2O   Minute ventilation: 12.5 l/min     Patient able to tolerate TCT x 4 hours yesterday 02/19/21 and x 6 hours today 02/20/21. Placed back on PSV after desaturating into the 70s and complaining of SOB. Will continue to perform TCT daily as tolerated. ABG: No results for input(s): PH, PCO2, PO2, HCO3 in the last 72 hours.       Tonie Ashton, RT

## 2021-02-20 NOTE — PROGRESS NOTES
patient was awake able to focus on the  when I spoke   brief visit to encourage  he is calm    staff providing very compassionate care

## 2021-02-20 NOTE — DIALYSIS
Dialysis treatment completed, ran 3.5 hours. No net fluid removal d/t hypotension and episode of clotted circuit that was replaced. Flushed and capped R temp IJ catheter. Dsg changed today. Pt received 2 units PRBC by primary team,     Report given to [facility], RN.     Patient Vitals for the past 4 hrs:   Temp Pulse Resp BP SpO2   02/20/21 1759 -- 72 (!) 61 (!) 111/57 99 %   02/20/21 1744 98.8 °F (37.1 °C) 75 20 (!) 104/51 97 %   02/20/21 1742 98.8 °F (37.1 °C) -- -- -- --   02/20/21 1729 99.5 °F (37.5 °C) 75 18 (!) 88/50 96 %   02/20/21 1714 99.9 °F (37.7 °C) 81 (!) 37 (!) 81/43 92 %   02/20/21 1657 (!) 101.2 °F (38.4 °C) -- -- -- --   02/20/21 1645 -- 82 (!) 32 92/64 98 %   02/20/21 1630 98.6 °F (37 °C) -- -- -- --   02/20/21 1615 -- 85 (!) 32 (!) 149/97 96 %   02/20/21 1559 -- 74 (!) 39 (!) 149/72 93 %   02/20/21 1529 -- 73 (!) 51 (!) 144/66 93 %   02/20/21 1514 -- 75 29 (!) 158/77 93 %   02/20/21 1500 98.7 °F (37.1 °C) 79 20 (!) 145/68 94 %   02/20/21 1459 -- 79 (!) 51 (!) 145/68 94 %   02/20/21 1445 -- 74 (!) 39 (!) 150/75 96 %   02/20/21 1440 -- 74 (!) 60 -- 95 %   02/20/21 1438 -- 77 -- (!) 170/83 94 %   02/20/21 1415 97.8 °F (36.6 °C) 69 23 (!) 139/99 100 %   02/20/21 1414 -- 69 23 139/69 100 %

## 2021-02-21 ENCOUNTER — APPOINTMENT (OUTPATIENT)
Dept: NUCLEAR MEDICINE | Age: 61
DRG: 004 | End: 2021-02-21
Attending: INTERNAL MEDICINE
Payer: COMMERCIAL

## 2021-02-21 ENCOUNTER — APPOINTMENT (OUTPATIENT)
Dept: GENERAL RADIOLOGY | Age: 61
DRG: 004 | End: 2021-02-21
Attending: INTERNAL MEDICINE
Payer: COMMERCIAL

## 2021-02-21 PROBLEM — K92.2 LOWER GI BLEED REQUIRING MORE THAN 4 UNITS OF BLOOD IN 24 HOURS, ICU, OR SURGERY: Status: ACTIVE | Noted: 2021-02-21

## 2021-02-21 LAB
ANION GAP SERPL CALC-SCNC: 5 MMOL/L (ref 7–16)
ARTERIAL PATENCY WRIST A: ABNORMAL
BASE EXCESS BLDV CALC-SCNC: 5 MMOL/L
BDY SITE: ABNORMAL
BUN SERPL-MCNC: 43 MG/DL (ref 8–23)
CALCIUM SERPL-MCNC: 7.6 MG/DL (ref 8.3–10.4)
CHLORIDE SERPL-SCNC: 106 MMOL/L (ref 98–107)
CO2 BLD-SCNC: 31 MMOL/L
CO2 SERPL-SCNC: 30 MMOL/L (ref 21–32)
COLLECT TIME,HTIME: 428
CREAT SERPL-MCNC: 3.04 MG/DL (ref 0.8–1.5)
ERYTHROCYTE [DISTWIDTH] IN BLOOD BY AUTOMATED COUNT: 15.9 % (ref 11.9–14.6)
EXHALED MINUTE VOLUME, VE: 12.6 L/MIN
FIBRINOGEN PPP-MCNC: 304 MG/DL (ref 190–501)
GAS FLOW.O2 O2 DELIVERY SYS: ABNORMAL L/MIN
GAS FLOW.O2 SETTING OXYMISER: 14 BPM
GLUCOSE BLD STRIP.AUTO-MCNC: 102 MG/DL (ref 65–100)
GLUCOSE BLD STRIP.AUTO-MCNC: 118 MG/DL (ref 65–100)
GLUCOSE BLD STRIP.AUTO-MCNC: 126 MG/DL (ref 65–100)
GLUCOSE BLD STRIP.AUTO-MCNC: 144 MG/DL (ref 65–100)
GLUCOSE BLD STRIP.AUTO-MCNC: 232 MG/DL (ref 65–100)
GLUCOSE BLD STRIP.AUTO-MCNC: 97 MG/DL (ref 65–100)
GLUCOSE SERPL-MCNC: 174 MG/DL (ref 65–100)
HCO3 BLDV-SCNC: 29.7 MMOL/L (ref 23–28)
HCT VFR BLD AUTO: 19.7 % (ref 41.1–50.3)
HCT VFR BLD AUTO: 22.9 % (ref 41.1–50.3)
HCT VFR BLD AUTO: 25.5 % (ref 41.1–50.3)
HGB BLD-MCNC: 6.5 G/DL (ref 13.6–17.2)
HGB BLD-MCNC: 7.5 G/DL (ref 13.6–17.2)
HGB BLD-MCNC: 8.4 G/DL (ref 13.6–17.2)
HISTORY CHECKED?,CKHIST: NORMAL
MCH RBC QN AUTO: 31.1 PG (ref 26.1–32.9)
MCHC RBC AUTO-ENTMCNC: 33 G/DL (ref 31.4–35)
MCV RBC AUTO: 94.3 FL (ref 79.6–97.8)
NRBC # BLD: 0 K/UL (ref 0–0.2)
O2/TOTAL GAS SETTING VFR VENT: 40 %
PCO2 BLDV: 44.8 MMHG (ref 41–51)
PEEP RESPIRATORY: 8 CMH2O
PH BLDV: 7.43 [PH] (ref 7.32–7.42)
PLATELET # BLD AUTO: 172 K/UL (ref 150–450)
PMV BLD AUTO: 10.4 FL (ref 9.4–12.3)
PO2 BLDV: 30 MMHG
POTASSIUM SERPL-SCNC: 3.7 MMOL/L (ref 3.5–5.1)
PRESSURE CONTROL, IPC: 16
RBC # BLD AUTO: 2.09 M/UL (ref 4.23–5.6)
SAO2 % BLDV: 58 % (ref 65–88)
SERVICE CMNT-IMP: ABNORMAL
SERVICE CMNT-IMP: ABNORMAL
SODIUM SERPL-SCNC: 141 MMOL/L (ref 136–145)
SPECIMEN TYPE: ABNORMAL
VENTILATION MODE VENT: ABNORMAL
WBC # BLD AUTO: 17.3 K/UL (ref 4.3–11.1)

## 2021-02-21 PROCEDURE — 74011250637 HC RX REV CODE- 250/637: Performed by: INTERNAL MEDICINE

## 2021-02-21 PROCEDURE — A9560 TC99M LABELED RBC: HCPCS

## 2021-02-21 PROCEDURE — 99233 SBSQ HOSP IP/OBS HIGH 50: CPT | Performed by: INTERNAL MEDICINE

## 2021-02-21 PROCEDURE — 82803 BLOOD GASES ANY COMBINATION: CPT

## 2021-02-21 PROCEDURE — 36592 COLLECT BLOOD FROM PICC: CPT

## 2021-02-21 PROCEDURE — 36430 TRANSFUSION BLD/BLD COMPNT: CPT

## 2021-02-21 PROCEDURE — 74011636637 HC RX REV CODE- 636/637: Performed by: INTERNAL MEDICINE

## 2021-02-21 PROCEDURE — 74011250637 HC RX REV CODE- 250/637: Performed by: NURSE PRACTITIONER

## 2021-02-21 PROCEDURE — 85018 HEMOGLOBIN: CPT

## 2021-02-21 PROCEDURE — 94003 VENT MGMT INPAT SUBQ DAY: CPT

## 2021-02-21 PROCEDURE — 2709999900 HC NON-CHARGEABLE SUPPLY

## 2021-02-21 PROCEDURE — 71045 X-RAY EXAM CHEST 1 VIEW: CPT

## 2021-02-21 PROCEDURE — P9016 RBC LEUKOCYTES REDUCED: HCPCS

## 2021-02-21 PROCEDURE — 82962 GLUCOSE BLOOD TEST: CPT

## 2021-02-21 PROCEDURE — 77030040393 HC DRSG OPTIFOAM GENT MDII -B

## 2021-02-21 PROCEDURE — 80048 BASIC METABOLIC PNL TOTAL CA: CPT

## 2021-02-21 PROCEDURE — P9040 RBC LEUKOREDUCED IRRADIATED: HCPCS

## 2021-02-21 PROCEDURE — 85027 COMPLETE CBC AUTOMATED: CPT

## 2021-02-21 PROCEDURE — 65620000000 HC RM CCU GENERAL

## 2021-02-21 PROCEDURE — 85384 FIBRINOGEN ACTIVITY: CPT

## 2021-02-21 RX ORDER — FACIAL-BODY WIPES
10 EACH TOPICAL ONCE
Status: COMPLETED | OUTPATIENT
Start: 2021-02-21 | End: 2021-02-21

## 2021-02-21 RX ORDER — POLYETHYLENE GLYCOL 3350 17 G/17G
238 POWDER, FOR SOLUTION ORAL ONCE
Status: COMPLETED | OUTPATIENT
Start: 2021-02-21 | End: 2021-02-21

## 2021-02-21 RX ORDER — SODIUM CHLORIDE 9 MG/ML
250 INJECTION, SOLUTION INTRAVENOUS AS NEEDED
Status: DISCONTINUED | OUTPATIENT
Start: 2021-02-21 | End: 2021-02-25 | Stop reason: SDUPTHER

## 2021-02-21 RX ORDER — SODIUM CHLORIDE 0.9 % (FLUSH) 0.9 %
10 SYRINGE (ML) INJECTION
Status: COMPLETED | OUTPATIENT
Start: 2021-02-21 | End: 2021-02-21

## 2021-02-21 RX ORDER — BISACODYL 5 MG
20 TABLET, DELAYED RELEASE (ENTERIC COATED) ORAL
Status: DISCONTINUED | OUTPATIENT
Start: 2021-02-21 | End: 2021-02-21

## 2021-02-21 RX ADMIN — METOPROLOL TARTRATE 50 MG: 50 TABLET, FILM COATED ORAL at 20:27

## 2021-02-21 RX ADMIN — Medication 20 ML: at 06:11

## 2021-02-21 RX ADMIN — SODIUM ZIRCONIUM CYCLOSILICATE 10 G: 10 POWDER, FOR SUSPENSION ORAL at 08:49

## 2021-02-21 RX ADMIN — Medication 10 ML: at 11:20

## 2021-02-21 RX ADMIN — LANSOPRAZOLE 30 MG: KIT at 22:26

## 2021-02-21 RX ADMIN — AMIODARONE HYDROCHLORIDE 200 MG: 200 TABLET ORAL at 08:49

## 2021-02-21 RX ADMIN — INSULIN LISPRO 3 UNITS: 100 INJECTION, SOLUTION INTRAVENOUS; SUBCUTANEOUS at 06:11

## 2021-02-21 RX ADMIN — INSULIN GLARGINE 15 UNITS: 100 INJECTION, SOLUTION SUBCUTANEOUS at 08:49

## 2021-02-21 RX ADMIN — ATORVASTATIN CALCIUM 80 MG: 40 TABLET, FILM COATED ORAL at 08:49

## 2021-02-21 RX ADMIN — Medication 20 ML: at 22:27

## 2021-02-21 RX ADMIN — Medication 20 ML: at 14:57

## 2021-02-21 RX ADMIN — POLYETHYLENE GLYCOL 3350 238 G: 17 POWDER, FOR SOLUTION ORAL at 18:16

## 2021-02-21 RX ADMIN — INSULIN LISPRO 6 UNITS: 100 INJECTION, SOLUTION INTRAVENOUS; SUBCUTANEOUS at 08:49

## 2021-02-21 RX ADMIN — LANSOPRAZOLE 30 MG: KIT at 08:49

## 2021-02-21 RX ADMIN — BISACODYL 10 MG: 10 SUPPOSITORY RECTAL at 18:09

## 2021-02-21 RX ADMIN — METOPROLOL TARTRATE 50 MG: 50 TABLET, FILM COATED ORAL at 08:49

## 2021-02-21 NOTE — PROGRESS NOTES
Bedside, Verbal and Written shift change report given to Mary Kay Del Cid RN (oncoming nurse) by Jen Garcia RN (offgoing nurse). Report included the following information SBAR, Kardex, ED Summary, Procedure Summary, Intake/Output, MAR, Recent Results and Cardiac Rhythm NSR.

## 2021-02-21 NOTE — PROGRESS NOTES
Patrica Sarkar Joaquin Gilford. Admission Date: 1/6/2021         60 y.o. y/o male with acute hypoxemic respiratory failure secondary to COVID-19.     Pt admitted 1/7 with fever, cough. Covid + 1/6. Admitted by hospitalists. Started on dexamethasone, convalescent plasma, and remdesivir. Pulm consulted 1/10 with pt requiring CPAP but pt decompensated and was intubated 1/17. Nephrology consulted 1/23 for renal failure and was started on dialysis. US 1/18 with L peroneal vein thrombus. He was started on heparin but stopped due to bleeding from HD catheter. He was started on HD on 1/25. Trached on 2/2 secondary to inability to wean from the vent. Pt developed afib requiring amio. PEG placed 2/4. IVC filter 2/8.          ICU Daily Progress Note: 2/21/2021  Subjective:   Awake and alert tolerated T piece yesterday, continues to pass BRBPR  Continues to drop Hb      Current Facility-Administered Medications   Medication Dose Route Frequency    0.9% sodium chloride infusion 250 mL  250 mL IntraVENous PRN    0.9% sodium chloride infusion 250 mL  250 mL IntraVENous PRN    0.9% sodium chloride infusion 250 mL  250 mL IntraVENous PRN    metoprolol tartrate (LOPRESSOR) tablet 50 mg  50 mg Per NG tube Q12H    heparin 25,000 units in dextrose 500 mL infusion  18-36 Units/kg/hr IntraVENous TITRATE    dexmedeTOMidine in 0.9 % NaCl (PRECEDEX) 400 mcg/100 mL (4 mcg/mL) infusion soln  0.1-1.5 mcg/kg/hr IntraVENous TITRATE    [Held by provider] risperiDONE (RisperDAL) 1 mg/mL oral solution soln 0.5 mg  0.5 mg Nasogastric BID    heparin (porcine) 1,000 unit/mL injection 5,000 Units  5,000 Units Injection DIALYSIS PRN    [Held by provider] oxyCODONE (ROXICODONE) 5 mg/5 mL oral solution 5 mg  5 mg Per G Tube Q6H    insulin glargine (LANTUS) injection 15 Units  15 Units SubCUTAneous DAILY    amiodarone (CORDARONE) tablet 200 mg  200 mg Oral DAILY    [Held by provider] oxyCODONE (ROXICODONE) 5 mg/5 mL oral solution 5 mg  5 mg Oral Q4H PRN  lansoprazole (PREVACID) 3 mg/mL oral suspension 30 mg  30 mg Per NG tube Q12H    ondansetron (ZOFRAN) injection 4 mg  4 mg IntraVENous Q6H PRN    polyethylene glycol (MIRALAX) packet 17 g  17 g Per NG tube DAILY PRN    guaiFENesin-dextromethorphan (ROBITUSSIN DM) 100-10 mg/5 mL syrup 10 mL  10 mL Per NG tube Q4H PRN    acetaminophen (TYLENOL) tablet 650 mg  650 mg Per NG tube Q6H PRN    sodium zirconium cyclosilicate (LOKELMA) powder packet 10 g  10 g Oral DAILY    white petrolatum-mineral oiL (LACRILUBE S.O.P.) ointment   Both Eyes Q4H PRN    atorvastatin (LIPITOR) tablet 80 mg  80 mg Per NG tube DAILY    [Held by provider] clopidogreL (PLAVIX) tablet 75 mg  75 mg Per NG tube DAILY    insulin lispro (HUMALOG) injection   SubCUTAneous Q4H    NUTRITIONAL SUPPORT ELECTROLYTE PRN ORDERS   Does Not Apply PRN    sodium chloride (NS) flush 20 mL  20 mL InterCATHeter Q8H    sodium chloride (NS) flush 20 mL  20 mL InterCATHeter PRN    loperamide (IMODIUM) capsule 2 mg  2 mg Oral Q4H PRN    glucagon (GLUCAGEN) injection 1 mg  1 mg IntraMUSCular PRN    dextrose (D50W) injection syrg 12.5-25 g  25-50 mL IntraVENous PRN    albuterol (PROVENTIL HFA, VENTOLIN HFA, PROAIR HFA) inhaler 2 Puff  2 Puff Inhalation Q4H PRN    influenza vaccine 2020-21 (6 mos+)(PF) (FLUARIX/FLULAVAL/FLUZONE QUAD) injection 0.5 mL  0.5 mL IntraMUSCular PRIOR TO DISCHARGE    hydrALAZINE (APRESOLINE) 20 mg/mL injection 20 mg  20 mg IntraVENous Q6H PRN         Objective:     Vitals:    02/21/21 0714 02/21/21 0719 02/21/21 0727 02/21/21 0739   BP: 117/66 (!) 122/57  (!) 114/56   Pulse: 80 83 84 86   Resp: 19 25 (!) 32 28   Temp:       SpO2: 100% 100% 95% 94%   Weight:       Height:         Intake and Output:     Intake/Output Summary (Last 24 hours) at 2/21/2021 0818  Last data filed at 2/21/2021 0644  Gross per 24 hour   Intake 1902.6 ml   Output 200 ml   Net 1702.6 ml       Physical Exam:          GEN: acutely ill, comfortable on TP  HEENT:  PERRL, no alar flaring or epistaxis, oral mucosa moist without cyanosis,   NECK:  no JVD, no retractions, no thyromegaly or masses,   LUNGS:  Bilateral lung sounds diminished; no wheezing  HEART:  RRR with no M,G,R;  ABDOMEN:  soft with no tenderness; positive bowel sounds present; tube feedings infusing  EXTREMITIES:  warm with no cyanosis, no lower leg edema  SKIN:  no jaundice or ecchymosis   NEURO: on vent, wakes easily to voice. Follows commands; moves all extremities    LINES:  PICC  Trach XLT shiley #8 cuffed  Art line  HD catheter  PEG  flexiseal    DRIPS:  none     NUTRITION:  Tube feeds via PEG      Ventilator Settings  Mode FIO2 Rate Tidal Volume Pressure PEEP   Pressure support  30 %    0 ml  10 cm H2O  8 cm H20      Peak airway pressure: 17.4 cm H2O   Minute ventilation: 13.9 l/min       CHEST XRAY:     LAB  Recent Labs     02/21/21 0423 02/21/21  0137 02/20/21 2009 02/20/21 0441 02/19/21  0852   WBC 17.3*  --   --  10.5 11.2*   HGB 6.5* 7.5* 7.8* 6.8* 7.3*   HCT 19.7* 22.9* 23.9* 22.6* 24.0*     --   --  239 251     Recent Labs     02/21/21  0423 02/20/21 0441 02/19/21  0406    139 140   K 3.7 3.7 3.9    102 104   CO2 30 33* 33*   * 183* 141*   BUN 43* 66* 47*   CREA 3.04* 4.27* 3.01*   MG  --   --  2.3   PHOS  --   --  3.6     ABG:      Cultures:  Respiratory cultures 2/5 with staph aureus and klebsiella pneumoniae  Respiratory culture on 1/30 with 4+ mucous, few yeast, few GPC  Blood cultures-all NGTD    ECHO 1/26  SUMMARY:  -  Left ventricle: The ventricle was mildly dilated. Systolic function was  normal. Ejection fraction was estimated in the range of 55 % to 60 %. Although  no diagnostic regional wall motion abnormality was identified, this   possibility  cannot be completely excluded on the basis of this study. There was mild  concentric hypertrophy. Left ventricular diastolic function is normal with an  average E/e of 10.4.  -  Right ventricle:  The size was at the upper limits of normal. There was   mild  pulmonary artery hypertension.  -  Left atrium: The atrium was mildly dilated. -  Right atrium: The atrium was mildly dilated. -  Inferior vena cava, hepatic veins: The inferior vena cava was dilated. The  respirophasic change in diameter was more than 50%. MRI:  IMPRESSION  1. No evidence of acute infarction. 2. Large bilateral mastoid effusions. Correlation for associated symptoms is  recommended. 3. Otherwise unremarkable noncontrast MRI of the brain for patient age. Assessment:     Patient Active Problem List   Diagnosis Code    Obesity E66.9    Hypertension I10    Bradycardia R00.1    PVC (premature ventricular contraction) I49.3    CAD (coronary artery disease) I25.10    Dyslipidemia, goal LDL below 70 E78.5    SWATI (acute kidney injury) ([facility] [address] N17.9    Acute hypoxemic respiratory failure (HCC) J96.01    Type 2 diabetes mellitus with hyperosmolarity without nonketotic hyperglycemic-hyperosmolar coma ([facility] [address] E11.00    Pneumonia due to COVID-19 virus U07.1, J12.82    Acute deep vein thrombosis (DVT) of left peroneal vein ([facility]) I82.452    VAP (ventilator-associated pneumonia) ([facility]) J95.851    Critical illness polyneuropathy ([facility]) G62.81    Anemia D64.9            PLAN:  Hospital Problems  Date Reviewed: 2/5/2021          Codes Class Noted POA    Anemia ICD-10-CM: D64.9  ICD-9-CM: 285.9  2/16/2021 Unknown    BPR on heparin which was stopped, Hb down to 6.8- to got 2U PRBC yesterday and receiveng 2 U today, continues to pass BRBPR AC is now contraindicated due to repeated GIB. Engage GI  NMRBC scan today  He may need a colonoscopy  .     Critical illness polyneuropathy ([facility] [address] ICD-10-CM: G62.81  ICD-9-CM: 357.82  2/12/2021 Unknown        VAP (ventilator-associated pneumonia) ([facility] [address] ICD-10-CM: F67.297  ICD-9-CM: 997.31  2/9/2021 Unknown    --Tolerating TP x 4 h yesterday on vent overnight, now back on TP - continue as tolerated    Acute deep vein thrombosis (DVT) of left peroneal vein (HCC) ICD-10-CM: G17.933  ICD-9-CM: 453.42  1/28/2021 Unknown    -IVC filter in place. Start Heparin gtt and monitor Hgb and any signs of bleeding    SWATI (acute kidney injury) ([facility] ICD-10-CM: N17.9  ICD-9-CM: 584.9  1/7/2021 Unknown    --nephrology managing dialysis. Pt had mild hypotension early in HD run, therefore low UF rate and minimal fluid removed. Follow to see how tolerates next run. Acute hypoxemic respiratory failure [facility]) ICD-10-CM: J96.01  ICD-9-CM: 518.81  1/7/2021 Unknown    See above  --PT/OT engaging    Type 2 diabetes mellitus with hyperosmolarity without nonketotic hyperglycemic-hyperosmolar coma ([facility] ICD-10-CM: E11.00  ICD-9-CM: 250.20  1/7/2021 Unknown    -BG stable with tube feedings, lantus and humalog    * (Principal) Pneumonia due to COVID-19 virus ICD-10-CM: U07.1, J12.82  ICD-9-CM: 480.8  1/7/2021 Unknown    --off all antibiotics 2/15    CAD (coronary artery disease) (Chronic) ICD-10-CM: I25.10  ICD-9-CM: 414.00  10/28/2016 Yes    Overview Signed 10/28/2016  8:05 AM by SHELDON Mathew     10-27-06 [facility] 90% LAD s/p 2.25 x 20 Synergy drug-eluting stent  (CS)             Hypertension ICD-10-CM: I10  ICD-9-CM: 401.9  10/27/2016 Yes    -restarted home Metoprolol; Bp better controlled     Obesity (Chronic) ICD-10-CM: E66.9  ICD-9-CM: 278.00  10/6/2015 Yes    -tolerating tube feeds      --pre certification for LTACH initiated.    Continue current supportive care    Frandy Ortiz MD

## 2021-02-21 NOTE — H&P (VIEW-ONLY)
[facility] REConsult Note       Primary GI Physician: Pravin Mckeon MD  Consulting GI Physician: Shar Craft MD  Referring Provider:  Drew Davis MD    Consult Date:  2/21/2021  Admit Date:  1/6/2021    Chief Complaint:  Rectal bleeding    Subjective:     History of Present Illness:  Patient is a 61 y.o. male with PMHx of GERD, Afib, DVT, HTN, DM, & CAD previously on Plavix/ASA who is seen in consultation at the request of Dr. Brandt Giles for further evaluation of rectal bleeding. Patient admitted early Jan 2021 with pneumonia secondary to COVID (positive 1/6). Intubated 1/17. Nephrology consulted 1/23 for renal failure and was started on dialysis. Trached on 2/2 secondary to inability to wean from the vent; remains trach on/vent. Pt developed afib requiring amio. GI PEG consult 2/3 and placed on 2/4. IVC filter 2/8. Nods appropriately to questions. GI re-consulted 2/21 due to patient having multiple bloody BMs throughout the night, BRBPR with clots. Received 2u of PRBCs yesterday for Hgb of 6.8, bumped him up to 7.8, and Hgb this morning around 4am is at 6.5. Heparin restarted 2 days ago, stopped yesterday morning. Hemodynamically stable. Now with leukocytosis. Afebrile. No blood noted in PEG tube feeding residuals. Patient denies N/V, abdominal pain, or proctalgia. No prior colonoscopy. NM bleeding scan pending. Two more additional units of PRBCs have been ordered for this morning.      PMH:  DPOA: Pt' sister, Jaime Grider [phone]  Past Medical History:   Diagnosis Date    Acute deep vein thrombosis (DVT) of left peroneal vein (HCC) 1/28/2021    Atrial fibrillation ([address] 01/22/2021    Critical illness polyneuropathy ([facility] [address] 2/12/2021    Gastrointestinal disorder     reflux    GERD (gastroesophageal reflux disease)     Hypertension     Lower respiratory tract infection due to COVID-19 virus 1/7/2021    Nausea & vomiting     Obesity 10/6/2015    Other ill-defined conditions(799.89)     dyspnea since surgery, wheezing, SOB    Type 2 diabetes mellitus with hyperosmolarity without nonketotic hyperglycemic-hyperosmolar coma ([facility] [address] 1/7/2021    Unstable angina ([facility] [address] 10/27/2016       PSH:  Past Surgical History:   Procedure Laterality Date    HX CHOLECYSTECTOMY      HX ORTHOPAEDIC      rotator cuff; knee    HX UROLOGICAL      kidney stone surgeries x 3    IR INSERT TUNL CVC W/O PORT OVER 5 YR  2/8/2021    IR IVC FILTER  2/8/2021    DC CARDIAC SURG PROCEDURE UNLIST      stent       Allergies: Allergies   Allergen Reactions    Latex Swelling     Had a purcell catheter with swelling of urethra. Only known latex issue in past. Wife remembers this now    Hydrocodone-Acetaminophen Itching     Wife remembers this allergy    Tessalon Perles [Benzonatate] Unknown (comments)       Home Medications:  Prior to Admission medications    Medication Sig Start Date End Date Taking? Authorizing Provider   metoprolol tartrate (LOPRESSOR) 50 mg tablet Take 1 Tab by mouth two (2) times a day. 6/3/20  Yes Wade Decker MD   clopidogreL (Plavix) 75 mg tab Take 1 Tab by mouth daily. 6/3/20  Yes Wade Decker MD   nitroglycerin (NITROSTAT) 0.4 mg SL tablet 1 Tab by SubLINGual route every five (5) minutes as needed for Chest Pain. 6/3/20  Yes Wade Decker MD   magnesium oxide (MAG-OX) 400 mg tablet Take 1 Tab by mouth daily. 6/17/19  Yes Wade Decker MD   Aspirin, Buffered 81 mg tab Take 81 mg by mouth daily. Yes Provider, Historical   atorvastatin (LIPITOR) 80 mg tablet Take 1 Tab by mouth daily.  3/19/20   Wade Decker MD       Hospital Medications:  Current Facility-Administered Medications   Medication Dose Route Frequency    0.9% sodium chloride infusion 250 mL  250 mL IntraVENous PRN    0.9% sodium chloride infusion 250 mL  250 mL IntraVENous PRN    0.9% sodium chloride infusion 250 mL  250 mL IntraVENous PRN    metoprolol tartrate (LOPRESSOR) tablet 50 mg  50 mg Per NG tube Q12H    heparin 25,000 units in dextrose 500 mL infusion  18-36 Units/kg/hr IntraVENous TITRATE    dexmedeTOMidine in 0.9 % NaCl (PRECEDEX) 400 mcg/100 mL (4 mcg/mL) infusion soln  0.1-1.5 mcg/kg/hr IntraVENous TITRATE    [Held by provider] risperiDONE (RisperDAL) 1 mg/mL oral solution soln 0.5 mg  0.5 mg Nasogastric BID    heparin (porcine) 1,000 unit/mL injection 5,000 Units  5,000 Units Injection DIALYSIS PRN    [Held by provider] oxyCODONE (ROXICODONE) 5 mg/5 mL oral solution 5 mg  5 mg Per G Tube Q6H    insulin glargine (LANTUS) injection 15 Units  15 Units SubCUTAneous DAILY    amiodarone (CORDARONE) tablet 200 mg  200 mg Oral DAILY    [Held by provider] oxyCODONE (ROXICODONE) 5 mg/5 mL oral solution 5 mg  5 mg Oral Q4H PRN    lansoprazole (PREVACID) 3 mg/mL oral suspension 30 mg  30 mg Per NG tube Q12H    ondansetron (ZOFRAN) injection 4 mg  4 mg IntraVENous Q6H PRN    polyethylene glycol (MIRALAX) packet 17 g  17 g Per NG tube DAILY PRN    guaiFENesin-dextromethorphan (ROBITUSSIN DM) 100-10 mg/5 mL syrup 10 mL  10 mL Per NG tube Q4H PRN    acetaminophen (TYLENOL) tablet 650 mg  650 mg Per NG tube Q6H PRN    sodium zirconium cyclosilicate (LOKELMA) powder packet 10 g  10 g Oral DAILY    white petrolatum-mineral oiL (LACRILUBE S.O.P.) ointment   Both Eyes Q4H PRN    atorvastatin (LIPITOR) tablet 80 mg  80 mg Per NG tube DAILY    [Held by provider] clopidogreL (PLAVIX) tablet 75 mg  75 mg Per NG tube DAILY    insulin lispro (HUMALOG) injection   SubCUTAneous Q4H    NUTRITIONAL SUPPORT ELECTROLYTE PRN ORDERS   Does Not Apply PRN    sodium chloride (NS) flush 20 mL  20 mL InterCATHeter Q8H    sodium chloride (NS) flush 20 mL  20 mL InterCATHeter PRN    loperamide (IMODIUM) capsule 2 mg  2 mg Oral Q4H PRN    glucagon (GLUCAGEN) injection 1 mg  1 mg IntraMUSCular PRN    dextrose (D50W) injection syrg 12.5-25 g  25-50 mL IntraVENous PRN    albuterol (PROVENTIL HFA, VENTOLIN HFA, PROAIR HFA) inhaler 2 Puff  2 Puff Inhalation Q4H PRN    influenza vaccine 2020-21 (6 mos+)(PF) (FLUARIX/FLULAVAL/FLUZONE QUAD) injection 0.5 mL  0.5 mL IntraMUSCular PRIOR TO DISCHARGE    hydrALAZINE (APRESOLINE) 20 mg/mL injection 20 mg  20 mg IntraVENous Q6H PRN       Social History:  Social History     Tobacco Use    Smoking status: Never Smoker    Smokeless tobacco: Never Used   Substance Use Topics    Alcohol use: No         Family History:  History reviewed. No pertinent family history. Review of Systems:  A detailed 10 system ROS is obtained, with pertinent positives as listed above. All others are negative. Objective:     Physical Exam:  Vitals:  Visit Vitals  BP (!) 114/56   Pulse 86   Temp 98.8 °F (37.1 °C)   Resp 28   Ht 5' 8\" (1.727 m)   Wt 91.2 kg (201 lb 1 oz)   SpO2 94%   BMI 30.57 kg/m²     Gen:  Pt is alert, cooperative, NAD  Skin:  Extremities and face reveal no rashes. HEENT: Sclerae anicteric. Extra-occular muscles are intact. No oral ulcers. No abnormal pigmentation of the lips. The neck is supple. Cardiovascular: Regular rate and rhythm. No murmurs, gallops, or rubs. Respiratory:  Trach/vent, coarse breath sounds. GI:  Abdomen soft, obese, nontender. NABS. PEG intact. Rectal:  Deferred  Musculoskeletal:  Pitting edema noted. Neurological:  Gross memory appears intact. Patient is alert and oriented. Psychiatric:  Mood appears appropriate with judgement intact. Lymphatic:  No cervical or supraclavicular adenopathy.     Laboratory:    Recent Labs     02/21/21  0423 02/21/21  0137 02/20/21 2009 02/20/21  0441 02/19/21  0852 02/19/21  0406   WBC 17.3*  --   --  10.5 11.2* 12.4*   HGB 6.5* 7.5* 7.8* 6.8* 7.3* 7.3*   HCT 19.7* 22.9* 23.9* 22.6* 24.0* 24.3*     --   --  239 251 262   MCV 94.3  --   --  97.8 97.6 95.7     --   --  139  --  140   K 3.7  --   --  3.7  --  3.9     --   --  102  --  104   CO2 30  --   --  33*  --  33* BUN 43*  --   --  66*  --  47*   CREA 3.04*  --   --  4.27*  --  3.01*   CA 7.6*  --   --  8.0*  --  8.4   MG  --   --   --   --   --  2.3   *  --   --  183*  --  141*   APTT  --   --   --   --  34.9  --           Assessment:     Principal Problem:  Pneumonia due to COVID-19 virus (1/7/2021)    Active Problems:  Obesity (10/6/2015)  Hypertension (10/27/2016)  CAD (coronary artery disease) (10/28/2016)      Overview: 10-27-06 [facility] 90% LAD s/p 2.25 x 20 Synergy drug-eluting stent  (CS)  SWATI (acute kidney injury) ([facility] [address] (1/7/2021)  Acute hypoxemic respiratory failure (HCC) (1/7/2021)  Type 2 diabetes mellitus with hyperosmolarity without nonketotic hyperglycemic-hyperosmolar coma ([address] (1/7/2021)  Acute deep vein thrombosis (DVT) of left peroneal vein (HCC) (1/28/2021)  VAP (ventilator-associated pneumonia) ([address] (2/9/2021)  Critical illness polyneuropathy ([facility] [address] (2/12/2021)  Anemia (2/16/2021)    61 y.o. male with PMHx of GERD, Afib, DVT, HTN, DM, & CAD who is seen in consultation at the request of Dr. Andres Ramirez for further evaluation of rectal bleeding. Patient admitted early Jan 2021 with pneumonia secondary to COVID (positive 1/6). Intubated 1/17. Nods appropriately to questions. Nephrology consulted 1/23 for renal failure and was started on dialysis. Trached on 2/2 secondary to inability to wean from the vent. Patient remains trach/on vent. Pt developed afib requiring amio. GI PEG consult 2/3, placed on 2/4. Has IVC filter. Heparin restarted 2 days ago, developed multiple BRB bloody BMs overnight with noted decline in Hgb requiring the ongoing need for blood transfusions. Hemodynamically stable. No prior colonoscopy. No evidence of bleeding with tube feed residuals, so likely likely brisk UGIB. Consider tic bleed, colonic/small bowel AVMs, friable polyps, neoplasm, and anorectal etiology as possible sources for patient's LGIB. Plan:     Heparin held again yesterday morning. Follow-up NM bleeding scan. Monitor H/H. Transfuse as needed. Spoke with sister who is POA. Wants GI to further discuss with family nurse friend who is familiar with patient's clinical history. Possible colonoscopy tomorrow with prep via PEG if POA desires. Continue supportive care. Following. Patient is seen and examined in collaboration with Dr. Opal Hollis. Assessment & plan as per Dr. Stanislav Friend.     Gin Harris PA-C  [facility]

## 2021-02-21 NOTE — CONSULTS
[facility] REConsult Note       Primary GI Physician: Jm Arce MD  Consulting GI Physician: Jordin Cohen MD  Referring Provider:  Adan Khan MD    Consult Date:  2/21/2021  Admit Date:  1/6/2021    Chief Complaint:  Rectal bleeding    Subjective:     History of Present Illness:  Patient is a 60 y.o. male with PMHx of GERD, Afib, DVT, HTN, DM, & CAD previously on Plavix/ASA who is seen in consultation at the request of Dr. Khan for further evaluation of rectal bleeding. Patient admitted early Jan 2021 with pneumonia secondary to COVID (positive 1/6). Intubated 1/17. Nephrology consulted 1/23 for renal failure and was started on dialysis.Trached on 2/2 secondary to inability to wean from the vent; remains trach on/vent. Pt developed afib requiring amio. GI PEG consult 2/3 and placed on 2/4. IVC filter 2/8. Nods appropriately to questions.     GI re-consulted 2/21 due to patient having multiple bloody BMs throughout the night, BRBPR with clots. Received 2u of PRBCs yesterday for Hgb of 6.8, bumped him up to 7.8, and Hgb this morning around 4am is at 6.5. Heparin restarted 2 days ago, stopped yesterday morning.   Hemodynamically stable. Now with leukocytosis. Afebrile. No blood noted in PEG tube feeding residuals. Patient denies N/V, abdominal pain, or proctalgia. No prior colonoscopy. NM bleeding scan pending. Two more additional units of PRBCs have been ordered for this morning.     PMH:  DPOA: Pt' sisterRika [phone]  Past Medical History:   Diagnosis Date   • Acute deep vein thrombosis (DVT) of left peroneal vein (HCC) 1/28/2021   • Atrial fibrillation (HCC) 01/22/2021   • Critical illness polyneuropathy (HCC) 2/12/2021   • Gastrointestinal disorder     reflux   • GERD (gastroesophageal reflux disease)    • Hypertension    • Lower respiratory tract infection due to COVID-19 virus 1/7/2021   • Nausea & vomiting    • Obesity 10/6/2015   • Other ill-defined  conditions(799.89)     dyspnea since surgery, wheezing, SOB    Type 2 diabetes mellitus with hyperosmolarity without nonketotic hyperglycemic-hyperosmolar coma ([facility] [address] 1/7/2021    Unstable angina ([facility] [address] 10/27/2016       PSH:  Past Surgical History:   Procedure Laterality Date    HX CHOLECYSTECTOMY      HX ORTHOPAEDIC      rotator cuff; knee    HX UROLOGICAL      kidney stone surgeries x 3    IR INSERT TUNL CVC W/O PORT OVER 5 YR  2/8/2021    IR IVC FILTER  2/8/2021    [facility] CARDIAC SURG PROCEDURE UNLIST      stent       Allergies: Allergies   Allergen Reactions    Latex Swelling     Had a purcell catheter with swelling of urethra. Only known latex issue in past. Wife remembers this now    Hydrocodone-Acetaminophen Itching     Wife remembers this allergy    Tessalon Perles [Benzonatate] Unknown (comments)       Home Medications:  Prior to Admission medications    Medication Sig Start Date End Date Taking? Authorizing Provider   metoprolol tartrate (LOPRESSOR) 50 mg tablet Take 1 Tab by mouth two (2) times a day. 6/3/20  Yes Nima Dos Santos MD   clopidogreL (Plavix) 75 mg tab Take 1 Tab by mouth daily. 6/3/20  Yes Nima Dos Santos MD   nitroglycerin (NITROSTAT) 0.4 mg SL tablet 1 Tab by SubLINGual route every five (5) minutes as needed for Chest Pain. 6/3/20  Yes Nima Dos Santos MD   magnesium oxide (MAG-OX) 400 mg tablet Take 1 Tab by mouth daily. 6/17/19  Yes Nima Dos Santos MD   Aspirin, Buffered 81 mg tab Take 81 mg by mouth daily. Yes Provider, Historical   atorvastatin (LIPITOR) 80 mg tablet Take 1 Tab by mouth daily.  3/19/20   Nima Dos Santos MD       Hospital Medications:  Current Facility-Administered Medications   Medication Dose Route Frequency    0.9% sodium chloride infusion 250 mL  250 mL IntraVENous PRN    0.9% sodium chloride infusion 250 mL  250 mL IntraVENous PRN    0.9% sodium chloride infusion 250 mL  250 mL IntraVENous PRN    metoprolol tartrate (LOPRESSOR) tablet 50 mg  50 mg Per NG tube Q12H    heparin 25,000 units in dextrose 500 mL infusion  18-36 Units/kg/hr IntraVENous TITRATE    dexmedeTOMidine in 0.9 % NaCl (PRECEDEX) 400 mcg/100 mL (4 mcg/mL) infusion soln  0.1-1.5 mcg/kg/hr IntraVENous TITRATE    [Held by provider] risperiDONE (RisperDAL) 1 mg/mL oral solution soln 0.5 mg  0.5 mg Nasogastric BID    heparin (porcine) 1,000 unit/mL injection 5,000 Units  5,000 Units Injection DIALYSIS PRN    [Held by provider] oxyCODONE (ROXICODONE) 5 mg/5 mL oral solution 5 mg  5 mg Per G Tube Q6H    insulin glargine (LANTUS) injection 15 Units  15 Units SubCUTAneous DAILY    amiodarone (CORDARONE) tablet 200 mg  200 mg Oral DAILY    [Held by provider] oxyCODONE (ROXICODONE) 5 mg/5 mL oral solution 5 mg  5 mg Oral Q4H PRN    lansoprazole (PREVACID) 3 mg/mL oral suspension 30 mg  30 mg Per NG tube Q12H    ondansetron (ZOFRAN) injection 4 mg  4 mg IntraVENous Q6H PRN    polyethylene glycol (MIRALAX) packet 17 g  17 g Per NG tube DAILY PRN    guaiFENesin-dextromethorphan (ROBITUSSIN DM) 100-10 mg/5 mL syrup 10 mL  10 mL Per NG tube Q4H PRN    acetaminophen (TYLENOL) tablet 650 mg  650 mg Per NG tube Q6H PRN    sodium zirconium cyclosilicate (LOKELMA) powder packet 10 g  10 g Oral DAILY    white petrolatum-mineral oiL (LACRILUBE S.O.P.) ointment   Both Eyes Q4H PRN    atorvastatin (LIPITOR) tablet 80 mg  80 mg Per NG tube DAILY    [Held by provider] clopidogreL (PLAVIX) tablet 75 mg  75 mg Per NG tube DAILY    insulin lispro (HUMALOG) injection   SubCUTAneous Q4H    NUTRITIONAL SUPPORT ELECTROLYTE PRN ORDERS   Does Not Apply PRN    sodium chloride (NS) flush 20 mL  20 mL InterCATHeter Q8H    sodium chloride (NS) flush 20 mL  20 mL InterCATHeter PRN    loperamide (IMODIUM) capsule 2 mg  2 mg Oral Q4H PRN    glucagon (GLUCAGEN) injection 1 mg  1 mg IntraMUSCular PRN    dextrose (D50W) injection syrg 12.5-25 g  25-50 mL IntraVENous PRN    albuterol (PROVENTIL HFA, VENTOLIN HFA, PROAIR HFA) inhaler 2 Puff  2 Puff Inhalation Q4H PRN    influenza vaccine 2020-21 (6 mos+)(PF) (FLUARIX/FLULAVAL/FLUZONE QUAD) injection 0.5 mL  0.5 mL IntraMUSCular PRIOR TO DISCHARGE    hydrALAZINE (APRESOLINE) 20 mg/mL injection 20 mg  20 mg IntraVENous Q6H PRN       Social History:  Social History     Tobacco Use    Smoking status: Never Smoker    Smokeless tobacco: Never Used   Substance Use Topics    Alcohol use: No         Family History:  History reviewed. No pertinent family history. Review of Systems:  A detailed 10 system ROS is obtained, with pertinent positives as listed above. All others are negative. Objective:     Physical Exam:  Vitals:  Visit Vitals  BP (!) 114/56   Pulse 86   Temp 98.8 °F (37.1 °C)   Resp 28   Ht 5' 8\" (1.727 m)   Wt 91.2 kg (201 lb 1 oz)   SpO2 94%   BMI 30.57 kg/m²     Gen:  Pt is alert, cooperative, NAD  Skin:  Extremities and face reveal no rashes. HEENT: Sclerae anicteric. Extra-occular muscles are intact. No oral ulcers. No abnormal pigmentation of the lips. The neck is supple. Cardiovascular: Regular rate and rhythm. No murmurs, gallops, or rubs. Respiratory:  Trach/vent, coarse breath sounds. GI:  Abdomen soft, obese, nontender. NABS. PEG intact. Rectal:  Deferred  Musculoskeletal:  Pitting edema noted. Neurological:  Gross memory appears intact. Patient is alert and oriented. Psychiatric:  Mood appears appropriate with judgement intact. Lymphatic:  No cervical or supraclavicular adenopathy.     Laboratory:    Recent Labs     02/21/21  0423 02/21/21  0137 02/20/21 2009 02/20/21  0441 02/19/21  0852 02/19/21  0406   WBC 17.3*  --   --  10.5 11.2* 12.4*   HGB 6.5* 7.5* 7.8* 6.8* 7.3* 7.3*   HCT 19.7* 22.9* 23.9* 22.6* 24.0* 24.3*     --   --  239 251 262   MCV 94.3  --   --  97.8 97.6 95.7     --   --  139  --  140   K 3.7  --   --  3.7  --  3.9     --   --  102  --  104   CO2 30  --   --  33*  --  33* BUN 43*  --   --  66*  --  47*   CREA 3.04*  --   --  4.27*  --  3.01*   CA 7.6*  --   --  8.0*  --  8.4   MG  --   --   --   --   --  2.3   *  --   --  183*  --  141*   APTT  --   --   --   --  34.9  --           Assessment:     Principal Problem:  Pneumonia due to COVID-19 virus (1/7/2021)    Active Problems:  Obesity (10/6/2015)  Hypertension (10/27/2016)  CAD (coronary artery disease) (10/28/2016)      Overview: 10-27-06 [facility] 90% LAD s/p 2.25 x 20 Synergy drug-eluting stent  (CS)  SWATI (acute kidney injury) ([facility] [address] (1/7/2021)  Acute hypoxemic respiratory failure (HCC) (1/7/2021)  Type 2 diabetes mellitus with hyperosmolarity without nonketotic hyperglycemic-hyperosmolar coma ([address] (1/7/2021)  Acute deep vein thrombosis (DVT) of left peroneal vein (HCC) (1/28/2021)  VAP (ventilator-associated pneumonia) ([address] (2/9/2021)  Critical illness polyneuropathy ([facility] [address] (2/12/2021)  Anemia (2/16/2021)    61 y.o. male with PMHx of GERD, Afib, DVT, HTN, DM, & CAD who is seen in consultation at the request of Dr. Courtney Moreno for further evaluation of rectal bleeding. Patient admitted early Jan 2021 with pneumonia secondary to COVID (positive 1/6). Intubated 1/17. Nods appropriately to questions. Nephrology consulted 1/23 for renal failure and was started on dialysis. Trached on 2/2 secondary to inability to wean from the vent. Patient remains trach/on vent. Pt developed afib requiring amio. GI PEG consult 2/3, placed on 2/4. Has IVC filter. Heparin restarted 2 days ago, developed multiple BRB bloody BMs overnight with noted decline in Hgb requiring the ongoing need for blood transfusions. Hemodynamically stable. No prior colonoscopy. No evidence of bleeding with tube feed residuals, so likely likely brisk UGIB. Consider tic bleed, colonic/small bowel AVMs, friable polyps, neoplasm, and anorectal etiology as possible sources for patient's LGIB. Plan:     Heparin held again yesterday morning. Follow-up NM bleeding scan. Monitor H/H. Transfuse as needed. Spoke with sister who is POA. Wants GI to further discuss with family nurse friend who is familiar with patient's clinical history. Possible colonoscopy tomorrow with prep via PEG if POA desires. Continue supportive care. Following. Patient is seen and examined in collaboration with Dr. Devon Rome. Assessment & plan as per Dr. Kirstin Medrano.     Gemini Victoria PA-C  [facility]

## 2021-02-21 NOTE — PROGRESS NOTES
Ventilator check complete; patient has a #8.0 tracheostomy. Patient is not sedated. Patient is able to follow commands. Breath sounds are coarse and diminished. Trachea is midline, Negative for subcutaneous air, and chest excursion is symmetric. Patient is also Negative for cyanosis and is Positive for pitting edema. All alarms are set and audible. Resuscitation bag is at the head of the bed.       Ventilator Settings  Mode FIO2 Rate Tidal Volume Pressure PEEP I:E Ratio   Pressure support  30 %    0 ml  10 cm H2O  8 cm H20  1:3.7      Peak airway pressure: 17.4 cm H2O   Minute ventilation: 13.9 l/min       Esaw Arian, RT

## 2021-02-21 NOTE — PROGRESS NOTES
Ventilator check complete; patient has a #8.0 XLT tracheostomy. Patient is not sedated. Patient is able to follow commands. Breath sounds are coarse and diminished. Trachea is midline, Negative for subcutaneous air, and chest excursion is symmetric. Patient is also Negative for cyanosis and is Positive for pitting edema. All alarms are set and audible. Resuscitation bag is at the head of the bed.       Ventilator Settings  Mode FIO2 Rate Pressure support  PEEP I:E Ratio   Pressure support  30 %  14 cmH20   8 cm H20  1:3.7      Peak airway pressure: 22 cm H2O   Minute ventilation: 11.3 l/min       Cecilia Banks RT

## 2021-02-21 NOTE — PROGRESS NOTES
Bedside, Verbal and Written shift change report given to Rocío Saab RN (oncoming nurse) by Luz Puckett (offgoing nurse). Report included the following information SBAR, Intake/Output, MAR, Recent Results and Cardiac Rhythm Sinus Rhythm.

## 2021-02-21 NOTE — PROGRESS NOTES
Renal Progress Note    Admission Date: 1/6/2021   Subjective:      Awake on vent, via trach  Nods appropriately to questions     Objective:     Physical Exam:    Patient Vitals for the past 8 hrs:   BP Temp Pulse Resp SpO2 Weight   02/21/21 0849 (!) 121/57 -- 82 -- -- --   02/21/21 0739 (!) 114/56 -- 86 28 94 % --   02/21/21 0727 -- -- 84 (!) 32 95 % --   02/21/21 0719 (!) 122/57 -- 83 25 100 % --   02/21/21 0714 117/66 -- 80 19 100 % --   02/21/21 0700 116/62 98.8 °F (37.1 °C) 82 (!) 33 100 % --   02/21/21 0659 116/62 -- 82 (!) 33 100 % --   02/21/21 0644 (!) 110/59 -- 79 27 100 % --   02/21/21 0629 116/60 -- 79 25 100 % --   02/21/21 0614 (!) 109/55 -- 81 28 98 % --   02/21/21 0559 (!) 94/47 -- 81 (!) 31 99 % --   02/21/21 0544 (!) 101/56 -- 78 28 99 % --   02/21/21 0528 (!) 102/57 -- 77 24 99 % --   02/21/21 0514 (!) 105/55 -- 79 20 97 % --   02/21/21 0500 -- -- 82 (!) 31 98 % --   02/21/21 0459 (!) 101/57 -- 79 25 98 % --   02/21/21 0444 (!) 97/55 -- 73 25 99 % --   02/21/21 0428 (!) 109/59 -- 78 23 99 % --   02/21/21 0414 (!) 104/57 -- 78 (!) 40 97 % --   02/21/21 0400 -- -- 72 24 98 % --   02/21/21 0359 (!) 103/54 -- 74 17 98 % --   02/21/21 0344 (!) 115/58 -- 72 (!) 35 97 % --   02/21/21 0330 -- 99.5 °F (37.5 °C) -- -- -- --   02/21/21 0329 (!) 104/51 -- 74 (!) 44 95 % --   02/21/21 0324 -- -- 74 27 96 % --   02/21/21 0314 128/61 -- 80 (!) 48 (!) 89 % --   02/21/21 0300 -- -- 81 19 92 % --   02/21/21 0259 (!) 99/49 -- 80 (!) 32 (!) 88 % --   02/21/21 0244 (!) 104/54 -- 80 (!) 59 93 % --   02/21/21 0229 (!) 108/57 -- 75 (!) 59 95 % --   02/21/21 0214 (!) 115/57 -- 73 (!) 42 96 % --   02/21/21 0200 -- -- 73 (!) 48 96 % --   02/21/21 0159 116/60 -- 74 (!) 68 96 % --   02/21/21 0144 116/63 -- 72 (!) 41 96 % --   02/21/21 0132 -- -- -- -- -- 91.2 kg (201 lb 1 oz)   02/21/21 0129 (!) 118/55 -- 75 (!) 52 94 % --      Gen: comfortable , NAD  HEENT: dry membranes  CV: S1, S2  Lungs: Coarse bilaterally  Extem: trace edema     Current Facility-Administered Medications   Medication Dose Route Frequency   • 0.9% sodium chloride infusion 250 mL  250 mL IntraVENous PRN   • 0.9% sodium chloride infusion 250 mL  250 mL IntraVENous PRN   • 0.9% sodium chloride infusion 250 mL  250 mL IntraVENous PRN   • metoprolol tartrate (LOPRESSOR) tablet 50 mg  50 mg Per NG tube Q12H   • dexmedeTOMidine in 0.9 % NaCl (PRECEDEX) 400 mcg/100 mL (4 mcg/mL) infusion soln  0.1-1.5 mcg/kg/hr IntraVENous TITRATE   • [Held by provider] risperiDONE (RisperDAL) 1 mg/mL oral solution soln 0.5 mg  0.5 mg Nasogastric BID   • [Held by provider] oxyCODONE (ROXICODONE) 5 mg/5 mL oral solution 5 mg  5 mg Per G Tube Q6H   • insulin glargine (LANTUS) injection 15 Units  15 Units SubCUTAneous DAILY   • amiodarone (CORDARONE) tablet 200 mg  200 mg Oral DAILY   • [Held by provider] oxyCODONE (ROXICODONE) 5 mg/5 mL oral solution 5 mg  5 mg Oral Q4H PRN   • lansoprazole (PREVACID) 3 mg/mL oral suspension 30 mg  30 mg Per NG tube Q12H   • ondansetron (ZOFRAN) injection 4 mg  4 mg IntraVENous Q6H PRN   • polyethylene glycol (MIRALAX) packet 17 g  17 g Per NG tube DAILY PRN   • guaiFENesin-dextromethorphan (ROBITUSSIN DM) 100-10 mg/5 mL syrup 10 mL  10 mL Per NG tube Q4H PRN   • acetaminophen (TYLENOL) tablet 650 mg  650 mg Per NG tube Q6H PRN   • sodium zirconium cyclosilicate (LOKELMA) powder packet 10 g  10 g Oral DAILY   • white petrolatum-mineral oiL (LACRILUBE S.O.P.) ointment   Both Eyes Q4H PRN   • atorvastatin (LIPITOR) tablet 80 mg  80 mg Per NG tube DAILY   • [Held by provider] clopidogreL (PLAVIX) tablet 75 mg  75 mg Per NG tube DAILY   • insulin lispro (HUMALOG) injection   SubCUTAneous Q4H   • NUTRITIONAL SUPPORT ELECTROLYTE PRN ORDERS   Does Not Apply PRN   • sodium chloride (NS) flush 20 mL  20 mL InterCATHeter Q8H   • sodium chloride (NS) flush 20 mL  20 mL InterCATHeter PRN   • loperamide (IMODIUM) capsule 2 mg  2 mg Oral Q4H PRN   • glucagon (GLUCAGEN)  injection 1 mg  1 mg IntraMUSCular PRN    dextrose (D50W) injection syrg 12.5-25 g  25-50 mL IntraVENous PRN    albuterol (PROVENTIL HFA, VENTOLIN HFA, PROAIR HFA) inhaler 2 Puff  2 Puff Inhalation Q4H PRN    influenza vaccine 2020-21 (6 mos+)(PF) (FLUARIX/FLULAVAL/FLUZONE QUAD) injection 0.5 mL  0.5 mL IntraMUSCular PRIOR TO DISCHARGE    hydrALAZINE (APRESOLINE) 20 mg/mL injection 20 mg  20 mg IntraVENous Q6H PRN            Data Review:     LABS:   Recent Results (from the past 12 hour(s))   GLUCOSE, POC    Collection Time: 02/21/21  1:17 AM   Result Value Ref Range    Glucose (POC) 126 (H) 65 - 100 mg/dL   HGB & HCT    Collection Time: 02/21/21  1:37 AM   Result Value Ref Range    HGB 7.5 (L) 13.6 - 17.2 g/dL    HCT 22.9 (L) 41.1 - 50.3 %   FIBRINOGEN    Collection Time: 02/21/21  4:23 AM   Result Value Ref Range    Fibrinogen 304 190 - 131 mg/dL   METABOLIC PANEL, BASIC    Collection Time: 02/21/21  4:23 AM   Result Value Ref Range    Sodium 141 136 - 145 mmol/L    Potassium 3.7 3.5 - 5.1 mmol/L    Chloride 106 98 - 107 mmol/L    CO2 30 21 - 32 mmol/L    Anion gap 5 (L) 7 - 16 mmol/L    Glucose 174 (H) 65 - 100 mg/dL    BUN 43 (H) 8 - 23 MG/DL    Creatinine 3.04 (H) 0.8 - 1.5 MG/DL    GFR est AA 27 (L) >60 ml/min/1.73m2    GFR est non-AA 22 (L) >60 ml/min/1.73m2    Calcium 7.6 (L) 8.3 - 10.4 MG/DL   CBC W/O DIFF    Collection Time: 02/21/21  4:23 AM   Result Value Ref Range    WBC 17.3 (H) 4.3 - 11.1 K/uL    RBC 2.09 (L) 4.23 - 5.6 M/uL    HGB 6.5 (LL) 13.6 - 17.2 g/dL    HCT 19.7 (L) 41.1 - 50.3 %    MCV 94.3 79.6 - 97.8 FL    MCH 31.1 26.1 - 32.9 PG    MCHC 33.0 31.4 - 35.0 g/dL    RDW 15.9 (H) 11.9 - 14.6 %    PLATELET 506 263 - 722 K/uL    MPV 10.4 9.4 - 12.3 FL    ABSOLUTE NRBC 0.00 0.0 - 0.2 K/uL   POC VENOUS BLOOD GAS    Collection Time: 02/21/21  4:31 AM   Result Value Ref Range    Device: VENT      FIO2 (POC) 40 %    pH, venous (POC) 7.43 (H) 7.32 - 7.42      pCO2, venous (POC) 44.8 41 - 51 MMHG pO2, venous (POC) 30 mmHg    HCO3, venous (POC) 29.7 (H) 23 - 28 MMOL/L    sO2, venous (POC) 58 (L) 65 - 88 %    Base excess, venous (POC) 5 mmol/L    Mode ASSIST CONTROL      Set Rate 14 bpm    PEEP/CPAP (POC) 8 cmH2O    Allens test (POC) NOT APPLICABLE      Site OTHER      Specimen type (POC) VENOUS BLOOD      Performed by [email or web address]     CO2, POC 31 MMOL/L    Pressure control 16      Respiratory comment: NurseNotified     Exhaled minute volume 12.60 L/min    COLLECT TIME 428     RBC, ALLOCATE    Collection Time: 02/21/21  8:00 AM   Result Value Ref Range    HISTORY CHECKED? Historical check performed    GLUCOSE, POC    Collection Time: 02/21/21  8:16 AM   Result Value Ref Range    Glucose (POC) 232 (H) 65 - 100 mg/dL         Plan:     Principal Problem:    Pneumonia due to COVID-19 virus (1/7/2021)    Active Problems:    Obesity (10/6/2015)      Hypertension (10/27/2016)      CAD (coronary artery disease) (10/28/2016)      Overview: 10-27-06 [facility] 90% LAD s/p 2.25 x 20 Synergy drug-eluting stent  (CS)      SWATI (acute kidney injury) ([address] (1/7/2021)      Acute hypoxemic respiratory failure ([address] (1/7/2021)      Type 2 diabetes mellitus with hyperosmolarity without nonketotic hyperglycemic-hyperosmolar coma ([address] (1/7/2021)      Acute deep vein thrombosis (DVT) of left peroneal vein (HCC) (1/28/2021)      VAP (ventilator-associated pneumonia) ([address] (2/9/2021)      Critical illness polyneuropathy ([address] (2/12/2021)      Anemia (2/16/2021)      Lower GI bleed requiring more than 4 units of blood in 24 hours, ICU, or surgery (2/21/2021)       SWATI - ATN, anuric   - 1st dialysis was 1/25/21  In setting of covid pneumonia. - out of isolation   - better hemodynamics.  Tolerated standard HD this past week  - next tx beth for Tues 2/23 - still oliguric  - minimal UF  But hasn't needed aggressive UF  - considering LTAC     DVT- anticoagulating - but not off anticoagulant with GI bleed  - we can do intermittent HD without heparin    COVID19/ Resp Failure     Hypotension- resolved.      A Fib with RVR     Anemia - s/p transfusion

## 2021-02-21 NOTE — PROGRESS NOTES
Pt having multiple bloody bowel movements throughout the night. H&H drawn to assess hemoglobin level.

## 2021-02-22 ENCOUNTER — APPOINTMENT (OUTPATIENT)
Dept: GENERAL RADIOLOGY | Age: 61
DRG: 004 | End: 2021-02-22
Attending: INTERNAL MEDICINE
Payer: COMMERCIAL

## 2021-02-22 PROBLEM — G62.81 CRITICAL ILLNESS POLYNEUROPATHY (HCC): Chronic | Status: ACTIVE | Noted: 2021-02-12

## 2021-02-22 PROBLEM — E11.00 TYPE 2 DIABETES MELLITUS WITH HYPEROSMOLARITY WITHOUT NONKETOTIC HYPERGLYCEMIC-HYPEROSMOLAR COMA (HCC): Chronic | Status: ACTIVE | Noted: 2021-01-07

## 2021-02-22 LAB
ANION GAP SERPL CALC-SCNC: 6 MMOL/L (ref 7–16)
BUN SERPL-MCNC: 60 MG/DL (ref 8–23)
CALCIUM SERPL-MCNC: 7.9 MG/DL (ref 8.3–10.4)
CHLORIDE SERPL-SCNC: 104 MMOL/L (ref 98–107)
CO2 SERPL-SCNC: 29 MMOL/L (ref 21–32)
CREAT SERPL-MCNC: 3.99 MG/DL (ref 0.8–1.5)
ERYTHROCYTE [DISTWIDTH] IN BLOOD BY AUTOMATED COUNT: 16.6 % (ref 11.9–14.6)
FIBRINOGEN PPP-MCNC: 296 MG/DL (ref 190–501)
GLUCOSE BLD STRIP.AUTO-MCNC: 100 MG/DL (ref 65–100)
GLUCOSE BLD STRIP.AUTO-MCNC: 107 MG/DL (ref 65–100)
GLUCOSE BLD STRIP.AUTO-MCNC: 113 MG/DL (ref 65–100)
GLUCOSE BLD STRIP.AUTO-MCNC: 123 MG/DL (ref 65–100)
GLUCOSE BLD STRIP.AUTO-MCNC: 145 MG/DL (ref 65–100)
GLUCOSE SERPL-MCNC: 125 MG/DL (ref 65–100)
HCT VFR BLD AUTO: 21.3 % (ref 41.1–50.3)
HCT VFR BLD AUTO: 21.4 % (ref 41.1–50.3)
HGB BLD-MCNC: 6.8 G/DL (ref 13.6–17.2)
HGB BLD-MCNC: 7.1 G/DL (ref 13.6–17.2)
INR PPP: 1.2
MAGNESIUM SERPL-MCNC: 1.9 MG/DL (ref 1.8–2.4)
MCH RBC QN AUTO: 30.9 PG (ref 26.1–32.9)
MCHC RBC AUTO-ENTMCNC: 33.3 G/DL (ref 31.4–35)
MCV RBC AUTO: 92.6 FL (ref 79.6–97.8)
NRBC # BLD: 0 K/UL (ref 0–0.2)
PHOSPHATE SERPL-MCNC: 3.9 MG/DL (ref 2.3–3.7)
PLATELET # BLD AUTO: 161 K/UL (ref 150–450)
PMV BLD AUTO: 10.3 FL (ref 9.4–12.3)
POTASSIUM SERPL-SCNC: 3.7 MMOL/L (ref 3.5–5.1)
PROTHROMBIN TIME: 15.6 SEC (ref 12.5–14.7)
RBC # BLD AUTO: 2.3 M/UL (ref 4.23–5.6)
SODIUM SERPL-SCNC: 139 MMOL/L (ref 136–145)
WBC # BLD AUTO: 19.5 K/UL (ref 4.3–11.1)

## 2021-02-22 PROCEDURE — 74011250637 HC RX REV CODE- 250/637: Performed by: NURSE PRACTITIONER

## 2021-02-22 PROCEDURE — 84100 ASSAY OF PHOSPHORUS: CPT

## 2021-02-22 PROCEDURE — 0DJD8ZZ INSPECTION OF LOWER INTESTINAL TRACT, VIA NATURAL OR ARTIFICIAL OPENING ENDOSCOPIC: ICD-10-PCS | Performed by: INTERNAL MEDICINE

## 2021-02-22 PROCEDURE — 97535 SELF CARE MNGMENT TRAINING: CPT

## 2021-02-22 PROCEDURE — 83735 ASSAY OF MAGNESIUM: CPT

## 2021-02-22 PROCEDURE — 0DJ08ZZ INSPECTION OF UPPER INTESTINAL TRACT, VIA NATURAL OR ARTIFICIAL OPENING ENDOSCOPIC: ICD-10-PCS | Performed by: INTERNAL MEDICINE

## 2021-02-22 PROCEDURE — 82962 GLUCOSE BLOOD TEST: CPT

## 2021-02-22 PROCEDURE — 97530 THERAPEUTIC ACTIVITIES: CPT

## 2021-02-22 PROCEDURE — 65620000000 HC RM CCU GENERAL

## 2021-02-22 PROCEDURE — 97112 NEUROMUSCULAR REEDUCATION: CPT

## 2021-02-22 PROCEDURE — 85014 HEMATOCRIT: CPT

## 2021-02-22 PROCEDURE — 76040000026: Performed by: INTERNAL MEDICINE

## 2021-02-22 PROCEDURE — 85027 COMPLETE CBC AUTOMATED: CPT

## 2021-02-22 PROCEDURE — 99233 SBSQ HOSP IP/OBS HIGH 50: CPT | Performed by: INTERNAL MEDICINE

## 2021-02-22 PROCEDURE — 74011250636 HC RX REV CODE- 250/636

## 2021-02-22 PROCEDURE — 94003 VENT MGMT INPAT SUBQ DAY: CPT

## 2021-02-22 PROCEDURE — 85384 FIBRINOGEN ACTIVITY: CPT

## 2021-02-22 PROCEDURE — 77010033678 HC OXYGEN DAILY

## 2021-02-22 PROCEDURE — 74011000258 HC RX REV CODE- 258: Performed by: NURSE PRACTITIONER

## 2021-02-22 PROCEDURE — 71045 X-RAY EXAM CHEST 1 VIEW: CPT

## 2021-02-22 PROCEDURE — 74011250637 HC RX REV CODE- 250/637: Performed by: INTERNAL MEDICINE

## 2021-02-22 PROCEDURE — 85610 PROTHROMBIN TIME: CPT

## 2021-02-22 PROCEDURE — 74011250636 HC RX REV CODE- 250/636: Performed by: NURSE PRACTITIONER

## 2021-02-22 PROCEDURE — 2709999900 HC NON-CHARGEABLE SUPPLY

## 2021-02-22 PROCEDURE — 2709999900 HC NON-CHARGEABLE SUPPLY: Performed by: INTERNAL MEDICINE

## 2021-02-22 PROCEDURE — 80048 BASIC METABOLIC PNL TOTAL CA: CPT

## 2021-02-22 PROCEDURE — 36592 COLLECT BLOOD FROM PICC: CPT

## 2021-02-22 RX ORDER — PROPOFOL 10 MG/ML
INJECTION, EMULSION INTRAVENOUS
Status: COMPLETED
Start: 2021-02-22 | End: 2021-02-22

## 2021-02-22 RX ORDER — PROPOFOL 10 MG/ML
0-50 INJECTION, EMULSION INTRAVENOUS
Status: COMPLETED | OUTPATIENT
Start: 2021-02-22 | End: 2021-02-22

## 2021-02-22 RX ORDER — PROPOFOL 10 MG/ML
50 INJECTION, EMULSION INTRAVENOUS
Status: COMPLETED | OUTPATIENT
Start: 2021-02-22 | End: 2021-02-22

## 2021-02-22 RX ADMIN — LANSOPRAZOLE 30 MG: KIT at 20:20

## 2021-02-22 RX ADMIN — LANSOPRAZOLE 30 MG: KIT at 08:48

## 2021-02-22 RX ADMIN — METOPROLOL TARTRATE 50 MG: 50 TABLET, FILM COATED ORAL at 20:19

## 2021-02-22 RX ADMIN — PROPOFOL 10 MCG/KG/MIN: 10 INJECTION, EMULSION INTRAVENOUS at 16:14

## 2021-02-22 RX ADMIN — Medication 20 ML: at 22:25

## 2021-02-22 RX ADMIN — CEFTRIAXONE 2 G: 2 INJECTION, POWDER, FOR SOLUTION INTRAMUSCULAR; INTRAVENOUS at 08:36

## 2021-02-22 RX ADMIN — AMIODARONE HYDROCHLORIDE 200 MG: 200 TABLET ORAL at 08:36

## 2021-02-22 RX ADMIN — Medication 20 ML: at 14:55

## 2021-02-22 RX ADMIN — Medication 20 ML: at 06:00

## 2021-02-22 RX ADMIN — METOPROLOL TARTRATE 50 MG: 50 TABLET, FILM COATED ORAL at 08:37

## 2021-02-22 RX ADMIN — ATORVASTATIN CALCIUM 80 MG: 40 TABLET, FILM COATED ORAL at 08:36

## 2021-02-22 RX ADMIN — PROPOFOL 50 MCG: 10 INJECTION, EMULSION INTRAVENOUS at 16:14

## 2021-02-22 RX ADMIN — PROPOFOL INJECTABLE EMULSION 50 MCG: 10 INJECTION, EMULSION INTRAVENOUS at 16:14

## 2021-02-22 NOTE — PROGRESS NOTES
Ventilator check complete; patient has a #8.0 XLT tracheostomy. Patient is not sedated. Patient is able to follow commands. Breath sounds are coarse. Trachea is midline, Negative for subcutaneous air, and chest excursion is symmetric. Patient is also Negative for cyanosis and is Negative for pitting edema. All alarms are set and audible. Resuscitation bag is at the head of the bed.       Ventilator Settings  Mode FIO2 Rate Pressure control  PEEP I:E Ratio   Pressure control  40 % 14 16 cm H20   8 cm H20  1:3.7      Peak airway pressure: 28 cm H2O   Minute ventilation: 12.3 l/min     Angella Arcos RT

## 2021-02-22 NOTE — PROGRESS NOTES
Justice Phillips RN reported off to [employer]. Notified of pts left upper extremity decrease in circulation and sensation along with being swollen, warm, redness above insertion site. Suggested the need for possible ultra sound today.

## 2021-02-22 NOTE — PROGRESS NOTES
Bedside, Verbal and Written shift change report given to Cosme Quiñones RN (oncoming nurse) by Jonnathan Ramos (offgoing nurse). Report included the following information SBAR, Procedure Summary, Intake/Output, MAR, Recent Results and Cardiac Rhythm Sinus Rhythm.

## 2021-02-22 NOTE — PROGRESS NOTES
Spoke with Dr Elida Hartmann about blood transfusion and dialysis. Will run HD in am and transfuse at that time if needed as chest x-ray is worse today.

## 2021-02-22 NOTE — PROCEDURES
ESOPHAGOGASTRODUODENOSCOPY    DATE of PROCEDURE: 2/22/2021    PT NAME: Alexander Torres Jr.     [id number]    MEDICATION:   MAC    INSTRUMENT: GIFH 190    SPECIAL PROCEDURE: none  BLOOD LOSS- 0 or min.  SPEC- no  IMPLANT- none    PROCEDURE:  After informed consent, the patient was placed Under anesthesia in the left lateral decubitus position. The endoscope was passed visually without difficulty. The examination was performed to the duodenum with the findings and treatments as outlined below. Patient's tolerated the procedure well. No apparent complications.      ASSESSMENT:  1. Normal exam  2. PEG in place    PLAN:   1. [place]     C Jordin Pack MD

## 2021-02-22 NOTE — PROGRESS NOTES
A follow up visit was made to the patient. Emotional support, spiritual presence and   prayer were provided for the patient. He was awake, his therapist had been working with him.       Osiel Chiu, [address] [facility], [facility]

## 2021-02-22 NOTE — PROGRESS NOTES
Chart reviewed as remains in CCU. Confused per notes. Trach -on and off trach collar per RT notes. PS at HS and trach collar during day. HD per Nephro. PEG in placed. Awaiting precert with insurance for LTACH/[facility]. Updated clinical and clinical overview sent to Flory esparza. CM following. LOS 46 days.

## 2021-02-22 NOTE — PROGRESS NOTES
Pts left upper extremity showed signs of decreased blood flow. Hand became cold and pale with swelling above the artline insertion site. Due to these signs and symptoms RN to removed art line and applied pressure dressing.

## 2021-02-22 NOTE — DIABETES MGMT
Patient's blood glucose  yesterday with patient receiving Lantus 15 units and Humalog 9 units. Blood glucose 125 this morning. Hgb 7. 1. Cr 3.99. GFR 16. Phos 3.9. Lantus being held today for procedure today. Remains on vent. Will follow along.

## 2021-02-22 NOTE — PROGRESS NOTES
Tre Juarez. Admission Date: 1/6/2021             Daily Progress Note: 2/22/2021   Pt admitted 1/7 with fever, cough. Covid + 1/6. Admitted by hospitalists. Started on dexamethasone, convalescent plasma, and remdesivir. Pulm consulted 1/10 with pt requiring CPAP but pt decompensated and was intubated 1/17. Nephrology consulted 1/23 for renal failure and was started on dialysis. US 1/18 with L peroneal vein thrombus. He was started on heparin but stopped due to bleeding from HD catheter. He was started on HD on 1/25. Trached on 2/2 secondary to inability to wean from the vent. Pt developed afib requiring amio. PEG placed 2/4. IVC filter 2/8. GIB and seen by GI. Heparin on hold. Klebsiella in sputum and abx added 2/22. Subjective:     Over the past 24 hours:  Ongoing infiltrates, confusion- now off sedatives. WBC up and klebsiella in sputum  comfortable on PC over night, nods appropriately.  On Trach collar during the day    Review of Systems  Constitutional: negative for fevers and chills  Respiratory: positive for cough  Musculoskeletal:positive for severe debility    Current Facility-Administered Medications   Medication Dose Route Frequency    cefTRIAXone (ROCEPHIN) 2 g in 0.9% sodium chloride (MBP/ADV) 50 mL MBP  2 g IntraVENous Q24H    0.9% sodium chloride infusion 250 mL  250 mL IntraVENous PRN    0.9% sodium chloride infusion 250 mL  250 mL IntraVENous PRN    0.9% sodium chloride infusion 250 mL  250 mL IntraVENous PRN    metoprolol tartrate (LOPRESSOR) tablet 50 mg  50 mg Per NG tube Q12H    [Held by provider] risperiDONE (RisperDAL) 1 mg/mL oral solution soln 0.5 mg  0.5 mg Nasogastric BID    [Held by provider] oxyCODONE (ROXICODONE) 5 mg/5 mL oral solution 5 mg  5 mg Per G Tube Q6H    insulin glargine (LANTUS) injection 15 Units  15 Units SubCUTAneous DAILY    amiodarone (CORDARONE) tablet 200 mg  200 mg Oral DAILY    [Held by provider] oxyCODONE (ROXICODONE) 5 mg/5 mL oral solution 5 mg  5 mg Oral Q4H PRN    lansoprazole (PREVACID) 3 mg/mL oral suspension 30 mg  30 mg Per NG tube Q12H    ondansetron (ZOFRAN) injection 4 mg  4 mg IntraVENous Q6H PRN    polyethylene glycol (MIRALAX) packet 17 g  17 g Per NG tube DAILY PRN    guaiFENesin-dextromethorphan (ROBITUSSIN DM) 100-10 mg/5 mL syrup 10 mL  10 mL Per NG tube Q4H PRN    acetaminophen (TYLENOL) tablet 650 mg  650 mg Per NG tube Q6H PRN    sodium zirconium cyclosilicate (LOKELMA) powder packet 10 g  10 g Oral DAILY    white petrolatum-mineral oiL (LACRILUBE S.O.P.) ointment   Both Eyes Q4H PRN    atorvastatin (LIPITOR) tablet 80 mg  80 mg Per NG tube DAILY    [Held by provider] clopidogreL (PLAVIX) tablet 75 mg  75 mg Per NG tube DAILY    insulin lispro (HUMALOG) injection   SubCUTAneous Q4H    NUTRITIONAL SUPPORT ELECTROLYTE PRN ORDERS   Does Not Apply PRN    sodium chloride (NS) flush 20 mL  20 mL InterCATHeter Q8H    sodium chloride (NS) flush 20 mL  20 mL InterCATHeter PRN    loperamide (IMODIUM) capsule 2 mg  2 mg Oral Q4H PRN    glucagon (GLUCAGEN) injection 1 mg  1 mg IntraMUSCular PRN    dextrose (D50W) injection syrg 12.5-25 g  25-50 mL IntraVENous PRN    albuterol (PROVENTIL HFA, VENTOLIN HFA, PROAIR HFA) inhaler 2 Puff  2 Puff Inhalation Q4H PRN    influenza vaccine 2020-21 (6 mos+)(PF) (FLUARIX/FLULAVAL/FLUZONE QUAD) injection 0.5 mL  0.5 mL IntraMUSCular PRIOR TO DISCHARGE    hydrALAZINE (APRESOLINE) 20 mg/mL injection 20 mg  20 mg IntraVENous Q6H PRN         Objective:     Vitals:    02/22/21 0330 02/22/21 0334 02/22/21 0344 02/22/21 0359   BP:   (!) 179/79 (!) 167/80   Pulse:  99 (!) 102 99   Resp:  24 (!) 91 30   Temp: 99.1 °F (37.3 °C)      SpO2:  97% 96% 96%   Weight:       Height:         Intake and Output:   02/20 1901 - 02/22 0700  In: 5085   Out: 500 [Urine:300]  No intake/output data recorded.       Intake/Output Summary (Last 24 hours) at 2/22/2021 2744  Last data filed at 2/21/2021 2259  Gross per 24 hour   Intake 4376 ml   Output 300 ml   Net 4076 ml       Physical Exam:          GEN: debilitated on vent  HEENT: trach   NECK:  no JVD, no retractions, no thyromegaly or masses,   LUNGS:  Coarse bilaterally on vent  HEART:  IRR with no M,G,R;  ABDOMEN:  soft with no tenderness; positive bowel sounds present  EXTREMITIES:  warm with no cyanosis, trace lower leg edema  SKIN:  no jaundice or ecchymosis   NEURO:  alert, grossly non-focal, nods appropriately    LINES/DRAINS:   PICC  HD  PEG  Trach      DRIPS: none. ACTIVITY: limited    NUTRITION: NPO    LAB  Recent Labs     02/22/21  0609 02/21/21  1657 02/21/21  0423 02/20/21  0441 02/20/21  0441   WBC 19.5*  --  17.3*  --  10.5   HGB 7.1* 8.4* 6.5*   < > 6.8*   HCT 21.3* 25.5* 19.7*   < > 22.6*     --  172  --  239    < > = values in this interval not displayed. Recent Labs     02/22/21  0609 02/21/21  0423 02/20/21 0441    141 139   K 3.7 3.7 3.7    106 102   CO2 29 30 33*   * 174* 183*   BUN 60* 43* 66*   CREA 3.99* 3.04* 4.27*   MG 1.9  --   --    PHOS 3.9*  --   --          MRI brain  IMPRESSION     1. No evidence of acute infarction.     2. Large bilateral mastoid effusions. Correlation for associated symptoms is  recommended.     3. Otherwise unremarkable noncontrast MRI of the brain for patient age.       Assessment:     Hospital Problems  Date Reviewed: 2/5/2021          Codes Class Noted POA    Lower GI bleed requiring more than 4 units of blood in 24 hours, ICU, or surgery ICD-10-CM: K92.2  ICD-9-CM: 578.9  2/21/2021 Yes    For colonoscopy today    Anemia ICD-10-CM: D64.9  ICD-9-CM: 285.9  2/16/2021 Yes    Ongoing. Has gotten 5U PRBC in past 2 day.  Hgb 7.1 this AM. Hold on Tx since CXR worse and dialysis not planned until tomorrow    Critical illness polyneuropathy ([address] (Chronic) ICD-10-CM: W34.67  ICD-9-CM: 357.82  2/12/2021 Yes        VAP (ventilator-associated pneumonia) ([facility] ICD-10-CM: A51.081  ICD-9-CM: 997.31 2/9/2021 Yes    On ceftriaxone for Klebsiella    Acute deep vein thrombosis (DVT) of left peroneal vein (HCC) ICD-10-CM: A68.893  ICD-9-CM: 453.42  1/28/2021 Yes    Off heparin due to bleeding    SWATI (acute kidney injury) ([facility] ICD-10-CM: N17.9  ICD-9-CM: 584.9  1/7/2021 Yes    Per     Acute hypoxemic respiratory failure ([facility] ICD-10-CM: J96.01  ICD-9-CM: 518.81  1/7/2021 Yes    Still on vent at night    Type 2 diabetes mellitus with hyperosmolarity without nonketotic hyperglycemic-hyperosmolar coma ([facility] (Chronic) ICD-10-CM: E11.00  ICD-9-CM: 250.20  1/7/2021 Yes        * (Principal) Pneumonia due to COVID-19 virus ICD-10-CM: U07.1, J12.82  ICD-9-CM: 480.8  1/7/2021 Yes    Completed Rx    CAD (coronary artery disease) (Chronic) ICD-10-CM: I25.10  ICD-9-CM: 414.00  10/28/2016 Yes    Overview Signed 10/28/2016  8:05 AM by SHELDON Caballero     10-27-06 [facility] 90% LAD s/p 2.25 x 20 Synergy drug-eluting stent  (CS)             Hypertension (Chronic) ICD-10-CM: I10  ICD-9-CM: 401.9  10/27/2016 Yes        Obesity (Chronic) ICD-10-CM: E66.9  ICD-9-CM: 278.00  10/6/2015 Yes            Admitted with covid 19 pneumonia and respiratory failure. Prolonged vent, trach/PEG. Complicated course with a fib, DVT, GIB and now with IVC filter. Acute renal failure and s/p HD start per nephrology. Severe debility with critical care polyneuropathy. Prevacid, no longer on heparin    PLAN:    WBC: klebsiella in sputum. He is not on abx. We   Hgb 7.1: may need to transfuse. Heparin on hold with recent GIB, had DVT. Has IVC filter  GI following for rectal bleed, plans for colonoscopy today  Cr up in SWATI, plans for HD tomorrow. CXR worse up 4 liters over the past 24 hours. Severe Critical care polyneuropathy, PT/OT  Increase TC duration (tolerating 6 hours now) and rest Q HS  LTAC when bed available.       More than 50% of time documented was spent in face-to-face contact with the patient and in the care of the patient on the floor/unit where the patient is located. Total time spent 33 minutes. Aida Kaufman, REMI    Lungs:  Few scattered rhonchi; now on TC  Heart:  RRR with no Murmur/Rubs/Gallops: + edema    Additional Comments:  Got lots of volume for colonoscopy. Hgb borderline. Likely place back on vent during colonoscopy. Rocephin started today for klebsiella in sputum. May need transfusion later. Recheck HH at 2 PM.    I have spoken with and examined the patient. I agree with the above assessment and plan as documented.     Charlie Mcclure MD

## 2021-02-22 NOTE — PROGRESS NOTES
2019    Marcia Crouch MD  [address] [facility]    Patient: Gabi Lim   YOB: 1979   Date of Visit: 2019     Encounter Diagnosis     ICD-10-CM    1  Herniated cervical intervertebral disc M50 20        Dear Dr Krystyna Lehman:    Thank you for your recent referral of Gabi Lim  Please review the attached evaluation summary from Park's recent visit  Please verify that you agree with the plan of care by signing the attached order  If you have any questions or concerns, please do not hesitate to call  I sincerely appreciate the opportunity to share in the care of one of your patients and hope to have another opportunity to work with you in the near future  Sincerely,    Ania Ta, PT      Referring Provider:      I certify that I have read the below Plan of Care and certify the need for these services furnished under this plan of treatment while under my care  Marcia Crouch MD  [facility]: [phone]          PT Evaluation     Today's date: 2019  Patient name: Gabi Lim  : 1979  MRN: [id number]  Referring provider: Shaquille Glass MD  Dx:   Encounter Diagnosis     ICD-10-CM    1  Herniated cervical intervertebral disc M50 20                   Assessment  Assessment details: Gabi Lim is a 36 y o  female who presents to outpatient PT with a  Herniated cervical intervertebral disc  (primary encounter diagnosis)  No further referral appears necessary at this time based upon examination results  Pt presents with decreased strength, ROM, balance, functional activity tolerance, and pain with movement in her cervical spine,  which is  limiting her ability to perform the aforementioned functional activities  Posture is grossly poor with forward head and rounded shoulders in sitting, Special testing unremarkable this session  Decreased sensation to T1 dermatome level  Pt placed back on vent after ~4hrs of TCT per pt request. Pt tolerated changed well and VSS Etiologic factors include repetitive poor body mechanics  Prognosis is good given HEP compliance and PT 2/wk  Please contact me if you have any questions or recommendations  Thank you for the opportunity to share in  Park's care  Impairments: abnormal or restricted ROM, abnormal movement, activity intolerance, difficulty understanding, lacks appropriate home exercise program, pain with function and poor posture     Symptom irritability: moderateUnderstanding of Dx/Px/POC: good   Prognosis: good    Goals  STG    1  In 4-6 weeks, patient will demonstrate a decrease in neck pain to 0/10 during functional activities  2  In 4-6 weeks, patient will demonstrate an increase in cervical range of motion by 5 degrees  3  In 4-6 weeks, patient will demonstrate an increase in Bilateral shoulder strength by 1/2 grade on MMT    LTG    1  In 6-8 weeks, patient will be independent with their home exercise program  2  In 6-8 weeks, patient will have zero limitations with B/L shoulder strength  3  In 6-8 weeks, patient will have zero limitations with cervical and B/L shoulder ROM   4  In 6-8 weeks, patient will have zero limitations with return to work   5    In 6-8 weeks, patient will have zero limitations with Sleeping             Plan  Patient would benefit from: skilled physical therapy  Planned therapy interventions: manual therapy, therapeutic exercise, balance, balance/weight bearing training, body mechanics training, flexibility, functional ROM exercises, graded motor, graded exercise, home exercise program, therapeutic activities, stretching, strengthening, postural training, neuromuscular re-education and joint mobilization  Frequency: 2x week  Duration in weeks: 4  Plan of Care beginning date: 12/5/2019  Plan of Care expiration date: 1/9/2020  Treatment plan discussed with: patient        Subjective Evaluation    History of Present Illness  Mechanism of injury: Pt is a 36year old female who presents to outpatient physical therapy with a chief complaint of left arm/neck pain  Insidious onset  Pt reports that pain started in left arm  Was getting progressively worse, so she went to the ED  Had X-rays, and MRI of the neck, which shows herniated cervical disc  Reports pain in her left UE at the time of her initial evaluation     Pain  Current pain ratin  At best pain ratin  At worst pain ratin  Quality: throbbing and tight  Relieving factors: medications  Progression: improved    Social Support    Employment status: working (Works a [employer] and [employer], has to [email or web address] )  Hand dominance: right      Diagnostic Tests  X-ray: abnormal  MRI studies: abnormal  Treatments  Previous treatment: medication  Current treatment: physical therapy  Patient Goals  Patient goals for therapy: increased strength, decreased pain and increased motion          Objective     Active Range of Motion   Cervical/Thoracic Spine       Cervical    Flexion: 30 degrees   Extension: 30 degrees      Left lateral flexion: 35 degrees      Right lateral flexion: 35 degrees      Left rotation: 80 degrees  Right rotation: 80 degrees         Left Shoulder   Flexion: WFL  Abduction: WFL  External rotation 90°:  WFL  Internal rotation 90°:  WFL    Right Shoulder   Flexion: WFL  Abduction: WFL  External rotation 90°:  WFL  Internal rotation 90°:  WFL    Additional Active Range of Motion Details  Compression/Distraction -  Negative     Spurling - Negative     VBI - Negative     Sharp Nya - Negative     Decreased Sensation to Left T1    Strength/Myotome Testing     Left Shoulder     Planes of Motion   Flexion: 4   Abduction: 4   External rotation at 90°:  4   Internal rotation at 90°:  4     Right Shoulder     Planes of Motion   Flexion: 4   Abduction: 4   External rotation at 90°:  4   Internal rotation at 90°:  4              Precautions:  None RA due        Manual              SOR Exercise Diary              Cervical AROM              Cervical Isometrics              Thoracic Ext Over chair              Doorway Stretch              Scap Squeeze              MTP/LTP             TB ER/IR             Chin Tucks              UBE                                                                                                                                                                 Modalities              Cervical Traction

## 2021-02-22 NOTE — PROGRESS NOTES
Report given to bedside RN, Suzan Guardian. Pt resting quietly post procedure. VSS continues on ventilator.

## 2021-02-22 NOTE — PROGRESS NOTES
JAROD CONNELL and NP here to see pt. Request EGD to be added to consent. Pt's family called for telephone consent. Consent received and verified by second RN.

## 2021-02-22 NOTE — PROGRESS NOTES
[facility] Nephrology        Subjective: SWATI ( probable ESRD)   Weak, trach. Nods answers to questions. Review of Systems -   General ROS: negative for - fever, chills  Respiratory ROS: no SOB, cough, JACKSON  Cardiovascular ROS: no CP, palpitations  Gastrointestinal ROS: no N&V, abdominal pain, diarrhea  Genito-Urinary ROS: no difficulty voiding, dysuria  Neurological ROS: no seizures, focal weekness        Objective:    Vitals:    02/22/21 0334 02/22/21 0344 02/22/21 0359 02/22/21 0836   BP:  (!) 179/79 (!) 167/80 135/62   Pulse: 99 (!) 102 99 76   Resp: 24 (!) 91 30    Temp:       SpO2: 97% 96% 96%    Weight:       Height:           PE  Gen: in no acute distress  CV:reg rate  Chest:bilat breath sounds  Abd: soft  Ext/Access: rt IJ Tunneled HD cath ok       . LAB  Recent Labs     02/22/21  0609 02/21/21  1657 02/21/21  0423 02/20/21  0441 02/20/21  0441   WBC 19.5*  --  17.3*  --  10.5   HGB 7.1* 8.4* 6.5*   < > 6.8*   HCT 21.3* 25.5* 19.7*   < > 22.6*     --  172  --  239    < > = values in this interval not displayed.      Recent Labs     02/22/21  0609 02/21/21  0423 02/20/21  0441    141 139   K 3.7 3.7 3.7    106 102   CO2 29 30 33*   * 174* 183*   BUN 60* 43* 66*   CREA 3.99* 3.04* 4.27*   MG 1.9  --   --    PHOS 3.9*  --   --    CA 7.9* 7.6* 8.0*           Radiology    A/P:   Patient Active Problem List   Diagnosis Code    Obesity E66.9    Hypertension I10    Bradycardia R00.1    PVC (premature ventricular contraction) I49.3    CAD (coronary artery disease) I25.10    Dyslipidemia, goal LDL below 70 E78.5    SWATI (acute kidney injury) (HCC) N17.9    Acute hypoxemic respiratory failure (HCC) J96.01    Type 2 diabetes mellitus with hyperosmolarity without nonketotic hyperglycemic-hyperosmolar coma (HCC) E11.00    Pneumonia due to COVID-19 virus U07.1, J12.82    Acute deep vein thrombosis (DVT) of left peroneal vein ([facility]) I82.452    VAP (ventilator-associated pneumonia) [facility]) J95.851    Critical illness polyneuropathy (HCC) G62.81    Anemia D64.9    Lower GI bleed requiring more than 4 units of blood in 24 hours, ICU, or surgery K92.2     SWATI (probaly ESRD)  - still oliguric, first HD was 1/25/21. For HD again tomorrow. Anemia - will be transfused tomorrow, Colonoscopy tomorrow.     Resp Failure/ COVID19    A Fib    DVT              Juan Jose Rico MD

## 2021-02-22 NOTE — PROGRESS NOTES
ACUTE PHYSICAL THERAPY GOALS:  (Developed with and agreed upon by patient and/or caregiver.)  UPDATED 21  LTG:  (1.)Mr. Torres will move from supine to sit and sit to supine , scoot up and down and roll side to side in bed with MINIMAL ASSIST within 7 treatment day(s). (2.)Mr. Torres will tolerate sitting up in recliner chair for 2 hours with stable vital signs to increase upright tolerance within 7 treatment day(s). (3.)Mr. Torres will maintain static/dynamic sitting x 15 minutes with MINIMAL ASSISTANCE for improved balance within 7 treatment days. (4.)Mr. Torres will participate in therapeutic activity/exercises x 24 minutes for increased strength within 7 treatment days. (5.)Mr. Torres will ambulate with MODERATE ASSIST for 5 feet with the least restrictive device within 7 treatment day(s). ________________________________________________________________________________________________      PHYSICAL THERAPY: Daily Note and AM Treatment Day # 2    Merrick Morillo is a 61 y.o. male   PRIMARY DIAGNOSIS: Pneumonia due to COVID-19 virus  Type 2 diabetes mellitus with hyperosmolarity without nonketotic hyperglycemic-hyperosmolar coma ([facility] [E11.00]  Lower respiratory tract infection due to COVID-19 virus [U07.1, J22]  Acute hypoxemic respiratory failure ([facility] [J96.01]  SWATI (acute kidney injury) ([facility] [N17.9]  Procedure(s) (LRB):  PERCUTANEOUS ENDOSCOPIC GASTROSTOMY TUBE INSERTION ROOM 3302  *Pt on vent with trach/ amio gtt (N/A)  ESOPHAGOGASTRODUODENOSCOPY (EGD) (N/A)  18 Days Post-Op    ASSESSMENT:     REHAB RECOMMENDATIONS: CURRENT LEVEL OF FUNCTION:  (Most Recently Demonstrated)   Recommendation to date pending progress:  [address] [place] [address]  Equipment:    To Be Determined Bed Mobility:   Total Assistance  Sit to Stand:   Not tested  Transfers:   Not tested  Gait/Mobility:   Not tested     ASSESSMENT:  Mr. Matilda Shipman was supine in bed and on trach collar.   He performed bed mobility today with max-totalA and poor sitting balance. Pt participated in seated EOB activities with practice maintaining midline and weight shifting. OT addressed neuro re-education. No significant progress today.      SUBJECTIVE:   Mr. Danna Carlisle states unable to verbalize    SOCIAL HISTORY/ LIVING ENVIRONMENT: See eval  Home Environment: Private residence  One/Two Story Residence: One story  Living Alone: No  Support Systems: Family member(s)  OBJECTIVE:     PAIN: VITAL SIGNS: LINES/DRAINS:   Pre Treatment: Pain Screen  Pain Scale 1: Numeric (0 - 10)  Pain Intensity 1: 0  Post Treatment: 0   Gimenez Catheter, IV and Nasogastric Tube  O2 Device: Tracheal collar     MOBILITY: I Mod I S SBA CGA Min Mod Max Total  NT x2 Comments:   Bed Mobility    Rolling [] [] [] [] [] [] [] [x] [x] [] [x]    Supine to Sit [] [] [] [] [] [] [] [x] [x] [] [x]    Scooting [] [] [] [] [] [] [] [] [x] [] [x]    Sit to Supine [] [] [] [] [] [] [] [x] [x] [] [x]    Transfers    Sit to Stand [] [] [] [] [] [] [] [] [] [x] []    Bed to Chair [] [] [] [] [] [] [] [] [] [x] []    Stand to Sit [] [] [] [] [] [] [] [] [] [x] []    I=Independent, Mod I=Modified Independent, S=Supervision, SBA=Standby Assistance, CGA=Contact Guard Assistance,   Min=Minimal Assistance, Mod=Moderate Assistance, Max=Maximal Assistance, Total=Total Assistance, NT=Not Tested    GAIT: I Mod I S SBA CGA Min Mod Max Total  NT x2 Comments:   Level of Assistance [] [] [] [] [] [] [] [] [] [x] []    Distance NT    DME N/A    Gait Quality NT    Weightbearing  Status N/A     I=Independent, Mod I=Modified Independent, S=Supervision, SBA=Standby Assistance, CGA=Contact Guard Assistance,   Min=Minimal Assistance, Mod=Moderate Assistance, Max=Maximal Assistance, Total=Total Assistance, NT=Not Tested    PLAN:   FREQUENCY/DURATION: PT Plan of Care: 3 times/week for duration of hospital stay or until stated goals are met, whichever comes first.  TREATMENT:     TREATMENT:   ($$ Therapeutic Activity: 23-37 mins    )  Co-Treatment PT/OT necessary due to patient's decreased overall endurance/tolerance levels, as well as need for high level skilled assistance to complete functional transfers/mobility and functional tasks  Therapeutic Activity (23 Minutes): Therapeutic activity included Rolling, Supine to Sit, Sit to Supine, Scooting and Sitting balance  to improve functional Mobility, Strength and Activity tolerance.     AFTER TREATMENT POSITION/PRECAUTIONS:  Bed and Needs within reach    INTERDISCIPLINARY COLLABORATION:  RN/PCT, PT/PTA, OT/SIN and Rehab Attendant     TOTAL TREATMENT DURATION:  PT Patient Time In/Time Out  Time In: 0943  Time Out: [address] Mario Arzate PT, DPT

## 2021-02-22 NOTE — PROCEDURES
COLONOSCOPY    DATE of PROCEDURE: 2/22/2021    PT NAME: Lucien Torres Jr.     [id number]    MEDICATION:   MAC    INSTRUMENT: CFHQ 190 L    SPECIAL PROCEDURE: none  PREP: fair  BLOOD LOSS- 0 or min. SPEC- no  IMPLANTS- NONE  PROCEDURE: After informed consent, the patient was placed under anesthesia in the left lateral decubitus position. Rectal exam was performed and the colonoscope was advanced under direct vision to the cecum. the terminal ileum was entered. Retroflex was performed in the right colon and the rectum. Further findings and therapy are as outlined below. Patient tolerated the procedure well with no apparent complications. ASSESSMENT:  1. Normal throughout except for clean based rectal ulcer- ? Stercoral ulcer vs trauma such as enema tip  2. NO Diverticulosis seen  3. Minimal Internal Hemorrhoids    PLAN:  1. F/U inpt  2.  Surveillance 10 yrs

## 2021-02-22 NOTE — PROGRESS NOTES
ACUTE OT GOALS:  (Developed with and agreed upon by patient and/or caregiver.)                   NEW GOALS   1. Patient will complete self-grooming tasks in supported sitting with mod A and adaptive equipment as needed. 2. Patient will complete functional transfers with max A and adaptive equipment as needed. 3. Patient will tolerate 30 minutes OT treatment with 2-3 rest breaks as needed to increase activity tolerance for ADL performance. 4. Patient will tolerate 15 minutes BUE therapeutic exercises to increase functional use of BUE during ADL performance.         Timeframe: 7 days    OCCUPATIONAL THERAPY: Daily Note OT Treatment Day # 2    Tip Sanchez is a 61 y.o. male   PRIMARY DIAGNOSIS: Pneumonia due to COVID-19 virus  Type 2 diabetes mellitus with hyperosmolarity without nonketotic hyperglycemic-hyperosmolar coma ([facility] [E11.00]  Lower respiratory tract infection due to COVID-19 virus [U07.1, J22]  Acute hypoxemic respiratory failure (HCC) [J96.01]  SWATI (acute kidney injury) ([facility] [address] [N17.9]  Procedure(s) (LRB):  PERCUTANEOUS ENDOSCOPIC GASTROSTOMY TUBE INSERTION ROOM 3302  *Pt on vent with trach/ amio gtt (N/A)  ESOPHAGOGASTRODUODENOSCOPY (EGD) (N/A)  18 Days Post-Op  Payor: [facility] / Plan: Elvia Narayan [employer] PPO / Product Type: Commerical /   ASSESSMENT:     REHAB RECOMMENDATIONS: CURRENT LEVEL OF FUNCTION:  (Most Recently Demonstrated)   Recommendation to date pending progress:  [address]  Equipment:    To Be Determined Bathing:   Total Assistance  Dressing:   Total Assistance  Feeding/Grooming:   Total Assistance  Toileting:   Total Assistance  Functional Mobility:   Total Assistance     ASSESSMENT:  Mr. Verlee Peabody continues to present with significant deficits with overall strength, activity tolerance, ADL performance, and functional mobility. Remains on vent support via trach. More alert today with improved command following.  Total A x 2 for supine to sit transfer to edge of bed. Poor sitting balance. Total A for maintaining upright posture. Total A for UB bathing and grooming. Minimal progress towards goals. Will continue OT POC as indicated. At this time, patient is appropriate for Co-treatment with physical  therapy due to patient's clinical complexity, decreased overall endurance/tolerance levels, as well as need for high level assistance and cues and intervention to complete functional transfers/mobility and functional tasks in safe manner. Siobhan Bazantyler Mullins.  is appropriate for a multidisciplinary co-treatment of PT and OT to address goals of both disciplines. SUBJECTIVE:   Mr. Holliday Leader states, Unable to verbalize    SOCIAL HISTORY/LIVING ENVIRONMENT:   Home Environment: Private residence  One/Two Story Residence: One story  Living Alone: No  Support Systems: Family member(s)    OBJECTIVE:     PAIN: VITAL SIGNS: LINES/DRAINS:   Pre Treatment: Pain Screen  Pain Scale 1: Numeric (0 - 10)  Pain Intensity 1: 0  Post Treatment: 0   Gimenez Catheter, IV, PEG and Rectal Tube  O2 Device: Tracheal collar     ACTIVITIES OF DAILY LIVING: I Mod I S SBA CGA Min Mod Max Total NT Comments   BASIC ADLs:              Bathing/ Showering [] [] [] [] [] [] [] [] [x] [] UB bathing   Toileting [] [] [] [] [] [] [] [] [] [x]    Dressing [] [] [] [] [] [] [] [] [] [x]    Feeding [] [] [] [] [] [] [] [] [] [x]    Grooming [] [] [] [] [] [] [] [] [x] [] Combing hair in supported sitting    Personal Device Care [] [] [] [] [] [] [] [] [] [x]    Functional Mobility [] [] [] [] [] [] [] [] [x] [] X 2 for functional transfers.    I=Independent, Mod I=Modified Independent, S=Supervision, SBA=Standby Assistance, CGA=Contact Guard Assistance,   Min=Minimal Assistance, Mod=Moderate Assistance, Max=Maximal Assistance, Total=Total Assistance, NT=Not Tested    MOBILITY: I Mod I S SBA CGA Min Mod Max Total  NT x2 Comments:   Supine to sit [] [] [] [] [] [] [] [] [x] [] [x]    Sit to supine [] [] [] [] [] [] [] [] [x] [] [x]    Sit to stand [] [] [] [] [] [] [] [] [] [x] []    Bed to chair [] [] [] [] [] [] [] [] [] [x] []    I=Independent, Mod I=Modified Independent, S=Supervision, SBA=Standby Assistance, CGA=Contact Guard Assistance,   Min=Minimal Assistance, Mod=Moderate Assistance, Max=Maximal Assistance, Total=Total Assistance, NT=Not Tested    PLAN:   FREQUENCY/DURATION: OT Plan of Care: 3 times/week for duration of hospital stay or until stated goals are met, whichever comes first.    TREATMENT:   TREATMENT:   ($$ Self Care/Home Management: 8-22 mins$$ Neuromuscular Re-Education: 8-22 mins   )  Co-Treatment PT/OT necessary due to patient's decreased overall endurance/tolerance levels, as well as need for high level skilled assistance to complete functional transfers/mobility and functional tasks  Self Care (10 Minutes): Self care including Upper Body Bathing and Grooming to decrease level of assistance required. Neuromuscular Re-education (13 Minutes): Neuromuscular Re-education included Balance Training, Coordination training, Postural training and Sitting balance training to improve Balance, Coordination, Postural Control and Proprioception.     AFTER TREATMENT POSITION/PRECAUTIONS:  Bed, Needs within reach and RN notified    INTERDISCIPLINARY COLLABORATION:  RN/PCT, PT/PTA and OT/SIN    TOTAL TREATMENT DURATION:  OT Patient Time In/Time Out  Time In: 7809  Time Out: 960 [facility], OT

## 2021-02-23 ENCOUNTER — APPOINTMENT (OUTPATIENT)
Dept: GENERAL RADIOLOGY | Age: 61
DRG: 004 | End: 2021-02-23
Attending: INTERNAL MEDICINE
Payer: COMMERCIAL

## 2021-02-23 LAB
ANION GAP SERPL CALC-SCNC: 9 MMOL/L (ref 7–16)
BUN SERPL-MCNC: 67 MG/DL (ref 8–23)
CALCIUM SERPL-MCNC: 8.1 MG/DL (ref 8.3–10.4)
CHLORIDE SERPL-SCNC: 104 MMOL/L (ref 98–107)
CO2 SERPL-SCNC: 28 MMOL/L (ref 21–32)
CREAT SERPL-MCNC: 4.67 MG/DL (ref 0.8–1.5)
ERYTHROCYTE [DISTWIDTH] IN BLOOD BY AUTOMATED COUNT: 16 % (ref 11.9–14.6)
FIBRINOGEN PPP-MCNC: 342 MG/DL (ref 190–501)
GLUCOSE BLD STRIP.AUTO-MCNC: 105 MG/DL (ref 65–100)
GLUCOSE BLD STRIP.AUTO-MCNC: 112 MG/DL (ref 65–100)
GLUCOSE BLD STRIP.AUTO-MCNC: 124 MG/DL (ref 65–100)
GLUCOSE BLD STRIP.AUTO-MCNC: 140 MG/DL (ref 65–100)
GLUCOSE BLD STRIP.AUTO-MCNC: 146 MG/DL (ref 65–100)
GLUCOSE BLD STRIP.AUTO-MCNC: 164 MG/DL (ref 65–100)
GLUCOSE SERPL-MCNC: 100 MG/DL (ref 65–100)
HCT VFR BLD AUTO: 21.5 % (ref 41.1–50.3)
HCT VFR BLD AUTO: 23.1 % (ref 41.1–50.3)
HGB BLD-MCNC: 7 G/DL (ref 13.6–17.2)
HGB BLD-MCNC: 7.6 G/DL (ref 13.6–17.2)
HISTORY CHECKED?,CKHIST: NORMAL
MCH RBC QN AUTO: 31 PG (ref 26.1–32.9)
MCHC RBC AUTO-ENTMCNC: 32.6 G/DL (ref 31.4–35)
MCV RBC AUTO: 95.1 FL (ref 79.6–97.8)
NRBC # BLD: 0 K/UL (ref 0–0.2)
PLATELET # BLD AUTO: 180 K/UL (ref 150–450)
PMV BLD AUTO: 10.5 FL (ref 9.4–12.3)
POTASSIUM SERPL-SCNC: 3.6 MMOL/L (ref 3.5–5.1)
RBC # BLD AUTO: 2.26 M/UL (ref 4.23–5.6)
SODIUM SERPL-SCNC: 141 MMOL/L (ref 136–145)
WBC # BLD AUTO: 15.6 K/UL (ref 4.3–11.1)

## 2021-02-23 PROCEDURE — 85384 FIBRINOGEN ACTIVITY: CPT

## 2021-02-23 PROCEDURE — 74011000258 HC RX REV CODE- 258: Performed by: NURSE PRACTITIONER

## 2021-02-23 PROCEDURE — P9016 RBC LEUKOCYTES REDUCED: HCPCS

## 2021-02-23 PROCEDURE — 74011250636 HC RX REV CODE- 250/636: Performed by: NURSE PRACTITIONER

## 2021-02-23 PROCEDURE — 74011250636 HC RX REV CODE- 250/636: Performed by: INTERNAL MEDICINE

## 2021-02-23 PROCEDURE — 80048 BASIC METABOLIC PNL TOTAL CA: CPT

## 2021-02-23 PROCEDURE — 85014 HEMATOCRIT: CPT

## 2021-02-23 PROCEDURE — 92597 ORAL SPEECH DEVICE EVAL: CPT

## 2021-02-23 PROCEDURE — 74011000250 HC RX REV CODE- 250: Performed by: INTERNAL MEDICINE

## 2021-02-23 PROCEDURE — 71045 X-RAY EXAM CHEST 1 VIEW: CPT

## 2021-02-23 PROCEDURE — 94003 VENT MGMT INPAT SUBQ DAY: CPT

## 2021-02-23 PROCEDURE — 82962 GLUCOSE BLOOD TEST: CPT

## 2021-02-23 PROCEDURE — 74011250637 HC RX REV CODE- 250/637: Performed by: INTERNAL MEDICINE

## 2021-02-23 PROCEDURE — 92610 EVALUATE SWALLOWING FUNCTION: CPT

## 2021-02-23 PROCEDURE — 99233 SBSQ HOSP IP/OBS HIGH 50: CPT | Performed by: INTERNAL MEDICINE

## 2021-02-23 PROCEDURE — 77030018626 HC TU TRACH CUF3 COVD -B

## 2021-02-23 PROCEDURE — 2709999900 HC NON-CHARGEABLE SUPPLY

## 2021-02-23 PROCEDURE — 36592 COLLECT BLOOD FROM PICC: CPT

## 2021-02-23 PROCEDURE — 85027 COMPLETE CBC AUTOMATED: CPT

## 2021-02-23 PROCEDURE — 90935 HEMODIALYSIS ONE EVALUATION: CPT

## 2021-02-23 PROCEDURE — 74011250637 HC RX REV CODE- 250/637: Performed by: NURSE PRACTITIONER

## 2021-02-23 PROCEDURE — 74011636637 HC RX REV CODE- 636/637: Performed by: INTERNAL MEDICINE

## 2021-02-23 PROCEDURE — 77030006998

## 2021-02-23 PROCEDURE — 65620000000 HC RM CCU GENERAL

## 2021-02-23 RX ORDER — SODIUM CHLORIDE 9 MG/ML
250 INJECTION, SOLUTION INTRAVENOUS AS NEEDED
Status: DISCONTINUED | OUTPATIENT
Start: 2021-02-23 | End: 2021-02-25 | Stop reason: SDUPTHER

## 2021-02-23 RX ADMIN — INSULIN GLARGINE 15 UNITS: 100 INJECTION, SOLUTION SUBCUTANEOUS at 08:25

## 2021-02-23 RX ADMIN — CEFTRIAXONE 2 G: 2 INJECTION, POWDER, FOR SOLUTION INTRAMUSCULAR; INTRAVENOUS at 08:03

## 2021-02-23 RX ADMIN — INSULIN LISPRO 3 UNITS: 100 INJECTION, SOLUTION INTRAVENOUS; SUBCUTANEOUS at 20:58

## 2021-02-23 RX ADMIN — EPOETIN ALFA-EPBX 4000 UNITS: 4000 INJECTION, SOLUTION INTRAVENOUS; SUBCUTANEOUS at 10:03

## 2021-02-23 RX ADMIN — AMIODARONE HYDROCHLORIDE 200 MG: 200 TABLET ORAL at 08:03

## 2021-02-23 RX ADMIN — Medication 20 ML: at 05:23

## 2021-02-23 RX ADMIN — METOPROLOL TARTRATE 50 MG: 50 TABLET, FILM COATED ORAL at 20:58

## 2021-02-23 RX ADMIN — ALTEPLASE 1 MG: 2.2 INJECTION, POWDER, LYOPHILIZED, FOR SOLUTION INTRAVENOUS at 10:00

## 2021-02-23 RX ADMIN — LANSOPRAZOLE 30 MG: KIT at 20:59

## 2021-02-23 RX ADMIN — LANSOPRAZOLE 30 MG: KIT at 08:17

## 2021-02-23 RX ADMIN — Medication 20 ML: at 21:01

## 2021-02-23 RX ADMIN — ATORVASTATIN CALCIUM 80 MG: 40 TABLET, FILM COATED ORAL at 08:03

## 2021-02-23 RX ADMIN — Medication 20 ML: at 13:53

## 2021-02-23 RX ADMIN — METOPROLOL TARTRATE 50 MG: 50 TABLET, FILM COATED ORAL at 08:03

## 2021-02-23 NOTE — PROGRESS NOTES
A follow up visit was made to the patient. Emotional support, spiritual presence and   prayer were provided for the patient. He was awake and alert. He was receiving dialysis. His sister Luis Enrique Dominguez was present.       Radha Bernal, [address] [facility], [facility]

## 2021-02-23 NOTE — PROGRESS NOTES
[address] Nephrology        Subjective: SWATI ( probable ESRD)   Weak, trach. Nods answers to questions. Review of Systems -   General ROS: negative for - fever, chills  Respiratory ROS: no SOB, cough, JACKSON  Cardiovascular ROS: no CP, palpitations  Gastrointestinal ROS: no N&V, abdominal pain, diarrhea  Genito-Urinary ROS: no difficulty voiding, dysuria  Neurological ROS: no seizures, focal weekness        Objective:    Vitals:    02/23/21 0544 02/23/21 0559 02/23/21 0738 02/23/21 0803   BP: (!) 148/67 (!) 155/72  133/61   Pulse: 72 77 74 74   Resp: 27 28 19    Temp:       SpO2: 100% 100% 100%    Weight:       Height:           PE  Gen: in no acute distress  CV:reg rate  Chest:bilat breath sounds  Abd: soft  Ext/Access: rt IJ Tunneled HD cath ok       . LAB  Recent Labs     02/23/21  0525 02/22/21  1435 02/22/21  0609 02/21/21  0423 02/21/21  0423   WBC 15.6*  --  19.5*  --  17.3*   HGB 7.0* 6.8* 7.1*   < > 6.5*   HCT 21.5* 21.4* 21.3*   < > 19.7*     --  161  --  172   INR  --   --  1.2  --   --     < > = values in this interval not displayed.      Recent Labs     02/23/21  0525 02/22/21  0609 02/21/21  0423    139 141   K 3.6 3.7 3.7    104 106   CO2 28 29 30    125* 174*   BUN 67* 60* 43*   CREA 4.67* 3.99* 3.04*   MG  --  1.9  --    PHOS  --  3.9*  --    CA 8.1* 7.9* 7.6*           Radiology    A/P:   Patient Active Problem List   Diagnosis Code    Obesity E66.9    Hypertension I10    Bradycardia R00.1    PVC (premature ventricular contraction) I49.3    CAD (coronary artery disease) I25.10    Dyslipidemia, goal LDL below 70 E78.5    SWATI (acute kidney injury) (HCC) N17.9    Acute hypoxemic respiratory failure (HCC) J96.01    Type 2 diabetes mellitus with hyperosmolarity without nonketotic hyperglycemic-hyperosmolar coma (HCC) E11.00    Pneumonia due to COVID-19 virus U07.1, J12.82    Acute deep vein thrombosis (DVT) of left peroneal vein ([facility]) I82.452    VAP (ventilator-associated pneumonia) ([facility] J95.851    Critical illness polyneuropathy ([facility] [address] G62.81    Anemia D64.9    Lower GI bleed requiring more than 4 units of blood in 24 hours, ICU, or surgery K92.2     SWATI (probaly ESRD)  - still oliguric, first HD was 1/25/21. For HD again later today  K is running <4. Will stop lokelma. .Addendum: pt seen on dialysis. On dialysis for clearance. Anemia - will be transfused today. Will start Retacrit. Benedetta Ou     Resp Failure/ COVID19    A Fib    DVT              Linda Grey MD

## 2021-02-23 NOTE — DIALYSIS
Initiated 3.5 hour iHD with R IJ. 4K 2.5Ca bath. , UF goal net 1 L. No heparin.     Navdeep Middleton RN

## 2021-02-23 NOTE — PROGRESS NOTES
2/23/2021    Admit Date: 1/6/2021    Subjective:   CHIEF COMPLAINT- rectal bleed  HPI      Overall-better           Diet-TF    Appetite-na     Nausea-no   Vomiting-no          Pain-no            BM-loose   Bleeding-no    Medications-reviewed and adjusted as appropriate   IV FLUIDS-reviewed      FAM HX-per H&P   SH-per H&P   tob-no             etoh- no       Past Medical History:   Diagnosis Date    Acute deep vein thrombosis (DVT) of left peroneal vein (HCC) 1/28/2021    Atrial fibrillation ([facility] [address] 01/22/2021    Critical illness polyneuropathy ([facility] [address] 2/12/2021    Gastrointestinal disorder     reflux    GERD (gastroesophageal reflux disease)     Hypertension     Lower respiratory tract infection due to COVID-19 virus 1/7/2021    Nausea & vomiting     Obesity 10/6/2015    Other ill-defined conditions(799.89)     dyspnea since surgery, wheezing, SOB    Type 2 diabetes mellitus with hyperosmolarity without nonketotic hyperglycemic-hyperosmolar coma ([facility] [address] 1/7/2021    Unstable angina ([facility] [address] 10/27/2016               Past Surgical History:   Procedure Laterality Date    COLONOSCOPY  2/22/2021         COLONOSCOPY N/A 2/22/2021    COLONOSCOPY ROOM 3302 *vent performed by Simuel Gosselin, MD at [facility] ENDOSCOPY    EGD  2/22/2021         HX CHOLECYSTECTOMY      HX ORTHOPAEDIC      rotator cuff; knee    HX UROLOGICAL      kidney stone surgeries x 3    IR INSERT TUNL CVC W/O PORT OVER 5 YR  2/8/2021    IR IVC FILTER  2/8/2021    MN CARDIAC SURG PROCEDURE UNLIST      stent       ROS--                 RESP-trach            CARDIAC-neg                       -neg             Further ROS as per PMH and PSH- see problem list                            Objective:     Visit Vitals  BP (!) 155/72   Pulse 74   Temp 98.8 °F (37.1 °C)   Resp 19   Ht 5' 8\" (1.727 m)   Wt 90.4 kg (199 lb 4.7 oz)   SpO2 100%   BMI 30.30 kg/m²         Intake/Output Summary (Last 24 hours) at 2/23/2021 5872  Last data filed at 2/23/2021 1178  Gross per 24 hour   Intake 360 ml   Output 300 ml   Net 60 ml       EXAM:     NEURO-a&o able to communicate despite trach    HEENT-wnl    LUNGS-clear       COR-rrr        ABD-soft , min tenderness, no rebound, good bs        EXT-no edema                         LABS-  Lab Results   Component Value Date/Time    WBC 15.6 (H) 02/23/2021 05:25 AM    RBC 2.26 (L) 02/23/2021 05:25 AM    HGB 7.0 (L) 02/23/2021 05:25 AM    HCT 21.5 (L) 02/23/2021 05:25 AM    PLATELET 281 52/76/5065 05:25 AM     Lab Results   Component Value Date/Time    Sodium 141 02/23/2021 05:25 AM    Potassium 3.6 02/23/2021 05:25 AM    Chloride 104 02/23/2021 05:25 AM    CO2 28 02/23/2021 05:25 AM    Anion gap 9 02/23/2021 05:25 AM    Glucose 100 02/23/2021 05:25 AM    Glucose 103 09/16/2008 05:55 AM    BUN 67 (H) 02/23/2021 05:25 AM    Creatinine 4.67 (H) 02/23/2021 05:25 AM    GFR est AA 17 (L) 02/23/2021 05:25 AM    GFR est non-AA 14 (L) 02/23/2021 05:25 AM    Calcium 8.1 (L) 02/23/2021 05:25 AM    Magnesium 1.9 02/22/2021 06:09 AM    Phosphorus 3.9 (H) 02/22/2021 06:09 AM    Albumin 1.5 (L) 01/31/2021 10:53 AM    Bilirubin, total 0.7 01/31/2021 10:53 AM    Protein, total 4.5 (L) 01/31/2021 10:53 AM    Globulin 3.0 01/31/2021 10:53 AM    A-G Ratio 0.5 (L) 01/31/2021 10:53 AM    ALT (SGPT) 32 01/31/2021 10:53 AM           TRANSFUSION-13 units since admit    Assessment:     Principal Problem:    Pneumonia due to COVID-19 virus (1/7/2021)    Active Problems:    Obesity (10/6/2015)      Hypertension (10/27/2016)      CAD (coronary artery disease) (10/28/2016)      Overview: 10-27-06 C 90% LAD s/p 2.25 x 20 Synergy drug-eluting stent  (CS)      SWATI (acute kidney injury) ([facility] [address] (1/7/2021)      Acute hypoxemic respiratory failure ([address] (1/7/2021)      Type 2 diabetes mellitus with hyperosmolarity without nonketotic hyperglycemic-hyperosmolar coma ([address] (1/7/2021)      Acute deep vein thrombosis (DVT) of left peroneal vein ([address] (1/28/2021)      VAP (ventilator-associated pneumonia) ([facility] [address] (2/9/2021)      Critical illness polyneuropathy ([facility] [address] (2/12/2021)      Anemia (2/16/2021)      Lower GI bleed requiring more than 4 units of blood in 24 hours, ICU, or surgery (2/21/2021)    pt likely developed stercoral ulcer due to fecal management balloon- clean ulcer  No specific treatment needed    Plan:     No further plans at this time  Please call if needed       PT SEEN AND EXAMINED AND PLAN [id number].   Saniya Huston MD

## 2021-02-23 NOTE — PROGRESS NOTES
Anjali Chavez Mark Choi. Admission Date: 1/6/2021             Daily Progress Note: 2/23/2021   Pt admitted 1/7 with fever, cough. Covid + 1/6. Admitted by hospitalists. Started on dexamethasone, convalescent plasma, and remdesivir. Pulm consulted 1/10 with pt requiring CPAP but pt decompensated and was intubated 1/17. Nephrology consulted 1/23 for renal failure and was started on dialysis. US 1/18 with L peroneal vein thrombus. He was started on heparin but stopped due to bleeding from HD catheter. He was started on HD on 1/25. Trached on 2/2 secondary to inability to wean from the vent. Pt developed afib requiring amio. PEG placed 2/4. IVC filter 2/8. GIB and seen by GI. Heparin on hold. Klebsiella in sputum and abx added 2/22. Subjective:     Over the past 24 hours:  Trach collar, comfortable. EGD normal and colonoscopy essentially normal -rectal ulcer.   Plans for HD today    Review of Systems  Constitutional: negative for fevers and chills  Respiratory: positive for cough  Musculoskeletal:positive for severe debility    Current Facility-Administered Medications   Medication Dose Route Frequency    cefTRIAXone (ROCEPHIN) 2 g in 0.9% sodium chloride (MBP/ADV) 50 mL MBP  2 g IntraVENous Q24H    0.9% sodium chloride infusion 250 mL  250 mL IntraVENous PRN    0.9% sodium chloride infusion 250 mL  250 mL IntraVENous PRN    0.9% sodium chloride infusion 250 mL  250 mL IntraVENous PRN    metoprolol tartrate (LOPRESSOR) tablet 50 mg  50 mg Per NG tube Q12H    [Held by provider] risperiDONE (RisperDAL) 1 mg/mL oral solution soln 0.5 mg  0.5 mg Nasogastric BID    [Held by provider] oxyCODONE (ROXICODONE) 5 mg/5 mL oral solution 5 mg  5 mg Per G Tube Q6H    insulin glargine (LANTUS) injection 15 Units  15 Units SubCUTAneous DAILY    amiodarone (CORDARONE) tablet 200 mg  200 mg Oral DAILY    [Held by provider] oxyCODONE (ROXICODONE) 5 mg/5 mL oral solution 5 mg  5 mg Oral Q4H PRN    lansoprazole (PREVACID) 3 mg/mL oral suspension 30 mg  30 mg Per NG tube Q12H    ondansetron (ZOFRAN) injection 4 mg  4 mg IntraVENous Q6H PRN    polyethylene glycol (MIRALAX) packet 17 g  17 g Per NG tube DAILY PRN    guaiFENesin-dextromethorphan (ROBITUSSIN DM) 100-10 mg/5 mL syrup 10 mL  10 mL Per NG tube Q4H PRN    acetaminophen (TYLENOL) tablet 650 mg  650 mg Per NG tube Q6H PRN    sodium zirconium cyclosilicate (LOKELMA) powder packet 10 g  10 g Oral DAILY    white petrolatum-mineral oiL (LACRILUBE S.O.P.) ointment   Both Eyes Q4H PRN    atorvastatin (LIPITOR) tablet 80 mg  80 mg Per NG tube DAILY    [Held by provider] clopidogreL (PLAVIX) tablet 75 mg  75 mg Per NG tube DAILY    insulin lispro (HUMALOG) injection   SubCUTAneous Q4H    NUTRITIONAL SUPPORT ELECTROLYTE PRN ORDERS   Does Not Apply PRN    sodium chloride (NS) flush 20 mL  20 mL InterCATHeter Q8H    sodium chloride (NS) flush 20 mL  20 mL InterCATHeter PRN    loperamide (IMODIUM) capsule 2 mg  2 mg Oral Q4H PRN    glucagon (GLUCAGEN) injection 1 mg  1 mg IntraMUSCular PRN    dextrose (D50W) injection syrg 12.5-25 g  25-50 mL IntraVENous PRN    albuterol (PROVENTIL HFA, VENTOLIN HFA, PROAIR HFA) inhaler 2 Puff  2 Puff Inhalation Q4H PRN    influenza vaccine 2020-21 (6 mos+)(PF) (FLUARIX/FLULAVAL/FLUZONE QUAD) injection 0.5 mL  0.5 mL IntraMUSCular PRIOR TO DISCHARGE    hydrALAZINE (APRESOLINE) 20 mg/mL injection 20 mg  20 mg IntraVENous Q6H PRN         Objective:     Vitals:    02/23/21 0514 02/23/21 0529 02/23/21 0544 02/23/21 0559   BP: (!) 159/74 (!) 151/64 (!) 148/67 (!) 155/72   Pulse: 77 72 72 77   Resp: 29 (!) 33 27 28   Temp:       SpO2: 100% 100% 100% 100%   Weight:       Height:         Intake and Output:   02/21 1901 - 02/23 0700  In: 3080 [I.V.:55]  Out: 310 [Urine:310]  No intake/output data recorded.       Intake/Output Summary (Last 24 hours) at 2/23/2021 0736  Last data filed at 2/23/2021 4874  Gross per 24 hour   Intake 360 ml   Output 300 ml   Net 60 ml       Physical Exam:          GEN: debilitated on vent  HEENT: trach, cuffed   NECK:  no JVD, no retractions, no thyromegaly or masses,   LUNGS:  CTA, dry cough  HEART:  RRR with no M,G,R;  ABDOMEN:  soft with no tenderness; positive bowel sounds present  EXTREMITIES:  warm with no cyanosis, trace lower leg edema  SKIN:  no jaundice or ecchymosis   NEURO:  alert, grossly non-focal, appropriate    LINES/DRAINS:   PICC  HD  PEG  Trach      DRIPS: none. ACTIVITY: limited    NUTRITION: NPO    LAB  Recent Labs     02/23/21  0525 02/22/21  1435 02/22/21  0609 02/21/21  0423 02/21/21  0423   WBC 15.6*  --  19.5*  --  17.3*   HGB 7.0* 6.8* 7.1*   < > 6.5*   HCT 21.5* 21.4* 21.3*   < > 19.7*     --  161  --  172    < > = values in this interval not displayed. Recent Labs     02/23/21  0525 02/22/21  0609 02/21/21 0423    139 141   K 3.6 3.7 3.7    104 106   CO2 28 29 30    125* 174*   BUN 67* 60* 43*   CREA 4.67* 3.99* 3.04*   MG  --  1.9  --    PHOS  --  3.9*  --    INR  --  1.2  --          MRI brain  IMPRESSION     1. No evidence of acute infarction.     2. Large bilateral mastoid effusions. Correlation for associated symptoms is  recommended.     3. Otherwise unremarkable noncontrast MRI of the brain for patient age.       Assessment:     Hospital Problems  Date Reviewed: 2/5/2021          Codes Class Noted POA    Lower GI bleed requiring more than 4 units of blood in 24 hours, ICU, or surgery ICD-10-CM: K92.2  ICD-9-CM: 578.9  2/21/2021 Yes    S/P EGDcolonoscopy     Anemia ICD-10-CM: D64.9  ICD-9-CM: 285.9  2/16/2021 Yes    Ongoing. Has gotten 5U PRBC in past 2 day.  Hgb 7.0 this AM.     Critical illness polyneuropathy (HCC) (Chronic) ICD-10-CM: G62.81  ICD-9-CM: 357.82  2/12/2021 Yes    Ongoing    VAP (ventilator-associated pneumonia) ([facility] ICD-10-CM: G76.926  ICD-9-CM: 997.31  2/9/2021 Yes    On ceftriaxone for Klebsiella    Acute deep vein thrombosis (DVT) of left peroneal vein (HCC) ICD-10-CM: R62.226  ICD-9-CM: 453.42  1/28/2021 Yes    Off heparin due to bleeding    SWATI (acute kidney injury) ([facility] ICD-10-CM: N17.9  ICD-9-CM: 584.9  1/7/2021 Yes    Per  Nephrology     Acute hypoxemic respiratory failure ([facility] ICD-10-CM: J96.01  ICD-9-CM: 518.81  1/7/2021 Yes    Still on vent at night    Type 2 diabetes mellitus with hyperosmolarity without nonketotic hyperglycemic-hyperosmolar coma ([facility] (Chronic) ICD-10-CM: E11.00  ICD-9-CM: 250.20  1/7/2021 Yes        * (Principal) Pneumonia due to COVID-19 virus ICD-10-CM: U07.1, J12.82  ICD-9-CM: 480.8  1/7/2021 Yes    Completed Rx    CAD (coronary artery disease) (Chronic) ICD-10-CM: I25.10  ICD-9-CM: 414.00  10/28/2016 Yes    Overview Signed 10/28/2016  8:05 AM by SHELDON Lagunas     10-27-06 [facility] 90% LAD s/p 2.25 x 20 Synergy drug-eluting stent  (CS)             Hypertension (Chronic) ICD-10-CM: I10  ICD-9-CM: 401.9  10/27/2016 Yes        Obesity (Chronic) ICD-10-CM: E66.9  ICD-9-CM: 278.00  10/6/2015 Yes            Admitted with covid 19 pneumonia and respiratory failure. Prolonged vent, trach/PEG. Complicated course with a fib, DVT, GIB and now with IVC filter. Acute renal failure and s/p HD start per nephrology. Severe debility with critical care polyneuropathy. Prevacid, no longer on heparin with recent GIB, has IVC filter. PLAN:  Rocephin D2/7 for klebsiella in sputum. Hgb 7.0, HD today. Transfuse 1 unit  Severe Critical care polyneuropathy, PT/OT  Increase TC duration all day and rest Q HS on CPAP  Speech consult for PMV trials  LTAC when bed available. More than 50% of time documented was spent in face-to-face contact with the patient and in the care of the patient on the floor/unit where the patient is located. Total time spent 30 minutes.     Suzanne Luz NP      Lungs: few trace rhonchi  Heart:  RRR with no Murmur/Rubs/Gallops    Additional Comments:  Looks good on TC this AM. HD in progress and pt to get 1U PRBC. Continue TC today with CPAP at night. Awaiting [facility] transfer. I have spoken with and examined the patient. I agree with the above assessment and plan as documented.     Devan Donnelly MD

## 2021-02-23 NOTE — PROGRESS NOTES
Ventilator check complete; patient has a #8.0 XLT tracheostomy. Patient is not sedated. Patient is able to follow commands. Breath sounds are coarse. Trachea is midline, Negative for subcutaneous air, and chest excursion is symmetric. Patient is also Negative for cyanosis and is Negative for pitting edema. All alarms are set and audible. Resuscitation bag is at the head of the bed.       Ventilator Settings  Mode FIO2 Rate PC  PEEP I:E Ratio   Pressure control  40 % 14 16 cm H20   8 cm H20  1:3.7      Peak airway pressure: 22 cm H2O   Minute ventilation: 7.4 l/min       Yessenia Reyes RT

## 2021-02-23 NOTE — DIALYSIS
System clotted with 15 minutes remaining in treatment, unable to complete blood return. Patient lost approximately 100 ml of blood. Total time on HD = 3:15. Flushed system with NS bolus with 45 minutes remaining to observe/prevent clotting. Dialyzer and system had only expected amount of clotting at that time. Pressures suddenly increased causing alarms with 15 minutes to go. Attempted emergency rinseback to prevent blood loss but not able to complete blood return before system clotted. Net fluid removal 800 ml.      Lawrence March RN

## 2021-02-23 NOTE — PROGRESS NOTES
cSTG: Patient will tolerate continual wear of speaking valve without respiratory decline. STG: Patient will demonstrate independence with doning and doffing speaking valve. LTG: Patient will tolerate passy neva speaking valve to communicate wants and needs without respiratory decline,. STG: Patient will participate in modified barium swallow study to objectively assess swallow function. SPEECH LANGUAGE PATHOLOGY: PASSY NEVA SPEAKING VALVE AND DYSPHAGIA- Initial Assessment    NAME/AGE/GENDER: Joy Diaz is a 61 y.o. male  DATE: 2/23/2021  PRIMARY DIAGNOSIS: Type 2 diabetes mellitus with hyperosmolarity without nonketotic hyperglycemic-hyperosmolar coma ([facility] [E11.00]  Lower respiratory tract infection due to COVID-19 virus [U07.1, J22]  Acute hypoxemic respiratory failure ([facility] [J96.01]  SWATI (acute kidney injury) ([facility] [N17.9]  Procedure(s) (LRB):  ESOPHAGOGASTRODUODENOSCOPY (EGD) (N/A)  COLONOSCOPY ROOM 3302 *vent (N/A) 1 Day Post-Op  ICD-10: Treatment Diagnosis: R13.12 Dysphagia, Oropharyngeal Phase  R49.1 Aphonia;  Loss of voice    RECOMMENDATIONS   SPEAKING VALVE RECOMMENDATIONS:  Wear speaking valve during all waking hours  Remove if patient is sleeping or showing signs of intolerance (increased respiratory rate, decreased oxygenation)  Ensure cuff is deflated prior to speaking valve placement     DIET:   NPO with alternative means of nutrition  Sips of water for pleasure when speaking valve is in place    MEDICATIONS: Non-oral     ASPIRATION PRECAUTIONS  Slow rate of intake  Small bites/sips  Upright at 90 degrees during meal     COMPENSATORY STRATEGIES/MODIFICATIONS  Ice chips  Sips of water with speaking valve in place     EDUCATION:  Recommendations discussed with Nursing  Patient     CONTINUATION OF SKILLED SERVICES/MEDICAL NECESSITY:  Patient is expected to demonstrate progress in  swallow strength, swallow timeliness, swallow function, diet tolerance and swallow safety in order to improve swallow safety, work toward diet advancement and decrease aspiration risk. Patient is expected to demonstrate progress in expressive communication to decrease assistance required communication, increase independence with activities of daily living and increase communication with family/caregivers. Patient continues to require skilled intervention due to possible dysphagia, aphonia due to trace  . RECOMMENDATIONS for CONTINUED SPEECH THERAPY: YES: Anticipate need for ongoing speech therapy at next level of care. ASSESSMENT   Patient presents with aphonia due to trach. Excellent tolerance with continual speaking valve wear (nearly 30 minutes with SLP supervision). Voice is low volume and hoarse, but he is able to achieve appropriate volume with minimal cues. No back pressure noted. Thin liquids via tsp,cup, straw, and bites of puree tolerated without s/sx of airway compromise while speaking valve was in place. He remains high risk of aspiration due to trach and generalized weakness. Recommend:  1. Speaking valve wear during all waking hours as tolerated       - please ensure cuff is deflated prior to speaking valve placement       - remove if patient is sleeping       - remove if s/sx of intolerance (increased respiratory rate, decreased oxygenation, complaints of chest tightness)  2. Ice chips and sips of water while speaking valve is in place  3. Modified barium swallow study on 2/24/21 to objectively assess swallow function. Discussed recommendations with patient and RN at end of session. REHABILITATION POTENTIAL FOR STATED GOALS: Excellent    PLAN    FREQUENCY/DURATION: Continue to follow patient 3 times a week for duration of hospital stay to address above goals. - Recommendations for next treatment session: Next treatment will address swallow function via modified barium swallow study    SUBJECTIVE   Alert with excellent participation. Dialysis running during session.      History of Present Injury/Illness: Mr. Carlos Bojorquez  has a past medical history of Acute deep vein thrombosis (DVT) of left peroneal vein ([facility] (1/28/2021), Atrial fibrillation ([facility] (01/22/2021), Critical illness polyneuropathy ([facility] (2/12/2021), Gastrointestinal disorder, GERD (gastroesophageal reflux disease), Hypertension, Lower respiratory tract infection due to COVID-19 virus (1/7/2021), Nausea & vomiting, Obesity (10/6/2015), Other ill-defined conditions(799.89), Type 2 diabetes mellitus with hyperosmolarity without nonketotic hyperglycemic-hyperosmolar coma ([facility] (1/7/2021), and Unstable angina ([facility] (10/27/2016). Vicki Saldivar He also  has a past surgical history that includes hx cholecystectomy; hx urological; hx orthopaedic; pr cardiac surg procedure unlist; ir ivc filter (2/8/2021); ir insert tunl cvc w/o port over 5 yr (2/8/2021); egd (2/22/2021); colonoscopy (2/22/2021); and colonoscopy (N/A, 2/22/2021). Problem List:  (Impairments causing functional limitations):  Aphonia due to trach  Concern for oropharyngeal dysphagia    Previous Dysphagia: NONE REPORTED  Diet Prior to Evaluation: NPO c PEG    Orientation:   Person  Place  Time  Situation    Pain: Pain Scale 1: Numeric (0 - 10)  Pain Intensity 1: 0    OBJECTIVE   Speaking Valve:  Patient with #8 cuffed trach. Secretions minimal.  Finger occlusion trials resulted in appropriate voicing. Based on patient's performance with finger occlusion, passy neva speaking valve trials were performed. PASSY NEVA VALVE TRIALS:      Heart Rate   SPO2 RR   Prior to Trial   68 100 16   After cuff deflation 68 100 16   After PMV placed 68 100 17   After 30 minutes continual wear 72 96 19     Speaking valve placed without incident. Patient tolerated placement with no back pressure, change in respiratory effort, or work of breathing for trials of 3, 10, and 30 minutes . Vocal quality was low and hoarse.  He continues with preference for mouthing due to prolonged inability to communicate verbally. He is able to achieve appropriate voicing with minimal encouragement to increase volume. Speaking valve was left in place at end of session. Discussed wear schedule with RN. Signs placed at [facility] and on  line. Oral Motor:   Labial: No impairment  Dentition: Intact  Oral Hygiene: Adequate  Lingual: No impairment    Swallow evaluation:   Patient consumed trials of ice chips, thin via tsp/cup/straw, and puree. Appropriate acceptance with all trials. Timely swallow with single swallow per bolus upon palpation. Voice remained clear. Respiratory status stable. No s/sx of airway compromise with 2 ounces of thin liquids and 2 ounces of puree. Additional trials deferred by patient. He is at increased risk of  aspiration given prolonged intubation, trach, and generalized weakness, but no apparent concerns with intake today. Modified barium swallow study will be beneficial in objectively assessing swallow function.      INTERDISCIPLINARY COLLABORATION: Registered Nurse  PRECAUTIONS/ALLERGIES: Latex, Hydrocodone-acetaminophen, and Tessalon perles [benzonatate]     Tool Used: Dysphagia Outcome and Severity Scale (JEANETTE)    Score Comments   Normal Diet  [] 7 With no strategies or extra time needed   Functional Swallow  [] 6 May have mild oral or pharyngeal delay   Mild Dysphagia  [] 5 Which may require one diet consistency restricted    Mild-Moderate Dysphagia  [] 4 With 1-2 diet consistencies restricted   Moderate Dysphagia  [] 3 With 2 or more diet consistencies restricted   Moderate-Severe Dysphagia  [] 2 With partial PO strategies (trials with ST only)   Severe Dysphagia  [] 1 With inability to tolerate any PO safely      Score:  Initial: 2 Most Recent: x (Date 02/23/21 )   Interpretation of Tool: The Dysphagia Outcome and Severity Scale (JEANETTE) is a simple, easy-to-use, 7-point scale developed to systematically rate the functional severity of dysphagia based on objective assessment and make recommendations for diet level, independence level, and type of nutrition. Tool Used: MODIFIED JASON SCALE (mRS)   Score   No Symptoms  [] 0   No significant disability despite symptoms; able to carry out all usual duties and activities  [] 1   Slight disability; unable to carry out all previous activities but able to look after own affairs without assistance. [] 2   Moderate disability; requiring some help but able to walk without assistance  [] 3   Moderately severe disability; unable to walk without assistance and unable to attend to own bodily needs without assistance  [] 4   Severe disability; bedridden, incontinent, and requiring constant nursing care and attention  [] 5      Score:  Initial: 5    Interpretation of Tool: The Modified Jason Scale is a 7-point scaled used to quantify level of disability as it relates to a patient's functional abilities. Current Medications:   No current facility-administered medications on file prior to encounter. Current Outpatient Medications on File Prior to Encounter   Medication Sig Dispense Refill    metoprolol tartrate (LOPRESSOR) 50 mg tablet Take 1 Tab by mouth two (2) times a day. 60 Tab 11    clopidogreL (Plavix) 75 mg tab Take 1 Tab by mouth daily. 90 Tab 3    nitroglycerin (NITROSTAT) 0.4 mg SL tablet 1 Tab by SubLINGual route every five (5) minutes as needed for Chest Pain. 1 Bottle 11    magnesium oxide (MAG-OX) 400 mg tablet Take 1 Tab by mouth daily. 90 Tab 2    Aspirin, Buffered 81 mg tab Take 81 mg by mouth daily.  atorvastatin (LIPITOR) 80 mg tablet Take 1 Tab by mouth daily.  90 Tab 3       SAFETY:  After treatment position/precautions:  Upright in bed  RN notified  Call light within reach    Total Treatment Duration:   Time In: 7664  Time Out: 16[address] [facility], [facility] MEDICO DEL [facility], [facility] PERRY NICHOLE, CCC-SLP

## 2021-02-23 NOTE — WOUND CARE
Follow up for penis wound, area is fully healed. Buttocks area and intragluteal cleft with upside down heart shaped open area 9x7cm with purple discoloration in center of left buttock, consistent with DTI. Recommend zinc based barrier cream bid and prn. Patient has frequent loose stools.  Will monitor and adjust as left buttock likely will demarcate to a deeper wound, currently the open areas are shallow/ superficial.

## 2021-02-23 NOTE — DIABETES MGMT
Patient's blood glucose ranged 100-145 yesterday. Blood glucose 105 this morning. Patient s/p trach, PEG. Patient had colonoscopy and EGD yesterday. See progress notes for details. TF infusing. Patient having dialysis today. Speech working with patient regarding speaking valve. Hgb 7.0 today. Plan to transfuse. Transfer to [facility] bed when available per pulmonary.

## 2021-02-24 ENCOUNTER — APPOINTMENT (OUTPATIENT)
Dept: GENERAL RADIOLOGY | Age: 61
DRG: 004 | End: 2021-02-24
Attending: INTERNAL MEDICINE
Payer: COMMERCIAL

## 2021-02-24 LAB
ABO + RH BLD: NORMAL
ANION GAP SERPL CALC-SCNC: 7 MMOL/L (ref 7–16)
BLD PROD TYP BPU: NORMAL
BLOOD GROUP ANTIBODIES SERPL: NORMAL
BPU ID: NORMAL
BUN SERPL-MCNC: 42 MG/DL (ref 8–23)
CALCIUM SERPL-MCNC: 8.3 MG/DL (ref 8.3–10.4)
CHLORIDE SERPL-SCNC: 107 MMOL/L (ref 98–107)
CO2 SERPL-SCNC: 29 MMOL/L (ref 21–32)
CREAT SERPL-MCNC: 2.96 MG/DL (ref 0.8–1.5)
CROSSMATCH RESULT,%XM: NORMAL
ERYTHROCYTE [DISTWIDTH] IN BLOOD BY AUTOMATED COUNT: 16.4 % (ref 11.9–14.6)
FERRITIN SERPL-MCNC: 2229 NG/ML (ref 8–388)
FIBRINOGEN PPP-MCNC: 333 MG/DL (ref 190–501)
GLUCOSE BLD STRIP.AUTO-MCNC: 134 MG/DL (ref 65–100)
GLUCOSE BLD STRIP.AUTO-MCNC: 147 MG/DL (ref 65–100)
GLUCOSE BLD STRIP.AUTO-MCNC: 156 MG/DL (ref 65–100)
GLUCOSE BLD STRIP.AUTO-MCNC: 182 MG/DL (ref 65–100)
GLUCOSE BLD STRIP.AUTO-MCNC: 201 MG/DL (ref 65–100)
GLUCOSE BLD STRIP.AUTO-MCNC: 217 MG/DL (ref 65–100)
GLUCOSE SERPL-MCNC: 199 MG/DL (ref 65–100)
HCT VFR BLD AUTO: 26.9 % (ref 41.1–50.3)
HGB BLD-MCNC: 8.8 G/DL (ref 13.6–17.2)
IRON SATN MFR SERPL: 21 %
IRON SERPL-MCNC: 34 UG/DL (ref 35–150)
MAGNESIUM SERPL-MCNC: 2 MG/DL (ref 1.8–2.4)
MCH RBC QN AUTO: 30.6 PG (ref 26.1–32.9)
MCHC RBC AUTO-ENTMCNC: 32.7 G/DL (ref 31.4–35)
MCV RBC AUTO: 93.4 FL (ref 79.6–97.8)
NRBC # BLD: 0 K/UL (ref 0–0.2)
PHOSPHATE SERPL-MCNC: 3.1 MG/DL (ref 2.3–3.7)
PLATELET # BLD AUTO: 242 K/UL (ref 150–450)
PMV BLD AUTO: 10.1 FL (ref 9.4–12.3)
POTASSIUM SERPL-SCNC: 3.6 MMOL/L (ref 3.5–5.1)
RBC # BLD AUTO: 2.88 M/UL (ref 4.23–5.6)
SODIUM SERPL-SCNC: 143 MMOL/L (ref 136–145)
SPECIMEN EXP DATE BLD: NORMAL
STATUS OF UNIT,%ST: NORMAL
TIBC SERPL-MCNC: 165 UG/DL (ref 250–450)
UNIT DIVISION, %UDIV: 0
WBC # BLD AUTO: 22.9 K/UL (ref 4.3–11.1)

## 2021-02-24 PROCEDURE — 83550 IRON BINDING TEST: CPT

## 2021-02-24 PROCEDURE — 74011250637 HC RX REV CODE- 250/637: Performed by: NURSE PRACTITIONER

## 2021-02-24 PROCEDURE — 80048 BASIC METABOLIC PNL TOTAL CA: CPT

## 2021-02-24 PROCEDURE — 74011250636 HC RX REV CODE- 250/636: Performed by: INTERNAL MEDICINE

## 2021-02-24 PROCEDURE — 2709999900 HC NON-CHARGEABLE SUPPLY

## 2021-02-24 PROCEDURE — 74011250637 HC RX REV CODE- 250/637: Performed by: INTERNAL MEDICINE

## 2021-02-24 PROCEDURE — 85384 FIBRINOGEN ACTIVITY: CPT

## 2021-02-24 PROCEDURE — 65620000000 HC RM CCU GENERAL

## 2021-02-24 PROCEDURE — 97112 NEUROMUSCULAR REEDUCATION: CPT

## 2021-02-24 PROCEDURE — 85027 COMPLETE CBC AUTOMATED: CPT

## 2021-02-24 PROCEDURE — 84100 ASSAY OF PHOSPHORUS: CPT

## 2021-02-24 PROCEDURE — 74011636637 HC RX REV CODE- 636/637: Performed by: INTERNAL MEDICINE

## 2021-02-24 PROCEDURE — 74011000258 HC RX REV CODE- 258: Performed by: NURSE PRACTITIONER

## 2021-02-24 PROCEDURE — 82962 GLUCOSE BLOOD TEST: CPT

## 2021-02-24 PROCEDURE — 83735 ASSAY OF MAGNESIUM: CPT

## 2021-02-24 PROCEDURE — 97535 SELF CARE MNGMENT TRAINING: CPT

## 2021-02-24 PROCEDURE — 74230 X-RAY XM SWLNG FUNCJ C+: CPT

## 2021-02-24 PROCEDURE — 92611 MOTION FLUOROSCOPY/SWALLOW: CPT

## 2021-02-24 PROCEDURE — 74011000255 HC RX REV CODE- 255: Performed by: INTERNAL MEDICINE

## 2021-02-24 PROCEDURE — 94003 VENT MGMT INPAT SUBQ DAY: CPT

## 2021-02-24 PROCEDURE — 77030040393 HC DRSG OPTIFOAM GENT MDII -B

## 2021-02-24 PROCEDURE — 74011250636 HC RX REV CODE- 250/636: Performed by: NURSE PRACTITIONER

## 2021-02-24 PROCEDURE — 82728 ASSAY OF FERRITIN: CPT

## 2021-02-24 PROCEDURE — 97530 THERAPEUTIC ACTIVITIES: CPT

## 2021-02-24 PROCEDURE — 99233 SBSQ HOSP IP/OBS HIGH 50: CPT | Performed by: INTERNAL MEDICINE

## 2021-02-24 RX ORDER — FENTANYL CITRATE 50 UG/ML
50 INJECTION, SOLUTION INTRAMUSCULAR; INTRAVENOUS ONCE
Status: COMPLETED | OUTPATIENT
Start: 2021-02-24 | End: 2021-02-24

## 2021-02-24 RX ADMIN — FENTANYL CITRATE 50 MCG: 50 INJECTION, SOLUTION INTRAMUSCULAR; INTRAVENOUS at 04:03

## 2021-02-24 RX ADMIN — HYDRALAZINE HYDROCHLORIDE 20 MG: 20 INJECTION, SOLUTION INTRAMUSCULAR; INTRAVENOUS at 15:49

## 2021-02-24 RX ADMIN — INSULIN LISPRO 6 UNITS: 100 INJECTION, SOLUTION INTRAVENOUS; SUBCUTANEOUS at 15:46

## 2021-02-24 RX ADMIN — METOPROLOL TARTRATE 50 MG: 50 TABLET, FILM COATED ORAL at 20:40

## 2021-02-24 RX ADMIN — CEFTRIAXONE 2 G: 2 INJECTION, POWDER, FOR SOLUTION INTRAMUSCULAR; INTRAVENOUS at 07:33

## 2021-02-24 RX ADMIN — BARIUM SULFATE 15 ML: 400 PASTE ORAL at 12:04

## 2021-02-24 RX ADMIN — INSULIN LISPRO 6 UNITS: 100 INJECTION, SOLUTION INTRAVENOUS; SUBCUTANEOUS at 04:55

## 2021-02-24 RX ADMIN — METOPROLOL TARTRATE 50 MG: 50 TABLET, FILM COATED ORAL at 09:00

## 2021-02-24 RX ADMIN — BARIUM SULFATE 45 ML: 980 POWDER, FOR SUSPENSION ORAL at 12:04

## 2021-02-24 RX ADMIN — Medication 20 ML: at 22:00

## 2021-02-24 RX ADMIN — INSULIN LISPRO 3 UNITS: 100 INJECTION, SOLUTION INTRAVENOUS; SUBCUTANEOUS at 00:39

## 2021-02-24 RX ADMIN — AMIODARONE HYDROCHLORIDE 200 MG: 200 TABLET ORAL at 08:52

## 2021-02-24 RX ADMIN — LANSOPRAZOLE 30 MG: KIT at 20:40

## 2021-02-24 RX ADMIN — Medication 20 ML: at 14:00

## 2021-02-24 RX ADMIN — LANSOPRAZOLE 30 MG: KIT at 09:00

## 2021-02-24 RX ADMIN — Medication 20 ML: at 06:00

## 2021-02-24 RX ADMIN — INSULIN GLARGINE 15 UNITS: 100 INJECTION, SOLUTION SUBCUTANEOUS at 09:00

## 2021-02-24 RX ADMIN — HYDRALAZINE HYDROCHLORIDE 20 MG: 20 INJECTION, SOLUTION INTRAMUSCULAR; INTRAVENOUS at 02:11

## 2021-02-24 RX ADMIN — INSULIN LISPRO 3 UNITS: 100 INJECTION, SOLUTION INTRAVENOUS; SUBCUTANEOUS at 08:00

## 2021-02-24 RX ADMIN — ATORVASTATIN CALCIUM 80 MG: 40 TABLET, FILM COATED ORAL at 08:52

## 2021-02-24 NOTE — PROGRESS NOTES
ACUTE PHYSICAL THERAPY GOALS:  (Developed with and agreed upon by patient and/or caregiver.)  UPDATED 21  LTG:  (1.)Mr. Torres will move from supine to sit and sit to supine , scoot up and down and roll side to side in bed with MINIMAL ASSIST within 7 treatment day(s). (2.)Mr. Torres will tolerate sitting up in recliner chair for 2 hours with stable vital signs to increase upright tolerance within 7 treatment day(s). (3.)Mr. Torres will maintain static/dynamic sitting x 15 minutes with MINIMAL ASSISTANCE for improved balance within 7 treatment days. (4.)Mr. Torres will participate in therapeutic activity/exercises x 24 minutes for increased strength within 7 treatment days. (5.)Mr. Torres will ambulate with MODERATE ASSIST for 5 feet with the least restrictive device within 7 treatment day(s). ________________________________________________________________________________________________      PHYSICAL THERAPY: Daily Note and AM Treatment Day # 3    Daron Church is a 61 y.o. male   PRIMARY DIAGNOSIS: Pneumonia due to COVID-19 virus  Type 2 diabetes mellitus with hyperosmolarity without nonketotic hyperglycemic-hyperosmolar coma (HCC) [E11.00]  Lower respiratory tract infection due to COVID-19 virus [U07.1, J22]  Acute hypoxemic respiratory failure ([facility] [address] [J96.01]  SWATI (acute kidney injury) ([facility] [N17.9]  Procedure(s) (LRB):  ESOPHAGOGASTRODUODENOSCOPY (EGD) (N/A)  COLONOSCOPY ROOM 3302 *vent (N/A)  2 Days Post-Op    ASSESSMENT:     REHAB RECOMMENDATIONS: CURRENT LEVEL OF FUNCTION:  (Most Recently Demonstrated)   Recommendation to date pending progress:  Settin [facility] [address]  Equipment:    To Be Determined Bed Mobility:   Total Assistance  Sit to Stand:   Not tested  Transfers:   Not tested  Gait/Mobility:   Not tested     ASSESSMENT:  Mr. Chun Alves was supine in bed and on trach collar. He performed bed mobility today with max-totalA and poor sitting balance.   Pt participated in seated EOB activities with practice maintaining midline and weight shifting to R.  OT addressed ADLs. Slightt progress in balance and pt able to speak today with valve.      SUBJECTIVE:   Mr. Maggie Marrufo states unable to verbalize    SOCIAL HISTORY/ LIVING ENVIRONMENT: See eval  Home Environment: Private residence  One/Two Story Residence: One story  Living Alone: No  Support Systems: Family member(s)  OBJECTIVE:     PAIN: VITAL SIGNS: LINES/DRAINS:   Pre Treatment: Pain Screen  Pain Scale 1: Numeric (0 - 10)  Pain Intensity 1: 0  Post Treatment: 0   Gimenez Catheter, IV and Nasogastric Tube  O2 Device: Tracheal collar     MOBILITY: I Mod I S SBA CGA Min Mod Max Total  NT x2 Comments:   Bed Mobility    Rolling [] [] [] [] [] [] [] [x] [x] [] [x]    Supine to Sit [] [] [] [] [] [] [] [x] [x] [] [x]    Scooting [] [] [] [] [] [] [] [] [x] [] [x]    Sit to Supine [] [] [] [] [] [] [] [x] [x] [] [x]    Transfers    Sit to Stand [] [] [] [] [] [] [] [] [] [x] []    Bed to Chair [] [] [] [] [] [] [] [] [] [x] []    Stand to Sit [] [] [] [] [] [] [] [] [] [x] []    I=Independent, Mod I=Modified Independent, S=Supervision, SBA=Standby Assistance, CGA=Contact Guard Assistance,   Min=Minimal Assistance, Mod=Moderate Assistance, Max=Maximal Assistance, Total=Total Assistance, NT=Not Tested    GAIT: I Mod I S SBA CGA Min Mod Max Total  NT x2 Comments:   Level of Assistance [] [] [] [] [] [] [] [] [] [x] []    Distance NT    DME N/A    Gait Quality NT    Weightbearing  Status N/A     I=Independent, Mod I=Modified Independent, S=Supervision, SBA=Standby Assistance, CGA=Contact Guard Assistance,   Min=Minimal Assistance, Mod=Moderate Assistance, Max=Maximal Assistance, Total=Total Assistance, NT=Not Tested    PLAN:   FREQUENCY/DURATION: PT Plan of Care: 3 times/week for duration of hospital stay or until stated goals are met, whichever comes first.  TREATMENT:     TREATMENT:   ($$ Therapeutic Activity: 23-37 mins )  Co-Treatment PT/OT necessary due to patient's decreased overall endurance/tolerance levels, as well as need for high level skilled assistance to complete functional transfers/mobility and functional tasks  Therapeutic Activity (23 Minutes): Therapeutic activity included Rolling, Supine to Sit, Sit to Supine, Scooting and Sitting balance  to improve functional Mobility, Strength and Activity tolerance.     AFTER TREATMENT POSITION/PRECAUTIONS:  Bed and Needs within reach    INTERDISCIPLINARY COLLABORATION:  RN/PCT, PT/PTA, OT/SIN and Rehab Attendant     TOTAL TREATMENT DURATION:  PT Patient Time In/Time Out  Time In: 1018  Time Out: 2500 S. Adiel Arzate, PT, DPT

## 2021-02-24 NOTE — PROGRESS NOTES
[facility]. Admission Date: 1/6/2021             Daily Progress Note: 2/24/2021   Pt admitted 1/7 with fever, cough. Covid + 1/6. Admitted by hospitalists. Started on dexamethasone, convalescent plasma, and remdesivir. Pulm consulted 1/10 with pt requiring CPAP but pt decompensated and was intubated 1/17. Nephrology consulted 1/23 for renal failure and was started on dialysis. US 1/18 with L peroneal vein thrombus. He was started on heparin but stopped due to bleeding from HD catheter. He was started on HD on 1/25. Trached on 2/2 secondary to inability to wean from the vent. Pt developed afib requiring amio. PEG placed 2/4. IVC filter 2/8. GIB and seen by GI. Heparin on hold. Klebsiella in sputum and abx added 2/22. EGD normal and colonoscopy essentially normal -rectal ulcer. Subjective:     Over the past 24 hours: On TC yesterday on vent support over night. Restless overnight, given fentanyl.      Review of Systems  Constitutional: negative for fevers and chills  Respiratory: positive for cough  Musculoskeletal:positive for severe debility    Current Facility-Administered Medications   Medication Dose Route Frequency    0.9% sodium chloride infusion 250 mL  250 mL IntraVENous PRN    epoetin ricardo-epbx (RETACRIT) injection 4,000 Units  4,000 Units SubCUTAneous DIALYSIS TUE, THU & SAT    0.9% sodium chloride infusion 250 mL  250 mL IntraVENous PRN    cefTRIAXone (ROCEPHIN) 2 g in 0.9% sodium chloride (MBP/ADV) 50 mL MBP  2 g IntraVENous Q24H    0.9% sodium chloride infusion 250 mL  250 mL IntraVENous PRN    0.9% sodium chloride infusion 250 mL  250 mL IntraVENous PRN    0.9% sodium chloride infusion 250 mL  250 mL IntraVENous PRN    metoprolol tartrate (LOPRESSOR) tablet 50 mg  50 mg Per NG tube Q12H    insulin glargine (LANTUS) injection 15 Units  15 Units SubCUTAneous DAILY    amiodarone (CORDARONE) tablet 200 mg  200 mg Oral DAILY    [Held by provider] oxyCODONE (ROXICODONE) 5 mg/5 mL oral solution 5 mg  5 mg Oral Q4H PRN    lansoprazole (PREVACID) 3 mg/mL oral suspension 30 mg  30 mg Per NG tube Q12H    ondansetron (ZOFRAN) injection 4 mg  4 mg IntraVENous Q6H PRN    polyethylene glycol (MIRALAX) packet 17 g  17 g Per NG tube DAILY PRN    guaiFENesin-dextromethorphan (ROBITUSSIN DM) 100-10 mg/5 mL syrup 10 mL  10 mL Per NG tube Q4H PRN    acetaminophen (TYLENOL) tablet 650 mg  650 mg Per NG tube Q6H PRN    white petrolatum-mineral oiL (LACRILUBE S.O.P.) ointment   Both Eyes Q4H PRN    atorvastatin (LIPITOR) tablet 80 mg  80 mg Per NG tube DAILY    [Held by provider] clopidogreL (PLAVIX) tablet 75 mg  75 mg Per NG tube DAILY    insulin lispro (HUMALOG) injection   SubCUTAneous Q4H    NUTRITIONAL SUPPORT ELECTROLYTE PRN ORDERS   Does Not Apply PRN    sodium chloride (NS) flush 20 mL  20 mL InterCATHeter Q8H    sodium chloride (NS) flush 20 mL  20 mL InterCATHeter PRN    loperamide (IMODIUM) capsule 2 mg  2 mg Oral Q4H PRN    glucagon (GLUCAGEN) injection 1 mg  1 mg IntraMUSCular PRN    dextrose (D50W) injection syrg 12.5-25 g  25-50 mL IntraVENous PRN    albuterol (PROVENTIL HFA, VENTOLIN HFA, PROAIR HFA) inhaler 2 Puff  2 Puff Inhalation Q4H PRN    influenza vaccine 2020-21 (6 mos+)(PF) (FLUARIX/FLULAVAL/FLUZONE QUAD) injection 0.5 mL  0.5 mL IntraMUSCular PRIOR TO DISCHARGE    hydrALAZINE (APRESOLINE) 20 mg/mL injection 20 mg  20 mg IntraVENous Q6H PRN         Objective:     Vitals:    02/24/21 0529 02/24/21 0559 02/24/21 0629 02/24/21 0703   BP: (!) 165/79 (!) 162/77 (!) 144/69    Pulse: 84 83 78 80   Resp: 24 28 24 23   Temp:       SpO2: 100% 100% 100% 100%   Weight:       Height:         Intake and Output:   02/22 1901 - 02/24 0700  In: 1930 [I.V.:50]  Out: 475 [Urine:475]  No intake/output data recorded.       Intake/Output Summary (Last 24 hours) at 2/24/2021 0732  Last data filed at 2/24/2021 0520  Gross per 24 hour   Intake 1725 ml   Output 325 ml   Net 1400 ml Physical Exam:          GEN: debilitated on vent  HEENT: trach, cuffed   NECK:  no JVD, no retractions, no thyromegaly or masses,   LUNGS:  CTA  HEART:  RRR with no M,G,R;  ABDOMEN:  soft with no tenderness; positive bowel sounds present  EXTREMITIES:  warm with no cyanosis, trace lower leg edema  SKIN:  no jaundice or ecchymosis   NEURO:  alert, grossly non-focal, appropriate. flat    LINES/DRAINS:   PICC  HD  PEG  Trach      DRIPS: none. ACTIVITY: limited    NUTRITION: NPO, tube feedings    LAB  Recent Labs     02/24/21 0357 02/23/21 2322 02/23/21  0525 02/22/21  0609 02/22/21  0609   WBC 22.9*  --  15.6*  --  19.5*   HGB 8.8* 7.6* 7.0*   < > 7.1*   HCT 26.9* 23.1* 21.5*   < > 21.3*     --  180  --  161    < > = values in this interval not displayed. Recent Labs     02/24/21 0357 02/23/21  0525 02/22/21  0609    141 139   K 3.6 3.6 3.7    104 104   CO2 29 28 29   * 100 125*   BUN 42* 67* 60*   CREA 2.96* 4.67* 3.99*   MG 2.0  --  1.9   PHOS 3.1  --  3.9*   INR  --   --  1.2       MRI brain  IMPRESSION     1. No evidence of acute infarction.     2. Large bilateral mastoid effusions. Correlation for associated symptoms is  recommended.     3. Otherwise unremarkable noncontrast MRI of the brain for patient age.         Assessment:     Hospital Problems  Date Reviewed: 2/5/2021          Codes Class Noted POA    Lower GI bleed requiring more than 4 units of blood in 24 hours, ICU, or surgery ICD-10-CM: K92.2  ICD-9-CM: 578.9  2/21/2021 Yes    S/P EGDcolonoscopy     Anemia ICD-10-CM: D64.9  ICD-9-CM: 285.9  2/16/2021 Yes    Ongoing. Has gotten 5U PRBC in past 2 day.  Hgb 7.0 this AM.     Critical illness polyneuropathy (HCC) (Chronic) ICD-10-CM: G62.81  ICD-9-CM: 357.82  2/12/2021 Yes    Ongoing    VAP (ventilator-associated pneumonia) ([facility] ICD-10-CM: U00.315  ICD-9-CM: 997.31  2/9/2021 Yes    On ceftriaxone for Klebsiella    Acute deep vein thrombosis (DVT) of left peroneal vein [facility]) ICD-10-CM: X47.584  ICD-9-CM: 453.42  1/28/2021 Yes    Off heparin due to bleeding    SWATI (acute kidney injury) ([facility] ICD-10-CM: N17.9  ICD-9-CM: 584.9  1/7/2021 Yes    Per  Nephrology     Acute hypoxemic respiratory failure ([facility] ICD-10-CM: J96.01  ICD-9-CM: 518.81  1/7/2021 Yes    Still on vent at night    Type 2 diabetes mellitus with hyperosmolarity without nonketotic hyperglycemic-hyperosmolar coma ([facility] (Chronic) ICD-10-CM: E11.00  ICD-9-CM: 250.20  1/7/2021 Yes        * (Principal) Pneumonia due to COVID-19 virus ICD-10-CM: U07.1, J12.82  ICD-9-CM: 480.8  1/7/2021 Yes    Completed Rx    CAD (coronary artery disease) (Chronic) ICD-10-CM: I25.10  ICD-9-CM: 414.00  10/28/2016 Yes    Overview Signed 10/28/2016  8:05 AM by SHELDON Miles     10-27-06 [facility] 90% LAD s/p 2.25 x 20 Synergy drug-eluting stent  (CS)             Hypertension (Chronic) ICD-10-CM: I10  ICD-9-CM: 401.9  10/27/2016 Yes        Obesity (Chronic) ICD-10-CM: E66.9  ICD-9-CM: 278.00  10/6/2015 Yes            Admitted with covid 19 pneumonia and respiratory failure. Prolonged vent, trach/PEG. Complicated course with a fib, DVT, GIB and now with IVC filter. Acute renal failure and s/p HD start per nephrology. Severe debility with critical care polyneuropathy. Prevacid, no longer on heparin with recent GIB, has IVC filter. PLAN:  Rocephin D3/7 for klebsiella in sputum. CXR every other day  Hgb 8.8, s/p transfusion  Severe Critical care polyneuropathy, consult PT/OT  TC duration all day as able and rest Q HS on CPAP  Speech following for PMV trials as able  LTAC when insurance approves    More than 50% of time documented was spent in face-to-face contact with the patient and in the care of the patient on the floor/unit where the patient is located. Total time spent 30 minutes. Gerhardt Levels, NP    Lungs:  Few scattered rhonchi  Heart:  RRR with no Murmur/Rubs/Gallops    Additional Comments:  Comfortable on TC.  Wanting apple sause for pleasure. Will see if ST can allow with PMV in place. HD planned for tomorrow AM.    I have spoken with and examined the patient. I agree with the above assessment and plan as documented.     Ashley Figueroa MD

## 2021-02-24 NOTE — DISCHARGE SUMMARY
1924 [facility]: Discharge Summary  Savanna Wagner 89. Admission date [facility]:1/6/2021    Discharge date [facility]heel Escobar: 2/26/21    Admission date [facility] : 2/26/21    Admitting Diagnosis:Type 2 diabetes mellitus with hyperosmolarity without nonketotic hyperglycemic-hyperosmolar coma ([facility] [E11.00]  Lower respiratory tract infection due to COVID-19 virus [U07.1, J22]  Acute hypoxemic respiratory failure ([facility] [J96.01]  SWATI (acute kidney injury) ([facility] [N17.9]    Discharge DiagnosesD:  Patient Active Problem List   Diagnosis Code    Obesity E66.9    Hypertension I10    Bradycardia R00.1    PVC (premature ventricular contraction) I49.3    CAD (coronary artery disease) I25.10    Dyslipidemia, goal LDL below 70 E78.5    SWATI (acute kidney injury) ([facility] N17.9    Acute hypoxemic respiratory failure (HCC) J96.01    Type 2 diabetes mellitus with hyperosmolarity without nonketotic hyperglycemic-hyperosmolar coma ([facility] [address] E11.00    Pneumonia due to COVID-19 virus U07.1, J12.82    Acute deep vein thrombosis (DVT) of left peroneal vein ([facility]) I82.452    VAP (ventilator-associated pneumonia) ([facility]) J95.851    Critical illness polyneuropathy ([facility]) G62.81    Anemia D64.9    Lower GI bleed requiring more than 4 units of blood in 24 hours, ICU, or surgery K92.2       Consultants:  Nephrology   GI   PT/OT  Speech therapy    Studies/ Procedures:  EGD/Colonoscopy- 2/22  PEG places on 2/4  Trach 2/2     Hospital course:  Pt admitted 1/7 with fever, cough. Covid + 1/6. Admitted by hospitalists. He was given dexamethasone, convalescent plasma, and remdesivir. Pulmonary was consulted 1/10 with pt requiring CPAP but pt decompensated and was intubated 1/17. Nephrology consulted 1/23 for renal failure and was started on dialysis. US 1/18 with L peroneal vein thrombus. He was started on heparin but stopped due to bleeding from HD catheter. He was started on HD on 1/25. Trached on 2/2 secondary to inability to wean from the vent. Patient developed afib requiring amio and since has been converted to oral amiodarone. PEG placed 2/4. IVC filter 2/8. GIB and seen by GI. Heparin has been on hold. Klebsiella in sputum and rocephin added 2/22. On 2/22, he had  EGD which was normal and colonoscopy essentially normal -rectal ulcer. Hgb stablized but dropped again requiring re-scoping and endoclips on 2/25. He has been working with PT/OT and tolerating TC (off vent for 24 hours) and using PMV. He was seen by speech and cleared for regular diet and regular liquids. He will plan to be transferred to [facility] for further weaning and rehabilitation.        Past Medical History:   Diagnosis Date    Acute deep vein thrombosis (DVT) of left peroneal vein (HCC) 1/28/2021    Atrial fibrillation ([facility] 01/22/2021    Critical illness polyneuropathy ([facility] [facility] 2/12/2021    Gastrointestinal disorder     reflux    GERD (gastroesophageal reflux disease)     Hypertension     Lower respiratory tract infection due to COVID-19 virus 1/7/2021    Nausea & vomiting     Obesity 10/6/2015    Other ill-defined conditions(799.89)     dyspnea since surgery, wheezing, SOB    Type 2 diabetes mellitus with hyperosmolarity without nonketotic hyperglycemic-hyperosmolar coma ([address] 1/7/2021    Unstable angina ([address] 10/27/2016     Past Surgical History:   Procedure Laterality Date    COLONOSCOPY  2/22/2021         COLONOSCOPY N/A 2/22/2021    COLONOSCOPY ROOM 3302 *vent performed by Yessica Gabriel MD at [facility] ENDOSCOPY    EGD  2/22/2021         HX CHOLECYSTECTOMY      HX ORTHOPAEDIC      rotator cuff; knee    HX UROLOGICAL      kidney stone surgeries x 3    IR INSERT TUNL CVC W/O PORT OVER 5 YR  2/8/2021    IR IVC FILTER  2/8/2021    MT CARDIAC SURG PROCEDURE UNLIST      stent     Social History     Tobacco Use    Smoking status: Never Smoker    Smokeless tobacco: Never Used   Substance Use Topics    Alcohol use: No       Allergies   Allergen Reactions    Latex Swelling     Had a purcell catheter with swelling of urethra.  Only known latex issue in past. Wife remembers this now    Hydrocodone-Acetaminophen Itching     Wife remembers this allergy    Tessalon Perles [Benzonatate] Unknown (comments)            Current Facility-Administered Medications   Medication Dose Route Frequency    0.9% sodium chloride infusion 250 mL  250 mL IntraVENous PRN    epoetin ricardo-epbx (RETACRIT) injection 4,000 Units  4,000 Units SubCUTAneous DIALYSIS TUE, THU & SAT    0.9% sodium chloride infusion 250 mL  250 mL IntraVENous PRN    cefTRIAXone (ROCEPHIN) 2 g in 0.9% sodium chloride (MBP/ADV) 50 mL MBP  2 g IntraVENous Q24H    0.9% sodium chloride infusion 250 mL  250 mL IntraVENous PRN    0.9% sodium chloride infusion 250 mL  250 mL IntraVENous PRN    0.9% sodium chloride infusion 250 mL  250 mL IntraVENous PRN    metoprolol tartrate (LOPRESSOR) tablet 50 mg  50 mg Per NG tube Q12H    insulin glargine (LANTUS) injection 15 Units  15 Units SubCUTAneous DAILY    amiodarone (CORDARONE) tablet 200 mg  200 mg Oral DAILY    [Held by provider] oxyCODONE (ROXICODONE) 5 mg/5 mL oral solution 5 mg  5 mg Oral Q4H PRN    lansoprazole (PREVACID) 3 mg/mL oral suspension 30 mg  30 mg Per NG tube Q12H    ondansetron (ZOFRAN) injection 4 mg  4 mg IntraVENous Q6H PRN    polyethylene glycol (MIRALAX) packet 17 g  17 g Per NG tube DAILY PRN    guaiFENesin-dextromethorphan (ROBITUSSIN DM) 100-10 mg/5 mL syrup 10 mL  10 mL Per NG tube Q4H PRN    acetaminophen (TYLENOL) tablet 650 mg  650 mg Per NG tube Q6H PRN    white petrolatum-mineral oiL (LACRILUBE S.O.P.) ointment   Both Eyes Q4H PRN    atorvastatin (LIPITOR) tablet 80 mg  80 mg Per NG tube DAILY    [Held by provider] clopidogreL (PLAVIX) tablet 75 mg  75 mg Per NG tube DAILY    insulin lispro (HUMALOG) injection   SubCUTAneous Q4H    NUTRITIONAL SUPPORT ELECTROLYTE PRN ORDERS Does Not Apply PRN    sodium chloride (NS) flush 20 mL  20 mL InterCATHeter Q8H    sodium chloride (NS) flush 20 mL  20 mL InterCATHeter PRN    loperamide (IMODIUM) capsule 2 mg  2 mg Oral Q4H PRN    glucagon (GLUCAGEN) injection 1 mg  1 mg IntraMUSCular PRN    dextrose (D50W) injection syrg 12.5-25 g  25-50 mL IntraVENous PRN    albuterol (PROVENTIL HFA, VENTOLIN HFA, PROAIR HFA) inhaler 2 Puff  2 Puff Inhalation Q4H PRN    influenza vaccine 2020-21 (6 mos+)(PF) (FLUARIX/FLULAVAL/FLUZONE QUAD) injection 0.5 mL  0.5 mL IntraMUSCular PRIOR TO DISCHARGE    hydrALAZINE (APRESOLINE) 20 mg/mL injection 20 mg  20 mg IntraVENous Q6H PRN         Review of Systems  Constitutional: negative for fevers, chills and sweats  Respiratory: negative for cough, sputum or dyspnea on exertion  Cardiovascular: negative for chest pain, chest pressure/discomfort  + debility    Objective:     Vitals:    02/24/21 0927 02/24/21 0929 02/24/21 0959 02/24/21 1100   BP:  136/68 (!) 147/73    Pulse: 72  72    Resp: 27  25    Temp:    98.4 °F (36.9 °C)   SpO2:  99% 100%    Weight:       Height:           Intake and Output:   02/22 1901 - 02/24 0700  In: 1930 [I.V.:50]  Out: 475 [Urine:475]  No intake/output data recorded. NG Tube IN: [REMOVED] Nasogastric Tube 01/17/21-Intake (ml): 120 ml  PEG/Gastrostomy Tube 02/04/21-Intake (ml): 475 ml  NG Tube OUT:    Urine void OUT: Urine Voided: 200 ml  Urine cath OUT: [REMOVED] Urinary Catheter 01/17/21-Urine Output (mL): 25 ml  IV IN:  I.V.: 1140 mL  Feeding tube IN:           Physical Exam:   General:  Alert, cooperative, no acute distress, appears stated age. Eyes:  Conjunctivae/corneas clear   Nose: Nares normal.    Mouth/Throat: Lips, mucosa, and tongue intact, atraumatic. Teeth and gums intact. Neck: Supple, trach   Lungs:   CTA to auscultation bilaterally on TC   Heart:  Regular rate and rhythm, S1, S2 normal, no murmur, click, rub or gallop.  Telemetry : NSR   Abdomen:   Soft, non-tender. Bowel sounds active. No masses,  No organomegaly. Extremities: Extremities symetrical, atraumatic, no cyanosis and minimal edema. Skin: Skin color- pink, and turgor < 2 sec recoil. No rashes or lesions. Neurologic: Debilitated, A & O X 3- appropriate. Lines and Tubes-  PICC  HD  PEG  Trach, cuffed with PMV    Ventilator Settings  Mode FIO2 Rate Tidal Volume Pressure PEEP   Pressure control  35 %    0 ml  10 cm H2O  8 cm H20      Peak airway pressure: 23.5 cm H2O   Minute ventilation: 8.1 l/min     LAB  Recent Labs     02/24/21 0357 02/23/21 2322 02/23/21  0525 02/22/21  0609 02/22/21  0609   WBC 22.9*  --  15.6*  --  19.5*   HGB 8.8* 7.6* 7.0*   < > 7.1*   HCT 26.9* 23.1* 21.5*   < > 21.3*     --  180  --  161    < > = values in this interval not displayed. Recent Labs     02/24/21 0357 02/23/21  0525 02/22/21  0609    141 139   K 3.6 3.6 3.7    104 104   CO2 29 28 29   BUN 42* 67* 60*   CREA 2.96* 4.67* 3.99*   MG 2.0  --  1.9   PHOS 3.1  --  3.9*   INR  --   --  1.2     No results for input(s): PH, PCO2, PO2, HCO3 in the last 72 hours. Discharge Medications:   Current Discharge Medication List      START taking these medications    Details   acetaminophen (TYLENOL) 325 mg tablet 2 Tabs by Per NG tube route every six (6) hours as needed for Pain. Qty: 1 Tab, Refills: 0      albuterol (PROVENTIL HFA, VENTOLIN HFA, PROAIR HFA) 90 mcg/actuation inhaler Take 2 Puffs by inhalation every four (4) hours as needed for Wheezing or Shortness of Breath. Qty: 1 Inhaler, Refills: 0      epoetin ricardo-epbx (RETACRIT) 4,000 unit/mL injection 1 mL by SubCUTAneous route Every Tuesday, Thursday and Saturday. Indications: method of removing waste/poison from blood with dialysis  Qty: 1 mL, Refills: 0      cefTRIAXone 2 gram 2 g IVPB 2 g by IntraVENous route every twenty-four (24) hours for 2 days.   Qty: 3 Dose, Refills: 0      amiodarone (CORDARONE) 200 mg tablet Take 1 Tab by mouth daily. Qty: 1 Tab, Refills: 0      guaiFENesin-dextromethorphan (ROBITUSSIN DM) 100-10 mg/5 mL syrup 10 mL by Per NG tube route every four (4) hours as needed for Cough for up to 10 days. Qty: 1 Bottle, Refills: 0      insulin glargine (LANTUS) 100 unit/mL injection 15 Units by SubCUTAneous route daily. Qty: 1 Vial, Refills: 0      lansoprazole (PREVACID) 3 mg/mL oral suspension 10 mL by Per NG tube route every twelve (12) hours. Qty: 1 mL, Refills: 0      insulin lispro (HUMALOG) 100 unit/mL injection SSI  Qty: 1 Vial, Refills: 0         CONTINUE these medications which have NOT CHANGED    Details   metoprolol tartrate (LOPRESSOR) 50 mg tablet Take 1 Tab by mouth two (2) times a day. Qty: 60 Tab, Refills: 11      atorvastatin (LIPITOR) 80 mg tablet Take 1 Tab by mouth daily.   Qty: 90 Tab, Refills: 3         STOP taking these medications       clopidogreL (Plavix) 75 mg tab Comments:   Reason for Stopping:         nitroglycerin (NITROSTAT) 0.4 mg SL tablet Comments:   Reason for Stopping:         magnesium oxide (MAG-OX) 400 mg tablet Comments:   Reason for Stopping:         Aspirin, Buffered 81 mg tab Comments:   Reason for Stopping:                                Assessment:     Hospital Problems  Date Reviewed: 2/5/2021          Codes Class Noted POA    Lower GI bleed requiring more than 4 units of blood in 24 hours, ICU, or surgery ICD-10-CM: K92.2  ICD-9-CM: 578.9  2/21/2021 Yes        Anemia ICD-10-CM: D64.9  ICD-9-CM: 285.9  2/16/2021 Yes        Critical illness polyneuropathy (HCC) (Chronic) ICD-10-CM: W59.68  ICD-9-CM: 357.82  2/12/2021 Yes        [facility]il[facility] pneumonia) [facility] ICD-10-CM: K56.406  ICD-9-CM: 997.31  2/9/2021 Yes        Acute deep vein thrombosis (DVT) of left peroneal vein (HCC) ICD-10-CM: D50.809  ICD-9-CM: 453.42  1/28/2021 Yes        SWATI (acute kidney injury) ([facility] ICD-10-CM: N17.9  ICD-9-CM: 584.9  1/7/2021 Yes        Acute hypoxemic respiratory failure ([facility] ICD-10-CM: J96.01  ICD-9-CM: 518.81  1/7/2021 Yes        Type 2 diabetes mellitus with hyperosmolarity without nonketotic hyperglycemic-hyperosmolar coma ([facility] [address] (Chronic) ICD-10-CM: E11.00  ICD-9-CM: 250.20  1/7/2021 Yes        * (Principal) Pneumonia due to COVID-19 virus ICD-10-CM: U07.1, J12.82  ICD-9-CM: 480.8  1/7/2021 Yes        CAD (coronary artery disease) (Chronic) ICD-10-CM: I25.10  ICD-9-CM: 414.00  10/28/2016 Yes    Overview Signed 10/28/2016  8:05 AM by SHELDON Shaffer     10-27-06 [facility] 90% LAD s/p 2.25 x 20 Synergy drug-eluting stent  (CS)             Hypertension (Chronic) ICD-10-CM: I10  ICD-9-CM: 401.9  10/27/2016 Yes        Obesity (Chronic) ICD-10-CM: E66.9  ICD-9-CM: 278.00  10/6/2015 Yes              Plan:     1. Admit to [facility] for further weaning and mechanical ventilator management. 2. Tolerating TC here, PMV trials and diet advanced  3. He still has cuffed trach and if he is able to stay off vent for ~ 48 hours can change to cuffless  4. HD per nephrology  5. PT/OT, will need aggressive rehab. Chantal Wyatt NP    More than 50% of time documented was spent in face-to-face contact with the patient and in the care of the patient on the floor/unit where the patient is located. Lungs: mild coarse sounds on TC  Heart S1 and S2 audible, no murmers or rubs appreciated  Other     Has been on TC for 24 hours. WIll need to continue  Taper oxygen as tolerated  Aggressive PT/OT and after LTACH will likely still need more aggressive REHAB    Currently watch stocks and mentally is already much better. Has not gotten any additional blood products since yesterday. Spoke to GI and they clipped bleeding area and feels they have it under control. Hg is up today. Rolo Ledezma for transfer to [facility].     I have spoken with and examined the patient. I have reviewed the history, examination, assessment, and plan and agree with the above.     Mily Lopez MD      This note was signed electronically. Errors are unfortunately her likely due to dictation software.

## 2021-02-24 NOTE — PROGRESS NOTES
Pt approved by insurance for [facility]. Possible bed offer tomorrow or Friday. Requested Nephrology change dialysis to MWF by RN. CM following.

## 2021-02-24 NOTE — DIABETES MGMT
Patient's blood glucose ranged 100-164 yesterday with patient receiving Lantus 15 units and Humalog 6 units Blood glucose 217 this morning. Hgb 8. 8. Cr 2.96. GFR 23. Will follow along.

## 2021-02-24 NOTE — PROGRESS NOTES
[facility]        Subjective: SWATI ( probable ESRD)   Weak, trach. Nods answers to questions. Review of Systems -   General ROS: negative for - fever, chills  Respiratory ROS: no SOB, cough, JACKSON  Cardiovascular ROS: no CP, palpitations  Gastrointestinal ROS: no N&V, abdominal pain, diarrhea  Genito-Urinary ROS: no difficulty voiding, dysuria  Neurological ROS: no seizures, focal weekness        Objective:    Vitals:    02/24/21 0529 02/24/21 0559 02/24/21 0629 02/24/21 0703   BP: (!) 165/79 (!) 162/77 (!) 144/69    Pulse: 84 83 78 80   Resp: 24 28 24 23   Temp:       SpO2: 100% 100% 100% 100%   Weight:       Height:           PE  Gen: in no acute distress  CV:reg rate  Chest:bilat breath sounds  Abd: soft  Ext/Access: rt IJ Tunneled HD cath ok       . LAB  Recent Labs     02/24/21  0357 02/23/21  2322 02/23/21  0525 02/22/21  0609 02/22/21  0609   WBC 22.9*  --  15.6*  --  19.5*   HGB 8.8* 7.6* 7.0*   < > 7.1*   HCT 26.9* 23.1* 21.5*   < > 21.3*     --  180  --  161   INR  --   --   --   --  1.2    < > = values in this interval not displayed.      Recent Labs     02/24/21  0357 02/23/21  0525 02/22/21  0609    141 139   K 3.6 3.6 3.7    104 104   CO2 29 28 29   * 100 125*   BUN 42* 67* 60*   CREA 2.96* 4.67* 3.99*   MG 2.0  --  1.9   PHOS 3.1  --  3.9*   CA 8.3 8.1* 7.9*           Radiology    A/P:   Patient Active Problem List   Diagnosis Code    Obesity E66.9    Hypertension I10    Bradycardia R00.1    PVC (premature ventricular contraction) I49.3    CAD (coronary artery disease) I25.10    Dyslipidemia, goal LDL below 70 E78.5    SWATI (acute kidney injury) (HCC) N17.9    Acute hypoxemic respiratory failure (HCC) J96.01    Type 2 diabetes mellitus with hyperosmolarity without nonketotic hyperglycemic-hyperosmolar coma ([facility]) E11.00    Pneumonia due to COVID-19 virus U07.1, J12.82    Acute deep vein thrombosis (DVT) of left peroneal vein ([facility]) I82.452    VAP (ventilator-associated pneumonia) ([facility]) J95.851   • Critical illness polyneuropathy ([facility]) G62.81   • Anemia D64.9   • Lower GI bleed requiring more than 4 units of blood in 24 hours, ICU, or surgery K92.2     SWATI (probaly ESRD)  - still oliguric, first HD was 1/25/21.   For HD again tomorrow.    Anemia - on Retacrit.   .    Resp Failure/ COVID19    A Fib    DVT              Alberto Carrero MD

## 2021-02-24 NOTE — PROGRESS NOTES
Bedside and Verbal shift change report  given to Fox Lainez RN (oncoming nurse) by Steffany Joseph RN (offgoing nurse). Report included the following information SBAR, Kardex, Intake/Output and Recent Results.

## 2021-02-24 NOTE — PROGRESS NOTES
SPEECH PATHOLOGY NOTE:  Modified Barium Swallow study complete. Oropharyngeal swallow within functional limits, with all swallows timely and with no laryngeal penetration or aspiration observed with any consistency assessed. Recommend initiate regular texture diet and thin liquids. PMV must be in place for all PO. Patient should be fully awake/alert and upright for all PO. Full report to follow.   Alejandro Escobedo MA, CCC-SLP

## 2021-02-24 NOTE — PROGRESS NOTES
SPEECH LANGUAGE PATHOLOGY: MODIFIED BARIUM SWALLOW STUDY  Initial Assessment    NAME/AGE/GENDER: Deondre Cristina is a 61 y.o. male  DATE: 2/24/2021  PRIMARY DIAGNOSIS: Type 2 diabetes mellitus with hyperosmolarity without nonketotic hyperglycemic-hyperosmolar coma ([facility] [E11.00]  Lower respiratory tract infection due to COVID-19 virus [U07.1, J22]  Acute hypoxemic respiratory failure ([facility] [J96.01]  SWATI (acute kidney injury) ([facility] [N17.9]  Procedure(s) (LRB):  ESOPHAGOGASTRODUODENOSCOPY (EGD) (N/A)  COLONOSCOPY ROOM 3302 *vent (N/A) 2 Days Post-Op  ICD-10: Treatment Diagnosis: Oropharyngeal dysphagia (R13.12)  INTERDISCIPLINARY COLLABORATION: Radiologist, Dr. Barnard Limb  PRECAUTIONS/ALLERGIES: Latex, Hydrocodone-acetaminophen, and Tessalon perles [benzonatate]     RECOMMENDATIONS/PLAN   DIET:   PO diet texture:  Regular  Liquids:  regular thin    MEDICATIONS: With liquid     COMPENSATORY STRATEGIES/MODIFICATIONS INCLUDING:  Fully awake/alert  Upright for all PO  PMV in place for all PO     RECOMMENDATIONS for CONTINUED SPEECH THERAPY:   YES: Anticipate need for ongoing speech therapy during this hospitalization. FREQUENCY/DURATION: Continue to follow patient for PMV goals    Recommendations for next treatment session: Next treatment will address PMV tolerance       ASSESSMENT   Mr. Mac Haley presents with oropharyngeal swallow within functional limits, with all swallows timely and with no laryngeal penetration or aspiration observed with any consistency assessed. Recommend initiate regular texture diet and thin liquids. PMV must be in place for all PO.  Patient should be fully awake/alert and upright for all PO.    SUBJECTIVE   History of Present Injury/Illness: Mr. Mac Haley  has a past medical history of Acute deep vein thrombosis (DVT) of left peroneal vein ([facility] (1/28/2021), Atrial fibrillation ([facility] (01/22/2021), Critical illness polyneuropathy ([facility] (2/12/2021), Gastrointestinal disorder, GERD (gastroesophageal reflux disease), Hypertension, Lower respiratory tract infection due to COVID-19 virus (1/7/2021), Nausea & vomiting, Obesity (10/6/2015), Other ill-defined conditions(799.89), Type 2 diabetes mellitus with hyperosmolarity without nonketotic hyperglycemic-hyperosmolar coma ([facility] (1/7/2021), and Unstable angina ([facility] (10/27/2016). Arun Kiser He also  has a past surgical history that includes hx cholecystectomy; hx urological; hx orthopaedic; pr cardiac surg procedure unlist; ir ivc filter (2/8/2021); ir insert tunl cvc w/o port over 5 yr (2/8/2021); egd (2/22/2021); colonoscopy (2/22/2021); and colonoscopy (N/A, 2/22/2021). Pain:  Pain Intensity 1: 0  Pain Intervention(s) 1: Medication (see MAR)    Current Medications:   No current facility-administered medications on file prior to encounter. Current Outpatient Medications on File Prior to Encounter   Medication Sig Dispense Refill    metoprolol tartrate (LOPRESSOR) 50 mg tablet Take 1 Tab by mouth two (2) times a day. 60 Tab 11    clopidogreL (Plavix) 75 mg tab Take 1 Tab by mouth daily. 90 Tab 3    nitroglycerin (NITROSTAT) 0.4 mg SL tablet 1 Tab by SubLINGual route every five (5) minutes as needed for Chest Pain. 1 Bottle 11    magnesium oxide (MAG-OX) 400 mg tablet Take 1 Tab by mouth daily. 90 Tab 2    Aspirin, Buffered 81 mg tab Take 81 mg by mouth daily.  atorvastatin (LIPITOR) 80 mg tablet Take 1 Tab by mouth daily. 90 Tab 3       OBJECTIVE   Orientation:   Person  Place  Time  Situation    Oral Assessment:  Labial: No impairment  Dentition: Natural  Oral Hygiene: Adequate  Lingual: No impairment  Vocal Quality: Adequate with PMV in place    Modified barium swallow study was performed in the radiology suite with Mr. Ignacio Diaz in the lateral plane upright 90° in bed.  To evaluate his swallow function, barium coated liquid and food was administered in the form of thin liquids (by spoon, cup sip, cup gulp, straw sip and serial swallows), pudding, mixed consistency and cracker. Oral phase of swallow:   no significant oral issues observed    Pharyngeal phase of swallow:   Swallows of all textures were timely. No laryngeal penetration or aspiration was observed. No pharyngeal residue after swallow. Pharyngeal characteristics:  functional pharyngeal swallow  Attempted strategies:   none  Effective strategies:   not applicable  Aspiration/Penetration Scale: 1 (No penetration/aspiration. Contrast does not enter the airway.)    Cervical esophageal phase of swallow:   adequate and timely clearance of all boluses through cervical esophagus  **Distal esophagus not assessed due to limitations of MBS study. Assessment/Reassessment only, no treatment provided today. Tool Used: Dysphagia Outcome and Severity Scale (JEANETTE)    Comments   Normal Diet With no strategies or extra time needed   Functional Swallow May have mild oral or pharyngeal delay   Mild Dysphagia Which may require one diet consistency restricted    Mild-Moderate Dysphagia With 1-2 diet consistencies restricted   Moderate Dysphagia With 2 or more diet consistencies restricted   Moderately Severe Dysphagia With partial PO strategies (trials with ST only)   Severe Dysphagia With inability to tolerate any PO safely      Score:  Initial: 2 Most Recent: 7 (Date:2/24/2021)   Interpretation of Tool: The Dysphagia Outcome and Severity Scale (JEANETTE) is a simple, easy-to-use, 7-point scale developed to systematically rate the functional severity of dysphagia based on objective assessment and make recommendations for diet level, independence level, and type of nutrition. Payor: [place] [employer] / Plan: [facility] PPO / Product Type: Commerical /     Education:  Recommendations discussed with RN, MD.    Safety:   After treatment position/precautions:  Patient waiting in radiology holding bay for transport back to room.     Total Treatment Duration:  Time In: 1130   Time Out: [address] [place], [place], [address] [facility]

## 2021-02-24 NOTE — PROGRESS NOTES
Pt sister at bedside. Spoke with pt and sister regarding [facility] and insurance approval. Pt seems excited and agrees with POC. Discussed goals of care regarding vent/trach at [facility]. Aware PT/OT/ST will continue to follow and possibly will need IRC from there. Discussed HD and MWF schedule. Dr. Lucille Sanderson aware per ICU CC, Dora Peña. Sister and pt agrees with POC. Pt talking today and states he wants rid of trach. Also, very aware he has been here 48 days. CM following.

## 2021-02-24 NOTE — PROGRESS NOTES
ACUTE OT GOALS:  (Developed with and agreed upon by patient and/or caregiver.)                   NEW GOALS   1. Patient will complete self-grooming tasks in supported sitting with mod A and adaptive equipment as needed. 2. Patient will complete functional transfers with max A and adaptive equipment as needed. 3. Patient will tolerate 30 minutes OT treatment with 2-3 rest breaks as needed to increase activity tolerance for ADL performance. 4. Patient will tolerate 15 minutes BUE therapeutic exercises to increase functional use of BUE during ADL performance.         Timeframe: 7 days    OCCUPATIONAL THERAPY: Daily Note OT Treatment Day # 3    Tip Sanchez is a 61 y.o. male   PRIMARY DIAGNOSIS: Pneumonia due to COVID-19 virus  Type 2 diabetes mellitus with hyperosmolarity without nonketotic hyperglycemic-hyperosmolar coma (HCC) [E11.00]  Lower respiratory tract infection due to COVID-19 virus [U07.1, J22]  Acute hypoxemic respiratory failure (HCC) [J96.01]  SWATI (acute kidney injury) ([facility] [N17.9]  Procedure(s) (LRB):  ESOPHAGOGASTRODUODENOSCOPY (EGD) (N/A)  COLONOSCOPY ROOM 3302 *vent (N/A)  2 Days Post-Op  Payor: [facility] / Plan: [employer] PPO / Product Type: Commerical /   ASSESSMENT:     REHAB RECOMMENDATIONS: CURRENT LEVEL OF FUNCTION:  (Most Recently Demonstrated)   Recommendation to date pending progress:  [address]  Equipment:    To Be Determined Bathing:   Total Assistance  Dressing:   Total Assistance  Feeding/Grooming:   Total Assistance  Toileting:   Total Assistance  Functional Mobility:   Total Assistance     ASSESSMENT:  Mr. Verlee Peabody continues to present with significant deficits with overall strength, activity tolerance, ADL performance, and functional mobility. Currently ICU, resting on trach collar. Total A for urinal management and bladder hygiene. Max A x 2 for supine to sit transfer to edge of bed.  Total A for combing hair. Improved static sitting balance with BUEs placed on pillows for prop sitting support. Pt progressing with functional transfers and activity tolerance. Will continue OT efforts as indicated. At this time, patient is appropriate for Co-treatment with physical  therapy due to patient's clinical complexity, decreased overall endurance/tolerance levels, as well as need for high level assistance and cues and intervention to complete functional transfers/mobility and functional tasks in safe manner. Chris Cotter Viktor.  is appropriate for a multidisciplinary co-treatment of PT and OT to address goals of both disciplines. SUBJECTIVE:   Mr. Matilda Shipman states, \"Is that Irlanda? \"    SOCIAL HISTORY/LIVING ENVIRONMENT:   Home Environment: Private residence  One/Two Story Residence: One story  Living Alone: No  Support Systems: Family member(s)    OBJECTIVE:     PAIN: VITAL SIGNS: LINES/DRAINS:   Pre Treatment: Pain Screen  Pain Scale 1: Numeric (0 - 10)  Post Treatment: 0   Gimenez Catheter, IV, PEG and Rectal Tube  O2 Device: Tracheal collar     ACTIVITIES OF DAILY LIVING: I Mod I S SBA CGA Min Mod Max Total NT Comments   BASIC ADLs:              Bathing/ Showering [] [] [] [] [] [] [] [] [] [x]    Toileting [] [] [] [] [] [] [] [] [x] [] Urinal management and bladder hygiene   Dressing [] [] [] [] [] [] [] [] [] [x]    Feeding [] [] [] [] [] [] [] [] [] [x]    Grooming [] [] [] [] [] [] [] [] [x] [] Combing hair in supported sitting    Personal Device Care [] [] [] [] [] [] [] [] [] [x]    Functional Mobility [] [] [] [] [] [] [] [] [x] [] X 2 for functional transfers.    I=Independent, Mod I=Modified Independent, S=Supervision, SBA=Standby Assistance, CGA=Contact Guard Assistance,   Min=Minimal Assistance, Mod=Moderate Assistance, Max=Maximal Assistance, Total=Total Assistance, NT=Not Tested    MOBILITY: I Mod I S SBA CGA Min Mod Max Total  NT x2 Comments:   Supine to sit [] [] [] [] [] [] [] [x] [x] [] [x] Sit to supine [] [] [] [] [] [] [] [x] [x] [] [x]    Sit to stand [] [] [] [] [] [] [] [] [] [x] []    Bed to chair [] [] [] [] [] [] [] [] [] [x] []    I=Independent, Mod I=Modified Independent, S=Supervision, SBA=Standby Assistance, CGA=Contact Guard Assistance,   Min=Minimal Assistance, Mod=Moderate Assistance, Max=Maximal Assistance, Total=Total Assistance, NT=Not Tested    PLAN:   FREQUENCY/DURATION: OT Plan of Care: 3 times/week for duration of hospital stay or until stated goals are met, whichever comes first.    TREATMENT:   TREATMENT:   ($$ Self Care/Home Management: 8-22 mins$$ Neuromuscular Re-Education: 8-22 mins   )  Co-Treatment PT/OT necessary due to patient's decreased overall endurance/tolerance levels, as well as need for high level skilled assistance to complete functional transfers/mobility and functional tasks  Self Care (10 Minutes): Self care including Toileting and Grooming to decrease level of assistance required. Neuromuscular Re-education (13 Minutes): Neuromuscular Re-education included Balance Training, Coordination training, Postural training and Sitting balance training to improve Balance, Coordination, Postural Control and Proprioception.     AFTER TREATMENT POSITION/PRECAUTIONS:  Bed, Needs within reach and RN notified    INTERDISCIPLINARY COLLABORATION:  RN/PCT, PT/PTA and OT/SIN    TOTAL TREATMENT DURATION:  OT Patient Time In/Time Out  Time In: 1018  Time Out: 1001 [facility], OT

## 2021-02-25 ENCOUNTER — HOSPITAL ENCOUNTER (OUTPATIENT)
Dept: NUCLEAR MEDICINE | Age: 61
Discharge: HOME | DRG: 004 | End: 2021-02-25
Attending: INTERNAL MEDICINE | Admitting: HOSPITALIST
Payer: COMMERCIAL

## 2021-02-25 ENCOUNTER — APPOINTMENT (OUTPATIENT)
Dept: GENERAL RADIOLOGY | Age: 61
DRG: 004 | End: 2021-02-25
Attending: NURSE PRACTITIONER
Payer: COMMERCIAL

## 2021-02-25 LAB
ANION GAP SERPL CALC-SCNC: 4 MMOL/L (ref 7–16)
BUN SERPL-MCNC: 50 MG/DL (ref 8–23)
CALCIUM SERPL-MCNC: 7.8 MG/DL (ref 8.3–10.4)
CHLORIDE SERPL-SCNC: 109 MMOL/L (ref 98–107)
CO2 SERPL-SCNC: 33 MMOL/L (ref 21–32)
CREAT SERPL-MCNC: 3.51 MG/DL (ref 0.8–1.5)
ERYTHROCYTE [DISTWIDTH] IN BLOOD BY AUTOMATED COUNT: 16.4 % (ref 11.9–14.6)
FIBRINOGEN PPP-MCNC: 285 MG/DL (ref 190–501)
GLUCOSE BLD STRIP.AUTO-MCNC: 111 MG/DL (ref 65–100)
GLUCOSE BLD STRIP.AUTO-MCNC: 114 MG/DL (ref 65–100)
GLUCOSE BLD STRIP.AUTO-MCNC: 127 MG/DL (ref 65–100)
GLUCOSE BLD STRIP.AUTO-MCNC: 134 MG/DL (ref 65–100)
GLUCOSE BLD STRIP.AUTO-MCNC: 150 MG/DL (ref 65–100)
GLUCOSE BLD STRIP.AUTO-MCNC: 68 MG/DL (ref 65–100)
GLUCOSE BLD STRIP.AUTO-MCNC: 79 MG/DL (ref 65–100)
GLUCOSE BLD STRIP.AUTO-MCNC: 89 MG/DL (ref 65–100)
GLUCOSE SERPL-MCNC: 109 MG/DL (ref 65–100)
HCT VFR BLD AUTO: 20.1 % (ref 41.1–50.3)
HCT VFR BLD AUTO: 24.1 % (ref 41.1–50.3)
HGB BLD-MCNC: 6.4 G/DL (ref 13.6–17.2)
HGB BLD-MCNC: 8 G/DL (ref 13.6–17.2)
HISTORY CHECKED?,CKHIST: NORMAL
MCH RBC QN AUTO: 30.8 PG (ref 26.1–32.9)
MCHC RBC AUTO-ENTMCNC: 31.8 G/DL (ref 31.4–35)
MCV RBC AUTO: 96.6 FL (ref 79.6–97.8)
NRBC # BLD: 0 K/UL (ref 0–0.2)
PLATELET # BLD AUTO: 204 K/UL (ref 150–450)
PMV BLD AUTO: 10.1 FL (ref 9.4–12.3)
POTASSIUM SERPL-SCNC: 3.3 MMOL/L (ref 3.5–5.1)
RBC # BLD AUTO: 2.08 M/UL (ref 4.23–5.6)
SODIUM SERPL-SCNC: 146 MMOL/L (ref 138–145)
WBC # BLD AUTO: 15.8 K/UL (ref 4.3–11.1)

## 2021-02-25 PROCEDURE — 74011250636 HC RX REV CODE- 250/636: Performed by: NURSE PRACTITIONER

## 2021-02-25 PROCEDURE — 90935 HEMODIALYSIS ONE EVALUATION: CPT

## 2021-02-25 PROCEDURE — 86923 COMPATIBILITY TEST ELECTRIC: CPT

## 2021-02-25 PROCEDURE — 99233 SBSQ HOSP IP/OBS HIGH 50: CPT | Performed by: INTERNAL MEDICINE

## 2021-02-25 PROCEDURE — 74011250637 HC RX REV CODE- 250/637: Performed by: INTERNAL MEDICINE

## 2021-02-25 PROCEDURE — 74011000250 HC RX REV CODE- 250: Performed by: HOSPITALIST

## 2021-02-25 PROCEDURE — 2709999900 HC NON-CHARGEABLE SUPPLY: Performed by: INTERNAL MEDICINE

## 2021-02-25 PROCEDURE — 74011250637 HC RX REV CODE- 250/637: Performed by: NURSE PRACTITIONER

## 2021-02-25 PROCEDURE — 94003 VENT MGMT INPAT SUBQ DAY: CPT

## 2021-02-25 PROCEDURE — 77010033678 HC OXYGEN DAILY

## 2021-02-25 PROCEDURE — 85018 HEMOGLOBIN: CPT

## 2021-02-25 PROCEDURE — 71045 X-RAY EXAM CHEST 1 VIEW: CPT

## 2021-02-25 PROCEDURE — 94760 N-INVAS EAR/PLS OXIMETRY 1: CPT

## 2021-02-25 PROCEDURE — 76040000025: Performed by: INTERNAL MEDICINE

## 2021-02-25 PROCEDURE — 0W3P8ZZ CONTROL BLEEDING IN GASTROINTESTINAL TRACT, VIA NATURAL OR ARTIFICIAL OPENING ENDOSCOPIC: ICD-10-PCS | Performed by: INTERNAL MEDICINE

## 2021-02-25 PROCEDURE — 77030014179 HC APPL CLP RESOL BSC -C: Performed by: INTERNAL MEDICINE

## 2021-02-25 PROCEDURE — 74011250636 HC RX REV CODE- 250/636: Performed by: INTERNAL MEDICINE

## 2021-02-25 PROCEDURE — 36430 TRANSFUSION BLD/BLD COMPNT: CPT

## 2021-02-25 PROCEDURE — 92507 TX SP LANG VOICE COMM INDIV: CPT

## 2021-02-25 PROCEDURE — P9016 RBC LEUKOCYTES REDUCED: HCPCS

## 2021-02-25 PROCEDURE — 85027 COMPLETE CBC AUTOMATED: CPT

## 2021-02-25 PROCEDURE — 65620000000 HC RM CCU GENERAL

## 2021-02-25 PROCEDURE — 82962 GLUCOSE BLOOD TEST: CPT

## 2021-02-25 PROCEDURE — 85384 FIBRINOGEN ACTIVITY: CPT

## 2021-02-25 PROCEDURE — 74011636637 HC RX REV CODE- 636/637: Performed by: INTERNAL MEDICINE

## 2021-02-25 PROCEDURE — 86901 BLOOD TYPING SEROLOGIC RH(D): CPT

## 2021-02-25 PROCEDURE — 74011000258 HC RX REV CODE- 258: Performed by: NURSE PRACTITIONER

## 2021-02-25 PROCEDURE — A9560 TC99M LABELED RBC: HCPCS

## 2021-02-25 PROCEDURE — 80048 BASIC METABOLIC PNL TOTAL CA: CPT

## 2021-02-25 PROCEDURE — 2709999900 HC NON-CHARGEABLE SUPPLY

## 2021-02-25 RX ORDER — SODIUM CHLORIDE 9 MG/ML
250 INJECTION, SOLUTION INTRAVENOUS AS NEEDED
Status: DISCONTINUED | OUTPATIENT
Start: 2021-02-25 | End: 2021-02-26 | Stop reason: HOSPADM

## 2021-02-25 RX ORDER — POTASSIUM CHLORIDE 14.9 MG/ML
20 INJECTION INTRAVENOUS ONCE
Status: COMPLETED | OUTPATIENT
Start: 2021-02-25 | End: 2021-02-25

## 2021-02-25 RX ORDER — PROPOFOL 10 MG/ML
0-50 VIAL (ML) INTRAVENOUS
Status: DISCONTINUED | OUTPATIENT
Start: 2021-02-25 | End: 2021-02-26 | Stop reason: HOSPADM

## 2021-02-25 RX ADMIN — AMIODARONE HYDROCHLORIDE 200 MG: 200 TABLET ORAL at 09:00

## 2021-02-25 RX ADMIN — ACETAMINOPHEN 650 MG: 325 TABLET, FILM COATED ORAL at 17:15

## 2021-02-25 RX ADMIN — PROPOFOL 25 MCG/KG/MIN: 10 INJECTION, EMULSION INTRAVENOUS at 16:30

## 2021-02-25 RX ADMIN — EPOETIN ALFA-EPBX 4000 UNITS: 4000 INJECTION, SOLUTION INTRAVENOUS; SUBCUTANEOUS at 08:03

## 2021-02-25 RX ADMIN — CEFTRIAXONE 2 G: 2 INJECTION, POWDER, FOR SOLUTION INTRAMUSCULAR; INTRAVENOUS at 07:03

## 2021-02-25 RX ADMIN — LANSOPRAZOLE 30 MG: KIT at 09:00

## 2021-02-25 RX ADMIN — DEXTROSE MONOHYDRATE 12.5 G: 25 INJECTION, SOLUTION INTRAVENOUS at 15:08

## 2021-02-25 RX ADMIN — INSULIN LISPRO 3 UNITS: 100 INJECTION, SOLUTION INTRAVENOUS; SUBCUTANEOUS at 00:36

## 2021-02-25 RX ADMIN — METOPROLOL TARTRATE 50 MG: 50 TABLET, FILM COATED ORAL at 21:25

## 2021-02-25 RX ADMIN — Medication 20 ML: at 22:00

## 2021-02-25 RX ADMIN — POTASSIUM CHLORIDE 20 MEQ: 200 INJECTION, SOLUTION INTRAVENOUS at 05:16

## 2021-02-25 RX ADMIN — Medication 20 ML: at 06:00

## 2021-02-25 RX ADMIN — Medication 20 ML: at 14:00

## 2021-02-25 RX ADMIN — LANSOPRAZOLE 30 MG: KIT at 21:25

## 2021-02-25 RX ADMIN — INSULIN GLARGINE 15 UNITS: 100 INJECTION, SOLUTION SUBCUTANEOUS at 09:00

## 2021-02-25 RX ADMIN — ATORVASTATIN CALCIUM 80 MG: 40 TABLET, FILM COATED ORAL at 08:43

## 2021-02-25 RX ADMIN — METOPROLOL TARTRATE 50 MG: 50 TABLET, FILM COATED ORAL at 09:00

## 2021-02-25 NOTE — PROGRESS NOTES
Occupational Therapy Note:    OT treatment deferred today d/t pt currently running HD. Will check back tomorrow as schedule and pt's condition permits.     Neena Can, OTR/L, CLT

## 2021-02-25 NOTE — DIALYSIS
HD started at bedside 3301 using R temp IJ cath, line aspirates and flushes well.     No heparin used. Pt getting PRBC by primary team.     /83, HR 88, pt a/ox3.     Report given to AQUILINO Hussein.

## 2021-02-25 NOTE — PROGRESS NOTES
Tre Juarez. Admission Date: 1/6/2021             Daily Progress Note: 2/25/2021   Pt admitted 1/7 with fever, cough. Covid + 1/6. Admitted by hospitalists. Started on dexamethasone, convalescent plasma, and remdesivir. Pulm consulted 1/10 with pt requiring CPAP but pt decompensated and was intubated 1/17. Nephrology consulted 1/23 for renal failure and was started on dialysis. US 1/18 with L peroneal vein thrombus. He was started on heparin but stopped due to bleeding from HD catheter. He was started on HD on 1/25. Trached on 2/2 secondary to inability to wean from the vent. Pt developed afib requiring amio. PEG placed 2/4. IVC filter 2/8. GIB and seen by GI. Heparin on hold. Klebsiella in sputum and abx added 2/22. EGD normal and colonoscopy essentially normal -rectal ulcer. Subjective:     Over the past 24 hours:  TC during the day X 3, CPAP Q HS  hgb down to 6.4, PRBC ordered. Using PMV, diet advanced per speech.     Review of Systems  Constitutional: negative for fevers and chills  Respiratory: positive for cough  Musculoskeletal:positive for severe debility    Current Facility-Administered Medications   Medication Dose Route Frequency    0.9% sodium chloride infusion 250 mL  250 mL IntraVENous PRN    potassium chloride 20 mEq in 100 ml IVPB  20 mEq IntraVENous ONCE    epoetin ricardo-epbx (RETACRIT) injection 4,000 Units  4,000 Units SubCUTAneous DIALYSIS TUE, THU & SAT    cefTRIAXone (ROCEPHIN) 2 g in 0.9% sodium chloride (MBP/ADV) 50 mL MBP  2 g IntraVENous Q24H    metoprolol tartrate (LOPRESSOR) tablet 50 mg  50 mg Per NG tube Q12H    insulin glargine (LANTUS) injection 15 Units  15 Units SubCUTAneous DAILY    amiodarone (CORDARONE) tablet 200 mg  200 mg Oral DAILY    [Held by provider] oxyCODONE (ROXICODONE) 5 mg/5 mL oral solution 5 mg  5 mg Oral Q4H PRN    lansoprazole (PREVACID) 3 mg/mL oral suspension 30 mg  30 mg Per NG tube Q12H    ondansetron (ZOFRAN) injection 4 mg  4 mg IntraVENous Q6H PRN    polyethylene glycol (MIRALAX) packet 17 g  17 g Per NG tube DAILY PRN    guaiFENesin-dextromethorphan (ROBITUSSIN DM) 100-10 mg/5 mL syrup 10 mL  10 mL Per NG tube Q4H PRN    acetaminophen (TYLENOL) tablet 650 mg  650 mg Per NG tube Q6H PRN    white petrolatum-mineral oiL (LACRILUBE S.O.P.) ointment   Both Eyes Q4H PRN    atorvastatin (LIPITOR) tablet 80 mg  80 mg Per NG tube DAILY    [Held by provider] clopidogreL (PLAVIX) tablet 75 mg  75 mg Per NG tube DAILY    insulin lispro (HUMALOG) injection   SubCUTAneous Q4H    NUTRITIONAL SUPPORT ELECTROLYTE PRN ORDERS   Does Not Apply PRN    sodium chloride (NS) flush 20 mL  20 mL InterCATHeter Q8H    sodium chloride (NS) flush 20 mL  20 mL InterCATHeter PRN    loperamide (IMODIUM) capsule 2 mg  2 mg Oral Q4H PRN    glucagon (GLUCAGEN) injection 1 mg  1 mg IntraMUSCular PRN    dextrose (D50W) injection syrg 12.5-25 g  25-50 mL IntraVENous PRN    albuterol (PROVENTIL HFA, VENTOLIN HFA, PROAIR HFA) inhaler 2 Puff  2 Puff Inhalation Q4H PRN    influenza vaccine 2020-21 (6 mos+)(PF) (FLUARIX/FLULAVAL/FLUZONE QUAD) injection 0.5 mL  0.5 mL IntraMUSCular PRIOR TO DISCHARGE    hydrALAZINE (APRESOLINE) 20 mg/mL injection 20 mg  20 mg IntraVENous Q6H PRN         Objective:     Vitals:    02/25/21 0614 02/25/21 0628 02/25/21 0629 02/25/21 0647   BP: (!) 167/79 (!) 166/78 (!) 166/78    Pulse: 90 91 93    Resp: 28 (!) 31 20    Temp:   98.4 °F (36.9 °C) (P) 98.6 °F (37 °C)   SpO2: 100% 100%     Weight:       Height:         Intake and Output:   02/23 1901 - 02/25 0700  In: 625   Out: 700 [Urine:700]  No intake/output data recorded.       Intake/Output Summary (Last 24 hours) at 2/25/2021 0715  Last data filed at 2/25/2021 0559  Gross per 24 hour   Intake --   Output 500 ml   Net -500 ml       Physical Exam:          GEN: debilitated on vent  HEENT: trach, PMV  NECK:  no JVD, no retractions, no thyromegaly or masses,   LUNGS:  CTA  HEART:  RRR with no M,G,R;  ABDOMEN:  soft with no tenderness; positive bowel sounds present  EXTREMITIES:  warm with no cyanosis, trace lower leg edema  SKIN:  no jaundice or ecchymosis   NEURO:  alert, grossly non-focal, appropriate. Flat affect     LINES/DRAINS:   PICC  HD  PEG  Trach      DRIPS: none. ACTIVITY: limited    NUTRITION: NPO, tube feedings    LAB  Recent Labs     02/25/21 0341 02/24/21  0357 02/23/21  2322 02/23/21  0525   WBC 15.8* 22.9*  --  15.6*   HGB 6.4* 8.8* 7.6* 7.0*   HCT 20.1* 26.9* 23.1* 21.5*    242  --  180     Recent Labs     02/25/21 0341 02/24/21  0357 02/23/21  0525   * 143 141   K 3.3* 3.6 3.6   * 107 104   CO2 33* 29 28   * 199* 100   BUN 50* 42* 67*   CREA 3.51* 2.96* 4.67*   MG  --  2.0  --    PHOS  --  3.1  --        MRI brain  IMPRESSION     1. No evidence of acute infarction.     2. Large bilateral mastoid effusions.  Correlation for associated symptoms is  recommended.     3. Otherwise unremarkable noncontrast MRI of the brain for patient age.         Assessment:     Hospital Problems  Date Reviewed: 2/5/2021          Codes Class Noted POA    Lower GI bleed requiring more than 4 units of blood in 24 hours, ICU, or surgery ICD-10-CM: K92.2  ICD-9-CM: 578.9  2/21/2021 Yes        Anemia ICD-10-CM: D64.9  ICD-9-CM: 285.9  2/16/2021 Yes        Critical illness polyneuropathy (HCC) (Chronic) ICD-10-CM: G62.81  ICD-9-CM: 357.82  2/12/2021 Yes        VAP (ventilator-associated pneumonia) ([facility] ICD-10-CM: Y46.046  ICD-9-CM: 997.31  2/9/2021 Yes        Acute deep vein thrombosis (DVT) of left peroneal vein (HCC) ICD-10-CM: T26.765  ICD-9-CM: 453.42  1/28/2021 Yes        SWATI (acute kidney injury) ([facility] ICD-10-CM: N17.9  ICD-9-CM: 584.9  1/7/2021 Yes        Acute hypoxemic respiratory failure ([facility] ICD-10-CM: J96.01  ICD-9-CM: 518.81  1/7/2021 Yes        Type 2 diabetes mellitus with hyperosmolarity without nonketotic hyperglycemic-hyperosmolar coma ([facility] (Chronic) ICD-10-CM: E11.00  ICD-9-CM: 250.20  1/7/2021 Yes        * (Principal) Pneumonia due to COVID-19 virus ICD-10-CM: U07.1, J12.82  ICD-9-CM: 480.8  1/7/2021 Yes        CAD (coronary artery disease) (Chronic) ICD-10-CM: I25.10  ICD-9-CM: 414.00  10/28/2016 Yes    Overview Signed 10/28/2016  8:05 AM by SHELDON Connell     10-27-06 [facility] 90% LAD s/p 2.25 x 20 Synergy drug-eluting stent  (CS)             Hypertension (Chronic) ICD-10-CM: I10  ICD-9-CM: 401.9  10/27/2016 Yes        Obesity (Chronic) ICD-10-CM: N32.8  ICD-9-CM: 278.00  10/6/2015 Yes                Admitted with covid 19 pneumonia and respiratory failure. Prolonged vent, trach/PEG. Complicated course with a fib, DVT, GIB and now with IVC filter. Acute renal failure and s/p HD start per nephrology. Severe debility with critical care polyneuropathy. Prevacid Q 12 h, no longer on heparin with recent GIB, has IVC filter. PLAN:  -- Rocephin D4/7 for klebsiella in sputum. CXR every other day  -- Hgb down to 6.4, RN reports rectal bleeding > amount, transfusing. Making GI aware (they signed off). --diet advanced, TF on hold. May need nocturnal feedings until po diet is adequate  -- Severe Critical care polyneuropathy, PT/OT, OOB daily  -- TC duration all day as able and continue as able tonight, CPAP PRN  -- Speech advanced diet, PMV trials. Still with cuffed trach  -- LTAC possibly today     More than 50% of time documented was spent in face-to-face contact with the patient and in the care of the patient on the floor/unit where the patient is located. Total time spent 32 minutes. Georgi Ulloa NP      Lungs: distant but CTA b/l  Heart S1 and S2 audible, no murmers or rubs appreciated  Other     Getting HD now  F/u rectal bleeding. I have spoken with and examined the patient. I have reviewed the history, examination, assessment, and plan and agree with the above. Kesha Paz MD      This note was signed electronically.    Errors are unfortunately her likely due to dictation software.

## 2021-02-25 NOTE — PROGRESS NOTES
Patient placed on mechanical ventilation per doctor Adán's verbal order due to bedside procedure patient tolerates procedure well and placed patient back on trach collar 7 liters at 35%.  Inner canula and trach was changed and trach care was performed

## 2021-02-25 NOTE — DIABETES MGMT
Patient's blood glucose ranged 134-217 yesterday with patient receiving Lantus 15 units and Humalog 21 units. Blood glucose 134 this morning. Hgb 6. 4. K+ 3. 3. Cr 3.51. GFR 19. Dialysis today. Will continue to follow along.

## 2021-02-25 NOTE — PROGRESS NOTES
A follow up visit was made to the patient. Emotional support, spiritual presence and   prayer were provided for the patient. He was capable of speaking today. His nurse shared that he may be going to [facility].       Laverne Saba, [address] [facility], [facility]

## 2021-02-25 NOTE — DIALYSIS
3 hour HD completed. Removed 1 liter, pt tolerated well. R temp IJ cath flushed and capped. Pt to GI lab following dialysis. Report given to Richa Esparza RN.

## 2021-02-25 NOTE — PROGRESS NOTES
Bedside and Verbal shift change report given to Fox Lainez RN (oncoming nurse) by [facility], RN (offgoing nurse). Report included the following information SBAR, Kardex, Intake/Output and Recent Results.

## 2021-02-25 NOTE — PROGRESS NOTES
cSTG: Patient will tolerate continual wear of speaking valve without respiratory decline. MET 02/25/21  STG: Patient will demonstrate independence with doning and doffing speaking valve. DISCONTINUED DUE TO UE WEAKNESS  LTG: Patient will tolerate passy neva speaking valve to communicate wants and needs without respiratory decline,. MET 02/25/21  STG: Patient will participate in modified barium swallow study to objectively assess swallow function MET 2/24/21    SPEECH LANGUAGE PATHOLOGY: PASSY NEVA SPEAKING VALVE AND DYSPHAGIA- Daily Note and Discharge    NAME/AGE/GENDER: Elke Gill is a 61 y.o. male  DATE: 2/25/2021  PRIMARY DIAGNOSIS: Type 2 diabetes mellitus with hyperosmolarity without nonketotic hyperglycemic-hyperosmolar coma ([facility] [facility] [E11.00]  Lower respiratory tract infection due to COVID-19 virus [U07.1, J22]  Acute hypoxemic respiratory failure ([facility] [address] [J96.01]  SWATI (acute kidney injury) ([facility] [address] [N17.9]  Procedure(s) (LRB):  ESOPHAGOGASTRODUODENOSCOPY (EGD) (N/A)  COLONOSCOPY ROOM 3302 *vent (N/A) 3 Days Post-Op  ICD-10: Treatment Diagnosis: R13.12 Dysphagia, Oropharyngeal Phase  R49.1 Aphonia; Loss of voice    RECOMMENDATIONS   SPEAKING VALVE RECOMMENDATIONS:  Wear speaking valve during all waking hours  Remove if patient is sleeping or showing signs of intolerance (increased respiratory rate, decreased oxygenation)  Ensure cuff is deflated prior to speaking valve placement     DIET:   PO:  Regular  Liquids:  regular thin    MEDICATIONS: With liquid     ASPIRATION PRECAUTIONS  Slow rate of intake  Small bites/sips  Upright at 90 degrees during meal     COMPENSATORY STRATEGIES/MODIFICATIONS  Speaking valve in place during meals     EDUCATION:  Recommendations discussed with Nursing  Patient     RECOMMENDATIONS for CONTINUED SPEECH THERAPY: No further speech therapy indicated at this time.      ASSESSMENT   Dysphagia: not addressed as swallow is within normal limits on modified barium swallow study 2/24/21- No dysphagia needs. Patient tolerating continual speaking valve wear without respiratory decline. Able to voice wants and needs appropriately with speaking valve in place. Recommend:  1. Speaking valve wear during all waking hours as tolerated       - please ensure cuff is deflated prior to speaking valve placement       - remove if patient is sleeping       - remove if s/sx of intolerance (increased respiratory rate, decreased oxygenation, complaints of chest tightness)  2. Regular diet/thin liquids with speaking valve in place. .     All speech therapy goals are met. No additional services indicated at this time. REHABILITATION POTENTIAL FOR STATED GOALS: Excellent    PLAN    FREQUENCY/DURATION: No additional speech therapy indicated as all goals are met. - Recommendations for next treatment session: No additional speech therapy indicated at this time. SUBJECTIVE   Seen while on dialysis. Speaking valve in place. Modified barium swallow study completed 2/24/21- swallow within normal limits. Problem List:  (Impairments causing functional limitations):  Aphonia due to trach  Concern for oropharyngeal dysphagia    Previous Dysphagia: NONE REPORTED  Diet Prior to Evaluation: NPO c PEG    Orientation:   Person  Place  Time  Situation    Pain: Pain Scale 1: Numeric (0 - 10)  Pain Intensity 1: 0    OBJECTIVE   Speaking Valve:  Patient continues with #8 cuffed trach. Speaking valve in place upon SLP arrival. He reports wearing valve for majority of day yesterday without difficulty or adverse events. Loudness much improved than on initial evaluation, but does remain slightly soft. He is able to achieve appropriate volume with minimal encouragement. No back pressure when valve was briefly removed. Valve left in place at end of session as he is tolerating continual wear.      INTERDISCIPLINARY COLLABORATION: Registered Nurse  PRECAUTIONS/ALLERGIES: Latex, Hydrocodone-acetaminophen, and Tessalon perles [benzonatate]     Tool Used: Dysphagia Outcome and Severity Scale (JEANETTE)    Score Comments   Normal Diet  [] 7 With no strategies or extra time needed   Functional Swallow  [] 6 May have mild oral or pharyngeal delay   Mild Dysphagia  [] 5 Which may require one diet consistency restricted    Mild-Moderate Dysphagia  [] 4 With 1-2 diet consistencies restricted   Moderate Dysphagia  [] 3 With 2 or more diet consistencies restricted   Moderate-Severe Dysphagia  [] 2 With partial PO strategies (trials with ST only)   Severe Dysphagia  [] 1 With inability to tolerate any PO safely      Score:  Initial: 2 Most Recent:  7(Date 02/25/21 )   Interpretation of Tool: The Dysphagia Outcome and Severity Scale (JEANETTE) is a simple, easy-to-use, 7-point scale developed to systematically rate the functional severity of dysphagia based on objective assessment and make recommendations for diet level, independence level, and type of nutrition. Tool Used: MODIFIED JASON SCALE (mRS)   Score   No Symptoms  [] 0   No significant disability despite symptoms; able to carry out all usual duties and activities  [] 1   Slight disability; unable to carry out all previous activities but able to look after own affairs without assistance. [] 2   Moderate disability; requiring some help but able to walk without assistance  [] 3   Moderately severe disability; unable to walk without assistance and unable to attend to own bodily needs without assistance  [] 4   Severe disability; bedridden, incontinent, and requiring constant nursing care and attention  [] 5      Score:  Initial: 5    Interpretation of Tool: The Modified Jason Scale is a 7-point scaled used to quantify level of disability as it relates to a patient's functional abilities.        SAFETY:  After treatment position/precautions:  Upright in bed  RN notified  Call light within reach    Total Treatment Duration:   Time In: 9761  Time Out: 18[address] [facility] Centers[facility]ley Valles, [address] CCC-SLP

## 2021-02-25 NOTE — PROGRESS NOTES
TRANSFER - IN REPORT:    Verbal report received from [facility] RN on [employer].  being received from [phone] 40 for ordered procedure      Report consisted of patients Situation, Background, Assessment and   Recommendations(SBAR). Information from the following report(s) SBAR, Intake/Output, MAR, Recent Results, Med Rec Status and Pre Procedure Checklist was reviewed with the receiving nurse. Opportunity for questions and clarification was provided.

## 2021-02-25 NOTE — PROCEDURES
Flex sig    DATE of PROCEDURE: 2/25/2021    PT NAME: Vesta Torres Jr.     [id number]    MEDICATION:   MAC    INSTRUMENT: CFHQ 190 L    SPECIAL PROCEDURE: endo clip to control bleed  PREP: none  BLOOD LOSS- 0 or min. SPEC- no  IMPLANTS- NONE  PROCEDURE: After informed consent, the patient was placed under anesthesia in the left lateral decubitus position. Rectal exam was performed and the colonoscope was advanced under direct vision to the distal sigmoid. Retroflex was performed in the rectum. Further findings and therapy are as outlined below. Patient tolerated the procedure well with no apparent complications. ASSESSMENT:  1. Stercoral ulcer with visible vessel and small clot- active ooze with clot removal - controlled with endoclips in 3 quadrants around the vessel and 1 clip directly across the lesion  2. Small Internal Hemorrhoids  3.  Brown stool above      PLAN:  1. F/U inpt

## 2021-02-25 NOTE — PROGRESS NOTES
[facility]        Subjective: SWATI ( probable ESRD)   Weak, trach. Nods answers to questions. Pt seen on dialysis. Review of Systems -   General ROS: negative for - fever, chills  Respiratory ROS: no SOB, cough, JACKSON  Cardiovascular ROS: no CP, palpitations  Gastrointestinal ROS: no N&V, abdominal pain, diarrhea  Genito-Urinary ROS: no difficulty voiding, dysuria  Neurological ROS: no seizures, focal weekness        Objective:    Vitals:    02/25/21 0628 02/25/21 0629 02/25/21 0647 02/25/21 0744   BP: (!) 166/78 (!) 166/78  (!) 170/83   Pulse: 91 93  90   Resp: (!) 31 20  26   Temp:  98.4 °F (36.9 °C) 98.6 °F (37 °C)    SpO2: 100%   100%   Weight:       Height:           PE  Gen: in no acute distress  CV:reg rate  Chest:bilat breath sounds  Abd: soft  Ext/Access: rt IJ Tunneled HD cath ok       . LAB  Recent Labs     02/25/21  0341 02/24/21  0357 02/23/21  2322 02/23/21  0525   WBC 15.8* 22.9*  --  15.6*   HGB 6.4* 8.8* 7.6* 7.0*   HCT 20.1* 26.9* 23.1* 21.5*    242  --  180     Recent Labs     02/25/21  0341 02/24/21  0357 02/23/21  0525   * 143 141   K 3.3* 3.6 3.6   * 107 104   CO2 33* 29 28   * 199* 100   BUN 50* 42* 67*   CREA 3.51* 2.96* 4.67*   MG  --  2.0  --    PHOS  --  3.1  --    CA 7.8* 8.3 8.1*           Radiology    A/P:   Patient Active Problem List   Diagnosis Code    Obesity E66.9    Hypertension I10    Bradycardia R00.1    PVC (premature ventricular contraction) I49.3    CAD (coronary artery disease) I25.10    Dyslipidemia, goal LDL below 70 E78.5    SWATI (acute kidney injury) (HCC) N17.9    Acute hypoxemic respiratory failure (HCC) J96.01    Type 2 diabetes mellitus with hyperosmolarity without nonketotic hyperglycemic-hyperosmolar coma ([facility]) E11.00    Pneumonia due to COVID-19 virus U07.1, J12.82    Acute deep vein thrombosis (DVT) of left peroneal vein ([facility]) I82.452    VAP (ventilator-associated pneumonia) ([facility]) J95.851    Critical illness polyneuropathy (HCC) G62.81    Anemia D64.9    Lower GI bleed requiring more than 4 units of blood in 24 hours, ICU, or surgery K92.2     SWATI (probaly ESRD)  - still oliguric, first HD was 1/25/21. On dialysis for clearance. Anemia - on Retacrit. Being transfused today. Felisa Money     Resp Failure/ COVID19    A Fib    DVT              Sade Estrada MD

## 2021-02-25 NOTE — PROGRESS NOTES
Flex Sig with clips X4 completed. Bedside report of procedure summary given to primary RN.  Patient in stable condition

## 2021-02-25 NOTE — PROGRESS NOTES
2/25/2021    Admit Date: 1/6/2021    Subjective:   CHIEF COMPLAINT- rectal bleed  HPI      Overall-called for continued liquid stools that are dark and blood tinged           Diet-TF    Appetite-na     Nausea-no   Vomiting-no          Pain-no            BM-dark and blackish   Bleeding-yes    Medications-reviewed and adjusted as appropriate   IV FLUIDS-reviewed      FAM HX-per H&P   SH-per H&P   tob-no             etoh- no       Past Medical History:   Diagnosis Date    Acute deep vein thrombosis (DVT) of left peroneal vein (HCC) 1/28/2021    Atrial fibrillation ([facility] [address] 01/22/2021    Critical illness polyneuropathy ([facility] [address] 2/12/2021    Gastrointestinal disorder     reflux    GERD (gastroesophageal reflux disease)     Hypertension     Lower respiratory tract infection due to COVID-19 virus 1/7/2021    Nausea & vomiting     Obesity 10/6/2015    Other ill-defined conditions(799.89)     dyspnea since surgery, wheezing, SOB    Type 2 diabetes mellitus with hyperosmolarity without nonketotic hyperglycemic-hyperosmolar coma ([facility] [address] 1/7/2021    Unstable angina ([facility] [address] 10/27/2016               Past Surgical History:   Procedure Laterality Date    COLONOSCOPY  2/22/2021         COLONOSCOPY N/A 2/22/2021    COLONOSCOPY ROOM 3302 *vent performed by Jun Thompson MD at [facility] ENDOSCOPY    EGD  2/22/2021         HX CHOLECYSTECTOMY      HX ORTHOPAEDIC      rotator cuff; knee    HX UROLOGICAL      kidney stone surgeries x 3    IR INSERT TUNL CVC W/O PORT OVER 5 YR  2/8/2021    IR IVC FILTER  2/8/2021    NV CARDIAC SURG PROCEDURE UNLIST      stent       ROS--                 RESP-trach            CARDIAC-neg                       -neg             Further ROS as per PMH and PSH- see problem list                            Objective:     Visit Vitals  BP (!) 170/83   Pulse 90   Temp 98.6 °F (37 °C)   Resp 26   Ht 5' 8\" (1.727 m)   Wt 90.4 kg (199 lb 4.7 oz)   SpO2 100%   BMI 30.30 kg/m²         Intake/Output Summary (Last 24 hours) at 2/25/2021 0818  Last data filed at 2/25/2021 0559  Gross per 24 hour   Intake --   Output 500 ml   Net -500 ml       EXAM:     NEURO-a&o able to communicate despite trach    HEENT-wnl    LUNGS-clear       COR-rrr        ABD-soft , min tenderness, no rebound, good bs        EXT-no edema                         LABS-  Lab Results   Component Value Date/Time    WBC 15.8 (H) 02/25/2021 03:41 AM    RBC 2.08 (L) 02/25/2021 03:41 AM    HGB 6.4 (LL) 02/25/2021 03:41 AM    HCT 20.1 (L) 02/25/2021 03:41 AM    PLATELET 818 10/49/4394 03:41 AM     Lab Results   Component Value Date/Time    Sodium 146 (H) 02/25/2021 03:41 AM    Potassium 3.3 (L) 02/25/2021 03:41 AM    Chloride 109 (H) 02/25/2021 03:41 AM    CO2 33 (H) 02/25/2021 03:41 AM    Anion gap 4 (L) 02/25/2021 03:41 AM    Glucose 109 (H) 02/25/2021 03:41 AM    Glucose 103 09/16/2008 05:55 AM    BUN 50 (H) 02/25/2021 03:41 AM    Creatinine 3.51 (H) 02/25/2021 03:41 AM    GFR est AA 23 (L) 02/25/2021 03:41 AM    GFR est non-AA 19 (L) 02/25/2021 03:41 AM    Calcium 7.8 (L) 02/25/2021 03:41 AM    Magnesium 2.0 02/24/2021 03:57 AM    Phosphorus 3.1 02/24/2021 03:57 AM    Albumin 1.5 (L) 01/31/2021 10:53 AM    Bilirubin, total 0.7 01/31/2021 10:53 AM    Protein, total 4.5 (L) 01/31/2021 10:53 AM    Globulin 3.0 01/31/2021 10:53 AM    A-G Ratio 0.5 (L) 01/31/2021 10:53 AM    ALT (SGPT) 32 01/31/2021 10:53 AM           TRANSFUSION-getting 15 th unit since admit    Assessment:     Principal Problem:    Pneumonia due to COVID-19 virus (1/7/2021)    Active Problems:    Obesity (10/6/2015)      Hypertension (10/27/2016)      CAD (coronary artery disease) (10/28/2016)      Overview: 10-27-06 [facility] 90% LAD s/p 2.25 x 20 Synergy drug-eluting stent  (CS)      SWATI (acute kidney injury) ([facility] [address] (1/7/2021)      Acute hypoxemic respiratory failure ([address] (1/7/2021)      Type 2 diabetes mellitus with hyperosmolarity without nonketotic hyperglycemic-hyperosmolar coma ([facility] [address] (1/7/2021)      Acute deep vein thrombosis (DVT) of left peroneal vein ([address] (1/28/2021)      VAP (ventilator-associated pneumonia) ([address] (2/9/2021)      Critical illness polyneuropathy ([address] (2/12/2021)      Anemia (2/16/2021)      Lower GI bleed requiring more than 4 units of blood in 24 hours, ICU, or surgery (2/21/2021)    pt likely developed stercoral ulcer due to fecal management balloon- clean ulcer  No specific treatment needed    2/25/21  Continues to bleed   Neg rbc scan in past  Still suspect stercoral ulcer as source with slow ooze    Plan:     Repeat scan today  unprepped colo later today to see what is above the rectum and see if we can clip or cauterize    PT SEEN AND EXAMINED AND PLAN DISCUSSED AND IMPLEMENTED.   Sagar Colin MD

## 2021-02-26 ENCOUNTER — HOSPITAL ENCOUNTER (OUTPATIENT)
Age: 61
Discharge: HOME | End: 2021-03-11
Attending: INTERNAL MEDICINE | Admitting: INTERNAL MEDICINE

## 2021-02-26 ENCOUNTER — APPOINTMENT (OUTPATIENT)
Dept: GENERAL RADIOLOGY | Age: 61
End: 2021-02-26
Attending: INTERNAL MEDICINE

## 2021-02-26 VITALS
OXYGEN SATURATION: 97 % | DIASTOLIC BLOOD PRESSURE: 56 MMHG | HEIGHT: 68 IN | HEART RATE: 86 BPM | RESPIRATION RATE: 27 BRPM | BODY MASS INDEX: 27.8 KG/M2 | TEMPERATURE: 98.5 F | WEIGHT: 183.42 LBS | SYSTOLIC BLOOD PRESSURE: 113 MMHG

## 2021-02-26 DIAGNOSIS — N17.9 AKI (ACUTE KIDNEY INJURY) (HCC): ICD-10-CM

## 2021-02-26 LAB
ABO + RH BLD: NORMAL
ALBUMIN SERPL-MCNC: 1.9 G/DL (ref 3.2–4.6)
ALBUMIN/GLOB SERPL: 0.5 {RATIO} (ref 1.2–3.5)
ALP SERPL-CCNC: 98 U/L (ref 50–136)
ALT SERPL-CCNC: 41 U/L (ref 12–65)
ANION GAP SERPL CALC-SCNC: 1 MMOL/L (ref 7–16)
ANION GAP SERPL CALC-SCNC: 3 MMOL/L (ref 7–16)
AST SERPL-CCNC: 37 U/L (ref 15–37)
BASOPHILS # BLD: 0.1 K/UL (ref 0–0.2)
BASOPHILS NFR BLD: 1 % (ref 0–2)
BILIRUB SERPL-MCNC: 0.4 MG/DL (ref 0.2–1.1)
BLD PROD TYP BPU: NORMAL
BLOOD GROUP ANTIBODIES SERPL: NORMAL
BPU ID: NORMAL
BUN SERPL-MCNC: 15 MG/DL (ref 8–23)
BUN SERPL-MCNC: 25 MG/DL (ref 8–23)
CALCIUM SERPL-MCNC: 7.9 MG/DL (ref 8.3–10.4)
CALCIUM SERPL-MCNC: 8.1 MG/DL (ref 8.3–10.4)
CHLORIDE SERPL-SCNC: 109 MMOL/L (ref 98–107)
CHLORIDE SERPL-SCNC: 110 MMOL/L (ref 98–107)
CO2 SERPL-SCNC: 34 MMOL/L (ref 21–32)
CO2 SERPL-SCNC: 34 MMOL/L (ref 21–32)
CREAT SERPL-MCNC: 1.84 MG/DL (ref 0.8–1.5)
CREAT SERPL-MCNC: 2.39 MG/DL (ref 0.8–1.5)
CROSSMATCH RESULT,%XM: NORMAL
DIFFERENTIAL METHOD BLD: ABNORMAL
EOSINOPHIL # BLD: 0.2 K/UL (ref 0–0.8)
EOSINOPHIL NFR BLD: 1 % (ref 0.5–7.8)
ERYTHROCYTE [DISTWIDTH] IN BLOOD BY AUTOMATED COUNT: 16 % (ref 11.9–14.6)
ERYTHROCYTE [DISTWIDTH] IN BLOOD BY AUTOMATED COUNT: 16.2 % (ref 11.9–14.6)
FIBRINOGEN PPP-MCNC: 250 MG/DL (ref 190–501)
FOLATE SERPL-MCNC: 14.6 NG/ML (ref 3.1–17.5)
GLOBULIN SER CALC-MCNC: 3.7 G/DL (ref 2.3–3.5)
GLUCOSE BLD STRIP.AUTO-MCNC: 129 MG/DL (ref 65–100)
GLUCOSE BLD STRIP.AUTO-MCNC: 85 MG/DL (ref 65–100)
GLUCOSE BLD STRIP.AUTO-MCNC: 89 MG/DL (ref 65–100)
GLUCOSE SERPL-MCNC: 81 MG/DL (ref 65–100)
GLUCOSE SERPL-MCNC: 86 MG/DL (ref 65–100)
HCT VFR BLD AUTO: 23.7 % (ref 41.1–50.3)
HCT VFR BLD AUTO: 24.8 % (ref 41.1–50.3)
HGB BLD-MCNC: 7.4 G/DL (ref 13.6–17.2)
HGB BLD-MCNC: 7.7 G/DL (ref 13.6–17.2)
IMM GRANULOCYTES # BLD AUTO: 0.6 K/UL (ref 0–0.5)
IMM GRANULOCYTES NFR BLD AUTO: 4 % (ref 0–5)
LYMPHOCYTES # BLD: 1.4 K/UL (ref 0.5–4.6)
LYMPHOCYTES NFR BLD: 10 % (ref 13–44)
MAGNESIUM SERPL-MCNC: 1.8 MG/DL (ref 1.8–2.4)
MCH RBC QN AUTO: 30.3 PG (ref 26.1–32.9)
MCH RBC QN AUTO: 30.7 PG (ref 26.1–32.9)
MCHC RBC AUTO-ENTMCNC: 31 G/DL (ref 31.4–35)
MCHC RBC AUTO-ENTMCNC: 31.2 G/DL (ref 31.4–35)
MCV RBC AUTO: 97.1 FL (ref 79.6–97.8)
MCV RBC AUTO: 98.8 FL (ref 79.6–97.8)
MONOCYTES # BLD: 1.4 K/UL (ref 0.1–1.3)
MONOCYTES NFR BLD: 9 % (ref 4–12)
NEUTS SEG # BLD: 11.2 K/UL (ref 1.7–8.2)
NEUTS SEG NFR BLD: 76 % (ref 43–78)
NRBC # BLD: 0 K/UL (ref 0–0.2)
NRBC # BLD: 0 K/UL (ref 0–0.2)
PHOSPHATE SERPL-MCNC: 2.8 MG/DL (ref 2.3–3.7)
PLATELET # BLD AUTO: 192 K/UL (ref 150–450)
PLATELET # BLD AUTO: 202 K/UL (ref 150–450)
PMV BLD AUTO: 9.4 FL (ref 9.4–12.3)
PMV BLD AUTO: 9.6 FL (ref 9.4–12.3)
POTASSIUM SERPL-SCNC: 3.8 MMOL/L (ref 3.5–5.1)
POTASSIUM SERPL-SCNC: 3.9 MMOL/L (ref 3.5–5.1)
PROT SERPL-MCNC: 5.6 G/DL (ref 6.3–8.2)
RBC # BLD AUTO: 2.44 M/UL (ref 4.23–5.6)
RBC # BLD AUTO: 2.51 M/UL (ref 4.23–5.6)
SODIUM SERPL-SCNC: 145 MMOL/L (ref 138–145)
SODIUM SERPL-SCNC: 146 MMOL/L (ref 136–145)
SPECIMEN EXP DATE BLD: NORMAL
STATUS OF UNIT,%ST: NORMAL
T4 SERPL-MCNC: 8.7 UG/DL (ref 4.8–13.9)
TSH SERPL DL<=0.005 MIU/L-ACNC: 5.48 UIU/ML (ref 0.36–3.74)
UNIT DIVISION, %UDIV: 0
VIT B12 SERPL-MCNC: 854 PG/ML (ref 193–986)
WBC # BLD AUTO: 14.8 K/UL (ref 4.3–11.1)
WBC # BLD AUTO: 16.1 K/UL (ref 4.3–11.1)

## 2021-02-26 PROCEDURE — 71045 X-RAY EXAM CHEST 1 VIEW: CPT

## 2021-02-26 PROCEDURE — 84443 ASSAY THYROID STIM HORMONE: CPT

## 2021-02-26 PROCEDURE — 85025 COMPLETE CBC W/AUTO DIFF WBC: CPT

## 2021-02-26 PROCEDURE — 74011250637 HC RX REV CODE- 250/637: Performed by: INTERNAL MEDICINE

## 2021-02-26 PROCEDURE — 82607 VITAMIN B-12: CPT

## 2021-02-26 PROCEDURE — 36415 COLL VENOUS BLD VENIPUNCTURE: CPT

## 2021-02-26 PROCEDURE — 74011250636 HC RX REV CODE- 250/636: Performed by: NURSE PRACTITIONER

## 2021-02-26 PROCEDURE — 85027 COMPLETE CBC AUTOMATED: CPT

## 2021-02-26 PROCEDURE — 2709999900 HC NON-CHARGEABLE SUPPLY

## 2021-02-26 PROCEDURE — 84100 ASSAY OF PHOSPHORUS: CPT

## 2021-02-26 PROCEDURE — 90935 HEMODIALYSIS ONE EVALUATION: CPT

## 2021-02-26 PROCEDURE — 74011000258 HC RX REV CODE- 258: Performed by: NURSE PRACTITIONER

## 2021-02-26 PROCEDURE — 99239 HOSP IP/OBS DSCHRG MGMT >30: CPT | Performed by: INTERNAL MEDICINE

## 2021-02-26 PROCEDURE — 84436 ASSAY OF TOTAL THYROXINE: CPT

## 2021-02-26 PROCEDURE — 85384 FIBRINOGEN ACTIVITY: CPT

## 2021-02-26 PROCEDURE — 80053 COMPREHEN METABOLIC PANEL: CPT

## 2021-02-26 PROCEDURE — 82746 ASSAY OF FOLIC ACID SERUM: CPT

## 2021-02-26 PROCEDURE — 82962 GLUCOSE BLOOD TEST: CPT

## 2021-02-26 PROCEDURE — 77010033711 HC HIGH FLOW OXYGEN

## 2021-02-26 PROCEDURE — 74011636637 HC RX REV CODE- 636/637: Performed by: INTERNAL MEDICINE

## 2021-02-26 PROCEDURE — 74011250637 HC RX REV CODE- 250/637: Performed by: NURSE PRACTITIONER

## 2021-02-26 PROCEDURE — 74011250636 HC RX REV CODE- 250/636: Performed by: INTERNAL MEDICINE

## 2021-02-26 PROCEDURE — 83735 ASSAY OF MAGNESIUM: CPT

## 2021-02-26 RX ORDER — AMIODARONE HYDROCHLORIDE 200 MG/1
200 TABLET ORAL DAILY
Qty: 1 TAB | Refills: 0 | Status: SHIPPED
Start: 2021-02-26 | End: 2021-06-03 | Stop reason: SDUPTHER

## 2021-02-26 RX ORDER — INSULIN GLARGINE 100 [IU]/ML
15 INJECTION, SOLUTION SUBCUTANEOUS DAILY
Qty: 1 VIAL | Refills: 0 | Status: SHIPPED
Start: 2021-02-26 | End: 2021-09-08

## 2021-02-26 RX ORDER — GUAIFENESIN/DEXTROMETHORPHAN 100-10MG/5
10 SYRUP ORAL
Qty: 1 BOTTLE | Refills: 0 | Status: SHIPPED
Start: 2021-02-26 | End: 2021-03-08

## 2021-02-26 RX ORDER — ALBUTEROL SULFATE 90 UG/1
2 AEROSOL, METERED RESPIRATORY (INHALATION)
Qty: 1 INHALER | Refills: 0 | Status: SHIPPED
Start: 2021-02-26 | End: 2021-09-08

## 2021-02-26 RX ORDER — ACETAMINOPHEN 325 MG/1
650 TABLET ORAL
Qty: 1 TAB | Refills: 0 | Status: SHIPPED
Start: 2021-02-26

## 2021-02-26 RX ORDER — INSULIN LISPRO 100 [IU]/ML
INJECTION, SOLUTION INTRAVENOUS; SUBCUTANEOUS
Qty: 1 VIAL | Refills: 0 | Status: SHIPPED
Start: 2021-02-26 | End: 2021-09-08

## 2021-02-26 RX ADMIN — Medication 20 ML: at 14:00

## 2021-02-26 RX ADMIN — INSULIN GLARGINE 15 UNITS: 100 INJECTION, SOLUTION SUBCUTANEOUS at 08:26

## 2021-02-26 RX ADMIN — Medication 20 ML: at 05:08

## 2021-02-26 RX ADMIN — HYDRALAZINE HYDROCHLORIDE 20 MG: 20 INJECTION, SOLUTION INTRAMUSCULAR; INTRAVENOUS at 03:14

## 2021-02-26 RX ADMIN — ATORVASTATIN CALCIUM 80 MG: 40 TABLET, FILM COATED ORAL at 08:23

## 2021-02-26 RX ADMIN — METOPROLOL TARTRATE 50 MG: 50 TABLET, FILM COATED ORAL at 08:26

## 2021-02-26 RX ADMIN — CEFTRIAXONE 2 G: 2 INJECTION, POWDER, FOR SOLUTION INTRAMUSCULAR; INTRAVENOUS at 08:24

## 2021-02-26 RX ADMIN — AMIODARONE HYDROCHLORIDE 200 MG: 200 TABLET ORAL at 08:23

## 2021-02-26 RX ADMIN — LANSOPRAZOLE 30 MG: KIT at 08:27

## 2021-02-26 NOTE — PROGRESS NOTES
A follow up visit was made to the patient. Emotional support, spiritual presence and   prayer were provided for the patient. He was receiving dialysis. He is scheduled to go to [place].       Jl Murillo, [facility], [facility]

## 2021-02-26 NOTE — PROGRESS NOTES
2/26/2021    Admit Date: 1/6/2021    Subjective:   CHIEF COMPLAINT- rectal bleed  HPI      Overall-stable           Diet-PEG    Appetite-na     Nausea-no   Vomiting-no          Pain-no            BM-yes   Bleeding-no    Medications-reviewed and adjusted as appropriate   IV FLUIDS-reviewed      FAM HX-per H&P   SH-per H&P       Past Medical History:   Diagnosis Date    Acute deep vein thrombosis (DVT) of left peroneal vein (HCC) 1/28/2021    Atrial fibrillation ([facility] [address] 01/22/2021    Critical illness polyneuropathy ([address] 2/12/2021    Gastrointestinal disorder     reflux    GERD (gastroesophageal reflux disease)     Hypertension     Lower respiratory tract infection due to COVID-19 virus 1/7/2021    Nausea & vomiting     Obesity 10/6/2015    Other ill-defined conditions(799.89)     dyspnea since surgery, wheezing, SOB    Type 2 diabetes mellitus with hyperosmolarity without nonketotic hyperglycemic-hyperosmolar coma ([address] 1/7/2021    Unstable angina ([facility] [address] 10/27/2016               Past Surgical History:   Procedure Laterality Date    COLONOSCOPY  2/22/2021         COLONOSCOPY N/A 2/22/2021    COLONOSCOPY ROOM 3302 *vent performed by [facility] MD at [facility] ENDOSCOPY    EGD  2/22/2021         HX CHOLECYSTECTOMY      HX ORTHOPAEDIC      rotator cuff; knee    HX UROLOGICAL      kidney stone surgeries x 3    IR INSERT TUNL CVC W/O PORT OVER 5 YR  2/8/2021    IR IVC FILTER  2/8/2021    [place] CARDIAC SURG PROCEDURE UNLIST      stent    SIGMOIDOSCOPY,FLEX,W/CONTROL,BLEEDING  2/25/2021            ROS--                 RESP-trach            CARDIAC-neg                       -neg             Further ROS as per PMH and PSH- see problem list                            Objective:     Visit Vitals  BP (!) 151/73   Pulse 85   Temp 98.7 °F (37.1 °C)   Resp 24   Ht 5' 8\" (1.727 m)   Wt 83.2 kg (183 lb 6.8 oz)   SpO2 99%   BMI 27.89 kg/m²         Intake/Output Summary (Last 24 hours) at 2/26/2021 0753  Last data filed at 2/26/2021 0504  Gross per 24 hour   Intake 400 ml   Output 1500 ml   Net -1100 ml       EXAM:     NEURO-a&o - talking to sister on the phone    HEENT-wnl    LUNGS-clear       COR-rrr        ABD-soft , min tenderness, no rebound, good bs        EXT-no edema                         LABS-  Lab Results   Component Value Date/Time    WBC 16.1 (H) 02/26/2021 03:21 AM    RBC 2.44 (L) 02/26/2021 03:21 AM    HGB 7.4 (L) 02/26/2021 03:21 AM    HCT 23.7 (L) 02/26/2021 03:21 AM    PLATELET 676 39/69/1303 03:21 AM     Lab Results   Component Value Date/Time    Sodium 146 (H) 02/26/2021 03:21 AM    Potassium 3.8 02/26/2021 03:21 AM    Chloride 109 (H) 02/26/2021 03:21 AM    CO2 34 (H) 02/26/2021 03:21 AM    Anion gap 3 (L) 02/26/2021 03:21 AM    Glucose 81 02/26/2021 03:21 AM    Glucose 103 09/16/2008 05:55 AM    BUN 25 (H) 02/26/2021 03:21 AM    Creatinine 2.39 (H) 02/26/2021 03:21 AM    GFR est AA 36 (L) 02/26/2021 03:21 AM    GFR est non-AA 30 (L) 02/26/2021 03:21 AM    Calcium 8.1 (L) 02/26/2021 03:21 AM    Magnesium 1.8 02/26/2021 03:21 AM    Phosphorus 2.8 02/26/2021 03:21 AM    Albumin 1.5 (L) 01/31/2021 10:53 AM    Bilirubin, total 0.7 01/31/2021 10:53 AM    Protein, total 4.5 (L) 01/31/2021 10:53 AM    Globulin 3.0 01/31/2021 10:53 AM    A-G Ratio 0.5 (L) 01/31/2021 10:53 AM    ALT (SGPT) 32 01/31/2021 10:53 AM         TRANSFUSION- got a total of 15 units    Assessment:     Principal Problem:    Pneumonia due to COVID-19 virus (1/7/2021)    Active Problems:    Obesity (10/6/2015)      Hypertension (10/27/2016)      CAD (coronary artery disease) (10/28/2016)      Overview: 10-27-06 C 90% LAD s/p 2.25 x 20 Synergy drug-eluting stent  (CS)      SWATI (acute kidney injury) ([facility] [address] (1/7/2021)      Acute hypoxemic respiratory failure ([address] (1/7/2021)      Type 2 diabetes mellitus with hyperosmolarity without nonketotic hyperglycemic-hyperosmolar coma ([address] (1/7/2021)      Acute deep vein thrombosis (DVT) of left peroneal vein (HCC) (1/28/2021)      VAP (ventilator-associated pneumonia) ([facility] [address] (2/9/2021)      Critical illness polyneuropathy ([facility] [address] (2/12/2021)      Anemia (2/16/2021)      Lower GI bleed requiring more than 4 units of blood in 24 hours, ICU, or surgery (2/21/2021)    stable post clipping of rectal vessel yesterday- hopefully this has taken care of the problem    Plan:     Please call if further bleeding       PT SEEN AND EXAMINED AND PLAN DISCUSSED AND IMPLEMENTED.   Tash Proctor MD

## 2021-02-26 NOTE — PROGRESS NOTES
[facility]        Subjective: SWATI ( probable ESRD)   Weak, trach. Nods answers to questions. Pt seen on dialysis. Review of Systems -   General ROS: negative for - fever, chills  Respiratory ROS: no SOB, cough, JACKSON  Cardiovascular ROS: no CP, palpitations  Gastrointestinal ROS: no N&V, abdominal pain, diarrhea  Genito-Urinary ROS: no difficulty voiding, dysuria  Neurological ROS: no seizures, focal weekness        Objective:    Vitals:    02/26/21 0743 02/26/21 0802 02/26/21 0823 02/26/21 0830   BP:  (!) 125/56 (!) 123/59 (!) 112/55   Pulse:  96 92 95   Resp:       Temp:       SpO2: 99%      Weight:       Height:           PE  Gen: in no acute distress  CV:reg rate  Chest:bilat breath sounds  Abd: soft  Ext/Access: rt IJ Tunneled HD cath ok       . LAB  Recent Labs     02/26/21  0321 02/25/21  1033 02/25/21  0341 02/24/21  0357   WBC 16.1*  --  15.8* 22.9*   HGB 7.4* 8.0* 6.4* 8.8*   HCT 23.7* 24.1* 20.1* 26.9*     --  204 242     Recent Labs     02/26/21  0321 02/25/21  0341 02/24/21  0357   * 146* 143   K 3.8 3.3* 3.6   * 109* 107   CO2 34* 33* 29   GLU 81 109* 199*   BUN 25* 50* 42*   CREA 2.39* 3.51* 2.96*   MG 1.8  --  2.0   PHOS 2.8  --  3.1   CA 8.1* 7.8* 8.3           Radiology    A/P:   Patient Active Problem List   Diagnosis Code    Obesity E66.9    Hypertension I10    Bradycardia R00.1    PVC (premature ventricular contraction) I49.3    CAD (coronary artery disease) I25.10    Dyslipidemia, goal LDL below 70 E78.5    SWATI (acute kidney injury) (HCC) N17.9    Acute hypoxemic respiratory failure (HCC) J96.01    Type 2 diabetes mellitus with hyperosmolarity without nonketotic hyperglycemic-hyperosmolar coma (HCC) E11.00    Pneumonia due to COVID-19 virus U07.1, J12.82    Acute deep vein thrombosis (DVT) of left peroneal vein ([facility]) I82.452    VAP (ventilator-associated pneumonia) ([facility]) J95.851    Critical illness polyneuropathy ([facility]) G62.81    Anemia D64.9    Lower GI bleed requiring more than 4 units of blood in 24 hours, ICU, or surgery K92.2     SWATI (probaly ESRD)  - still oliguric, first HD was 1/25/21. On dialysis for clearance. Running again today to get him on a MWF schedule for [facility]. Anemia - on Retacrit. Has been transfused. .  S/p post ciipping of vessel in rectal.   .     Resp Failure/ COVID19    A Fib    DVT              Juan Jose Rico MD

## 2021-02-26 NOTE — PROGRESS NOTES
PT Note:  Per chart pt running dialysis today. Will hold and re-attempt pending schedule and pt status.   Thanks,  Gigi Morley, PT, DPT

## 2021-02-26 NOTE — PROGRESS NOTES
Pt accepted at [facility] and can go at Amy Keating RN aware and can call report to [phone]. Spoke wiith pt and sister, they agree with POC for LTACH. Alvino Bermudez to speak with them. Care Management Interventions  PCP Verified by CM: Yes  Mode of Transport at Discharge:  Other (see comment)(TBD: based upon need. )  Transition of Care Consult (CM Consult): Discharge Planning  Discharge Durable Medical Equipment: No  Physical Therapy Consult: Yes  Occupational Therapy Consult: Yes  Speech Therapy Consult: No  Current Support Network: Own Home  Confirm Follow Up Transport: [address] Provided?: No  Discharge Location  Discharge Placement: [address] (LTAC)([facility])

## 2021-02-26 NOTE — DIALYSIS
3 hour HD completed at bedside. Ports flushed with normal saline and clamped. Patient tolerated treatment well. Net UF removal of 1L.

## 2021-02-26 NOTE — DIALYSIS
Hemodialysis initiated at bedside via right IJ CVC. Patient is alert and talkative. Machine settings verified. Will monitor.

## 2021-02-26 NOTE — DIABETES MGMT
Patient s/p colonoscopy yesterday. Patient's blood glucose ranged  yesterday with patient receiving Lantus 15 units. Humalog 3 units, and D50 12.5g. Blood glucose 81 in AM lab. Hgb 7. 4. Cr 2.39. GFR 30. Current regimen: Lantus 15 units every day, and Humalog SSI Q4 (VIR). Will follow along.

## 2021-02-26 NOTE — PROGRESS NOTES
Chart reviewed. Pt running HD now per Nephrology and on MWF schedule for transition to [facility]. Ok per MD for d/c. Awaiting to hear from [facility] if can accept today. CM following. Liaison aware pt ready today. LOS 50 days.

## 2021-02-28 LAB
ALBUMIN SERPL-MCNC: 1.9 G/DL (ref 3.2–4.6)
ANION GAP SERPL CALC-SCNC: 6 MMOL/L (ref 7–16)
BUN SERPL-MCNC: 20 MG/DL (ref 8–23)
CALCIUM SERPL-MCNC: 8.1 MG/DL (ref 8.3–10.4)
CHLORIDE SERPL-SCNC: 109 MMOL/L (ref 98–107)
CO2 SERPL-SCNC: 31 MMOL/L (ref 21–32)
CREAT SERPL-MCNC: 2.63 MG/DL (ref 0.8–1.5)
ERYTHROCYTE [DISTWIDTH] IN BLOOD BY AUTOMATED COUNT: 15.9 % (ref 11.9–14.6)
GLUCOSE SERPL-MCNC: 98 MG/DL (ref 65–100)
HCT VFR BLD AUTO: 26.5 % (ref 41.1–50.3)
HGB BLD-MCNC: 8.2 G/DL (ref 13.6–17.2)
MCH RBC QN AUTO: 30.4 PG (ref 26.1–32.9)
MCHC RBC AUTO-ENTMCNC: 30.9 G/DL (ref 31.4–35)
MCV RBC AUTO: 98.1 FL (ref 79.6–97.8)
NRBC # BLD: 0 K/UL (ref 0–0.2)
PHOSPHATE SERPL-MCNC: 3.9 MG/DL (ref 2.3–3.7)
PLATELET # BLD AUTO: 231 K/UL (ref 150–450)
PMV BLD AUTO: 9.5 FL (ref 9.4–12.3)
POTASSIUM SERPL-SCNC: 3.5 MMOL/L (ref 3.5–5.1)
RBC # BLD AUTO: 2.7 M/UL (ref 4.23–5.6)
SODIUM SERPL-SCNC: 146 MMOL/L (ref 136–145)
WBC # BLD AUTO: 15.7 K/UL (ref 4.3–11.1)

## 2021-02-28 PROCEDURE — 80069 RENAL FUNCTION PANEL: CPT

## 2021-02-28 PROCEDURE — 85027 COMPLETE CBC AUTOMATED: CPT

## 2021-02-28 PROCEDURE — 36415 COLL VENOUS BLD VENIPUNCTURE: CPT

## 2021-03-01 ENCOUNTER — ANESTHESIA (OUTPATIENT)
Dept: ENDOSCOPY | Age: 61
End: 2021-03-01

## 2021-03-01 ENCOUNTER — ANESTHESIA EVENT (OUTPATIENT)
Dept: ENDOSCOPY | Age: 61
End: 2021-03-01

## 2021-03-01 ENCOUNTER — HOSPITAL ENCOUNTER (OUTPATIENT)
Age: 61
Setting detail: OUTPATIENT SURGERY
Discharge: OTHER INSTITUTION | End: 2021-03-01
Attending: INTERNAL MEDICINE | Admitting: INTERNAL MEDICINE

## 2021-03-01 VITALS
OXYGEN SATURATION: 96 % | TEMPERATURE: 98 F | SYSTOLIC BLOOD PRESSURE: 159 MMHG | RESPIRATION RATE: 12 BRPM | DIASTOLIC BLOOD PRESSURE: 81 MMHG | HEART RATE: 82 BPM

## 2021-03-01 LAB
ALBUMIN SERPL-MCNC: 1.8 G/DL (ref 3.2–4.6)
ALBUMIN/GLOB SERPL: 0.5 {RATIO} (ref 1.2–3.5)
ALP SERPL-CCNC: 93 U/L (ref 50–136)
ALT SERPL-CCNC: 26 U/L (ref 12–65)
ANION GAP SERPL CALC-SCNC: 6 MMOL/L (ref 7–16)
APTT PPP: 31.6 SEC (ref 24.1–35.1)
AST SERPL-CCNC: 20 U/L (ref 15–37)
BASOPHILS # BLD: 0.1 K/UL (ref 0–0.2)
BASOPHILS NFR BLD: 0 % (ref 0–2)
BILIRUB SERPL-MCNC: 0.5 MG/DL (ref 0.2–1.1)
BUN SERPL-MCNC: 24 MG/DL (ref 8–23)
CALCIUM SERPL-MCNC: 7.9 MG/DL (ref 8.3–10.4)
CHLORIDE SERPL-SCNC: 110 MMOL/L (ref 98–107)
CO2 SERPL-SCNC: 30 MMOL/L (ref 21–32)
CREAT SERPL-MCNC: 2.91 MG/DL (ref 0.8–1.5)
DIFFERENTIAL METHOD BLD: ABNORMAL
EOSINOPHIL # BLD: 0.2 K/UL (ref 0–0.8)
EOSINOPHIL NFR BLD: 1 % (ref 0.5–7.8)
ERYTHROCYTE [DISTWIDTH] IN BLOOD BY AUTOMATED COUNT: 16 % (ref 11.9–14.6)
GLOBULIN SER CALC-MCNC: 3.8 G/DL (ref 2.3–3.5)
GLUCOSE SERPL-MCNC: 103 MG/DL (ref 65–100)
HBV SURFACE AB SERPL IA-ACNC: <3.1 MIU/ML
HBV SURFACE AG SER QL: NONREACTIVE
HCT VFR BLD AUTO: 24.4 % (ref 41.1–50.3)
HGB BLD-MCNC: 7.5 G/DL (ref 13.6–17.2)
HISTORY CHECKED?,CKHIST: NORMAL
IMM GRANULOCYTES # BLD AUTO: 0.3 K/UL (ref 0–0.5)
IMM GRANULOCYTES NFR BLD AUTO: 2 % (ref 0–5)
INR PPP: 1.1
LYMPHOCYTES # BLD: 1.1 K/UL (ref 0.5–4.6)
LYMPHOCYTES NFR BLD: 6 % (ref 13–44)
MAGNESIUM SERPL-MCNC: 1.8 MG/DL (ref 1.8–2.4)
MCH RBC QN AUTO: 30.6 PG (ref 26.1–32.9)
MCHC RBC AUTO-ENTMCNC: 30.7 G/DL (ref 31.4–35)
MCV RBC AUTO: 99.6 FL (ref 79.6–97.8)
MONOCYTES # BLD: 1.1 K/UL (ref 0.1–1.3)
MONOCYTES NFR BLD: 7 % (ref 4–12)
NEUTS SEG # BLD: 14.3 K/UL (ref 1.7–8.2)
NEUTS SEG NFR BLD: 84 % (ref 43–78)
NRBC # BLD: 0 K/UL (ref 0–0.2)
PHOSPHATE SERPL-MCNC: 4 MG/DL (ref 2.3–3.7)
PLATELET # BLD AUTO: 222 K/UL (ref 150–450)
PMV BLD AUTO: 9.6 FL (ref 9.4–12.3)
POTASSIUM SERPL-SCNC: 3.8 MMOL/L (ref 3.5–5.1)
PROT SERPL-MCNC: 5.6 G/DL (ref 6.3–8.2)
PROTHROMBIN TIME: 14.6 SEC (ref 12.5–14.7)
RBC # BLD AUTO: 2.45 M/UL (ref 4.23–5.6)
SODIUM SERPL-SCNC: 146 MMOL/L (ref 138–145)
WBC # BLD AUTO: 17 K/UL (ref 4.3–11.1)

## 2021-03-01 PROCEDURE — 85610 PROTHROMBIN TIME: CPT

## 2021-03-01 PROCEDURE — 76040000025: Performed by: INTERNAL MEDICINE

## 2021-03-01 PROCEDURE — 85025 COMPLETE CBC W/AUTO DIFF WBC: CPT

## 2021-03-01 PROCEDURE — 80053 COMPREHEN METABOLIC PANEL: CPT

## 2021-03-01 PROCEDURE — 83735 ASSAY OF MAGNESIUM: CPT

## 2021-03-01 PROCEDURE — 86923 COMPATIBILITY TEST ELECTRIC: CPT

## 2021-03-01 PROCEDURE — 85730 THROMBOPLASTIN TIME PARTIAL: CPT

## 2021-03-01 PROCEDURE — 86706 HEP B SURFACE ANTIBODY: CPT

## 2021-03-01 PROCEDURE — 2709999900 HC NON-CHARGEABLE SUPPLY: Performed by: INTERNAL MEDICINE

## 2021-03-01 PROCEDURE — 76060000031 HC ANESTHESIA FIRST 0.5 HR: Performed by: INTERNAL MEDICINE

## 2021-03-01 PROCEDURE — 84100 ASSAY OF PHOSPHORUS: CPT

## 2021-03-01 PROCEDURE — P9016 RBC LEUKOCYTES REDUCED: HCPCS

## 2021-03-01 PROCEDURE — 86901 BLOOD TYPING SEROLOGIC RH(D): CPT

## 2021-03-01 PROCEDURE — 86705 HEP B CORE ANTIBODY IGM: CPT

## 2021-03-01 PROCEDURE — 74011250636 HC RX REV CODE- 250/636: Performed by: NURSE ANESTHETIST, CERTIFIED REGISTERED

## 2021-03-01 PROCEDURE — 36415 COLL VENOUS BLD VENIPUNCTURE: CPT

## 2021-03-01 PROCEDURE — 87340 HEPATITIS B SURFACE AG IA: CPT

## 2021-03-01 RX ORDER — SODIUM CHLORIDE, SODIUM LACTATE, POTASSIUM CHLORIDE, CALCIUM CHLORIDE 600; 310; 30; 20 MG/100ML; MG/100ML; MG/100ML; MG/100ML
INJECTION, SOLUTION INTRAVENOUS
Status: DISCONTINUED | OUTPATIENT
Start: 2021-03-01 | End: 2021-03-01 | Stop reason: HOSPADM

## 2021-03-01 RX ORDER — PROPOFOL 10 MG/ML
INJECTION, EMULSION INTRAVENOUS AS NEEDED
Status: DISCONTINUED | OUTPATIENT
Start: 2021-03-01 | End: 2021-03-01 | Stop reason: HOSPADM

## 2021-03-01 RX ORDER — PROPOFOL 10 MG/ML
INJECTION, EMULSION INTRAVENOUS
Status: DISCONTINUED | OUTPATIENT
Start: 2021-03-01 | End: 2021-03-01 | Stop reason: HOSPADM

## 2021-03-01 RX ADMIN — SODIUM CHLORIDE, SODIUM LACTATE, POTASSIUM CHLORIDE, AND CALCIUM CHLORIDE: 600; 310; 30; 20 INJECTION, SOLUTION INTRAVENOUS at 17:56

## 2021-03-01 RX ADMIN — PROPOFOL 50 MG: 10 INJECTION, EMULSION INTRAVENOUS at 18:01

## 2021-03-01 RX ADMIN — PROPOFOL 140 MCG/KG/MIN: 10 INJECTION, EMULSION INTRAVENOUS at 18:01

## 2021-03-01 NOTE — PROGRESS NOTES
Acute physical therapy order cancelled. Notified [facility] of PT order.  Thank you,    Letha Adams, PT, DPT  3/1/2021

## 2021-03-01 NOTE — PROGRESS NOTES
TRANSFER - IN REPORT:    Verbal report received from [address] on Moriah Briseno.  being received from 1111 1830 for ordered procedure      Report consisted of patients Situation, Background, Assessment and   Recommendations(SBAR). Information from the following report(s) SBAR, Intake/Output, MAR, Recent Results, Med Rec Status and Pre Procedure Checklist was reviewed with the receiving nurse. Opportunity for questions and clarification was provided.

## 2021-03-01 NOTE — ANESTHESIA PREPROCEDURE EVALUATION
Relevant Problems   RESPIRATORY SYSTEM   (+) Pneumonia due to COVID-19 virus   (+) VAP (ventilator-associated pneumonia) (HCC)      CARDIOVASCULAR   (+) CAD (coronary artery disease)   (+) Hypertension   (+) PVC (premature ventricular contraction)      RENAL FAILURE   (+) SWATI (acute kidney injury) ([facility] [address]      ENDOCRINE   (+) Obesity   (+) Type 2 diabetes mellitus with hyperosmolarity without nonketotic hyperglycemic-hyperosmolar coma (HCC)      HEMATOLOGY   (+) Anemia       Anesthetic History   No history of anesthetic complications            Review of Systems / Medical History  Patient summary reviewed and pertinent labs reviewed    Pulmonary                Comments: H/o covid PNA s/p trach 2/2   Neuro/Psych             Comments: Critical illness polyneuropathy  Cardiovascular    Hypertension        Dysrhythmias   CAD and hyperlipidemia    Exercise tolerance: <4 METS  Comments: H/o DVT   GI/Hepatic/Renal     GERD    Renal disease: ESRD and dialysis       Endo/Other    Diabetes    Arthritis and anemia     Other Findings              Physical Exam    Airway  Mallampati: II  TM Distance: 4 - 6 cm  Neck ROM: normal range of motion   Mouth opening: Normal    Comments: trach Cardiovascular    Rhythm: regular  Rate: normal         Dental         Pulmonary    Rhonchi:bilateral             Abdominal  GI exam deferred       Other Findings            Anesthetic Plan    ASA: 4  Anesthesia type: total IV anesthesia          Induction: Intravenous  Anesthetic plan and risks discussed with: Patient

## 2021-03-01 NOTE — H&P
Gastroenterology Outpatient History and Physical    Patient: Jhoana Evans. Physician: Ely Moore MD    Vital Signs: Blood pressure (!) 173/90, pulse 82, temperature 99.6 °F (37.6 °C), resp. rate 20, SpO2 100 %. Allergies: Allergies   Allergen Reactions    Latex Swelling     Had a purcell catheter with swelling of urethra. Only known latex issue in past. Wife remembers this now    Hydrocodone-Acetaminophen Itching     Wife remembers this allergy    Tessalon Perles [Benzonatate] Unknown (comments)       Chief Complaint: Rectal bleeding. History of Present Illness: EGD and colonoscopy performed February 22 with findings of rectal ulcer - probable source of bleeding. Repeat sigmoidoscopy on February 25 with suspected bleeding from rectal ulcer with visible vessel and small clot. Endoclips x4 with hemostasis. Now with recurrent bleeding. Justification for Procedure: Re-evaluate rectal ulcer and consider additional therapy.     History:  Past Medical History:   Diagnosis Date    Acute deep vein thrombosis (DVT) of left peroneal vein (HCC) 1/28/2021    Atrial fibrillation ([address] 01/22/2021    Critical illness polyneuropathy ([address] 2/12/2021    Gastrointestinal disorder     reflux    GERD (gastroesophageal reflux disease)     Hypertension     Lower respiratory tract infection due to COVID-19 virus 1/7/2021    Nausea & vomiting     Obesity 10/6/2015    Other ill-defined conditions(799.89)     dyspnea since surgery, wheezing, SOB    Type 2 diabetes mellitus with hyperosmolarity without nonketotic hyperglycemic-hyperosmolar coma ([address] 1/7/2021    Unstable angina ([address] 10/27/2016      Past Surgical History:   Procedure Laterality Date    COLONOSCOPY  2/22/2021         COLONOSCOPY N/A 2/22/2021    COLONOSCOPY ROOM 3302 *vent performed by Edgar Waller MD at [facility] ENDOSCOPY    EGD  2/22/2021         FLEXIBLE SIGMOIDOSCOPY N/A 2/25/2021    SIGMOIDOSCOPY FLEXIBLE performed by Simuel Gosselin, MD at [facility] ENDOSCOPY    HX CHOLECYSTECTOMY      HX ORTHOPAEDIC      rotator cuff; knee    HX UROLOGICAL      kidney stone surgeries x 3    IR INSERT TUNL CVC W/O PORT OVER 5 YR  2/8/2021    IR IVC FILTER  2/8/2021    RI CARDIAC SURG PROCEDURE UNLIST      stent    SIGMOIDOSCOPY,FLEX,W/CONTROL,BLEEDING  2/25/2021           Social History     Socioeconomic History    Marital status:      Spouse name: Not on file    Number of children: Not on file    Years of education: Not on file    Highest education level: Not on file   Tobacco Use    Smoking status: Never Smoker    Smokeless tobacco: Never Used   Substance and Sexual Activity    Alcohol use: No    Drug use: No    History reviewed. No pertinent family history. Medications:   Prior to Admission medications    Medication Sig Start Date End Date Taking? Authorizing Provider   acetaminophen (TYLENOL) 325 mg tablet 2 Tabs by Per NG tube route every six (6) hours as needed for Pain. 2/26/21   Ravi Hernandez NP   albuterol (PROVENTIL HFA, VENTOLIN HFA, PROAIR HFA) 90 mcg/actuation inhaler Take 2 Puffs by inhalation every four (4) hours as needed for Wheezing or Shortness of Breath. 2/26/21   Iglesia SINCLAIR NP   epoetin ricardo-epbx (RETACRIT) 4,000 unit/mL injection 1 mL by SubCUTAneous route Every Tuesday, Thursday and Saturday. Indications: method of removing waste/poison from blood with dialysis 2/27/21   Iglesia SINCLAIR NP   cefTRIAXone 2 gram 2 g IVPB 2 g by IntraVENous route every twenty-four (24) hours for 2 days. 2/26/21 2/28/21  Iglesia SINCLAIR NP   amiodarone (CORDARONE) 200 mg tablet Take 1 Tab by mouth daily. 2/26/21   Apple Cheung, NP   guaiFENesin-dextromethorphan (ROBITUSSIN DM) 100-10 mg/5 mL syrup 10 mL by Per NG tube route every four (4) hours as needed for Cough for up to 10 days. 2/26/21 3/8/21  Iglesia SINCLAIR NP   insulin glargine (LANTUS) 100 unit/mL injection 15 Units by SubCUTAneous route daily. 2/26/21   Jacquiline Blonder C, NP   lansoprazole (PREVACID) 3 mg/mL oral suspension 10 mL by Per NG tube route every twelve (12) hours. 2/26/21   Jacquiline Blonder C, NP   insulin lispro (HUMALOG) 100 unit/mL injection SSI 2/26/21   RADHA [employer] C, NP   metoprolol tartrate (LOPRESSOR) 50 mg tablet Take 1 Tab by mouth two (2) times a day. 6/3/20   Luz Zavala MD   atorvastatin (LIPITOR) 80 mg tablet Take 1 Tab by mouth daily. 3/19/20   Luz Zavala MD       Physical Exam:   General: alert, cooperative, no distress. No vocalization. HEENT: Head: Normal, normocephalic, atraumatic. Heart: regular rate and rhythm, S1, S2 normal, no murmur, click, rub or gallop   Lungs: Trach. chest clear, no wheezing, rales, normal symmetric air entry   Abdominal: Bowel sounds are normal, liver is not enlarged, spleen is not enlarged   Neurological: Grossly normal   Extremities: no edema     Findings/Diagnosis: Rectal bleeding, History of rectal ulcer. Plan of Care/Planned Procedure: Sigmoidoscopy.     Fani Michel MD

## 2021-03-01 NOTE — PROGRESS NOTES
TRANSFER - OUT REPORT:    Verbal report given to [address] on Jonathandanny Holt.  being transferred  for routine post - op       Report consisted of patients Situation, Background, Assessment and   Recommendations(SBAR). Information from the following report(s) SBAR was reviewed with the receiving nurse. Lines:   PICC Double Lumen 67/54/61 Right;Basilic (Active)   Central Line Being Utilized Yes 02/26/21 1515   Criteria for Appropriate Use Limited/no vessel suitable for conventional peripheral access 02/26/21 1515   Site Assessment Clean, dry, & intact 02/26/21 1515   Phlebitis Assessment 0 02/26/21 1515   Infiltration Assessment 0 02/26/21 1515   Arm Circumference (cm) 33 cm 01/13/21 1605   Date of Last Dressing Change 02/19/21 02/26/21 1200   Dressing Status Clean, dry, & intact 02/26/21 1515   Action Taken Tubing changed 02/22/21 2321   External Catheter Length (cm) 0 centimeters 02/26/21 1200   Dressing Type Disk with Chlorhexadine gluconate (CHG); Transparent 02/26/21 1515   Hub Color/Line Status Patent; Flushed 02/26/21 1515   Positive Blood Return (Site #1) Yes 02/26/21 1515   Hub Color/Line Status Patent; Flushed 02/26/21 1515   Positive Blood Return (Site #2) Yes 02/26/21 1515   Alcohol Cap Used No 02/25/21 2000        Opportunity for questions and clarification was provided.       Patient transported with: transport tech

## 2021-03-02 LAB
ANION GAP SERPL CALC-SCNC: 7 MMOL/L (ref 7–16)
BUN SERPL-MCNC: 29 MG/DL (ref 8–23)
CALCIUM SERPL-MCNC: 7.8 MG/DL (ref 8.3–10.4)
CHLORIDE SERPL-SCNC: 109 MMOL/L (ref 98–107)
CO2 SERPL-SCNC: 29 MMOL/L (ref 21–32)
CREAT SERPL-MCNC: 2.96 MG/DL (ref 0.8–1.5)
ERYTHROCYTE [DISTWIDTH] IN BLOOD BY AUTOMATED COUNT: 18.8 % (ref 11.9–14.6)
GLUCOSE SERPL-MCNC: 178 MG/DL (ref 65–100)
HBV CORE IGM SERPL QL IA: NEGATIVE
HCT VFR BLD AUTO: 25.2 % (ref 41.1–50.3)
HGB BLD-MCNC: 8.2 G/DL (ref 13.6–17.2)
MCH RBC QN AUTO: 30.9 PG (ref 26.1–32.9)
MCHC RBC AUTO-ENTMCNC: 32.5 G/DL (ref 31.4–35)
MCV RBC AUTO: 95.1 FL (ref 79.6–97.8)
NRBC # BLD: 0 K/UL (ref 0–0.2)
PLATELET # BLD AUTO: 210 K/UL (ref 150–450)
PMV BLD AUTO: 10.2 FL (ref 9.4–12.3)
POTASSIUM SERPL-SCNC: 3.6 MMOL/L (ref 3.5–5.1)
RBC # BLD AUTO: 2.65 M/UL (ref 4.23–5.6)
SODIUM SERPL-SCNC: 145 MMOL/L (ref 136–145)
WBC # BLD AUTO: 22.2 K/UL (ref 4.3–11.1)

## 2021-03-02 PROCEDURE — 80048 BASIC METABOLIC PNL TOTAL CA: CPT

## 2021-03-02 PROCEDURE — 85027 COMPLETE CBC AUTOMATED: CPT

## 2021-03-02 PROCEDURE — 36415 COLL VENOUS BLD VENIPUNCTURE: CPT

## 2021-03-02 NOTE — ANESTHESIA POSTPROCEDURE EVALUATION
Procedure(s):  SIGMOIDOSCOPY FLEXIBLE ROOM 434.    total IV anesthesia    Anesthesia Post Evaluation      Multimodal analgesia: multimodal analgesia used between 6 hours prior to anesthesia start to PACU discharge  Patient location during evaluation: PACU  Patient participation: complete - patient participated  Level of consciousness: awake  Pain management: adequate  Airway patency: patent  Anesthetic complications: no  Cardiovascular status: acceptable  Respiratory status: acceptable  Hydration status: acceptable  Post anesthesia nausea and vomiting:  controlled  Final Post Anesthesia Temperature Assessment:  Normothermia (36.0-37.5 degrees C)      INITIAL Post-op Vital signs:   Vitals Value Taken Time   /86 03/01/21 1855   Temp 36.7 °C (98 °F) 03/01/21 1817   Pulse 82 03/01/21 1857   Resp 12 03/01/21 1835   SpO2 96 % 03/01/21 1856   Vitals shown include unvalidated device data.

## 2021-03-02 NOTE — PROCEDURES
Operative Note    Patient: Neo Delvalle. YOB: 1960  MRN: [id number]    Date of Procedure: 3/1/2021     Pre-Op Diagnosis: Gastrointestinal hemorrhage, unspecified gastrointestinal hemorrhage type [K92.2]  Recurrent LGI bleeding reported    Post-Op Diagnosis: No fresh or old blood seen in the rectum and sigmoid colon. Rectal ulcer showed no stigmata of bleeding with four clips in good position. Brown fecal slurry seen with more solid brown stool debris above 30 cm from the anal verge. Perianal skin sores noted as possible source of bleeding. Procedure(s):  SIGMOIDOSCOPY FLEXIBLE ROOM 434    Surgeon(s):  Temo Perez MD    Surgical Assistant: None    Anesthesia: MAC     Estimated Blood Loss (mL): Minimal / None. Complications: None. Specimens: * No specimens in log *     Procedure:  After informed consent, the patient was placed in the left lateral position and received iv anesthesia. See anesthesiology records for details. A digital rectal exam was performed. The Colonoscope was inserted into the rectum and passed under direct vison to the sigmoid colon. Colonoscope was then slowly withdrawn with careful evaluation of the mucosa. Retroflexion was performed in the rectum. Prep was fair distal to 30 cms from the anal verge. Visualization was adequate. The patient was taken to the recovery area in stable condition. Findings:  ANUS: Ivelisse-anal skin sores noted around the anus. Possible source of blood loss but not actively bleeding. RECTUM: Rectal ulcers showed no stigmata of bleeding with fore clips in good position. Visualized mucosa otherwise appeared normal.  SIGMOID COLON: Brown fecal slurry with more solid debris beyond 30 cm noted. Visualized mucosa was unremarkable. .  No blood or melena seen in the rectum and sigmoid colon. Recommendations:   Routine post sigmoidoscopy instructions. Monitor for recurrent rectal bleeding or bleeding from skin sores.    Local care of perianal skin. Resume diet and medications.     Electronically Signed by Derick Potts MD on 3/1/2021 at 7:25 PM

## 2021-03-03 LAB
ANION GAP SERPL CALC-SCNC: 5 MMOL/L (ref 7–16)
BUN SERPL-MCNC: 29 MG/DL (ref 8–23)
CALCIUM SERPL-MCNC: 7.9 MG/DL (ref 8.3–10.4)
CHLORIDE SERPL-SCNC: 111 MMOL/L (ref 98–107)
CO2 SERPL-SCNC: 29 MMOL/L (ref 21–32)
CREAT SERPL-MCNC: 2.75 MG/DL (ref 0.8–1.5)
ERYTHROCYTE [DISTWIDTH] IN BLOOD BY AUTOMATED COUNT: 18.2 % (ref 11.9–14.6)
GLUCOSE SERPL-MCNC: 109 MG/DL (ref 65–100)
HCT VFR BLD AUTO: 25 % (ref 41.1–50.3)
HGB BLD-MCNC: 8.2 G/DL (ref 13.6–17.2)
MCH RBC QN AUTO: 30.8 PG (ref 26.1–32.9)
MCHC RBC AUTO-ENTMCNC: 32.8 G/DL (ref 31.4–35)
MCV RBC AUTO: 94 FL (ref 79.6–97.8)
NRBC # BLD: 0 K/UL (ref 0–0.2)
PLATELET # BLD AUTO: 157 K/UL (ref 150–450)
PMV BLD AUTO: 10.2 FL (ref 9.4–12.3)
POTASSIUM SERPL-SCNC: 3.3 MMOL/L (ref 3.5–5.1)
RBC # BLD AUTO: 2.66 M/UL (ref 4.23–5.6)
SODIUM SERPL-SCNC: 145 MMOL/L (ref 138–145)
WBC # BLD AUTO: 12 K/UL (ref 4.3–11.1)

## 2021-03-03 PROCEDURE — 85027 COMPLETE CBC AUTOMATED: CPT

## 2021-03-03 PROCEDURE — 80048 BASIC METABOLIC PNL TOTAL CA: CPT

## 2021-03-03 PROCEDURE — 36415 COLL VENOUS BLD VENIPUNCTURE: CPT

## 2021-03-04 LAB
ANION GAP SERPL CALC-SCNC: 4 MMOL/L (ref 7–16)
BUN SERPL-MCNC: 17 MG/DL (ref 8–23)
CALCIUM SERPL-MCNC: 7.9 MG/DL (ref 8.3–10.4)
CHLORIDE SERPL-SCNC: 104 MMOL/L (ref 98–107)
CO2 SERPL-SCNC: 33 MMOL/L (ref 21–32)
CREAT SERPL-MCNC: 1.86 MG/DL (ref 0.8–1.5)
ERYTHROCYTE [DISTWIDTH] IN BLOOD BY AUTOMATED COUNT: 17.3 % (ref 11.9–14.6)
GLUCOSE SERPL-MCNC: 130 MG/DL (ref 65–100)
HCT VFR BLD AUTO: 25.2 % (ref 41.1–50.3)
HGB BLD-MCNC: 8.2 G/DL (ref 13.6–17.2)
MCH RBC QN AUTO: 30.7 PG (ref 26.1–32.9)
MCHC RBC AUTO-ENTMCNC: 32.5 G/DL (ref 31.4–35)
MCV RBC AUTO: 94.4 FL (ref 79.6–97.8)
NRBC # BLD: 0 K/UL (ref 0–0.2)
PLATELET # BLD AUTO: 113 K/UL (ref 150–450)
PMV BLD AUTO: 9.9 FL (ref 9.4–12.3)
POTASSIUM SERPL-SCNC: 3.3 MMOL/L (ref 3.5–5.1)
RBC # BLD AUTO: 2.67 M/UL (ref 4.23–5.6)
SODIUM SERPL-SCNC: 141 MMOL/L (ref 138–145)
WBC # BLD AUTO: 11.4 K/UL (ref 4.3–11.1)

## 2021-03-04 PROCEDURE — 80048 BASIC METABOLIC PNL TOTAL CA: CPT

## 2021-03-04 PROCEDURE — 36415 COLL VENOUS BLD VENIPUNCTURE: CPT

## 2021-03-04 PROCEDURE — 85027 COMPLETE CBC AUTOMATED: CPT

## 2021-03-05 LAB
ABO + RH BLD: NORMAL
ANION GAP SERPL CALC-SCNC: 6 MMOL/L (ref 7–16)
BLD PROD TYP BPU: NORMAL
BLOOD GROUP ANTIBODIES SERPL: NORMAL
BPU ID: NORMAL
BUN SERPL-MCNC: 21 MG/DL (ref 8–23)
CALCIUM SERPL-MCNC: 8.2 MG/DL (ref 8.3–10.4)
CHLORIDE SERPL-SCNC: 107 MMOL/L (ref 98–107)
CO2 SERPL-SCNC: 31 MMOL/L (ref 21–32)
CREAT SERPL-MCNC: 2.05 MG/DL (ref 0.8–1.5)
CROSSMATCH RESULT,%XM: NORMAL
ERYTHROCYTE [DISTWIDTH] IN BLOOD BY AUTOMATED COUNT: 17.3 % (ref 11.9–14.6)
GLUCOSE SERPL-MCNC: 126 MG/DL (ref 65–100)
HCT VFR BLD AUTO: 26.9 % (ref 41.1–50.3)
HGB BLD-MCNC: 8.3 G/DL (ref 13.6–17.2)
MAGNESIUM SERPL-MCNC: 1.7 MG/DL (ref 1.8–2.4)
MCH RBC QN AUTO: 30.3 PG (ref 26.1–32.9)
MCHC RBC AUTO-ENTMCNC: 30.9 G/DL (ref 31.4–35)
MCV RBC AUTO: 98.2 FL (ref 79.6–97.8)
NRBC # BLD: 0 K/UL (ref 0–0.2)
PHOSPHATE SERPL-MCNC: 3 MG/DL (ref 2.3–3.7)
PLATELET # BLD AUTO: 104 K/UL (ref 150–450)
PMV BLD AUTO: 10.5 FL (ref 9.4–12.3)
POTASSIUM SERPL-SCNC: 3.4 MMOL/L (ref 3.5–5.1)
RBC # BLD AUTO: 2.74 M/UL (ref 4.23–5.6)
SODIUM SERPL-SCNC: 144 MMOL/L (ref 136–145)
SPECIMEN EXP DATE BLD: NORMAL
STATUS OF UNIT,%ST: NORMAL
UNIT DIVISION, %UDIV: 0
WBC # BLD AUTO: 9.9 K/UL (ref 4.3–11.1)

## 2021-03-05 PROCEDURE — 85027 COMPLETE CBC AUTOMATED: CPT

## 2021-03-05 PROCEDURE — 83735 ASSAY OF MAGNESIUM: CPT

## 2021-03-05 PROCEDURE — 80048 BASIC METABOLIC PNL TOTAL CA: CPT

## 2021-03-05 PROCEDURE — 36415 COLL VENOUS BLD VENIPUNCTURE: CPT

## 2021-03-05 PROCEDURE — 84100 ASSAY OF PHOSPHORUS: CPT

## 2021-03-06 LAB
ANION GAP SERPL CALC-SCNC: 5 MMOL/L (ref 7–16)
BUN SERPL-MCNC: 24 MG/DL (ref 8–23)
CALCIUM SERPL-MCNC: 8.2 MG/DL (ref 8.3–10.4)
CHLORIDE SERPL-SCNC: 106 MMOL/L (ref 98–107)
CO2 SERPL-SCNC: 31 MMOL/L (ref 21–32)
CREAT SERPL-MCNC: 2.12 MG/DL (ref 0.8–1.5)
ERYTHROCYTE [DISTWIDTH] IN BLOOD BY AUTOMATED COUNT: 17.2 % (ref 11.9–14.6)
GLUCOSE SERPL-MCNC: 135 MG/DL (ref 65–100)
HCT VFR BLD AUTO: 27.1 % (ref 41.1–50.3)
HGB BLD-MCNC: 8.4 G/DL (ref 13.6–17.2)
MCH RBC QN AUTO: 30.3 PG (ref 26.1–32.9)
MCHC RBC AUTO-ENTMCNC: 31 G/DL (ref 31.4–35)
MCV RBC AUTO: 97.8 FL (ref 79.6–97.8)
NRBC # BLD: 0 K/UL (ref 0–0.2)
PLATELET # BLD AUTO: 113 K/UL (ref 150–450)
PMV BLD AUTO: 10.7 FL (ref 9.4–12.3)
POTASSIUM SERPL-SCNC: 3.1 MMOL/L (ref 3.5–5.1)
RBC # BLD AUTO: 2.77 M/UL (ref 4.23–5.6)
SODIUM SERPL-SCNC: 142 MMOL/L (ref 138–145)
WBC # BLD AUTO: 11.9 K/UL (ref 4.3–11.1)

## 2021-03-06 PROCEDURE — 80048 BASIC METABOLIC PNL TOTAL CA: CPT

## 2021-03-06 PROCEDURE — 85027 COMPLETE CBC AUTOMATED: CPT

## 2021-03-06 PROCEDURE — 36415 COLL VENOUS BLD VENIPUNCTURE: CPT

## 2021-03-07 LAB
ANION GAP SERPL CALC-SCNC: 4 MMOL/L (ref 7–16)
BUN SERPL-MCNC: 22 MG/DL (ref 8–23)
CALCIUM SERPL-MCNC: 8.4 MG/DL (ref 8.3–10.4)
CHLORIDE SERPL-SCNC: 107 MMOL/L (ref 98–107)
CO2 SERPL-SCNC: 33 MMOL/L (ref 21–32)
CREAT SERPL-MCNC: 2.16 MG/DL (ref 0.8–1.5)
ERYTHROCYTE [DISTWIDTH] IN BLOOD BY AUTOMATED COUNT: 17.1 % (ref 11.9–14.6)
GLUCOSE SERPL-MCNC: 131 MG/DL (ref 65–100)
HCT VFR BLD AUTO: 28.7 % (ref 41.1–50.3)
HGB BLD-MCNC: 8.8 G/DL (ref 13.6–17.2)
MCH RBC QN AUTO: 30.3 PG (ref 26.1–32.9)
MCHC RBC AUTO-ENTMCNC: 30.7 G/DL (ref 31.4–35)
MCV RBC AUTO: 99 FL (ref 79.6–97.8)
NRBC # BLD: 0 K/UL (ref 0–0.2)
PLATELET # BLD AUTO: 124 K/UL (ref 150–450)
PMV BLD AUTO: 10.2 FL (ref 9.4–12.3)
POTASSIUM SERPL-SCNC: 3.4 MMOL/L (ref 3.5–5.1)
RBC # BLD AUTO: 2.9 M/UL (ref 4.23–5.6)
SODIUM SERPL-SCNC: 144 MMOL/L (ref 136–145)
WBC # BLD AUTO: 11.2 K/UL (ref 4.3–11.1)

## 2021-03-07 PROCEDURE — 36415 COLL VENOUS BLD VENIPUNCTURE: CPT

## 2021-03-07 PROCEDURE — 85027 COMPLETE CBC AUTOMATED: CPT

## 2021-03-07 PROCEDURE — 80048 BASIC METABOLIC PNL TOTAL CA: CPT

## 2021-03-08 LAB
ALBUMIN SERPL-MCNC: 2 G/DL (ref 3.2–4.6)
ALBUMIN/GLOB SERPL: 0.5 {RATIO} (ref 1.2–3.5)
ALP SERPL-CCNC: 85 U/L (ref 50–136)
ALT SERPL-CCNC: 14 U/L (ref 12–65)
ANION GAP SERPL CALC-SCNC: 6 MMOL/L (ref 7–16)
AST SERPL-CCNC: 16 U/L (ref 15–37)
BASOPHILS # BLD: 0.1 K/UL (ref 0–0.2)
BASOPHILS NFR BLD: 1 % (ref 0–2)
BILIRUB SERPL-MCNC: 0.4 MG/DL (ref 0.2–1.1)
BUN SERPL-MCNC: 23 MG/DL (ref 8–23)
CALCIUM SERPL-MCNC: 8.4 MG/DL (ref 8.3–10.4)
CHLORIDE SERPL-SCNC: 105 MMOL/L (ref 98–107)
CO2 SERPL-SCNC: 31 MMOL/L (ref 21–32)
CREAT SERPL-MCNC: 1.97 MG/DL (ref 0.8–1.5)
DIFFERENTIAL METHOD BLD: ABNORMAL
EOSINOPHIL # BLD: 0.3 K/UL (ref 0–0.8)
EOSINOPHIL NFR BLD: 3 % (ref 0.5–7.8)
ERYTHROCYTE [DISTWIDTH] IN BLOOD BY AUTOMATED COUNT: 17 % (ref 11.9–14.6)
GLOBULIN SER CALC-MCNC: 3.9 G/DL (ref 2.3–3.5)
GLUCOSE SERPL-MCNC: 125 MG/DL (ref 65–100)
HCT VFR BLD AUTO: 29.1 % (ref 41.1–50.3)
HGB BLD-MCNC: 8.9 G/DL (ref 13.6–17.2)
IMM GRANULOCYTES # BLD AUTO: 0.1 K/UL (ref 0–0.5)
IMM GRANULOCYTES NFR BLD AUTO: 1 % (ref 0–5)
LYMPHOCYTES # BLD: 0.8 K/UL (ref 0.5–4.6)
LYMPHOCYTES NFR BLD: 7 % (ref 13–44)
MCH RBC QN AUTO: 30.2 PG (ref 26.1–32.9)
MCHC RBC AUTO-ENTMCNC: 30.6 G/DL (ref 31.4–35)
MCV RBC AUTO: 98.6 FL (ref 79.6–97.8)
MONOCYTES # BLD: 0.8 K/UL (ref 0.1–1.3)
MONOCYTES NFR BLD: 7 % (ref 4–12)
NEUTS SEG # BLD: 8.9 K/UL (ref 1.7–8.2)
NEUTS SEG NFR BLD: 82 % (ref 43–78)
NRBC # BLD: 0 K/UL (ref 0–0.2)
PLATELET # BLD AUTO: 128 K/UL (ref 150–450)
PMV BLD AUTO: 10.6 FL (ref 9.4–12.3)
POTASSIUM SERPL-SCNC: 3.3 MMOL/L (ref 3.5–5.1)
PROT SERPL-MCNC: 5.9 G/DL (ref 6.3–8.2)
RBC # BLD AUTO: 2.95 M/UL (ref 4.23–5.6)
SODIUM SERPL-SCNC: 142 MMOL/L (ref 138–145)
WBC # BLD AUTO: 10.9 K/UL (ref 4.3–11.1)

## 2021-03-08 PROCEDURE — 85025 COMPLETE CBC W/AUTO DIFF WBC: CPT

## 2021-03-08 PROCEDURE — 36415 COLL VENOUS BLD VENIPUNCTURE: CPT

## 2021-03-08 PROCEDURE — 80053 COMPREHEN METABOLIC PANEL: CPT

## 2021-03-10 LAB
ANION GAP SERPL CALC-SCNC: 4 MMOL/L (ref 7–16)
BUN SERPL-MCNC: 23 MG/DL (ref 8–23)
CALCIUM SERPL-MCNC: 8.7 MG/DL (ref 8.3–10.4)
CHLORIDE SERPL-SCNC: 110 MMOL/L (ref 98–107)
CO2 SERPL-SCNC: 30 MMOL/L (ref 21–32)
CREAT SERPL-MCNC: 1.99 MG/DL (ref 0.8–1.5)
ERYTHROCYTE [DISTWIDTH] IN BLOOD BY AUTOMATED COUNT: 17.9 % (ref 11.9–14.6)
GLUCOSE SERPL-MCNC: 176 MG/DL (ref 65–100)
HCT VFR BLD AUTO: 29.7 % (ref 41.1–50.3)
HGB BLD-MCNC: 9.5 G/DL (ref 13.6–17.2)
MCH RBC QN AUTO: 31 PG (ref 26.1–32.9)
MCHC RBC AUTO-ENTMCNC: 32 G/DL (ref 31.4–35)
MCV RBC AUTO: 97.1 FL (ref 79.6–97.8)
NRBC # BLD: 0 K/UL (ref 0–0.2)
PLATELET # BLD AUTO: 166 K/UL (ref 150–450)
PMV BLD AUTO: 10.2 FL (ref 9.4–12.3)
POTASSIUM SERPL-SCNC: 3.7 MMOL/L (ref 3.5–5.1)
RBC # BLD AUTO: 3.06 M/UL (ref 4.23–5.6)
SODIUM SERPL-SCNC: 144 MMOL/L (ref 136–145)
WBC # BLD AUTO: 11.7 K/UL (ref 4.3–11.1)

## 2021-03-10 PROCEDURE — 36415 COLL VENOUS BLD VENIPUNCTURE: CPT

## 2021-03-10 PROCEDURE — 80048 BASIC METABOLIC PNL TOTAL CA: CPT

## 2021-03-10 PROCEDURE — 85027 COMPLETE CBC AUTOMATED: CPT

## 2021-03-11 ENCOUNTER — HOSPITAL ENCOUNTER (OUTPATIENT)
Dept: INTERVENTIONAL RADIOLOGY/VASCULAR | Age: 61
Discharge: HOME | End: 2021-03-11
Attending: INTERNAL MEDICINE

## 2021-03-11 VITALS
WEIGHT: 183 LBS | HEART RATE: 85 BPM | RESPIRATION RATE: 16 BRPM | HEIGHT: 68 IN | BODY MASS INDEX: 27.74 KG/M2 | SYSTOLIC BLOOD PRESSURE: 198 MMHG | DIASTOLIC BLOOD PRESSURE: 99 MMHG | OXYGEN SATURATION: 95 % | TEMPERATURE: 97.8 F

## 2021-03-11 LAB
ANION GAP SERPL CALC-SCNC: 5 MMOL/L (ref 7–16)
BUN SERPL-MCNC: 22 MG/DL (ref 8–23)
CALCIUM SERPL-MCNC: 8.2 MG/DL (ref 8.3–10.4)
CHLORIDE SERPL-SCNC: 110 MMOL/L (ref 98–107)
CO2 SERPL-SCNC: 28 MMOL/L (ref 21–32)
CREAT SERPL-MCNC: 1.79 MG/DL (ref 0.8–1.5)
ERYTHROCYTE [DISTWIDTH] IN BLOOD BY AUTOMATED COUNT: 17.7 % (ref 11.9–14.6)
GLUCOSE SERPL-MCNC: 187 MG/DL (ref 65–100)
HCT VFR BLD AUTO: 27.9 % (ref 41.1–50.3)
HGB BLD-MCNC: 9 G/DL (ref 13.6–17.2)
MCH RBC QN AUTO: 31.5 PG (ref 26.1–32.9)
MCHC RBC AUTO-ENTMCNC: 32.3 G/DL (ref 31.4–35)
MCV RBC AUTO: 97.6 FL (ref 79.6–97.8)
NRBC # BLD: 0 K/UL (ref 0–0.2)
PLATELET # BLD AUTO: 156 K/UL (ref 150–450)
PMV BLD AUTO: 9.9 FL (ref 9.4–12.3)
POTASSIUM SERPL-SCNC: 3.4 MMOL/L (ref 3.5–5.1)
RBC # BLD AUTO: 2.86 M/UL (ref 4.23–5.6)
SODIUM SERPL-SCNC: 143 MMOL/L (ref 138–145)
WBC # BLD AUTO: 11.3 K/UL (ref 4.3–11.1)

## 2021-03-11 PROCEDURE — 36415 COLL VENOUS BLD VENIPUNCTURE: CPT

## 2021-03-11 PROCEDURE — 85027 COMPLETE CBC AUTOMATED: CPT

## 2021-03-11 PROCEDURE — 80048 BASIC METABOLIC PNL TOTAL CA: CPT

## 2021-03-11 PROCEDURE — 36589 REMOVAL TUNNELED CV CATH: CPT

## 2021-05-13 ENCOUNTER — TRANSCRIBE ORDER (OUTPATIENT)
Dept: SCHEDULING | Age: 61
End: 2021-05-13

## 2021-05-13 DIAGNOSIS — I82.409 ACUTE DVT (DEEP VENOUS THROMBOSIS) (HCC): Primary | ICD-10-CM

## 2021-06-03 PROBLEM — I48.0 PAROXYSMAL ATRIAL FIBRILLATION (HCC): Status: ACTIVE | Noted: 2021-01-22

## 2021-06-07 ENCOUNTER — HOSPITAL ENCOUNTER (OUTPATIENT)
Dept: ULTRASOUND IMAGING | Age: 61
Discharge: HOME | End: 2021-06-07
Attending: RADIOLOGY
Payer: COMMERCIAL

## 2021-06-07 DIAGNOSIS — I82.409 ACUTE DVT (DEEP VENOUS THROMBOSIS) (HCC): ICD-10-CM

## 2021-06-07 PROCEDURE — 93970 EXTREMITY STUDY: CPT

## 2021-06-10 NOTE — PROGRESS NOTES
"Pt just approached writer to say \"I'm really sick of the Dr's here, they're keeping me until Monday.\" \"They used a whole bottle of oxide in here to clean my room and it's making my allergies bad.\" Assured pt it wasn't oxide and that our room spray is scent free. Pt then walked away.  " TRANSFER - IN REPORT:    Verbal report received from [address] [facility], RN (name) on Kaitlin Huang.  being received from BMT (unit) for routine progression of care      Report consisted of patients Situation, Background, Assessment and   Recommendations(SBAR). Information from the following report(s) SBAR, Kardex, ED Summary, OR Summary, Procedure Summary, Intake/Output, MAR, Recent Results, Med Rec Status, Cardiac Rhythm Sinus, Alarm Parameters  and Quality Measures was reviewed with the receiving nurse. Opportunity for questions and clarification was provided. Assessment completed upon patients arrival to unit and care assumed. Dual skin assessment performed with Ketan Hernandez RN. No new deficits noted. Alevyn to sacrum in place. Sacrum red, blanchable. Heel boots in place.

## 2021-11-10 NOTE — PROGRESS NOTES
Pt back in afib with HR up to 130s-150s. Dialysis RN notified and UF decreased from 250/hr to 200/hr. 84 yo F with bibems with cardiac arrest, unknown down time, found down, unresponsive, per family.  25 minutes of CPR conducted by EMS, intubated in field, confirmed in ER with direct visualization/CO2.  Pt. cannot provide history, daughter notes she's been very ill, struggling to move and breathe for the last few days.  Daughter admits she felt this may be coming.   ROS: unobtainable from pt  PMH: stage IV Lung CA, COPD, CHF; Meds: See EMR for list; SH: Denies smoking/drinking/drug use

## 2022-03-18 PROBLEM — D64.9 ANEMIA: Status: ACTIVE | Noted: 2021-02-16

## 2022-03-18 PROBLEM — U07.1 PNEUMONIA DUE TO COVID-19 VIRUS: Status: ACTIVE | Noted: 2021-01-07

## 2022-03-18 PROBLEM — J12.82 PNEUMONIA DUE TO COVID-19 VIRUS: Status: ACTIVE | Noted: 2021-01-07

## 2022-03-19 PROBLEM — I82.452 ACUTE DEEP VEIN THROMBOSIS (DVT) OF LEFT PERONEAL VEIN (HCC): Status: ACTIVE | Noted: 2021-01-28

## 2022-03-19 PROBLEM — G62.81 CRITICAL ILLNESS POLYNEUROPATHY (HCC): Status: ACTIVE | Noted: 2021-02-12

## 2022-03-19 PROBLEM — E78.5 DYSLIPIDEMIA, GOAL LDL BELOW 70: Status: ACTIVE | Noted: 2018-11-29

## 2022-03-19 PROBLEM — I48.0 PAROXYSMAL ATRIAL FIBRILLATION (HCC): Status: ACTIVE | Noted: 2021-01-22

## 2022-03-20 PROBLEM — J95.851 VAP (VENTILATOR-ASSOCIATED PNEUMONIA) (HCC): Status: ACTIVE | Noted: 2021-02-09

## 2022-03-20 PROBLEM — K92.2 LOWER GI BLEED REQUIRING MORE THAN 4 UNITS OF BLOOD IN 24 HOURS, ICU, OR SURGERY: Status: ACTIVE | Noted: 2021-02-21

## 2022-03-20 PROBLEM — N17.9 AKI (ACUTE KIDNEY INJURY) (HCC): Status: ACTIVE | Noted: 2021-01-07

## 2022-03-20 PROBLEM — E11.00 TYPE 2 DIABETES MELLITUS WITH HYPEROSMOLARITY WITHOUT NONKETOTIC HYPERGLYCEMIC-HYPEROSMOLAR COMA (HCC): Status: ACTIVE | Noted: 2021-01-07

## 2022-03-20 PROBLEM — J96.01 ACUTE HYPOXEMIC RESPIRATORY FAILURE (HCC): Status: ACTIVE | Noted: 2021-01-07

## 2022-07-25 RX ORDER — HYDRALAZINE HYDROCHLORIDE 50 MG/1
TABLET, FILM COATED ORAL
Qty: 90 TABLET | Refills: 11 | Status: SHIPPED | OUTPATIENT
Start: 2022-07-25

## 2022-09-30 NOTE — ADT AUTH CERT NOTES
Pneumonia - Care Day 35 (2/10/2021) by Kaitlynn Gonsales, RN       Review Status Review Entered   Completed 2/11/2021 12:29      Criteria Review      Care Day: 35 Care Date: 2/10/2021 Level of Care: ICU    Guideline Day 3    Level Of Care    ( ) Floor to discharge [F]    2/11/2021 12:29:36 EST by Esau Monsivais      2/10 icu    Clinical Status    ( ) * Hemodynamic stability    2/11/2021 12:29:36 EST by Esau Monsivais      100 77 99/61 38 96% trach via vent  ekg st with freq pvcs nonspecific t wave abnormality    ( ) * Afebrile or temperature acceptable for next level of care    2/11/2021 12:29:36 EST by Esau Monsivais      100    ( ) * Tachypnea absent    2/11/2021 12:29:36 EST by Esau Monsivais      resp 25-34    ( ) * Hypoxemia absent    2/11/2021 12:29:36 EST by Esau Monsivais      abg vent via trach peep 10 fio2 50 pH 7.45 pco2 48. 3 pco2 100 hco3 33.3 so2 98  cxr unchanged bilateral lung edema or infiltrates    ( ) * Mental status at baseline    2/11/2021 12:29:36 EST by Sharon crocker following commands    ( ) * Antibiotic regimen acceptable for next level of care    ( ) * Discharge plans and education understood    Activity    (X) * Ambulatory or acceptable for next level of care    Routes    ( ) * Oral hydration, medications, and diet    2/11/2021 12:29:36 EST by Esau Monsivais      hd cath peg tube trach  meds via peg  precedex drip   iv fentanyl 50 mcg given x 4   fentanyl drip   lokelma 10 gm daily   iv apresoline 20 mg q6h prn given x1   lantus sc 15 units daily ssi lispro given 6 units x2 3 units x 3  iv ativan 1 mg q4h prn givenx2    Interventions    ( ) * Oxygen absent or at baseline need    ( ) * Isolation not indicated, or is performable at next level of care    2/11/2021 12:29:36 EST by Esau Monsivais      enteric contact isol    (X) WBC    2/11/2021 12:29:36 EST by Esau Monsivais      wbc 12.9 H&H 7.7 24.7 platelets 361   gluc 146 141 230 201 vanco random 15.5   new blood cx    Medications    ( ) Oral antibiotics    2/11/2021 12:29:36 EST by Maru Santos      iv eraxis 200 mg given x1  iv zosyn 3.375 gm dcd    * Milestone   Additional Notes   2/10    Per attending    Pt remains on vent,  To have dialysis today-+ 4500 ml since admit   Pt is awake but is still on precedex and fentanyl   Fever better last 24 hours   Lines: (insertion date)    Tracheostomy size #8 prox XLT, cuffed Shiley (2/2)   PEG 2/4   PICC 1/13   Art line 1/18/2021   Dialysis access 1/24/21, tunneled 2/8   Gimenez 1/17   Rectal tube 1/30   IVC filter 2/8       Drips: current dose (range)   precedex and fentanyl   Constitutional:  intubated and mechanically ventilated. EENMT:  Sclera clear, pupils equal, oral mucosa moist   Respiratory: rhonchi   Cardiovascular:  RRR with no M,G,R;   Gastrointestinal:  soft with no tenderness; positive bowel sounds present   Musculoskeletal:  warm with no cyanosis, no lower extremity edema   Skin:  no jaundice or ecchymosis   Neurologic: no gross neuro deficits      Psychiatric:  Awake, follows some commands   1   Sled today for fluid removal   2   On eraxis   3    Vent support- trial today of pressure support   --COVID-19 Meds:   Dexamethasone 6mg daily (EOT 1/16)   Convalescent plasma transfusion (1/7)   Remdesivir (EOT 1/12)       Micro Results:   BC: negative (1/30)   Sputum cx: normal resp vianney: (1/30)   Sputum 2/5 - moderate s aureus, moderate GNR -? colonization       Anti-infectives (start date):   azithro (completed)   Ceftriaxone (completed)   Vanc and cefepime: (completed)   Vanc and zosyn (started 2/5) stopped per id   eraxis started today per id      Per nephrology    SWATI - ATN, oliguric. Volume overload    For SLED again today and tomorrow    Clotting dialysis system: use minimum of heparin         Per id    Continue empiric trial of eraxis x 72 hours- fever improved overnight. Will order c. Diff testing today.     If ongoing fever, might also consider CTAP? Does have PEG tube which looks ok and no other acute findings but could potentially be source of fever      Pneumonia - Care Day 32 (2/7/2021) by Leonora Pulido RN       Review Status Review Entered   Completed 2/9/2021 15:31      Criteria Review      Care Day: 32 Care Date: 2/7/2021 Level of Care: ICU    Guideline Day 3    Level Of Care    ( ) Floor to discharge [F]    2/9/2021 15:31:25 EST by Da Rodgers      2/7 icu    Clinical Status    ( ) * Hemodynamic stability    2/9/2021 15:31:25 EST by Da Rodgers      100.6 85 119/57 30 94% vent via trach    ( ) * Afebrile or temperature acceptable for next level of care    2/9/2021 15:31:25 EST by Da Rodgers      tmax 100.6    ( ) * Tachypnea absent    2/9/2021 15:31:25 EST by Da Rodgers      resp 20-41    ( ) * Hypoxemia absent    2/9/2021 15:31:25 EST by Da Rodgers      abg vent trach fio2 40 peep 8 pH 7.45 pco2 43.4 po2 78 hco3 30  cxr mildly progressed bilateral lung infiltrates    ( ) * Mental status at baseline    2/9/2021 15:31:25 EST by aD Rodgers      sedated    ( ) * Antibiotic regimen acceptable for next level of care    ( ) * Discharge plans and education understood    Activity    (X) * Ambulatory or acceptable for next level of care    Routes    ( ) * Oral hydration, medications, and diet    2/9/2021 15:31:25 EST by Da Rodgers      meds via ng tube   iv fentanyl 50 mcg given x 2    fentanyl drip   iv apresoline 20 mg given x1   lantus sc 15 units daily sc   lispro sc 3 units x 4 6 units x2   iv ativan 1 mg given x 3 versed drip    Interventions    ( ) * Oxygen absent or at baseline need    ( ) * Isolation not indicated, or is performable at next level of care    (X) WBC    2/9/2021 15:31:25 EST by Da Rodgers      wbc 14.6 H&H 7.7 23.7 inr 1.2 fibrinogen 693 bun 71 cr 4.59 gluc 176 212 215 193 176    Medications    ( ) Oral antibiotics    2/9/2021 15:31:25 EST by Da Rodgers      iv zosyn 3.375 gm q12h    * Milestone   Additional Notes   2/7    Per attending    No major issues overnight. Is beginning to wake up but also become more tachypneic with RR in the 40's at times. Febrile last night to 101. Cultured on 2/5 for this reason. Sputum with GNR and staph. On vanc and zosyn. Pertinent Exam:             Constitutional:  trached and mechanically ventilated. Appears more tachypeic today. EENMT:  Sclera clear, pupils equal, oral mucosa moist   Respiratory: crackles bilaterally    Cardiovascular:  RRR with no M,G,R;   Gastrointestinal:  soft with no tenderness; positive bowel sounds present   Musculoskeletal:  warm with no cyanosis, 2+ pitting B lower extremity edema up to thigh   Skin:  no jaundice or ecchymosis   Neurologic: Asleep but awakens, tachypneic.      Psychiatric: as above   SWATI on SLED. Now trached, PEG.     Increasing tachypnea may simply be due to his ongoing parenchymal lung disease as above. PLAN:   -trached, cont to wean vent. Down to 40% Fio2 though becoming more tachypneic. Will try prn fentanyl to see if this helps. -on po amio for a fib   -cont sled per nephrology. -IVC filter on Monday. Did not tolerate anticoagulation with multiple attempts. Continues to require intermittent transfusions. Getting 1unit PRBC today. -on TF and a goal through PEG.    -restarted abx, day 3 vanc/zosyn. Sputum with GNR and staph. Still having fevers.     -prn oxycodone for pain control. Adding fentanyl as above   -s/p treatments for COVID. Now off droplet precautions. -needs SCD on R LE.    -cont prevacid   -consult placed for PT starting tomorrow   -Monday consider [facility] evaluation      Per neprhology    SWATI - ATN, Still oliguric. For SLED again on Monday 2/8/21, after IVC filter. .. No signs of renal recovery. Will IR for a tunnelled HD cath tomorrow, when he goes for IVC filter. ,         yes

## 2022-11-23 NOTE — PROGRESS NOTES
Report received from Mukesh Neri, [facility] Airway patent, Nasal mucosa clear. Mouth with normal mucosa. Throat has no vesicles, no oropharyngeal exudates and uvula is midline. Airway patent. Mouth with normal mucosa.

## 2023-04-03 NOTE — DIALYSIS
Problem: Pain  Goal: #Acceptable pain level achieved/maintained at rest using NRS/Faces  Description: This goal is used for patients who can self-report.  Acceptable means the level is at or below the identified comfort/function goal.  Outcome: Outcome Met, Continue evaluating goal progress toward completion     Problem: Cardiac Rhythm Disturbances with or without Devices  Goal: Hemodynamic stability achieved/maintained  Outcome: Outcome Met, Continue evaluating goal progress toward completion     Problem: Cardiac Rhythm Disturbances with or without Devices  Goal: Anxiety is controlled  Outcome: Outcome Met, Continue evaluating goal progress toward completion     Problem: Stroke: Ischemic (Transient/Permanent)  Goal: Verbalizes understanding of condition and treatment plan  Description: Document on Patient Education Activity  Outcome: Outcome Met, Continue evaluating goal progress toward completion     Problem: Pain  Goal: # Verbalizes understanding of pain management  Description: Documented in Patient Education Activity  Outcome: Outcome Met, Continue evaluating goal progress toward completion      12 HR SLED initiated using  CVC, right IJ. Aspirated and flushed both catheter ports without problem. Machine settings per MD order. 150  DFR  500ml/hr UFR  Heparin 300 unit bolus and 500 units/hr (citrasate unavailable at this time). Reported to primary nurse. Dialysis nurse available as needed.    B/P 153/73  HR 91 Initial SW/CM Assessment/Plan of Care Note     Baseline Assessment  74 year old admitted 11/13/2022 as Observation with a diagnosis of palpitations   Prior to admission patient was living with Spouse/significant other and residing at House.   Patient’s Primary Care Provider is Jacy Rosa DO.     Medical History  Past Medical History:   Diagnosis Date   • AF (atrial fibrillation) (CMS/[facility]ly [facility]) 11/2022   • Basal cell carcinoma (BCC) of skin of nose        Prior to Admission Status  Functional Status  Ambulation: Independent/Self  Bathing: Independent/Self  Dressing: Independent/Self  Toileting: Independent/Self  Transportation: Family    Agency/Support  Type of Services Prior to Hospitalization: None  Support Systems: Spouse/Significant other  Home Devices/Equipment: Blood glucose monitor  Mobility Assist Devices: Cane  Sensory Support Devices: Eyeglasses    Current Status  PT Ambulation Tips:    PT Transfer Tips:     OT Bathing Tips:    OT Dressing Tips:    OT Toileting Tips:    OT Feeding Tips:    SLP Swallow/Feeding Tips:    SLP Comm/Cog Tips:    Current Mental Status:    Stressors:      Insurance  Primary: [facility] BLUE CROSS MEDICARE ADVANTAGE  Secondary: N/A    Barriers to Discharge  Identified Barriers to Discharge/Transition Planning:      Progress Note  Met with patient  Introduced CM role. Verified facesheet. Pt. Lives w/ sps. sps can help & can  pt when ready for discharge    Plan  SW/CM - Recommendations for Discharge:     Home w/ sps  PT - Recommendations for Discharge:      OT - Recommendations for Discharge:      SLP - Recommendations for Discharge:     Anticipate patient will need post-hospital services. Necessary services are available.  Anticipate patient can return to the environment from which patient entered the hospital.   Anticipate patient can provide self-care at discharge.    Refer to SW/CM Flowsheet for Goals and objective data.      ROSITA rec'd MD referral to check insurance coverage for Eliquis.    ROSITA spoke with pharmacy technician at [facility] ([phone])- pt has monthly copay of $47 for Eliquis.    ROSITA [facility], [facility]  [phone]   Fall with Harm Risk

## 2023-04-24 NOTE — TELEPHONE ENCOUNTER
Hydralazine  Nitroglycerin   Plavix  Atorvastatin     Pharmacy: [facility] on [address] [facility] [address].

## 2023-04-25 RX ORDER — ATORVASTATIN CALCIUM 80 MG/1
80 TABLET, FILM COATED ORAL DAILY
Qty: 59 TABLET | Refills: 0 | Status: SHIPPED | OUTPATIENT
Start: 2023-04-25 | End: 2023-06-23

## 2023-04-25 RX ORDER — HYDRALAZINE HYDROCHLORIDE 50 MG/1
TABLET, FILM COATED ORAL
Qty: 90 TABLET | Refills: 0 | Status: SHIPPED | OUTPATIENT
Start: 2023-04-25 | End: 2023-06-23

## 2023-04-25 RX ORDER — CLOPIDOGREL BISULFATE 75 MG/1
75 TABLET ORAL DAILY
Qty: 59 TABLET | Refills: 0 | Status: SHIPPED | OUTPATIENT
Start: 2023-04-25 | End: 2023-06-23

## 2023-04-25 NOTE — TELEPHONE ENCOUNTER
Requested Prescriptions     Pending Prescriptions Disp Refills    hydrALAZINE (APRESOLINE) 50 MG tablet 90 tablet 0     Sig: TAKE ONE-HALF TABLET BY MOUTH THREE TIMES A DAY    clopidogrel (PLAVIX) 75 MG tablet 59 tablet 0     Sig: Take 1 tablet by mouth daily    atorvastatin (LIPITOR) 80 MG tablet 59 tablet 0     Sig: Take 1 tablet by mouth daily     Spoke with pt and informed he is due for a yearly check. Appt scheduled for 6/23/23.

## 2023-06-23 ENCOUNTER — OFFICE VISIT (OUTPATIENT)
Age: 63
End: 2023-06-23
Payer: COMMERCIAL

## 2023-06-23 VITALS
HEART RATE: 60 BPM | DIASTOLIC BLOOD PRESSURE: 82 MMHG | BODY MASS INDEX: 33.95 KG/M2 | HEIGHT: 68 IN | WEIGHT: 224 LBS | SYSTOLIC BLOOD PRESSURE: 138 MMHG

## 2023-06-23 DIAGNOSIS — I48.0 PAROXYSMAL ATRIAL FIBRILLATION (HCC): ICD-10-CM

## 2023-06-23 DIAGNOSIS — I10 PRIMARY HYPERTENSION: ICD-10-CM

## 2023-06-23 DIAGNOSIS — I25.10 CORONARY ARTERY DISEASE INVOLVING NATIVE CORONARY ARTERY OF NATIVE HEART WITHOUT ANGINA PECTORIS: Primary | ICD-10-CM

## 2023-06-23 PROCEDURE — 3075F SYST BP GE 130 - 139MM HG: CPT | Performed by: INTERNAL MEDICINE

## 2023-06-23 PROCEDURE — 99214 OFFICE O/P EST MOD 30 MIN: CPT | Performed by: INTERNAL MEDICINE

## 2023-06-23 PROCEDURE — 93000 ELECTROCARDIOGRAM COMPLETE: CPT | Performed by: INTERNAL MEDICINE

## 2023-06-23 PROCEDURE — 3079F DIAST BP 80-89 MM HG: CPT | Performed by: INTERNAL MEDICINE

## 2023-06-23 RX ORDER — HYDRALAZINE HYDROCHLORIDE 50 MG/1
TABLET, FILM COATED ORAL
Qty: 270 TABLET | Refills: 3 | Status: SHIPPED | OUTPATIENT
Start: 2023-06-23 | End: 2023-08-21

## 2023-06-23 RX ORDER — NITROGLYCERIN 0.4 MG/1
TABLET SUBLINGUAL
COMMUNITY
End: 2023-06-23 | Stop reason: SDUPTHER

## 2023-06-23 RX ORDER — METOPROLOL TARTRATE 100 MG/1
100 TABLET ORAL 2 TIMES DAILY
Qty: 180 TABLET | Refills: 3 | Status: SHIPPED | OUTPATIENT
Start: 2023-06-23

## 2023-06-23 RX ORDER — NITROGLYCERIN 0.4 MG/1
TABLET SUBLINGUAL
Qty: 25 TABLET | Refills: 11 | Status: SHIPPED | OUTPATIENT
Start: 2023-06-23

## 2023-06-23 RX ORDER — CLOPIDOGREL BISULFATE 75 MG/1
75 TABLET ORAL DAILY
Qty: 90 TABLET | Refills: 3 | Status: SHIPPED | OUTPATIENT
Start: 2023-06-23

## 2023-06-23 RX ORDER — AMIODARONE HYDROCHLORIDE 200 MG/1
200 TABLET ORAL DAILY
Qty: 90 TABLET | Refills: 3 | Status: SHIPPED | OUTPATIENT
Start: 2023-06-23

## 2023-06-23 RX ORDER — SEMAGLUTIDE 2.68 MG/ML
2 INJECTION, SOLUTION SUBCUTANEOUS
COMMUNITY
Start: 2022-10-25

## 2023-06-23 RX ORDER — AMLODIPINE BESYLATE 2.5 MG/1
2.5 TABLET ORAL DAILY
COMMUNITY
Start: 2023-05-05

## 2023-06-23 RX ORDER — ATORVASTATIN CALCIUM 80 MG/1
80 TABLET, FILM COATED ORAL DAILY
Qty: 90 TABLET | Refills: 3 | Status: SHIPPED | OUTPATIENT
Start: 2023-06-23

## 2023-06-23 NOTE — PROGRESS NOTES
[address], [address] [facility], [address] [place] [address]  PHONE: [phone]           HISTORY AND PHYSICAL          SUBJECTIVE:   Enrique Marte is a 58 y.o. male seen for a consultation visit regarding the following:     Chief Complaint   Patient presents with    Coronary Artery Disease    Atrial Fibrillation            HPI:    No cp or clements. No orthopnea or pnd. No palpitations or syncope. Allergies   Allergen Reactions    Latex Swelling     Had a lee catheter with swelling of urethra.  Only known latex issue in past. Wife remembers this now    Benzonatate Other (See Comments)    Hydrocodone-Acetaminophen Itching     Wife remembers this allergy     Past Medical History:   Diagnosis Date    Acute deep vein thrombosis (DVT) of left peroneal vein (HCC) 1/28/2021    Atrial fibrillation ([address] 01/22/2021    Critical illness polyneuropathy ([facility] 2/12/2021    Gastrointestinal disorder     reflux    GERD (gastroesophageal reflux disease)     Hypertension     Lower respiratory tract infection due to COVID-19 virus 1/7/2021    Nausea & vomiting     Obesity 10/6/2015    Other ill-defined conditions(799.89)     dyspnea since surgery, wheezing, SOB    Type 2 diabetes mellitus with hyperosmolarity without nonketotic hyperglycemic-hyperosmolar coma ([address] 1/7/2021    Unstable angina ([address] 10/27/2016     Past Surgical History:   Procedure Laterality Date    CHOLECYSTECTOMY      COLONOSCOPY  2/22/2021         COLONOSCOPY N/A 2/22/2021    COLONOSCOPY ROOM 3302 *vent performed by Newton Johnson MD at [address] N/A 2/25/2021    SIGMOIDOSCOPY FLEXIBLE performed by Newton Johnson MD at [address] 3/1/2021    SIGMOIDOSCOPY [address] performed by Alan Carlson MD at [facility] ENDOSCOPY    IR IVC FILTER PLACEMENT W IMAGING  2/8/2021    IR IVC FILTER PLACEMENT W IMAGING  2/8/2021    IR IVC FILTER PLACEMENT W IMAGING

## 2023-11-03 ENCOUNTER — HOSPITAL ENCOUNTER (EMERGENCY)
Age: 63
Discharge: HOME | End: 2023-11-03
Attending: EMERGENCY MEDICINE
Payer: COMMERCIAL

## 2023-11-03 ENCOUNTER — APPOINTMENT (OUTPATIENT)
Dept: GENERAL RADIOLOGY | Age: 63
End: 2023-11-03
Payer: COMMERCIAL

## 2023-11-03 ENCOUNTER — TELEPHONE (OUTPATIENT)
Age: 63
End: 2023-11-03

## 2023-11-03 VITALS
OXYGEN SATURATION: 95 % | RESPIRATION RATE: 12 BRPM | BODY MASS INDEX: 33.34 KG/M2 | WEIGHT: 220 LBS | HEIGHT: 68 IN | HEART RATE: 60 BPM | TEMPERATURE: 97.7 F | DIASTOLIC BLOOD PRESSURE: 76 MMHG | SYSTOLIC BLOOD PRESSURE: 169 MMHG

## 2023-11-03 DIAGNOSIS — R07.9 CHEST PAIN, UNSPECIFIED TYPE: Primary | ICD-10-CM

## 2023-11-03 LAB
ALBUMIN SERPL-MCNC: 3.5 G/DL (ref 3.2–4.6)
ALBUMIN/GLOB SERPL: 0.9 (ref 0.4–1.6)
ALP SERPL-CCNC: 124 U/L (ref 50–136)
ALT SERPL-CCNC: 49 U/L (ref 12–65)
ANION GAP SERPL CALC-SCNC: 6 MMOL/L (ref 2–11)
AST SERPL-CCNC: 43 U/L (ref 15–37)
BASOPHILS # BLD: 0.1 K/UL (ref 0–0.2)
BASOPHILS NFR BLD: 1 % (ref 0–2)
BILIRUB DIRECT SERPL-MCNC: 0.1 MG/DL
BILIRUB SERPL-MCNC: 0.5 MG/DL (ref 0.2–1.1)
BUN SERPL-MCNC: 26 MG/DL (ref 8–23)
CALCIUM SERPL-MCNC: 9.2 MG/DL (ref 8.3–10.4)
CHLORIDE SERPL-SCNC: 102 MMOL/L (ref 101–110)
CO2 SERPL-SCNC: 25 MMOL/L (ref 21–32)
CREAT SERPL-MCNC: 2.1 MG/DL (ref 0.8–1.5)
DIFFERENTIAL METHOD BLD: NORMAL
EKG ATRIAL RATE: 63 BPM
EKG DIAGNOSIS: NORMAL
EKG P AXIS: 36 DEGREES
EKG P-R INTERVAL: 176 MS
EKG Q-T INTERVAL: 446 MS
EKG QRS DURATION: 112 MS
EKG QTC CALCULATION (BAZETT): 457 MS
EKG R AXIS: -5 DEGREES
EKG T AXIS: 85 DEGREES
EKG VENTRICULAR RATE: 63 BPM
EOSINOPHIL # BLD: 0.2 K/UL (ref 0–0.8)
EOSINOPHIL NFR BLD: 1 % (ref 0.5–7.8)
ERYTHROCYTE [DISTWIDTH] IN BLOOD BY AUTOMATED COUNT: 13.1 % (ref 11.9–14.6)
GLOBULIN SER CALC-MCNC: 4 G/DL (ref 2.8–4.5)
GLUCOSE SERPL-MCNC: 334 MG/DL (ref 65–100)
HCT VFR BLD AUTO: 47.1 % (ref 41.1–50.3)
HGB BLD-MCNC: 16 G/DL (ref 13.6–17.2)
IMM GRANULOCYTES # BLD AUTO: 0.1 K/UL (ref 0–0.5)
IMM GRANULOCYTES NFR BLD AUTO: 1 % (ref 0–5)
LIPASE SERPL-CCNC: 449 U/L (ref 73–393)
LYMPHOCYTES # BLD: 2 K/UL (ref 0.5–4.6)
LYMPHOCYTES NFR BLD: 19 % (ref 13–44)
MAGNESIUM SERPL-MCNC: 2.3 MG/DL (ref 1.8–2.4)
MCH RBC QN AUTO: 29.7 PG (ref 26.1–32.9)
MCHC RBC AUTO-ENTMCNC: 34 G/DL (ref 31.4–35)
MCV RBC AUTO: 87.4 FL (ref 82–102)
MONOCYTES # BLD: 0.8 K/UL (ref 0.1–1.3)
MONOCYTES NFR BLD: 7 % (ref 4–12)
NEUTS SEG # BLD: 7.7 K/UL (ref 1.7–8.2)
NEUTS SEG NFR BLD: 71 % (ref 43–78)
NRBC # BLD: 0 K/UL (ref 0–0.2)
PLATELET # BLD AUTO: 179 K/UL (ref 150–450)
PMV BLD AUTO: 10.1 FL (ref 9.4–12.3)
POTASSIUM SERPL-SCNC: 4.2 MMOL/L (ref 3.5–5.1)
PROT SERPL-MCNC: 7.5 G/DL (ref 6.3–8.2)
RBC # BLD AUTO: 5.39 M/UL (ref 4.23–5.6)
SODIUM SERPL-SCNC: 133 MMOL/L (ref 133–143)
TROPONIN I SERPL HS-MCNC: 17 PG/ML (ref 0–14)
TROPONIN I SERPL HS-MCNC: 18 PG/ML (ref 0–14)
WBC # BLD AUTO: 10.8 K/UL (ref 4.3–11.1)

## 2023-11-03 PROCEDURE — 83690 ASSAY OF LIPASE: CPT

## 2023-11-03 PROCEDURE — 96374 THER/PROPH/DIAG INJ IV PUSH: CPT

## 2023-11-03 PROCEDURE — 93010 ELECTROCARDIOGRAM REPORT: CPT | Performed by: INTERNAL MEDICINE

## 2023-11-03 PROCEDURE — 80048 BASIC METABOLIC PNL TOTAL CA: CPT

## 2023-11-03 PROCEDURE — 80076 HEPATIC FUNCTION PANEL: CPT

## 2023-11-03 PROCEDURE — 99285 EMERGENCY DEPT VISIT HI MDM: CPT

## 2023-11-03 PROCEDURE — 96375 TX/PRO/DX INJ NEW DRUG ADDON: CPT

## 2023-11-03 PROCEDURE — 6360000002 HC RX W HCPCS: Performed by: EMERGENCY MEDICINE

## 2023-11-03 PROCEDURE — 84484 ASSAY OF TROPONIN QUANT: CPT

## 2023-11-03 PROCEDURE — 85025 COMPLETE CBC W/AUTO DIFF WBC: CPT

## 2023-11-03 PROCEDURE — 71046 X-RAY EXAM CHEST 2 VIEWS: CPT

## 2023-11-03 PROCEDURE — 93005 ELECTROCARDIOGRAM TRACING: CPT | Performed by: EMERGENCY MEDICINE

## 2023-11-03 PROCEDURE — 83735 ASSAY OF MAGNESIUM: CPT

## 2023-11-03 PROCEDURE — 6370000000 HC RX 637 (ALT 250 FOR IP): Performed by: EMERGENCY MEDICINE

## 2023-11-03 RX ORDER — MORPHINE SULFATE 4 MG/ML
4 INJECTION INTRAVENOUS
Status: DISCONTINUED | OUTPATIENT
Start: 2023-11-03 | End: 2023-11-03 | Stop reason: HOSPADM

## 2023-11-03 RX ORDER — ONDANSETRON 2 MG/ML
4 INJECTION INTRAMUSCULAR; INTRAVENOUS
Status: COMPLETED | OUTPATIENT
Start: 2023-11-03 | End: 2023-11-03

## 2023-11-03 RX ORDER — ASPIRIN 81 MG/1
243 TABLET, CHEWABLE ORAL ONCE
Status: COMPLETED | OUTPATIENT
Start: 2023-11-03 | End: 2023-11-03

## 2023-11-03 RX ADMIN — MORPHINE SULFATE 4 MG: 4 INJECTION INTRAVENOUS at 15:05

## 2023-11-03 RX ADMIN — NITROGLYCERIN 1 INCH: 20 OINTMENT TOPICAL at 15:04

## 2023-11-03 RX ADMIN — ASPIRIN 243 MG: 81 TABLET, CHEWABLE ORAL at 15:05

## 2023-11-03 RX ADMIN — ONDANSETRON 4 MG: 2 INJECTION INTRAMUSCULAR; INTRAVENOUS at 16:53

## 2023-11-03 ASSESSMENT — PAIN DESCRIPTION - LOCATION
LOCATION: CHEST
LOCATION: CHEST

## 2023-11-03 ASSESSMENT — PAIN DESCRIPTION - ORIENTATION: ORIENTATION: LEFT

## 2023-11-03 ASSESSMENT — PAIN SCALES - GENERAL
PAINLEVEL_OUTOF10: 8
PAINLEVEL_OUTOF10: 6

## 2023-11-03 ASSESSMENT — LIFESTYLE VARIABLES
HOW MANY STANDARD DRINKS CONTAINING ALCOHOL DO YOU HAVE ON A TYPICAL DAY: PATIENT DOES NOT DRINK
HOW OFTEN DO YOU HAVE A DRINK CONTAINING ALCOHOL: NEVER

## 2023-11-03 ASSESSMENT — PAIN - FUNCTIONAL ASSESSMENT: PAIN_FUNCTIONAL_ASSESSMENT: 0-10

## 2023-11-03 NOTE — ED TRIAGE NOTES
Pt arrives ambulatory to ER reporting chest pain. Pt reports left sided-chest pain since yesterday unrelieved by nitro and antacids. Pt had a stent pt in in 2015.

## 2023-11-03 NOTE — TELEPHONE ENCOUNTER
Almost constant pinching pain in left side of chest under left breast, about a 6 on a scale of 1-10 x 2 days. \"Jerking\" pain in left side of chest that takes his breath away when he bends over. NTG x 1 slightly decreased chest pain on 11/1/23. Antacids not helping. No nausea or diaphoresis with chest pain. Has not checked BP or HR at home. History includes FARA, DVT, and a fib. Has not been taking amlodipine, amiodarone, or metoprolol. Taking Plavix 75 mg qd hydralazine 50 mg BID, and ASA 81 mg qd. Patient asks for stress test, today. Advised [facility], now. Reviewed NTG instructions with patient. Advised patient to go to [facility] ER for immediate evaluation of any chest pain or SOB not relieved by NTG.

## 2023-11-03 NOTE — ED PROVIDER NOTES
4.6 K/UL    Monocytes Absolute 0.8 0.1 - 1.3 K/UL    Eosinophils Absolute 0.2 0.0 - 0.8 K/UL    Basophils Absolute 0.1 0.0 - 0.2 K/UL    Absolute Immature Granulocyte 0.1 0.0 - 0.5 K/UL   Troponin   Result Value Ref Range    Troponin, High Sensitivity 18.0 (H) 0 - 14 pg/mL   Troponin   Result Value Ref Range    Troponin, High Sensitivity 17.0 (H) 0 - 14 pg/mL   Hepatic Function Panel   Result Value Ref Range    Total Protein 7.5 6.3 - 8.2 g/dL    Albumin 3.5 3.2 - 4.6 g/dL    Globulin 4.0 2.8 - 4.5 g/dL    Albumin/Globulin Ratio 0.9 0.4 - 1.6      Total Bilirubin 0.5 0.2 - 1.1 MG/DL    Bilirubin, Direct 0.1 <0.4 MG/DL    Alk Phosphatase 124 50 - 136 U/L    AST 43 (H) 15 - 37 U/L    ALT 49 12 - 65 U/L   Lipase   Result Value Ref Range    Lipase 449 (H) 73 - 393 U/L   Magnesium   Result Value Ref Range    Magnesium 2.3 1.8 - 2.4 mg/dL   EKG 12 Lead   Result Value Ref Range    Ventricular Rate 63 BPM    Atrial Rate 63 BPM    P-R Interval 176 ms    QRS Duration 112 ms    Q-T Interval 446 ms    QTc Calculation (Bazett) 457 ms    P Axis 36 degrees    R Axis -5 degrees    T Axis 85 degrees    Diagnosis       Sinus rhythm  Borderline intraventricular conduction delay    Confirmed by MD JAMES DANIEL ([id number]) on 11/3/2023 2:34:49 PM          XR CHEST (2 VW)   Final Result   No acute cardiopulmonary abnormality. Voice dictation software was used during the making of this note. This software is not perfect and grammatical and other typographical errors may be present. This note has not been completely proofread for errors.      Ashutosh Rendon MD  11/03/23 2009

## 2023-11-13 ENCOUNTER — TELEPHONE (OUTPATIENT)
Age: 63
End: 2023-11-13

## 2023-11-13 NOTE — TELEPHONE ENCOUNTER
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

## 2023-11-21 ENCOUNTER — OFFICE VISIT (OUTPATIENT)
Age: 63
End: 2023-11-21
Payer: COMMERCIAL

## 2023-11-21 VITALS
DIASTOLIC BLOOD PRESSURE: 70 MMHG | SYSTOLIC BLOOD PRESSURE: 160 MMHG | HEIGHT: 68 IN | HEART RATE: 64 BPM | WEIGHT: 221 LBS | BODY MASS INDEX: 33.49 KG/M2

## 2023-11-21 DIAGNOSIS — E78.5 DYSLIPIDEMIA, GOAL LDL BELOW 70: ICD-10-CM

## 2023-11-21 DIAGNOSIS — I10 PRIMARY HYPERTENSION: ICD-10-CM

## 2023-11-21 DIAGNOSIS — I25.10 CORONARY ARTERY DISEASE INVOLVING NATIVE CORONARY ARTERY OF NATIVE HEART WITHOUT ANGINA PECTORIS: Primary | ICD-10-CM

## 2023-11-21 PROCEDURE — 3078F DIAST BP <80 MM HG: CPT | Performed by: INTERNAL MEDICINE

## 2023-11-21 PROCEDURE — 99214 OFFICE O/P EST MOD 30 MIN: CPT | Performed by: INTERNAL MEDICINE

## 2023-11-21 PROCEDURE — 3077F SYST BP >= 140 MM HG: CPT | Performed by: INTERNAL MEDICINE

## 2023-11-21 RX ORDER — AMLODIPINE BESYLATE 5 MG/1
5 TABLET ORAL DAILY
Qty: 90 TABLET | Refills: 3 | Status: SHIPPED | OUTPATIENT
Start: 2023-11-21

## 2023-11-21 RX ORDER — TRAZODONE HYDROCHLORIDE 100 MG/1
TABLET ORAL DAILY
COMMUNITY

## 2023-11-21 RX ORDER — HYDRALAZINE HYDROCHLORIDE 100 MG/1
TABLET, FILM COATED ORAL
Qty: 270 TABLET | Refills: 3 | Status: SHIPPED | OUTPATIENT
Start: 2023-11-21 | End: 2024-04-20

## 2023-11-21 RX ORDER — ATORVASTATIN CALCIUM 80 MG/1
80 TABLET, FILM COATED ORAL DAILY
Qty: 90 TABLET | Refills: 3 | Status: SHIPPED | OUTPATIENT
Start: 2023-11-21

## 2023-11-21 RX ORDER — CLOPIDOGREL BISULFATE 75 MG/1
75 TABLET ORAL DAILY
Qty: 90 TABLET | Refills: 3 | Status: SHIPPED | OUTPATIENT
Start: 2023-11-21

## 2023-11-21 RX ORDER — AMIODARONE HYDROCHLORIDE 200 MG/1
200 TABLET ORAL DAILY
Qty: 90 TABLET | Refills: 3 | Status: SHIPPED | OUTPATIENT
Start: 2023-11-21

## 2023-11-21 RX ORDER — NITROGLYCERIN 0.4 MG/1
TABLET SUBLINGUAL
Qty: 25 TABLET | Refills: 11 | Status: SHIPPED | OUTPATIENT
Start: 2023-11-21

## 2023-11-21 RX ORDER — METOPROLOL TARTRATE 100 MG/1
100 TABLET ORAL 2 TIMES DAILY
Qty: 180 TABLET | Refills: 3 | Status: SHIPPED | OUTPATIENT
Start: 2023-11-21

## 2023-11-21 NOTE — PROGRESS NOTES
[facility] CARDIOLOGY  [id number] [place] [address]  PHONE: [phone]      23    NAME:  Ashlee Matthews. : 1960  MRN: [id number]       SUBJECTIVE:   Ashlee Matthews is a 61 y.o. male seen for a follow up visit regarding the following:     Chief Complaint   Patient presents with    Coronary Artery Disease         HPI:    No clements. No orthopnea or pnd. No palpitations or syncope. Seen in er with cp and  normal ekg and trop. Mild cad at cath 2018. Single episode with no recurrence. One hour. No aggravating or alleviating factors. No associated. Past Medical History, Past Surgical History, Family history, Social History, and Medications were all reviewed with the patient today and updated as necessary. Current Outpatient Medications   Medication Sig Dispense Refill    traZODone (DESYREL) 100 MG tablet daily      amLODIPine (NORVASC) 2.5 MG tablet Take 1 tablet by mouth daily      Semaglutide, 2 MG/DOSE, (OZEMPIC, 2 MG/DOSE,) 8 MG/3ML SOPN Inject 2 mg into the skin every 7 days      MAGNESIUM PO Take 200 mg by mouth daily      clopidogrel (PLAVIX) 75 MG tablet Take 1 tablet by mouth daily 90 tablet 3    nitroGLYCERIN (NITROSTAT) 0.4 MG SL tablet PLACE ONE TABLET UNDER THE TONGUE AT ONSET OF CHEST PAIN. MAY REPEAT EVERY 5 MINUTES AS NEEDED FOR CHEST PAIN UP TO 3 TABLETS IN 15 MINUTES.  25 tablet 11    hydrALAZINE (APRESOLINE) 50 MG tablet TAKE ONE-HALF TABLET BY MOUTH THREE TIMES A  tablet 3    atorvastatin (LIPITOR) 80 MG tablet Take 1 tablet by mouth daily 90 tablet 3    metoprolol (LOPRESSOR) 100 MG tablet Take 1 tablet by mouth 2 times daily 180 tablet 3    amiodarone (CORDARONE) 200 MG tablet Take 1 tablet by mouth daily 90 tablet 3    acetaminophen (TYLENOL) 325 MG tablet 2 tablets by Nasogastric route every 6 hours as needed      aspirin 81 MG EC tablet Take 1 tablet by mouth daily      vitamin D 25 MCG (1000 UT) CAPS TAKE TWO CAPSULES BY MOUTH ONE

## 2024-04-29 RX ORDER — CLOPIDOGREL BISULFATE 75 MG/1
75 TABLET ORAL DAILY
Qty: 90 TABLET | Refills: 0 | Status: SHIPPED | OUTPATIENT
Start: 2024-04-29

## 2024-04-29 RX ORDER — AMLODIPINE BESYLATE 5 MG/1
5 TABLET ORAL DAILY
Qty: 90 TABLET | Refills: 0 | Status: SHIPPED | OUTPATIENT
Start: 2024-04-29

## 2024-04-29 RX ORDER — ATORVASTATIN CALCIUM 80 MG/1
80 TABLET, FILM COATED ORAL DAILY
Qty: 90 TABLET | Refills: 0 | Status: SHIPPED | OUTPATIENT
Start: 2024-04-29

## 2024-04-29 RX ORDER — HYDRALAZINE HYDROCHLORIDE 100 MG/1
TABLET, FILM COATED ORAL
Qty: 270 TABLET | Refills: 0 | Status: SHIPPED | OUTPATIENT
Start: 2024-04-29 | End: 2024-09-27

## 2024-04-29 RX ORDER — METOPROLOL TARTRATE 100 MG/1
100 TABLET ORAL 2 TIMES DAILY
Qty: 180 TABLET | Refills: 0 | Status: SHIPPED | OUTPATIENT
Start: 2024-04-29

## 2024-04-29 RX ORDER — AMIODARONE HYDROCHLORIDE 200 MG/1
200 TABLET ORAL DAILY
Qty: 90 TABLET | Refills: 0 | Status: SHIPPED | OUTPATIENT
Start: 2024-04-29

## 2024-04-29 NOTE — TELEPHONE ENCOUNTER
Called patient to inform he is due for a follow up with . Appointment given for 6/26/24.  Requested Prescriptions     Pending Prescriptions Disp Refills    amiodarone (CORDARONE) 200 MG tablet 90 tablet 0     Sig: Take 1 tablet by mouth daily    amLODIPine (NORVASC) 5 MG tablet 90 tablet 0     Sig: Take 1 tablet by mouth daily    atorvastatin (LIPITOR) 80 MG tablet 90 tablet 0     Sig: Take 1 tablet by mouth daily    clopidogrel (PLAVIX) 75 MG tablet 90 tablet 0     Sig: Take 1 tablet by mouth daily    hydrALAZINE (APRESOLINE) 100 MG tablet 270 tablet 0     Sig: TAKE ONE-HALF TABLET BY MOUTH THREE TIMES A DAY    metoprolol (LOPRESSOR) 100 MG tablet 180 tablet 0     Sig: Take 1 tablet by mouth 2 times daily

## 2024-04-29 NOTE — TELEPHONE ENCOUNTER
MEDICATION REFILL REQUEST      Name of Medication:  Clopidogrel  Dose:  75 mg  Frequency:  QD  Quantity:  90  Days' supply:  90    Pharmacy Name/Location:  [place] [address]    MEDICATION REFILL REQUEST      Name of Medication:  Hydralazine  Dose:  100 mg  Frequency: TID  Quantity:  270  Days' supply:  90 with refills      Pharmacy Name/Location:  [place] [address]    MEDICATION REFILL REQUEST      Name of Medication:  Hydroxyzine  Dose:  50 mg  Frequency:  QD  Quantity:  90  Days' supply:  90 with refills      Pharmacy Name/Location:  [place] [address]    MEDICATION REFILL REQUEST      Name of Medication:  Amiodarone  Dose:  200 mg  Frequency:  QD  Quantity:  90  Days' supply:  90      Pharmacy Name/Location:  [place] [address]    MEDICATION REFILL REQUEST      Name of Medication:  Atorvastatin  Dose:  80mg  Frequency:  QD  Quantity:  90  Days' supply:  90      Pharmacy Name/Location:  [place] [address]    MEDICATION REFILL REQUEST      Name of Medication:  Metoprolol  Dose:  100 mg  Frequency:  BID  Quantity:  180  Days' supply:  90      Pharmacy Name/Location:  [place] [address]

## 2024-06-06 RX ORDER — METOPROLOL TARTRATE 100 MG/1
100 TABLET ORAL 2 TIMES DAILY
Qty: 180 TABLET | Refills: 1 | Status: SHIPPED | OUTPATIENT
Start: 2024-06-06

## 2024-06-06 RX ORDER — CLOPIDOGREL BISULFATE 75 MG/1
75 TABLET ORAL DAILY
Qty: 90 TABLET | Refills: 1 | Status: SHIPPED | OUTPATIENT
Start: 2024-06-06

## 2024-06-06 RX ORDER — ATORVASTATIN CALCIUM 80 MG/1
80 TABLET, FILM COATED ORAL DAILY
Qty: 90 TABLET | Refills: 1 | Status: SHIPPED | OUTPATIENT
Start: 2024-06-06

## 2024-06-06 RX ORDER — AMIODARONE HYDROCHLORIDE 200 MG/1
200 TABLET ORAL DAILY
Qty: 90 TABLET | Refills: 1 | Status: SHIPPED | OUTPATIENT
Start: 2024-06-06

## 2024-06-06 RX ORDER — AMLODIPINE BESYLATE 5 MG/1
5 TABLET ORAL DAILY
Qty: 90 TABLET | Refills: 1 | Status: SHIPPED | OUTPATIENT
Start: 2024-06-06

## 2024-06-06 RX ORDER — HYDRALAZINE HYDROCHLORIDE 100 MG/1
TABLET, FILM COATED ORAL
Qty: 270 TABLET | Refills: 1 | Status: SHIPPED | OUTPATIENT
Start: 2024-06-06 | End: 2024-11-04

## 2024-06-06 NOTE — TELEPHONE ENCOUNTER
MEDICATION REFILL REQUEST      Name of Medication:  Amlodipine   Dose:    Frequency:    Quantity:    Days' supply:  90      Pharmacy Name/Location:  [facility]    MEDICATION REFILL REQUEST      Name of Medication:  hydralazine   Dose:    Frequency:    Quantity:    Days' supply:  90      Pharmacy Name/Location:  [facility]    MEDICATION REFILL REQUEST      Name of Medication:  Hydroxyzine   Dose:    Frequency:    Quantity:    Days' supply:  90      Pharmacy Name/Location:  [facility]

## 2024-06-06 NOTE — TELEPHONE ENCOUNTER
Requested Prescriptions     Pending Prescriptions Disp Refills    amiodarone (CORDARONE) 200 MG tablet 90 tablet 1     Sig: Take 1 tablet by mouth daily    amLODIPine (NORVASC) 5 MG tablet 90 tablet 1     Sig: Take 1 tablet by mouth daily    atorvastatin (LIPITOR) 80 MG tablet 90 tablet 1     Sig: Take 1 tablet by mouth daily    clopidogrel (PLAVIX) 75 MG tablet 90 tablet 1     Sig: Take 1 tablet by mouth daily    hydrALAZINE (APRESOLINE) 100 MG tablet 270 tablet 1     Sig: TAKE ONE-HALF TABLET BY MOUTH THREE TIMES A DAY    metoprolol (LOPRESSOR) 100 MG tablet 180 tablet 1     Sig: Take 1 tablet by mouth 2 times daily

## 2024-06-25 ENCOUNTER — OFFICE VISIT (OUTPATIENT)
Age: 64
End: 2024-06-25
Payer: COMMERCIAL

## 2024-06-25 VITALS
BODY MASS INDEX: 34.25 KG/M2 | HEART RATE: 52 BPM | SYSTOLIC BLOOD PRESSURE: 106 MMHG | WEIGHT: 226 LBS | HEIGHT: 68 IN | DIASTOLIC BLOOD PRESSURE: 60 MMHG

## 2024-06-25 DIAGNOSIS — I10 PRIMARY HYPERTENSION: ICD-10-CM

## 2024-06-25 DIAGNOSIS — E78.5 DYSLIPIDEMIA, GOAL LDL BELOW 70: ICD-10-CM

## 2024-06-25 DIAGNOSIS — I48.0 PAROXYSMAL ATRIAL FIBRILLATION (HCC): Primary | ICD-10-CM

## 2024-06-25 DIAGNOSIS — I25.10 CORONARY ARTERY DISEASE INVOLVING NATIVE CORONARY ARTERY OF NATIVE HEART WITHOUT ANGINA PECTORIS: ICD-10-CM

## 2024-06-25 PROCEDURE — 3078F DIAST BP <80 MM HG: CPT | Performed by: INTERNAL MEDICINE

## 2024-06-25 PROCEDURE — 99214 OFFICE O/P EST MOD 30 MIN: CPT | Performed by: INTERNAL MEDICINE

## 2024-06-25 PROCEDURE — 3074F SYST BP LT 130 MM HG: CPT | Performed by: INTERNAL MEDICINE

## 2024-06-25 RX ORDER — HYDROXYZINE 50 MG/1
50 TABLET, FILM COATED ORAL EVERY EVENING
Qty: 30 TABLET | Refills: 3 | Status: SHIPPED | OUTPATIENT
Start: 2024-06-25

## 2024-06-25 RX ORDER — HYDRALAZINE HYDROCHLORIDE 100 MG/1
TABLET, FILM COATED ORAL
Qty: 270 TABLET | Refills: 1 | Status: SHIPPED | OUTPATIENT
Start: 2024-06-25 | End: 2024-11-23

## 2024-06-25 RX ORDER — AMLODIPINE BESYLATE 5 MG/1
5 TABLET ORAL DAILY
Qty: 90 TABLET | Refills: 3 | Status: SHIPPED | OUTPATIENT
Start: 2024-06-25

## 2024-06-25 NOTE — PROGRESS NOTES
[facility] CARDIOLOGY  [address] [place] [address], [address]  [place] [address]  PHONE: [phone]      24    NAME:  Vaughn Palacio Jr.  : 1960  MRN: [id number]       SUBJECTIVE:   Vaughn Palacio Jr. is a 63 y.o. male seen for a follow up visit regarding the following:     Chief Complaint   Patient presents with    Coronary Artery Disease    Hyperlipidemia         HPI:    No cp or clements. No orthopnea or pnd. No palpitations or syncope.      Past Medical History, Past Surgical History, Family history, Social History, and Medications were all reviewed with the patient today and updated as necessary.     Current Outpatient Medications   Medication Sig Dispense Refill    amiodarone (CORDARONE) 200 MG tablet Take 1 tablet by mouth daily 90 tablet 1    amLODIPine (NORVASC) 5 MG tablet Take 1 tablet by mouth daily 90 tablet 1    atorvastatin (LIPITOR) 80 MG tablet Take 1 tablet by mouth daily 90 tablet 1    clopidogrel (PLAVIX) 75 MG tablet Take 1 tablet by mouth daily 90 tablet 1    hydrALAZINE (APRESOLINE) 100 MG tablet TAKE ONE-HALF TABLET BY MOUTH THREE TIMES A  tablet 1    metoprolol (LOPRESSOR) 100 MG tablet Take 1 tablet by mouth 2 times daily 180 tablet 1    nitroGLYCERIN (NITROSTAT) 0.4 MG SL tablet PLACE ONE TABLET UNDER THE TONGUE AT ONSET OF CHEST PAIN. MAY REPEAT EVERY 5 MINUTES AS NEEDED FOR CHEST PAIN UP TO 3 TABLETS IN 15 MINUTES. 25 tablet 11    traZODone (DESYREL) 100 MG tablet daily      Semaglutide, 2 MG/DOSE, (OZEMPIC, 2 MG/DOSE,) 8 MG/3ML SOPN Inject 2 mg into the skin every 7 days      MAGNESIUM PO Take 200 mg by mouth daily      acetaminophen (TYLENOL) 325 MG tablet 2 tablets by Nasogastric route every 6 hours as needed      aspirin 81 MG EC tablet Take 1 tablet by mouth daily      vitamin D 25 MCG (1000 UT) CAPS TAKE TWO CAPSULES BY MOUTH ONE TIME DAILY      ferrous sulfate (IRON 325) 325 (65 Fe) MG tablet TAKE ONE TABLET BY MOUTH ONE TIME DAILY      hydrOXYzine (ATARAX)

## 2024-08-12 RX ORDER — AMLODIPINE BESYLATE 5 MG/1
5 TABLET ORAL DAILY
Qty: 90 TABLET | Refills: 3 | Status: SHIPPED | OUTPATIENT
Start: 2024-08-12

## 2024-08-12 NOTE — TELEPHONE ENCOUNTER
Requested Prescriptions     Pending Prescriptions Disp Refills    amLODIPine (NORVASC) 5 MG tablet [Pharmacy Med Name: AMLODIPINE 5 MG TAB[*]] 90 tablet 3     Sig: TAKE ONE TABLET BY MOUTH ONE TIME DAILY

## 2024-09-13 ENCOUNTER — OFFICE VISIT (OUTPATIENT)
Dept: FAMILY MEDICINE CLINIC | Facility: CLINIC | Age: 64
End: 2024-09-13
Payer: COMMERCIAL

## 2024-09-13 VITALS
DIASTOLIC BLOOD PRESSURE: 78 MMHG | SYSTOLIC BLOOD PRESSURE: 130 MMHG | BODY MASS INDEX: 32.67 KG/M2 | HEIGHT: 68 IN | TEMPERATURE: 98.1 F | RESPIRATION RATE: 15 BRPM | OXYGEN SATURATION: 95 % | WEIGHT: 215.6 LBS | HEART RATE: 57 BPM

## 2024-09-13 DIAGNOSIS — I25.10 CORONARY ARTERY DISEASE INVOLVING NATIVE CORONARY ARTERY OF NATIVE HEART WITHOUT ANGINA PECTORIS: ICD-10-CM

## 2024-09-13 DIAGNOSIS — G47.00 INSOMNIA, UNSPECIFIED TYPE: ICD-10-CM

## 2024-09-13 DIAGNOSIS — I10 PRIMARY HYPERTENSION: ICD-10-CM

## 2024-09-13 DIAGNOSIS — Z95.828 S/P IVC FILTER: ICD-10-CM

## 2024-09-13 DIAGNOSIS — Z86.718 HISTORY OF DVT IN ADULTHOOD: ICD-10-CM

## 2024-09-13 DIAGNOSIS — E55.9 VITAMIN D DEFICIENCY: ICD-10-CM

## 2024-09-13 DIAGNOSIS — Z79.4 TYPE 2 DIABETES MELLITUS WITH HYPERGLYCEMIA, WITH LONG-TERM CURRENT USE OF INSULIN (HCC): ICD-10-CM

## 2024-09-13 DIAGNOSIS — I48.0 PAROXYSMAL ATRIAL FIBRILLATION (HCC): Primary | ICD-10-CM

## 2024-09-13 DIAGNOSIS — E78.5 HYPERLIPIDEMIA, UNSPECIFIED HYPERLIPIDEMIA TYPE: ICD-10-CM

## 2024-09-13 DIAGNOSIS — Z12.5 SPECIAL SCREENING FOR MALIGNANT NEOPLASM OF PROSTATE: ICD-10-CM

## 2024-09-13 DIAGNOSIS — D50.9 IRON DEFICIENCY ANEMIA, UNSPECIFIED IRON DEFICIENCY ANEMIA TYPE: ICD-10-CM

## 2024-09-13 DIAGNOSIS — E11.65 TYPE 2 DIABETES MELLITUS WITH HYPERGLYCEMIA, WITH LONG-TERM CURRENT USE OF INSULIN (HCC): ICD-10-CM

## 2024-09-13 DIAGNOSIS — Z00.00 LABORATORY EXAM ORDERED AS PART OF ROUTINE GENERAL MEDICAL EXAMINATION: ICD-10-CM

## 2024-09-13 DIAGNOSIS — Z23 ENCOUNTER FOR IMMUNIZATION: ICD-10-CM

## 2024-09-13 DIAGNOSIS — Z12.11 SCREENING FOR COLON CANCER: ICD-10-CM

## 2024-09-13 LAB
25(OH)D3 SERPL-MCNC: 25.8 NG/ML (ref 30–100)
ALBUMIN SERPL-MCNC: 3.6 G/DL (ref 3.2–4.6)
ALBUMIN/GLOB SERPL: 1.1 (ref 1–1.9)
ALP SERPL-CCNC: 83 U/L (ref 40–129)
ALT SERPL-CCNC: 26 U/L (ref 12–65)
ANION GAP SERPL CALC-SCNC: 16 MMOL/L (ref 9–18)
AST SERPL-CCNC: 24 U/L (ref 15–37)
BASOPHILS # BLD: 0.1 K/UL (ref 0–0.2)
BASOPHILS NFR BLD: 1 % (ref 0–2)
BILIRUB SERPL-MCNC: 0.7 MG/DL (ref 0–1.2)
BUN SERPL-MCNC: 37 MG/DL (ref 8–23)
CALCIUM SERPL-MCNC: 9.3 MG/DL (ref 8.8–10.2)
CHLORIDE SERPL-SCNC: 105 MMOL/L (ref 98–107)
CHOLEST SERPL-MCNC: 149 MG/DL (ref 0–200)
CO2 SERPL-SCNC: 17 MMOL/L (ref 20–28)
CREAT SERPL-MCNC: 1.76 MG/DL (ref 0.8–1.3)
CREAT UR-MCNC: 146 MG/DL (ref 39–259)
DIFFERENTIAL METHOD BLD: NORMAL
EOSINOPHIL # BLD: 0.2 K/UL (ref 0–0.8)
EOSINOPHIL NFR BLD: 3 % (ref 0.5–7.8)
ERYTHROCYTE [DISTWIDTH] IN BLOOD BY AUTOMATED COUNT: 12.7 % (ref 11.9–14.6)
EST. AVERAGE GLUCOSE BLD GHB EST-MCNC: 303 MG/DL
GLOBULIN SER CALC-MCNC: 3.3 G/DL (ref 2.3–3.5)
GLUCOSE SERPL-MCNC: 192 MG/DL (ref 70–99)
HBA1C MFR BLD: 12.2 % (ref 0–5.6)
HCT VFR BLD AUTO: 42.6 % (ref 41.1–50.3)
HCV AB SER QL: NONREACTIVE
HDLC SERPL-MCNC: 32 MG/DL (ref 40–60)
HDLC SERPL: 4.7 (ref 0–5)
HGB BLD-MCNC: 13.9 G/DL (ref 13.6–17.2)
HIV 1+2 AB+HIV1 P24 AG SERPL QL IA: NONREACTIVE
HIV 1/2 RESULT COMMENT: NORMAL
IMM GRANULOCYTES # BLD AUTO: 0 K/UL (ref 0–0.5)
IMM GRANULOCYTES NFR BLD AUTO: 1 % (ref 0–5)
LDLC SERPL CALC-MCNC: 96 MG/DL (ref 0–100)
LYMPHOCYTES # BLD: 1.6 K/UL (ref 0.5–4.6)
LYMPHOCYTES NFR BLD: 23 % (ref 13–44)
MCH RBC QN AUTO: 28.9 PG (ref 26.1–32.9)
MCHC RBC AUTO-ENTMCNC: 32.6 G/DL (ref 31.4–35)
MCV RBC AUTO: 88.6 FL (ref 82–102)
MICROALBUMIN UR-MCNC: 6.74 MG/DL (ref 0–20)
MICROALBUMIN/CREAT UR-RTO: 46 MG/G (ref 0–30)
MONOCYTES # BLD: 0.6 K/UL (ref 0.1–1.3)
MONOCYTES NFR BLD: 9 % (ref 4–12)
NEUTS SEG # BLD: 4.2 K/UL (ref 1.7–8.2)
NEUTS SEG NFR BLD: 63 % (ref 43–78)
NRBC # BLD: 0 K/UL (ref 0–0.2)
PLATELET # BLD AUTO: 181 K/UL (ref 150–450)
PMV BLD AUTO: 10.2 FL (ref 9.4–12.3)
POTASSIUM SERPL-SCNC: 4.2 MMOL/L (ref 3.5–5.1)
PROT SERPL-MCNC: 6.9 G/DL (ref 6.3–8.2)
PSA SERPL-MCNC: 0.6 NG/ML (ref 0–4)
RBC # BLD AUTO: 4.81 M/UL (ref 4.23–5.6)
SODIUM SERPL-SCNC: 138 MMOL/L (ref 136–145)
TRIGL SERPL-MCNC: 110 MG/DL (ref 0–150)
TSH W FREE THYROID IF ABNORMAL: 2.25 UIU/ML (ref 0.27–4.2)
VLDLC SERPL CALC-MCNC: 22 MG/DL (ref 6–23)
WBC # BLD AUTO: 6.7 K/UL (ref 4.3–11.1)

## 2024-09-13 PROCEDURE — 99203 OFFICE O/P NEW LOW 30 MIN: CPT | Performed by: NURSE PRACTITIONER

## 2024-09-13 PROCEDURE — 36415 COLL VENOUS BLD VENIPUNCTURE: CPT | Performed by: NURSE PRACTITIONER

## 2024-09-13 PROCEDURE — 3078F DIAST BP <80 MM HG: CPT | Performed by: NURSE PRACTITIONER

## 2024-09-13 PROCEDURE — 3075F SYST BP GE 130 - 139MM HG: CPT | Performed by: NURSE PRACTITIONER

## 2024-09-13 PROCEDURE — 3046F HEMOGLOBIN A1C LEVEL >9.0%: CPT | Performed by: NURSE PRACTITIONER

## 2024-09-13 RX ORDER — HYDRALAZINE HYDROCHLORIDE 100 MG/1
50 TABLET, FILM COATED ORAL 3 TIMES DAILY
Qty: 135 TABLET | Refills: 1 | Status: SHIPPED | OUTPATIENT
Start: 2024-09-13

## 2024-09-13 RX ORDER — HYDROXYZINE HYDROCHLORIDE 50 MG/1
50 TABLET, FILM COATED ORAL EVERY EVENING
Qty: 30 TABLET | Refills: 3 | Status: CANCELLED | OUTPATIENT
Start: 2024-09-13

## 2024-09-13 RX ORDER — ATORVASTATIN CALCIUM 80 MG/1
80 TABLET, FILM COATED ORAL DAILY
Qty: 90 TABLET | Refills: 1 | Status: SHIPPED | OUTPATIENT
Start: 2024-09-13

## 2024-09-13 RX ORDER — SEMAGLUTIDE 2.68 MG/ML
2 INJECTION, SOLUTION SUBCUTANEOUS
Qty: 3 ML | Refills: 5 | Status: SHIPPED | OUTPATIENT
Start: 2024-09-13

## 2024-09-13 RX ORDER — HYDROXYZINE HYDROCHLORIDE 50 MG/1
50 TABLET, FILM COATED ORAL
Qty: 30 TABLET | Refills: 5 | Status: SHIPPED | OUTPATIENT
Start: 2024-09-13

## 2024-09-13 RX ORDER — METFORMIN HYDROCHLORIDE 750 MG/1
750 TABLET, EXTENDED RELEASE ORAL 2 TIMES DAILY WITH MEALS
Qty: 180 TABLET | Refills: 1 | Status: SHIPPED | OUTPATIENT
Start: 2024-09-13

## 2024-09-13 RX ORDER — METOPROLOL TARTRATE 100 MG
100 TABLET ORAL 2 TIMES DAILY
Qty: 180 TABLET | Refills: 1 | Status: SHIPPED | OUTPATIENT
Start: 2024-09-13

## 2024-09-13 RX ORDER — AMLODIPINE BESYLATE 5 MG/1
5 TABLET ORAL DAILY
Qty: 90 TABLET | Refills: 1 | Status: CANCELLED | OUTPATIENT
Start: 2024-09-13

## 2024-09-13 RX ORDER — ATORVASTATIN CALCIUM 80 MG/1
80 TABLET, FILM COATED ORAL DAILY
Qty: 90 TABLET | Refills: 1 | Status: CANCELLED | OUTPATIENT
Start: 2024-09-13

## 2024-09-13 RX ORDER — HYDRALAZINE HYDROCHLORIDE 100 MG/1
TABLET, FILM COATED ORAL
Qty: 270 TABLET | Refills: 1 | Status: CANCELLED | OUTPATIENT
Start: 2024-09-13 | End: 2025-02-11

## 2024-09-13 RX ORDER — INSULIN GLARGINE 100 [IU]/ML
10 INJECTION, SOLUTION SUBCUTANEOUS NIGHTLY
Qty: 3 ML | Refills: 5 | Status: SHIPPED | OUTPATIENT
Start: 2024-09-13

## 2024-09-13 RX ORDER — AMIODARONE HYDROCHLORIDE 200 MG/1
200 TABLET ORAL DAILY
Qty: 90 TABLET | Refills: 1 | Status: CANCELLED | OUTPATIENT
Start: 2024-09-13

## 2024-09-13 RX ORDER — SEMAGLUTIDE 2.68 MG/ML
2 INJECTION, SOLUTION SUBCUTANEOUS
Status: CANCELLED | OUTPATIENT
Start: 2024-09-13

## 2024-09-13 RX ORDER — CLOPIDOGREL BISULFATE 75 MG/1
75 TABLET ORAL DAILY
Qty: 90 TABLET | Refills: 1 | Status: CANCELLED | OUTPATIENT
Start: 2024-09-13

## 2024-09-13 RX ORDER — METOPROLOL TARTRATE 100 MG
100 TABLET ORAL 2 TIMES DAILY
Qty: 180 TABLET | Refills: 1 | Status: CANCELLED | OUTPATIENT
Start: 2024-09-13

## 2024-09-13 RX ORDER — AMLODIPINE BESYLATE 5 MG/1
5 TABLET ORAL DAILY
Qty: 90 TABLET | Refills: 1 | Status: SHIPPED | OUTPATIENT
Start: 2024-09-13

## 2024-09-13 SDOH — ECONOMIC STABILITY: FOOD INSECURITY: WITHIN THE PAST 12 MONTHS, THE FOOD YOU BOUGHT JUST DIDN'T LAST AND YOU DIDN'T HAVE MONEY TO GET MORE.: NEVER TRUE

## 2024-09-13 SDOH — ECONOMIC STABILITY: FOOD INSECURITY: WITHIN THE PAST 12 MONTHS, YOU WORRIED THAT YOUR FOOD WOULD RUN OUT BEFORE YOU GOT MONEY TO BUY MORE.: NEVER TRUE

## 2024-09-13 SDOH — ECONOMIC STABILITY: INCOME INSECURITY: HOW HARD IS IT FOR YOU TO PAY FOR THE VERY BASICS LIKE FOOD, HOUSING, MEDICAL CARE, AND HEATING?: NOT HARD AT ALL

## 2024-09-13 ASSESSMENT — ANXIETY QUESTIONNAIRES
1. FEELING NERVOUS, ANXIOUS, OR ON EDGE: NOT AT ALL
4. TROUBLE RELAXING: NOT AT ALL
IF YOU CHECKED OFF ANY PROBLEMS ON THIS QUESTIONNAIRE, HOW DIFFICULT HAVE THESE PROBLEMS MADE IT FOR YOU TO DO YOUR WORK, TAKE CARE OF THINGS AT HOME, OR GET ALONG WITH OTHER PEOPLE: NOT DIFFICULT AT ALL
7. FEELING AFRAID AS IF SOMETHING AWFUL MIGHT HAPPEN: NOT AT ALL
3. WORRYING TOO MUCH ABOUT DIFFERENT THINGS: NOT AT ALL
2. NOT BEING ABLE TO STOP OR CONTROL WORRYING: NOT AT ALL
GAD7 TOTAL SCORE: 0
5. BEING SO RESTLESS THAT IT IS HARD TO SIT STILL: NOT AT ALL
6. BECOMING EASILY ANNOYED OR IRRITABLE: NOT AT ALL

## 2024-09-13 ASSESSMENT — PATIENT HEALTH QUESTIONNAIRE - PHQ9
2. FEELING DOWN, DEPRESSED OR HOPELESS: NOT AT ALL
SUM OF ALL RESPONSES TO PHQ QUESTIONS 1-9: 0
SUM OF ALL RESPONSES TO PHQ QUESTIONS 1-9: 0
1. LITTLE INTEREST OR PLEASURE IN DOING THINGS: NOT AT ALL
SUM OF ALL RESPONSES TO PHQ QUESTIONS 1-9: 0
SUM OF ALL RESPONSES TO PHQ QUESTIONS 1-9: 0
SUM OF ALL RESPONSES TO PHQ9 QUESTIONS 1 & 2: 0

## 2024-09-16 DIAGNOSIS — N18.32 STAGE 3B CHRONIC KIDNEY DISEASE (HCC): Primary | ICD-10-CM

## 2024-09-16 DIAGNOSIS — R80.9 URINE TEST POSITIVE FOR MICROALBUMINURIA: ICD-10-CM

## 2024-09-16 PROBLEM — N18.30 CKD (CHRONIC KIDNEY DISEASE) STAGE 3, GFR 30-59 ML/MIN (HCC): Status: ACTIVE | Noted: 2024-09-16

## 2024-09-18 RX ORDER — BLOOD-GLUCOSE METER
KIT MISCELLANEOUS
Qty: 1 KIT | Refills: 0 | Status: SHIPPED | OUTPATIENT
Start: 2024-09-18

## 2024-09-18 RX ORDER — LANCETS 30 GAUGE
EACH MISCELLANEOUS
Qty: 100 EACH | Refills: 5 | Status: SHIPPED | OUTPATIENT
Start: 2024-09-18

## 2024-09-18 RX ORDER — BLOOD-GLUCOSE METER
1 KIT MISCELLANEOUS DAILY PRN
COMMUNITY
End: 2024-09-18 | Stop reason: SDUPTHER

## 2024-09-18 RX ORDER — LANCETS 30 GAUGE
1 EACH MISCELLANEOUS DAILY
COMMUNITY
End: 2024-09-18 | Stop reason: SDUPTHER

## 2024-09-24 ASSESSMENT — ENCOUNTER SYMPTOMS
SHORTNESS OF BREATH: 0
VOMITING: 0
CHOKING: 0
ABDOMINAL DISTENTION: 0
EYE PAIN: 0
COLOR CHANGE: 0
RECTAL PAIN: 0
SINUS PAIN: 0
RESPIRATORY NEGATIVE: 1
CHEST TIGHTNESS: 0
FACIAL SWELLING: 0
EYE DISCHARGE: 0
EYES NEGATIVE: 1
RHINORRHEA: 0
STRIDOR: 0
CONSTIPATION: 0
ANAL BLEEDING: 0
APNEA: 0
SORE THROAT: 0
GASTROINTESTINAL NEGATIVE: 1
TROUBLE SWALLOWING: 0
NAUSEA: 0
ABDOMINAL PAIN: 0
BACK PAIN: 0
ALLERGIC/IMMUNOLOGIC NEGATIVE: 1
VOICE CHANGE: 0
DIARRHEA: 0
WHEEZING: 0
COUGH: 0
SINUS PRESSURE: 0
BLOOD IN STOOL: 0

## 2024-10-11 ENCOUNTER — TELEPHONE (OUTPATIENT)
Age: 64
End: 2024-10-11

## 2024-10-11 NOTE — TELEPHONE ENCOUNTER
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 He need this letter by November 26, 2024

## 2024-10-14 ENCOUNTER — OFFICE VISIT (OUTPATIENT)
Dept: FAMILY MEDICINE CLINIC | Facility: CLINIC | Age: 64
End: 2024-10-14

## 2024-10-14 ENCOUNTER — TELEPHONE (OUTPATIENT)
Age: 64
End: 2024-10-14

## 2024-10-14 VITALS
HEART RATE: 54 BPM | BODY MASS INDEX: 31.52 KG/M2 | WEIGHT: 208 LBS | HEIGHT: 68 IN | RESPIRATION RATE: 16 BRPM | DIASTOLIC BLOOD PRESSURE: 62 MMHG | SYSTOLIC BLOOD PRESSURE: 102 MMHG | OXYGEN SATURATION: 97 %

## 2024-10-14 DIAGNOSIS — I25.10 CORONARY ARTERY DISEASE INVOLVING NATIVE CORONARY ARTERY OF NATIVE HEART WITHOUT ANGINA PECTORIS: ICD-10-CM

## 2024-10-14 DIAGNOSIS — Z23 ENCOUNTER FOR IMMUNIZATION: ICD-10-CM

## 2024-10-14 DIAGNOSIS — E11.65 TYPE 2 DIABETES MELLITUS WITH HYPERGLYCEMIA, WITH LONG-TERM CURRENT USE OF INSULIN (HCC): ICD-10-CM

## 2024-10-14 DIAGNOSIS — Z12.11 SCREENING FOR COLON CANCER: ICD-10-CM

## 2024-10-14 DIAGNOSIS — I48.0 PAROXYSMAL ATRIAL FIBRILLATION (HCC): Primary | ICD-10-CM

## 2024-10-14 DIAGNOSIS — Z79.4 TYPE 2 DIABETES MELLITUS WITH HYPERGLYCEMIA, WITH LONG-TERM CURRENT USE OF INSULIN (HCC): ICD-10-CM

## 2024-10-14 DIAGNOSIS — D50.9 IRON DEFICIENCY ANEMIA, UNSPECIFIED IRON DEFICIENCY ANEMIA TYPE: ICD-10-CM

## 2024-10-14 DIAGNOSIS — G47.00 INSOMNIA, UNSPECIFIED TYPE: ICD-10-CM

## 2024-10-14 DIAGNOSIS — E55.9 VITAMIN D DEFICIENCY: ICD-10-CM

## 2024-10-14 DIAGNOSIS — N18.32 STAGE 3B CHRONIC KIDNEY DISEASE (HCC): ICD-10-CM

## 2024-10-14 DIAGNOSIS — E78.5 HYPERLIPIDEMIA, UNSPECIFIED HYPERLIPIDEMIA TYPE: ICD-10-CM

## 2024-10-14 DIAGNOSIS — Z95.828 S/P IVC FILTER: ICD-10-CM

## 2024-10-14 DIAGNOSIS — I10 PRIMARY HYPERTENSION: ICD-10-CM

## 2024-10-14 DIAGNOSIS — Z86.718 HISTORY OF DVT IN ADULTHOOD: ICD-10-CM

## 2024-10-14 PROBLEM — Z93.0 TRACHEOSTOMY STATUS (HCC): Status: ACTIVE | Noted: 2021-03-12

## 2024-10-14 PROBLEM — Z93.0 TRACHEOSTOMY PRESENT (HCC): Status: ACTIVE | Noted: 2021-03-12

## 2024-10-14 PROBLEM — R91.8 OTHER NONSPECIFIC ABNORMAL FINDING OF LUNG FIELD: Status: ACTIVE | Noted: 2021-03-11

## 2024-10-14 PROBLEM — G62.9 NEUROPATHY: Status: ACTIVE | Noted: 2021-10-13

## 2024-10-14 PROBLEM — K92.2 GASTROINTESTINAL HEMORRHAGE: Status: ACTIVE | Noted: 2021-03-11

## 2024-10-14 PROBLEM — R63.0 ANOREXIA SYMPTOM: Status: ACTIVE | Noted: 2021-03-11

## 2024-10-14 PROBLEM — K21.9 GASTRO-ESOPHAGEAL REFLUX DISEASE WITHOUT ESOPHAGITIS: Status: ACTIVE | Noted: 2021-03-11

## 2024-10-14 PROBLEM — E11.9 TYPE 2 DIABETES MELLITUS (HCC): Status: ACTIVE | Noted: 2021-03-12

## 2024-10-14 PROBLEM — Z86.16 PERSONAL HISTORY OF COVID-19: Status: ACTIVE | Noted: 2021-03-11

## 2024-10-14 RX ORDER — MULTIVIT-MIN/IRON/FOLIC ACID/K 18-600-40
2000 CAPSULE ORAL DAILY
Qty: 30 CAPSULE | Refills: 5 | Status: SHIPPED | OUTPATIENT
Start: 2024-10-14

## 2024-10-14 NOTE — TELEPHONE ENCOUNTER
Unable to leave message. Sent letter through my chart and printed letter and left it at the . I also mailed the letter to the pt.

## 2024-10-14 NOTE — PROGRESS NOTES
[place] [address]  Phone: [phone] Fax [phone]  Madan Cunningham MS, APRN, FNP-C  10/14/2024  Chief Complaint   Patient presents with   • Follow-up     Pt here today for f/u to recheck chronic conditions. Please see ROS/Assessment and Plan section for full details of all items addressed during today's appointment.         ASSESSMENT/PLAN:  Below is the assessment and plan developed based on review of pertinent history, physical exam, labs, studies, and medications.    1. Paroxysmal atrial fibrillation (HCC)  2. Coronary artery disease involving native coronary artery of native heart without angina pectoris  Pt on medication and under care of [facility] for PAF and CAD. Pt saw them 6/25/24 and has f/u 12/11/24-stable. Pt denies any current/recent palpitations, chest pain/pressure, SOB, dizziness, syncope/near syncope. Discussed with pt. Will have pt continue POC/medications per [facility]. Pt to f/u with me in 8 weeks. Will monitor.   - Comprehensive Metabolic Panel; Future    3. History of DVT in adulthood  4. S/P IVC filter  5. Iron deficiency anemia, unspecified iron deficiency anemia type  Pt has a remote hx of DVT to BLE and IVC filter. Pt denies any current symptoms of DVT-pt on OTC ASA 81 m po daily and Plavix 75 mg po daily. Pt also has RONNIE. Pt reports taking OTC Ferrous Sulfate 325 mg po daily. Pt has not recently seen a Hematologist, but was previously referred to Hematology. Pt has not yet scheduled an appointment with them. On 9/13/24, pt's CBC was WNL. Hgb was WNL 13.9. Discussed with pt. Will have pt continue OTC ASA, Plavix, and OTC Ferrous Sulfate at same doses. Pt given number to Hematology and encouraged to call in near future to schedule an appointment. Will recheck CBC prior to f/u with me in 8 weeks. Will monitor.   - CBC with Auto Differential; Future    6. Type 2 diabetes mellitus with hyperglycemia, with

## 2024-11-13 ASSESSMENT — ENCOUNTER SYMPTOMS
RECTAL PAIN: 0
VOMITING: 0
APNEA: 0
GASTROINTESTINAL NEGATIVE: 1
ANAL BLEEDING: 0
WHEEZING: 0
CONSTIPATION: 0
EYE PAIN: 0
RHINORRHEA: 0
TROUBLE SWALLOWING: 0
RESPIRATORY NEGATIVE: 1
SHORTNESS OF BREATH: 0
CHEST TIGHTNESS: 0
VOICE CHANGE: 0
EYES NEGATIVE: 1
BLOOD IN STOOL: 0
BACK PAIN: 0
ABDOMINAL PAIN: 0
SORE THROAT: 0
DIARRHEA: 0
STRIDOR: 0
ALLERGIC/IMMUNOLOGIC NEGATIVE: 1
EYE DISCHARGE: 0
NAUSEA: 0
COLOR CHANGE: 0
FACIAL SWELLING: 0
SINUS PRESSURE: 0
ABDOMINAL DISTENTION: 0
COUGH: 0
CHOKING: 0
SINUS PAIN: 0

## (undated) DEVICE — KENDALL RADIOLUCENT FOAM MONITORING ELECTRODE RECTANGULAR SHAPE: Brand: KENDALL

## (undated) DEVICE — CONNECTOR TBNG OD5-7MM O2 END DISP

## (undated) DEVICE — SINGLE USE SUCTION VALVE MAJ-209: Brand: SINGLE USE SUCTION VALVE (STERILE)

## (undated) DEVICE — SYR 5ML 1/5 GRAD LL NSAF LF --

## (undated) DEVICE — SYR 50ML LR LCK 1ML GRAD NSAF --

## (undated) DEVICE — CANNULA NSL ORAL AD FOR CAPNOFLEX CO2 O2 AIRLFE

## (undated) DEVICE — MOUTHPIECE ENDOSCP 20X27MM --

## (undated) DEVICE — SYR 3ML LL TIP 1/10ML GRAD --

## (undated) DEVICE — SINGLE USE BIOPSY VALVE MAJ-210: Brand: SINGLE USE BIOPSY VALVE (STERILE)

## (undated) DEVICE — CLIPPING DEVICE: Brand: RESOLUTION CLIP

## (undated) DEVICE — BRONCHOSCOPY PACK: Brand: MEDLINE INDUSTRIES, INC.

## (undated) DEVICE — STERILE POLYISOPRENE POWDER-FREE SURGICAL GLOVES: Brand: PROTEXIS

## (undated) DEVICE — SYRINGE, LUER LOCK, 60ML: Brand: MEDLINE

## (undated) DEVICE — BE 105-8 BRONCHOSCOPE SWIVEL - 15MM ID/22MM OD (PATIENT PORT) X15MM OD (EQUIPMENT PORT). REUSABLE.  FITS COMPONENTS OF ADULT VENTILATOR CIRCUITS.  MOLDED OF POLYETHERIMIDE. INCLUDES TWO SILICONE RUBBER CAPS; ONE CAP ALLOWS FOR THE USE OF A SUCTIONING CATHETER WHILE THE OTHER CAP ALLOWS FOR THE USE OF A FIBER-OPTIC BRONCHOSCOPE WITHOUT SIGNIFICANT LOSS OF PEEP.: Brand: BE 105-8 BRONCHOSCOPE SWIVEL

## (undated) DEVICE — CATH KT GASTMY PEG PSH 20FR --

## (undated) DEVICE — BLOCK BITE AD 60FR W/ VELC STRP ADDRESSES MOST PT AND

## (undated) DEVICE — NDL PRT INJ NSAF BLNT 18GX1.5 --

## (undated) DEVICE — SYRINGE, LUER SLIP, STERILE, 60ML: Brand: MEDLINE